# Patient Record
Sex: MALE | Race: WHITE | Employment: OTHER | URBAN - NONMETROPOLITAN AREA
[De-identification: names, ages, dates, MRNs, and addresses within clinical notes are randomized per-mention and may not be internally consistent; named-entity substitution may affect disease eponyms.]

---

## 2017-01-02 ENCOUNTER — OFFICE VISIT - GICH (OUTPATIENT)
Dept: PEDIATRICS | Facility: OTHER | Age: 47
End: 2017-01-02

## 2017-01-02 ENCOUNTER — AMBULATORY - GICH (OUTPATIENT)
Dept: ONCOLOGY | Facility: OTHER | Age: 47
End: 2017-01-02

## 2017-01-02 ENCOUNTER — HISTORY (OUTPATIENT)
Dept: PEDIATRICS | Facility: OTHER | Age: 47
End: 2017-01-02

## 2017-01-02 ENCOUNTER — HOSPITAL ENCOUNTER (OUTPATIENT)
Dept: RADIOLOGY | Facility: OTHER | Age: 47
End: 2017-01-02
Attending: INTERNAL MEDICINE

## 2017-01-02 ENCOUNTER — TRANSFERRED RECORDS (OUTPATIENT)
Dept: HEALTH INFORMATION MANAGEMENT | Facility: CLINIC | Age: 47
End: 2017-01-02

## 2017-01-02 ENCOUNTER — AMBULATORY - GICH (OUTPATIENT)
Dept: LAB | Facility: OTHER | Age: 47
End: 2017-01-02

## 2017-01-02 DIAGNOSIS — D63.8 ANEMIA IN OTHER CHRONIC DISEASES CLASSIFIED ELSEWHERE: ICD-10-CM

## 2017-01-02 DIAGNOSIS — M51.16 INTERVERTEBRAL DISC DISORDER WITH RADICULOPATHY OF LUMBAR REGION: ICD-10-CM

## 2017-01-02 DIAGNOSIS — R61 GENERALIZED HYPERHIDROSIS: ICD-10-CM

## 2017-01-02 DIAGNOSIS — C64.9 MALIGNANT NEOPLASM OF KIDNEY EXCLUDING RENAL PELVIS (H): ICD-10-CM

## 2017-01-02 DIAGNOSIS — Z01.812 ENCOUNTER FOR PREPROCEDURAL LABORATORY EXAMINATION: ICD-10-CM

## 2017-01-02 DIAGNOSIS — M54.16 RADICULOPATHY OF LUMBAR REGION: ICD-10-CM

## 2017-01-02 DIAGNOSIS — M99.83 OTHER BIOMECHANICAL LESIONS OF LUMBAR REGION (CODE): ICD-10-CM

## 2017-01-02 LAB
CREAT SERPL-MCNC: 9.87 MG/DL (ref 0.7–1.3)
GFR IF NOT AFRICAN AMERICAN - HISTORICAL: 6 ML/MIN/1.73M2

## 2017-01-03 ENCOUNTER — MEDICAL CORRESPONDENCE (OUTPATIENT)
Dept: TRANSPLANT | Facility: CLINIC | Age: 47
End: 2017-01-03

## 2017-01-06 ENCOUNTER — HOSPITAL ENCOUNTER (OUTPATIENT)
Dept: PHYSICAL THERAPY | Facility: OTHER | Age: 47
Setting detail: THERAPIES SERIES
End: 2017-01-06
Attending: INTERNAL MEDICINE

## 2017-01-06 DIAGNOSIS — M99.83 OTHER BIOMECHANICAL LESIONS OF LUMBAR REGION (CODE): ICD-10-CM

## 2017-01-06 DIAGNOSIS — M51.16 INTERVERTEBRAL DISC DISORDER WITH RADICULOPATHY OF LUMBAR REGION: ICD-10-CM

## 2017-01-07 ENCOUNTER — AMBULATORY - GICH (OUTPATIENT)
Dept: SCHEDULING | Facility: OTHER | Age: 47
End: 2017-01-07

## 2017-01-10 ENCOUNTER — AMBULATORY - GICH (OUTPATIENT)
Dept: LAB | Facility: OTHER | Age: 47
End: 2017-01-10

## 2017-01-10 DIAGNOSIS — Z99.2 DEPENDENCE ON RENAL DIALYSIS (H): ICD-10-CM

## 2017-01-10 LAB
HEMOGLOBIN: 14.2 G/DL (ref 13.5–17.5)
MCV RBC AUTO: 100 FL (ref 80–100)

## 2017-01-11 ENCOUNTER — HOSPITAL ENCOUNTER (OUTPATIENT)
Dept: RADIOLOGY | Facility: OTHER | Age: 47
End: 2017-01-11
Attending: INTERNAL MEDICINE

## 2017-01-11 ENCOUNTER — AMBULATORY - GICH (OUTPATIENT)
Dept: ONCOLOGY | Facility: OTHER | Age: 47
End: 2017-01-11

## 2017-01-11 DIAGNOSIS — D63.8 ANEMIA IN OTHER CHRONIC DISEASES CLASSIFIED ELSEWHERE: ICD-10-CM

## 2017-01-11 DIAGNOSIS — C64.9 MALIGNANT NEOPLASM OF KIDNEY EXCLUDING RENAL PELVIS (H): ICD-10-CM

## 2017-01-20 ENCOUNTER — AMBULATORY - GICH (OUTPATIENT)
Dept: RADIOLOGY | Facility: OTHER | Age: 47
End: 2017-01-20

## 2017-01-20 DIAGNOSIS — G25.3 MYOCLONUS: ICD-10-CM

## 2017-01-20 DIAGNOSIS — G62.9 POLYNEUROPATHY: ICD-10-CM

## 2017-01-25 ENCOUNTER — OFFICE VISIT - GICH (OUTPATIENT)
Dept: ONCOLOGY | Facility: OTHER | Age: 47
End: 2017-01-25

## 2017-01-25 ENCOUNTER — TRANSFERRED RECORDS (OUTPATIENT)
Dept: HEALTH INFORMATION MANAGEMENT | Facility: HOSPITAL | Age: 47
End: 2017-01-25

## 2017-01-25 ENCOUNTER — HOSPITAL ENCOUNTER (OUTPATIENT)
Dept: RADIOLOGY | Facility: OTHER | Age: 47
End: 2017-01-25

## 2017-01-25 ENCOUNTER — HISTORY (OUTPATIENT)
Dept: ONCOLOGY | Facility: OTHER | Age: 47
End: 2017-01-25

## 2017-01-25 DIAGNOSIS — G25.3 MYOCLONUS: ICD-10-CM

## 2017-01-25 DIAGNOSIS — C64.9 MALIGNANT NEOPLASM OF KIDNEY EXCLUDING RENAL PELVIS (H): ICD-10-CM

## 2017-01-25 DIAGNOSIS — D63.8 ANEMIA IN OTHER CHRONIC DISEASES CLASSIFIED ELSEWHERE: ICD-10-CM

## 2017-01-25 DIAGNOSIS — G62.9 POLYNEUROPATHY: ICD-10-CM

## 2017-01-25 LAB
ERYTHROCYTE [SEDIMENTATION RATE] IN BLOOD: 15 MM/HR
IRON BINDING CAP: 223.72 UG/DL (ref 245–400)
IRON SATURATION: 51 % (ref 20–55)
IRON: 115 UG/DL (ref 50–212)
RHEUMATOID FACTOR - HISTORICAL: 20.8 IU/ML
TSH - HISTORICAL: 3.38 UIU/ML (ref 0.35–4.94)
UIBC (UNSATURATED) - HISTORICAL: 108.72 MG/DL

## 2017-01-26 LAB
ELP INTERP,SERUM - HISTORICAL: NORMAL
ELP,ALBUMIN - HISTORICAL: 4.1 G/DL (ref 3.31–5.31)
ELP,ALPHA 1 - HISTORICAL: 0.28 G/DL (ref 0.19–0.42)
ELP,ALPHA 2 - HISTORICAL: 0.63 G/DL (ref 0.44–1.03)
ELP,BETA - HISTORICAL: 0.63 G/DL (ref 0.52–1.05)
ELP,GAMMA - HISTORICAL: 1.45 G/DL (ref 0.59–1.46)
PROT SERPL-MCNC: 7.1 G/DL (ref 6–8)

## 2017-01-27 LAB
ANTI-NATIVE DNA - HISTORICAL: NEGATIVE
Lab: 20.5 MIU/ML (ref 2.6–18.5)

## 2017-01-31 ENCOUNTER — TRANSFERRED RECORDS (OUTPATIENT)
Dept: HEALTH INFORMATION MANAGEMENT | Facility: CLINIC | Age: 47
End: 2017-01-31

## 2017-01-31 ENCOUNTER — AMBULATORY - GICH (OUTPATIENT)
Dept: SCHEDULING | Facility: OTHER | Age: 47
End: 2017-01-31

## 2017-02-10 ENCOUNTER — TELEPHONE (OUTPATIENT)
Dept: TRANSPLANT | Facility: CLINIC | Age: 47
End: 2017-02-10

## 2017-02-10 ENCOUNTER — AMBULATORY - GICH (OUTPATIENT)
Dept: SCHEDULING | Facility: OTHER | Age: 47
End: 2017-02-10

## 2017-02-10 ENCOUNTER — HISTORY (OUTPATIENT)
Dept: PEDIATRICS | Facility: OTHER | Age: 47
End: 2017-02-10

## 2017-02-10 ENCOUNTER — OFFICE VISIT - GICH (OUTPATIENT)
Dept: PEDIATRICS | Facility: OTHER | Age: 47
End: 2017-02-10

## 2017-02-10 ENCOUNTER — COMMUNICATION - GICH (OUTPATIENT)
Dept: INTERNAL MEDICINE | Facility: OTHER | Age: 47
End: 2017-02-10

## 2017-02-10 ENCOUNTER — COMMUNICATION - GICH (OUTPATIENT)
Dept: ONCOLOGY | Facility: OTHER | Age: 47
End: 2017-02-10

## 2017-02-10 ENCOUNTER — COMMUNICATION - GICH (OUTPATIENT)
Dept: PEDIATRICS | Facility: OTHER | Age: 47
End: 2017-02-10

## 2017-02-10 DIAGNOSIS — Z99.2 DEPENDENCE ON RENAL DIALYSIS (H): ICD-10-CM

## 2017-02-10 DIAGNOSIS — Z79.899 OTHER LONG TERM (CURRENT) DRUG THERAPY: ICD-10-CM

## 2017-02-10 DIAGNOSIS — M54.16 RADICULOPATHY OF LUMBAR REGION: ICD-10-CM

## 2017-02-10 DIAGNOSIS — N18.6 END STAGE RENAL DISEASE (H): ICD-10-CM

## 2017-02-10 DIAGNOSIS — Z94.0 HISTORY OF KIDNEY TRANSPLANT: ICD-10-CM

## 2017-02-10 DIAGNOSIS — R34 ANURIA AND OLIGURIA: ICD-10-CM

## 2017-02-10 ASSESSMENT — PATIENT HEALTH QUESTIONNAIRE - PHQ9: SUM OF ALL RESPONSES TO PHQ QUESTIONS 1-9: 0

## 2017-02-10 NOTE — TELEPHONE ENCOUNTER
Received Oncologist, Dr. Raul Campbell's Note dated 01/25/2017. Note stated concern for rule out for myeloma and need to assess this with SPEP and otherwise pt is cancer free. I called Dr. Campbell's office today and spoke with his nurse who explained SPEP was done and negative but Dr. Campbell also now mentioned in patient's chart concern for mono clonal antibody presence. Nurse will send Dr. Campbell a message to call me about possible myeloma diagnosis and transferred me to HIM where I left a message requesting records for lab work associated with appt 01-.

## 2017-02-13 ENCOUNTER — AMBULATORY - GICH (OUTPATIENT)
Dept: SCHEDULING | Facility: OTHER | Age: 47
End: 2017-02-13

## 2017-02-14 ENCOUNTER — AMBULATORY - GICH (OUTPATIENT)
Dept: LAB | Facility: OTHER | Age: 47
End: 2017-02-14

## 2017-02-14 ENCOUNTER — AMBULATORY - GICH (OUTPATIENT)
Dept: SCHEDULING | Facility: OTHER | Age: 47
End: 2017-02-14

## 2017-02-14 DIAGNOSIS — R34 ANURIA AND OLIGURIA: ICD-10-CM

## 2017-02-14 DIAGNOSIS — Z79.899 OTHER LONG TERM (CURRENT) DRUG THERAPY: ICD-10-CM

## 2017-02-17 ENCOUNTER — COMMUNICATION - GICH (OUTPATIENT)
Dept: INTERNAL MEDICINE | Facility: OTHER | Age: 47
End: 2017-02-17

## 2017-02-17 LAB
Lab: NORMAL
Lab: NORMAL MCG/ML
Lab: NORMAL NG/ML

## 2017-02-21 ENCOUNTER — HOSPITAL ENCOUNTER (OUTPATIENT)
Dept: PHYSICAL THERAPY | Facility: OTHER | Age: 47
Setting detail: THERAPIES SERIES
End: 2017-02-21
Attending: INTERNAL MEDICINE

## 2017-02-21 DIAGNOSIS — Z99.2 DEPENDENCE ON RENAL DIALYSIS (H): ICD-10-CM

## 2017-02-21 DIAGNOSIS — Z79.899 OTHER LONG TERM (CURRENT) DRUG THERAPY: ICD-10-CM

## 2017-02-21 DIAGNOSIS — Z94.0 HISTORY OF KIDNEY TRANSPLANT: ICD-10-CM

## 2017-02-21 DIAGNOSIS — N18.6 END STAGE RENAL DISEASE (H): ICD-10-CM

## 2017-02-21 DIAGNOSIS — M54.16 RADICULOPATHY OF LUMBAR REGION: ICD-10-CM

## 2017-02-23 ENCOUNTER — TELEPHONE (OUTPATIENT)
Dept: TRANSPLANT | Facility: CLINIC | Age: 47
End: 2017-02-23

## 2017-02-23 ENCOUNTER — MEDICAL CORRESPONDENCE (OUTPATIENT)
Dept: TRANSPLANT | Facility: CLINIC | Age: 47
End: 2017-02-23

## 2017-02-23 DIAGNOSIS — N02.B9 IGA NEPHROPATHY: ICD-10-CM

## 2017-02-23 DIAGNOSIS — T86.91 TRANSPLANT FAILURE DUE TO REJECTION: ICD-10-CM

## 2017-02-23 DIAGNOSIS — N18.6 END STAGE RENAL DISEASE (H): ICD-10-CM

## 2017-02-23 DIAGNOSIS — Z76.82 ORGAN TRANSPLANT CANDIDATE: ICD-10-CM

## 2017-02-23 DIAGNOSIS — T86.12 KIDNEY TRANSPLANT FAILURE: ICD-10-CM

## 2017-02-23 NOTE — LETTER
February 24, 2017            Gilles Henning  [address]  [address]  [place] [facility] [address]      Dear Dr. Samantha Tobin,      We are writing to inform you that Gilles Henning has completed the referral process with us to be evaluated for a kidney transplant.    Their assigned Transplant Coordinator is Laura Benitez.  If you should have any questions or concerns, please don t hesitate to contact us.        Regards,           Solid Organ Transplant Intake   [facility], [address]  [address], [facility] [address]  Phone: [phone]  [place] [address]

## 2017-02-23 NOTE — LETTER
March 10, 2017    Gilles Junior  [address]  [address]  [facility] [address]      Dear Mr. Junior,    Attached is your Pre Kidney Evaluation schedule on March 22, 2017 and returning on June 12, 2017. Please feel free to contact me at [phone], if you have any questions.      Sincerely,   Frida LEAL    CC:Laura ESCOBAR                                                  [facility]  [address] [place]  [address]          EVALUATION SCHEDULE: KIDNEY TRANSPLANT SLOT 2 EVAL      Patient:   GILLES JUNIOR   MR#:    [id number]  Coordinator:  Laura ESCOBAR     [phone]  :   Frida LEAL     [phone]  Location:   [facility] 3[facility]  Date:    March 22, 2017 & June 12, 2017      This is your evaluation schedule, please follow dates and times.  You  will receive reminder phone calls for other tests, but please follow this  schedule only!  If you have any questions about dates and times,  please call us on number listed above.  Thank you, Transplant Clinic.     NO FOOD or DRINK AFTER MIDNIGHT  (You may only have small amounts of water)      Day/Date:   Wednesday, March 22, 2017  Time Location Activity   6:30a.m. [facility]  Imaging and Lab Testing  1st floor Blood tests (fasting, PLEASE)   7:15a.m. BREAKFAST     7:30a.m. [facility]  Transplant Services  3rd floor; Clinic 3A Check in & go thru evaluation schedule for the day, get vitals & medication records   7:50 - 9:00a.m. [facility]  Transplant Services  3rd floor; Clinic 3A Pre-transplant class   9:00a.m. [facility]  Transplant Services  3rd floor; Clinic 3B Appointment with Dr. Macias,   Transplant Nephrologist   9:45a.m.-  10:15a.m. [facility]  Transplant Services  3rd floor; Clinic 3A; Consult Room Appointment with Rukhsana Peña,  Registered  Dietitian   10:30a.m. [facility]  Transplant Services  3rd floor; Clinic 3A Meet with Laura ESCOBAR  Transplant Coordinator   11:00a.m. [facility]  Transplant Services  3rd floor; Clinic 3A Appointment with Dr. Lorenzo,  Transplant Surgeon   11:30a.m. [facility]  Transplant Services  3rd floor; Clinic 3A Research    12:00Noon [facility]  Transplant Services  3rd floor; Clinic 3A; Consult Room Appointment with either Anastasiya Low,  Transplant    12:45p.m.-  1:15p.m. LUNCH     PLEASE TAKE SHUTTLE GOING TO MEDICAL CENTER: [facility]     1:30p.m. [facility] Waiting Room  2nd floor [facility] EKG   2:00p.m. [facility] Waiting Room  2nd floor [facility] Chest X-ray   2:30p.m. [facility] Waiting Room  2nd floor [facility] Echocardiogram    PLEASE TAKE THE SHUTTLE BACK TO THE CLINICS AND SURGERY CENTER:    4:00p.m. [facility]  Imaging and Lab Testing  1st floor 2nd ABO blood draw - confirmation of 1st draw       Day/Date:   Monday, June 12, 2017  Time Location Activity   2:00p.m. [facility]  Cardiology/Heart Care Clinic  3rd floor; Clinic 3L Appointment with Dr. Campbell,   Cardiology

## 2017-02-24 ENCOUNTER — AMBULATORY - GICH (OUTPATIENT)
Dept: SCHEDULING | Facility: OTHER | Age: 47
End: 2017-02-24

## 2017-02-24 ENCOUNTER — HISTORY (OUTPATIENT)
Dept: EMERGENCY MEDICINE | Facility: OTHER | Age: 47
End: 2017-02-24

## 2017-02-24 ENCOUNTER — HOSPITAL ENCOUNTER (OUTPATIENT)
Dept: PHYSICAL THERAPY | Facility: OTHER | Age: 47
Setting detail: THERAPIES SERIES
End: 2017-02-24
Attending: INTERNAL MEDICINE

## 2017-02-24 NOTE — TELEPHONE ENCOUNTER
Intake Progress Note  Organ:  Kidney    Referral Came Via (Fax from/phone call from):  Phone call    Referring Physician:  Dr. Samantha Tobin     Assigned Coordinator:  Laura Benitez    Reported Diagnosis that caused the kidney failure ( not CKD)  IgA Nephritis, Cancer 2015, (Both Kidney removed at [facility])    Best time patient can be reached:  Anytime     has dialysis starts 0645-10:00 (T-Th-Sa)     Type of packet sent:  Kidney    Records:  [facility] requested from:  [facility], [facility], [facility]    Insurance information:  Medicare and [employer] Platinum  Policy araiza:  Self  Subscriber/policy/ID number:  [id number],   [id number]  Group Number:                                                   [id number]    History of diabetes: No    Do you have an endocrinologist?: No           History of a kidney biopsy:  Yes         If Yes,when and where:  [facility]  2015       Past Medical and Surgical History (updated in Epic medical / surgical):             History of  cancer personally:  Yes, What type? In the Left Kidney, was removed [facility] 2015              Where and when was it treated?[facility] 2015                            History of cardiac events:  No              History abdominal surgeries: Yes What type?Neprectomy of both Kidney's, Periteneal fluid retension=changed to Hemo>[facility]              Where and when? [facility] Nephrectomy, both Kidney's  2015        [facility] [place] 2015              History of previous transplant: Yes             If Yes where and estimated date:  [facility] 12/2009             Listed or in eval at another transplant center?  No             History of hospitalization in last 12 months:  No               History of blood transfusion:  Yes               Smoking history:  No     Current smoker:  No     On dialysis: Yes  Where: [facility]                  Type of Dialysis: In Center Dialysis   Start  "date: 2015       Dialysis Days:  T-Th-Sa       If NYOD, estimated GFR:  Unknown  Height:  5'10\"  Weight:  131  BMI:  18.8    Health Maintenance:  Colon:  Yes [facility]  2011  Dr. Dubose   PSA:  Yes [facility]  Dr. Kelly 11/2016  Dental: No but needs to go in  Vaccines Yes [facility] Dr. Tobin  Special Needs (ie--wheelchair, assistance, guardian, interpretor):  No      Informed patient on the need to arrange age appropriate cancer screening, vaccines up to date and dental clearance    Reviewed evaluation process and reminded patient to complete questionnaire, complete medical records release, and review packet prior to evaluation visit     Informed patient that coordinator will review their chart and insurance coverage and if no concerns they will receive a call from a  to schedule evaluation      "

## 2017-02-25 ENCOUNTER — AMBULATORY - GICH (OUTPATIENT)
Dept: SCHEDULING | Facility: OTHER | Age: 47
End: 2017-02-25

## 2017-02-27 ENCOUNTER — HOSPITAL ENCOUNTER (OUTPATIENT)
Dept: PHYSICAL THERAPY | Facility: OTHER | Age: 47
Setting detail: THERAPIES SERIES
End: 2017-02-27
Attending: INTERNAL MEDICINE

## 2017-02-27 NOTE — TELEPHONE ENCOUNTER
Called Dr. Raul Campbell ( Oncologist at [facility] ) whose last visit with pt was 01/25/2017 where he wrote that he would like to r/o an underlying myeloma. Dr. Campbell explained to me that he does not think pt has myeloma, that he will have pt RTC with him in 4 months from last visit and that he thinks pt is OK to proceed with kidney transplant from his point of view. I called pt today and spoke to him. I explained that all is OK now to proceed with a pre kidney transplant evaluation - pt is very happy and wants to proceed. I reviewed the components of the pre kidney transplant evaluation and process. I explained that a  from our office will call him soon to set up appts for pre K eval. I instructed pt to call me with questions. Pt expressed good understanding of all and was in good agreement with the plan.     Smart set orders into EPIC today for pre kidney transplant evaluation - routed to .

## 2017-03-01 ENCOUNTER — HOSPITAL ENCOUNTER (OUTPATIENT)
Dept: PHYSICAL THERAPY | Facility: OTHER | Age: 47
Setting detail: THERAPIES SERIES
End: 2017-03-01
Attending: INTERNAL MEDICINE

## 2017-03-02 ENCOUNTER — MEDICAL CORRESPONDENCE (OUTPATIENT)
Dept: TRANSPLANT | Facility: CLINIC | Age: 47
End: 2017-03-02

## 2017-03-08 ENCOUNTER — HOSPITAL ENCOUNTER (OUTPATIENT)
Dept: PHYSICAL THERAPY | Facility: OTHER | Age: 47
Setting detail: THERAPIES SERIES
End: 2017-03-08
Attending: INTERNAL MEDICINE

## 2017-03-10 ENCOUNTER — HOSPITAL ENCOUNTER (OUTPATIENT)
Dept: PHYSICAL THERAPY | Facility: OTHER | Age: 47
Setting detail: THERAPIES SERIES
End: 2017-03-10
Attending: INTERNAL MEDICINE

## 2017-03-11 ENCOUNTER — AMBULATORY - GICH (OUTPATIENT)
Dept: SCHEDULING | Facility: OTHER | Age: 47
End: 2017-03-11

## 2017-03-16 ENCOUNTER — HISTORY (OUTPATIENT)
Dept: PEDIATRICS | Facility: OTHER | Age: 47
End: 2017-03-16

## 2017-03-16 ENCOUNTER — OFFICE VISIT - GICH (OUTPATIENT)
Dept: PEDIATRICS | Facility: OTHER | Age: 47
End: 2017-03-16

## 2017-03-16 DIAGNOSIS — N05.9 NEPHRITIC SYNDROME WITH MORPHOLOGIC CHANGE: ICD-10-CM

## 2017-03-16 DIAGNOSIS — M54.16 RADICULOPATHY OF LUMBAR REGION: ICD-10-CM

## 2017-03-16 DIAGNOSIS — Z94.0 HISTORY OF KIDNEY TRANSPLANT: ICD-10-CM

## 2017-03-16 ASSESSMENT — PATIENT HEALTH QUESTIONNAIRE - PHQ9: SUM OF ALL RESPONSES TO PHQ QUESTIONS 1-9: 0

## 2017-03-20 ENCOUNTER — HISTORY (OUTPATIENT)
Dept: EMERGENCY MEDICINE | Facility: OTHER | Age: 47
End: 2017-03-20

## 2017-03-21 ENCOUNTER — TELEPHONE (OUTPATIENT)
Dept: TRANSPLANT | Facility: CLINIC | Age: 47
End: 2017-03-21

## 2017-03-22 ENCOUNTER — COMMUNICATION - GICH (OUTPATIENT)
Dept: PEDIATRICS | Facility: OTHER | Age: 47
End: 2017-03-22

## 2017-03-22 DIAGNOSIS — K21.9 GASTRO-ESOPHAGEAL REFLUX DISEASE WITHOUT ESOPHAGITIS: ICD-10-CM

## 2017-04-05 ENCOUNTER — ALLIED HEALTH/NURSE VISIT (OUTPATIENT)
Dept: TRANSPLANT | Facility: CLINIC | Age: 47
End: 2017-04-05
Attending: INTERNAL MEDICINE
Payer: MEDICARE

## 2017-04-05 ENCOUNTER — AMBULATORY - GICH (OUTPATIENT)
Dept: SCHEDULING | Facility: OTHER | Age: 47
End: 2017-04-05

## 2017-04-05 ENCOUNTER — RESULTS ONLY (OUTPATIENT)
Dept: OTHER | Facility: CLINIC | Age: 47
End: 2017-04-05

## 2017-04-05 ENCOUNTER — ALLIED HEALTH/NURSE VISIT (OUTPATIENT)
Dept: TRANSPLANT | Facility: CLINIC | Age: 47
End: 2017-04-05
Attending: DIETITIAN, REGISTERED
Payer: MEDICARE

## 2017-04-05 ENCOUNTER — OFFICE VISIT (OUTPATIENT)
Dept: TRANSPLANT | Facility: CLINIC | Age: 47
End: 2017-04-05
Attending: TRANSPLANT SURGERY
Payer: MEDICARE

## 2017-04-05 ENCOUNTER — RADIANT APPOINTMENT (OUTPATIENT)
Dept: CARDIOLOGY | Facility: CLINIC | Age: 47
End: 2017-04-05
Attending: PHYSICIAN ASSISTANT

## 2017-04-05 VITALS
HEART RATE: 58 BPM | DIASTOLIC BLOOD PRESSURE: 88 MMHG | TEMPERATURE: 98.2 F | HEIGHT: 70 IN | SYSTOLIC BLOOD PRESSURE: 158 MMHG | OXYGEN SATURATION: 99 % | BODY MASS INDEX: 19.79 KG/M2 | WEIGHT: 138.2 LBS

## 2017-04-05 VITALS
HEIGHT: 70 IN | WEIGHT: 138.2 LBS | BODY MASS INDEX: 19.79 KG/M2 | SYSTOLIC BLOOD PRESSURE: 158 MMHG | DIASTOLIC BLOOD PRESSURE: 88 MMHG | TEMPERATURE: 98.2 F | OXYGEN SATURATION: 99 % | HEART RATE: 58 BPM

## 2017-04-05 DIAGNOSIS — T86.91 TRANSPLANT FAILURE DUE TO REJECTION: ICD-10-CM

## 2017-04-05 DIAGNOSIS — N18.6 ESRD (END STAGE RENAL DISEASE) (H): Primary | ICD-10-CM

## 2017-04-05 DIAGNOSIS — Z76.82 ORGAN TRANSPLANT CANDIDATE: ICD-10-CM

## 2017-04-05 DIAGNOSIS — T86.12 KIDNEY TRANSPLANT FAILURE: ICD-10-CM

## 2017-04-05 DIAGNOSIS — N02.B9 IGA NEPHROPATHY: ICD-10-CM

## 2017-04-05 DIAGNOSIS — Z76.82 ORGAN TRANSPLANT CANDIDATE: Primary | ICD-10-CM

## 2017-04-05 DIAGNOSIS — Z01.818 PRE-TRANSPLANT EVALUATION FOR KIDNEY TRANSPLANT: Primary | ICD-10-CM

## 2017-04-05 DIAGNOSIS — N18.6 END STAGE RENAL DISEASE (H): ICD-10-CM

## 2017-04-05 DIAGNOSIS — Z76.82 AWAITING ORGAN TRANSPLANT STATUS: Primary | ICD-10-CM

## 2017-04-05 DIAGNOSIS — Z01.818 ENCOUNTER FOR PRE-TRANSPLANT EVALUATION FOR KIDNEY TRANSPLANT: Primary | ICD-10-CM

## 2017-04-05 LAB
ABO + RH BLD: NORMAL
ALBUMIN SERPL-MCNC: 4 G/DL (ref 3.4–5)
ALP SERPL-CCNC: 130 U/L (ref 40–150)
ALT SERPL W P-5'-P-CCNC: 13 U/L (ref 0–70)
ANION GAP SERPL CALCULATED.3IONS-SCNC: 13 MMOL/L (ref 3–14)
APTT PPP: 28 SEC (ref 22–37)
AST SERPL W P-5'-P-CCNC: 11 U/L (ref 0–45)
BASOPHILS # BLD AUTO: 0 10E9/L (ref 0–0.2)
BASOPHILS NFR BLD AUTO: 0.7 %
BILIRUB SERPL-MCNC: 0.8 MG/DL (ref 0.2–1.3)
BLD GP AB SCN SERPL QL: NORMAL
BLD GP AB SCN TITR SERPL: NORMAL {TITER}
BLOOD BANK CMNT PATIENT-IMP: NORMAL
BUN SERPL-MCNC: 61 MG/DL (ref 7–30)
CALCIUM SERPL-MCNC: 8.3 MG/DL (ref 8.5–10.1)
CARDIOLIPIN ANTIBODY IGG: NORMAL GPL-U/ML (ref 0–19.9)
CARDIOLIPIN ANTIBODY IGM: 1.2 MPL-U/ML (ref 0–19.9)
CHLORIDE SERPL-SCNC: 91 MMOL/L (ref 94–109)
CHOLEST SERPL-MCNC: 225 MG/DL
CMV IGG SERPL QL IA: ABNORMAL AI (ref 0–0.8)
CO2 SERPL-SCNC: 32 MMOL/L (ref 20–32)
CREAT SERPL-MCNC: 8.12 MG/DL (ref 0.66–1.25)
DIFFERENTIAL METHOD BLD: ABNORMAL
EBV VCA IGG SER QL IA: 0.4 AI (ref 0–0.8)
EOSINOPHIL # BLD AUTO: 0.1 10E9/L (ref 0–0.7)
EOSINOPHIL NFR BLD AUTO: 2 %
ERYTHROCYTE [DISTWIDTH] IN BLOOD BY AUTOMATED COUNT: 19.9 % (ref 10–15)
GFR SERPL CREATININE-BSD FRML MDRD: 7 ML/MIN/1.7M2
GLUCOSE SERPL-MCNC: 96 MG/DL (ref 70–99)
HBV CORE AB SERPL QL IA: NONREACTIVE
HBV SURFACE AB SERPL IA-ACNC: 26.31 M[IU]/ML
HBV SURFACE AG SERPL QL IA: NONREACTIVE
HCT VFR BLD AUTO: 34.8 % (ref 40–53)
HCV AB SERPL QL IA: NORMAL
HDLC SERPL-MCNC: 50 MG/DL
HGB BLD-MCNC: 11.3 G/DL (ref 13.3–17.7)
HIV 1+2 AB+HIV1 P24 AG SERPL QL IA: NORMAL
IMM GRANULOCYTES # BLD: 0 10E9/L (ref 0–0.4)
IMM GRANULOCYTES NFR BLD: 0.5 %
INR PPP: 1.06 (ref 0.86–1.14)
LDLC SERPL CALC-MCNC: 151 MG/DL
LYMPHOCYTES # BLD AUTO: 0.5 10E9/L (ref 0.8–5.3)
LYMPHOCYTES NFR BLD AUTO: 12.9 %
MCH RBC QN AUTO: 33.6 PG (ref 26.5–33)
MCHC RBC AUTO-ENTMCNC: 32.5 G/DL (ref 31.5–36.5)
MCV RBC AUTO: 104 FL (ref 78–100)
MONOCYTES # BLD AUTO: 0.6 10E9/L (ref 0–1.3)
MONOCYTES NFR BLD AUTO: 15.6 %
NEUTROPHILS # BLD AUTO: 2.8 10E9/L (ref 1.6–8.3)
NEUTROPHILS NFR BLD AUTO: 68.3 %
NONHDLC SERPL-MCNC: 175 MG/DL
NRBC # BLD AUTO: 0 10*3/UL
NRBC BLD AUTO-RTO: 0 /100
PHOSPHATE SERPL-MCNC: 3.9 MG/DL (ref 2.5–4.5)
PLATELET # BLD AUTO: 163 10E9/L (ref 150–450)
POTASSIUM SERPL-SCNC: 4.2 MMOL/L (ref 3.4–5.3)
PROT SERPL-MCNC: 7.7 G/DL (ref 6.8–8.8)
RBC # BLD AUTO: 3.36 10E12/L (ref 4.4–5.9)
SODIUM SERPL-SCNC: 136 MMOL/L (ref 133–144)
SPECIMEN EXP DATE BLD: NORMAL
SPECIMEN EXP DATE BLD: NORMAL
T PALLIDUM IGG+IGM SER QL: NEGATIVE
THROMBIN TIME: 17.5 SEC (ref 13–19)
TRIGL SERPL-MCNC: 119 MG/DL
VZV IGG SER QL IA: 2.2 AI (ref 0–0.8)
WBC # BLD AUTO: 4 10E9/L (ref 4–11)

## 2017-04-05 PROCEDURE — 86665 EPSTEIN-BARR CAPSID VCA: CPT | Performed by: PHYSICIAN ASSISTANT

## 2017-04-05 PROCEDURE — 86832 HLA CLASS I HIGH DEFIN QUAL: CPT | Performed by: PHYSICIAN ASSISTANT

## 2017-04-05 PROCEDURE — 93010 ELECTROCARDIOGRAM REPORT: CPT | Performed by: INTERNAL MEDICINE

## 2017-04-05 PROCEDURE — 85610 PROTHROMBIN TIME: CPT | Performed by: PHYSICIAN ASSISTANT

## 2017-04-05 PROCEDURE — 86706 HEP B SURFACE ANTIBODY: CPT | Performed by: PHYSICIAN ASSISTANT

## 2017-04-05 PROCEDURE — 81376 HLA II TYPING 1 LOCUS LR: CPT | Mod: 91 | Performed by: PHYSICIAN ASSISTANT

## 2017-04-05 PROCEDURE — 86886 COOMBS TEST INDIRECT TITER: CPT | Performed by: PHYSICIAN ASSISTANT

## 2017-04-05 PROCEDURE — 86833 HLA CLASS II HIGH DEFIN QUAL: CPT | Performed by: PHYSICIAN ASSISTANT

## 2017-04-05 PROCEDURE — 86644 CMV ANTIBODY: CPT | Performed by: PHYSICIAN ASSISTANT

## 2017-04-05 PROCEDURE — 80061 LIPID PANEL: CPT | Performed by: PHYSICIAN ASSISTANT

## 2017-04-05 PROCEDURE — 87340 HEPATITIS B SURFACE AG IA: CPT | Performed by: PHYSICIAN ASSISTANT

## 2017-04-05 PROCEDURE — 86147 CARDIOLIPIN ANTIBODY EA IG: CPT | Performed by: PHYSICIAN ASSISTANT

## 2017-04-05 PROCEDURE — 86803 HEPATITIS C AB TEST: CPT | Performed by: PHYSICIAN ASSISTANT

## 2017-04-05 PROCEDURE — 86704 HEP B CORE ANTIBODY TOTAL: CPT | Performed by: PHYSICIAN ASSISTANT

## 2017-04-05 PROCEDURE — 86905 BLOOD TYPING RBC ANTIGENS: CPT | Performed by: PHYSICIAN ASSISTANT

## 2017-04-05 PROCEDURE — 81375 HLA II TYPING AG EQUIV LR: CPT | Performed by: PHYSICIAN ASSISTANT

## 2017-04-05 PROCEDURE — 85025 COMPLETE CBC W/AUTO DIFF WBC: CPT | Performed by: PHYSICIAN ASSISTANT

## 2017-04-05 PROCEDURE — 85670 THROMBIN TIME PLASMA: CPT | Performed by: PHYSICIAN ASSISTANT

## 2017-04-05 PROCEDURE — 81240 F2 GENE: CPT | Performed by: PHYSICIAN ASSISTANT

## 2017-04-05 PROCEDURE — 85730 THROMBOPLASTIN TIME PARTIAL: CPT | Mod: 91 | Performed by: PHYSICIAN ASSISTANT

## 2017-04-05 PROCEDURE — 85730 THROMBOPLASTIN TIME PARTIAL: CPT | Performed by: PHYSICIAN ASSISTANT

## 2017-04-05 PROCEDURE — 86780 TREPONEMA PALLIDUM: CPT | Performed by: PHYSICIAN ASSISTANT

## 2017-04-05 PROCEDURE — 84100 ASSAY OF PHOSPHORUS: CPT | Performed by: PHYSICIAN ASSISTANT

## 2017-04-05 PROCEDURE — 81241 F5 GENE: CPT | Performed by: PHYSICIAN ASSISTANT

## 2017-04-05 PROCEDURE — 85613 RUSSELL VIPER VENOM DILUTED: CPT | Performed by: PHYSICIAN ASSISTANT

## 2017-04-05 PROCEDURE — 86850 RBC ANTIBODY SCREEN: CPT | Performed by: PHYSICIAN ASSISTANT

## 2017-04-05 PROCEDURE — 86787 VARICELLA-ZOSTER ANTIBODY: CPT | Performed by: PHYSICIAN ASSISTANT

## 2017-04-05 PROCEDURE — 80053 COMPREHEN METABOLIC PANEL: CPT | Performed by: PHYSICIAN ASSISTANT

## 2017-04-05 PROCEDURE — 87799 DETECT AGENT NOS DNA QUANT: CPT | Mod: XU | Performed by: PHYSICIAN ASSISTANT

## 2017-04-05 PROCEDURE — 40000866 ZZHCL STATISTIC HIV 1/2 ANTIGEN/ANTIBODY PRETRANSPLANT ONLY: Performed by: PHYSICIAN ASSISTANT

## 2017-04-05 PROCEDURE — 86901 BLOOD TYPING SEROLOGIC RH(D): CPT | Performed by: PHYSICIAN ASSISTANT

## 2017-04-05 PROCEDURE — 86900 BLOOD TYPING SEROLOGIC ABO: CPT | Mod: 91 | Performed by: PHYSICIAN ASSISTANT

## 2017-04-05 PROCEDURE — 36415 COLL VENOUS BLD VENIPUNCTURE: CPT | Performed by: PHYSICIAN ASSISTANT

## 2017-04-05 RX ORDER — AZATHIOPRINE 50 MG/1
50 TABLET ORAL DAILY
Qty: 60 TABLET | Status: ON HOLD | COMMUNITY
Start: 2017-04-05 | End: 2017-12-17

## 2017-04-05 RX ORDER — TACROLIMUS 1 MG/1
1 CAPSULE, GELATIN COATED ORAL 2 TIMES DAILY
Qty: 240 CAPSULE | Status: ON HOLD | COMMUNITY
Start: 2017-04-05 | End: 2017-12-17

## 2017-04-05 RX ORDER — SENNOSIDES 8.6 MG
1 TABLET ORAL DAILY PRN
Status: ON HOLD | COMMUNITY
End: 2017-12-17

## 2017-04-05 RX ORDER — PREDNISONE 5 MG/1
20 TABLET ORAL DAILY
Status: ON HOLD | COMMUNITY
Start: 2017-04-05 | End: 2017-12-17

## 2017-04-05 ASSESSMENT — PAIN SCALES - GENERAL
PAINLEVEL: NO PAIN (0)
PAINLEVEL: NO PAIN (0)

## 2017-04-05 NOTE — LETTER
4/5/2017       RE: Gilles Henning  [address]  [facility] [address]     Dear Colleague,    Thank you for referring your patient, Gilles Henning, to the [facility] SOLID ORGAN TRANSPLANT at [facility]. Please see a copy of my visit note below.    Patient attended the Pre Kidney/Pancreas Transplant Education Class today with his wife. All were very attentive and enganged throughout the class and asked few questions. I introduced the [facility] website and encouraged them to access it for further education. In addition to the standard video content. I reviewed living donation paired exchange, KDPI, typical length of stay and the need to stay locally post transplant discharge.    Again, thank you for allowing me to participate in the care of your patient.      Sincerely,    Transplant Class

## 2017-04-05 NOTE — PROGRESS NOTES
Patient attended the Pre Kidney/Pancreas Transplant Education Class today with his wife. All were very attentive and enganged throughout the class and asked few questions. I introduced the [facility] website and encouraged them to access it for further education. In addition to the standard video content. I reviewed living donation paired exchange, KDPI, typical length of stay and the need to stay locally post transplant discharge.

## 2017-04-05 NOTE — MR AVS SNAPSHOT
"              After Visit Summary   4/5/2017    Gilles Henning    MRN: [id number]           Patient Information     Date Of Birth          1970        Visit Information        Provider Department      4/5/2017 9:45 AM Mary Peña RD [facility] Solid Organ Transplant        Today's Diagnoses     Organ transplant candidate    -  1       Follow-ups after your visit        Your next 10 appointments already scheduled     Jun 12, 2017  2:00 PM CDT   (Arrive by 1:45 PM)   NEW PANCREAS/KIDNEY TRANSPLANT WORK-UP with Marisa Campbell MD   [facility]    [address] [place] [id number] [phone]              Who to contact     If you have questions or need follow up information about today's clinic visit or your schedule please contact [facility] SOLID ORGAN TRANSPLANT directly at [phone].  Normal or non-critical lab and imaging results will be communicated to you by MyChart, letter or phone within 4 business days after the clinic has received the results. If you do not hear from us within 7 days, please contact the clinic through [employer] or phone. If you have a critical or abnormal lab result, we will notify you by phone as soon as possible.  Submit refill requests through "[employer] or call your pharmacy and they will forward the refill request to us. Please allow 3 business days for your refill to be completed.          Additional Information About Your Visit        MyChart Information     "[employer] lets you send messages to your doctor, view your test results, renew your prescriptions, schedule appointments and more. To sign up, go to [email or web address]/"[email or web address] . Click on \"Log in\" on the left side of the screen, which will take you to the Welcome page. Then click on \"Sign up Now\" on the right side of the page.     You will be asked to enter the access code listed below, as well as some personal information. Please follow the directions " to create your username and password.     Your access code is: [email or web address]  Expires: 2017  6:30 AM     Your access code will  in 90 days. If you need help or a new code, please call your [facility] or [phone].        Care EveryWhere ID     This is your Care EveryWhere ID. This could be used by other organizations to access your [facility] medical records  [id number]         Blood Pressure from Last 3 Encounters:   17 158/88   17 158/88    Weight from Last 3 Encounters:   17 62.7 kg (138 lb 3.2 oz)   17 62.7 kg (138 lb 3.2 oz)              Today, you had the following     No orders found for display       Primary Care Provider Office Phone # Fax #    Charlie Darrion Dubose [phone]       [facility] [address] [facility] [address]        Thank you!     Thank you for choosing [facility] SOLID ORGAN TRANSPLANT  for your care. Our goal is always to provide you with excellent care. Hearing back from our patients is one way we can continue to improve our services. Please take a few minutes to complete the written survey that you may receive in the mail after your visit with us. Thank you!             Your Updated Medication List - Protect others around you: Learn how to safely use, store and throw away your medicines at www.disposemymeds.org.          This list is accurate as of: 17 11:59 PM.  Always use your most recent med list.                   Brand Name Dispense Instructions for use    azaTHIOprine 50 MG tablet    IMURAN    60 tablet    Take 1 tablet (50 mg) by mouth daily       calcium acetate 667 MG Caps capsule    PHOSLO     Take 667 mg by mouth 3 times daily (with meals)       CLONAZEPAM PO      Take 1 mg by mouth 2 times daily as needed for anxiety       * LISINOPRIL PO      Take 10 mg by mouth daily       * LISINOPRIL PO      Take 5 mg by mouth daily       METOPROLOL TARTRATE PO      Take 25 mg by mouth daily       OMEPRAZOLE  PO      Take 40 mg by mouth every morning       predniSONE 5 MG tablet    DELTASONE     Take 1 tablet (5 mg) by mouth daily       sennosides 8.6 MG tablet    SENOKOT     Take 1 tablet by mouth daily as needed for constipation       tacrolimus capsule     240 capsule    Take 1 capsule (1 mg) by mouth 2 times daily       TRAZODONE HCL PO      Take 300 mg by mouth At Bedtime       VITAMIN D (CHOLECALCIFEROL) PO      Take 2,000 Units by mouth daily       * Notice:  This list has 2 medication(s) that are the same as other medications prescribed for you. Read the directions carefully, and ask your doctor or other care provider to review them with you.

## 2017-04-05 NOTE — PROGRESS NOTES
Transplant Surgery Consult Note     Medical record number: [id number]  YOB: 1970,   Consult requested by Dr. Tobin for evaluation of kidney transplant candidacy.    Assessment and Recommendations:Mr. Henning appears to be a good candidate for kidney transplantation and has a good understanding of the risks and benefits of this approach to the management of renal failure. The following issues should be addressed prior to finalizing his transplant candidacy:     1. Immunosuppression: ok to wean pred as long as continue on tac and imuran.  2. Review OP notes for extent of surgery (patient says something about partial splenectomy).  3. EBV negative serostatus.  No symptoms of weight loss.   Could consider EBV PCR add on from today's labs.  4. Retransplant: on immunosuppression.  Check BK PCR on eval labs.    The majority of our visit was spent in counselling, discussing the medical and surgical risks of kidney transplantation. We discussed approximate wait time and how that is influenced by issues such as blood type and sensitization (PRA) and access to a living donor. I contrasted potential waiting time for living vs  donor kidneys from  normal (0-85%) or higher (%) kidney donor profile index (KDPI) donors and their associated outcomes. I would not recommend this individual to consider kidneys from high KDPI donors. Potential surgical complications of kidney transplantation include bleeding, superficial or deep wound complications (infection, hernia, lymphocele), ureteral anastomotic failure (leak or stenosis), graft thrombosis, need for reoperation and other issues such as cardiac complications, pneumonia, deep venous thrombosis, pulmonary embolism, post transplant diabetes and death. The potential for recurrent disease or need for retransplantation was also addressed. We discussed the possible need for ureteral stent (and subsequent removal), and the utility of protocol biopsy and  laboratory studies to evaluate for rejection or recurrent disease. We discussed the risk of graft rejection, our center's average graft and patient survival rates, immunosuppression protocols, as well as the potential opportunity to participate in clinical trials.  We also discussed the average length of stay, recovery process, and posttransplant lab and monitoring protocol.  I emphasized the need for strict immunosuppression medication adherence and the potential for complications of immunosuppression such as skin cancer or lymphoma, as well as a very low but not zero risk of donor-derived disease transmission risks (infection, cancer). Mr. Henning asked good questions and his candidacy will be reviewed at our Multidisciplinary Selection Committee. Thank you for the opportunity to participate in Mr. Henning's care.      Total time: 45 minutes  Counselling time: 40 minutes            Mairo Vallejo MD  Department of Surgery  ---------------------------------------------------------------------------------------------------    HPI: Mr. Henning has End stage renal failure due to IgA Nephropathy. The patient is non-diabetic.   He [place] [facility] [place] with [place]  donor kidney.  He had good graft function until requiring bilateral nephrectomy for RCC.  He is now tumor free.  He is active, doing workout (weight/bike) about 5 days a week. Does not make urine at this time.  No known living donor options.  History of heptitis A, uncomplicated.  He tolerated MMF for the first year of his transplant until he developed severe diarrhea w 40 lb weight loss, colonoscopy proven MMF toxicity.  On aza since that time.  Big history of constipation with laxatives.    The patient has the following pertinent history:       No    Yes  Dialysis:    []      [x] via: L AVF    Blood Transfusion                  []      [x]  Number of units:   Most recently:   Pregnancy:    [x]      [] Number:       Previous Transplant:  []       [x] Details:   [facility] [place]   Cancer    []      [x] Comment: L RCC  Kidney stones   [x]      [] Comment:      Recurrent infections  [x]      []  Type:                  Bladder dysfunction  [x]      [] Cause:    Claudication   [x]      [] Distance:    Previous Amputation  [x]      [] Cause:     Chronic anticoagulation  [x]      [] Indication:   Tenriism  [x]      []   Potential living donor?  [x]      []      Past Medical History:   Diagnosis Date     Anemia in chronic kidney disease      Chews tobacco      Chronic rejection of kidney transplant      ESRD needing dialysis (H)     ~2014     History of alcoholism (H)      History of depression      History of peritoneal dialysis     7 years     History of substance abuse      Hyperlipidemia      IgA nephropathy      Osteopenia      Peritonitis (H)     non-MRSA staph     Renal cell carcinoma (H)      Past Surgical History:   Procedure Laterality Date     HC DIALYSIS AVF OR AVG, CENTRAL INTERVENTION ONLY Left      LAPAROSCOPIC INSERTION CATHETER PERITONEAL DIALYSIS Left      NEPHRECTOMY BILATERAL  2015     REMOVE CATHETER PERITONEAL       TRANSPLANT KIDNEY RECIPIENT  DONOR Left 2009     No family history on file.  Social History     Social History     Marital status:      Spouse name: Merline     Number of children: 4     Years of education: 13     Occupational History     disabled      Social History Main Topics     Smoking status: Current Every Day Smoker     Types: Dip, chew, snus or snuff     Start date: 2011     Smokeless tobacco: Current User     Types: Chew      Comment: 2 days per can.     Alcohol use Yes      Comment: none now, did treatment at age 21, relapsed with divorce (a couple months)  then now  sober 3 years.      Drug use: Yes     Special: Marijuana      Comment: age 14 only.     Sexual activity: Yes     Partners: Female     Other Topics Concern     Not on file     Social History Narrative       ROS:   CONSTITUTIONAL:   No fevers or chills  EYES: negative for icterus  ENT:  negative for hearing loss, tinnitus and sore throat  RESPIRATORY:  negative for cough, sputum, dyspnea  CARDIOVASCULAR:  negative for chest pain Fatigue  GASTROINTESTINAL:  negative for nausea, vomiting, diarrhea or constipation  GENITOURINARY:  negative for incontinence, dysuria, bladder emptying problems  HEME:  No easy bruising  INTEGUMENT:  negative for rash and pruritus  NEURO:  Negative for headache, seizure disorder  Allergies:   No Known Allergies  Medications:  Prescription Medications as of 5/2/2017             PROGRAF 1 MG PO CAPSULE Take 1 capsule (1 mg) by mouth 2 times daily    azaTHIOprine (IMURAN) 50 MG tablet Take 1 tablet (50 mg) by mouth daily    predniSONE (DELTASONE) 5 MG tablet Take 1 tablet (5 mg) by mouth daily    METOPROLOL TARTRATE PO Take 25 mg by mouth daily    LISINOPRIL PO Take 10 mg by mouth daily    LISINOPRIL PO Take 5 mg by mouth daily    VITAMIN D, CHOLECALCIFEROL, PO Take 2,000 Units by mouth daily    OMEPRAZOLE PO Take 40 mg by mouth every morning    CLONAZEPAM PO Take 1 mg by mouth 2 times daily as needed for anxiety    sennosides (SENOKOT) 8.6 MG tablet Take 1 tablet by mouth daily as needed for constipation    TRAZODONE HCL PO Take 300 mg by mouth At Bedtime    calcium acetate (PHOSLO) 667 MG CAPS capsule Take 667 mg by mouth 3 times daily (with meals)        Exam:      Appearance: in no apparent distress.   Skin: normal  Head and Neck: Normal, no rashes or jaundice  Respiratory: easy respirations, no audible wheezing.  Cardiovascular: RRR  Abdomen: flat, Surgical scars consistent with history and Lower abdomen negative for tenderness and masses   Extremeties: femoral 2+/2+, Edema, none  Neuro: without deficit     Diagnostics:   Recent Results (from the past 672 hour(s))   HLA-DR/DQ Typing pcr/ssop    Collection Time: 04/05/17 12:00 AM   Result Value Ref Range    Drsso Test Method SSOP     DRB1* locus DRB1*03(17)     DRB1*  DRB1*11     DRB3* locus DRB3*02     DQB1* locus DQB1*02     DQB1* DQB1*03(07)     DQA1*locus DQA1*05     DPB1* DPB1*02:01     DPB1*locus DPB1*04:02     DPA1* DPA1*01:03    HLA Britney Class I Single Antigen    Collection Time: 04/05/17 12:00 AM   Result Value Ref Range    SA1 Test Method SA HI     SA1 Cell Class I     SA1 Hi Risk Britney B:57 58     SA1 Mod Risk Britney B:35 49 50 53 56 62 63 71 72 75     SA1 Comments        Test performed by modified procedure. Serum heat inactivated. High-risk,   mfi >3,000. Mod-risk, mfi 500-3,000.     HLA Britney Class II Single Antigen    Collection Time: 04/05/17 12:00 AM   Result Value Ref Range    SA2 Test Method SA HI     SA2 Cell Class II     SA2 Hi Risk Britney DR:15 DRw:51 53 DQ:4 5 6 8 9 DQA:02 03     SA2 Mod Risk Britney DR:4 18     SA2 Comments        Test performed by modified procedure. Serum heat inactivated. High-risk,   mfi >3,000. Mod-risk, mfi 500-3,000.     ABO/Rh type and screen [[id number]]    Collection Time: 04/05/17  7:11 AM   Result Value Ref Range    ABO O     RH(D)  Pos     Antibody Screen Neg     Test Valid Only At       [facility]    Specimen Expires 04/08/2017    Antibody titer red cell [[id number]]    Collection Time: 04/05/17  7:12 AM   Result Value Ref Range    Antibody Titer Anti A: IgM 32, IgG 128  Anti B: IgM 16, IgG 16      ABO Subtyping [[id number]]    Collection Time: 04/05/17  7:12 AM   Result Value Ref Range    Antigen Type Canceled, Test credited     Blood Bank Comment       Patient is not ABO type A or AB. A subtyping not applicable. 04/05/17 MT   Lipid Profile [LAB18]    Collection Time: 04/05/17  7:13 AM   Result Value Ref Range    Cholesterol 225 (H) <200 mg/dL    Triglycerides 119 <150 mg/dL    HDL Cholesterol 50 >39 mg/dL    LDL Cholesterol Calculated 151 (H) <100 mg/dL    Non HDL Cholesterol 175 (H) <130 mg/dL   Comprehensive metabolic panel [LAB17]    Collection Time: 04/05/17  7:13 AM   Result Value Ref Range     Sodium 136 133 - 144 mmol/L    Potassium 4.2 3.4 - 5.3 mmol/L    Chloride 91 (L) 94 - 109 mmol/L    Carbon Dioxide 32 20 - 32 mmol/L    Anion Gap 13 3 - 14 mmol/L    Glucose 96 70 - 99 mg/dL    Urea Nitrogen 61 (H) 7 - 30 mg/dL    Creatinine 8.12 (H) 0.66 - 1.25 mg/dL    GFR Estimate 7 (L) >60 mL/min/1.7m2    GFR Estimate If Black 9 (L) >60 mL/min/1.7m2    Calcium 8.3 (L) 8.5 - 10.1 mg/dL    Bilirubin Total 0.8 0.2 - 1.3 mg/dL    Albumin 4.0 3.4 - 5.0 g/dL    Protein Total 7.7 6.8 - 8.8 g/dL    Alkaline Phosphatase 130 40 - 150 U/L    ALT 13 0 - 70 U/L    AST 11 0 - 45 U/L   Phosphorus    Collection Time: 04/05/17  7:13 AM   Result Value Ref Range    Phosphorus 3.9 2.5 - 4.5 mg/dL   Cardiolipin Britney IgG and IgM [[id number]]    Collection Time: 04/05/17  7:13 AM   Result Value Ref Range    Cardiolipin Antibody IgG <1.6  Negative   0.0 - 19.9 GPL-U/mL    Cardiolipin Antibody IgM 1.2 0.0 - 19.9 MPL-U/mL   M Tuberculosis by Quantiferon [[id number]]    Collection Time: 04/05/17  7:13 AM   Result Value Ref Range    M Tuberculosis Result (A) NEG     Canceled, Test credited   Unsatisfactory specimen - tube overfilled  Called to Fátima Ribeiro at transplant clinic at 1150,4/6/2017.MR      M Tuberculosis Antigen Value  IU/mL     Canceled, Test credited   Unsatisfactory specimen - tube overfilled  Called to Fátima Ribeiro at transplant clinic at 1150,4/6/207.  MR     INR [[id number]]    Collection Time: 04/05/17  7:13 AM   Result Value Ref Range    INR 1.06 0.86 - 1.14   Partial thromboplastin time [LAB56]    Collection Time: 04/05/17  7:13 AM   Result Value Ref Range    PTT 28 22 - 37 sec   Thrombin time [[id number]]    Collection Time: 04/05/17  7:13 AM   Result Value Ref Range    Thrombin Time 17.5 13.0 - 19.0 sec   Lupus panel [[id number]]    Collection Time: 04/05/17  7:13 AM   Result Value Ref Range    Lupus Result  NEG     Negative  (Note)  COMMENTS:  The INR is normal.  APTT ratio is normal.  DRVVT Screen ratio is normal.  Thrombin time is  normal.  NEGATIVE TEST; A LUPUS ANTICOAGULANT WAS NOT DETECTED IN THIS  SPECIMEN WITHIN THE LIMITS OF THE TESTING REPERTOIRE.  If the clinical picture is strongly suggestive of an antiphospholipid  syndrome, recommend anticardiolipin and beta-2-glycoprotein (IgG and  IgM) antibody tests.  Ema Frankel M.D.  [phone]  4/6/2017    APTT:       Ratio  Patient  =  0.97  1:2 Mix  =  N/A  Reference:  Negative: Less than or equal to 1.16  Positive: Greater than or equal to 1.17     DILUTE OFELIA VIPER VENOM TEST:  Screen Ratio = 0.97   Normal is less than 1.21       CBC with platelets differential [[id number]]    Collection Time: 04/05/17  7:13 AM   Result Value Ref Range    WBC 4.0 4.0 - 11.0 10e9/L    RBC Count 3.36 (L) 4.4 - 5.9 10e12/L    Hemoglobin 11.3 (L) 13.3 - 17.7 g/dL    Hematocrit 34.8 (L) 40.0 - 53.0 %     (H) 78 - 100 fl    MCH 33.6 (H) 26.5 - 33.0 pg    MCHC 32.5 31.5 - 36.5 g/dL    RDW 19.9 (H) 10.0 - 15.0 %    Platelet Count 163 150 - 450 10e9/L    Diff Method Automated Method     % Neutrophils 68.3 %    % Lymphocytes 12.9 %    % Monocytes 15.6 %    % Eosinophils 2.0 %    % Basophils 0.7 %    % Immature Granulocytes 0.5 %    Nucleated RBCs 0 0 /100    Absolute Neutrophil 2.8 1.6 - 8.3 10e9/L    Absolute Lymphocytes 0.5 (L) 0.8 - 5.3 10e9/L    Absolute Monocytes 0.6 0.0 - 1.3 10e9/L    Absolute Eosinophils 0.1 0.0 - 0.7 10e9/L    Absolute Basophils 0.0 0.0 - 0.2 10e9/L    Abs Immature Granulocytes 0.0 0 - 0.4 10e9/L    Absolute Nucleated RBC 0.0    HLA Typing Complete SOT Recipient    Collection Time: 04/05/17  7:13 AM   Result Value Ref Range    HLA Typing Complete SOT Recipient       Specimen received - Immunology report to follow upon completion.   PRA Single Antigen IgG Antibody    Collection Time: 04/05/17  7:13 AM   Result Value Ref Range    PRA Single Antigen IgG Antibody       Specimen received - Immunology report to follow upon completion.   BK virus PCR quantitative [[id number]]     Collection Time: 04/05/17  7:13 AM   Result Value Ref Range    BK Virus Specimen Plasma     BK Virus Result BK Virus DNA Not Detected BKNEG copies/mL    BK Virus Log  <2.7 Log copies/mL     Not Calculated   The Real-Time quantitative BK Virus assay was developed and its performance   characteristics determined by the [facility] Laboratory at   the [facility] in [place], [place]. The   primers and probes for each analyte are Analyte Specific Reagents (ASRs)   manufactured by [employer].   ASRs are used in many laboratory tests necessary for standard medical care and   generally do not require U.S. Food and Drug Administration approval. The FDA   has determined that such clearance or approval is not necessary.   This test is used for clinical purposes. It should not be regarded as   investigational or for research. This laboratory is certified under the   Clinical Laboratory Improvement Amendments of 1988 (CLIA-88) as qualified to   perform high complexity clinical laboratory testing.     CMV Antibody IgG [[id number]]    Collection Time: 04/05/17  7:13 AM   Result Value Ref Range    CMV Antibody IgG (H) 0.0 - 0.8 AI     >8.0  Positive   Antibody index (AI) values reflect qualitative changes in antibody   concentration that cannot be directly associated with clinical condition or   disease state.     EBV Capsid Antibody IgG [[id number]]    Collection Time: 04/05/17  7:13 AM   Result Value Ref Range    EBV Capsid Antibody IgG 0.4 0.0 - 0.8 AI   Hepatitis B core antibody [[id number]]    Collection Time: 04/05/17  7:13 AM   Result Value Ref Range    Hepatitis B Core Britney Nonreactive NR   Hepatitis B Surface Antibody [[id number]]    Collection Time: 04/05/17  7:13 AM   Result Value Ref Range    Hepatitis B Surface Antibody 26.31 (H) <8.00 m[IU]/mL   Hepatitis B surface antigen [[id number]]    Collection Time: 04/05/17  7:13 AM   Result Value Ref Range    Hep B Surface Agn Nonreactive NR    Hepatitis C antibody [[id number]]    Collection Time: 04/05/17  7:13 AM   Result Value Ref Range    Hepatitis C Antibody  NR     Nonreactive   Assay performance characteristics have not been established for newborns,   infants, and children     HIV Antigen Antibody Combo Pretransplant    Collection Time: 04/05/17  7:13 AM   Result Value Ref Range    HIV Antigen Antibody Combo Pretransplant  NR     Nonreactive   HIV-1 p24 Ag & HIV-1/HIV-2 Ab Not Detected     Anti Treponema [[id number]]    Collection Time: 04/05/17  7:13 AM   Result Value Ref Range    Treponema pallidum Antibody Negative NEG   Varicella Zoster Virus Antibody IgG [[id number]]    Collection Time: 04/05/17  7:13 AM   Result Value Ref Range    Varicella Zoster Virus Antibody IgG 2.2 (H) 0.0 - 0.8 AI   Antibody titer red cell [[id number]]    Collection Time: 04/05/17  3:03 PM   Result Value Ref Range    Antibody Titer Duplicate request    ABO type [[id number]]    Collection Time: 04/05/17  3:04 PM   Result Value Ref Range    ABO O     RH(D)  Pos     Specimen Expires 04/08/2017    UNOS Unacceptable Antigens    Collection Time: 04/10/17 12:00 AM   Result Value Ref Range    Protocol Cutoff Plan A, 500 mfi cumulative      Unacceptable Antigen       A:1 3 23 24 32 B:13 35 44 45 49 50 51 53 56 57 58 62 63 71 72 75 76 78 82   DR:1 103 4 9 10 15 18 DRw:51 53 DQ:4 5 6 8 9     UNOS cPRA    Collection Time: 04/10/17 12:00 AM   Result Value Ref Range    UNOS cPRA 100      UNOS cPRA   Date Value Ref Range Status   04/10/2017 100  Final

## 2017-04-05 NOTE — LETTER
2017       RE: Gilles Henning  [address]  [facility] [address]     Dear Colleague,    Thank you for referring your patient, Gilles Henning, to the [facility] at [facility]. Please see a copy of my visit note below.    Transplant Surgery Consult Note     Medical record number: [id number]  YOB: 1970,   Consult requested by Dr. Tobin for evaluation of kidney transplant candidacy.    Assessment and Recommendations:Mr. Henning appears to be a good candidate for kidney transplantation and has a good understanding of the risks and benefits of this approach to the management of renal failure. The following issues should be addressed prior to finalizing his transplant candidacy:     1. Immunosuppression: ok to wean pred as long as continue on tac and imuran.  2. Review OP notes for extent of surgery (patient says something about partial splenectomy).  3. EBV negative serostatus.  No symptoms of weight loss.   Could consider EBV PCR add on from today's labs.  4. Retransplant: on immunosuppression.  Check BK PCR on eval labs.    The majority of our visit was spent in counselling, discussing the medical and surgical risks of kidney transplantation. We discussed approximate wait time and how that is influenced by issues such as blood type and sensitization (PRA) and access to a living donor. I contrasted potential waiting time for living vs  donor kidneys from  normal (0-85%) or higher (%) kidney donor profile index (KDPI) donors and their associated outcomes. I would not recommend this individual to consider kidneys from high KDPI donors. Potential surgical complications of kidney transplantation include bleeding, superficial or deep wound complications (infection, hernia, lymphocele), ureteral anastomotic failure (leak or stenosis), graft thrombosis, need for reoperation and other issues such as cardiac complications,  pneumonia, deep venous thrombosis, pulmonary embolism, post transplant diabetes and death. The potential for recurrent disease or need for retransplantation was also addressed. We discussed the possible need for ureteral stent (and subsequent removal), and the utility of protocol biopsy and laboratory studies to evaluate for rejection or recurrent disease. We discussed the risk of graft rejection, our center's average graft and patient survival rates, immunosuppression protocols, as well as the potential opportunity to participate in clinical trials.  We also discussed the average length of stay, recovery process, and posttransplant lab and monitoring protocol.  I emphasized the need for strict immunosuppression medication adherence and the potential for complications of immunosuppression such as skin cancer or lymphoma, as well as a very low but not zero risk of donor-derived disease transmission risks (infection, cancer). Mr. Henning asked good questions and his candidacy will be reviewed at our Multidisciplinary Selection Committee. Thank you for the opportunity to participate in Mr. Henning's care.      Total time: 45 minutes  Counselling time: 40 minutes            Mraio Vallejo MD  Department of Surgery  ---------------------------------------------------------------------------------------------------    HPI: Mr. Henning has End stage renal failure due to IgA Nephropathy. The patient is non-diabetic.   He was transplanted at [facility] in 2009 with a  donor kidney.  He had good graft function until requiring bilateral nephrectomy for RCC.  He is now tumor free.  He is active, doing workout (weight/bike) about 5 days a week. Does not make urine at this time.  No known living donor options.  History of heptitis A, uncomplicated.  He tolerated MMF for the first year of his transplant until he developed severe diarrhea w 40 lb weight loss, colonoscopy proven MMF toxicity.  On aza since that time.  Big history of  constipation with laxatives.    The patient has the following pertinent history:       No    Yes  Dialysis:    []      [x] via: L AVF    Blood Transfusion                  []      [x]  Number of units:   Most recently:   Pregnancy:    [x]      [] Number:       Previous Trans[place]:  []      [place]x] [place]   [facility]   [place]cer    []      [x] Comment: L RCC  Kidney stones   [x]      [] Comment:      Recurrent infections  [x]      []  Type:                  Bladder dysfunction  [x]      [] Cause:    Claudication   [x]      [] Distance:    Previous Amputation  [x]      [] Cause:     Chronic anticoagulation  [x]      [] Indication:   Shinto  [x]      []   Potential living donor?  [x]      []      Past Medical History:   Diagnosis Date     Anemia in chronic kidney disease      Chews tobacco      Chronic rejection of kidney transplant      ESRD needing dialysis (H)     ~2014     History of alcoholism (H)      History of depression      History of peritoneal dialysis     7 years     History of substance abuse      Hyperlipidemia      IgA nephropathy      Osteopenia      Peritonitis (H)     non-MRSA staph     Renal cell carcinoma (H)      Past Surgical History:   Procedure Laterality Date     HC DIALYSIS AVF OR AVG, CENTRAL INTERVENTION ONLY Left      LAPAROSCOPIC INSERTION CATHETER PERITONEAL DIALYSIS Left      NEPHRECTOMY BILATERAL  2015     REMOVE CATHETER PERITONEAL       TRANSPLANT KIDNEY RECIPIENT  DONOR Left 2009     No family history on file.  Social History     Social History     Marital status:      Spouse name: Merline     Number of children: 4     Years of education: 13     Occupational History     disabled      Social History Main Topics     Smoking status: Current Every Day Smoker     Types: Dip, chew, snus or snuff     Start date: 2011     Smokeless tobacco: Current User     Types: Chew      Comment: 2 days per can.     Alcohol use Yes      Comment: none now, did  treatment at age 21, relapsed with divorce (a couple months)  then now  sober 3 years.      Drug use: Yes     Special: Marijuana      Comment: age 14 only.     Sexual activity: Yes     Partners: Female     Other Topics Concern     Not on file     Social History Narrative       ROS:   CONSTITUTIONAL:  No fevers or chills  EYES: negative for icterus  ENT:  negative for hearing loss, tinnitus and sore throat  RESPIRATORY:  negative for cough, sputum, dyspnea  CARDIOVASCULAR:  negative for chest pain Fatigue  GASTROINTESTINAL:  negative for nausea, vomiting, diarrhea or constipation  GENITOURINARY:  negative for incontinence, dysuria, bladder emptying problems  HEME:  No easy bruising  INTEGUMENT:  negative for rash and pruritus  NEURO:  Negative for headache, seizure disorder  Allergies:   No Known Allergies  Medications:  Prescription Medications as of 5/2/2017             PROGRAF 1 MG PO CAPSULE Take 1 capsule (1 mg) by mouth 2 times daily    azaTHIOprine (IMURAN) 50 MG tablet Take 1 tablet (50 mg) by mouth daily    predniSONE (DELTASONE) 5 MG tablet Take 1 tablet (5 mg) by mouth daily    METOPROLOL TARTRATE PO Take 25 mg by mouth daily    LISINOPRIL PO Take 10 mg by mouth daily    LISINOPRIL PO Take 5 mg by mouth daily    VITAMIN D, CHOLECALCIFEROL, PO Take 2,000 Units by mouth daily    OMEPRAZOLE PO Take 40 mg by mouth every morning    CLONAZEPAM PO Take 1 mg by mouth 2 times daily as needed for anxiety    sennosides (SENOKOT) 8.6 MG tablet Take 1 tablet by mouth daily as needed for constipation    TRAZODONE HCL PO Take 300 mg by mouth At Bedtime    calcium acetate (PHOSLO) 667 MG CAPS capsule Take 667 mg by mouth 3 times daily (with meals)        Exam:      Appearance: in no apparent distress.   Skin: normal  Head and Neck: Normal, no rashes or jaundice  Respiratory: easy respirations, no audible wheezing.  Cardiovascular: RRR  Abdomen: flat, Surgical scars consistent with history and Lower abdomen negative for  tenderness and masses   Extremeties: femoral 2+/2+, Edema, none  Neuro: without deficit     Diagnostics:   Recent Results (from the past 672 hour(s))   HLA-DR/DQ Typing pcr/ssop    Collection Time: 04/05/17 12:00 AM   Result Value Ref Range    Drsso Test Method SSOP     DRB1* locus DRB1*03(17)     DRB1* DRB1*11     DRB3* locus DRB3*02     DQB1* locus DQB1*02     DQB1* DQB1*03(07)     DQA1*locus DQA1*05     DPB1* DPB1*02:01     DPB1*locus DPB1*04:02     DPA1* DPA1*01:03    HLA Britney Class I Single Antigen    Collection Time: 04/05/17 12:00 AM   Result Value Ref Range    SA1 Test Method SA HI     SA1 Cell Class I     SA1 Hi Risk Britney B:57 58     SA1 Mod Risk Britney B:35 49 50 53 56 62 63 71 72 75     SA1 Comments        Test performed by modified procedure. Serum heat inactivated. High-risk,   mfi >3,000. Mod-risk, mfi 500-3,000.     HLA Britney Class II Single Antigen    Collection Time: 04/05/17 12:00 AM   Result Value Ref Range    SA2 Test Method SA HI     SA2 Cell Class II     SA2 Hi Risk Britney DR:15 DRw:51 53 DQ:4 5 6 8 9 DQA:02 03     SA2 Mod Risk Britney DR:4 18     SA2 Comments        Test performed by modified procedure. Serum heat inactivated. High-risk,   mfi >3,000. Mod-risk, mfi 500-3,000.     ABO/Rh type and screen [[id number]]    Collection Time: 04/05/17  7:11 AM   Result Value Ref Range    ABO O     RH(D)  Pos     Antibody Screen Neg     Test Valid Only At       [facility]    Specimen Expires 04/08/2017    Antibody titer red cell [[id number]]    Collection Time: 04/05/17  7:12 AM   Result Value Ref Range    Antibody Titer Anti A: IgM 32, IgG 128  Anti B: IgM 16, IgG 16      ABO Subtyping [[id number]]    Collection Time: 04/05/17  7:12 AM   Result Value Ref Range    Antigen Type Canceled, Test credited     Blood Bank Comment       Patient is not ABO type A or AB. A subtyping not applicable. 04/05/17 MT   Lipid Profile [LAB18]    Collection Time: 04/05/17  7:13 AM   Result Value Ref  Range    Cholesterol 225 (H) <200 mg/dL    Triglycerides 119 <150 mg/dL    HDL Cholesterol 50 >39 mg/dL    LDL Cholesterol Calculated 151 (H) <100 mg/dL    Non HDL Cholesterol 175 (H) <130 mg/dL   Comprehensive metabolic panel [LAB17]    Collection Time: 04/05/17  7:13 AM   Result Value Ref Range    Sodium 136 133 - 144 mmol/L    Potassium 4.2 3.4 - 5.3 mmol/L    Chloride 91 (L) 94 - 109 mmol/L    Carbon Dioxide 32 20 - 32 mmol/L    Anion Gap 13 3 - 14 mmol/L    Glucose 96 70 - 99 mg/dL    Urea Nitrogen 61 (H) 7 - 30 mg/dL    Creatinine 8.12 (H) 0.66 - 1.25 mg/dL    GFR Estimate 7 (L) >60 mL/min/1.7m2    GFR Estimate If Black 9 (L) >60 mL/min/1.7m2    Calcium 8.3 (L) 8.5 - 10.1 mg/dL    Bilirubin Total 0.8 0.2 - 1.3 mg/dL    Albumin 4.0 3.4 - 5.0 g/dL    Protein Total 7.7 6.8 - 8.8 g/dL    Alkaline Phosphatase 130 40 - 150 U/L    ALT 13 0 - 70 U/L    AST 11 0 - 45 U/L   Phosphorus    Collection Time: 04/05/17  7:13 AM   Result Value Ref Range    Phosphorus 3.9 2.5 - 4.5 mg/dL   Cardiolipin Britney IgG and IgM [[id number]]    Collection Time: 04/05/17  7:13 AM   Result Value Ref Range    Cardiolipin Antibody IgG <1.6  Negative   0.0 - 19.9 GPL-U/mL    Cardiolipin Antibody IgM 1.2 0.0 - 19.9 MPL-U/mL   M Tuberculosis by Quantiferon [[id number]]    Collection Time: 04/05/17  7:13 AM   Result Value Ref Range    M Tuberculosis Result (A) NEG     Canceled, Test credited   Unsatisfactory specimen - tube overfilled  Called to Fátima Ribeiro at transplant clinic at 1150,4/6/2017.MR      M Tuberculosis Antigen Value  IU/mL     Canceled, Test credited   Unsatisfactory specimen - tube overfilled  Called to Fátima Ribeiro at transplant clinic at 1150,4/6/207.  MR     INR [[id number]]    Collection Time: 04/05/17  7:13 AM   Result Value Ref Range    INR 1.06 0.86 - 1.14   Partial thromboplastin time [LAB56]    Collection Time: 04/05/17  7:13 AM   Result Value Ref Range    PTT 28 22 - 37 sec   Thrombin time [[id number]]    Collection Time: 04/05/17   7:13 AM   Result Value Ref Range    Thrombin Time 17.5 13.0 - 19.0 sec   Lupus panel [[id number]]    Collection Time: 04/05/17  7:13 AM   Result Value Ref Range    Lupus Result  NEG     Negative  (Note)  COMMENTS:  The INR is normal.  APTT ratio is normal.  DRVVT Screen ratio is normal.  Thrombin time is normal.  NEGATIVE TEST; A LUPUS ANTICOAGULANT WAS NOT DETECTED IN THIS  SPECIMEN WITHIN THE LIMITS OF THE TESTING REPERTOIRE.  If the clinical picture is strongly suggestive of an antiphospholipid  syndrome, recommend anticardiolipin and beta-2-glycoprotein (IgG and  IgM) antibody tests.  Ema Frankel M.D.  [phone]  4/6/2017    APTT:       Ratio  Patient  =  0.97  1:2 Mix  =  N/A  Reference:  Negative: Less than or equal to 1.16  Positive: Greater than or equal to 1.17     DILUTE OFELIA VIPER VENOM TEST:  Screen Ratio = 0.97   Normal is less than 1.21       CBC with platelets differential [[id number]]    Collection Time: 04/05/17  7:13 AM   Result Value Ref Range    WBC 4.0 4.0 - 11.0 10e9/L    RBC Count 3.36 (L) 4.4 - 5.9 10e12/L    Hemoglobin 11.3 (L) 13.3 - 17.7 g/dL    Hematocrit 34.8 (L) 40.0 - 53.0 %     (H) 78 - 100 fl    MCH 33.6 (H) 26.5 - 33.0 pg    MCHC 32.5 31.5 - 36.5 g/dL    RDW 19.9 (H) 10.0 - 15.0 %    Platelet Count 163 150 - 450 10e9/L    Diff Method Automated Method     % Neutrophils 68.3 %    % Lymphocytes 12.9 %    % Monocytes 15.6 %    % Eosinophils 2.0 %    % Basophils 0.7 %    % Immature Granulocytes 0.5 %    Nucleated RBCs 0 0 /100    Absolute Neutrophil 2.8 1.6 - 8.3 10e9/L    Absolute Lymphocytes 0.5 (L) 0.8 - 5.3 10e9/L    Absolute Monocytes 0.6 0.0 - 1.3 10e9/L    Absolute Eosinophils 0.1 0.0 - 0.7 10e9/L    Absolute Basophils 0.0 0.0 - 0.2 10e9/L    Abs Immature Granulocytes 0.0 0 - 0.4 10e9/L    Absolute Nucleated RBC 0.0    HLA Typing Complete SOT Recipient    Collection Time: 04/05/17  7:13 AM   Result Value Ref Range    HLA Typing Complete SOT Recipient       Specimen  received - Immunology report to follow upon completion.   PRA Single Antigen IgG Antibody    Collection Time: 04/05/17  7:13 AM   Result Value Ref Range    PRA Single Antigen IgG Antibody       Specimen received - Immunology report to follow upon completion.   BK virus PCR quantitative [[id number]]    Collection Time: 04/05/17  7:13 AM   Result Value Ref Range    BK Virus Specimen Plasma     BK Virus Result BK Virus DNA Not Detected BKNEG copies/mL    BK Virus Log  <2.7 Log copies/mL     Not Calculated   The Real-Time quantitative BK Virus assay was developed and its performance   characteristics determined by the Infectious Diseases Diagnostic Laboratory at   the [facility] in [place], [place]. The   primers and probes for each analyte are Analyte Specific Reagents (ASRs)   manufactured by [employer].   ASRs are used in many laboratory tests necessary for standard medical care and   generally do not require U.S. Food and Drug Administration approval. The FDA   has determined that such clearance or approval is not necessary.   This test is used for clinical purposes. It should not be regarded as   investigational or for research. This laboratory is certified under the   Clinical Laboratory Improvement Amendments of 1988 (CLIA-88) as qualified to   perform high complexity clinical laboratory testing.     CMV Antibody IgG [[id number]]    Collection Time: 04/05/17  7:13 AM   Result Value Ref Range    CMV Antibody IgG (H) 0.0 - 0.8 AI     >8.0  Positive   Antibody index (AI) values reflect qualitative changes in antibody   concentration that cannot be directly associated with clinical condition or   disease state.     EBV Capsid Antibody IgG [[id number]]    Collection Time: 04/05/17  7:13 AM   Result Value Ref Range    EBV Capsid Antibody IgG 0.4 0.0 - 0.8 AI   Hepatitis B core antibody [[id number]]    Collection Time: 04/05/17  7:13 AM   Result Value Ref Range    Hepatitis B Core Britney Nonreactive NR    Hepatitis B Surface Antibody [[id number]]    Collection Time: 04/05/17  7:13 AM   Result Value Ref Range    Hepatitis B Surface Antibody 26.31 (H) <8.00 m[IU]/mL   Hepatitis B surface antigen [[id number]]    Collection Time: 04/05/17  7:13 AM   Result Value Ref Range    Hep B Surface Agn Nonreactive NR   Hepatitis C antibody [[id number]]    Collection Time: 04/05/17  7:13 AM   Result Value Ref Range    Hepatitis C Antibody  NR     Nonreactive   Assay performance characteristics have not been established for newborns,   infants, and children     HIV Antigen Antibody Combo Pretransplant    Collection Time: 04/05/17  7:13 AM   Result Value Ref Range    HIV Antigen Antibody Combo Pretransplant  NR     Nonreactive   HIV-1 p24 Ag & HIV-1/HIV-2 Ab Not Detected     Anti Treponema [[id number]]    Collection Time: 04/05/17  7:13 AM   Result Value Ref Range    Treponema pallidum Antibody Negative NEG   Varicella Zoster Virus Antibody IgG [[id number]]    Collection Time: 04/05/17  7:13 AM   Result Value Ref Range    Varicella Zoster Virus Antibody IgG 2.2 (H) 0.0 - 0.8 AI   Antibody titer red cell [[id number]]    Collection Time: 04/05/17  3:03 PM   Result Value Ref Range    Antibody Titer Duplicate request    ABO type [[id number]]    Collection Time: 04/05/17  3:04 PM   Result Value Ref Range    ABO O     RH(D)  Pos     Specimen Expires 04/08/2017    UNOS Unacceptable Antigens    Collection Time: 04/10/17 12:00 AM   Result Value Ref Range    Protocol Cutoff Plan A, 500 mfi cumulative      Unacceptable Antigen       A:1 3 23 24 32 B:13 35 44 45 49 50 51 53 56 57 58 62 63 71 72 75 76 78 82   DR:1 103 4 9 10 15 18 DRw:51 53 DQ:4 5 6 8 9     UNOS cPRA    Collection Time: 04/10/17 12:00 AM   Result Value Ref Range    UNOS cPRA 100      UNOS cPRA   Date Value Ref Range Status   04/10/2017 100  Final       Again, thank you for allowing me to participate in the care of your patient.      Sincerely,    JORGE

## 2017-04-05 NOTE — MR AVS SNAPSHOT
After Visit Summary   4/5/2017    Gilles Henning    MRN: [id number]           Patient Information     Date Of Birth          1970        Visit Information        Provider Department      4/5/2017 8:00 AM UC TRANSPLANT CLASS [facility] Solid Organ Transplant        Today's Diagnoses     Encounter for pre-transplant evaluation for kidney transplant    -  1       Follow-ups after your visit        Your next 10 appointments already scheduled     Apr 05, 2017  9:45 AM CDT   NUTRITION VISIT with Mary Peña RD   [facility] Solid Organ Transplant ([facility])    [address] [place] [phone]   [phone]            Apr 05, 2017 10:30 AM CDT   (Arrive by 10:15 AM)   SOT CARE COORDINATOR EVAL with Laura Benitez RN   [facility] Solid Organ Transplant [facility])    [address] [place] [id number]   [phone]            Apr 05, 2017 11:00 AM CDT   (Arrive by 10:45 AM)   Surgery Consult with VIRGINIA PATEL PATIENT 2   [facility] Solid Organ Transplant ([facility])    [address] [place] [phone]   [phone]            Apr 05, 2017 12:00 PM CDT   (Arrive by 11:45 AM)   SOT SOCIAL WORK EVAL with ETTA Patterson   [facility] Solid Organ Transplant ([facility])    [address] [place] [phone]   [phone]            Apr 05, 2017  1:30 PM CDT   Ech Complete with CINDY    [facility] ([facility])    [address] [place] [id number] [phone]           1.  Please bring or wear a comfortable two-piece outfit. 2.  You may eat, drink and take your normal medicines. 3.  For any questions that cannot be answered, please contact the ordering physician            Apr 05, 2017  2:30 PM CDT   (Arrive by 2:15 PM)   ecg with  CV EKG    [facility] ([facility])    [address] [place] [address]  [phone]               Apr 05, 2017  3:15 PM CDT   (Arrive by 3:00 PM)   XR CHEST 2 VIEWS with UCXR1   [facility] ([facility])    [address] [place], [place] [id number] [phone]           Please bring a list of your current medicines to your exam. (Include vitamins, minerals and over-thecounter medicines.) Leave your valuables at home.  Tell your doctor if there is a chance you may be pregnant.  You do not need to do anything special for this exam.            Apr 05, 2017  3:30 PM CDT   Lab with UC LAB   [facility] ([facility])    [address] [place] [id number] [phone]            Jun 12, 2017  2:00 PM CDT   (Arrive by 1:45 PM)   NEW PANCREAS/KIDNEY TRANSPLANT WORK-UP with Marisa Campbell MD   [facility] ([facility])    [address] [place] [id number] [phone]              Who to contact     If you have questions or need follow up information about today's clinic visit or your schedule please contact [facility] SOLID ORGAN TRANSPLANT directly at [phone].  Normal or non-critical lab and imaging results will be communicated to you by eÃ“ticahart, letter or phone within 4 business days after the clinic has received the results. If you do not hear from us within 7 days, please contact the clinic through eÃ“ticahart or phone. If you have a critical or abnormal lab result, we will notify you by phone as soon as possible.  Submit refill requests through [employer] or call your pharmacy and they will forward the refill request to us. Please allow 3 business days for your refill to be completed.          Additional Information About Your Visit        [employer] Information     [employer] lets you send messages to your doctor, view your test results,  "renew your prescriptions, schedule appointments and more. To sign up, go to [email or web address] . Click on \"Log in\" on the left side of the screen, which will take you to the Welcome page. Then click on \"Sign up Now\" on the right side of the page.     You will be asked to enter the access code listed below, as well as some personal information. Please follow the directions to create your username and password.     Your access code is: [email or web address]  Expires: 2017  6:30 AM     Your access code will  in 90 days. If you need help or a new code, please call your [place] clinic or [phone].        Care EveryWhere ID     This is your Care EveryWhere ID. This could be used by other organizations to access your [place] medical records  [id number]         Blood Pressure from Last 3 Encounters:   17 158/88    Weight from Last 3 Encounters:   17 62.7 kg (138 lb 3.2 oz)              Today, you had the following     No orders found for display       Primary Care Provider Office Phone # Fax #    Charlie Darrion Dubose [phone]       [facility] [address] [facility] [address]        Thank you!     Thank you for choosing [facility] SOLID ORGAN TRANSPLANT  for your care. Our goal is always to provide you with excellent care. Hearing back from our patients is one way we can continue to improve our services. Please take a few minutes to complete the written survey that you may receive in the mail after your visit with us. Thank you!             Your Updated Medication List - Protect others around you: Learn how to safely use, store and throw away your medicines at www.disposemymeds.org.      Notice  As of 2017  9:36 AM    You have not been prescribed any medications.      "

## 2017-04-05 NOTE — PROGRESS NOTES
NUTRITION KIDNEY TRANSPLANT EVALUATION  Medical Nutrition Therapy    Weight and BMI:  Current BMI: 19.8  BMI is within criteria for kidney transplant    Malnutrition Status:    Non-Severe malnutrition in the context of chronic illness  Malnutrition determined by:  - Mild fat loss and   - Mild muscle loss associated with weight loss        Visit Type: Initial Assessment    Gilles Henning referred by Dr. Parsons for MNT related to kidney transplant evaluation    Patient accompanied by his wife     H/o previous txp: kidney 2009    Nutrition Assessment:  Anthropometrics  Height:   70 in   BMI:    19.8    Weight Status:Normal BMI   Weight:  138 lbs            IBW (lb): 166  % IBW: 83    Wt Hx: Pt lost 40 lbs d/t medication interaction, side effects, etc and got down to 119 lbs (unsure when). He has regained some lost weight. UBW in the past 155-160.     Adj/dosing BW: 138 lbs/63 kg       Recent Labs   Lab Test  04/05/17   0713   CHOL  225*   HDL  50   LDL  151*   TRIG  119     No results found for: A1C  Potassium   Date Value Ref Range Status   04/05/2017 4.2 3.4 - 5.3 mmol/L Final     Phosphorus: 3.9 today      Vitamins, Supplements, Herbals, Pertinent Meds:   None, phos binders     Dialysis Modality: HD  Days/Times: T/Th/Sat 6 am   Dialysis Start: 2015  Pt receives protein supplement with dialysis: N    Nutrition History  Pt-reported special diets/eating habits: low K+/Phos   Dining out/food not made at home: 1x/month  Appetite: good    Recall:  B: none  L: protein bar, protein drink (Nepro, [employer] powder), apple, berries   D: 8 oz chix/steak/fish, bread, some salad   Sn: pie, fruit, burger, egg s/w (eats a lot at night)  Beverages: water, coffee, energy drinks  ETOH (1 drink = 12 oz beer, 5 oz wine, 1.5 oz liquor): none     Physical Activity  Weights or biking 10-15 minutes 5x/week      MALNUTRITION  % Intake:  No decreased intake noted  % Weight Loss:  Difficult to assess d/t unknown duration of weight loss and when this  occurred   Subcutaneous Fat Loss:  Mild  Muscle Loss:  Mild  Fluid Accumulation/Edema:  None noted  Malnutrition Diagnosis: Non-Severe malnutrition in the context of chronic illness    Nutrition Prescription  Energy:  9745-7647+     (25-30+ kcal/kg for maintenance)     Protein:  76-88    (1.2-1.4 g/kg for HD/PD needs)         Fluid:  1 ml/kcal or per MD        Micronutrient:  Na+: <2000 mg/day  K+: 4583-5360 mg/day  Phos: 800-1000 mg/day          Nutrition Diagnosis:  Food and nutrition related knowledge deficit r/t pre kidney transplant eval AEB pt verbalized not hearing pre/post transplant diet guidelines.    Nutrition Intervention:  Nutrition education provided:  Discussed sodium intake (low sodium foods and drinks, seasoning food without salt and tips for low sodium diet). Also reviewed need for adequate protein intake with dialysis.     Reviewed post txp diet guidelines in brief (will review in further detail post txp):   (1) Review of proper food safety measures d/t immunosuppressant therapy post-op and increased risk for food-borne illness (2) Stressed importance of not taking any herbal/Chinese/alternative medicines or supplements post txp (d/t risk for rejection, unknown effects on the organs, potential interactions with immunosuppresants). (3) Med regimen and possible side effects    Patient Understanding: Pt verbalized understanding of education provided.  Expected Compliance: Good  Follow-Up Plans: PRN     Nutrition Goals:  1. Limit Na+ <2000mg/day  2. Weight maintenance    Provided pt with contact info.   Time spent with patient: 15 minutes.  Mary Peña, RD, LD

## 2017-04-05 NOTE — MR AVS SNAPSHOT
"              After Visit Summary   4/5/2017    Gilles Henning    MRN: [id number]           Patient Information     Date Of Birth          1970        Visit Information        Provider Department      4/5/2017 11:00 AM [place] [facility] PATIENT 2 [facility] Solid Organ Transplant        Today's Diagnoses     Organ transplant candidate    -  1    End stage renal disease (H)           Follow-ups after your visit        Your next 10 appointments already scheduled     Jun 12, 2017  2:00 PM CDT   (Arrive by 1:45 PM)   NEW PANCREAS/KIDNEY TRANSPLANT WORK-UP with Marisa Campbell MD   [facility] ([facility])    [address] [place] [id number] [phone]              Who to contact     If you have questions or need follow up information about today's clinic visit or your schedule please contact [facility] SOLID ORGAN TRANSPLANT directly at [phone].  Normal or non-critical lab and imaging results will be communicated to you by MyChart, letter or phone within 4 business days after the clinic has received the results. If you do not hear from us within 7 days, please contact the clinic through cicaydahart or phone. If you have a critical or abnormal lab result, we will notify you by phone as soon as possible.  Submit refill requests through [employer] or call your pharmacy and they will forward the refill request to us. Please allow 3 business days for your refill to be completed.          Additional Information About Your Visit        MyChart Information     [employer] lets you send messages to your doctor, view your test results, renew your prescriptions, schedule appointments and more. To sign up, go to [email or web address] . Click on \"Log in\" on the left side of the screen, which will take you to the Welcome page. Then click on \"Sign up Now\" on the right side of the page.     You will be asked to enter the access code listed below, as well as some personal information. " "Please follow the directions to create your username and password.     Your access code is: [email or web address]  Expires: 2017  6:30 AM     Your access code will  in 90 days. If you need help or a new code, please call your [facility] clinic or [phone].        Care EveryWhere ID     This is your Care EveryWhere ID. This could be used by other organizations to access your [facility] medical records  [id number]        Your Vitals Were     Pulse Temperature Height Pulse Oximetry BMI (Body Mass Index)       58 98.2  F (36.8  C) (Oral) 1.778 m (5' 10\") 99% 19.83 kg/m2        Blood Pressure from Last 3 Encounters:   17 158/88   17 158/88    Weight from Last 3 Encounters:   17 62.7 kg (138 lb 3.2 oz)   17 62.7 kg (138 lb 3.2 oz)              Today, you had the following     No orders found for display       Primary Care Provider Office Phone # Fax #    Charlie Darrion Dubose [phone]       [facility] [address] [facility] [address]        Thank you!     Thank you for choosing [facility] SOLID ORGAN TRANSPLANT  for your care. Our goal is always to provide you with excellent care. Hearing back from our patients is one way we can continue to improve our services. Please take a few minutes to complete the written survey that you may receive in the mail after your visit with us. Thank you!             Your Updated Medication List - Protect others around you: Learn how to safely use, store and throw away your medicines at www.disposemymeds.org.          This list is accurate as of: 17 11:59 PM.  Always use your most recent med list.                   Brand Name Dispense Instructions for use    azaTHIOprine 50 MG tablet    IMURAN    60 tablet    Take 1 tablet (50 mg) by mouth daily       calcium acetate 667 MG Caps capsule    PHOSLO     Take 667 mg by mouth 3 times daily (with meals)       CLONAZEPAM PO      Take 1 mg by mouth 2 times daily as needed for " anxiety       * LISINOPRIL PO      Take 10 mg by mouth daily       * LISINOPRIL PO      Take 5 mg by mouth daily       METOPROLOL TARTRATE PO      Take 25 mg by mouth daily       OMEPRAZOLE PO      Take 40 mg by mouth every morning       predniSONE 5 MG tablet    DELTASONE     Take 1 tablet (5 mg) by mouth daily       sennosides 8.6 MG tablet    SENOKOT     Take 1 tablet by mouth daily as needed for constipation       tacrolimus capsule     240 capsule    Take 1 capsule (1 mg) by mouth 2 times daily       TRAZODONE HCL PO      Take 300 mg by mouth At Bedtime       VITAMIN D (CHOLECALCIFEROL) PO      Take 2,000 Units by mouth daily       * Notice:  This list has 2 medication(s) that are the same as other medications prescribed for you. Read the directions carefully, and ask your doctor or other care provider to review them with you.

## 2017-04-05 NOTE — LETTER
4/5/2017       RE: Gilles Henning  [address]  [place] [address]     Dear Colleague,    Thank you for referring your patient, Gilels Henning, to the [facility] SOLID ORGAN TRANSPLANT at [facility]. Please see a copy of my visit note below.    Assessment and Plan:  1. Kidney transplant evaluation - patient is a excellent and good candidate overall. Benefits of a living donor transplant were discussed.  2. 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 Taking  Imuran and Cyclosporine   3. 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 PET CT on 1/2/2017 normal. CXR to be done.   4. tabacco chewing : we recommend to quit   5. CV: good exercise tolerance. Repeat Echo  6.comliance: I spoke with the coordinator. Patient did not follow with oncology for one year after he had the surgery. Will f/u on his compliance.   7. Blood type O1, multiple  Transfusions, no LD.     Discussed the risks and benefits of a transplant, including the risk of surgery and immunosuppression medications.  Patients overall evaluation will be discussed in the Transplant Program's regular meeting with a final recommendation on the patients suitability for transplant to be made at that time.    Consult:  Gilles Henning was seen in consultation at the request of Dr. Mario Vallejo for evaluation as a potential Kidney transplant recipient.    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    HPI:  Patient 48 yo M, with PMH of IgA nephropathy at the age of 30s, Dx by kidney Bx. At that time apparently he had extensive chronic changes, was not treated, and started dialysis 3 mo latter. In 2009 he had a DDKT. He had in the first year rejection treated with increased in immunosuppression by the patient. No Bx  report in care everywhere. In 2015 he was found with RCC , and he had BL nephrectomy in march 2015. During that time he had had 11 BP, for bleeding. His Cr back then was in 2nd, witch was his baseline. He had kidney Bx in 5/26/2015 and 6/18/15 found with grade 1 A rejection. Unclear what was  The treatment at that time. He was started on dialysis in July.   He has been tolerating HD well. He goes for HD on TuThSat.  He has good exercise tolerance, bikes 15 miles. He had a  NM cardiac stress test in 8/2016.His EF was 56 %. Dose not have any ischemic changes .   RCC: following with oncology, he had a PET scan in Jan 2017, that did not show any recurrence of his disease. His oncologist cleared him for another transplant from the stend point of RCC.   He had a coloscopy in 2012, for diarrhea. At that time his immunosuppression was switched to Imuran from MMF.   He has been chewing tobacco for 6 years.. He plant to quit, as he understand that puts him at risk for mouth cancer.   Skin cancer: on INS for 8 years, never saw an dermatologist, wearing sun screen only when he goes to the beach.   He had a rectal exam, and as per him PSA sent, normal as per patient.    He has anemia not responding to Epo. His oncologist recommended him to have work up for MM. No results were found in his chart. Will try to talk with his nephrologist about this .  Lately he was restart on BP medication. His BP at home is 120-130s/85 on Metoprolol 25 mg, Lisinopril 10 mg and Cardizem 25 mg.              Kidney Disease Hx:        Kidney Disease Dx: Ig A        Biopsy Proven: Yes;          On Dialysis: Yes, Dialysis Type: [facility] HD;       Primary Nephrologist: Dr.         Medical Hx:       h/o HTN: Yes  Usual BP: 120-130s SBP        h/o DM:  No       h/o Protein in Urine: No       h/o Blood in Urine:  Yes        h/o Kidney Stones:  No       h/o UTI: No       h/o Chronic NSAID Use: No         Previous Transplant Hx:       Yes; failed due to  chronic rejection          Transplant Sensitization Hx:       Previous Tx: Yes       Blood Transfusion: Yes       Pregnancy: Not applicable         Uremic Symptoms:       Fatigue: No; Cold: No; Nausea: No; Poor Appetite: No; Metallic Taste: No; Edema: No; Pruritis: No; Tremor: No;         Cardiovascular Hx:       h/o Cardiac Issues: No       Exercise Tolerance: no chest pain or shortness of breath with exertion.         Health Maintenance:       Colonoscopy: Not indicated and Dermatology: Not up to date         Potential Donor(s): No    ROS:  A comprehensive review of systems was obtained and negative, except as noted in the HPI or PMH.    PMH:   Medical records were reviewed  and PMH was discussed with patient and noted below.  Past Medical History:   Diagnosis Date     Anemia in chronic kidney disease      Chews tobacco      Chronic rejection of kidney transplant      CKD (chronic kidney disease)      History of alcoholism (H)      History of depression      History of substance abuse      Hyperlipidemia      IgA nephropathy      Renal cell carcinoma (H)        PSH:   Past Surgical History:   Procedure Laterality Date     NEPHRECTOMY BILATERAL  2015     TRANSPLANT KIDNEY RECIPIENT  DONOR       Personal or family history of bleeding or anesthesia problems: No    Family Hx:  No family history on file.    Personal Hx:   Social History     Social History     Marital status:      Spouse name: N/A     Number of children: N/A     Years of education: N/A     Occupational History     Not on file.     Social History Main Topics     Smoking status: Never Smoker     Smokeless tobacco: Current User     Types: Chew     Alcohol use No     Drug use: Not on file     Sexual activity: No     Other Topics Concern     Not on file     Social History Narrative       Allergies:  No Known Allergies    Medications:  Prior to Admission medications    Not on File       Vitals:  /88 (BP Location: Right arm, Patient  "Position: Chair, Cuff Size: Adult Regular)  Pulse 58  Temp 98.2  F (36.8  C)  Ht 1.778 m (5' 10\")  Wt 62.7 kg (138 lb 3.2 oz)  SpO2 99%  BMI 19.83 kg/m2    Exam:  HENT: mouth without ulcers or lesions  LYMPHATICS: no cervical or supraclavicular nodes  RESP: lungs clear to auscultation - no rales, rhonchi or wheezes  CV: regular rhythm, normal rate, no rub, no murmur  EDEMA: no LE edema bilaterally  ABDOMEN: soft, nondistended, nontender, bowel sounds normal, healed surgical wound   MS: extremities normal - no gross deformities noted, no evidence of inflammation in joints, no muscle tenderness  SKIN: no rash    Results:   Labs and imaging were ordered for this visit and reviewed by me.  Recent Results (from the past 336 hour(s))   ABO/Rh type and screen [MLD036]    Collection Time: 04/05/17  7:11 AM   Result Value Ref Range    ABO O     RH(D)  Pos     Antibody Screen Neg     Test Valid Only At       [facility]    Specimen Expires 04/08/2017    Lipid Profile [LAB18]    Collection Time: 04/05/17  7:13 AM   Result Value Ref Range    Cholesterol 225 (H) <200 mg/dL    Triglycerides 119 <150 mg/dL    HDL Cholesterol 50 >39 mg/dL    LDL Cholesterol Calculated 151 (H) <100 mg/dL    Non HDL Cholesterol 175 (H) <130 mg/dL   Comprehensive metabolic panel [LAB17]    Collection Time: 04/05/17  7:13 AM   Result Value Ref Range    Sodium 136 133 - 144 mmol/L    Potassium 4.2 3.4 - 5.3 mmol/L    Chloride 91 (L) 94 - 109 mmol/L    Carbon Dioxide 32 20 - 32 mmol/L    Anion Gap 13 3 - 14 mmol/L    Glucose 96 70 - 99 mg/dL    Urea Nitrogen 61 (H) 7 - 30 mg/dL    Creatinine 8.12 (H) 0.66 - 1.25 mg/dL    GFR Estimate 7 (L) >60 mL/min/1.7m2    GFR Estimate If Black 9 (L) >60 mL/min/1.7m2    Calcium 8.3 (L) 8.5 - 10.1 mg/dL    Bilirubin Total 0.8 0.2 - 1.3 mg/dL    Albumin 4.0 3.4 - 5.0 g/dL    Protein Total 7.7 6.8 - 8.8 g/dL    Alkaline Phosphatase 130 40 - 150 U/L    ALT 13 0 - 70 U/L "    AST 11 0 - 45 U/L   Phosphorus    Collection Time: 04/05/17  7:13 AM   Result Value Ref Range    Phosphorus 3.9 2.5 - 4.5 mg/dL   INR [FGJ1514]    Collection Time: 04/05/17  7:13 AM   Result Value Ref Range    INR 1.06 0.86 - 1.14   Partial thromboplastin time [LAB56]    Collection Time: 04/05/17  7:13 AM   Result Value Ref Range    PTT 28 22 - 37 sec   CBC with platelets differential [IMG460]    Collection Time: 04/05/17  7:13 AM   Result Value Ref Range    WBC 4.0 4.0 - 11.0 10e9/L    RBC Count 3.36 (L) 4.4 - 5.9 10e12/L    Hemoglobin 11.3 (L) 13.3 - 17.7 g/dL    Hematocrit 34.8 (L) 40.0 - 53.0 %     (H) 78 - 100 fl    MCH 33.6 (H) 26.5 - 33.0 pg    MCHC 32.5 31.5 - 36.5 g/dL    RDW 19.9 (H) 10.0 - 15.0 %    Platelet Count 163 150 - 450 10e9/L    Diff Method Automated Method     % Neutrophils 68.3 %    % Lymphocytes 12.9 %    % Monocytes 15.6 %    % Eosinophils 2.0 %    % Basophils 0.7 %    % Immature Granulocytes 0.5 %    Nucleated RBCs 0 0 /100    Absolute Neutrophil 2.8 1.6 - 8.3 10e9/L    Absolute Lymphocytes 0.5 (L) 0.8 - 5.3 10e9/L    Absolute Monocytes 0.6 0.0 - 1.3 10e9/L    Absolute Eosinophils 0.1 0.0 - 0.7 10e9/L    Absolute Basophils 0.0 0.0 - 0.2 10e9/L    Abs Immature Granulocytes 0.0 0 - 0.4 10e9/L    Absolute Nucleated RBC 0.0          Again, thank you for allowing me to participate in the care of your patient.      Sincerely,    JORGE

## 2017-04-05 NOTE — PROGRESS NOTES
Assessment and Plan:  1. Kidney transplant evaluation - patient is a reasonable candidate overall. Benefits of a living donor transplant were discussed.  2. 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 Taking  Imuran and Cyclosporine   3. 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 PET CT on 1/2/2017 normal. CXR to be done.   4. Tabacco chewing : we recommend to quit   5. Cardiac Risk assessment: good exercise tolerance. Will need formal cardiac testing   6. Psychosocial difficulties: concern for non compliance per coordinator. Patient did not follow with oncology for one year after he had the surgery. Will f/u on his compliance.       Discussed the risks and benefits of a transplant, including the risk of surgery and immunosuppression medications.  Patients overall evaluation will be discussed in the Transplant Program's regular meeting with a final recommendation on the patients suitability for transplant to be made at that time.    Consult:  Gilles Henning was seen in consultation at the request of Dr. Mario Vallejo for evaluation as a potential Kidney transplant recipient.    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    HPI:  Patient 48 yo M, with PMH of IgA nephropathy at the age of 30s, Dx by kidney Bx. At that time apparently he had extensive chronic changes, was not treated, and started dialysis 3 mo latter. In 2009 he had a DDKT. He had in the first year rejection treated with increased in immunosuppression by the patient. No Bx report in care everywhere. In 2015 he was found with RCC , and he had BL nephrectomy in march 2015. During that time he had had 11 BP, for bleeding. His Cr back then was in 2nd, witch was his baseline. He had kidney Bx in 5/26/2015 and 6/18/15 found with grade 1 A rejection.  Unclear what was  The treatment at that time. He was started on dialysis in July.   He has been tolerating HD well. [facility]  He has good exercise tolerance, bikes 15 miles. He had a  NM cardiac stress test in 8/2016.His EF was 56 %. Dose not have any ischemic changes .   RCC: following with oncology, he had a PET scan in Jan 2017, that did not show any recurrence of his disease. His oncologist cleared him for another transplant from the stend point of RCC.   He had a coloscopy in 2012, for diarrhea. At that time his immunosuppression was switched to Imuran from MMF.   He has been chewing tobacco for 6 years.. He plans to quit, as he understand that puts him at risk for mouth cancer.   Skin cancer: on INS for 8 years, never saw an dermatologist, wearing sun screen only when he goes to the beach.   He had a rectal exam, and as per him PSA sent, normal as per patient.    He has anemia not responding to Epo. His oncologist recommended him to have work up for MM. No results were found in his chart. Will try to talk with his nephrologist about this .  Lately he was restart on BP medication. His BP at home is 120-130s/85 on Metoprolol 25 mg, Lisinopril 10 mg and Cardizem 25 mg.              Kidney Disease Hx:        Kidney Disease Dx: Ig A        Biopsy Proven: Yes;          On Dialysis: Yes, Dialysis Type: [facility] HD;       Primary Nephrologist: Dr.         Medical Hx:       h/o HTN: Yes  Usual BP: 120-130s SBP        h/o DM:  No       h/o Protein in Urine: No       h/o Blood in Urine:  Yes        h/o Kidney Stones:  No       h/o UTI: No       h/o Chronic NSAID Use: No         Previous Transplant Hx:       Yes; failed due to chronic rejection          Transplant Sensitization Hx:       Previous Tx: Yes       Blood Transfusion: Yes       Pregnancy: Not applicable         Uremic Symptoms:       Fatigue: No; Cold: No; Nausea: No; Poor Appetite: No; Metallic Taste: No; Edema: No; Pruritis: No; Tremor:  "No;         Cardiovascular Hx:       h/o Cardiac Issues: No       Exercise Tolerance: no chest pain or shortness of breath with exertion.         Health Maintenance:       Colonoscopy: Not indicated and Dermatology: Not up to date         Potential Donor(s): No    ROS:  A comprehensive review of systems was obtained and negative, except as noted in the HPI or PMH.    PMH:   Medical records were reviewed  and PMH was discussed with patient and noted below.  Past Medical History:   Diagnosis Date     Anemia in chronic kidney disease      Chews tobacco      Chronic rejection of kidney transplant      CKD (chronic kidney disease)      History of alcoholism (H)      History of depression      History of substance abuse      Hyperlipidemia      IgA nephropathy      Renal cell carcinoma (H)        PSH:   Past Surgical History:   Procedure Laterality Date     NEPHRECTOMY BILATERAL  2015     TRANSPLANT KIDNEY RECIPIENT  DONOR       Personal or family history of bleeding or anesthesia problems: No    Family Hx:  No family history on file.    Personal Hx:   Social History     Social History     Marital status:      Spouse name: N/A     Number of children: N/A     Years of education: N/A     Occupational History     Not on file.     Social History Main Topics     Smoking status: Never Smoker     Smokeless tobacco: Current User     Types: Chew     Alcohol use No     Drug use: Not on file     Sexual activity: No     Other Topics Concern     Not on file     Social History Narrative       Allergies:  No Known Allergies    Medications:  Prior to Admission medications    Not on File       Vitals:  /88 (BP Location: Right arm, Patient Position: Chair, Cuff Size: Adult Regular)  Pulse 58  Temp 98.2  F (36.8  C)  Ht 1.778 m (5' 10\")  Wt 62.7 kg (138 lb 3.2 oz)  SpO2 99%  BMI 19.83 kg/m2    Exam:  GEN: NAD pleasant   HENT: mouth without ulcers or lesions  LYMPHATICS: no cervical or supraclavicular " nodes  RESP: lungs clear to auscultation - no rales, rhonchi or wheezes  CV: regular rhythm, normal rate, no rub, no murmur  EDEMA: no LE edema bilaterally  ABDOMEN: soft, nondistended, nontender, bowel sounds normal, healed surgical wound   MS: extremities normal - no gross deformities noted, no evidence of inflammation in joints, no muscle tenderness  SKIN: no rash  Neuro: non focal   Psych: appropriate mood     Results:   Labs and imaging were ordered for this visit and reviewed by me.  Recent Results (from the past 336 hour(s))   ABO/Rh type and screen [EJV743]    Collection Time: 04/05/17  7:11 AM   Result Value Ref Range    ABO O     RH(D)  Pos     Antibody Screen Neg     Test Valid Only At       [facility]    Specimen Expires 04/08/2017    Lipid Profile [LAB18]    Collection Time: 04/05/17  7:13 AM   Result Value Ref Range    Cholesterol 225 (H) <200 mg/dL    Triglycerides 119 <150 mg/dL    HDL Cholesterol 50 >39 mg/dL    LDL Cholesterol Calculated 151 (H) <100 mg/dL    Non HDL Cholesterol 175 (H) <130 mg/dL   Comprehensive metabolic panel [LAB17]    Collection Time: 04/05/17  7:13 AM   Result Value Ref Range    Sodium 136 133 - 144 mmol/L    Potassium 4.2 3.4 - 5.3 mmol/L    Chloride 91 (L) 94 - 109 mmol/L    Carbon Dioxide 32 20 - 32 mmol/L    Anion Gap 13 3 - 14 mmol/L    Glucose 96 70 - 99 mg/dL    Urea Nitrogen 61 (H) 7 - 30 mg/dL    Creatinine 8.12 (H) 0.66 - 1.25 mg/dL    GFR Estimate 7 (L) >60 mL/min/1.7m2    GFR Estimate If Black 9 (L) >60 mL/min/1.7m2    Calcium 8.3 (L) 8.5 - 10.1 mg/dL    Bilirubin Total 0.8 0.2 - 1.3 mg/dL    Albumin 4.0 3.4 - 5.0 g/dL    Protein Total 7.7 6.8 - 8.8 g/dL    Alkaline Phosphatase 130 40 - 150 U/L    ALT 13 0 - 70 U/L    AST 11 0 - 45 U/L   Phosphorus    Collection Time: 04/05/17  7:13 AM   Result Value Ref Range    Phosphorus 3.9 2.5 - 4.5 mg/dL   INR [TYC5546]    Collection Time: 04/05/17  7:13 AM   Result Value Ref  Range    INR 1.06 0.86 - 1.14   Partial thromboplastin time [LAB56]    Collection Time: 04/05/17  7:13 AM   Result Value Ref Range    PTT 28 22 - 37 sec   CBC with platelets differential [ULP096]    Collection Time: 04/05/17  7:13 AM   Result Value Ref Range    WBC 4.0 4.0 - 11.0 10e9/L    RBC Count 3.36 (L) 4.4 - 5.9 10e12/L    Hemoglobin 11.3 (L) 13.3 - 17.7 g/dL    Hematocrit 34.8 (L) 40.0 - 53.0 %     (H) 78 - 100 fl    MCH 33.6 (H) 26.5 - 33.0 pg    MCHC 32.5 31.5 - 36.5 g/dL    RDW 19.9 (H) 10.0 - 15.0 %    Platelet Count 163 150 - 450 10e9/L    Diff Method Automated Method     % Neutrophils 68.3 %    % Lymphocytes 12.9 %    % Monocytes 15.6 %    % Eosinophils 2.0 %    % Basophils 0.7 %    % Immature Granulocytes 0.5 %    Nucleated RBCs 0 0 /100    Absolute Neutrophil 2.8 1.6 - 8.3 10e9/L    Absolute Lymphocytes 0.5 (L) 0.8 - 5.3 10e9/L    Absolute Monocytes 0.6 0.0 - 1.3 10e9/L    Absolute Eosinophils 0.1 0.0 - 0.7 10e9/L    Absolute Basophils 0.0 0.0 - 0.2 10e9/L    Abs Immature Granulocytes 0.0 0 - 0.4 10e9/L    Absolute Nucleated RBC 0.0            Patient was seen and evaluated by me, Edmond Donato MD. I have reviewed the note and agree with the the plan of care as documented by the fellow.    - ESRD s/p failed kidney transplant   - RCC s/p nephrectomy will need to review oncology clearance but per records should be OK RCC< 5 cm that was discovered incidentally and removed without evidence of mets  - Anemia being worked u by hematology need records as they suspected MM at some point and we want to make sure this has been addressed   Patient will be discussed in our multidisciplinary meeting for final candidacy assessment

## 2017-04-05 NOTE — MR AVS SNAPSHOT
"              After Visit Summary   4/5/2017    Gilles Henning    MRN: [id number]           Patient Information     Date Of Birth          1970        Visit Information        Provider Department      4/5/2017 10:30 AM Laura Benitez RN [facility] Solid Organ Transplant        Today's Diagnoses     ESRD (end stage renal disease) (H)    -  1       Follow-ups after your visit        Your next 10 appointments already scheduled     Jun 12, 2017  2:00 PM CDT   (Arrive by 1:45 PM)   NEW PANCREAS/KIDNEY TRANSPLANT WORK-UP with Marisa Campbell MD   [facility] ([facility])    [address] [place] [id number] [phone]              Who to contact     If you have questions or need follow up information about today's clinic visit or your schedule please contact [facility] SOLID ORGAN TRANSPLANT directly at [phone].  Normal or non-critical lab and imaging results will be communicated to you by MyChart, letter or phone within 4 business days after the clinic has received the results. If you do not hear from us within 7 days, please contact the clinic through [employer] or phone. If you have a critical or abnormal lab result, we will notify you by phone as soon as possible.  Submit refill requests through [employer] or call your pharmacy and they will forward the refill request to us. Please allow 3 business days for your refill to be completed.          Additional Information About Your Visit        MyChart Information     [employer] lets you send messages to your doctor, view your test results, renew your prescriptions, schedule appointments and more. To sign up, go to [email or web address] . Click on \"Log in\" on the left side of the screen, which will take you to the Welcome page. Then click on \"Sign up Now\" on the right side of the page.     You will be asked to enter the access code listed below, as well as some personal information. Please follow the " directions to create your username and password.     Your access code is: [email or web address]  Expires: 2017  6:30 AM     Your access code will  in 90 days. If you need help or a new code, please call your [facility] or [phone].        Care EveryWhere ID     This is your Care EveryWhere ID. This could be used by other organizations to access your [facility] medical records  [id number]         Blood Pressure from Last 3 Encounters:   17 158/88   17 158/88    Weight from Last 3 Encounters:   17 62.7 kg (138 lb 3.2 oz)   17 62.7 kg (138 lb 3.2 oz)              Today, you had the following     No orders found for display       Primary Care Provider Office Phone # Fax #    Charlie Darrion Dubose [phone]       [facility] [address] [facility] [address]        Thank you!     Thank you for choosing [facility] SOLID ORGAN TRANSPLANT  for your care. Our goal is always to provide you with excellent care. Hearing back from our patients is one way we can continue to improve our services. Please take a few minutes to complete the written survey that you may receive in the mail after your visit with us. Thank you!             Your Updated Medication List - Protect others around you: Learn how to safely use, store and throw away your medicines at www.disposemymeds.org.          This list is accurate as of: 17 11:59 PM.  Always use your most recent med list.                   Brand Name Dispense Instructions for use    azaTHIOprine 50 MG tablet    IMURAN    60 tablet    Take 1 tablet (50 mg) by mouth daily       calcium acetate 667 MG Caps capsule    PHOSLO     Take 667 mg by mouth 3 times daily (with meals)       CLONAZEPAM PO      Take 1 mg by mouth 2 times daily as needed for anxiety       * LISINOPRIL PO      Take 10 mg by mouth daily       * LISINOPRIL PO      Take 5 mg by mouth daily       METOPROLOL TARTRATE PO      Take 25 mg by mouth daily        OMEPRAZOLE PO      Take 40 mg by mouth every morning       predniSONE 5 MG tablet    DELTASONE     Take 1 tablet (5 mg) by mouth daily       sennosides 8.6 MG tablet    SENOKOT     Take 1 tablet by mouth daily as needed for constipation       tacrolimus capsule     240 capsule    Take 1 capsule (1 mg) by mouth 2 times daily       TRAZODONE HCL PO      Take 300 mg by mouth At Bedtime       VITAMIN D (CHOLECALCIFEROL) PO      Take 2,000 Units by mouth daily       * Notice:  This list has 2 medication(s) that are the same as other medications prescribed for you. Read the directions carefully, and ask your doctor or other care provider to review them with you.

## 2017-04-05 NOTE — MR AVS SNAPSHOT
"              After Visit Summary   4/5/2017    Gilles Henning    MRN: [id number]           Patient Information     Date Of Birth          1970        Visit Information        Provider Department      4/5/2017 9:00 AM [facility] PATIENT 2 [facility] Solid Organ Transplant        Today's Diagnoses     Pre-transplant evaluation for kidney transplant    -  1       Follow-ups after your visit        Your next 10 appointments already scheduled     Jun 12, 2017  2:00 PM CDT   (Arrive by 1:45 PM)   NEW PANCREAS/KIDNEY TRANSPLANT WORK-UP with Marisa Campbell MD   [facility] ([facility] [place])    [address] [place] [address] [phone]              Who to contact     If you have questions or need follow up information about today's clinic visit or your schedule please contact [facility] SOLID ORGAN TRANSPLANT directly at [phone].  Normal or non-critical lab and imaging results will be communicated to you by [employer], letter or phone within 4 business days after the clinic has received the results. If you do not hear from us within 7 days, please contact the clinic through [employer] or phone. If you have a critical or abnormal lab result, we will notify you by phone as soon as possible.  Submit refill requests through [employer] or call your pharmacy and they will forward the refill request to us. Please allow 3 business days for your refill to be completed.          Additional Information About Your Visit        MyChart Information     [employer] lets you send messages to your doctor, view your test results, renew your prescriptions, schedule appointments and more. To sign up, go to [email or web address] . Click on \"Log in\" on the left side of the screen, which will take you to the Welcome page. Then click on \"Sign up Now\" on the right side of the page.     You will be asked to enter the access code listed below, as well as some personal information. Please follow the " "directions to create your username and password.     Your access code is: [email or web address]  Expires: 2017  6:30 AM     Your access code will  in 90 days. If you need help or a new code, please call your [facility] clinic or [phone].        Care EveryWhere ID     This is your Care EveryWhere ID. This could be used by other organizations to access your [facility] medical records  [id number]        Your Vitals Were     Pulse Temperature Height Pulse Oximetry BMI (Body Mass Index)       58 98.2  F (36.8  C) 1.778 m (5' 10\") 99% 19.83 kg/m2        Blood Pressure from Last 3 Encounters:   No data found for BP    Weight from Last 3 Encounters:   No data found for Wt              Today, you had the following     No orders found for display       Primary Care Provider Office Phone # Fax #    Charlie Darrion Dubose [phone]       [facility] [address] [facility] [address]        Thank you!     Thank you for choosing [facility] SOLID ORGAN TRANSPLANT  for your care. Our goal is always to provide you with excellent care. Hearing back from our patients is one way we can continue to improve our services. Please take a few minutes to complete the written survey that you may receive in the mail after your visit with us. Thank you!             Your Updated Medication List - Protect others around you: Learn how to safely use, store and throw away your medicines at www.disposemymeds.org.          This list is accurate as of: 17 11:59 PM.  Always use your most recent med list.                   Brand Name Dispense Instructions for use    azaTHIOprine 50 MG tablet    IMURAN    60 tablet    Take 1 tablet (50 mg) by mouth daily       calcium acetate 667 MG Caps capsule    PHOSLO     Take 667 mg by mouth 3 times daily (with meals)       CLONAZEPAM PO      Take 1 mg by mouth 2 times daily as needed for anxiety       * LISINOPRIL PO      Take 10 mg by mouth daily       * LISINOPRIL PO      " Take 5 mg by mouth daily       METOPROLOL TARTRATE PO      Take 25 mg by mouth daily       OMEPRAZOLE PO      Take 40 mg by mouth every morning       predniSONE 5 MG tablet    DELTASONE     Take 1 tablet (5 mg) by mouth daily       sennosides 8.6 MG tablet    SENOKOT     Take 1 tablet by mouth daily as needed for constipation       tacrolimus capsule     240 capsule    Take 1 capsule (1 mg) by mouth 2 times daily       TRAZODONE HCL PO      Take 300 mg by mouth At Bedtime       VITAMIN D (CHOLECALCIFEROL) PO      Take 2,000 Units by mouth daily       * Notice:  This list has 2 medication(s) that are the same as other medications prescribed for you. Read the directions carefully, and ask your doctor or other care provider to review them with you.

## 2017-04-05 NOTE — PROGRESS NOTES
"Psychosocial Assessment  Patient Name/ Age: Gilles Henning/47 year old   Medical Record #: [id number]  Duration of Interview:     30   min  Process:   Face-to-Face Interview                (counseling < 50%)   Present at Appointment: Gilles \"Javon\" and his wife Merline        : ETTA Colón, LICSW Date:  April 5, 2017        Type of transplant: Kidney    Donor type:   Javon does not know of any potential donors at this time.   [facility]   Prior Transplants:    Yes    2009 DD Kidney transplant Status of Transplant:  2013 transplanted kidney started to fail.       Current Living Situation    Location:   [address]  [facility] [address]  With Whom: Merline       Family/ Social Support:    Javon has four children from a previous relationship.  Melvin (29) lives in [place], [facility] [place] (23) [place], [facility] ([place]) [place] and [facility] (18) [place], [place].  He has two grandchildren.  Javon has one brother ([place], [place]).   Available, helpful   Committed relationship:  Javon and Merline have been  for five years.   Stable/supportive   Other supports:   Friends Available, helpful       Activities/ Functional Ability    Current level: Ambulatory and independent with ADL's     Transportation Drives self       Vocational/Employment/Financial     Employment   Disabled   Job Description      Income  Javon indicated he has been eligible for 17 years for SSDI.  Merline is employed part time.   [employer]DI   Insurance  Medicare Parts A and B, BC/BS Supplement policy and BC/BS Part D with [employer].  Javon indicated he pays his own premiums.      At this time, patient can afford medication costs:  Yes  Private insurance and Medicare       Medical Status    Current mode of treatment for ESRD Dialysis   Complications - Non diabetic        Behavioral    Tobacco Use Yes  Chemical Dependency No   Javon indicated he uses a tin of tobacco every other day and does not have any plans to quit.   Javon " indicated he went through CD treatment 30 years ago but has not always been able to maintain sobriety.  Javon indicated he has not drank for the past three years.     Psychiatric Impairment No    Reading ability Good  Education level: One year of college Recent Legal History No    Coping Style/Strategies: Javon indicated when under stress he will exercise, listen to music, do photography or clean the house.   Ability to Adhere to Complex Medical Regime: Yes     Adherence History: Javon indicated he will usually follow his physician's recommendations.        Education  _X_ Medicare  _X_ Rehabilitation  _X_ Donor issues  _X_ Community resources  _X_ Post discharge housing  _X_ Financial resources  _X_ Medical insurance options  _X_ Psych adjustment  _X_ Family adjustment  _X_ Health Care Directive - Javon indicated he is in agreement with Merline making his medical decisions for him if he is unable and does not want to complete a Health Care Directive at this time. Psychosocial Risks of Transplant Reviewed and Discussed:  _X_ Increased stress related to emotional,            family, social, employment or financial           situation  _X_ Affect on work and/or disability benefits  _X_ Affect on future life insurance  _X_ Transplant outcome expectations may           not be met  _X_ Mental Health Risks: anxiety,           depression, PTSD, guilt, grief and           chronic fatigue     Notable Items:   Javon indicated he should be able to stay post transplant with Merline's father who lives in [place].      Final Evaluation/Assessment   Patient seemed to process information well. Appeared well informed, motivated and able to follow post transplant requirements. Behavior was appropriate during interview. Has adequate income and insurance coverage. Adequate social support. No major contraindications noted for transplant.  At this time patient appears to understand the risks and benefits of transplant.       Recommendation  Acceptable    Selection Criteria Met:  Plan for support Yes   Chemical Dependence Yes   Smoking Yes   Mental Health Yes   Adequate Finances Yes    Signature: ETTA Colón, LICSW   Title: Clinical

## 2017-04-05 NOTE — MR AVS SNAPSHOT
"              After Visit Summary   4/5/2017    Gilles Henning    MRN: [id number]           Patient Information     Date Of Birth          1970        Visit Information        Provider Department      4/5/2017 12:00 PM Krysta Keller MSW [facility] Solid Organ Transplant        Today's Diagnoses     Awaiting organ transplant status    -  1       Follow-ups after your visit        Your next 10 appointments already scheduled     Jun 12, 2017  2:00 PM CDT   (Arrive by 1:45 PM)   NEW PANCREAS/KIDNEY TRANSPLANT WORK-UP with Marisa Campbell MD   [facility] ([facility])    [address] [place] [id number] [phone]              Who to contact     If you have questions or need follow up information about today's clinic visit or your schedule please contact [facility] SOLID ORGAN TRANSPLANT directly at [phone].  Normal or non-critical lab and imaging results will be communicated to you by MyChart, letter or phone within 4 business days after the clinic has received the results. If you do not hear from us within 7 days, please contact the clinic through [facility] or phone. If you have a critical or abnormal lab result, we will notify you by phone as soon as possible.  Submit refill requests through [employer] or call your pharmacy and they will forward the refill request to us. Please allow 3 business days for your refill to be completed.          Additional Information About Your Visit        MyChart Information     [employer] lets you send messages to your doctor, view your test results, renew your prescriptions, schedule appointments and more. To sign up, go to [email or web address] . Click on \"Log in\" on the left side of the screen, which will take you to the Welcome page. Then click on \"Sign up Now\" on the right side of the page.     You will be asked to enter the access code listed below, as well as some personal information. Please follow the " directions to create your username and password.     Your access code is: [email or web address]  Expires: 2017  6:30 AM     Your access code will  in 90 days. If you need help or a new code, please call your [facility] or [phone].        Care EveryWhere ID     This is your Care EveryWhere ID. This could be used by other organizations to access your [place] medical records  [id number]         Blood Pressure from Last 3 Encounters:   17 158/88   17 158/88    Weight from Last 3 Encounters:   17 62.7 kg (138 lb 3.2 oz)   17 62.7 kg (138 lb 3.2 oz)              We Performed the Following     UNOS cPRA     UNOS Unacceptable Antigens        Primary Care Provider Office Phone # Fax #    Charlie Darrion Dubose [phone]       [facility] [address] [facility] [address]        Thank you!     Thank you for choosing [facility] SOLID ORGAN TRANSPLANT  for your care. Our goal is always to provide you with excellent care. Hearing back from our patients is one way we can continue to improve our services. Please take a few minutes to complete the written survey that you may receive in the mail after your visit with us. Thank you!             Your Updated Medication List - Protect others around you: Learn how to safely use, store and throw away your medicines at www.disposemymeds.org.          This list is accurate as of: 17 11:59 PM.  Always use your most recent med list.                   Brand Name Dispense Instructions for use    azaTHIOprine 50 MG tablet    IMURAN    60 tablet    Take 1 tablet (50 mg) by mouth daily       calcium acetate 667 MG Caps capsule    PHOSLO     Take 667 mg by mouth 3 times daily (with meals)       CLONAZEPAM PO      Take 1 mg by mouth 2 times daily as needed for anxiety       * LISINOPRIL PO      Take 10 mg by mouth daily       * LISINOPRIL PO      Take 5 mg by mouth daily       METOPROLOL TARTRATE PO      Take 25 mg by mouth  daily       OMEPRAZOLE PO      Take 40 mg by mouth every morning       predniSONE 5 MG tablet    DELTASONE     Take 1 tablet (5 mg) by mouth daily       sennosides 8.6 MG tablet    SENOKOT     Take 1 tablet by mouth daily as needed for constipation       tacrolimus capsule     240 capsule    Take 1 capsule (1 mg) by mouth 2 times daily       TRAZODONE HCL PO      Take 300 mg by mouth At Bedtime       VITAMIN D (CHOLECALCIFEROL) PO      Take 2,000 Units by mouth daily       * Notice:  This list has 2 medication(s) that are the same as other medications prescribed for you. Read the directions carefully, and ask your doctor or other care provider to review them with you.

## 2017-04-05 NOTE — NURSING NOTE
Pre Abdominal Organ Transplant Coordinator Evaluation Note:    MD present: Dr. Mario Vallejo, Dr. Phuong Pandya/Dr. Edmond Donato    Attendees: self and pt's wife     Type of transplant: kidney    Required Topic(s) Discussed: Evaluation notification document, SRTR data, Multiple wait list brochure, KDPI, Evaluation/approval process, Selection committee process, Wait list process and Living donor process    Teaching: Instruct patient on living donor process/provided contact info, Provided my business card and To call me with any questions    Assessment/Plan: I was not present at either provider consult today. I did spend approximately 15 minutes with pt and his wife to review the pre-transplant process and to answer questions. Pt signed the signature page of the Receipt of Information for Kidney Transplant Recipients. Pt signed the KDPI  > 85 % with a    Response. Pt stating he does not know of any live donors - instructed him to have donors register with our program now if he does know of someone.  Explained I will call him with the outcome of the Selection Committee. Pt and wife expressed good understanding of all and was in good agreement with the plan.     Time spent with patient: 15 minutes    Laura Benitez  --  Nurse Transplant Coordinator   Pager [phone]

## 2017-04-06 ENCOUNTER — DOCUMENTATION ONLY (OUTPATIENT)
Dept: TRANSPLANT | Facility: CLINIC | Age: 47
End: 2017-04-06

## 2017-04-06 LAB
BLD GP AB SCN TITR SERPL: NORMAL {TITER}
HLA TYPING COMPLETE SOT RECIPIENT: NORMAL
LA PPP-IMP: NORMAL
M TB TUBERC IFN-G BLD QL: ABNORMAL
M TB TUBERC IFN-G/MITOGEN IGNF BLD: ABNORMAL IU/ML
PRA SINGLE ANTIGEN IGG ANTIBODY: NORMAL

## 2017-04-07 LAB
BKV DNA # SPEC NAA+PROBE: NORMAL COPIES/ML
BKV DNA SPEC NAA+PROBE-LOG#: NORMAL LOG COPIES/ML
DPA1*: NORMAL
DPB1*: NORMAL
DPB1*LOCUS: NORMAL
DQA1*LOCUS: NORMAL
DQB1* LOCUS: NORMAL
DQB1*: NORMAL
DRB1* LOCUS: NORMAL
DRB1*: NORMAL
DRB3* LOCUS: NORMAL
DRSSO TEST METHOD: NORMAL
SPECIMEN SOURCE: NORMAL

## 2017-04-08 ENCOUNTER — AMBULATORY - GICH (OUTPATIENT)
Dept: SCHEDULING | Facility: OTHER | Age: 47
End: 2017-04-08

## 2017-04-10 LAB
SA1 CELL: NORMAL
SA1 COMMENTS: NORMAL
SA1 HI RISK ABY: NORMAL
SA1 MOD RISK ABY: NORMAL
SA1 TEST METHOD: NORMAL
SA2 CELL: NORMAL
SA2 COMMENTS: NORMAL
SA2 HI RISK ABY UA: NORMAL
SA2 MOD RISK ABY: NORMAL
SA2 TEST METHOD: NORMAL
UNOS CPRA: 100

## 2017-04-21 ENCOUNTER — AMBULATORY - GICH (OUTPATIENT)
Dept: SCHEDULING | Facility: OTHER | Age: 47
End: 2017-04-21

## 2017-04-21 ENCOUNTER — TELEPHONE (OUTPATIENT)
Dept: TRANSPLANT | Facility: CLINIC | Age: 47
End: 2017-04-21

## 2017-04-21 NOTE — LETTER
2017    Gilles Henning  [address] [facility]  [place] [address]  : 1970     Dear Mr. Henning,    It was a pleasure to see you here recently for consideration of kidney transplantation. Your pre-transplant evaluation appointments were here on 2017. Your evaluation results were reviewed at our Multidisciplinary Selection Committee on 2017. The Committee has approved you as a candidate for kidney transplant pending the successful completion of the following things.      1. 6 month compliance contract. Please see the enclosed compliance contract, read and sign and return to our office in the provided envelope if you are in agreement with this.  At the end of the 6 month period, please have all of your regular physicians write a note summarizing your ability to be medically compliant for this 6 months time frame and their opinion of your ability to be compliant with a post transplant regimen.     2. Attend your already scheduled appointment here on  at 2:00 PM with Dr. Campbell ( cardiologist ).     3. Attend your already scheduled appointment locally to see a Dermatologist on Jesenia 10, 2017. Please make arrangements for this clinic note to be faxed to our office at [phone].     4. Please make an appointment for a dental check up. We recommend you quit chewing tobacco.    5. It is recommended that you try to taper off of prednisone. Please work with Dr. Tobin on this item.    6. Please arrange to have all oncology clinic notes faxed to our office at [phone] for every appointment you attend in the future.        Upon successful completion of all the above items, you will be eligible to be added onto the kidney waiting list on ACTIVE status. When added onto the list, your wait time will begin with your dialysis start date.      Please have all potential live donors register with our Program now online to initiate their [facility] at [email or web address].  You  will be notified by our office in the event of an approved live donor.                     Please feel free to call me with questions at [phone].        Sincerely,         Laura Benitze RN BSN Transplant Coordinator     Enclosure: UNOS Letter, Compliance Contract      CC: Charlie Mercado MD (PCP), Samantha Tobin MD, Raul Campbell MD

## 2017-04-21 NOTE — TELEPHONE ENCOUNTER
I called pt today and spoke to him at length. I reviewed the outcome of the Selection Committee this week. Explained that he is approved for kidney transplant pending the completion of: 6 month compliance contract, cardiology clearance, dermatology clearance, dental clearance, try to taper off of Prednisone and need to get the neurology records from appts earlier this year.  Explained once all is successfully completed he can then be added onto the wait list on ACTIVE status with his wait time starting with his dialysis start date. Also explained to pt his cPRA level is 100 meaning he will be hard to find a match for for his next kidney transplant. Pt stating he does not think he has any donors - instructed him to try and find some potential ones due to his very high PRA level.  Explained to pt I will write him a letter regarding our discussion today - instructed him to read the letter and to call me with questions. Pt expressed good understanding of all and was in good agreement with the plan.     Generated Transplant Evaluation Summary Letter today in EPIC - routed to  for mailing.

## 2017-04-25 ENCOUNTER — MEDICAL CORRESPONDENCE (OUTPATIENT)
Dept: TRANSPLANT | Facility: CLINIC | Age: 47
End: 2017-04-25

## 2017-05-08 LAB — COPATH REPORT: NORMAL

## 2017-05-20 ENCOUNTER — AMBULATORY - GICH (OUTPATIENT)
Dept: SCHEDULING | Facility: OTHER | Age: 47
End: 2017-05-20

## 2017-05-24 ENCOUNTER — AMBULATORY - GICH (OUTPATIENT)
Dept: LAB | Facility: OTHER | Age: 47
End: 2017-05-24

## 2017-05-24 DIAGNOSIS — C64.9 MALIGNANT NEOPLASM OF KIDNEY EXCLUDING RENAL PELVIS (H): ICD-10-CM

## 2017-05-24 DIAGNOSIS — D63.8 ANEMIA IN OTHER CHRONIC DISEASES CLASSIFIED ELSEWHERE: ICD-10-CM

## 2017-05-24 LAB
A/G RATIO - HISTORICAL: 1.5 (ref 1–2)
ABSOLUTE BASOPHILS - HISTORICAL: 0 THOU/CU MM
ABSOLUTE EOSINOPHILS - HISTORICAL: 0.1 THOU/CU MM
ABSOLUTE LYMPHOCYTES - HISTORICAL: 0.5 THOU/CU MM (ref 0.9–2.9)
ABSOLUTE MONOCYTES - HISTORICAL: 0.6 THOU/CU MM
ABSOLUTE NEUTROPHILS - HISTORICAL: 3.2 THOU/CU MM (ref 1.7–7)
ALBUMIN SERPL-MCNC: 4.4 G/DL (ref 3.5–5.7)
ALP SERPL-CCNC: 91 IU/L (ref 34–104)
ALT (SGPT) - HISTORICAL: 9 IU/L (ref 7–52)
ANION GAP - HISTORICAL: 14 (ref 5–18)
AST SERPL-CCNC: 12 IU/L (ref 13–39)
BASOPHILS # BLD AUTO: 0.9 %
BILIRUB SERPL-MCNC: 0.5 MG/DL (ref 0.3–1)
BUN SERPL-MCNC: 54 MG/DL (ref 7–25)
BUN/CREAT RATIO - HISTORICAL: 6
CALCIUM SERPL-MCNC: 9.6 MG/DL (ref 8.6–10.3)
CHLORIDE SERPLBLD-SCNC: 96 MMOL/L (ref 98–107)
CO2 SERPL-SCNC: 30 MMOL/L (ref 21–31)
CREAT SERPL-MCNC: 8.91 MG/DL (ref 0.7–1.3)
EOSINOPHIL NFR BLD AUTO: 2.3 %
ERYTHROCYTE [DISTWIDTH] IN BLOOD BY AUTOMATED COUNT: 15.5 % (ref 11.5–15.5)
GFR IF NOT AFRICAN AMERICAN - HISTORICAL: 6 ML/MIN/1.73M2
GLOBULIN - HISTORICAL: 2.9 G/DL (ref 2–3.7)
GLUCOSE SERPL-MCNC: 108 MG/DL (ref 70–105)
HCT VFR BLD AUTO: 36.6 % (ref 37–53)
HEMOGLOBIN: 11.4 G/DL (ref 13.5–17.5)
LDH SERPL-CCNC: 128 IU/L (ref 140–271)
LYMPHOCYTES NFR BLD AUTO: 11.7 % (ref 20–44)
MCH RBC QN AUTO: 33.1 PG (ref 26–34)
MCHC RBC AUTO-ENTMCNC: 31.1 G/DL (ref 32–36)
MCV RBC AUTO: 106 FL (ref 80–100)
MONOCYTES NFR BLD AUTO: 12.6 %
NEUTROPHILS NFR BLD AUTO: 72.3 % (ref 42–72)
PLATELET # BLD AUTO: 122 THOU/CU MM (ref 140–440)
PMV BLD: 9.3 FL (ref 6.5–11)
POTASSIUM SERPL-SCNC: 4.7 MMOL/L (ref 3.5–5.1)
PROT SERPL-MCNC: 7.3 G/DL (ref 6.4–8.9)
RED BLOOD COUNT - HISTORICAL: 3.44 MIL/CU MM (ref 4.3–5.9)
SODIUM SERPL-SCNC: 140 MMOL/L (ref 133–143)
WHITE BLOOD COUNT - HISTORICAL: 4.4 THOU/CU MM (ref 4.5–11)

## 2017-05-25 ENCOUNTER — TRANSFERRED RECORDS (OUTPATIENT)
Dept: HEALTH INFORMATION MANAGEMENT | Facility: HOSPITAL | Age: 47
End: 2017-05-25

## 2017-05-25 ENCOUNTER — OFFICE VISIT - GICH (OUTPATIENT)
Dept: ONCOLOGY | Facility: OTHER | Age: 47
End: 2017-05-25

## 2017-05-25 ENCOUNTER — HISTORY (OUTPATIENT)
Dept: ONCOLOGY | Facility: OTHER | Age: 47
End: 2017-05-25

## 2017-05-25 DIAGNOSIS — C64.9 MALIGNANT NEOPLASM OF KIDNEY EXCLUDING RENAL PELVIS (H): ICD-10-CM

## 2017-05-25 LAB
ELP INTERP,SERUM - HISTORICAL: NORMAL
ELP,ALBUMIN - HISTORICAL: 4.84 G/DL (ref 3.31–5.31)
ELP,ALPHA 1 - HISTORICAL: 0.25 G/DL (ref 0.19–0.42)
ELP,ALPHA 2 - HISTORICAL: 0.58 G/DL (ref 0.44–1.03)
ELP,BETA - HISTORICAL: 0.59 G/DL (ref 0.52–1.05)
ELP,GAMMA - HISTORICAL: 1.25 G/DL (ref 0.59–1.46)
PROT SERPL-MCNC: 7.5 G/DL (ref 6–8)

## 2017-05-25 ASSESSMENT — PATIENT HEALTH QUESTIONNAIRE - PHQ9: SUM OF ALL RESPONSES TO PHQ QUESTIONS 1-9: 0

## 2017-05-26 LAB — BETA 2 MICROGLOBULIN,SERUM - HISTORICAL: 36.3 UG/ML (ref 1.09–2.53)

## 2017-06-08 ENCOUNTER — HISTORY (OUTPATIENT)
Dept: EMERGENCY MEDICINE | Facility: OTHER | Age: 47
End: 2017-06-08

## 2017-06-09 ENCOUNTER — COMMUNICATION - GICH (OUTPATIENT)
Dept: INTERNAL MEDICINE | Facility: OTHER | Age: 47
End: 2017-06-09

## 2017-06-12 ENCOUNTER — AMBULATORY - GICH (OUTPATIENT)
Dept: SCHEDULING | Facility: OTHER | Age: 47
End: 2017-06-12

## 2017-06-12 ENCOUNTER — OFFICE VISIT (OUTPATIENT)
Dept: CARDIOLOGY | Facility: CLINIC | Age: 47
End: 2017-06-12
Attending: INTERNAL MEDICINE
Payer: COMMERCIAL

## 2017-06-12 ENCOUNTER — PRE VISIT (OUTPATIENT)
Dept: CARDIOLOGY | Facility: CLINIC | Age: 47
End: 2017-06-12

## 2017-06-12 DIAGNOSIS — I15.0 RENOVASCULAR HYPERTENSION: Primary | ICD-10-CM

## 2017-06-12 DIAGNOSIS — Z76.82 ORGAN TRANSPLANT CANDIDATE: ICD-10-CM

## 2017-06-12 DIAGNOSIS — N02.B9 IGA NEPHROPATHY: ICD-10-CM

## 2017-06-12 DIAGNOSIS — N18.6 END STAGE RENAL DISEASE (H): ICD-10-CM

## 2017-06-12 PROBLEM — N18.5 CHRONIC KIDNEY DISEASE, STAGE V (H): Status: ACTIVE | Noted: 2017-06-12

## 2017-06-12 PROCEDURE — 99204 OFFICE O/P NEW MOD 45 MIN: CPT | Mod: ZP | Performed by: INTERNAL MEDICINE

## 2017-06-12 PROCEDURE — 99212 OFFICE O/P EST SF 10 MIN: CPT | Mod: 25,ZF

## 2017-06-12 RX ORDER — CIPROFLOXACIN 500 MG/1
500 TABLET, FILM COATED ORAL
COMMUNITY
Start: 2017-06-08 | End: 2017-06-18

## 2017-06-12 NOTE — PROGRESS NOTES
HPI: 47 year old male with PMH end stage renal failure due to IgA Nephropathy as well as uncomplicated Hepatitis A. He comes to us from [place]. He is a non-diabetic. He was transplanted at [facility] in 2009 with a  donor kidney with good graft function until requiring bilateral nephrectomy 2/2 RCC, now tumor free. He is referred for evaluation by cardiology as part of his evaluation to be listed for a kidney (no known living or donor options at present).     Gilles Henning describes an active lifestyle doing workout (weight/bike) about 5 days a week, NYHA I. He gets dialysis three times weekly. Mr. Henning notes that he has persistent, uncontrolled hypertension despite metoprolol 25 mg BID and lisinopril 10 mg qd. His BP today is 158/88 mmHg and he states that his BP is regularly checked with dialysis runs and is consistently >140 mmHg systolic.     ECHO 17 revealed LVEF 60-65%, normal RV size/function, PASP normal. Stress MPI  was without inducible ischemia with stress, LV EF 56%. Patient denies any chest pain, palpitation, light headedness, syncope. He denies lower extremity edema, orthopnea, PND. He denies lower extremity cramping c/w claudication. RCRI score is 1 (ESRD). He is currently being treated for a prostatitis with Cipro. He denies fever, chills, nausea, vomiting. He denies weight gain or weight loss.        PAST MEDICAL HISTORY:  Past Medical History:   Diagnosis Date     Anemia in chronic kidney disease      Chews tobacco      Chronic rejection of kidney transplant      ESRD needing dialysis (H)     ~2014     History of alcoholism (H)      History of depression      History of peritoneal dialysis     7 years     History of substance abuse      Hyperlipidemia      IgA nephropathy      Osteopenia      Peritonitis (H)     non-MRSA staph     Renal cell carcinoma (H)        CURRENT MEDICATIONS:  Current Outpatient Prescriptions   Medication Sig Dispense Refill     PROGRAF 1 MG PO  CAPSULE Take 1 capsule (1 mg) by mouth 2 times daily 240 capsule      azaTHIOprine (IMURAN) 50 MG tablet Take 1 tablet (50 mg) by mouth daily 60 tablet      predniSONE (DELTASONE) 5 MG tablet Take 1 tablet (5 mg) by mouth daily       METOPROLOL TARTRATE PO Take 25 mg by mouth daily       LISINOPRIL PO Take 10 mg by mouth daily       LISINOPRIL PO Take 5 mg by mouth daily       VITAMIN D, CHOLECALCIFEROL, PO Take 2,000 Units by mouth daily       OMEPRAZOLE PO Take 40 mg by mouth every morning       CLONAZEPAM PO Take 1 mg by mouth 2 times daily as needed for anxiety       sennosides (SENOKOT) 8.6 MG tablet Take 1 tablet by mouth daily as needed for constipation       TRAZODONE HCL PO Take 300 mg by mouth At Bedtime       calcium acetate (PHOSLO) 667 MG CAPS capsule Take 667 mg by mouth 3 times daily (with meals)         PAST SURGICAL HISTORY:  Past Surgical History:   Procedure Laterality Date     HC DIALYSIS AVF OR AVG, CENTRAL INTERVENTION ONLY Left      LAPAROSCOPIC INSERTION CATHETER PERITONEAL DIALYSIS Left      NEPHRECTOMY BILATERAL  2015     REMOVE CATHETER PERITONEAL       TRANSPLANT KIDNEY RECIPIENT  DONOR Left 2009       ALLERGIES   No Known Allergies    FAMILY HISTORY:  Paternal family history of DM I and MI before age 65 as complication of longstanding diabetes.     SOCIAL HISTORY:  The patient lives in [place]. Patient chews tobacco. He is a non-smoker. He denies alcohol.     Social History     Marital status:      Spouse name: Merline     Number of children: 4     Years of education: 13     Occupational History     disabled      Social History Main Topics     Smoking status: Current Every Day Smoker     Types: Dip, chew, snus or snuff     Start date: 2011     Smokeless tobacco: Current User     Types: Chew      Comment: 2 days per can.     Alcohol use Yes      Comment: none now, did treatment at age 21, relapsed with divorce (a couple months)  then now  sober 3 years.       "Drug use: Yes     Special: Marijuana      Comment: age 14 only.     Sexual activity: Yes     Partners: Female     Other Topics Concern     Not on file     Social History Narrative       ROS:   Constitutional: No fever, chills, or sweats. No weight gain/loss   ENT: Gradual vision loss with nearsightedness, ear ache, epistaxis, sore throat  Allergies/Immunologic: Negative.   Respiratory: No cough, hemoptysia  Cardiovascular: As per HPI  GI: No nausea, vomiting, hematemesis, melena, or hematochezia  : No longer making urine, bilateral nephrectomy 2/2 RCC  Integument: Negative  Psychiatric: Negative  Neuro: Negative  Endocrinology: Negative   Musculoskeletal: Negative     EXAM:  Vitals: /88, Pulse 58, H 1.778 m (5'10\"), Weight 62.7 kg (138 lb), BMI 19.87    In general, the patient is a thin male, pleasant, in no apparent distress.    HEENT: NC/AT.  PERRLA.  EOMI.  Sclerae white, not injected.  Nares clear.  Pharynx without erythema or exudate.  Dentition intact.    Neck: No adenopathy.  No thyromegaly. Carotids +4/4 bilaterally without bruits.  No jugular venous distension.   Heart: RRR. Normal S1, S2 splits physiologically. No murmur, rub, click, or gallop.There is no heave.    Lungs: CTA.  No ronchi, wheezes, rales.  No dullness to percussion.   Abdomen: Thin, soft, nontender, nondistended. No organomegaly.  No bruits.   Extremities: No clubbing, cyanosis, or edema.  The pulses are +4/4 at the radial, brachial, femoral, popliteal, DP, and PT sites bilaterally.  No bruits are noted.  Neurologic: Alert and oriented to person/place/time, normal speech, gait and affect  Skin: No petechiae, purpura or rash.    Labs:  LIPID RESULTS:  Lab Results   Component Value Date    CHOL 225 (H) 04/05/2017    HDL 50 04/05/2017     (H) 04/05/2017    TRIG 119 04/05/2017    NHDL 175 (H) 04/05/2017       LIVER ENZYME RESULTS:  Lab Results   Component Value Date    AST 11 04/05/2017    ALT 13 04/05/2017       CBC " RESULTS:  Lab Results   Component Value Date    WBC 4.0 2017    RBC 3.36 (L) 2017    HGB 11.3 (L) 2017    HCT 34.8 (L) 2017     (H) 2017    MCH 33.6 (H) 2017    MCHC 32.5 2017    RDW 19.9 (H) 2017     2017       BMP RESULTS:  Lab Results   Component Value Date     2017    POTASSIUM 4.2 2017    CHLORIDE 91 (L) 2017    CO2 32 2017    ANIONGAP 13 2017    GLC 96 2017    BUN 61 (H) 2017    CR 8.12 (H) 2017    GFRESTIMATED 7 (L) 2017    GFRESTBLACK 9 (L) 2017    SHIRAZ 8.3 (L) 2017        A1C RESULTS:  No results found for: A1C    INR RESULTS:  Lab Results   Component Value Date    INR 1.06 2017       Cardiac data:  1. EKG 17 was personally reviewed. Sinus rhythm. No acute ST-T changes.        2. ECHO 17 was personally reviewed and shows preserved biventricular function, no significant valvular dysfunction, estimated pulmonary artery systolic pressure is normal, no pericardial effusion.    3. NM Cardiac MPI Stress 2016: No evidence of stress-induced ischemia, LV EF 56%    Assessment and Plan:   47 year old male with PMH end stage renal failure due to IgA Nephropathy as well as uncomplicated Hepatitis A. The patient is non-diabetic.   He was transplanted at [facility] in 2009 with a  donor kidney with good graft function until requiring bilateral nephrectomy 2/2 RCC, now tumor free.     Cardiac Risk assessment: good exercise tolerance. Blood pressure is elevated. He is not a diabetic. His recent lipids -  total cholesterol 225, HDL 50,  done on 17. Stress test in  showed no ischemia.     Overall, patient has no active anginal or heart faliure symptoms. Exam shows no heart failure. The patient's Revised Ordonez Cardiac Risk Index score is 1 with a low estimated post operative CV event rate of ~1% . Given all the recent investigations he has had, he does  not need any further cardiac work up att his point. Close attention to heart rate, blood pressure and fluid management would be recommended perioperatively.    Plan:   1. Would recommend Amlodipine 2.5 mg at bedtime   2. Given elevated total cholesterol and elevated LDL, consider prevastatin 20 mg daily (10-year risk of atherosclerotic CV disease 5.7%, at risk for CAD given the ESRD)  3. Follow with physicians locally in [place] and maintain communication with transplant coordinator. See us as needed. No additional cardiac workup needed now.      Cirilo Ladd PA-C  General Cardiology   Pager [phone]    ATTENDING ATTESTATION  Patient was seen by me on June 12, 2017 in conjunction with Cirilo Ladd PA-C as part of a shared visit.    I have personally reviewed medical history, medication list, relevant cardiac and laboratory data. I have examined the patient and edited the note as necessary to reflect our joint assessment and plan.    Marisa Campbell MD, MS  Staff Cardiologist, [facility]   Pager: [phone]    CC  Patient Care Team:  Charlie Dubose MD as PCP - General (Internal Medicine)  Samantha Tobin MD as Referring Physician (Nephrology)  Raul Campbell MD as MD (Hematology & Oncology)  EDWIN DELUNA

## 2017-06-12 NOTE — TELEPHONE ENCOUNTER
Previsit nursing summary    HPI: Mr. Henning has End stage renal failure due to IgA Nephropathy. The patient is non-diabetic.   He was transplanted at [facility] in 2009 with a  donor kidney.  He had good graft function until requiring bilateral nephrectomy for RCC.  He is now tumor free.  He is active, doing workout (weight/bike) about 5 days a week. Does not make urine at this time.  No known living donor options.  History of heptitis A, uncomplicated.  He tolerated MMF for the first year of his transplant until he developed severe diarrhea w 40 lb weight loss, colonoscopy proven MMF toxicity.  On aza since that time.  Big history of constipation with laxatives.    Procedures:    ECHO (2017)  Interpretation Summary  Global and regional left ventricular function is normal with an EF of 60-65%.  Global right ventricular function is normal.  Pulmonary artery systolic pressure is normal.  No pericardial effusion is present.  Previous study not available for comparison.    NM Cardiac MPI stress(2016)[facility]  IMPRESSION:   1. No evidence of stress-induced ischemia.  2. Ejection fraction is 56%.    ECHO (2016) [facility]  Final Impressions:   1. Normal LV size, borderline wall thickness, normal global systolic function with an estimated EF of 55 - 60%.   2. Mid septum segment and basal septum segment are abnormal.   3. Right ventricular cavity size is normal, global systolic RV function is normal.   4. The aortic valve is trileaflet, no stenosis and trivial regurgitation.   5. The mitral valve is normal, mild mitral regurgitation.  Stress treadmill (3/9/2009) [facility]   IMPRESSION:    1. No Cardiolite evidence for myocardial ischemia.    2. Atypical chest pain with dipyridamole infusion.    3. Calculated left ventricular ejection fraction of 41%.    4. Nondiagnostic EKG due to abnormal baseline.     ECHO (3/17/2009) [facility]   CONCLUSION:    1. Normal left ventricular size with overall normal LV systolic  function,    estimated ejection fraction around 50 to perhaps 55%.    2. Mild mitral regurgitation.    3. Trivial tricuspid regurgitation.    4. Small pericardial effusion.

## 2017-06-12 NOTE — LETTER
2017      RE: Gilles Henning  [address]  [address]  [facility] [address]       Dear Colleague,    Thank you for the opportunity to participate in the care of your patient, Gilles Henning, at the [facility] HEART [facility]. Please see a copy of my visit note below.    HPI: 47 year old male with PMH end stage renal failure due to IgA Nephropathy as well as uncomplicated Hepatitis A. He comes to us from [place]. He is a non-diabetic. He was transplanted at [facility] in 2009 with a  donor kidney with good graft function until requiring bilateral nephrectomy 2/2 RCC, now tumor free. He is referred for evaluation by cardiology as part of his evaluation to be listed for a kidney (no known living or donor options at present).     Gilles Henning describes an active lifestyle doing workout (weight/bike) about 5 days a week, NYHA I. He gets dialysis three times weekly. Mr. Henning notes that he has persistent, uncontrolled hypertension despite metoprolol 25 mg BID and lisinopril 10 mg qd. His BP today is 158/88 mmHg and he states that his BP is regularly checked with dialysis runs and is consistently >140 mmHg systolic.     ECHO 17 revealed LVEF 60-65%, normal RV size/function, PASP normal. Stress MPI  was without inducible ischemia with stress, LV EF 56%. Patient denies any chest pain, palpitation, light headedness, syncope. He denies lower extremity edema, orthopnea, PND. He denies lower extremity cramping c/w claudication. RCRI score is 1 (ESRD). He is currently being treated for a prostatitis with Cipro. He denies fever, chills, nausea, vomiting. He denies weight gain or weight loss.        PAST MEDICAL HISTORY:  Past Medical History:   Diagnosis Date     Anemia in chronic kidney disease      Chews tobacco      Chronic rejection of kidney transplant      ESRD needing dialysis (H)     ~2014     History of alcoholism (H)      History of  depression      History of peritoneal dialysis     7 years     History of substance abuse      Hyperlipidemia      IgA nephropathy      Osteopenia      Peritonitis (H)     non-MRSA staph     Renal cell carcinoma (H)        CURRENT MEDICATIONS:  Current Outpatient Prescriptions   Medication Sig Dispense Refill     PROGRAF 1 MG PO CAPSULE Take 1 capsule (1 mg) by mouth 2 times daily 240 capsule      azaTHIOprine (IMURAN) 50 MG tablet Take 1 tablet (50 mg) by mouth daily 60 tablet      predniSONE (DELTASONE) 5 MG tablet Take 1 tablet (5 mg) by mouth daily       METOPROLOL TARTRATE PO Take 25 mg by mouth daily       LISINOPRIL PO Take 10 mg by mouth daily       LISINOPRIL PO Take 5 mg by mouth daily       VITAMIN D, CHOLECALCIFEROL, PO Take 2,000 Units by mouth daily       OMEPRAZOLE PO Take 40 mg by mouth every morning       CLONAZEPAM PO Take 1 mg by mouth 2 times daily as needed for anxiety       sennosides (SENOKOT) 8.6 MG tablet Take 1 tablet by mouth daily as needed for constipation       TRAZODONE HCL PO Take 300 mg by mouth At Bedtime       calcium acetate (PHOSLO) 667 MG CAPS capsule Take 667 mg by mouth 3 times daily (with meals)         PAST SURGICAL HISTORY:  Past Surgical History:   Procedure Laterality Date     HC DIALYSIS AVF OR AVG, CENTRAL INTERVENTION ONLY Left      LAPAROSCOPIC INSERTION CATHETER PERITONEAL DIALYSIS Left      NEPHRECTOMY BILATERAL  2015     REMOVE CATHETER PERITONEAL       TRANSPLANT KIDNEY RECIPIENT  DONOR Left 2009       ALLERGIES   No Known Allergies    FAMILY HISTORY:  Paternal family history of DM I and MI before age 65 as complication of longstanding diabetes.     SOCIAL HISTORY:  The patient lives in [place]. Patient chews tobacco. He is a non-smoker. He denies alcohol.     Social History     Marital status:      Spouse name: Merline     Number of children: 4     Years of education: 13     Occupational History     disabled      Social History Main Topics  "    Smoking status: Current Every Day Smoker     Types: Dip, chew, snus or snuff     Start date: 4/1/2011     Smokeless tobacco: Current User     Types: Chew      Comment: 2 days per can.     Alcohol use Yes      Comment: none now, did treatment at age 21, relapsed with divorce (a couple months)  then now  sober 3 years.      Drug use: Yes     Special: Marijuana      Comment: age 14 only.     Sexual activity: Yes     Partners: Female     Other Topics Concern     Not on file     Social History Narrative       ROS:   Constitutional: No fever, chills, or sweats. No weight gain/loss   ENT: Gradual vision loss with nearsightedness, ear ache, epistaxis, sore throat  Allergies/Immunologic: Negative.   Respiratory: No cough, hemoptysia  Cardiovascular: As per HPI  GI: No nausea, vomiting, hematemesis, melena, or hematochezia  : No longer making urine, bilateral nephrectomy 2/2 RCC  Integument: Negative  Psychiatric: Negative  Neuro: Negative  Endocrinology: Negative   Musculoskeletal: Negative     EXAM:  Vitals: /88, Pulse 58, H 1.778 m (5'10\"), Weight 62.7 kg (138 lb), BMI 19.87    In general, the patient is a thin male, pleasant, in no apparent distress.    HEENT: NC/AT.  PERRLA.  EOMI.  Sclerae white, not injected.  Nares clear.  Pharynx without erythema or exudate.  Dentition intact.    Neck: No adenopathy.  No thyromegaly. Carotids +4/4 bilaterally without bruits.  No jugular venous distension.   Heart: RRR. Normal S1, S2 splits physiologically. No murmur, rub, click, or gallop.There is no heave.    Lungs: CTA.  No ronchi, wheezes, rales.  No dullness to percussion.   Abdomen: Thin, soft, nontender, nondistended. No organomegaly.  No bruits.   Extremities: No clubbing, cyanosis, or edema.  The pulses are +4/4 at the radial, brachial, femoral, popliteal, DP, and PT sites bilaterally.  No bruits are noted.  Neurologic: Alert and oriented to person/place/time, normal speech, gait and affect  Skin: No petechiae, " purpura or rash.    Labs:  LIPID RESULTS:  Lab Results   Component Value Date    CHOL 225 (H) 2017    HDL 50 2017     (H) 2017    TRIG 119 2017    NHDL 175 (H) 2017       LIVER ENZYME RESULTS:  Lab Results   Component Value Date    AST 11 2017    ALT 13 2017       CBC RESULTS:  Lab Results   Component Value Date    WBC 4.0 2017    RBC 3.36 (L) 2017    HGB 11.3 (L) 2017    HCT 34.8 (L) 2017     (H) 2017    MCH 33.6 (H) 2017    MCHC 32.5 2017    RDW 19.9 (H) 2017     2017       BMP RESULTS:  Lab Results   Component Value Date     2017    POTASSIUM 4.2 2017    CHLORIDE 91 (L) 2017    CO2 32 2017    ANIONGAP 13 2017    GLC 96 2017    BUN 61 (H) 2017    CR 8.12 (H) 2017    GFRESTIMATED 7 (L) 2017    GFRESTBLACK 9 (L) 2017    SHIRAZ 8.3 (L) 2017        A1C RESULTS:  No results found for: A1C    INR RESULTS:  Lab Results   Component Value Date    INR 1.06 2017       Cardiac data:  1. EKG 17 was personally reviewed. Sinus rhythm. No acute ST-T changes.        2. ECHO 17 was personally reviewed and shows preserved biventricular function, no significant valvular dysfunction, estimated pulmonary artery systolic pressure is normal, no pericardial effusion.    3. NM Cardiac MPI Stress 2016: No evidence of stress-induced ischemia, LV EF 56%    Assessment and Plan:   47 year old male with PMH end stage renal failure due to IgA Nephropathy as well as uncomplicated Hepatitis A. The patient is non-diabetic.   He was transplanted at [facility] in 2009 with a  donor kidney with good graft function until requiring bilateral nephrectomy 2/2 RCC, now tumor free.     Cardiac Risk assessment: good exercise tolerance. Blood pressure is elevated. He is not a diabetic. His recent lipids -  total cholesterol 225, HDL 50,  done on  4/5/17. Stress test in 2016 showed no ischemia.     Overall, patient has no active anginal or heart faliure symptoms. Exam shows no heart failure. The patient's Revised Ordonez Cardiac Risk Index score is 1 with a low estimated post operative CV event rate of ~1% . Given all the recent investigations he has had, he does not need any further cardiac work up att his point. Close attention to heart rate, blood pressure and fluid management would be recommended perioperatively.    Plan:   1. Would recommend Amlodipine 2.5 mg at bedtime   2. Given elevated total cholesterol and elevated LDL, consider prevastatin 20 mg daily (10-year risk of atherosclerotic CV disease 5.7%, at risk for CAD given the ESRD)  3. Follow with physicians locally in [place] and maintain communication with transplant coordinator. See us as needed. No additional cardiac workup needed now.      Cirilo Ladd PA-C  General Cardiology   Pager [phone]    ATTENDING ATTESTATION  Patient was seen by me on June 12, 2017 in conjunction with Cirilo Ladd PA-C as part of a shared visit.    I have personally reviewed medical history, medication list, relevant cardiac and laboratory data. I have examined the patient and edited the note as necessary to reflect our joint assessment and plan.    Marisa Campbell MD, MS  Staff Cardiologist, [facility]   Pager: [phone]    CC  Patient Care Team:  Charlie Dubose MD as PCP - General (Internal Medicine)  Samantha Tobin MD as Referring Physician (Nephrology)  Raul Campbell MD as MD (Hematology & Oncology)  EDWIN DELUNA

## 2017-06-12 NOTE — PATIENT INSTRUCTIONS
Patient Instructions:  It was a pleasure to see you in the cardiology clinic today.      If you have any questions, you can reach my nurse, Louise Campa, at [phone].  Press Option #1 for the [facility], and then press Option #3 for nursing.  We are encouraging the use of Xactly Corpt to communicate with your HealthCare Provider    Note the new medications: none  Stop the following medications: none    Follow the American Heart Association Diet and Lifestyle recommendations:  Limit saturated fat, trans fat, sodium, red meat, sweets and sugar-sweetened beverages. If you choose to eat red meat, compare labels and select the leanest cuts available.  Aim for at least 150 minutes of moderate physical activity or 75 minutes of vigorous physical activity - or an equal combination of both - each week.    The results from today include: none  Please follow up with Dr. Marisa Campbell as needed for cardiac risk prior to transplant    Sincerely,    Marisa VALDES MD     If you have an urgent need after hours (8:00 am to 4:30 pm) please call [phone] and ask for the cardiology fellow on call

## 2017-06-12 NOTE — NURSING NOTE
Diet: Patient instructed regarding a heart healthy diet, including discussion of reduced fat and sodium intake. Patient demonstrated understanding of this information and agreed to call with further questions or concerns.  Med Reconcile: Reviewed and verified all current medications with the patient. The updated medication list was printed and given to the patient.  Return Appointment: Patient given instructions regarding scheduling next clinic visit. Patient demonstrated understanding of this information and agreed to call with further questions or concerns.  Patient stated he understood all health information given and agreed to call with further questions or concerns.

## 2017-06-12 NOTE — MR AVS SNAPSHOT
After Visit Summary   6/12/2017    Gilles Henning    MRN: [id number]           Patient Information     Date Of Birth          1970        Visit Information        Provider Department      6/12/2017 2:00 PM Marisa Campbell MD [facility]        Today's Diagnoses     End stage renal disease (H)        IgA nephropathy        Kidney transplant failure        Organ transplant candidate        Transplant failure due to rejection          Care Instructions    Patient Instructions:  It was a pleasure to see you in the cardiology clinic today.      If you have any questions, you can reach my nurse, Louise Campa, at [phone].  Press Option #1 for the [facility], and then press Option #3 for nursing.  We are encouraging the use of Rent[employer]ot to communicate with your HealthCare Provider    Note the new medications: none  Stop the following medications: none    Follow the American Heart Association Diet and Lifestyle recommendations:  Limit saturated fat, trans fat, sodium, red meat, sweets and sugar-sweetened beverages. If you choose to eat red meat, compare labels and select the leanest cuts available.  Aim for at least 150 minutes of moderate physical activity or 75 minutes of vigorous physical activity - or an equal combination of both - each week.    The results from today include: none  Please follow up with Dr. Marisa Campbell as needed for cardiac risk prior to transplant    Sincerely,    Marisa VALDES MD     If you have an urgent need after hours (8:00 am to 4:30 pm) please call [phone] and ask for the cardiology fellow on call                Follow-ups after your visit        Who to contact     If you have questions or need follow up information about today's clinic visit or your schedule please contact [facility] directly at [phone].  Normal or non-critical lab and imaging results will be communicated to you by MyChart, letter or phone within 4  "business days after the clinic has received the results. If you do not hear from us within 7 days, please contact the clinic through [employer] or phone. If you have a critical or abnormal lab result, we will notify you by phone as soon as possible.  Submit refill requests through [employer] or call your pharmacy and they will forward the refill request to us. Please allow 3 business days for your refill to be completed.          Additional Information About Your Visit        [employer] Information     [employer] lets you send messages to your doctor, view your test results, renew your prescriptions, schedule appointments and more. To sign up, go to [email or web address] . Click on \"Log in\" on the left side of the screen, which will take you to the Welcome page. Then click on \"Sign up Now\" on the right side of the page.     You will be asked to enter the access code listed below, as well as some personal information. Please follow the directions to create your username and password.     Your access code is: [email or web address]  Expires: 9/10/2017  2:51 PM     Your access code will  in 90 days. If you need help or a new code, please call your [facility] clinic or [phone].        Care EveryWhere ID     This is your Care EveryWhere ID. This could be used by other organizations to access your [facility] medical records  [id number]         Blood Pressure from Last 3 Encounters:   17 158/88   17 158/88    Weight from Last 3 Encounters:   17 62.7 kg (138 lb 3.2 oz)   17 62.7 kg (138 lb 3.2 oz)              Today, you had the following     No orders found for display       Primary Care Provider Office Phone # Fax #    Charlie Dubose [phone]       [facility] [address] [facility] [address]        Thank you!     Thank you for choosing [facility]  for your care. Our goal is always to provide you with excellent care. Hearing back from our patients is one " way we can continue to improve our services. Please take a few minutes to complete the written survey that you may receive in the mail after your visit with us. Thank you!             Your Updated Medication List - Protect others around you: Learn how to safely use, store and throw away your medicines at www.disposemymeds.org.          This list is accurate as of: 6/12/17  2:51 PM.  Always use your most recent med list.                   Brand Name Dispense Instructions for use    azaTHIOprine 50 MG tablet    IMURAN    60 tablet    Take 1 tablet (50 mg) by mouth daily       calcium acetate 667 MG Caps capsule    PHOSLO     Take 667 mg by mouth 3 times daily (with meals)       CLONAZEPAM PO      Take 1 mg by mouth 2 times daily as needed for anxiety       * LISINOPRIL PO      Take 10 mg by mouth daily       * LISINOPRIL PO      Take 5 mg by mouth daily       METOPROLOL TARTRATE PO      Take 25 mg by mouth daily       OMEPRAZOLE PO      Take 40 mg by mouth every morning       predniSONE 5 MG tablet    DELTASONE     Take 1 tablet (5 mg) by mouth daily       sennosides 8.6 MG tablet    SENOKOT     Take 1 tablet by mouth daily as needed for constipation       tacrolimus capsule     240 capsule    Take 1 capsule (1 mg) by mouth 2 times daily       TRAZODONE HCL PO      Take 300 mg by mouth At Bedtime       VITAMIN D (CHOLECALCIFEROL) PO      Take 2,000 Units by mouth daily       * Notice:  This list has 2 medication(s) that are the same as other medications prescribed for you. Read the directions carefully, and ask your doctor or other care provider to review them with you.

## 2017-06-12 NOTE — NURSING NOTE
Chief Complaint   Patient presents with     Follow Up For     risk stratify for kidney transplant/EKG done on 4/5

## 2017-06-13 ENCOUNTER — AMBULATORY - GICH (OUTPATIENT)
Dept: SCHEDULING | Facility: OTHER | Age: 47
End: 2017-06-13

## 2017-06-13 ENCOUNTER — HISTORY (OUTPATIENT)
Dept: EMERGENCY MEDICINE | Facility: OTHER | Age: 47
End: 2017-06-13

## 2017-06-14 ENCOUNTER — AMBULATORY - GICH (OUTPATIENT)
Dept: SCHEDULING | Facility: OTHER | Age: 47
End: 2017-06-14

## 2017-06-16 ENCOUNTER — AMBULATORY - GICH (OUTPATIENT)
Dept: SCHEDULING | Facility: OTHER | Age: 47
End: 2017-06-16

## 2017-06-16 ENCOUNTER — COMMUNICATION - GICH (OUTPATIENT)
Dept: INTERNAL MEDICINE | Facility: OTHER | Age: 47
End: 2017-06-16

## 2017-06-19 ENCOUNTER — HISTORY (OUTPATIENT)
Dept: EMERGENCY MEDICINE | Facility: OTHER | Age: 47
End: 2017-06-19

## 2017-06-20 ENCOUNTER — AMBULATORY - GICH (OUTPATIENT)
Dept: SCHEDULING | Facility: OTHER | Age: 47
End: 2017-06-20

## 2017-06-21 ENCOUNTER — TRANSFERRED RECORDS (OUTPATIENT)
Dept: HEALTH INFORMATION MANAGEMENT | Facility: CLINIC | Age: 47
End: 2017-06-21

## 2017-06-21 ENCOUNTER — AMBULATORY - GICH (OUTPATIENT)
Dept: SCHEDULING | Facility: OTHER | Age: 47
End: 2017-06-21

## 2017-06-22 ENCOUNTER — TELEPHONE (OUTPATIENT)
Dept: PHARMACY | Facility: CLINIC | Age: 47
End: 2017-06-22

## 2017-06-22 ENCOUNTER — COMMUNICATION - GICH (OUTPATIENT)
Dept: INTERNAL MEDICINE | Facility: OTHER | Age: 47
End: 2017-06-22

## 2017-06-22 DIAGNOSIS — F51.04 PSYCHOPHYSIOLOGIC INSOMNIA: ICD-10-CM

## 2017-06-22 NOTE — TELEPHONE ENCOUNTER
Transplant coordinator requested pharmacist review imiquimod for use in one of her patients and contact dermatologist requesting to use the product. I called and left message on phone of Yudith Daniels (sp?) [phone], to call me to discuss.     Melvin Esteban, R.Ph.  [facility]  [phone] pager

## 2017-06-22 NOTE — TELEPHONE ENCOUNTER
This pharmacist received phone call from Dermatologist Yudith Gaffney (sp?). She is treating Gilles Henning for viral warts. Consulted me regarding use of imiquimod in presence of immunosuppression. This is due to interferon stimulation.   No DDI listed with AZA or tacrolimus. Patient on lower dose of prednisone and may taper off.   Imiquimod should be ok for this patient to use.   No further questions for this pharmacist.     Melvin Esteban, R.Ph.  [facility]  [phone] pager

## 2017-06-23 ENCOUNTER — AMBULATORY - GICH (OUTPATIENT)
Dept: SCHEDULING | Facility: OTHER | Age: 47
End: 2017-06-23

## 2017-06-26 ENCOUNTER — OFFICE VISIT - GICH (OUTPATIENT)
Dept: PEDIATRICS | Facility: OTHER | Age: 47
End: 2017-06-26

## 2017-06-26 ENCOUNTER — HISTORY (OUTPATIENT)
Dept: PEDIATRICS | Facility: OTHER | Age: 47
End: 2017-06-26

## 2017-06-26 DIAGNOSIS — Z02.89 ENCOUNTER FOR OTHER ADMINISTRATIVE EXAMINATIONS: ICD-10-CM

## 2017-06-26 DIAGNOSIS — Z09 ENCOUNTER FOR FOLLOW-UP EXAMINATION AFTER COMPLETED TREATMENT FOR CONDITIONS OTHER THAN MALIGNANT NEOPLASM: ICD-10-CM

## 2017-06-26 DIAGNOSIS — M54.16 RADICULOPATHY OF LUMBAR REGION: ICD-10-CM

## 2017-06-26 DIAGNOSIS — N41.0 ACUTE PROSTATITIS: ICD-10-CM

## 2017-06-26 DIAGNOSIS — N18.6 END STAGE RENAL DISEASE (H): ICD-10-CM

## 2017-06-26 DIAGNOSIS — M51.16 INTERVERTEBRAL DISC DISORDER WITH RADICULOPATHY OF LUMBAR REGION: ICD-10-CM

## 2017-06-26 DIAGNOSIS — Z99.2 DEPENDENCE ON RENAL DIALYSIS (H): ICD-10-CM

## 2017-06-26 DIAGNOSIS — M99.83 OTHER BIOMECHANICAL LESIONS OF LUMBAR REGION (CODE): ICD-10-CM

## 2017-06-26 DIAGNOSIS — M62.89 OTHER SPECIFIED DISORDERS OF MUSCLE (CODE): ICD-10-CM

## 2017-06-26 DIAGNOSIS — N19 KIDNEY FAILURE: ICD-10-CM

## 2017-06-26 ASSESSMENT — PATIENT HEALTH QUESTIONNAIRE - PHQ9: SUM OF ALL RESPONSES TO PHQ QUESTIONS 1-9: 0

## 2017-06-27 ENCOUNTER — AMBULATORY - GICH (OUTPATIENT)
Dept: SCHEDULING | Facility: OTHER | Age: 47
End: 2017-06-27

## 2017-07-15 ENCOUNTER — AMBULATORY - GICH (OUTPATIENT)
Dept: SCHEDULING | Facility: OTHER | Age: 47
End: 2017-07-15

## 2017-07-15 ENCOUNTER — TRANSFERRED RECORDS (OUTPATIENT)
Dept: HEALTH INFORMATION MANAGEMENT | Facility: CLINIC | Age: 47
End: 2017-07-15

## 2017-07-18 ENCOUNTER — COMMUNICATION - GICH (OUTPATIENT)
Dept: INTERNAL MEDICINE | Facility: OTHER | Age: 47
End: 2017-07-18

## 2017-07-18 DIAGNOSIS — R36.9 URETHRAL DISCHARGE: ICD-10-CM

## 2017-07-19 ENCOUNTER — HOSPITAL ENCOUNTER (OUTPATIENT)
Dept: PHYSICAL THERAPY | Facility: OTHER | Age: 47
Setting detail: THERAPIES SERIES
End: 2017-07-19
Attending: INTERNAL MEDICINE

## 2017-07-19 DIAGNOSIS — M62.89 OTHER SPECIFIED DISORDERS OF MUSCLE (CODE): ICD-10-CM

## 2017-07-28 ENCOUNTER — OFFICE VISIT - GICH (OUTPATIENT)
Dept: UROLOGY | Facility: OTHER | Age: 47
End: 2017-07-28

## 2017-07-28 ENCOUNTER — HISTORY (OUTPATIENT)
Dept: UROLOGY | Facility: OTHER | Age: 47
End: 2017-07-28

## 2017-07-28 DIAGNOSIS — R36.9 URETHRAL DISCHARGE: ICD-10-CM

## 2017-08-01 ENCOUNTER — COMMUNICATION - GICH (OUTPATIENT)
Dept: INTERNAL MEDICINE | Facility: OTHER | Age: 47
End: 2017-08-01

## 2017-08-10 ENCOUNTER — AMBULATORY - GICH (OUTPATIENT)
Dept: SCHEDULING | Facility: OTHER | Age: 47
End: 2017-08-10

## 2017-08-11 ENCOUNTER — TRANSFERRED RECORDS (OUTPATIENT)
Dept: HEALTH INFORMATION MANAGEMENT | Facility: CLINIC | Age: 47
End: 2017-08-11

## 2017-08-19 ENCOUNTER — TRANSFERRED RECORDS (OUTPATIENT)
Dept: HEALTH INFORMATION MANAGEMENT | Facility: CLINIC | Age: 47
End: 2017-08-19

## 2017-08-19 ENCOUNTER — AMBULATORY - GICH (OUTPATIENT)
Dept: SCHEDULING | Facility: OTHER | Age: 47
End: 2017-08-19

## 2017-08-23 ENCOUNTER — TRANSFERRED RECORDS (OUTPATIENT)
Dept: HEALTH INFORMATION MANAGEMENT | Facility: CLINIC | Age: 47
End: 2017-08-23

## 2017-08-30 ENCOUNTER — HISTORY (OUTPATIENT)
Dept: EMERGENCY MEDICINE | Facility: OTHER | Age: 47
End: 2017-08-30

## 2017-09-03 ENCOUNTER — HISTORY (OUTPATIENT)
Dept: EMERGENCY MEDICINE | Facility: OTHER | Age: 47
End: 2017-09-03

## 2017-09-06 ENCOUNTER — AMBULATORY - GICH (OUTPATIENT)
Dept: LAB | Facility: OTHER | Age: 47
End: 2017-09-06

## 2017-09-06 ENCOUNTER — HISTORY (OUTPATIENT)
Dept: UROLOGY | Facility: OTHER | Age: 47
End: 2017-09-06

## 2017-09-06 ENCOUNTER — COMMUNICATION - GICH (OUTPATIENT)
Dept: INTERNAL MEDICINE | Facility: OTHER | Age: 47
End: 2017-09-06

## 2017-09-06 ENCOUNTER — AMBULATORY - GICH (OUTPATIENT)
Dept: UROLOGY | Facility: OTHER | Age: 47
End: 2017-09-06

## 2017-09-06 DIAGNOSIS — R31.9 HEMATURIA: ICD-10-CM

## 2017-09-06 DIAGNOSIS — C64.9 MALIGNANT NEOPLASM OF KIDNEY EXCLUDING RENAL PELVIS (H): ICD-10-CM

## 2017-09-06 LAB
A/G RATIO - HISTORICAL: 1.2 (ref 1–2)
ABSOLUTE BASOPHILS - HISTORICAL: 0 THOU/CU MM
ABSOLUTE EOSINOPHILS - HISTORICAL: 0.2 THOU/CU MM
ABSOLUTE IMMATURE GRANULOCYTES(METAS,MYELOS,PROS) - HISTORICAL: 0 THOU/CU MM
ABSOLUTE LYMPHOCYTES - HISTORICAL: 0.4 THOU/CU MM (ref 0.9–2.9)
ABSOLUTE MONOCYTES - HISTORICAL: 0.5 THOU/CU MM
ABSOLUTE NEUTROPHILS - HISTORICAL: 2.8 THOU/CU MM (ref 1.7–7)
ALBUMIN SERPL-MCNC: 4.5 G/DL (ref 3.5–5.7)
ALP SERPL-CCNC: 62 IU/L (ref 34–104)
ALT (SGPT) - HISTORICAL: 5 IU/L (ref 7–52)
ANION GAP - HISTORICAL: 14 (ref 5–18)
AST SERPL-CCNC: 18 IU/L (ref 13–39)
BASOPHILS # BLD AUTO: 1 %
BILIRUB SERPL-MCNC: 0.9 MG/DL (ref 0.3–1)
BUN SERPL-MCNC: 34 MG/DL (ref 7–25)
BUN/CREAT RATIO - HISTORICAL: 4
CALCIUM SERPL-MCNC: 10.5 MG/DL (ref 8.6–10.3)
CHLORIDE SERPLBLD-SCNC: 95 MMOL/L (ref 98–107)
CO2 SERPL-SCNC: 28 MMOL/L (ref 21–31)
CREAT SERPL-MCNC: 8.05 MG/DL (ref 0.7–1.3)
EOSINOPHIL NFR BLD AUTO: 4.4 %
ERYTHROCYTE [DISTWIDTH] IN BLOOD BY AUTOMATED COUNT: 16.6 % (ref 11.5–15.5)
GFR IF NOT AFRICAN AMERICAN - HISTORICAL: 7 ML/MIN/1.73M2
GLOBULIN - HISTORICAL: 3.8 G/DL (ref 2–3.7)
GLUCOSE SERPL-MCNC: 107 MG/DL (ref 70–105)
HCT VFR BLD AUTO: 35.1 % (ref 37–53)
HEMOGLOBIN: 11.4 G/DL (ref 13.5–17.5)
IMMATURE GRANULOCYTES(METAS,MYELOS,PROS) - HISTORICAL: 0.3 %
LDH SERPL-CCNC: 199 IU/L (ref 140–271)
LYMPHOCYTES NFR BLD AUTO: 9.8 % (ref 20–44)
MCH RBC QN AUTO: 30.5 PG (ref 26–34)
MCHC RBC AUTO-ENTMCNC: 32.5 G/DL (ref 32–36)
MCV RBC AUTO: 94 FL (ref 80–100)
MONOCYTES NFR BLD AUTO: 12.1 %
NEUTROPHILS NFR BLD AUTO: 72.4 % (ref 42–72)
PLATELET # BLD AUTO: 133 THOU/CU MM (ref 140–440)
PMV BLD: 9.2 FL (ref 6.5–11)
POTASSIUM SERPL-SCNC: 3.7 MMOL/L (ref 3.5–5.1)
PROT SERPL-MCNC: 8.3 G/DL (ref 6.4–8.9)
RED BLOOD COUNT - HISTORICAL: 3.74 MIL/CU MM (ref 4.3–5.9)
SODIUM SERPL-SCNC: 137 MMOL/L (ref 133–143)
WHITE BLOOD COUNT - HISTORICAL: 3.9 THOU/CU MM (ref 4.5–11)

## 2017-09-07 LAB
ELP INTERP,SERUM - HISTORICAL: ABNORMAL
ELP,ALBUMIN - HISTORICAL: 4.76 G/DL (ref 3.31–5.31)
ELP,ALPHA 1 - HISTORICAL: 0.34 G/DL (ref 0.19–0.42)
ELP,ALPHA 2 - HISTORICAL: 0.59 G/DL (ref 0.44–1.03)
ELP,BETA - HISTORICAL: 0.73 G/DL (ref 0.52–1.05)
ELP,GAMMA - HISTORICAL: 1.49 G/DL (ref 0.59–1.46)
PROT SERPL-MCNC: 7.9 G/DL (ref 6–8)

## 2017-09-08 ENCOUNTER — OFFICE VISIT - GICH (OUTPATIENT)
Dept: ONCOLOGY | Facility: OTHER | Age: 47
End: 2017-09-08

## 2017-09-08 ENCOUNTER — HISTORY (OUTPATIENT)
Dept: ONCOLOGY | Facility: OTHER | Age: 47
End: 2017-09-08

## 2017-09-08 DIAGNOSIS — D63.8 ANEMIA IN OTHER CHRONIC DISEASES CLASSIFIED ELSEWHERE: ICD-10-CM

## 2017-09-08 DIAGNOSIS — C64.9 MALIGNANT NEOPLASM OF KIDNEY EXCLUDING RENAL PELVIS (H): ICD-10-CM

## 2017-09-08 LAB — BETA 2 MICROGLOBULIN,SERUM - HISTORICAL: 41.8 UG/ML (ref 1.09–2.53)

## 2017-09-14 ENCOUNTER — TRANSFERRED RECORDS (OUTPATIENT)
Dept: HEALTH INFORMATION MANAGEMENT | Facility: CLINIC | Age: 47
End: 2017-09-14

## 2017-09-14 ENCOUNTER — AMBULATORY - GICH (OUTPATIENT)
Dept: SCHEDULING | Facility: OTHER | Age: 47
End: 2017-09-14

## 2017-09-18 ENCOUNTER — TELEPHONE (OUTPATIENT)
Dept: TRANSPLANT | Facility: CLINIC | Age: 47
End: 2017-09-18

## 2017-09-18 ENCOUNTER — TRANSFERRED RECORDS (OUTPATIENT)
Dept: HEALTH INFORMATION MANAGEMENT | Facility: CLINIC | Age: 47
End: 2017-09-18

## 2017-09-18 NOTE — TELEPHONE ENCOUNTER
Called pt today as he is approximately 1 month before the end of his compliance contract period and spoke to him at length. I explained that Rukhsana the NP with Dr. Tobin has contacted me about scheduling a surgery at our Center to remove his transplanted kidney - I told him I am happy to arrange for this as soon as I have records and have discussed it with Dr. Mario Vallejo. I reviewed I have received his note from Dr. Tobin July 2017 and his Dermatology appt noted dated 06/21/2017. Pt added that he was also just seen by his Oncologist and the note with PET scan results should be on the way to our Office. I reviewed that his compliance period is up in October 2017 and that I have not recieved his compliance contract back - pt stating he has the paper work but does not know where it is and he did not sign the contract. Pt complaining that I called him a liar - I responded by stating I have done no such thing. I instructed pt to then have his regular providers at this time write a short to the Transplant Team on his behalf which reviews pt's compliance since April and their recommendation for him to be compliant post transplant. Pt then began telling me how much he does not like me and that he does not agree with my decision about his compliance. I explained that the decision is actually a multidisciplinarily team who decided upon the decision which I am a part of. I further explained that our decision is based on what his medical records dcoumented from his regular providers. I also explained the importance of being happy and comfortable with a Transplant Center in order to proceed with a future transplant - pt explained he is happy with everybody at our Center except for me and that he does not like me. I explained again that the decision to complete a compliance period was made by the Transplant Team and not just myself.  I suggested that pt calls my supervisor and discusses his concerns with her and provided her phone  number. I explained in the meantime I am very happy to continue to provide his care here from a pre kidney transplant coordinator point of view, explained I will review his situation with Dr. Vallejo when I receive local records and make a plan for him to be scheduled for a transplant nephrectomy, I will receive his compliance statement notes from his local doctors and list him on ACTIVE status as soon as all is successfully completed. Instructed pt to continue to call me with questions and concerns. Pt expressed good understanding of all and was in good agreement with the plan.

## 2017-09-20 ENCOUNTER — AMBULATORY - GICH (OUTPATIENT)
Dept: ONCOLOGY | Facility: OTHER | Age: 47
End: 2017-09-20

## 2017-09-20 DIAGNOSIS — N18.6 END STAGE RENAL DISEASE (H): ICD-10-CM

## 2017-09-20 DIAGNOSIS — C64.2 MALIGNANT NEOPLASM OF LEFT KIDNEY, EXCEPT RENAL PELVIS (H): ICD-10-CM

## 2017-09-20 DIAGNOSIS — T86.11 KIDNEY TRANSPLANT REJECTION: ICD-10-CM

## 2017-09-20 DIAGNOSIS — C64.9 MALIGNANT NEOPLASM OF KIDNEY EXCLUDING RENAL PELVIS (H): ICD-10-CM

## 2017-09-28 ENCOUNTER — AMBULATORY - GICH (OUTPATIENT)
Dept: SCHEDULING | Facility: OTHER | Age: 47
End: 2017-09-28

## 2017-10-06 ENCOUNTER — TELEPHONE (OUTPATIENT)
Dept: TRANSPLANT | Facility: CLINIC | Age: 47
End: 2017-10-06

## 2017-10-06 NOTE — TELEPHONE ENCOUNTER
Spoke with pt who called wondering when he could get his transplant nephrectomy done. Pt stating he has already had his cystoscopy at [facility] - gave me permission to request this.  Told pt I will call him as soon as we have received this in our Office. Pt expressed good understanding of all and was in good agreement with the plan.

## 2017-10-09 ENCOUNTER — MEDICAL CORRESPONDENCE (OUTPATIENT)
Dept: TRANSPLANT | Facility: CLINIC | Age: 47
End: 2017-10-09

## 2017-10-10 ENCOUNTER — AMBULATORY - GICH (OUTPATIENT)
Dept: SCHEDULING | Facility: OTHER | Age: 47
End: 2017-10-10

## 2017-10-12 ENCOUNTER — MEDICAL CORRESPONDENCE (OUTPATIENT)
Dept: TRANSPLANT | Facility: CLINIC | Age: 47
End: 2017-10-12

## 2017-10-13 ENCOUNTER — HISTORY (OUTPATIENT)
Dept: PEDIATRICS | Facility: OTHER | Age: 47
End: 2017-10-13

## 2017-10-13 ENCOUNTER — TRANSFERRED RECORDS (OUTPATIENT)
Dept: HEALTH INFORMATION MANAGEMENT | Facility: CLINIC | Age: 47
End: 2017-10-13

## 2017-10-13 ENCOUNTER — OFFICE VISIT - GICH (OUTPATIENT)
Dept: PEDIATRICS | Facility: OTHER | Age: 47
End: 2017-10-13

## 2017-10-13 DIAGNOSIS — N18.6 END STAGE RENAL DISEASE (H): ICD-10-CM

## 2017-10-13 DIAGNOSIS — F31.9 BIPOLAR DISORDER (H): ICD-10-CM

## 2017-10-13 DIAGNOSIS — M99.83 OTHER BIOMECHANICAL LESIONS OF LUMBAR REGION (CODE): ICD-10-CM

## 2017-10-13 DIAGNOSIS — M51.16 INTERVERTEBRAL DISC DISORDER WITH RADICULOPATHY OF LUMBAR REGION: ICD-10-CM

## 2017-10-13 DIAGNOSIS — Z99.2 DEPENDENCE ON RENAL DIALYSIS (H): ICD-10-CM

## 2017-10-13 DIAGNOSIS — F51.04 PSYCHOPHYSIOLOGIC INSOMNIA: ICD-10-CM

## 2017-10-13 DIAGNOSIS — F43.10 POST-TRAUMATIC STRESS DISORDER: ICD-10-CM

## 2017-10-13 DIAGNOSIS — T86.11 KIDNEY TRANSPLANT REJECTION: ICD-10-CM

## 2017-10-13 DIAGNOSIS — F51.4 SLEEP TERRORS: ICD-10-CM

## 2017-10-13 ASSESSMENT — PATIENT HEALTH QUESTIONNAIRE - PHQ9: SUM OF ALL RESPONSES TO PHQ QUESTIONS 1-9: 9

## 2017-10-30 ENCOUNTER — HISTORY (OUTPATIENT)
Dept: EMERGENCY MEDICINE | Facility: OTHER | Age: 47
End: 2017-10-30

## 2017-10-30 ENCOUNTER — TRANSFERRED RECORDS (OUTPATIENT)
Dept: HEALTH INFORMATION MANAGEMENT | Facility: CLINIC | Age: 47
End: 2017-10-30

## 2017-11-09 ENCOUNTER — HOSPITAL ENCOUNTER (OUTPATIENT)
Dept: RADIOLOGY | Facility: OTHER | Age: 47
End: 2017-11-09
Attending: INTERNAL MEDICINE

## 2017-11-09 ENCOUNTER — TRANSFERRED RECORDS (OUTPATIENT)
Dept: HEALTH INFORMATION MANAGEMENT | Facility: CLINIC | Age: 47
End: 2017-11-09

## 2017-11-09 ENCOUNTER — AMBULATORY - GICH (OUTPATIENT)
Dept: LAB | Facility: OTHER | Age: 47
End: 2017-11-09

## 2017-11-09 DIAGNOSIS — T86.11 KIDNEY TRANSPLANT REJECTION: ICD-10-CM

## 2017-11-09 DIAGNOSIS — C64.2 MALIGNANT NEOPLASM OF LEFT KIDNEY, EXCEPT RENAL PELVIS (H): ICD-10-CM

## 2017-11-09 DIAGNOSIS — D63.8 ANEMIA IN OTHER CHRONIC DISEASES CLASSIFIED ELSEWHERE: ICD-10-CM

## 2017-11-09 DIAGNOSIS — C64.9 MALIGNANT NEOPLASM OF KIDNEY EXCLUDING RENAL PELVIS (H): ICD-10-CM

## 2017-11-09 DIAGNOSIS — N18.6 END STAGE RENAL DISEASE (H): ICD-10-CM

## 2017-11-09 LAB
A/G RATIO - HISTORICAL: 1.3 (ref 1–2)
ABSOLUTE BASOPHILS - HISTORICAL: 0 THOU/CU MM
ABSOLUTE EOSINOPHILS - HISTORICAL: 0.1 THOU/CU MM
ABSOLUTE IMMATURE GRANULOCYTES(METAS,MYELOS,PROS) - HISTORICAL: 0 THOU/CU MM
ABSOLUTE LYMPHOCYTES - HISTORICAL: 0.4 THOU/CU MM (ref 0.9–2.9)
ABSOLUTE MONOCYTES - HISTORICAL: 0.5 THOU/CU MM
ABSOLUTE NEUTROPHILS - HISTORICAL: 4.1 THOU/CU MM (ref 1.7–7)
ALBUMIN SERPL-MCNC: 4.2 G/DL (ref 3.5–5.7)
ALP SERPL-CCNC: 53 IU/L (ref 34–104)
ALT (SGPT) - HISTORICAL: 8 IU/L (ref 7–52)
ANION GAP - HISTORICAL: 14 (ref 5–18)
AST SERPL-CCNC: 15 IU/L (ref 13–39)
BASOPHILS # BLD AUTO: 0.6 %
BILIRUB SERPL-MCNC: 0.9 MG/DL (ref 0.3–1)
BUN SERPL-MCNC: 18 MG/DL (ref 7–25)
BUN/CREAT RATIO - HISTORICAL: 3
CALCIUM SERPL-MCNC: 9.3 MG/DL (ref 8.6–10.3)
CHLORIDE SERPLBLD-SCNC: 90 MMOL/L (ref 98–107)
CO2 SERPL-SCNC: 32 MMOL/L (ref 21–31)
CREAT SERPL-MCNC: 5.32 MG/DL (ref 0.7–1.3)
EOSINOPHIL NFR BLD AUTO: 1 %
ERYTHROCYTE [DISTWIDTH] IN BLOOD BY AUTOMATED COUNT: 16.4 % (ref 11.5–15.5)
GFR IF NOT AFRICAN AMERICAN - HISTORICAL: 12 ML/MIN/1.73M2
GLOBULIN - HISTORICAL: 3.2 G/DL (ref 2–3.7)
GLUCOSE SERPL-MCNC: 84 MG/DL (ref 70–105)
HCT VFR BLD AUTO: 36.4 % (ref 37–53)
HEMOGLOBIN: 12.4 G/DL (ref 13.5–17.5)
IMMATURE GRANULOCYTES(METAS,MYELOS,PROS) - HISTORICAL: 0.6 %
LDH SERPL-CCNC: 196 IU/L (ref 140–271)
LYMPHOCYTES NFR BLD AUTO: 8 % (ref 20–44)
MCH RBC QN AUTO: 32.8 PG (ref 26–34)
MCHC RBC AUTO-ENTMCNC: 34.1 G/DL (ref 32–36)
MCV RBC AUTO: 96 FL (ref 80–100)
MONOCYTES NFR BLD AUTO: 9 %
NEUTROPHILS NFR BLD AUTO: 80.8 % (ref 42–72)
PLATELET # BLD AUTO: 136 THOU/CU MM (ref 140–440)
PMV BLD: 8.8 FL (ref 6.5–11)
POTASSIUM SERPL-SCNC: 3.7 MMOL/L (ref 3.5–5.1)
PROT SERPL-MCNC: 7.4 G/DL (ref 6.4–8.9)
RED BLOOD COUNT - HISTORICAL: 3.78 MIL/CU MM (ref 4.3–5.9)
SODIUM SERPL-SCNC: 136 MMOL/L (ref 133–143)
WHITE BLOOD COUNT - HISTORICAL: 5 THOU/CU MM (ref 4.5–11)

## 2017-11-10 ENCOUNTER — DOCUMENTATION ONLY (OUTPATIENT)
Dept: TRANSPLANT | Facility: CLINIC | Age: 47
End: 2017-11-10

## 2017-11-10 ENCOUNTER — TELEPHONE (OUTPATIENT)
Dept: TRANSPLANT | Facility: CLINIC | Age: 47
End: 2017-11-10

## 2017-11-10 NOTE — PROGRESS NOTES
Spoke w/Kylah @ [facility] to make arrangements for dialysis on Thursday 12/14/17, as per Coordinator request.  Packet faxed to pt's regular dialysis unit, [facility] for completion.  Packet to be faxed to Rut @ [facility] upon completion.

## 2017-11-10 NOTE — TELEPHONE ENCOUNTER
Called pt today and spoke with him to review plans for transplant nephrectomy. I reviewed appts here for: Dr. Vallejo 12/14/2017 at 9:00 AM for pre-op surgical consult, Dialysis at [facility] 12/14/2017 afternoon and OR date 12/15/17 with need to arrive at 3C 5:30 AM. Need for NPO after midnight and expected hospital stay around 3 days. I explained a pre-op OR nurse will call him the week of his surgery to review schedule and confirm info with him. I explained he will receive a letter in the mail regarding his schedule here. I instructed pt to schedule a pre-op H+P with his local MD preferably the week of 11/27/17 or at the latest week of 12/04/2017 and to make arrangement for this note and any labs to be faxed to our office asap - provided our fax #. I explained to pt I have notified Rukhsana Johnson NP at his local dialysis unit with this schedule and she said it is fine.  Pt stating he is still working on getting his mental health clinic notes sent to our office which has been delayed due to absence currently of his provider. Pt stating he is NOT taking any asprin, coumadin or plavix.  Instructed pt to call me with questions. Pt expressed good understanding of all and was in good agreement with the plan.      Nirali Roche  to mail out letter.     Notified Rukhsana DIXON that I have reviewed all with pt today and he is in agreement with plan.

## 2017-11-13 LAB
ELP INTERP,SERUM - HISTORICAL: NORMAL
ELP,ALBUMIN - HISTORICAL: 4.53 G/DL (ref 3.31–5.31)
ELP,ALPHA 1 - HISTORICAL: 0.3 G/DL (ref 0.19–0.42)
ELP,ALPHA 2 - HISTORICAL: 0.57 G/DL (ref 0.44–1.03)
ELP,BETA - HISTORICAL: 0.63 G/DL (ref 0.52–1.05)
ELP,GAMMA - HISTORICAL: 1.07 G/DL (ref 0.59–1.46)
IFIX INTERP,SERUM - HISTORICAL: NORMAL
IGA - HISTORICAL: 257 MG/DL (ref 61–356)
IGG - HISTORICAL: 1110 MG/DL (ref 767–1590)
IGM - HISTORICAL: 83.3 MG/DL (ref 37–286)
PROT SERPL-MCNC: 7.1 G/DL (ref 6–8)

## 2017-11-14 LAB — BETA 2 MICROGLOBULIN,SERUM - HISTORICAL: 25.3 UG/ML (ref 1.09–2.53)

## 2017-11-16 ENCOUNTER — MEDICAL CORRESPONDENCE (OUTPATIENT)
Dept: TRANSPLANT | Facility: CLINIC | Age: 47
End: 2017-11-16

## 2017-11-16 ENCOUNTER — TELEPHONE (OUTPATIENT)
Dept: TRANSPLANT | Facility: CLINIC | Age: 47
End: 2017-11-16

## 2017-11-16 NOTE — TELEPHONE ENCOUNTER
I called Mr. Henning to discuss the indications, risks and benefits of transplant nephrectomy.  He has acute on chronic rejection of his failed L iliac fossa transplant and reports he has passed blood, has not made urine for some time, and has some occasional twinges of pain over his 'bladder' area.  He denies fevers/sweats.  He is on prednisone 10 mg (down from a prior taper) and he hates it because it makes his ears ring.  He has not required a transfusion due to this hematuria. We discussed the general conduct of the procedure, the potential for a drain, bleeding, transfusion, wound complication, vascular injury, and other medical complications such as pneumonia and heart attack. We discussed his expected length of stay. He indicated he will have family with him on the day of surgery.  He verbalized an understanding of our conversation and wishes to proceed as scheduled. I advised him to contact his coordinator if he starts to experience increased bleeding, pain or fevers in order to arrange a timely admission for emergent explant rather than temporize with more steroids.       Mario Vallejo MD, MS  Associate Professor of Surgery   Division of Transplantation  Surgical Director, Kidney Transplant Program  Surgical Director, Living Kidney Donor Program  Associate Surgical Director, Chronic Pancreatitis Program  [facility]  [phone]

## 2017-11-28 ENCOUNTER — HISTORY (OUTPATIENT)
Dept: PEDIATRICS | Facility: OTHER | Age: 47
End: 2017-11-28

## 2017-11-28 ENCOUNTER — OFFICE VISIT - GICH (OUTPATIENT)
Dept: PEDIATRICS | Facility: OTHER | Age: 47
End: 2017-11-28

## 2017-11-28 DIAGNOSIS — Z94.0 HISTORY OF KIDNEY TRANSPLANT: ICD-10-CM

## 2017-11-28 DIAGNOSIS — Z99.2 DEPENDENCE ON RENAL DIALYSIS (H): ICD-10-CM

## 2017-11-28 DIAGNOSIS — N18.6 END STAGE RENAL DISEASE (H): ICD-10-CM

## 2017-11-28 DIAGNOSIS — Z90.5 ACQUIRED ABSENCE OF KIDNEY: ICD-10-CM

## 2017-11-28 DIAGNOSIS — T86.11 KIDNEY TRANSPLANT REJECTION: ICD-10-CM

## 2017-11-28 DIAGNOSIS — Z01.818 ENCOUNTER FOR OTHER PREPROCEDURAL EXAMINATION: ICD-10-CM

## 2017-11-28 ASSESSMENT — ANXIETY QUESTIONNAIRES
GAD7 TOTAL SCORE: 0
3. WORRYING TOO MUCH ABOUT DIFFERENT THINGS: NOT AT ALL
7. FEELING AFRAID AS IF SOMETHING AWFUL MIGHT HAPPEN: NOT AT ALL
2. NOT BEING ABLE TO STOP OR CONTROL WORRYING: NOT AT ALL
6. BECOMING EASILY ANNOYED OR IRRITABLE: NOT AT ALL
1. FEELING NERVOUS, ANXIOUS, OR ON EDGE: NOT AT ALL
5. BEING SO RESTLESS THAT IT IS HARD TO SIT STILL: NOT AT ALL
4. TROUBLE RELAXING: NOT AT ALL

## 2017-11-28 ASSESSMENT — PATIENT HEALTH QUESTIONNAIRE - PHQ9: SUM OF ALL RESPONSES TO PHQ QUESTIONS 1-9: 0

## 2017-12-02 ENCOUNTER — TRANSFERRED RECORDS (OUTPATIENT)
Dept: HEALTH INFORMATION MANAGEMENT | Facility: CLINIC | Age: 47
End: 2017-12-02

## 2017-12-02 ENCOUNTER — AMBULATORY - GICH (OUTPATIENT)
Dept: SCHEDULING | Facility: OTHER | Age: 47
End: 2017-12-02

## 2017-12-11 ENCOUNTER — TELEPHONE (OUTPATIENT)
Dept: TRANSPLANT | Facility: CLINIC | Age: 47
End: 2017-12-11

## 2017-12-13 ENCOUNTER — MEDICAL CORRESPONDENCE (OUTPATIENT)
Dept: TRANSPLANT | Facility: CLINIC | Age: 47
End: 2017-12-13

## 2017-12-14 ENCOUNTER — ANESTHESIA EVENT (OUTPATIENT)
Dept: SURGERY | Facility: CLINIC | Age: 47
DRG: 659 | End: 2017-12-14
Payer: MEDICARE

## 2017-12-14 ENCOUNTER — MEDICAL CORRESPONDENCE (OUTPATIENT)
Dept: TRANSPLANT | Facility: CLINIC | Age: 47
End: 2017-12-14

## 2017-12-14 ASSESSMENT — LIFESTYLE VARIABLES: TOBACCO_USE: 1

## 2017-12-14 NOTE — ANESTHESIA PREPROCEDURE EVALUATION
Anesthesia Evaluation     . Pt has had prior anesthetic. Type: General    No history of anesthetic complications          ROS/MED HX    ENT/Pulmonary:     (+)tobacco use, Current use , . .    Neurologic:  - neg neurologic ROS     Cardiovascular:     (+) Dyslipidemia, hypertension----. : . . . :. . Previous cardiac testing Echodate:results:4/5/17:    Interpretation Summary  Global and regional left ventricular function is normal with an EF of 60-65%.  Global right ventricular function is normal.  Pulmonary artery systolic pressure is normal.  No pericardial effusion is present.  Previous study not available for comparison.  _____________________________________________________________________________date: results:ECG reviewed date: results:Sinus rhythm, anterolateral ST and T wave changes date: results:          METS/Exercise Tolerance:  4 - Raking leaves, gardening   Hematologic:     (+) Anemia, -      Musculoskeletal:  - neg musculoskeletal ROS       GI/Hepatic:     (+) Other GI/Hepatic h/o alcoholism       Renal/Genitourinary:     (+) chronic renal disease, type: ESRD, Pt requires dialysis, type: Peritoneal dialysis, Pt has history of transplant, date: 12/2009, Other Renal/ Genitourinary, IgA nephropathy, Renal Cell Carcinoma, Transplant Rejection      Endo:  - neg endo ROS       Psychiatric:     (+) psychiatric history depression      Infectious Disease:  - neg infectious disease ROS       Malignancy:   (+) Malignancy History of Other  Other CA Renal Cell Carcinoma  Active status post Surgery         Other:    - neg other ROS                 Physical Exam  Normal systems: cardiovascular, pulmonary and dental    Airway   Mallampati: I  TM distance: >3 FB  Neck ROM: full    Dental     Cardiovascular   Rhythm and rate: regular and normal      Pulmonary     Other findings: AV fistula on left forearm                 Anesthesia Plan      History & Physical Review  History and physical reviewed and following  examination; no interval change.    ASA Status:  3 .    NPO Status:  > 8 hours    Plan for General and ETT with Intravenous and Propofol induction. Maintenance will be Inhalation and Balanced.    PONV prophylaxis:  Ondansetron (or other 5HT-3), Other (See comment) and Dexamethasone or Solumedrol (hydrocortisone )  Additional equipment: 2nd IV      Postoperative Care  Postoperative pain management:  IV analgesics, Oral pain medications, Multi-modal analgesia and Peripheral nerve block (Single Shot).      Consents  Anesthetic plan, risks, benefits and alternatives discussed with:  Patient.  Use of blood products discussed: Yes.   Use of blood products discussed with Patient.  Consented to blood products.  .          Procedure: Procedure(s):  Transplanted Nephrectomy, Anesthesia Block  - Wound Class:     HPI: 47 year old male with ESRD s/p transplant with transplant failure who requires anesthesia for Procedure(s):  Transplanted Nephrectomy, Anesthesia Block  - Wound Class: .     PMHx significant for current daily smoker, marijuana use, ESRD s/p transplant in 2009 - now with failure of transplant following renal cell carcinoma. Pt s/p bilateral native nephrectomy 2/2 RCC, IgA nephropathy, peritoneal dialysis, HTN, HLD, depression, and anemia    PMH/PSH:  Past Medical History:   Diagnosis Date     Anemia in chronic kidney disease      Chews tobacco      Chronic rejection of kidney transplant      ESRD needing dialysis (H)     ~07/01/2014     History of alcoholism (H)      History of depression      History of peritoneal dialysis     7 years     History of substance abuse      Hyperlipidemia      IgA nephropathy      Osteopenia      Peritonitis (H)     non-MRSA staph     Renal cell carcinoma (H)        Past Surgical History:   Procedure Laterality Date     HC DIALYSIS AVF OR AVG, CENTRAL INTERVENTION ONLY Left      LAPAROSCOPIC INSERTION CATHETER PERITONEAL DIALYSIS Left      NEPHRECTOMY BILATERAL  2015     REMOVE  CATHETER PERITONEAL       TRANSPLANT KIDNEY RECIPIENT  DONOR Left 2009         No current facility-administered medications on file prior to encounter.   Current Outpatient Prescriptions on File Prior to Encounter:  PROGRAF 1 MG PO CAPSULE Take 1 capsule (1 mg) by mouth 2 times daily   azaTHIOprine (IMURAN) 50 MG tablet Take 1 tablet (50 mg) by mouth daily   predniSONE (DELTASONE) 5 MG tablet Take 4 mg by mouth daily    METOPROLOL TARTRATE PO Take 25 mg by mouth daily   LISINOPRIL PO Take 10 mg by mouth daily   LISINOPRIL PO Take 5 mg by mouth daily   VITAMIN D, CHOLECALCIFEROL, PO Take 2,000 Units by mouth daily   OMEPRAZOLE PO Take 40 mg by mouth every morning   CLONAZEPAM PO Take 1 mg by mouth 2 times daily as needed for anxiety   sennosides (SENOKOT) 8.6 MG tablet Take 1 tablet by mouth daily as needed for constipation   TRAZODONE HCL PO Take 300 mg by mouth At Bedtime   calcium acetate (PHOSLO) 667 MG CAPS capsule Take 667 mg by mouth 3 times daily (with meals)       SH:   Social History   Substance Use Topics     Smoking status: Current Every Day Smoker     Types: Dip, chew, snus or snuff     Start date: 2011     Smokeless tobacco: Current User     Types: Chew      Comment: 2 days per can.     Alcohol use Yes      Comment: none now, did treatment at age 21, relapsed with divorce (a couple months)  then now  sober 3 years.        Allergies: No Known Allergies    NPO Status: Per ASA Guidelines    Labs:    Blood Bank:  Lab Results   Component Value Date    ABO O 2017    RH  Pos 2017    AS Neg 2017     BMP:  Recent Labs   Lab Test  17   NA  136   POTASSIUM  4.2   CHLORIDE  91*   CO2  32   BUN  61*   CR  8.12*   GLC  96   SHIRAZ  8.3*     CBC:   Recent Labs   Lab Test  17   WBC  4.0   RBC  3.36*   HGB  11.3*   HCT  34.8*   MCV  104*   MCH  33.6*   MCHC  32.5   RDW  19.9*   PLT  163     Coags:  Recent Labs   Lab Test  17   INR  1.06   PTT  28      To be discussed with staff.   - ASA 3  - GETA with standard ASA monitors, IV induction, balanced anesthetic  - PIVx2  - tylenol preoperatively for postoperative pain control  - Regional anesthesia for postoperative pain control  - Antibiotics per surgery  - PONV prophylaxis    DO BILLY Finley-1   Department of Anesthesiology  P: [phone]                    .

## 2017-12-15 ENCOUNTER — HOSPITAL ENCOUNTER (INPATIENT)
Facility: CLINIC | Age: 47
LOS: 2 days | Discharge: HOME | DRG: 659 | End: 2017-12-17
Attending: SURGERY | Admitting: SURGERY
Payer: MEDICARE

## 2017-12-15 ENCOUNTER — ANESTHESIA (OUTPATIENT)
Dept: SURGERY | Facility: CLINIC | Age: 47
DRG: 659 | End: 2017-12-15
Payer: MEDICARE

## 2017-12-15 DIAGNOSIS — Z90.5 S/P NEPHRECTOMY: Primary | ICD-10-CM

## 2017-12-15 LAB
ABO + RH BLD: NORMAL
ABO + RH BLD: NORMAL
ANION GAP SERPL CALCULATED.3IONS-SCNC: 11 MMOL/L (ref 3–14)
APTT PPP: 25 SEC (ref 22–37)
BASOPHILS # BLD AUTO: 0 10E9/L (ref 0–0.2)
BASOPHILS NFR BLD AUTO: 0 %
BLD GP AB SCN SERPL QL: NORMAL
BLOOD BANK CMNT PATIENT-IMP: NORMAL
BUN SERPL-MCNC: 52 MG/DL (ref 7–30)
CALCIUM SERPL-MCNC: 8.2 MG/DL (ref 8.5–10.1)
CHLORIDE SERPL-SCNC: 95 MMOL/L (ref 94–109)
CO2 SERPL-SCNC: 31 MMOL/L (ref 20–32)
CREAT SERPL-MCNC: 6.85 MG/DL (ref 0.66–1.25)
CREAT SERPL-MCNC: 6.95 MG/DL (ref 0.66–1.25)
DIFFERENTIAL METHOD BLD: ABNORMAL
EOSINOPHIL # BLD AUTO: 0 10E9/L (ref 0–0.7)
EOSINOPHIL NFR BLD AUTO: 0.3 %
ERYTHROCYTE [DISTWIDTH] IN BLOOD BY AUTOMATED COUNT: 17.8 % (ref 10–15)
GFR SERPL CREATININE-BSD FRML MDRD: 8 ML/MIN/1.7M2
GFR SERPL CREATININE-BSD FRML MDRD: 9 ML/MIN/1.7M2
GLUCOSE BLDC GLUCOMTR-MCNC: 132 MG/DL (ref 70–99)
GLUCOSE SERPL-MCNC: 104 MG/DL (ref 70–99)
HCT VFR BLD AUTO: 37.1 % (ref 40–53)
HGB BLD-MCNC: 11.8 G/DL (ref 13.3–17.7)
HGB BLD-MCNC: 12.2 G/DL (ref 13.3–17.7)
IMM GRANULOCYTES # BLD: 0.1 10E9/L (ref 0–0.4)
IMM GRANULOCYTES NFR BLD: 0.6 %
INR PPP: 0.96 (ref 0.86–1.14)
LYMPHOCYTES # BLD AUTO: 0.5 10E9/L (ref 0.8–5.3)
LYMPHOCYTES NFR BLD AUTO: 5 %
MAGNESIUM SERPL-MCNC: 2.7 MG/DL (ref 1.6–2.3)
MCH RBC QN AUTO: 32.7 PG (ref 26.5–33)
MCHC RBC AUTO-ENTMCNC: 31.8 G/DL (ref 31.5–36.5)
MCV RBC AUTO: 103 FL (ref 78–100)
MONOCYTES # BLD AUTO: 0.2 10E9/L (ref 0–1.3)
MONOCYTES NFR BLD AUTO: 2.3 %
NEUTROPHILS # BLD AUTO: 8.6 10E9/L (ref 1.6–8.3)
NEUTROPHILS NFR BLD AUTO: 91.8 %
NRBC # BLD AUTO: 0 10*3/UL
NRBC BLD AUTO-RTO: 0 /100
PHOSPHATE SERPL-MCNC: 2.6 MG/DL (ref 2.5–4.5)
PLATELET # BLD AUTO: 86 10E9/L (ref 150–450)
POTASSIUM SERPL-SCNC: 4.5 MMOL/L (ref 3.4–5.3)
POTASSIUM SERPL-SCNC: 4.8 MMOL/L (ref 3.4–5.3)
RBC # BLD AUTO: 3.61 10E12/L (ref 4.4–5.9)
SODIUM SERPL-SCNC: 137 MMOL/L (ref 133–144)
SPECIMEN EXP DATE BLD: NORMAL
WBC # BLD AUTO: 9.4 10E9/L (ref 4–11)

## 2017-12-15 PROCEDURE — 0TT10ZZ RESECTION OF LEFT KIDNEY, OPEN APPROACH: ICD-10-PCS | Performed by: SURGERY

## 2017-12-15 PROCEDURE — 82565 ASSAY OF CREATININE: CPT | Performed by: STUDENT IN AN ORGANIZED HEALTH CARE EDUCATION/TRAINING PROGRAM

## 2017-12-15 PROCEDURE — 25000128 H RX IP 250 OP 636: Performed by: STUDENT IN AN ORGANIZED HEALTH CARE EDUCATION/TRAINING PROGRAM

## 2017-12-15 PROCEDURE — 25000125 ZZHC RX 250: Performed by: STUDENT IN AN ORGANIZED HEALTH CARE EDUCATION/TRAINING PROGRAM

## 2017-12-15 PROCEDURE — 71000016 ZZH RECOVERY PHASE 1 LEVEL 3 FIRST HR: Performed by: SURGERY

## 2017-12-15 PROCEDURE — 27210794 ZZH OR GENERAL SUPPLY STERILE: Performed by: SURGERY

## 2017-12-15 PROCEDURE — 25000132 ZZH RX MED GY IP 250 OP 250 PS 637: Mod: GY | Performed by: STUDENT IN AN ORGANIZED HEALTH CARE EDUCATION/TRAINING PROGRAM

## 2017-12-15 PROCEDURE — 36415 COLL VENOUS BLD VENIPUNCTURE: CPT | Performed by: STUDENT IN AN ORGANIZED HEALTH CARE EDUCATION/TRAINING PROGRAM

## 2017-12-15 PROCEDURE — C9399 UNCLASSIFIED DRUGS OR BIOLOG: HCPCS | Performed by: STUDENT IN AN ORGANIZED HEALTH CARE EDUCATION/TRAINING PROGRAM

## 2017-12-15 PROCEDURE — 85025 COMPLETE CBC W/AUTO DIFF WBC: CPT | Performed by: STUDENT IN AN ORGANIZED HEALTH CARE EDUCATION/TRAINING PROGRAM

## 2017-12-15 PROCEDURE — 12000025 ZZH R&B TRANSPLANT INTERMEDIATE

## 2017-12-15 PROCEDURE — 36000057 ZZH SURGERY LEVEL 3 1ST 30 MIN - UMMC: Performed by: SURGERY

## 2017-12-15 PROCEDURE — 71000017 ZZH RECOVERY PHASE 1 LEVEL 3 EA ADDTL HR: Performed by: SURGERY

## 2017-12-15 PROCEDURE — 84100 ASSAY OF PHOSPHORUS: CPT | Performed by: STUDENT IN AN ORGANIZED HEALTH CARE EDUCATION/TRAINING PROGRAM

## 2017-12-15 PROCEDURE — 86901 BLOOD TYPING SEROLOGIC RH(D): CPT | Performed by: ANESTHESIOLOGY

## 2017-12-15 PROCEDURE — 37000008 ZZH ANESTHESIA TECHNICAL FEE, 1ST 30 MIN: Performed by: SURGERY

## 2017-12-15 PROCEDURE — 85610 PROTHROMBIN TIME: CPT | Performed by: STUDENT IN AN ORGANIZED HEALTH CARE EDUCATION/TRAINING PROGRAM

## 2017-12-15 PROCEDURE — 86850 RBC ANTIBODY SCREEN: CPT | Performed by: ANESTHESIOLOGY

## 2017-12-15 PROCEDURE — 88307 TISSUE EXAM BY PATHOLOGIST: CPT | Performed by: SURGERY

## 2017-12-15 PROCEDURE — 00000146 ZZHCL STATISTIC GLUCOSE BY METER IP

## 2017-12-15 PROCEDURE — 83735 ASSAY OF MAGNESIUM: CPT | Performed by: STUDENT IN AN ORGANIZED HEALTH CARE EDUCATION/TRAINING PROGRAM

## 2017-12-15 PROCEDURE — 85730 THROMBOPLASTIN TIME PARTIAL: CPT | Performed by: STUDENT IN AN ORGANIZED HEALTH CARE EDUCATION/TRAINING PROGRAM

## 2017-12-15 PROCEDURE — 25000128 H RX IP 250 OP 636: Performed by: ANESTHESIOLOGY

## 2017-12-15 PROCEDURE — 12000026 ZZH R&B TRANSPLANT

## 2017-12-15 PROCEDURE — A9270 NON-COVERED ITEM OR SERVICE: HCPCS | Mod: GY | Performed by: STUDENT IN AN ORGANIZED HEALTH CARE EDUCATION/TRAINING PROGRAM

## 2017-12-15 PROCEDURE — 84132 ASSAY OF SERUM POTASSIUM: CPT | Performed by: STUDENT IN AN ORGANIZED HEALTH CARE EDUCATION/TRAINING PROGRAM

## 2017-12-15 PROCEDURE — 37000009 ZZH ANESTHESIA TECHNICAL FEE, EACH ADDTL 15 MIN: Performed by: SURGERY

## 2017-12-15 PROCEDURE — 25000566 ZZH SEVOFLURANE, EA 15 MIN: Performed by: SURGERY

## 2017-12-15 PROCEDURE — 40000170 ZZH STATISTIC PRE-PROCEDURE ASSESSMENT II: Performed by: SURGERY

## 2017-12-15 PROCEDURE — 85018 HEMOGLOBIN: CPT | Performed by: STUDENT IN AN ORGANIZED HEALTH CARE EDUCATION/TRAINING PROGRAM

## 2017-12-15 PROCEDURE — 25000131 ZZH RX MED GY IP 250 OP 636 PS 637: Mod: GY | Performed by: STUDENT IN AN ORGANIZED HEALTH CARE EDUCATION/TRAINING PROGRAM

## 2017-12-15 PROCEDURE — 86900 BLOOD TYPING SEROLOGIC ABO: CPT | Performed by: ANESTHESIOLOGY

## 2017-12-15 PROCEDURE — 36000059 ZZH SURGERY LEVEL 3 EA 15 ADDTL MIN UMMC: Performed by: SURGERY

## 2017-12-15 PROCEDURE — 80048 BASIC METABOLIC PNL TOTAL CA: CPT | Performed by: STUDENT IN AN ORGANIZED HEALTH CARE EDUCATION/TRAINING PROGRAM

## 2017-12-15 RX ORDER — LISINOPRIL 5 MG/1
5 TABLET ORAL DAILY
Status: DISCONTINUED | OUTPATIENT
Start: 2017-12-15 | End: 2017-12-15

## 2017-12-15 RX ORDER — ONDANSETRON 2 MG/ML
INJECTION INTRAMUSCULAR; INTRAVENOUS PRN
Status: DISCONTINUED | OUTPATIENT
Start: 2017-12-15 | End: 2017-12-15

## 2017-12-15 RX ORDER — PREDNISONE 20 MG/1
20 TABLET ORAL DAILY
Status: DISCONTINUED | OUTPATIENT
Start: 2017-12-15 | End: 2017-12-17 | Stop reason: HOSPADM

## 2017-12-15 RX ORDER — PROCHLORPERAZINE MALEATE 5 MG
10 TABLET ORAL EVERY 6 HOURS PRN
Status: DISCONTINUED | OUTPATIENT
Start: 2017-12-15 | End: 2017-12-17 | Stop reason: HOSPADM

## 2017-12-15 RX ORDER — SODIUM CHLORIDE 9 MG/ML
INJECTION, SOLUTION INTRAVENOUS CONTINUOUS PRN
Status: DISCONTINUED | OUTPATIENT
Start: 2017-12-15 | End: 2017-12-15

## 2017-12-15 RX ORDER — AZATHIOPRINE 50 MG/1
50 TABLET ORAL DAILY
Status: DISCONTINUED | OUTPATIENT
Start: 2017-12-15 | End: 2017-12-17 | Stop reason: HOSPADM

## 2017-12-15 RX ORDER — SODIUM CHLORIDE, SODIUM LACTATE, POTASSIUM CHLORIDE, CALCIUM CHLORIDE 600; 310; 30; 20 MG/100ML; MG/100ML; MG/100ML; MG/100ML
INJECTION, SOLUTION INTRAVENOUS CONTINUOUS
Status: DISCONTINUED | OUTPATIENT
Start: 2017-12-15 | End: 2017-12-15 | Stop reason: HOSPADM

## 2017-12-15 RX ORDER — LABETALOL HYDROCHLORIDE 5 MG/ML
10 INJECTION, SOLUTION INTRAVENOUS
Status: DISCONTINUED | OUTPATIENT
Start: 2017-12-15 | End: 2017-12-15 | Stop reason: HOSPADM

## 2017-12-15 RX ORDER — HYDRALAZINE HYDROCHLORIDE 20 MG/ML
5 INJECTION INTRAMUSCULAR; INTRAVENOUS EVERY 4 HOURS PRN
Status: DISCONTINUED | OUTPATIENT
Start: 2017-12-15 | End: 2017-12-17 | Stop reason: HOSPADM

## 2017-12-15 RX ORDER — GLYCOPYRROLATE 0.2 MG/ML
INJECTION, SOLUTION INTRAMUSCULAR; INTRAVENOUS PRN
Status: DISCONTINUED | OUTPATIENT
Start: 2017-12-15 | End: 2017-12-15

## 2017-12-15 RX ORDER — AMOXICILLIN 250 MG
1-2 CAPSULE ORAL 2 TIMES DAILY
Status: DISCONTINUED | OUTPATIENT
Start: 2017-12-15 | End: 2017-12-17 | Stop reason: HOSPADM

## 2017-12-15 RX ORDER — LISINOPRIL 10 MG/1
40 TABLET ORAL EVERY EVENING
Status: DISCONTINUED | OUTPATIENT
Start: 2017-12-16 | End: 2017-12-17 | Stop reason: HOSPADM

## 2017-12-15 RX ORDER — NALOXONE HYDROCHLORIDE 0.4 MG/ML
.1-.4 INJECTION, SOLUTION INTRAMUSCULAR; INTRAVENOUS; SUBCUTANEOUS
Status: ACTIVE | OUTPATIENT
Start: 2017-12-15 | End: 2017-12-16

## 2017-12-15 RX ORDER — SODIUM CHLORIDE, SODIUM LACTATE, POTASSIUM CHLORIDE, CALCIUM CHLORIDE 600; 310; 30; 20 MG/100ML; MG/100ML; MG/100ML; MG/100ML
INJECTION, SOLUTION INTRAVENOUS CONTINUOUS PRN
Status: DISCONTINUED | OUTPATIENT
Start: 2017-12-15 | End: 2017-12-15

## 2017-12-15 RX ORDER — CEFAZOLIN SODIUM 2 G/100ML
2 INJECTION, SOLUTION INTRAVENOUS
Status: COMPLETED | OUTPATIENT
Start: 2017-12-15 | End: 2017-12-15

## 2017-12-15 RX ORDER — DOCUSATE SODIUM 100 MG/1
100 CAPSULE, LIQUID FILLED ORAL 2 TIMES DAILY
Status: DISCONTINUED | OUTPATIENT
Start: 2017-12-15 | End: 2017-12-17 | Stop reason: HOSPADM

## 2017-12-15 RX ORDER — LIDOCAINE HYDROCHLORIDE 20 MG/ML
INJECTION, SOLUTION INFILTRATION; PERINEURAL PRN
Status: DISCONTINUED | OUTPATIENT
Start: 2017-12-15 | End: 2017-12-15

## 2017-12-15 RX ORDER — FENTANYL CITRATE 50 UG/ML
25-50 INJECTION, SOLUTION INTRAMUSCULAR; INTRAVENOUS
Status: DISCONTINUED | OUTPATIENT
Start: 2017-12-15 | End: 2017-12-15 | Stop reason: HOSPADM

## 2017-12-15 RX ORDER — ONDANSETRON 4 MG/1
4 TABLET, ORALLY DISINTEGRATING ORAL EVERY 30 MIN PRN
Status: DISCONTINUED | OUTPATIENT
Start: 2017-12-15 | End: 2017-12-15 | Stop reason: HOSPADM

## 2017-12-15 RX ORDER — CEFAZOLIN SODIUM 1 G/3ML
1 INJECTION, POWDER, FOR SOLUTION INTRAMUSCULAR; INTRAVENOUS SEE ADMIN INSTRUCTIONS
Status: DISCONTINUED | OUTPATIENT
Start: 2017-12-15 | End: 2017-12-15 | Stop reason: HOSPADM

## 2017-12-15 RX ORDER — TACROLIMUS 1 MG/1
1 CAPSULE, GELATIN COATED ORAL 2 TIMES DAILY
Status: DISCONTINUED | OUTPATIENT
Start: 2017-12-15 | End: 2017-12-17 | Stop reason: HOSPADM

## 2017-12-15 RX ORDER — HYDROMORPHONE HYDROCHLORIDE 1 MG/ML
.3-.5 INJECTION, SOLUTION INTRAMUSCULAR; INTRAVENOUS; SUBCUTANEOUS EVERY 5 MIN PRN
Status: DISCONTINUED | OUTPATIENT
Start: 2017-12-15 | End: 2017-12-15 | Stop reason: HOSPADM

## 2017-12-15 RX ORDER — NALOXONE HYDROCHLORIDE 0.4 MG/ML
.1-.4 INJECTION, SOLUTION INTRAMUSCULAR; INTRAVENOUS; SUBCUTANEOUS
Status: DISCONTINUED | OUTPATIENT
Start: 2017-12-15 | End: 2017-12-15 | Stop reason: HOSPADM

## 2017-12-15 RX ORDER — ONDANSETRON 2 MG/ML
4 INJECTION INTRAMUSCULAR; INTRAVENOUS EVERY 30 MIN PRN
Status: DISCONTINUED | OUTPATIENT
Start: 2017-12-15 | End: 2017-12-15 | Stop reason: HOSPADM

## 2017-12-15 RX ORDER — HYDRALAZINE HYDROCHLORIDE 20 MG/ML
5 INJECTION INTRAMUSCULAR; INTRAVENOUS EVERY 10 MIN PRN
Status: DISCONTINUED | OUTPATIENT
Start: 2017-12-15 | End: 2017-12-15 | Stop reason: HOSPADM

## 2017-12-15 RX ORDER — TRAZODONE HYDROCHLORIDE 150 MG/1
300 TABLET ORAL AT BEDTIME
Status: DISCONTINUED | OUTPATIENT
Start: 2017-12-15 | End: 2017-12-17 | Stop reason: HOSPADM

## 2017-12-15 RX ORDER — ONDANSETRON 2 MG/ML
4 INJECTION INTRAMUSCULAR; INTRAVENOUS EVERY 6 HOURS PRN
Status: DISCONTINUED | OUTPATIENT
Start: 2017-12-15 | End: 2017-12-17 | Stop reason: HOSPADM

## 2017-12-15 RX ORDER — NALOXONE HYDROCHLORIDE 0.4 MG/ML
.1-.4 INJECTION, SOLUTION INTRAMUSCULAR; INTRAVENOUS; SUBCUTANEOUS
Status: DISCONTINUED | OUTPATIENT
Start: 2017-12-16 | End: 2017-12-17 | Stop reason: HOSPADM

## 2017-12-15 RX ORDER — SENNOSIDES 8.6 MG
1 TABLET ORAL DAILY PRN
Status: DISCONTINUED | OUTPATIENT
Start: 2017-12-15 | End: 2017-12-17 | Stop reason: HOSPADM

## 2017-12-15 RX ORDER — FLUMAZENIL 0.1 MG/ML
0.2 INJECTION, SOLUTION INTRAVENOUS
Status: DISCONTINUED | OUTPATIENT
Start: 2017-12-15 | End: 2017-12-15 | Stop reason: HOSPADM

## 2017-12-15 RX ORDER — SODIUM CHLORIDE 9 MG/ML
INJECTION, SOLUTION INTRAVENOUS CONTINUOUS
Status: DISCONTINUED | OUTPATIENT
Start: 2017-12-15 | End: 2017-12-16

## 2017-12-15 RX ORDER — SODIUM CHLORIDE 9 MG/ML
INJECTION, SOLUTION INTRAVENOUS CONTINUOUS
Status: DISCONTINUED | OUTPATIENT
Start: 2017-12-15 | End: 2017-12-15 | Stop reason: HOSPADM

## 2017-12-15 RX ORDER — LISINOPRIL 10 MG/1
40 TABLET ORAL DAILY
Status: DISCONTINUED | OUTPATIENT
Start: 2017-12-15 | End: 2017-12-15

## 2017-12-15 RX ORDER — METOPROLOL TARTRATE 25 MG/1
25 TABLET, FILM COATED ORAL DAILY
Status: DISCONTINUED | OUTPATIENT
Start: 2017-12-15 | End: 2017-12-17 | Stop reason: HOSPADM

## 2017-12-15 RX ORDER — HYDRALAZINE HYDROCHLORIDE 20 MG/ML
5 INJECTION INTRAMUSCULAR; INTRAVENOUS
Status: ACTIVE | OUTPATIENT
Start: 2017-12-15 | End: 2017-12-15

## 2017-12-15 RX ORDER — LIDOCAINE 40 MG/G
CREAM TOPICAL
Status: DISCONTINUED | OUTPATIENT
Start: 2017-12-15 | End: 2017-12-15 | Stop reason: HOSPADM

## 2017-12-15 RX ORDER — IMIQUIMOD 12.5 MG/.25G
1 CREAM TOPICAL
Status: DISCONTINUED | OUTPATIENT
Start: 2017-12-15 | End: 2017-12-17 | Stop reason: HOSPADM

## 2017-12-15 RX ORDER — CLONAZEPAM 1 MG/1
1 TABLET ORAL 2 TIMES DAILY PRN
Status: DISCONTINUED | OUTPATIENT
Start: 2017-12-15 | End: 2017-12-17 | Stop reason: HOSPADM

## 2017-12-15 RX ORDER — AMLODIPINE BESYLATE 5 MG/1
5 TABLET ORAL DAILY
Status: DISCONTINUED | OUTPATIENT
Start: 2017-12-16 | End: 2017-12-17 | Stop reason: HOSPADM

## 2017-12-15 RX ORDER — PROPOFOL 10 MG/ML
INJECTION, EMULSION INTRAVENOUS PRN
Status: DISCONTINUED | OUTPATIENT
Start: 2017-12-15 | End: 2017-12-15

## 2017-12-15 RX ORDER — ONDANSETRON 4 MG/1
4 TABLET, ORALLY DISINTEGRATING ORAL EVERY 6 HOURS PRN
Status: DISCONTINUED | OUTPATIENT
Start: 2017-12-15 | End: 2017-12-17 | Stop reason: HOSPADM

## 2017-12-15 RX ORDER — ACETAMINOPHEN 325 MG/1
975 TABLET ORAL ONCE
Status: DISCONTINUED | OUTPATIENT
Start: 2017-12-15 | End: 2017-12-15 | Stop reason: HOSPADM

## 2017-12-15 RX ORDER — FENTANYL CITRATE 50 UG/ML
INJECTION, SOLUTION INTRAMUSCULAR; INTRAVENOUS PRN
Status: DISCONTINUED | OUTPATIENT
Start: 2017-12-15 | End: 2017-12-15

## 2017-12-15 RX ADMIN — FENTANYL CITRATE 25 MCG: 50 INJECTION, SOLUTION INTRAMUSCULAR; INTRAVENOUS at 10:13

## 2017-12-15 RX ADMIN — CALCIUM ACETATE 1334 MG: 667 CAPSULE ORAL at 18:39

## 2017-12-15 RX ADMIN — FENTANYL CITRATE 25 MCG: 50 INJECTION, SOLUTION INTRAMUSCULAR; INTRAVENOUS at 10:36

## 2017-12-15 RX ADMIN — ROCURONIUM BROMIDE 20 MG: 10 INJECTION INTRAVENOUS at 08:15

## 2017-12-15 RX ADMIN — HYDRALAZINE HYDROCHLORIDE 5 MG: 20 INJECTION INTRAMUSCULAR; INTRAVENOUS at 13:30

## 2017-12-15 RX ADMIN — MIDAZOLAM 2 MG: 1 INJECTION INTRAMUSCULAR; INTRAVENOUS at 10:40

## 2017-12-15 RX ADMIN — SUGAMMADEX 120 MG: 100 INJECTION, SOLUTION INTRAVENOUS at 09:57

## 2017-12-15 RX ADMIN — FENTANYL CITRATE 50 MCG: 50 INJECTION INTRAMUSCULAR; INTRAVENOUS at 10:48

## 2017-12-15 RX ADMIN — FENTANYL CITRATE 25 MCG: 50 INJECTION, SOLUTION INTRAMUSCULAR; INTRAVENOUS at 10:25

## 2017-12-15 RX ADMIN — Medication: at 11:21

## 2017-12-15 RX ADMIN — HYDROCORTISONE SODIUM SUCCINATE 60 MG: 100 INJECTION, POWDER, FOR SOLUTION INTRAMUSCULAR; INTRAVENOUS at 08:27

## 2017-12-15 RX ADMIN — Medication 0.2 MG: at 08:00

## 2017-12-15 RX ADMIN — SODIUM CHLORIDE, POTASSIUM CHLORIDE, SODIUM LACTATE AND CALCIUM CHLORIDE: 600; 310; 30; 20 INJECTION, SOLUTION INTRAVENOUS at 06:50

## 2017-12-15 RX ADMIN — METOPROLOL TARTRATE 25 MG: 25 TABLET ORAL at 14:27

## 2017-12-15 RX ADMIN — Medication 0.5 MG: at 11:24

## 2017-12-15 RX ADMIN — DOCUSATE SODIUM 100 MG: 100 CAPSULE, LIQUID FILLED ORAL at 21:40

## 2017-12-15 RX ADMIN — TACROLIMUS 1 MG: 1 CAPSULE, GELATIN COATED ORAL at 20:17

## 2017-12-15 RX ADMIN — LIDOCAINE HYDROCHLORIDE 60 MG: 20 INJECTION, SOLUTION INFILTRATION; PERINEURAL at 07:51

## 2017-12-15 RX ADMIN — TRAZODONE HYDROCHLORIDE 300 MG: 150 TABLET ORAL at 21:41

## 2017-12-15 RX ADMIN — FENTANYL CITRATE 50 MCG: 50 INJECTION, SOLUTION INTRAMUSCULAR; INTRAVENOUS at 08:34

## 2017-12-15 RX ADMIN — PROPOFOL 30 MG: 10 INJECTION, EMULSION INTRAVENOUS at 08:18

## 2017-12-15 RX ADMIN — FENTANYL CITRATE 50 MCG: 50 INJECTION INTRAMUSCULAR; INTRAVENOUS at 10:59

## 2017-12-15 RX ADMIN — ROCURONIUM BROMIDE 30 MG: 10 INJECTION INTRAVENOUS at 08:48

## 2017-12-15 RX ADMIN — HYDRALAZINE HYDROCHLORIDE 5 MG: 20 INJECTION INTRAMUSCULAR; INTRAVENOUS at 12:16

## 2017-12-15 RX ADMIN — PROPOFOL 50 MG: 10 INJECTION, EMULSION INTRAVENOUS at 07:55

## 2017-12-15 RX ADMIN — CLONAZEPAM 1 MG: 1 TABLET ORAL at 21:40

## 2017-12-15 RX ADMIN — ONDANSETRON 4 MG: 2 INJECTION INTRAMUSCULAR; INTRAVENOUS at 09:43

## 2017-12-15 RX ADMIN — PROPOFOL 20 MG: 10 INJECTION, EMULSION INTRAVENOUS at 10:16

## 2017-12-15 RX ADMIN — ROCURONIUM BROMIDE 20 MG: 10 INJECTION INTRAVENOUS at 09:18

## 2017-12-15 RX ADMIN — ROCURONIUM BROMIDE 20 MG: 10 INJECTION INTRAVENOUS at 08:10

## 2017-12-15 RX ADMIN — SENNOSIDES 1 TABLET: 8.6 TABLET, FILM COATED ORAL at 21:40

## 2017-12-15 RX ADMIN — PROPOFOL 20 MG: 10 INJECTION, EMULSION INTRAVENOUS at 09:51

## 2017-12-15 RX ADMIN — ROCURONIUM BROMIDE 30 MG: 10 INJECTION INTRAVENOUS at 07:52

## 2017-12-15 RX ADMIN — IMIQUIMOD 0.25 G: 12.5 CREAM TOPICAL at 21:38

## 2017-12-15 RX ADMIN — SODIUM CHLORIDE: 9 INJECTION, SOLUTION INTRAVENOUS at 08:00

## 2017-12-15 RX ADMIN — FENTANYL CITRATE 50 MCG: 50 INJECTION INTRAMUSCULAR; INTRAVENOUS at 10:52

## 2017-12-15 RX ADMIN — HYDROMORPHONE HYDROCHLORIDE 0.5 MG: 1 INJECTION, SOLUTION INTRAMUSCULAR; INTRAVENOUS; SUBCUTANEOUS at 09:02

## 2017-12-15 RX ADMIN — FENTANYL CITRATE 50 MCG: 50 INJECTION, SOLUTION INTRAMUSCULAR; INTRAVENOUS at 08:15

## 2017-12-15 RX ADMIN — FENTANYL CITRATE 25 MCG: 50 INJECTION, SOLUTION INTRAMUSCULAR; INTRAVENOUS at 10:30

## 2017-12-15 RX ADMIN — Medication 0.5 MG: at 11:07

## 2017-12-15 RX ADMIN — LISINOPRIL 40 MG: 10 TABLET ORAL at 18:39

## 2017-12-15 RX ADMIN — FENTANYL CITRATE 100 MCG: 50 INJECTION, SOLUTION INTRAMUSCULAR; INTRAVENOUS at 07:51

## 2017-12-15 RX ADMIN — PREDNISONE 20 MG: 20 TABLET ORAL at 18:40

## 2017-12-15 RX ADMIN — MIDAZOLAM 2 MG: 1 INJECTION INTRAMUSCULAR; INTRAVENOUS at 07:42

## 2017-12-15 RX ADMIN — FENTANYL CITRATE 50 MCG: 50 INJECTION INTRAMUSCULAR; INTRAVENOUS at 10:55

## 2017-12-15 RX ADMIN — AZATHIOPRINE 50 MG: 50 TABLET ORAL at 18:39

## 2017-12-15 RX ADMIN — Medication 0.5 MG: at 11:02

## 2017-12-15 RX ADMIN — CEFAZOLIN SODIUM 2 G: 2 INJECTION, SOLUTION INTRAVENOUS at 08:08

## 2017-12-15 RX ADMIN — PROPOFOL 100 MG: 10 INJECTION, EMULSION INTRAVENOUS at 07:51

## 2017-12-15 RX ADMIN — Medication 0.5 MG: at 11:17

## 2017-12-15 RX ADMIN — HYDRALAZINE HYDROCHLORIDE 5 MG: 20 INJECTION INTRAMUSCULAR; INTRAVENOUS at 11:30

## 2017-12-15 RX ADMIN — CALCIUM ACETATE 667 MG: 667 CAPSULE ORAL at 21:44

## 2017-12-15 NOTE — IP AVS SNAPSHOT
MRN:[id number]                      After Visit Summary   12/15/2017    Gilles Henning    MRN: [id number]           Thank you!     Thank you for choosing [facility] for your care. Our goal is always to provide you with excellent care. Hearing back from our patients is one way we can continue to improve our services. Please take a few minutes to complete the written survey that you may receive in the mail after you visit with us. Thank you!        Patient Information     Date Of Birth          1970        Designated Caregiver       Most Recent Value    Caregiver    Will someone help with your care after discharge? yes    Name of designated caregiver Merline Henning (wife)    Phone number of caregiver [phone]    Caregiver address [address], [place] [address]      About your hospital stay     You were admitted on:  December 15, 2017 You last received care in the:  [address] [facility] [place]    You were discharged on:  December 17, 2017        Reason for your hospital stay       S/p nephrectomy  (primary encounter diagnosis)                  Who to Call     For medical emergencies, please call 911.  For non-urgent questions about your medical care, please call your primary care provider or clinic, [phone]  For questions related to your surgery, please call your surgery clinic        Attending Provider     Provider Specialty    Mario Vallejo MD Transplant    Deedee Finley MD Transplant       Primary Care Provider Office Phone # Fax #    Charlie Darrion Dubose [phone]      Follow-up Appointments     Adult [facility] Follow-up and recommended labs and tests       Medication Instructions  -Some of your medications may have changed. Please take only prescribed and resumed medications.   Prednisone 5mg daily for 3 months  Tacrolimus 1mg daily for 1 year or until transplant  Stop taking imuran.     DIALYSIS  -Resume hemodialysis Tu,Th,Sa at [facility]  aldair LOYD    DIET  -Heart healthy dietary habits long term (low saturated/trans fat, low sodium). High protein diet x 8 weeks. Practice food safety precautions.    ACTIVITY  -No lifting, pushing, pulling greater than 10 lbs and no strenuous exercise for 6 weeks   -No driving while on narcotic analgesics (i.e. Percocet, oxycodone, Vicodin)  -No driving until you are able to fully twist to both sides or slam on brakes quickly and without any pain    WOUND   -Inspect your wounds daily for signs of infection (increased redness, drainage, pain)  -Keep your wound clean and dry  -You may shower, but do not soak in tub or pool    WHEN TO CONTACT YOUR  COORDINATOR:  Transplant  [phone]  Notify your coordinator if you have pain over your kidney, increased redness or drainage from your incision, fever greater than 100.5F, or decreased urine output.  Notify your coordinator immediately if you are ever unable to take your immunosuppressive medications for any reason.  If it is outside of office hours, please call the hospital switchboard at [phone] and ask to have the kidney transplant surgery fellow paged for urgent medical questions, or present to the emergency department.    FOLLOW-UP  -Remember to always bring an updated medication list to all appointments.       1.  You will need to follow-up with Dr. Vallejo in the Kidney Transplant Surgery clinic in 2-3 week(s). Please contact our clinic scheduler [phone] if you have not heard from our clinic in 3 business days after discharge to schedule a follow-up appointment.     2.  Follow up with your primary care provider in 1-2 weeks after discharge from the hospital to review this hospitalization. (Patient to schedule)    Appointments on [place] and/or [facility] (with [facility] or [facility] provider or service). Call [phone] if you haven't heard regarding these appointments within 7 days of discharge.                  Further instructions from  your care team       Medication Instructions  -Some of your medications may have changed. Please take only prescribed and resumed medications.   Prednisone 5mg daily for 3 months  Tacrolimus 1mg daily for 1 year or until transplant  Stop taking imuran.     DIALYSIS  -Resume hemodialysis Tu,Th,Sa at [facility]    DIET  -Heart healthy dietary habits long term (low saturated/trans fat, low sodium). High protein diet x 8 weeks. Practice food safety precautions.    ACTIVITY  -No lifting, pushing, pulling greater than 10 lbs and no strenuous exercise for 6 weeks   -No driving while on narcotic analgesics (i.e. Percocet, oxycodone, Vicodin)  -No driving until you are able to fully twist to both sides or slam on brakes quickly and without any pain    WOUND   -Inspect your wounds daily for signs of infection (increased redness, drainage, pain)  -Keep your wound clean and dry  -You may shower, but do not soak in tub or pool    WHEN TO CONTACT YOUR  COORDINATOR:  Transplant  [phone]  Notify your coordinator if you have pain over your kidney, increased redness or drainage from your incision, fever greater than 100.5F, or decreased urine output.  Notify your coordinator immediately if you are ever unable to take your immunosuppressive medications for any reason.  If it is outside of office hours, please call the hospital switchboard at [phone] and ask to have the kidney transplant surgery fellow paged for urgent medical questions, or present to the emergency department.    FOLLOW-UP  -Remember to always bring an updated medication list to all appointments.       1.  You will need to follow-up with Dr. Vallejo in the Kidney Transplant Surgery clinic in 2-3 week(s). Please contact our clinic scheduler [phone] if you have not heard from our clinic in 3 business days after discharge to schedule a follow-up appointment.     2.  Follow up with your primary care provider in 1-2 weeks after  "discharge from the hospital to review this hospitalization. (Patient to schedule)    Appointments on [place] and/or [facility] (with [facility] or [facility] provider or service). Call [phone] if you haven't heard regarding these appointments within 7 days of discharge.      Pending Results     Date and Time Order Name Status Description    2017 1451 Hepatitis B surface antigen In process     2017 1451 Hepatitis B Surface Antibody In process     12/15/2017 0920 Surgical pathology exam In process             Statement of Approval     Ordered          17 1011  I have reviewed and agree with all the recommendations and orders detailed in this document.  EFFECTIVE NOW     Approved and electronically signed by:  Enrique Duval MD             Admission Information     Date & Time Provider Department Dept. Phone    12/15/2017 Deedee Finley MD [address] [facility] [place] [phone]      Your Vitals Were     Blood Pressure Pulse Temperature Respirations Height Weight    156/94 (BP Location: Right arm) 72 98.6  F (37  C) (Oral) 16 1.778 m (5' 10\") 61.1 kg (134 lb 12.8 oz)    Pulse Oximetry BMI (Body Mass Index)                98% 19.34 kg/m2          MyChart Information     [facility] lets you send messages to your doctor, view your test results, renew your prescriptions, schedule appointments and more. To sign up, go to [email or web address] . Click on \"Log in\" on the left side of the screen, which will take you to the Welcome page. Then click on \"Sign up Now\" on the right side of the page.     You will be asked to enter the access code listed below, as well as some personal information. Please follow the directions to create your username and password.     Your access code is: [email or web address]  Expires: 2018  6:30 AM     Your access code will  in 90 days. If you need help or a new code, please call your [facility] clinic or [phone].        Care EveryWhere ID     This is your Care EveryWhere ID. " This could be used by other organizations to access your [facility] medical records  [id number]        Equal Access to Services     ALEXANDRA DOVE : Hadii kev Rene, arabella landaverde, arielmello lingarethjacqueline stephenssanamjacqueline, waxay idiin hayajitallen castelanindia bernie king. So [facility] [phone].    ATENCIÓN: Si habla español, tiene a millan disposición servicios gratuitos de asistencia lingüística. Llame al [phone].    We comply with applicable federal civil rights laws and [place] laws. We do not discriminate on the basis of race, color, national origin, age, disability, sex, sexual orientation, or gender identity.               Review of your medicines      START taking        Dose / Directions    acetaminophen 325 MG tablet   Commonly known as:  TYLENOL   Used for:  S/p nephrectomy        Dose:  650 mg   Take 2 tablets (650 mg) by mouth every 4 hours as needed for mild pain   Quantity:  100 tablet   Refills:  0       oxyCODONE IR 5 MG tablet   Commonly known as:  ROXICODONE   Used for:  S/p nephrectomy        Dose:  5 mg   Take 1 tablet (5 mg) by mouth every 4 hours as needed for moderate to severe pain   Quantity:  14 tablet   Refills:  0         CONTINUE these medicines which may have CHANGED, or have new prescriptions. If we are uncertain of the size of tablets/capsules you have at home, strength may be listed as something that might have changed.        Dose / Directions    amLODIPine 5 MG tablet   Commonly known as:  NORVASC   Indication:  High Blood Pressure Disorder   This may have changed:  medication strength   Used for:  S/p nephrectomy        Dose:  5 mg   Take 1 tablet (5 mg) by mouth daily   Quantity:  30 tablet   Refills:  1       * calcium acetate 667 MG Caps capsule   Commonly known as:  PHOSLO   Indication:  Patient takes two tablets with B, L, D., and one tablet with snacks.   This may have changed:  Another medication with the same name was added. Make sure you understand how and when to take each.         Dose:  667 mg   Take 667 mg by mouth 3 times daily (with meals)   Refills:  0       * calcium acetate 667 MG Caps capsule   Commonly known as:  PHOSLO   Indication:  Patient takes two tablets with B, L, D., and one tablet with snacks.   This may have changed:  You were already taking a medication with the same name, and this prescription was added. Make sure you understand how and when to take each.   Used for:  S/p nephrectomy        Dose:  1334 mg   Take 2 capsules (1,334 mg) by mouth 3 times daily (with meals)   Quantity:  180 capsule   Refills:  1       LISINOPRIL PO   This may have changed:  Another medication with the same name was removed. Continue taking this medication, and follow the directions you see here.        Dose:  40 mg   Take 40 mg by mouth daily   Refills:  0       predniSONE 5 MG tablet   Commonly known as:  DELTASONE   This may have changed:  how much to take   Used for:  S/p nephrectomy        Dose:  5 mg   Take 1 tablet (5 mg) by mouth daily   Quantity:  30 tablet   Refills:  2       tacrolimus 1 MG capsule   This may have changed:  when to take this   Used for:  S/p nephrectomy        Dose:  1 mg   Take 1 capsule (1 mg) by mouth daily   Quantity:  30 capsule   Refills:  11       * Notice:  This list has 2 medication(s) that are the same as other medications prescribed for you. Read the directions carefully, and ask your doctor or other care provider to review them with you.      CONTINUE these medicines which have NOT CHANGED        Dose / Directions    CLONAZEPAM PO        Dose:  1 mg   Take 1 mg by mouth 2 times daily as needed for anxiety   Refills:  0       COLACE PO   Indication:  Constipation        Dose:  100 mg   Take 100 mg by mouth 2 times daily   Refills:  0       metoprolol 25 MG tablet   Commonly known as:  LOPRESSOR   Used for:  S/p nephrectomy        Dose:  25 mg   Take 1 tablet (25 mg) by mouth daily   Quantity:  30 tablet   Refills:  1       OMEPRAZOLE PO        Dose:   40 mg   Take 40 mg by mouth every morning   Refills:  0       sennosides 8.6 MG tablet   Commonly known as:  SENOKOT   Used for:  S/p nephrectomy        Dose:  1 tablet   Take 1 tablet by mouth daily as needed for constipation   Quantity:  120 each   Refills:  0       TRAZODONE HCL PO        Dose:  300 mg   Take 300 mg by mouth At Bedtime   Refills:  0       VITAMIN D (CHOLECALCIFEROL) PO        Dose:  2000 Units   Take 2,000 Units by mouth daily   Refills:  0         STOP taking     azaTHIOprine 50 MG tablet   Commonly known as:  IMURAN                Where to get your medicines      Some of these will need a paper prescription and others can be bought over the counter. Ask your nurse if you have questions.     Bring a paper prescription for each of these medications     acetaminophen 325 MG tablet    amLODIPine 5 MG tablet    calcium acetate 667 MG Caps capsule    metoprolol 25 MG tablet    oxyCODONE IR 5 MG tablet    predniSONE 5 MG tablet    sennosides 8.6 MG tablet    tacrolimus 1 MG capsule                Protect others around you: Learn how to safely use, store and throw away your medicines at www.disposemymeds.org.             Medication List: This is a list of all your medications and when to take them. Check marks below indicate your daily home schedule. Keep this list as a reference.      Medications           Morning Afternoon Evening Bedtime As Needed    acetaminophen 325 MG tablet   Commonly known as:  TYLENOL   Take 2 tablets (650 mg) by mouth every 4 hours as needed for mild pain                                amLODIPine 5 MG tablet   Commonly known as:  NORVASC   Take 1 tablet (5 mg) by mouth daily   Last time this was given:  5 mg on 12/17/2017  7:41 AM                                * calcium acetate 667 MG Caps capsule   Commonly known as:  PHOSLO   Take 667 mg by mouth 3 times daily (with meals)   Last time this was given:  1,334 mg on 12/17/2017  7:39 AM                                * calcium  acetate 667 MG Caps capsule   Commonly known as:  PHOSLO   Take 2 capsules (1,334 mg) by mouth 3 times daily (with meals)   Last time this was given:  1,334 mg on 12/17/2017  7:39 AM                                CLONAZEPAM PO   Take 1 mg by mouth 2 times daily as needed for anxiety   Last time this was given:  1 mg on 12/16/2017  9:53 PM                                COLACE PO   Take 100 mg by mouth 2 times daily   Last time this was given:  100 mg on 12/17/2017  7:41 AM                                LISINOPRIL PO   Take 40 mg by mouth daily   Last time this was given:  40 mg on 12/16/2017  8:17 PM                                metoprolol 25 MG tablet   Commonly known as:  LOPRESSOR   Take 1 tablet (25 mg) by mouth daily   Last time this was given:  25 mg on 12/17/2017  7:41 AM                                OMEPRAZOLE PO   Take 40 mg by mouth every morning   Last time this was given:  40 mg on 12/17/2017  7:39 AM                                oxyCODONE IR 5 MG tablet   Commonly known as:  ROXICODONE   Take 1 tablet (5 mg) by mouth every 4 hours as needed for moderate to severe pain   Last time this was given:  5 mg on 12/17/2017  7:08 AM                                predniSONE 5 MG tablet   Commonly known as:  DELTASONE   Take 1 tablet (5 mg) by mouth daily   Last time this was given:  20 mg on 12/17/2017  7:39 AM                                sennosides 8.6 MG tablet   Commonly known as:  SENOKOT   Take 1 tablet by mouth daily as needed for constipation   Last time this was given:  1 tablet on 12/15/2017  9:40 PM                                tacrolimus 1 MG capsule   Take 1 capsule (1 mg) by mouth daily   Last time this was given:  1 mg on 12/17/2017  7:41 AM                                TRAZODONE HCL PO   Take 300 mg by mouth At Bedtime   Last time this was given:  300 mg on 12/16/2017  9:52 PM                                VITAMIN D (CHOLECALCIFEROL) PO   Take 2,000 Units by mouth daily   Last time  this was given:  2,000 Units on 12/17/2017  7:41 AM                                * Notice:  This list has 2 medication(s) that are the same as other medications prescribed for you. Read the directions carefully, and ask your doctor or other care provider to review them with you.

## 2017-12-15 NOTE — ANESTHESIA POSTPROCEDURE EVALUATION
Patient: Gilles Henning    Procedure(s):  Transplanted Nephrectomy - Wound Class: II-Clean Contaminated    Diagnosis:Post Kidney Transplant   Diagnosis Additional Information: No value filed.    Anesthesia Type:  General, ETT, Periph. Nerve Block for postop pain    Note:  Anesthesia Post Evaluation    Patient location during evaluation: PACU and Bedside  Patient participation: Able to fully participate in evaluation  Level of consciousness: awake  Pain management: adequate  Airway patency: patent  Cardiovascular status: acceptable  Respiratory status: acceptable  Hydration status: acceptable  PONV: none     Anesthetic complications: None          Last vitals:  Vitals:    12/15/17 1200 12/15/17 1215 12/15/17 1230   BP: (!) 166/92 (!) 161/93 (!) 151/91   Pulse:      Resp: 13 12 11   Temp:      SpO2: 100% 99% 100%         Electronically Signed By: Eloy Hamm MD  December 15, 2017  12:47 PM

## 2017-12-15 NOTE — OR NURSING
Per teresa Pruitt for pt to have carrier infusion of 0.9% normal saline IV at 20ml/hr for PCA pump.

## 2017-12-15 NOTE — OR NURSING
Pt transferred to  with RN and NST. Pt declined to don cloth gown, and preferred to wear purple surgical gown. Pt transferred to  at this time, family at bedside.

## 2017-12-15 NOTE — ANESTHESIA CARE TRANSFER NOTE
Patient: Gilles Henning    Procedure(s):  Transplanted Nephrectomy - Wound Class: II-Clean Contaminated    Diagnosis: Post Kidney Transplant   Diagnosis Additional Information: No value filed.    Anesthesia Type:   General, ETT, Periph. Nerve Block for postop pain     Note:  Airway :Face Mask  Patient transferred to:PACU  Comments: VSS. Breathing spontaneously at a regular rate with adequate tidal volumes and maintaining O2 sats on 6L facemask. Denies nausea. Pt reports moderate pain at incisional site, 3x 25 mcg fentanyl given over 15 min period. Pt also given 1 mg versed by PACU resident as he had mild emergence delirium. Pt remains alert and breathing spontaneously.     Trevor Francis DO  CA-1  Handoff Report: Identifed the Patient, Identified the Reponsible Provider, Reviewed the pertinent medical history, Discussed the surgical course, Reviewed Intra-OP anesthesia mangement and issues during anesthesia, Set expectations for post-procedure period and Allowed opportunity for questions and acknowledgement of understanding      Vitals: (Last set prior to Anesthesia Care Transfer)    CRNA VITALS  12/15/2017 0953 - 12/15/2017 1040      12/15/2017             Pulse: 63    SpO2: 100 %    Resp Rate (observed): (!)  6                Electronically Signed By: Trevor Francis DO  December 15, 2017  10:40 AM

## 2017-12-15 NOTE — BRIEF OP NOTE
[facility], [place]    Brief Operative Note    Pre-operative diagnosis: Post Kidney Transplant   Post-operative diagnosis * No post-op diagnosis entered *  Procedure: Procedure(s):  Transplanted Nephrectomy - Wound Class: II-Clean Contaminated  Surgeon: Surgeon(s) and Role:     * Mario Vallejo MD - Primary     * Maliha Taylor MD - Assisting  Anesthesia: Combined General with Block   Estimated blood loss: Less than 50 ml  Drains: None  Specimens:   ID Type Source Tests Collected by Time Destination   A : Transplant Nephrectomy Tissue Abdomen SURGICAL PATHOLOGY EXAM Mario Vallejo MD 12/15/2017  9:20 AM      Findings:   kidney allograft.  Complications: None.  Implants: None.

## 2017-12-15 NOTE — IP AVS SNAPSHOT
[address] [address]    Phone:  [phone]                                       After Visit Summary   12/15/2017    Gilles Henning    MRN: [id number]           After Visit Summary Signature Page     I have received my discharge instructions, and my questions have been answered. I have discussed any challenges I see with this plan with the nurse or doctor.    ..........................................................................................................................................  Patient/Patient Representative Signature      ..........................................................................................................................................  Patient Representative Print Name and Relationship to Patient    ..................................................               ................................................  Date                                            Time    ..........................................................................................................................................  Reviewed by Signature/Title    ...................................................              ..............................................  Date                                                            Time

## 2017-12-15 NOTE — OR NURSING
Report to Dorothea Mccarthy. Dr. Hamm called and notified that BP did not resolve with pain control. Pt HR 45-55 so hydralazine ordered and given with good results.

## 2017-12-16 LAB
ALBUMIN SERPL-MCNC: 3.1 G/DL (ref 3.4–5)
ANION GAP SERPL CALCULATED.3IONS-SCNC: 10 MMOL/L (ref 3–14)
ANION GAP SERPL CALCULATED.3IONS-SCNC: 14 MMOL/L (ref 3–14)
BUN SERPL-MCNC: 60 MG/DL (ref 7–30)
BUN SERPL-MCNC: 70 MG/DL (ref 7–30)
CALCIUM SERPL-MCNC: 8.3 MG/DL (ref 8.5–10.1)
CALCIUM SERPL-MCNC: 8.7 MG/DL (ref 8.5–10.1)
CHLORIDE SERPL-SCNC: 100 MMOL/L (ref 94–109)
CHLORIDE SERPL-SCNC: 96 MMOL/L (ref 94–109)
CO2 SERPL-SCNC: 25 MMOL/L (ref 20–32)
CO2 SERPL-SCNC: 26 MMOL/L (ref 20–32)
CREAT SERPL-MCNC: 7.53 MG/DL (ref 0.66–1.25)
CREAT SERPL-MCNC: 9.78 MG/DL (ref 0.66–1.25)
GFR SERPL CREATININE-BSD FRML MDRD: 6 ML/MIN/1.7M2
GFR SERPL CREATININE-BSD FRML MDRD: 8 ML/MIN/1.7M2
GLUCOSE SERPL-MCNC: 148 MG/DL (ref 70–99)
GLUCOSE SERPL-MCNC: 155 MG/DL (ref 70–99)
PHOSPHATE SERPL-MCNC: 3.8 MG/DL (ref 2.5–4.5)
POTASSIUM SERPL-SCNC: 5 MMOL/L (ref 3.4–5.3)
POTASSIUM SERPL-SCNC: 5.8 MMOL/L (ref 3.4–5.3)
SODIUM SERPL-SCNC: 135 MMOL/L (ref 133–144)
SODIUM SERPL-SCNC: 136 MMOL/L (ref 133–144)

## 2017-12-16 PROCEDURE — 5A1D70Z PERFORMANCE OF URINARY FILTRATION, INTERMITTENT, LESS THAN 6 HOURS PER DAY: ICD-10-PCS | Performed by: INTERNAL MEDICINE

## 2017-12-16 PROCEDURE — A9270 NON-COVERED ITEM OR SERVICE: HCPCS | Mod: GY | Performed by: PHYSICIAN ASSISTANT

## 2017-12-16 PROCEDURE — 86706 HEP B SURFACE ANTIBODY: CPT | Performed by: INTERNAL MEDICINE

## 2017-12-16 PROCEDURE — 25000128 H RX IP 250 OP 636: Performed by: INTERNAL MEDICINE

## 2017-12-16 PROCEDURE — 36415 COLL VENOUS BLD VENIPUNCTURE: CPT | Performed by: INTERNAL MEDICINE

## 2017-12-16 PROCEDURE — 25000132 ZZH RX MED GY IP 250 OP 250 PS 637: Mod: GY | Performed by: STUDENT IN AN ORGANIZED HEALTH CARE EDUCATION/TRAINING PROGRAM

## 2017-12-16 PROCEDURE — 25000131 ZZH RX MED GY IP 250 OP 636 PS 637: Mod: GY | Performed by: STUDENT IN AN ORGANIZED HEALTH CARE EDUCATION/TRAINING PROGRAM

## 2017-12-16 PROCEDURE — 25000125 ZZHC RX 250: Performed by: STUDENT IN AN ORGANIZED HEALTH CARE EDUCATION/TRAINING PROGRAM

## 2017-12-16 PROCEDURE — A9270 NON-COVERED ITEM OR SERVICE: HCPCS | Mod: GY | Performed by: STUDENT IN AN ORGANIZED HEALTH CARE EDUCATION/TRAINING PROGRAM

## 2017-12-16 PROCEDURE — 80048 BASIC METABOLIC PNL TOTAL CA: CPT | Performed by: INTERNAL MEDICINE

## 2017-12-16 PROCEDURE — 80069 RENAL FUNCTION PANEL: CPT | Performed by: INTERNAL MEDICINE

## 2017-12-16 PROCEDURE — 12000026 ZZH R&B TRANSPLANT

## 2017-12-16 PROCEDURE — 87340 HEPATITIS B SURFACE AG IA: CPT | Performed by: INTERNAL MEDICINE

## 2017-12-16 PROCEDURE — 90937 HEMODIALYSIS REPEATED EVAL: CPT

## 2017-12-16 PROCEDURE — 25000132 ZZH RX MED GY IP 250 OP 250 PS 637: Mod: GY | Performed by: PHYSICIAN ASSISTANT

## 2017-12-16 RX ORDER — ACETAMINOPHEN 325 MG/1
650 TABLET ORAL EVERY 4 HOURS PRN
Status: DISCONTINUED | OUTPATIENT
Start: 2017-12-16 | End: 2017-12-17 | Stop reason: HOSPADM

## 2017-12-16 RX ORDER — OXYCODONE HYDROCHLORIDE 5 MG/1
5 TABLET ORAL EVERY 4 HOURS PRN
Status: DISCONTINUED | OUTPATIENT
Start: 2017-12-16 | End: 2017-12-17 | Stop reason: HOSPADM

## 2017-12-16 RX ORDER — BISACODYL 10 MG
10 SUPPOSITORY, RECTAL RECTAL DAILY PRN
Status: DISCONTINUED | OUTPATIENT
Start: 2017-12-16 | End: 2017-12-17 | Stop reason: HOSPADM

## 2017-12-16 RX ORDER — CLONAZEPAM 1 MG/1
1 TABLET ORAL ONCE
Status: COMPLETED | OUTPATIENT
Start: 2017-12-16 | End: 2017-12-16

## 2017-12-16 RX ADMIN — SODIUM CHLORIDE 250 ML: 9 INJECTION, SOLUTION INTRAVENOUS at 14:46

## 2017-12-16 RX ADMIN — OXYCODONE HYDROCHLORIDE 5 MG: 5 TABLET ORAL at 18:23

## 2017-12-16 RX ADMIN — MAGNESIUM HYDROXIDE 30 ML: 400 SUSPENSION ORAL at 18:23

## 2017-12-16 RX ADMIN — SENNOSIDES AND DOCUSATE SODIUM 2 TABLET: 8.6; 5 TABLET ORAL at 20:18

## 2017-12-16 RX ADMIN — AMLODIPINE BESYLATE 5 MG: 5 TABLET ORAL at 08:06

## 2017-12-16 RX ADMIN — SENNOSIDES AND DOCUSATE SODIUM 1 TABLET: 8.6; 5 TABLET ORAL at 08:06

## 2017-12-16 RX ADMIN — LISINOPRIL 40 MG: 10 TABLET ORAL at 20:17

## 2017-12-16 RX ADMIN — PREDNISONE 20 MG: 20 TABLET ORAL at 08:06

## 2017-12-16 RX ADMIN — CLONAZEPAM 1 MG: 1 TABLET ORAL at 21:53

## 2017-12-16 RX ADMIN — CALCIUM ACETATE 667 MG: 667 CAPSULE ORAL at 13:26

## 2017-12-16 RX ADMIN — TACROLIMUS 1 MG: 1 CAPSULE, GELATIN COATED ORAL at 08:07

## 2017-12-16 RX ADMIN — CALCIUM ACETATE 1334 MG: 667 CAPSULE ORAL at 08:07

## 2017-12-16 RX ADMIN — CALCIUM ACETATE 1334 MG: 667 CAPSULE ORAL at 18:23

## 2017-12-16 RX ADMIN — DOCUSATE SODIUM 100 MG: 100 CAPSULE, LIQUID FILLED ORAL at 20:18

## 2017-12-16 RX ADMIN — CALCIUM ACETATE 667 MG: 667 CAPSULE ORAL at 20:28

## 2017-12-16 RX ADMIN — AZATHIOPRINE 50 MG: 50 TABLET ORAL at 08:06

## 2017-12-16 RX ADMIN — TACROLIMUS 1 MG: 1 CAPSULE, GELATIN COATED ORAL at 20:18

## 2017-12-16 RX ADMIN — METOPROLOL TARTRATE 25 MG: 25 TABLET ORAL at 08:07

## 2017-12-16 RX ADMIN — SODIUM CHLORIDE 1000 ML: 9 INJECTION, SOLUTION INTRAVENOUS at 14:46

## 2017-12-16 RX ADMIN — TRAZODONE HYDROCHLORIDE 300 MG: 150 TABLET ORAL at 21:52

## 2017-12-16 RX ADMIN — OMEPRAZOLE 40 MG: 20 CAPSULE, DELAYED RELEASE ORAL at 08:06

## 2017-12-16 RX ADMIN — DOCUSATE SODIUM 100 MG: 100 CAPSULE, LIQUID FILLED ORAL at 08:06

## 2017-12-16 RX ADMIN — Medication: at 14:46

## 2017-12-16 RX ADMIN — CLONAZEPAM 1 MG: 1 TABLET ORAL at 14:06

## 2017-12-16 RX ADMIN — VITAMIN D, TAB 1000IU (100/BT) 2000 UNITS: 25 TAB at 08:07

## 2017-12-16 ASSESSMENT — PAIN DESCRIPTION - DESCRIPTORS
DESCRIPTORS: TENDER
DESCRIPTORS: TENDER

## 2017-12-16 NOTE — OR NURSING
"Blood pressure 150s/90s since writer assumed care in PACU. Writer spoke with Dr. Taylor, she stated, \"It's okay where it is for now. Just give his normal home antihypertensive medications.\" Oral metoprolol administered in PACU. Pt states he usually takes lisinopril at bedtime. This was reported to receiving nurse Abbey Muñoz RN on 7A.   "

## 2017-12-16 NOTE — PLAN OF CARE
Problem: Patient Care Overview  Goal: Plan of Care/Patient Progress Review  Outcome: No Change  All VSS on RA throughout the shift. Pt had pain despite PCA, but realized he had used his bumps for awhile. PCA settings 0.2mg with 10 min lockout. Denied nausea. NS running at 20ml/hr. Tolerates regular diet well. Anuric and no BM on night shift. Telemetry placed on start of shift. Pt did not sleep well last night and denied nonpharmacological interventions Will receive dialysis today. Will continue to monitor and follow POC.

## 2017-12-16 NOTE — PROGRESS NOTES
"POST OP CHECK  12/15/2017    Gilles Henning is a 47 year old male with h/o post kidney transplant now POD #0 s/p transplanted nephrectomy.    Pt reports doing well and pain controlled. Tolerating apple juice without nausea or emesis. Does not make urine as patient does not have native kidneys and is HD dependent. Ambulating without difficulty, dizziness, or lightheadedness.    /89 (BP Location: Right arm)  Pulse 72  Temp 98.6  F (37  C) (Oral)  Resp 16  Ht 1.778 m (5' 10\")  Wt 61.5 kg (135 lb 9.3 oz)  SpO2 100%  BMI 19.45 kg/m2  General: Alert, interactive, & in NA  Resp: Non labored breathing  Cardiac: RRR  Abdomen: Soft, appropriately tender, non-distended  Incision: c/d/i withouth erythema, warmth, or discharge.   Extremities: WWP    A/P:Gilles Henning is a 47 year old male with h/o post kidney transplant now POD #0 s/p above procedure.  -recovering well  -added home lisinopril. Primary team to add back norvasc as appropriate  -PRN antihypertensives  -continue cares per primary team orders    Caroline Mccormack MD  PGY-1, General Surgery        "

## 2017-12-16 NOTE — PROGRESS NOTES
Transplant Surgery  Inpatient Daily Progress Note  12/16/2017    Assessment & Plan: Gilles Henning is a 47 year old male with past medical history ESRD 2/2 IgA nephropathy, s/p (12/2009) DDKT and RCC s/p bilateral nephrectomy (2015). He has been on HD since 9/2015, has had PD in the past. He is now s/p (12/15) transplanted nephrectomy 2/2 rejection. Will have dialysis today. Pending continued clinical improvement and no acute events, could discharge 1-2 days.    Graft function: s/p transplanted nephrectomy, will have HD run today   Immunosuppression management: Tacrolimus 1 mg BID, prednisone 20 mg.  Complexity of management:Medium. Contributing factors: transplanted nephrectomy  Hematology: Hgb stable, 11.8 this AM  Cardiorespiratory: no acute issues; lisinopril 40 mg, metoprolol increased to 50 mg, amlodipine 5 mg; d/c telemetry  GI/Nutrition: advance diet as tolerated, passing flatus; bowel regiment to include Milk of Magnesia PRN, Senna and suppository BID, omeprazole 40 mg QD  Endocrine: no acute issues  Fluid/Electrolytes: d/c mIVF, good oral fluid intake  : Anuresis   Infectious disease:   Prophylaxis: DVT, fall, GI, fungal  Disposition: 7A, pending clinical improvement and no acute issues, could discharge in 1-2 days     Medical Decision Making: Medium  Admit 20992 (moderate level decision making)    ISMAEL/Fellow/Resident Provider: Maliha Taylor MD    Faculty: Deedee Finley M.D.  _________________________________________________________________  Transplant History: Admitted 12/15/2017 for s/p (12/15) transplanted nephrectomy.  N/A, Postoperative day:      Interval History: History is obtained from the patient  Overnight events: no acute events overnight, pain well-controlled, tolerating clear liquid diet, slept well overnight, no nausea/vomiting    ROS:   A 10-point review of systems was negative except as noted above.    Meds:    sodium chloride 0.9%  250 mL Intravenous Once in dialysis     sodium  "chloride 0.9%  1,000-2,000 mL Hemodialysis Machine Once     gelatin absorbable  1 each Topical During Hemodialysis (from stock)     - MEDICATION INSTRUCTIONS -   Does not apply Once     clonazePAM  1 mg Oral Once     azaTHIOprine  50 mg Oral Daily     metoprolol (LOPRESSOR) tablet 25 mg  25 mg Oral Daily     omeprazole (priLOSEC) CR capsule 40 mg  40 mg Oral QAM     predniSONE  20 mg Oral Daily     tacrolimus  1 mg Oral BID     traZODone (DESYREL) tablet 300 mg  300 mg Oral At Bedtime     cholecalciferol  2,000 Units Oral Daily     sodium chloride (PF)  3 mL Intravenous Q8H     senna-docusate  1-2 tablet Oral BID     calcium acetate  1,334 mg Oral TID w/meals     imiquimod  1 packet Topical Once per day on Mon Wed Fri     lisinopril (PRINIVIL/ZESTRIL) tablet 40 mg  40 mg Oral QPM     amLODIPine (NORVASC) tablet 5 mg  5 mg Oral Daily     docusate sodium (COLACE) capsule 100 mg  100 mg Oral BID       Physical Exam:     Admit Weight: 61.5 kg (135 lb 9.3 oz)    Current vitals:   /87 (BP Location: Right arm)  Pulse 72  Temp 98.6  F (37  C) (Oral)  Resp 16  Ht 1.778 m (5' 10\")  Wt 63.3 kg (139 lb 9.6 oz)  SpO2 100%  BMI 20.03 kg/m2         Vital sign ranges:    Temp:  [98  F (36.7  C)-98.9  F (37.2  C)] 98.6  F (37  C)  Pulse:  [72] 72  Heart Rate:  [62-75] 69  Resp:  [12-20] 16  BP: (138-172)/() 156/87  SpO2:  [94 %-100 %] 100 %  Patient Vitals for the past 24 hrs:   BP Temp Temp src Pulse Heart Rate Resp SpO2 Weight   12/16/17 1152 156/87 98.6  F (37  C) Oral - 69 16 100 % -   12/16/17 1131 - - - - - - - 63.3 kg (139 lb 9.6 oz)   12/16/17 0751 138/85 98.9  F (37.2  C) Oral - 75 18 94 % -   12/16/17 0316 146/83 98.6  F (37  C) Oral - 72 16 99 % -   12/15/17 2353 155/90 98.2  F (36.8  C) Oral - 70 16 99 % -   12/15/17 2151 (!) 158/94 - - - 72 16 100 % -   12/15/17 1929 149/89 98.6  F (37  C) Oral - 74 16 100 % -   12/15/17 1752 (!) 172/91 98.4  F (36.9  C) Oral - 62 20 100 % -   12/15/17 1600 (!) 151/92 " 98  F (36.7  C) Oral - 71 12 100 % -   12/15/17 1500 - - - - - - 100 % -   12/15/17 1427 (!) 161/100 - - 72 72 12 100 % -     General Appearance: in no apparent distress.   Skin: normal, warm  Lungs: nonlabored breathing on room air  Abdomen: The abdomen is soft, and  non-tender.The wound is Healing well, well approximated, without signs of infection and no drainage.  : plascencia is not present.   Extremities: edema: absent.   Neurologic: awake, alert and oriented. Tremor absent..     Data:   CMP  Recent Labs  Lab 12/15/17  1150 12/15/17  0615     --    POTASSIUM 4.8 4.5   CHLORIDE 95  --    CO2 31  --    *  --    BUN 52*  --    CR 6.95* 6.85*   GFRESTIMATED 8* 9*   GFRESTBLACK 10* 10*   SHIRAZ 8.2*  --    MAG 2.7*  --    PHOS 2.6  --      CBC  Recent Labs  Lab 12/15/17  1150 12/15/17  0615   HGB 11.8* 12.2*   WBC 9.4  --    PLT 86*  --      COAGS  Recent Labs  Lab 12/15/17  1150   INR 0.96   PTT 25      Urinalysis  No lab results found.  Virology:  Hepatitis C Antibody   Date Value Ref Range Status   04/05/2017  NR Final    Nonreactive   Assay performance characteristics have not been established for newborns,   infants, and children       BK Virus Result   Date Value Ref Range Status   04/05/2017 BK Virus DNA Not Detected BKNEG copies/mL Final

## 2017-12-16 NOTE — PLAN OF CARE
"Problem: Patient Care Overview  Goal: Plan of Care/Patient Progress Review  Outcome: Improving  /89 (BP Location: Right arm)  Pulse 72  Temp 98.6  F (37  C) (Oral)  Resp 16  Ht 1.778 m (5' 10\")  Wt 61.5 kg (135 lb 9.3 oz)  SpO2 100%  BMI 19.45 kg/m2  0762-1950: Pt arrived from PACU at 1752 with spouse and mother-in-law at bedside. Transplanted Nephrectomy. L lower abdominal incision approximated with liquid bandage and open to air. Afebrile. Room air. Refused capnography. VSS with exception of hypertension at arrival with /91. Plan to give home doses of antihypertensives. Lisinopril available and given at 1839. Other scheduled meds resumed including Imuran, prednisone, prograf and Phoslo. Pt took Phoslo with dinner. Regular diet with good appetite. 100% Kenyan toast with butter and syrup, Omelet with green pepper, mushroom, onion, and gasca, cup of grapes& blueberry muffin saved for snack at bedtime. Ambulated with SBA in [facility] at 2000. Bowel maintenance program continued per home dose. Pain controlled with Dilaudid PCA 0.2 mg Q10 minutes; lockout 1.2 mg. R Forearm PIV with NS infusing 20 ml/hr. R Hand PIV SL'd. L hemodialysis fistula. Anuric. Continue to monitor and follow POC.       "

## 2017-12-16 NOTE — CONSULTS
Nephrology Initial Consult  December 16, 2017      Gilles Henning MRN:[id number] YOB: 1970  Date of Admission:12/15/2017  Primary care provider: Charlie Dubose  Requesting physician: Deedee Finley MD    ASSESSMENT AND RECOMMENDATIONS:   ESKD: due to IGA nephropathy on HD Tu,Th,Sa at [facility], via left AV fistula, 3hr run,  lb. Plan will order HD today  Volume and BP: euvolemic he is 3kg above dry weight, will order HD today with 3 l UF.   Electrolyte: K is 5.8 will order K 2 bath  Acid base: no issue   Anemia Hgb 11.8   BMD ca 8.7, phos 3.8   S/p transplant kidney nepherectomy     Recommendations were communicated to primary team via phone     Seen and discussed with Dr. Kwabena Barrientos MD   [phone]      REASON FOR CONSULT: dialysis     HISTORY OF PRESENT ILLNESS:  Gilles Henning is a 47 year old with ESKD related to iga nephropathy s/p transplant 2009 rejection of his kidney 2014 started on dialysis since then , over the last few months he started to have bleeding from his urethra workup shows that it is coming from his transplanted kidney degeneration, he was admitted for elective nephrectomy. Nephrology consulted for dialysis.   Pt asymptomatic after the procedure denies any sob no pain no uremic symptoms.      PAST MEDICAL HISTORY:  Reviewed with patient on 12/16/2017     Past Medical History:   Diagnosis Date     Anemia in chronic kidney disease      Chews tobacco      Chronic rejection of kidney transplant      ESRD needing dialysis (H)     ~07/01/2014     History of alcoholism (H)      History of depression      History of peritoneal dialysis     7 years     History of substance abuse      Hyperlipidemia      IgA nephropathy      Osteopenia      Peritonitis (H)     non-MRSA staph     Renal cell carcinoma (H)        Past Surgical History:   Procedure Laterality Date     HC DIALYSIS AVF OR AVG, CENTRAL INTERVENTION ONLY Left      LAPAROSCOPIC  INSERTION CATHETER PERITONEAL DIALYSIS Left      NEPHRECTOMY BILATERAL  2015     REMOVE CATHETER PERITONEAL       TRANSPLANT KIDNEY RECIPIENT  DONOR Left 2009        MEDICATIONS:  PTA Meds  Prior to Admission medications    Medication Sig Last Dose Taking? Auth Provider   Docusate Sodium (COLACE PO) Take 100 mg by mouth 2 times daily 2017 at 200 Yes Reported, Patient   AmLODIPine Besylate (NORVASC PO) Take 5 mg by mouth daily 2017 at 0800 Yes Reported, Patient   PROGRAF 1 MG PO CAPSULE Take 1 capsule (1 mg) by mouth 2 times daily 2017 at 2130 Yes Mario Vallejo MD   azaTHIOprine (IMURAN) 50 MG tablet Take 1 tablet (50 mg) by mouth daily 2017 at 0800 Yes Mario Vallejo MD   predniSONE (DELTASONE) 5 MG tablet Take 20 mg by mouth daily  2017 at 0800 Yes Mario Vallejo MD   LISINOPRIL PO Take 40 mg by mouth daily  2017 at 2130 Yes Reported, Patient   LISINOPRIL PO Take 5 mg by mouth daily 2017 at 2000 Yes Reported, Patient   VITAMIN D, CHOLECALCIFEROL, PO Take 2,000 Units by mouth daily 12/15/2017 at 0800 Yes Reported, Patient   OMEPRAZOLE PO Take 40 mg by mouth every morning 12/15/2017 at 0545 Yes Reported, Patient   CLONAZEPAM PO Take 1 mg by mouth 2 times daily as needed for anxiety 2017 at 2130 Yes Reported, Patient   TRAZODONE HCL PO Take 300 mg by mouth At Bedtime 2017 at 2130 Yes Reported, Patient   calcium acetate (PHOSLO) 667 MG CAPS capsule Take 667 mg by mouth 3 times daily (with meals) 2017 at 2000 Yes Reported, Patient   METOPROLOL TARTRATE PO Take 25 mg by mouth daily Unknown  Reported, Patient   sennosides (SENOKOT) 8.6 MG tablet Take 1 tablet by mouth daily as needed for constipation   Reported, Patient      Current Meds    azaTHIOprine  50 mg Oral Daily     metoprolol (LOPRESSOR) tablet 25 mg  25 mg Oral Daily     omeprazole (priLOSEC) CR capsule 40 mg  40 mg Oral QAM     predniSONE  20 mg Oral Daily     tacrolimus  1 mg Oral  "BID     traZODone (DESYREL) tablet 300 mg  300 mg Oral At Bedtime     cholecalciferol  2,000 Units Oral Daily     sodium chloride (PF)  3 mL Intravenous Q8H     senna-docusate  1-2 tablet Oral BID     calcium acetate  1,334 mg Oral TID w/meals     imiquimod  1 packet Topical Once per day on      lisinopril (PRINIVIL/ZESTRIL) tablet 40 mg  40 mg Oral QPM     amLODIPine (NORVASC) tablet 5 mg  5 mg Oral Daily     docusate sodium (COLACE) capsule 100 mg  100 mg Oral BID     Infusion Meds       ALLERGIES:    No Known Allergies    REVIEW OF SYSTEMS:  A comprehensive of systems was negative except as noted above.    SOCIAL HISTORY:   Social History     Social History     Marital status:      Spouse name: Merline     Number of children: 4     Years of education: 13     Occupational History     disabled      Social History Main Topics     Smoking status: Current Every Day Smoker     Types: Dip, chew, snus or snuff     Start date: 2011     Smokeless tobacco: Current User     Types: Chew      Comment: 2 days per can.     Alcohol use Yes      Comment: none now, did treatment at age 21, relapsed with divorce (a couple months)  then now  sober 3 years.      Drug use: Yes     Special: Marijuana      Comment: age 14 only.     Sexual activity: Yes     Partners: Female     Other Topics Concern     Not on file     Social History Narrative     Reviewed with patient    accompanies Gilles Henning in hospital room    FAMILY MEDICAL HISTORY:   History reviewed. No pertinent family history.  Reviewed with patient family hx of diabetes and MI     PHYSICAL EXAM:   Temp  Av.2  F (36.8  C)  Min: 97.4  F (36.3  C)  Max: 98.9  F (37.2  C)      Pulse  Av  Min: 64  Max: 72 Resp  Av.7  Min: 11  Max: 20  SpO2  Av.8 %  Min: 85 %  Max: 100 %       /88 (BP Location: Right arm)  Pulse 72  Temp 98.2  F (36.8  C) (Oral)  Resp 18  Ht 1.778 m (5' 10\")  Wt 63.3 kg (139 lb 9.6 oz)  SpO2 100%  BMI 20.03 kg/m2 "   Date 12/16/17 0700 - 12/17/17 0659   Shift [id number] 4998-2590 4315-8530 24 Hour Total   I  N  T  A  K  E   P.O. 720   720    Shift Total  (mL/kg) 720  (6.3)   720  (6.3)   O  U  T  P  U  T   Shift Total  (mL/kg)       Weight (kg) 114.26 114.26 114.26 114.26        Admit Weight: 61.5 kg (135 lb 9.3 oz)     GENERAL APPEARANCE: no distress,  awake  EYES: no scleral icterus, pupils equal  Endo: no goiter, no moon facies  Lymphatics: no cervical or supraclavicular LAD  Pulmonary: lungs clear to auscultation with equal breath sounds bilaterally, no clubbing  CV: regular rhythm, normal rate, no rub   - JVD not elevated    - Edema no  GI: soft, nontender, normal bowel sounds  MS: no evidence of inflammation in joints, no muscle tenderness  : no plascencia  SKIN: no rash, warm, dry, no cyanosis  NEURO: face symmetric, no asterixis     LABS:   CMP    Recent Labs  Lab 12/16/17  1454 12/16/17  1356 12/15/17  1150 12/15/17  0615    135 137  --    POTASSIUM 5.0 5.8* 4.8 4.5   CHLORIDE 100 96 95  --    CO2 26 25 31  --    ANIONGAP 10 14 11  --    * 155* 104*  --    BUN 60* 70* 52*  --    CR 7.53* 9.78* 6.95* 6.85*   GFRESTIMATED 8* 6* 8* 9*   GFRESTBLACK 9* 7* 10* 10*   SHIRAZ 8.7 8.3* 8.2*  --    MAG  --   --  2.7*  --    PHOS  --  3.8 2.6  --    ALBUMIN  --  3.1*  --   --      CBC    Recent Labs  Lab 12/15/17  1150 12/15/17  0615   HGB 11.8* 12.2*   WBC 9.4  --    RBC 3.61*  --    HCT 37.1*  --    *  --    MCH 32.7  --    MCHC 31.8  --    RDW 17.8*  --    PLT 86*  --      INR    Recent Labs  Lab 12/15/17  1150   INR 0.96   PTT 25     ABGNo lab results found in last 7 days.   URINE STUDIES  No lab results found.  No lab results found.  PTH  No lab results found.  IRON STUDIES  No lab results found.    IMAGING:  All imaging studies reviewed by me.     Mery Barrientos MD    I have seen and examined this patient with the resident.  This note reflects our joint assessment and plan.     Anastasiya Martinez MD

## 2017-12-16 NOTE — PLAN OF CARE
Problem: Patient Care Overview  Goal: Plan of Care/Patient Progress Review  Outcome: No Change  Afebrile, OVSS. Pain well managed, PCA discontinued and prn oxycodone available but pt declined. PIV SL'd. Anuric, went to HD at 1400. + BS and passing flatus, no BM as of yet. Eating well, good appetite, denies nausea. Ambulating in halls with SBA. Using IS with reminders. Likely will discharge tomorrow.

## 2017-12-17 VITALS
BODY MASS INDEX: 19.3 KG/M2 | OXYGEN SATURATION: 98 % | HEIGHT: 70 IN | DIASTOLIC BLOOD PRESSURE: 94 MMHG | RESPIRATION RATE: 16 BRPM | TEMPERATURE: 98.6 F | SYSTOLIC BLOOD PRESSURE: 156 MMHG | HEART RATE: 72 BPM | WEIGHT: 134.8 LBS

## 2017-12-17 LAB
ANION GAP SERPL CALCULATED.3IONS-SCNC: 10 MMOL/L (ref 3–14)
BASOPHILS # BLD AUTO: 0 10E9/L (ref 0–0.2)
BASOPHILS NFR BLD AUTO: 0 %
BUN SERPL-MCNC: 44 MG/DL (ref 7–30)
CALCIUM SERPL-MCNC: 9.1 MG/DL (ref 8.5–10.1)
CHLORIDE SERPL-SCNC: 102 MMOL/L (ref 94–109)
CO2 SERPL-SCNC: 25 MMOL/L (ref 20–32)
CREAT SERPL-MCNC: 6.7 MG/DL (ref 0.66–1.25)
DIFFERENTIAL METHOD BLD: ABNORMAL
EOSINOPHIL # BLD AUTO: 0.1 10E9/L (ref 0–0.7)
EOSINOPHIL NFR BLD AUTO: 0.6 %
ERYTHROCYTE [DISTWIDTH] IN BLOOD BY AUTOMATED COUNT: 18 % (ref 10–15)
GFR SERPL CREATININE-BSD FRML MDRD: 9 ML/MIN/1.7M2
GLUCOSE SERPL-MCNC: 112 MG/DL (ref 70–99)
HCT VFR BLD AUTO: 34.2 % (ref 40–53)
HGB BLD-MCNC: 11.1 G/DL (ref 13.3–17.7)
IMM GRANULOCYTES # BLD: 0.1 10E9/L (ref 0–0.4)
IMM GRANULOCYTES NFR BLD: 0.4 %
LYMPHOCYTES # BLD AUTO: 1.1 10E9/L (ref 0.8–5.3)
LYMPHOCYTES NFR BLD AUTO: 9.5 %
MAGNESIUM SERPL-MCNC: 2.7 MG/DL (ref 1.6–2.3)
MCH RBC QN AUTO: 32.6 PG (ref 26.5–33)
MCHC RBC AUTO-ENTMCNC: 32.5 G/DL (ref 31.5–36.5)
MCV RBC AUTO: 100 FL (ref 78–100)
MONOCYTES # BLD AUTO: 0.4 10E9/L (ref 0–1.3)
MONOCYTES NFR BLD AUTO: 3.3 %
NEUTROPHILS # BLD AUTO: 10.3 10E9/L (ref 1.6–8.3)
NEUTROPHILS NFR BLD AUTO: 86.2 %
NRBC # BLD AUTO: 0 10*3/UL
NRBC BLD AUTO-RTO: 0 /100
PHOSPHATE SERPL-MCNC: 3.1 MG/DL (ref 2.5–4.5)
PLATELET # BLD AUTO: 94 10E9/L (ref 150–450)
POTASSIUM SERPL-SCNC: 5.3 MMOL/L (ref 3.4–5.3)
RBC # BLD AUTO: 3.41 10E12/L (ref 4.4–5.9)
SODIUM SERPL-SCNC: 137 MMOL/L (ref 133–144)
WBC # BLD AUTO: 11.9 10E9/L (ref 4–11)

## 2017-12-17 PROCEDURE — 80048 BASIC METABOLIC PNL TOTAL CA: CPT | Performed by: SURGERY

## 2017-12-17 PROCEDURE — 36415 COLL VENOUS BLD VENIPUNCTURE: CPT | Performed by: SURGERY

## 2017-12-17 PROCEDURE — 85025 COMPLETE CBC W/AUTO DIFF WBC: CPT | Performed by: SURGERY

## 2017-12-17 PROCEDURE — A9270 NON-COVERED ITEM OR SERVICE: HCPCS | Mod: GY | Performed by: STUDENT IN AN ORGANIZED HEALTH CARE EDUCATION/TRAINING PROGRAM

## 2017-12-17 PROCEDURE — 84100 ASSAY OF PHOSPHORUS: CPT | Performed by: SURGERY

## 2017-12-17 PROCEDURE — 25000132 ZZH RX MED GY IP 250 OP 250 PS 637: Mod: GY | Performed by: STUDENT IN AN ORGANIZED HEALTH CARE EDUCATION/TRAINING PROGRAM

## 2017-12-17 PROCEDURE — 25000131 ZZH RX MED GY IP 250 OP 636 PS 637: Mod: GY | Performed by: STUDENT IN AN ORGANIZED HEALTH CARE EDUCATION/TRAINING PROGRAM

## 2017-12-17 PROCEDURE — 25000125 ZZHC RX 250: Performed by: STUDENT IN AN ORGANIZED HEALTH CARE EDUCATION/TRAINING PROGRAM

## 2017-12-17 PROCEDURE — 83735 ASSAY OF MAGNESIUM: CPT | Performed by: SURGERY

## 2017-12-17 RX ORDER — ACETAMINOPHEN 325 MG/1
650 TABLET ORAL EVERY 4 HOURS PRN
Qty: 100 TABLET | Refills: 0 | Status: SHIPPED | OUTPATIENT
Start: 2017-12-17 | End: 2018-04-04

## 2017-12-17 RX ORDER — OXYCODONE HYDROCHLORIDE 5 MG/1
5 TABLET ORAL EVERY 4 HOURS PRN
Qty: 14 TABLET | Refills: 0 | Status: SHIPPED | OUTPATIENT
Start: 2017-12-17 | End: 2018-03-09

## 2017-12-17 RX ORDER — TACROLIMUS 1 MG/1
1 CAPSULE, GELATIN COATED ORAL DAILY
Qty: 30 CAPSULE | Refills: 11 | Status: SHIPPED | OUTPATIENT
Start: 2017-12-17 | End: 2018-04-04

## 2017-12-17 RX ORDER — AMLODIPINE BESYLATE 5 MG/1
5 TABLET ORAL DAILY
Qty: 30 TABLET | Refills: 1 | Status: ON HOLD | OUTPATIENT
Start: 2017-12-17 | End: 2018-05-31

## 2017-12-17 RX ORDER — SENNOSIDES 8.6 MG
1 TABLET ORAL DAILY PRN
Qty: 120 EACH | Refills: 0 | Status: ON HOLD | OUTPATIENT
Start: 2017-12-17 | End: 2018-06-02

## 2017-12-17 RX ORDER — PREDNISONE 5 MG/1
5 TABLET ORAL DAILY
Qty: 30 TABLET | Refills: 2 | Status: SHIPPED | OUTPATIENT
Start: 2017-12-17 | End: 2018-05-14

## 2017-12-17 RX ORDER — METOPROLOL TARTRATE 25 MG/1
25 TABLET, FILM COATED ORAL DAILY
Qty: 30 TABLET | Refills: 1 | Status: ON HOLD | OUTPATIENT
Start: 2017-12-17 | End: 2018-05-31

## 2017-12-17 RX ADMIN — PREDNISONE 20 MG: 20 TABLET ORAL at 07:39

## 2017-12-17 RX ADMIN — TACROLIMUS 1 MG: 1 CAPSULE, GELATIN COATED ORAL at 07:41

## 2017-12-17 RX ADMIN — AMLODIPINE BESYLATE 5 MG: 5 TABLET ORAL at 07:41

## 2017-12-17 RX ADMIN — CALCIUM ACETATE 1334 MG: 667 CAPSULE ORAL at 07:39

## 2017-12-17 RX ADMIN — METOPROLOL TARTRATE 25 MG: 25 TABLET ORAL at 07:41

## 2017-12-17 RX ADMIN — AZATHIOPRINE 50 MG: 50 TABLET ORAL at 07:43

## 2017-12-17 RX ADMIN — VITAMIN D, TAB 1000IU (100/BT) 2000 UNITS: 25 TAB at 07:41

## 2017-12-17 RX ADMIN — DOCUSATE SODIUM 100 MG: 100 CAPSULE, LIQUID FILLED ORAL at 07:41

## 2017-12-17 RX ADMIN — SENNOSIDES AND DOCUSATE SODIUM 2 TABLET: 8.6; 5 TABLET ORAL at 07:41

## 2017-12-17 RX ADMIN — OXYCODONE HYDROCHLORIDE 5 MG: 5 TABLET ORAL at 07:08

## 2017-12-17 RX ADMIN — OMEPRAZOLE 40 MG: 20 CAPSULE, DELAYED RELEASE ORAL at 07:39

## 2017-12-17 ASSESSMENT — PAIN DESCRIPTION - DESCRIPTORS: DESCRIPTORS: TENDER

## 2017-12-17 NOTE — PLAN OF CARE
"Problem: Patient Care Overview  Goal: Plan of Care/Patient Progress Review  Outcome: Improving  /88 (BP Location: Right arm)  Pulse 72  Temp 98.2  F (36.8  C) (Oral)  Resp 18  Ht 1.778 m (5' 10\")  Wt 63.3 kg (139 lb 9.6 oz)  SpO2 100%  BMI 20.03 kg/m2  6744-4810:  A&Ox4. Afebrile. VSS with exception of uncontrolled hypertension. Administered lisinopril as scheduled. Pt independently using incentive spirometer. Dialysis run completed today. R arm fistula dressing intact with no bleeding noted. Upon return from dialysis pt ordered dinner of Arabic toast and omelet. Pt ambulated off floor to go to cafeteria and get apple crisp/pie. Good appetite on regular diet. Anuric. Reports constipation. Milk of Mag given PRN and scheduled bowel regime. Ambulates and repositions independently. Abdominal binder on when up. Left lower abdominal incision approximated and open to air. Incisional discomfort controlled with oxycodone. R lower forearm PIV SL'd. R hand PIV removed. Possible discharge tomorrow. Continue to monitor and follow POC.     Comments:     "

## 2017-12-17 NOTE — PLAN OF CARE
Problem: Patient Care Overview  Goal: Plan of Care/Patient Progress Review  Outcome: Adequate for Discharge Date Met: 12/17/17  Focus: Discharge  D/I: Afebrile, VSS. Pain well managed on prn oxycodone. Pt eating well, having BM's. Anuric, will resume home HD T/Th/Sat. Deemed appropriate for discharge. Paperwork reviewed and questions answered. Home dose imuran stopped, prednisone and tacrolimus doses reduced. Pt verbalized understanding of changes to these meds. PIV removed. All personal belongings collected from room. Home cream returned from med room. Pt opted to ambulate to pharmacy and front doors for private transportation.   A/P: F/U with nephrologist from [facility]. Will continue home HD routine.

## 2017-12-17 NOTE — DISCHARGE INSTRUCTIONS
Medication Instructions  -Some of your medications may have changed. Please take only prescribed and resumed medications.   Prednisone 5mg daily for 3 months  Tacrolimus 1mg daily for 1 year or until transplant  Stop taking imuran.     DIALYSIS  -Resume hemodialysis Tu,Th,Sa at [facility]    DIET  -Heart healthy dietary habits long term (low saturated/trans fat, low sodium). High protein diet x 8 weeks. Practice food safety precautions.    ACTIVITY  -No lifting, pushing, pulling greater than 10 lbs and no strenuous exercise for 6 weeks   -No driving while on narcotic analgesics (i.e. Percocet, oxycodone, Vicodin)  -No driving until you are able to fully twist to both sides or slam on brakes quickly and without any pain    WOUND   -Inspect your wounds daily for signs of infection (increased redness, drainage, pain)  -Keep your wound clean and dry  -You may shower, but do not soak in tub or pool    WHEN TO CONTACT YOUR  COORDINATOR:  Transplant  [phone]  Notify your coordinator if you have pain over your kidney, increased redness or drainage from your incision, fever greater than 100.5F, or decreased urine output.  Notify your coordinator immediately if you are ever unable to take your immunosuppressive medications for any reason.  If it is outside of office hours, please call the hospital switchboard at [phone] and ask to have the kidney transplant surgery fellow paged for urgent medical questions, or present to the emergency department.    FOLLOW-UP  -Remember to always bring an updated medication list to all appointments.       1.  You will need to follow-up with Dr. Vallejo in the Kidney Transplant Surgery clinic in 2-3 week(s). Please contact our clinic scheduler [phone] if you have not heard from our clinic in 3 business days after discharge to schedule a follow-up appointment.     2.  Follow up with your primary care provider in 1-2 weeks after discharge from the  hospital to review this hospitalization. (Patient to schedule)    Appointments on [place] and/or [facility] (with [facility] or [facility] provider or service). Call [phone] if you haven't heard regarding these appointments within 7 days of discharge.

## 2017-12-17 NOTE — PLAN OF CARE
"Problem: Patient Care Overview  Goal: Plan of Care/Patient Progress Review  Outcome: Improving  /89 (BP Location: Right arm)  Pulse 72  Temp 98.8  F (37.1  C) (Oral)  Resp 18  Ht 1.778 m (5' 10\")  Wt 63.3 kg (139 lb 9.6 oz)  SpO2 98%  BMI 20.03 kg/m2  2433-2495:  AVSS on room air. Pt slept comfortably throughout the night. Declined need for pain medication when offered. No acute events. Nephrectomy incision on L abdomen approximated with liquid bandage. R PIV SL'd. L dialysis fistula with dressing intact from dialysis run on 12/16. Morning lab draw to include CBC, BMP, Mg, & Phos. Pt likely to discharge today 12/17.         "

## 2017-12-17 NOTE — DISCHARGE SUMMARY
[facility] [place]    Discharge Summary  Transplant Surgery    Date of Admission:  12/15/2017  Date of Discharge:  12/17/2017  Discharging Provider: Maliha Taylor MD  Date of Service (when I saw the patient): 12/17/17    Discharge Diagnoses  12/15/2017  Degeneration of transplanted kidney     Procedure/Surgery Information   Procedure: Procedure(s):  Transplanted Nephrectomy - Wound Class: II-Clean Contaminated   Surgeon(s): Surgeon(s) and Role:     * Mario Vallejo MD - Primary     * Maliha Taylor MD - Assisting       Non-operative procedures None performed     History of Present Illness  From chart:  Gilles Henning is a 47 year old with 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 Nephrology consulted for dialysis.     Hospital Course   Gilles Henning was admitted on 12/15/2017. Tolerated above procedure without complication. Cardiopulmonary function remained stable throughout hospital stay. On the day of discharge, patient was tolerating regular diet, had return of bowel function, pain well controlled on oral pain medication. He is to continue hemodialysis Tu, Th, Sa at [place], [place] via his left [facility] fistula.     Transplant coordinator: Laura Benitez [phone]  Prednisone 5mg daily for 3 months  Tacrolimus 1mg daily for 1 year    Post-operative pain control: included Hydromorphone (dilaudid) IV and will be Oxycodone and Tylenol on discharge.     Antibiotics prescribed at discharge: None prescribed     Imaging study follow up needs:   -None performed    Discharge Disposition   Discharged to home   Condition at discharge: Stable    Pending Results   These results will be followed up by [facility]  Unresulted Labs Ordered in the Past 30 Days of this Admission     Date and Time Order Name Status Description    12/16/2017 1451 Hepatitis B surface antigen In process     12/16/2017 1451 Hepatitis B Surface Antibody In process     12/15/2017 0920 Surgical pathology exam In process         Final pathology results: Pending at time of discharge  Primary Care Physician   Charlie Dubose    General Appearance: NAD  Skin: wwp  Lungs: Non labored breathing on room air  Abdomen: Soft, non-distended, non-tender.    Incision: c/d/i   : plascencia is not present.    Extremities: edema: absent.    Neurologic: awake, alert and oriented. Tremor absent..      Consultations This Hospital Stay   NEPHROLOGY KIDNEY/PANCREAS TRANSPLANT ADULT IP CONSULT    Time Spent on this Encounter   I have spent greater than 30 minutes on this discharge.    Discharge Orders   Discharge Medications   Current Discharge Medication List      START taking these medications    Details   oxyCODONE IR (ROXICODONE) 5 MG tablet Take 1 tablet (5 mg) by mouth every 4 hours as needed for moderate to severe pain  Qty: 14 tablet, Refills: 0    Associated Diagnoses: S/p nephrectomy      acetaminophen (TYLENOL) 325 MG tablet Take 2 tablets (650 mg) by mouth every 4 hours as needed for mild pain  Qty: 100 tablet, Refills: 0    Associated Diagnoses: S/p nephrectomy         CONTINUE these medications which have CHANGED    Details   PROGRAF (BRAND) 1 MG CAPSULE Take 1 capsule (1 mg) by mouth daily  Qty: 30 capsule, Refills: 11    Associated Diagnoses: S/p nephrectomy      metoprolol (LOPRESSOR) 25 MG tablet Take 1 tablet (25 mg) by mouth daily  Qty: 30 tablet, Refills: 1    Associated Diagnoses: S/p nephrectomy      amLODIPine (NORVASC) 5 MG tablet Take 1 tablet (5 mg) by mouth daily  Qty: 30 tablet, Refills: 1    Associated Diagnoses: S/p nephrectomy      predniSONE (DELTASONE) 5 MG tablet Take 1 tablet (5 mg) by mouth daily  Qty: 30 tablet, Refills: 2     Associated Diagnoses: S/p nephrectomy      sennosides (SENOKOT) 8.6 MG tablet Take 1 tablet by mouth daily as needed for constipation  Qty: 120 each, Refills: 0    Associated Diagnoses: S/p nephrectomy      !! calcium acetate (PHOSLO) 667 MG CAPS capsule Take 2 capsules (1,334 mg) by mouth 3 times daily (with meals)  Qty: 180 capsule, Refills: 1    Associated Diagnoses: S/p nephrectomy       !! - Potential duplicate medications found. Please discuss with provider.      CONTINUE these medications which have NOT CHANGED    Details   Docusate Sodium (COLACE PO) Take 100 mg by mouth 2 times daily      LISINOPRIL PO Take 40 mg by mouth daily       VITAMIN D, CHOLECALCIFEROL, PO Take 2,000 Units by mouth daily      OMEPRAZOLE PO Take 40 mg by mouth every morning      CLONAZEPAM PO Take 1 mg by mouth 2 times daily as needed for anxiety      TRAZODONE HCL PO Take 300 mg by mouth At Bedtime      !! calcium acetate (PHOSLO) 667 MG CAPS capsule Take 667 mg by mouth 3 times daily (with meals)       !! - Potential duplicate medications found. Please discuss with provider.      STOP taking these medications       azaTHIOprine (IMURAN) 50 MG tablet Comments:   Reason for Stopping:                 Reason for your hospital stay   S/p nephrectomy  (primary encounter diagnosis)     [facility] Follow-up and recommended labs and tests   Medication Instructions  -Some of your medications may have changed. Please take only prescribed and resumed medications.   Prednisone 5mg daily for 3 months  Tacrolimus 1mg daily for 1 year or until transplant  Stop taking imuran.     DIALYSIS  -Resume hemodialysis Tu,Th,Sa at [facility]    DIET  -Heart healthy dietary habits long term (low saturated/trans fat, low sodium). High protein diet x 8 weeks. Practice food safety precautions.    ACTIVITY  -No lifting, pushing, pulling greater than 10 lbs and no strenuous exercise for 6 weeks   -No driving while on narcotic analgesics (i.e.  Percocet, oxycodone, Vicodin)  -No driving until you are able to fully twist to both sides or slam on brakes quickly and without any pain    WOUND   -Inspect your wounds daily for signs of infection (increased redness, drainage, pain)  -Keep your wound clean and dry  -You may shower, but do not soak in tub or pool    WHEN TO CONTACT YOUR  COORDINATOR:  Transplant  [phone]  Notify your coordinator if you have pain over your kidney, increased redness or drainage from your incision, fever greater than 100.5F, or decreased urine output.  Notify your coordinator immediately if you are ever unable to take your immunosuppressive medications for any reason.  If it is outside of office hours, please call the hospital switchboard at [phone] and ask to have the kidney transplant surgery fellow paged for urgent medical questions, or present to the emergency department.    FOLLOW-UP  -Remember to always bring an updated medication list to all appointments.       1.  You will need to follow-up with Dr. Vallejo in the Kidney Transplant Surgery clinic in 2-3 week(s). Please contact our clinic scheduler [phone] if you have not heard from our clinic in 3 business days after discharge to schedule a follow-up appointment.     2.  Follow up with your primary care provider in 1-2 weeks after discharge from the hospital to review this hospitalization. (Patient to schedule)    Appointments on [place] and/or [facility] (with [facility] or [facility] provider or service). Call [phone] if you haven't heard regarding these appointments within 7 days of discharge.     Full Code           Data   Most Recent 3 CBC's:  Recent Labs   Lab Test  12/17/17   0643  12/15/17   1150  12/15/17   0615  04/05/17   0713   WBC  11.9*  9.4   --   4.0   HGB  11.1*  11.8*  12.2*  11.3*   MCV  100  103*   --   104*   PLT  94*  86*   --   163      Most Recent 3 BMP's:  Recent Labs   Lab Test  12/17/17   0643  12/16/17   1454   12/16/17   1356   NA  137  136  135   POTASSIUM  5.3  5.0  5.8*   CHLORIDE  102  100  96   CO2  25  26  25   BUN  44*  60*  70*   CR  6.70*  7.53*  9.78*   ANIONGAP  10  10  14   SHIRAZ  9.1  8.7  8.3*   GLC  112*  148*  155*     Most Recent 2 LFT's:  Recent Labs   Lab Test  04/05/17   0713   AST  11   ALT  13   ALKPHOS  130   BILITOTAL  0.8     Most Recent INR's and Anticoagulation Dosing History:  Anticoagulation Dose History     Recent Dosing and Labs Latest Ref Rng & Units 4/5/2017 12/15/2017    INR 0.86 - 1.14 1.06 0.96        Most Recent 3 Troponin's:No lab results found.  Most Recent Cholesterol Panel:  Recent Labs   Lab Test  04/05/17   0713   CHOL  225*   LDL  151*   HDL  50   TRIG  119     Most Recent 6 Bacteria Isolates From Any Culture (See EPIC Reports for Culture Details):No lab results found.  Most Recent TSH, T4 and A1c Labs:No lab results found.  Results for orders placed or performed in visit on 04/05/17   XR Chest 2 Views [IMG36]    Narrative    XR CHEST 2 VW  4/5/2017 2:38 PM      HISTORY: End stage renal disease, Recurrent and persistent hematuria  with other morphologic changes, Kidney transplant failure, Awaiting  organ transplant status, Unspecified transplanted organ and tissue  rejection    COMPARISON: None available    FINDINGS:   PA and lateral views of the chest were obtained.   Support devices:None.   Cardiomediastinal silhouette is unremarkable. Pulmonary vasculature is  distinct. No focal airspace opacity. No pleural effusion. No  pneumothorax. The trachea is midline. No acute osseous abnormality.  Visualized upper abdomen unremarkable.       Impression    IMPRESSION:  No acute cardiopulmonary abnormality.       I have personally reviewed the examination and initial interpretation  and I agree with the findings.    MANUEL TABOR       I have reviewed history, examined patient and discussed plan with the fellow/resident/ISMAEL.    I concur with the findings in this note.    Time spent on  discharge activities: 45 minutes.

## 2017-12-18 ENCOUNTER — TELEPHONE (OUTPATIENT)
Dept: TRANSPLANT | Facility: CLINIC | Age: 47
End: 2017-12-18

## 2017-12-18 ENCOUNTER — CARE COORDINATION (OUTPATIENT)
Dept: CARE COORDINATION | Facility: CLINIC | Age: 47
End: 2017-12-18

## 2017-12-18 LAB
HBV SURFACE AB SERPL IA-ACNC: 6.5 M[IU]/ML
HBV SURFACE AG SERPL QL IA: NONREACTIVE

## 2017-12-18 NOTE — PROGRESS NOTES
Patient is a transplant patient and will be followed by the transplant team for follow up          Procedure: Procedure(s):  Transplanted Nephrectomy

## 2017-12-23 NOTE — OP NOTE
Transplant Surgery  Operative Note    Preop Dx:  ESRD. Acute on Chronic rejection of kidney transplant  Postop Dx: same  Procedure: Transplant Nephrectomy  Surgeon: Mario Vallejo M.D., M.S.  ASSISTANT:  Claudia fellow. [employer] student. There was no qualified general surgery resident available to assist during this procedure.   Anesthesia: General  EBL: 25 ml  Fluids: see anesthesia record  UO: none  Drains: no drain  Specimen: failed kidney transplant.  Complications: None  Findings:  Smallish ischemic kidney  Indication: The patient has ESRD after failed kidney transplant, recently with hematuria and acute on chronic rejection, temporized with pulse steroids.  After discussing the risks and benefits of surgery and potential complications, the patient provided informed consent.     DETAILS OF PROCEDURE:  The patient was brought to the operating room, placed in a supine position.  Perioperative prophylactic IV antibiotics were given.  Anesthesia was adminisitered. The abdomen was prepped and draped in the usual sterile fashion.  Time out was performed.    An incision over the prior left iliac fossa scar was made and carried down thru the subcutaneous tissues and fascia.  The kidney was identified.  It was densely adherent to the peritoneum and surrounding tissues.  The transplant ureter was divided and ligated.  After it was clear we could not do a selective vascular dissection, we proceeded to perform an intracapsular transplant nephrectomy.  The hilum was controlled with 2 clamps and the hilar tissue divided sharply, the kidney being passed off as specimen.  4-0 Prolene was used to oversew the hilar vessels with good hemostasis.  Valsalva was done and there was no bleeding. The wound was irrigated.  There was no violation of the peritoneum.  The fascia was closed and the skin reapproximated and dressing applied.    All needle, sponge, and instrument counts were accurate.  The patient tolerated the procedure well  without apparent complications and was trasfered to the PACU in good condition.  Faculty was present for critical portions of the procedure.

## 2017-12-26 LAB — COPATH REPORT: NORMAL

## 2017-12-27 ENCOUNTER — COMMUNICATION - GICH (OUTPATIENT)
Dept: INTERNAL MEDICINE | Facility: OTHER | Age: 47
End: 2017-12-27

## 2017-12-27 NOTE — PROGRESS NOTES
Patient Information     Patient Name MRN Sex Gilles Munoz III [id number] Male 1970      Progress Notes by Charlie Dubose MD at 10/13/2017  3:00 PM     Author:  Charlie Dubose MD Service:  (none) Author Type:  Physician     Filed:  10/13/2017  4:07 PM Encounter Date:  10/13/2017 Status:  Signed     :  Charlie Dubose MD (Physician)            Subjective  Nursing Notes:   Moon Cardona  10/13/2017  3:09 PM  Signed  Patient presents to clinic for ongoing back pain and for increased issues with night terrors and night sweats.  Moon Cardona LPN ....................  10/13/2017   2:55 PM      Gilles Henning III is a 47 y.o. male who presents for multiple concerns. He continues to have discomfort in the back but it is much better. He is doing daily physical exercise is that he was taught by the physical therapist. He's been able to improve his core muscle strength. He's been riding his bicycle for longer distances. Last year he can only ride his bicycle for 5 blocks, recently he went on a 20 mile bike ride. He's been doing some hiking as well. He's attended some yoga classes. His favorite exercise included some stretches from the therapist and planking. His pain tends to be worse in the evening. He's been able to wean down on the hydrocodone-acetaminophen. He says he only needs fewer tablets now. He's also been having some night sweats and night terrors. He saw mental health medication manager locally for PTSD, bipolar disorder. He says some medication options were tried but didn't work for him. He also needs a letter for the transplant center. There was some concern that he may not be compliant with his office visits. We reviewed the note together today via care everywhere. He tells me he has a PET scan upcoming in the near future. He's been able to gain some weight, up 5 pounds. Eating healthier.    Problem List/PMH: reviewed in EMR, and made relevant updates today.  Medications:  "reviewed in EMR, and made relevant updates today.  Allergies: reviewed in EMR, and made relevant updates today.    Social Hx:  Social History       Substance Use Topics         Smoking status:   Never Smoker     Smokeless tobacco:   Current User     Types:  Chew      Comment: 1 tin lasts 2 days      Alcohol use   No     Social History Narrative    Has moved into a new home. Lives with his girlfriend. Has worked at [employer] in the past. Lots of financial concerns/pressures. Going back to [employer] to study psychology.      I reviewed social history and made relevant updates today.    Family Hx:   Family History       Problem   Relation Age of Onset     Other  Mother      parkinson disease       Diabetes  Father      Heart Disease  Father        Objective  Vitals: reviewed in EMR.  /84  Pulse 66  Ht 1.778 m (5' 10\")  Wt 63.8 kg (140 lb 9.6 oz)  BMI 20.17 kg/m2    Gen: Pleasant male, NAD.  HEENT: MMM  Neck: Supple  Pulm: Breathing easily  Neuro: Grossly intact  Skin: No concerning lesions.  Psychiatric: Normal affect and insight. Does not appear anxious or depressed.      PET/CT report dated January 11, 2017 was personally reviewed with the patient today.      Component      Latest Ref Rng & Units 9/6/2017   WHITE BLOOD COUNT              4.5 - 11.0 thou/cu mm 3.9 (L)   RED BLOOD COUNT                4.30 - 5.90 mil/cu mm 3.74 (L)   HEMOGLOBIN                     13.5 - 17.5 g/dL 11.4 (L)   HEMATOCRIT                     37.0 - 53.0 % 35.1 (L)   MCV                            80 - 100 fL 94   MCH                            26.0 - 34.0 pg 30.5   MCHC                           32.0 - 36.0 g/dL 32.5   RDW                            11.5 - 15.5 % 16.6 (H)   PLATELET COUNT                 140 - 440 thou/cu mm 133 (L)   MPV                            6.5 - 11.0 fL 9.2   NEUTROPHILS                    42.0 - 72.0 % 72.4 (H)   LYMPHOCYTES                    20.0 - 44.0 % 9.8 (L)   MONOCYTES                      <12.0 % 12.1 (H) "   EOSINOPHILS                    <8.0 % 4.4   BASOPHILS                      <3.0 % 1.0   IMMATURE GRANULOCYTES(METAS,MYELOS,PROS)      % 0.3   ABSOLUTE NEUTROPHILS           1.7 - 7.0 thou/cu mm 2.8   ABSOLUTE LYMPHOCYTES           0.9 - 2.9 thou/cu mm 0.4 (L)   ABSOLUTE MONOCYTES             <0.9 thou/cu mm 0.5   ABSOLUTE EOSINOPHILS           <0.5 thou/cu mm 0.2   ABSOLUTE BASOPHILS             <0.3 thou/cu mm 0.0   ABSOLUTE IMMATURE GRANULOCYTES(METAS,MYELOS,PROS)      <=0.3 thou/cu mm 0.0   SODIUM      133 - 143 mmol/L 137   POTASSIUM      3.5 - 5.1 mmol/L 3.7   CHLORIDE      98 - 107 mmol/L 95 (L)   CO2,TOTAL      21 - 31 mmol/L 28   ANION GAP      5 - 18                 14   GLUCOSE      70 - 105 mg/dL 107 (H)   CALCIUM      8.6 - 10.3 mg/dL 10.5 (H)   BUN      7 - 25 mg/dL 34 (H)   CREATININE      0.70 - 1.30 mg/dL 8.05 (HH)   BUN/CREAT RATIO                 4   GFR if African American      >60 ml/min/1.73m2 9 (L)   GFR if not African American      >60 ml/min/1.73m2 7 (L)   ALBUMIN      3.5 - 5.7 g/dL 4.5   PROTEIN,TOTAL      6.4 - 8.9 g/dL 8.3   GLOBULIN                       2.0 - 3.7 g/dL 3.8 (H)   A/G RATIO      1.0 - 2.0                 1.2   BILIRUBIN,TOTAL      0.3 - 1.0 mg/dL 0.9   ALK PHOSPHATASE      34 - 104 IU/L 62   ALT (SGPT)      7 - 52 IU/L 5 (L)   AST (SGOT)      13 - 39 IU/L 18   LD,TOTAL      140 - 271 IU/L 199       Assessment    ICD-10-CM    1. Lumbar disc disease with radiculopathy M51.16 traMADol (ULTRAM) 50 mg tablet   2. Lumbar foraminal stenosis M99.83 traMADol (ULTRAM) 50 mg tablet   3. CKD (chronic kidney disease) stage V requiring chronic dialysis (HC) N18.6      Z99.2    4. Chronic rejection of kidney transplant, stage 1 T86.11    5. Chronic insomnia F51.04    6. PTSD (post-traumatic stress disorder) F43.10    7. Bipolar affective disorder, remission status unspecified (HC) F31.9    8. Night terrors F51.4        A letter was written today with regards to his compliance, see  letter section. With regards to his low back pain I applauded his ability to work on increasing physical fitness, physical therapy exercises. We discussed options and decided to reduce from hydrocodone-acetaminophen down to tramadol. With this reduction a schedule to narcotic contract is no longer required so this was amicably discontinued today. We could consider reinstating in the future if required. 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 Fortunately he has an upcoming PET/CT. Blood count is reduced but stable. With regards to his night terror and other mental health conditions I will defer to his mental health medication management are. I would like to see some office notes so I requested he sign a release of information.    Plan   -- Expected clinical course discussed   -- Medications and their side effects discussed  Patient Instructions    -- Follow-up with your mental health med manager for night terrors   -- Request records to be sent to Charlie Dubose MD    -- Stop hydrocodone/apap   -- This completes the pain contract, could reinstate in the future if needed   -- Start tramadol as needed for low back pain   -- Continue PT exercises, daily exercise, yoga   -- If pain persists/worsens, would consider return to PT      Signed, Charlie Dubose MD  Internal Medicine & Pediatrics    Total time 30 minutes, over half spent counseling and coordinating care regarding desire for kidney transplant, back pain.

## 2017-12-27 NOTE — PROGRESS NOTES
Patient Information     Patient Name MRN Sex Gilles Munoz III [id number] Male 1970      Progress Notes by Nicole Brink NP at 2017 10:00 AM     Author:  Nicole Brink NP Service:  (none) Author Type:  PHYS- Nurse Practitioner     Filed:  9/15/2017 11:50 AM Encounter Date:  2017 Status:  Signed     :  Nicole Brink NP (PHYS- Nurse Practitioner)            SUBJECTIVE:    Gilles Henning III is a 47 y.o. male who presents for follow up of renal cell carcinoma    HPI  Patient presents to the clinic today for followup of renal cell carcinoma, anemia and history of IgA nephropathy. Patient was seen at the request of Dr. Charlie Dubose on 2016  with multiple medical problems, primarily end-stage renal disease with a history of renal cell carcinoma. We were asked to evaluate concerning anemia and ongoing management of his renal cell carcinoma. Apparently, he originally had developed end-stage renal disease felt to be secondary to IgA nephropathy. He was treated with dialysis under the direction of Dr. Tobin, and then subsequently underwent transplant and then underwent chronic transplant rejection. Apparently, his left kidney was noted to have developed renal cell carcinoma. The patient subsequently underwent bilateral nephrectomy performed on 2015. 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 Right kidney was benign with end-stage renal disease. He underwent procedure at [facility] on 2015. Since then, he has been treated for end-stage renal disease by Dr. Tobin with chronic hemodialysis. Most recently, he was noted to be anemic with a hemoglobin of 8.4. Dr. Tobin reinstituted IV iron and Epogen replacement and his hemoglobin improved. He was interested in proceeding with renal transplant at the [facility]. According to patient, the transplant team wanted to rule  out any evidence of malignancy before proceeding with transplant. When we saw the patient we felt that his hemoglobin improved with iron and Epogen and likely he had anemia of renal failure. In terms of renal cell carcinoma, we ordered a PET scan. This was done 01/12/2017. The findings were no evidence of recurrent or metastatic disease, no evidence malignancy. We ordered an SPEP; this was not done. He did have a positive WIN and had seen a rheumatologist in the past. Otherwise, the plan was to continue IV iron and Epogen as per Dr. Tobin. We felt that he was cancer-free and he would be a candidate for renal cell transplant. He did see the transplant unit at the [facility], and most recently was accepted into their transplant program; it was reviewed by the multidisciplinary committee on 04/19/2017 and the committee approved Mr. Henning for kidney transplant. They recommended that he have a six-month compliance contract where he would be monitored on an every six-month basis and have regular physician appointments and be medically compliant. They also recommended that he see a cardiologist, Dr. Cordero , on June 12 and also attend scheduled appointments to see a dermatologist as well as a dental checkup and to taper off his prednisone. He continues on hemodialysis with Dr. Tobin. He states he sees her every month. He states he is no longer getting IV iron but he does receive aranesp injections at dialysis. Patient has been having large amounts of blood from his urethra. He was seen by Dr Kelly and a cystoscopy was performed which was negative. Patient states Dr Kelly felt the bleeding was coming from his kidneys and he will follow up with Dr Tobin for this. He continues to follow up at the [facility] and is on the transplant list. Patient was recently hospitalized for pneumonia. He continues on his antibiotics for this but states he is feeling much better.     REVIEW OF SYSTEMS:  ROS   Constitutional: denies fever, chills,  "night sweats  Heent: denies vision or hearing changes  Respiratory: denies cough or shortness of breath  Cardiovascular: denies chest pain, palpitations. Does report occasional swelling of the legs  Gastrointestinal: denies abdominal pain, diarrhea, constipation or blood in stool  Genitourinary: reports bladder spasms and increase in blood from urethra. Patient will be following up with Dr Tobin  Musculoskeletal: reports left knee pain. No new joint or bone pain  Neurologic: denies headaches, dizziness, numbness or tingling      OBJECTIVE:  /84  Pulse 60  Temp 97.4  F (36.3  C) (Tympanic)  Ht 1.778 m (5' 10\")  Wt 61.2 kg (135 lb)  SpO2 99%  BMI 19.37 kg/m2    EXAM:   Physical Exam   GENERAL: 47 year old white male in no acute distress.    HEENT: Atraumatic, normocephalic. Oropharynx is nonerythematous.   NECK: Supple. No adenopathy  LUNGS: Clear to auscultation bilaterally. Normal respiratory effort  HEART: Regular rate and rhythm. S1 and S2 normal.  ABDOMEN: Soft. Normoactive bowel sounds. No masses or tenderness.   LYMPHATICS: No cervical, supraclavicular, axillary or inguinal nodes.   EXTREMITIES: Trace ankle edema. Peripheral pulses palapble  NEURO: Nonfocal.      Laboratories include a CBC with a white count of 3.9, hemoglobin and hematocrit 11.4 and 35.1, platelet count 133,000. . BUN is 34, creatinine 8.05.        ASSESSMENT/PLAN:    ICD-10-CM    1. Renal cell carcinoma, unspecified laterality C64.9 PET CT SKULL BASE TO MID THIGH SUBSEQUENT TREAT      CBC AND DIFFERENTIAL      COMP METABOLIC PANEL      LD,TOTAL      BETA 2 MICROGLOBULIN,SER      PROTEIN ELP,SERUM   2. Anemia of chronic disease D63.8 PET CT SKULL BASE TO MID THIGH SUBSEQUENT TREAT      CBC AND DIFFERENTIAL      COMP METABOLIC PANEL      LD,TOTAL      BETA 2 MICROGLOBULIN,SER      PROTEIN ELP,SERUM        Plan:    1. 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 Right kidney revealed end-stage renal disease. Patient is considered a potential candidate for renal transplant. PET scan was negative. MRI of the thoracic and lumbar spine were negative for metastatic disease. The patient had a positive WIN. We did obtain a double-stranded DNA, which came back negative. The patient is otherwise cancer-free and has been accepted by the renal transplant unit at the [facility] for transplant. Patient will follow up with Dr Tobin in regards to continued bleeding from urethra . Dr Campbell reviewed labs from this visit, his plan is to continue to monitor patient with  CBC, CMP, LDH, SPEP and beta-2 microglobulin and myeloma lab work. Patient will be due for another PET scan in November.  2. Anemia of renal failure:  Patient states he is no longer getting IV iron per Dr Tobin orders. The patient is to continue his  Aranesp injections under the direction of Dr. Tobin. He will continue with hemodialysis      Dr Campbell will see the patient in three months with lab and PET scan     30 minutes were spent with the patient with greater than half the time spent in counseling patient regarding disease process, interpretation of lab work, discussing plan of care and coordination of care.

## 2017-12-28 ENCOUNTER — OFFICE VISIT - GICH (OUTPATIENT)
Dept: PEDIATRICS | Facility: OTHER | Age: 47
End: 2017-12-28

## 2017-12-28 ENCOUNTER — HISTORY (OUTPATIENT)
Dept: PEDIATRICS | Facility: OTHER | Age: 47
End: 2017-12-28

## 2017-12-28 DIAGNOSIS — Z09 ENCOUNTER FOR FOLLOW-UP EXAMINATION AFTER COMPLETED TREATMENT FOR CONDITIONS OTHER THAN MALIGNANT NEOPLASM: ICD-10-CM

## 2017-12-28 DIAGNOSIS — Z94.0 HISTORY OF KIDNEY TRANSPLANT: ICD-10-CM

## 2017-12-28 DIAGNOSIS — Z90.5 ACQUIRED ABSENCE OF KIDNEY: ICD-10-CM

## 2017-12-28 DIAGNOSIS — N18.6 END STAGE RENAL DISEASE (H): ICD-10-CM

## 2017-12-28 DIAGNOSIS — N05.9 NEPHRITIC SYNDROME WITH MORPHOLOGIC CHANGE: ICD-10-CM

## 2017-12-28 DIAGNOSIS — T86.11 KIDNEY TRANSPLANT REJECTION: ICD-10-CM

## 2017-12-28 DIAGNOSIS — Z99.2 DEPENDENCE ON RENAL DIALYSIS (H): ICD-10-CM

## 2017-12-28 ASSESSMENT — PATIENT HEALTH QUESTIONNAIRE - PHQ9: SUM OF ALL RESPONSES TO PHQ QUESTIONS 1-9: 7

## 2017-12-28 NOTE — PROGRESS NOTES
Patient Information     Patient Name MRN Sex Gilles Munoz III [id number] Male 1970      Progress Notes by Trevor Kelly MD at 2017 11:45 AM     Author:  Trevor Kelly MD Service:  (none) Author Type:  Physician     Filed:  2017 12:27 PM Encounter Date:  2017 Status:  Signed     :  Trevor Kelly MD (Physician)            Preprocedure diagnosis  Hematuria    Postprocedure diagnosis  Hematuria    Procedure  Flexible Cystourethroscopy    Surgeon  Trevor Kelly MD    Anesthesia  2% lidocaine jelly intraurethrally    Complications  None    Indications  47 y.o. male undergoing a flexible cystoscopy for the above mentioned indications.    Findings  Cystoscopic findings included a normal anterior urethra.    There was not a prominent median lobe.    The lateral lobes were not obstructive in appearance.  The transplant ureteral anastomosis was widely patent and obvious at the left lateral portion of the bladder.  The bladder appeared to be of lower capacity.    There were no tumors, stones or foreign bodies.      Procedure  The patient was placed in supine position and prepped and draped in sterile fashion with lidocaine jelly per urethra for anesthesia.    I passed a lubricated 14F flexible cystoscope through the penile urethra and into the bladder and the bladder was completely visualized.  The cystoscope was retroflexed and the bladder neck and prostate visualized.    The cystoscope was slowly withdrawn while visualizing the urethra and the procedure terminated.    The patient tolerated the procedure well.      Plan  reassurance

## 2017-12-28 NOTE — TELEPHONE ENCOUNTER
Patient Information     Patient Name MRN Sex Gilles Munoz III [id number] Male 1970      Telephone Encounter by Jason Reis at 2017  9:22 AM     Author:  Jason Reis Service:  (none) Author Type:  (none)     Filed:  2017  9:26 AM Encounter Date:  2017 Status:  Signed     :  Jason Reis            He states that he has a pain contract & was given Dilaudid yesterday in the ED. Wanted Charlie Dubose MD to be aware.  Jason Reis LPN ....................  2017   9:25 AM

## 2017-12-28 NOTE — TELEPHONE ENCOUNTER
Patient Information     Patient Name MRN Sex Gilles Munoz III [id number] Male 1970      Telephone Encounter by Demi Mathis RN at 2017  2:29 PM     Author:  Demi Mathis RN Service:  (none) Author Type:  NURS- Registered Nurse     Filed:  2017  2:53 PM Encounter Date:  2017 Status:  Signed     :  Demi Mathis RN (NURS- Registered Nurse)            Received refill request from [facility] for Fluoxetine.  In reviewing chart Fluoxetine not noted on medication list or in history.  Called patient and patient states he was taken off of Fluoxetine per psychiatrist does not need refilled.  Pharmacy notified.  DEMI MATHIS RN ....................  2017   2:53 PM

## 2017-12-28 NOTE — TELEPHONE ENCOUNTER
Patient Information     Patient Name MRN Sex Gilles Munoz III [id number] Male 1970      Telephone Encounter by Oly sAtudillo at 2017  1:38 PM     Author:  Oly Astudillo Service:  (none) Author Type:  (none)     Filed:  2017  1:39 PM Encounter Date:  2017 Status:  Signed     :  Oly Astudillo            Noted.  Routed to Charlie Dubose MD as an FYI upon his return tomorrow.  Oly Astudillo CMA (AAMA)................ 2017 1:38 PM

## 2017-12-28 NOTE — PATIENT INSTRUCTIONS
Patient Information     Patient Name MRGilles Madrigal III [id number] Male 1970      Patient Instructions by Charlie Dubose MD at 2017  3:15 PM     Author:  Charlie Dubose MD  Service:  (none) Author Type:  Physician     Filed:  2017  3:57 PM  Encounter Date:  2017 Status:  Signed     :  Charlie Dubose MD (Physician)        Related Notes: Original Note by Charlie Dubose MD (Physician) filed at 2017  3:43 PM             -- Referral to [facility] for pelvic floor therapy   -- If we do additional antibiotics, would refer to Dr. Kelly for pelvic floor pain/prostatitis   -- Complete antibiotic course   -- Eat yogurt 1-2 times per day while on antibiotics (and for a few weeks after) to reduce the chances of diarrhea      Narcotic and Controlled Substance information    Today:   -- Goals: stable doses   -- Controlled substance agreement Up to date   -- [place] Prescription Monitoring Program was reviewed today.   -- Last urine drug screen: unable to leave urine due to CKDV   -- Refills for the following medications were provided today:  Drug Quantity/Month Months Rx today   Hydrocodone 5/325 90 3      -- Total Daily MME (morphine equivalents): 15    -- Follow-up in 3 months (approximately 17) for office visit dedicated to Schedule II medications.      Facts:    Every day, 44 people die in the [place] from overdose of prescription painkillers. (1)    Prescription drug overdose kills more people than car crashes each year. (2, 3)    Deaths from prescription painkillers among women is up 400%, and up 265% for men. (4)    Long-term use of prescription painkillers increases your risk for heart attack, broken bones, impotence (5)    By signing a medication contract, you accept these risks and side effects.    Be aware that the use of such medicine has certain risks associated with it, including, but not limited to:  Sleepiness or drowsiness, constipation, nausea, itching,  vomiting, lightheadedness, dizziness, confusion, allergic reaction, slowing of breathing rate, slowing of reflexes or reaction time, kidney or liver disease,  sexual dysfunction, physical dependence, tolerance to analgesia, addiction, withdrawal and the possibility that the medicine will not provide complete relief.  Any narcotic usage is associate with a significantly increased risk of falls and other unintended injuries, respiratory depression and increased risk for death.    I recommend against operating motor vehicles or heavy machinery when using this medication.    This medication will be strictly monitored and all of my medications should be filled at the same pharmacy.  (Should the need arise to change pharmacies our office must be informed).    It is your responsibility to share that you are on a narcotic contract with your other health care providers, including your dentist.    If you come into clinic for a dose adjustment, always bring your remaining pills and remaining hard copies of prescriptions.    References  1. CDC. http://www.cdc.gov/drugoverdose/epidemic/public.html  2. CDC. http://www.cdc.gov/drugoverdose/data/overdose.html  3. [employer]. http://strib.mn/0m5jhaf  4. CDC. http://www.cdc.gov/vitalsigns/PrescriptionPainkillerOverdoses/index.html  5. Annals of Internal Medicine   Vol. 162 No. 4   17 February 2015

## 2017-12-28 NOTE — TELEPHONE ENCOUNTER
Patient Information     Patient Name MRN Sex Gilles Munoz III [id number] Male 1970      Telephone Encounter by Oly Astudillo at 2017 11:50 AM     Author:  Oly Astudillo Service:  (none) Author Type:  (none)     Filed:  2017 11:53 AM Encounter Date:  2017 Status:  Signed     :  Oly Astudillo            Phone call to supervisorRubia with this critical lab result:  Creatinine 8.05  PCP is out of the office today, so routing to doctor of the day, Camron León MD for review.  Oly Astudillo CMA (AAMA)................ 2017 11:52 AM

## 2017-12-28 NOTE — TELEPHONE ENCOUNTER
Patient Information     Patient Name MRN Sex Gilles Munoz III [id number] Male 1970      Telephone Encounter by Moon Cardona at 2017  1:14 PM     Author:  Moon Cardona Service:  (none) Author Type:  (none)     Filed:  2017  1:15 PM Encounter Date:  2017 Status:  Signed     :  Moon Cardona            Referral placed per Charlie Dubose MD.  Moon Cardona LPN ....................  2017   1:15 PM

## 2017-12-28 NOTE — PROGRESS NOTES
Patient Information     Patient Name MRN Sex Gilles Munoz III [id number] Male 1970      Progress Notes by Charlie Dubose MD at 2017  3:15 PM     Author:  Charlie Dubose MD Service:  (none) Author Type:  Physician     Filed:  2017  6:16 PM Encounter Date:  2017 Status:  Signed     :  Charlie Dubose MD (Physician)            Subjective  Gilles Henning III is a 47 y.o. male who presents for Hospital Discharge Follow-up. He also needs refills of his pain medications. He was recently seen at [facility] in the emergency department 2017, diagnosed with hypertensive emergency and transferred to [facility] in [place]. There they adjusted his antihypertensive regimen to now include amlodipine, lisinopril, metoprolol. His blood pressure is a little higher today but this is because it's long stretch between hemodialysis runs. He'll be going to dialysis tomorrow. While he was in the hospital he vomited on a few occasions but has not vomited since leaving. He continues to have some nausea. No diarrhea. No cough. No fever. No sinus pressure or congestion. He was seen by the dermatologist who diagnosed his lesions as an HPV rash. The dermatologist froze a few lesions and is trying to get a specialty cream covered. He's been cleared for his kidney transplant however since he has multiple antibodies he has been told it may be longer until he receives 1. He follows with Dr. Samantha Tobin at [facility] nephrology as well as down at the [facility] ER transplant clinic. He continues to have low back pain. He feels like his pain was well controlled when he was more physically active. However due to his recent ER visits and hospitalization his back has started to worsen and his physical state has deconditioned. He's been battling symptoms of acute prostatitis and was started on antibiotics then subsequently switched to doxycycline while in the  hospital to complete a course of that. Started on June 8, 2017 when he saw Dr. Bib Pagan. He was told that he had a boggy prostate. Looking back he's had symptoms like this last October as well when he saw Dr. Kelly. He was referred to pelvic floor physical therapy which was never completed. He has done lots of physical therapy for his low back which has been very helpful to him. His hospital discharge summary is not yet available for my review.    Allergies: reviewed in EMR  Medications: reviewed in EMR  Problem List/PMH: reviewed in EMR    Social Hx:  Social History       Substance Use Topics         Smoking status:   Never Smoker     Smokeless tobacco:   Current User     Types:  Chew      Comment: 1 tin lasts 2 days      Alcohol use   No     Social History Narrative    Has moved into a new home. Lives with his girlfriend. Has worked at [employer] in the past. Lots of financial concerns/pressures. Going back to [employer] to study psychology.      I reviewed social history and made relevant updates today.    Family Hx:   Family History       Problem   Relation Age of Onset     Other  Mother      parkinson disease       Diabetes  Father      Heart Disease  Father        Objective  Vitals: reviewed in EMR.  /88  Pulse 68  Temp 97.3  F (36.3  C) (Tympanic)   Wt 65.3 kg (144 lb)  BMI 20.66 kg/m2    Gen: Pleasant male, NAD.  HEENT: MMM, no OP erythema.   Neck: Supple  CV: RRR no m/r/g.   Pulm: CTAB no w/r/r  GI: Soft, NT, ND. No HSM. No masses. Bowel sounds present.  Neuro: Grossly intact  Msk: No lower extremity edema.  Skin: No concerning lesions.  Psychiatric: Normal affect and insight. Does not appear anxious or depressed.    Results for orders placed or performed during the hospital encounter of 06/19/17      COMP METABOLIC PANEL      Result  Value Ref Range    SODIUM 139 133 - 143 mmol/L    POTASSIUM 4.6 3.5 - 5.1 mmol/L    CHLORIDE 96 (L) 98 - 107 mmol/L    CO2,TOTAL 26 21 - 31 mmol/L    ANION GAP 17 5 - 18                     GLUCOSE 131 (H) 70 - 105 mg/dL    CALCIUM 10.0 8.6 - 10.3 mg/dL    BUN 57 (H) 7 - 25 mg/dL    CREATININE 9.90 (HH) 0.70 - 1.30 mg/dL    BUN/CREAT RATIO           6                    GFR if African American 7 (L) >60 ml/min/1.73m2    GFR if not African American 6 (L) >60 ml/min/1.73m2    ALBUMIN 4.3 3.5 - 5.7 g/dL    PROTEIN,TOTAL 7.5 6.4 - 8.9 g/dL    GLOBULIN                  3.2 2.0 - 3.7 g/dL    A/G RATIO 1.3 1.0 - 2.0                    BILIRUBIN,TOTAL 0.6 0.3 - 1.0 mg/dL    ALK PHOSPHATASE 75 34 - 104 IU/L    ALT (SGPT) 9 7 - 52 IU/L    AST (SGOT) 15 13 - 39 IU/L   CBC WITH AUTO DIFFERENTIAL      Result  Value Ref Range    WHITE BLOOD COUNT         5.0 4.5 - 11.0 thou/cu mm    RED BLOOD COUNT           3.82 (L) 4.30 - 5.90 mil/cu mm    HEMOGLOBIN                12.7 (L) 13.5 - 17.5 g/dL    HEMATOCRIT                37.3 37.0 - 53.0 %    MCV                       98 80 - 100 fL    MCH                       33.2 26.0 - 34.0 pg    MCHC                      34.0 32.0 - 36.0 g/dL    RDW                       14.6 11.5 - 15.5 %    PLATELET COUNT            141 140 - 440 thou/cu mm    MPV                       8.6 6.5 - 11.0 fL    NEUTROPHILS               74.4 (H) 42.0 - 72.0 %    LYMPHOCYTES               9.8 (L) 20.0 - 44.0 %    MONOCYTES                 11.4 <12.0 %    EOSINOPHILS               3.2 <8.0 %    BASOPHILS                 0.8 <3.0 %    IMMATURE GRANULOCYTES(METAS,MYELOS,PROS) 0.4 %    ABSOLUTE NEUTROPHILS      3.7 1.7 - 7.0 thou/cu mm    ABSOLUTE LYMPHOCYTES      0.5 (L) 0.9 - 2.9 thou/cu mm    ABSOLUTE MONOCYTES        0.6 <0.9 thou/cu mm    ABSOLUTE EOSINOPHILS      0.2 <0.5 thou/cu mm    ABSOLUTE BASOPHILS        0.0 <0.3 thou/cu mm    ABSOLUTE IMMATURE GRANULOCYTES(METAS,MYELOS,PROS) 0.0 <=0.3 thou/cu mm         Assessment    ICD-10-CM    1. Hospital discharge follow-up Z09    2. Pelvic floor dysfunction M62.89 AMB CONSULT TO PHYSICAL THERAPY   3. Acute prostatitis N41.0    4. Uremia  N19 ondansetron (ZOFRAN, AS HYDROCHLORIDE,) 4 mg tablet   5. CKD (chronic kidney disease) stage V requiring chronic dialysis (HC) N18.6      Z99.2    6. Pain medication agreement Z02.89    7. Lumbar disc disease with radiculopathy M51.16    8. Lumbar foraminal stenosis M99.83    9. Lumbar radiculopathy M54.16 HYDROcodone-acetaminophen, 5-325 mg, (NORCO) per tablet      DISCONTINUED: HYDROcodone-acetaminophen, 5-325 mg, (NORCO) per tablet      DISCONTINUED: HYDROcodone-acetaminophen, 5-325 mg, (NORCO) per tablet       Mr. Henning was recently hospitalized for hypertensive emergency, appears to be doing well since discharge.  Discharge summary and recommendations for outpatient provider are reviewed. Based on what occurred in the visit today:  Previous medication(s) were discontinued or altered? No  Previous medication(s) were suspended pending consultation? No  New medication(s) started? No    Looking back on suspicious that some of his low pelvic/prostate symptoms may be simply related to pelvic floor dysfunction. He did undergo extensive evaluation with Dr. Kelly last fall. However it is possible that he has developed acute prostatitis on top of his previous symptoms. We discussed options and I recommended a trial of physical therapy for pelvic floor therapy as previously recommended. If his prostatitis symptoms were to persist a more prolonged course of antibiotics would be recommended however if this was ordered I would recommend a return to Dr. Kelly as well.    Plan   -- Expected clinical course discussed   -- Medications and their side effects discussed    Patient Instructions      -- Referral to [facility] for pelvic floor therapy   -- If we do additional antibiotics, would refer to Dr. Kelly for pelvic floor pain/prostatitis   -- Complete antibiotic course   -- Eat yogurt 1-2 times per day while on antibiotics (and for a few weeks after) to reduce the chances of diarrhea      Narcotic and Controlled  Substance information    Today:   -- Goals: stable doses   -- Controlled substance agreement Up to date   -- [place] Prescription Monitoring Program was reviewed today.   -- Last urine drug screen: unable to leave urine due to CKDV   -- Refills for the following medications were provided today:  Drug Quantity/Month Months Rx today   Hydrocodone 5/325 90 3      -- Total Daily MME (morphine equivalents): 15    -- Follow-up in 3 months (approximately 09/24/17) for office visit dedicated to Schedule II medications.      Facts:    Every day, 44 people die in the [place] from overdose of prescription painkillers. (1)    Prescription drug overdose kills more people than car crashes each year. (2, 3)    Deaths from prescription painkillers among women is up 400%, and up 265% for men. (4)    Long-term use of prescription painkillers increases your risk for heart attack, broken bones, impotence (5)    By signing a medication contract, you accept these risks and side effects.    Be aware that the use of such medicine has certain risks associated with it, including, but not limited to:  Sleepiness or drowsiness, constipation, nausea, itching, vomiting, lightheadedness, dizziness, confusion, allergic reaction, slowing of breathing rate, slowing of reflexes or reaction time, kidney or liver disease,  sexual dysfunction, physical dependence, tolerance to analgesia, addiction, withdrawal and the possibility that the medicine will not provide complete relief.  Any narcotic usage is associate with a significantly increased risk of falls and other unintended injuries, respiratory depression and increased risk for death.    I recommend against operating motor vehicles or heavy machinery when using this medication.    This medication will be strictly monitored and all of my medications should be filled at the same pharmacy.  (Should the need arise to change pharmacies our office must be informed).    It is your responsibility to share that you  are on a narcotic contract with your other health care providers, including your dentist.    If you come into clinic for a dose adjustment, always bring your remaining pills and remaining hard copies of prescriptions.    References  1. CDC. http://www.cdc.gov/drugoverdose/epidemic/public.html  2. CDC. http://www.cdc.gov/drugoverdose/data/overdose.html  3. [facility] [place]. http://strib.mn/6u2cbnl  4. [facility]. http://www.cdc.gov/vitalsigns/PrescriptionPainkillerOverdoses/index.html  5. Annals of Internal Medicine   Vol. 162 No. 4   17 February 2015      Return if symptoms worsen or fail to improve.        Signed, Charlie Dubose MD  Internal Medicine & Pediatrics

## 2017-12-28 NOTE — PROGRESS NOTES
Patient Information     Patient Name MRN Sex Gilles Munoz III [id number] Male 1970      Progress Notes by Trevor Kelly MD at 2017  9:15 AM     Author:  Trevor Kelly MD Service:  (none) Author Type:  Physician     Filed:  2017 10:26 AM Encounter Date:  2017 Status:  Signed     :  Trevor Kelly MD (Physician)            Type of Visit  Established    Chief Complaint  Penile discharge  Hematuria    HPI  Mr. Henning is a 47 y.o. Male with history of ESRD on HD for 18 years follows up with penile discharge.  He makes no urine on a daily basis.  Started 3 months ago.  Has been evaluated multiple times in the last 3 months.  Testing has been negative.  He has had hematuria intermittently however he has also had a negative cystoscopy 8 months ago.      Review of Systems  I reviewed the ROS the patient today.    Nursing Notes:   Mili Oswald  2017  9:34 AM  Signed  Patient presents to the clinic for bloody penile discharge.  Mili Oswald LPN........................2017  9:26 AM    Review of Systems:    Weight loss:    No     Recent fever/chills:  yes   Night sweats:   yes  Current skin rash:  yes   Recent hair loss:  No  Heat intolerance:  No   Cold intolerance:  yes  Chest pain:   No   Palpitations:   No  Shortness of breath:  No   Wheezing:   No  Constipation:    No   Diarrhea:   No   Nausea:   No   Vomiting:   No   Kidney/side pain:  No   Back pain:   yes  Frequent headaches:  No   Dizziness:     No  Leg swelling:   yes   Calf pain:    No    Parents, brothers or sisters with history of kidney cancer?   No  Parents, brothers or sisters with history of bladder cancer: No    Mili Oswald LPN........................2017  9:26 AM     Family History  Family History       Problem   Relation Age of Onset     Other  Mother      parkinson disease       Diabetes  Father      Heart Disease  Father        Physical Exam  Vitals:     17 0927   BP: 142/76   Pulse: 60    Temp: 97.5  F (36.4  C)   TempSrc: Temporal     Constitutional: NAD, WDWN.  Cardiovascular: Regular rate.  Pulmonary/Chest: Respirations are even and non-labored bilaterally.  Abdominal: Soft. No distension, tenderness, masses or guarding. No CVA tenderness.  Extremities: MARY x 4, Warm. No clubbing.  No cyanosis.    Skin: Pink, warm and dry.  No rashes noted.  Genitourinary: nonpalpable bladder    Labs  Results for AYAAN HENNING III (MRN [id number]) as of 7/28/2017 09:34   11/6/2016 15:56 6/8/2017 15:10   COLOR                     Yellow Yellow   CLARITY                   Clear Clear   SPECIFIC GRAVITY,URINE    1.015 1.015   PH,URINE                  8.5 >=9.0 (A)   UROBILINOGEN,QUALITATIVE  Normal Normal   PROTEIN, URINE 100 (A) >=300 (A)   GLUCOSE, URINE Negative Negative   KETONES,URINE             Negative Negative   BILIRUBIN,URINE           Negative Negative   OCCULT BLOOD,URINE        Trace (A) Trace (A)   NITRITE                   Negative Negative   LEUKOCYTE ESTERASE        Negative Negative   RBC 0-2 0-2   WBC 0-2 0-2   BACTERIA Few Moderate (A)   EPITHELIAL CELLS None Seen Few   AMORPHOUS                  Present (A)     Imaging  CT Stone  6/13/2017  Impression: No acute abdominal masses or inflammatory changes are seen    Assessment & Plan  Mr. Henning is a 47 y.o. male with history of ESRD on HD for 18 years follows up with penile discharge and hematuria.  Recent negative imaging.  I reassured the patient, given his renal failure and lack of urine production is not uncommon to see some discharge.  He had a cystoscopy that was normal just 10 months ago.  If the problem persists and he is concerned I am happy to perform cystoscopy for reassurance.  He will call if he decides to proceed with cystoscopy.

## 2017-12-28 NOTE — TELEPHONE ENCOUNTER
Patient Information     Patient Name MRN Sex Gilles Munoz III [id number] Male 1970      Telephone Encounter by Charlie Dubose MD at 2017 12:46 PM     Author:  Charlie Dubose MD Service:  (none) Author Type:  Physician     Filed:  2017 12:46 PM Encounter Date:  2017 Status:  Signed     :  Charlie Dubose MD (Physician)            Noted.    SignedCharlie MD  Internal Medicine & Pediatrics

## 2017-12-28 NOTE — TELEPHONE ENCOUNTER
Patient Information     Patient Name MRN Sex Gilles Munoz III [id number] Male 1970      Telephone Encounter by Romelia Espitia LPN at 2017 12:58 PM     Author:  Romelia Espitia LPN Service:  (none) Author Type:  NURS- Licensed Practical Nurse     Filed:  2017 12:59 PM Encounter Date:  2017 Status:  Signed     :  Romelia Espitia LPN (NURS- Licensed Practical Nurse)            Just an FYI.  Romelia Espitia LPN............................ 2017 12:58 PM

## 2017-12-28 NOTE — TELEPHONE ENCOUNTER
Patient Information     Patient Name MRN Sex Gilles Munoz III [id number] Male 1970      Telephone Encounter by Camron León MD at 2017 11:58 AM     Author:  Camron León MD Service:  (none) Author Type:  Physician     Filed:  2017 11:58 AM Encounter Date:  2017 Status:  Signed     :  Camron León MD (Physician)            Dialysis patient. This is his baseline value.

## 2017-12-28 NOTE — TELEPHONE ENCOUNTER
Patient Information     Patient Name MRN Sex Gilles Munoz III [id number] Male 1970      Telephone Encounter by Demi Archer RN at 2017 12:03 PM     Author:  Demi Archer RN Service:  (none) Author Type:  NURS- Registered Nurse     Filed:  2017 12:04 PM Encounter Date:  2017 Status:  Signed     :  Demi Archer RN (NURS- Registered Nurse)            traZODone (DESYREL) 150 mg tablet  TAKE 2 TABLETS BY MOUTH AT BEDTIME.       Disp: 180 tablet Refills:     Class: eRx Start: 2017    For: Chronic insomnia  Documented:4 years ago  Last refill:2017  To be filled at: [id number] - [place], [place] - [address]: [phone]    Last visit with SUZETTE DUBOSE was on: 2017 in [facility]  PCP:  Suzette Dubose MD  Controlled Substance Agreement:  2/10/17      Unable to complete prescription refill per RN Medication Refill Policy.................... DEMI ARCHER RN ....................  2017   12:03 PM

## 2017-12-28 NOTE — TELEPHONE ENCOUNTER
Patient Information     Patient Name MRN Gilles Dorman III [id number] Male 1970      Telephone Encounter by Moon Cardona at 2017 12:56 PM     Author:  Moon Cardona Service:  (none) Author Type:  (none)     Filed:  2017 12:59 PM Encounter Date:  2017 Status:  Signed     :  Moon Cardona            Pt would like a referral to see Dr. Kelly for bloody discharge from penis.  Moon Cardona LPN ....................  2017   12:59 PM

## 2017-12-28 NOTE — TELEPHONE ENCOUNTER
Patient Information     Patient Name MRN Sex Gilles Munoz III [id number] Male 1970      Telephone Encounter by Moon Cardona at 2017 12:37 PM     Author:  Moon Cardona Service:  (none) Author Type:  (none)     Filed:  2017 12:40 PM Encounter Date:  2017 Status:  Signed     :  Moon Cardona            Patient was in the hospital for 3 days in [place] for prostates infection and high blood pressure.  Pt just wanted Charlie Dubose MD to know he received Morphine on the way down there in the ambulance.  Moon Cardona LPN ....................  2017   12:39 PM

## 2017-12-29 NOTE — H&P
"Patient Information     Patient Name MRN Sex Gilles Munoz III [id number] Male 1970      H&P by Charlie Dubose MD at 2017  2:30 PM     Author:  Charlie Dubose MD Service:  (none) Author Type:  Physician     Filed:  2017  4:35 PM Encounter Date:  2017 Status:  Signed     :  Charlie Dubose MD (Physician)            PREOPERATIVE HISTORY & PHYSICAL  Date of Exam: 2017  Chief Complaint     Patient presents with       Pre-Op Exam      12/15/17 Kidney removal       Nursing Notes:   Moon Cardona  2017  2:37 PM  Signed  Date of Surgery: 12/15/17  Type of Surgery: Transplanted Kidney Removal   Surgeon: does not know until the  when he meets with the surgeon.   Hospital:  [facility]  Fax: does not know    Fever/Chills or other infectious symptoms in past month: YES, chills   >10lb weight loss in past two months: NO  O2 SAT: 100%    Health Care Directive/Code status:  None on file/ Full code  Hx of blood transfusions:   (NO)   Td up to date:  YES,   History of VRE/MRSA:  (NO) Date: N/A    Preoperative Evaluation: Obstructive Sleep Apnea screening    S: Snore -  Do you snore loudly? (louder than talking or loud enough to be heard through closed doors)(NO)  T: Tired - Do you often feel tired, fatigued, or sleepy during the daytime?(YES)  O: Observed - Has anyone ever observed you stop breathing during your sleep?(NO)  P: Pressure - Do you have or are you being treated for high blood pressure?(YES)  B: BMI - BMI greater than 35kg/m2?(NO)  A: Age - Age over 50 years old?(NO)  N: Neck - Neck circumference greater than 40 cm?(NO)  G: Gender - Gender: Male?(YES)    Total number of \"YES\" responses:  4    Scoring: Low risk of LULA 0-2  At Risk of LULA: >3 High Risk of LULA: 5-8    Moon Cardona LPN...................  2017   2:26 PM        HPI:  I was asked to see Mr. Gilles Henning III by [facility] for preoperative management of CKD V on " HD.    Gilles Henning III is a 47 y.o. male with a history of chronic kidney disease stage V on hemodialysis, history of renal cell carcinoma, hypertension, chronic low back pain, chronic rejection of transplanted kidney here for preoperative examination.  The most physically active he has been over the past 2 weeks was: Not too much however he's been very active all summer including riding his bicycle many miles without chest pains, palpitations or diaphoresis..    Patient Active Problem List       Diagnosis  Date Noted     Chronic rejection of kidney transplant, stage 1  06/11/2014     Priority: High      s/p kidney transplant  08/01/2012     Priority: High      Followed at [facility]        IGA NEPHROPATHY       Priority: High      PTSD (post-traumatic stress disorder)  10/13/2017     Bipolar affective disorder (HC)  10/13/2017     Night terrors  10/13/2017     CKD (chronic kidney disease) stage V requiring chronic dialysis (HC)  06/26/2017     Lumbar disc disease with radiculopathy  07/11/2016     Lumbar foraminal stenosis  07/11/2016     Immunosuppressed status (HC)  04/21/2016     Gastroesophageal reflux disease  03/15/2016     Secondary hyperparathyroidism (HC)  08/11/2015     Vitamin D deficiency  08/11/2015     Acute and chronic rejection of kidney  06/12/2015     S/p nephrectomy, bilateral, native kidneys  05/22/2015     Renal cell carcinoma (HC)  05/19/2015     s/p resection at [facility] 2015        Anemia of chronic disease  03/06/2015     Erectile dysfunction  06/11/2014     Chronic insomnia  06/11/2014     Nausea  10/07/2013     COLITIS, ACUTE  06/17/2012     DIARRHEA, CHRONIC  05/10/2012     HEADACHE  10/18/2011     HEARTBURN  08/17/2011     MUSCLE FASCICULATIONS  08/17/2011     ONYCHOCRYPTOSIS       PERIPHERAL NEUROPATHY, FEET  03/29/2011     ERECTILE DYSFUNCTION  03/29/2011     CHEST PAIN  10/26/2010     likely not cardiac          DEPRESSION, MAJOR, RECURRENT  04/26/2010     with suicide attempt.           END STAGE RENAL DISEASE       A:  Status post peritoneal dialysis for seven years.  B:  On hemodialysis.          POSTTRAUMATIC STRESS DISORDER       HYPERTENSION       HYPERLIPIDEMIA       Past Medical History:     Diagnosis  Date     Chemical dependency (HC)     With Vodka      Colitis     CellCept-induced colitis with diarrhea      Depression      HTN (hypertension)      IgA nephropathy     With renal failure      Transplant rejection     episode, resolved with increased meds.       Past Surgical History:      Procedure  Laterality Date     COLONOSCOPY      For pain and diarrhea - normal, no pathologic diagnosis with multiple biopsies, CellCept colitis       ESOPHAGOGASTRODUODENOSCOPY      For dyspepsia - reactive gastropathy; acute esophagitis/ulceration; fungal stain positive       LAPAROTOMY       Left arm fistula       NEPHRECTOMY      native kidney removal bilateral for cancer       Peritoneal dialysis catheter placement       [place]      [facility]       Current Outpatient Prescriptions       Medication  Sig Dispense Refill     amLODIPine (NORVASC) 10 mg tablet Take 10 mg by mouth once daily.       atorvastatin (LIPITOR) 40 mg tablet   2     azaTHIOprine (IMURAN) 50 mg tablet Take 125 mg by mouth every morning.       Calcium Acetate (PHOS-LO) 667 mg capsule 667 mg. Take two tablets with meals and 1 tablet with a snack  0     Cholecalciferol, Vitamin D3, 2,000 unit tablet Take 2,000 Units by mouth once daily.       clonazePAM (KLONOPIN) 1 mg tablet Take up to 1.5 mg daily PRN  0     DME 4 wheeled walker with seat. 1 Each 0     docusate (DOCU SOFT) 100 mg capsule Take 100 mg by mouth 2 times daily.       imiquimod 5% cream (ALDARA) 5 % cream   2     lisinopril (PRINIVIL; ZESTRIL) 40 mg tablet   6     metoprolol tartrate (LOPRESSOR) 25 mg tablet Take 1 tablet by mouth once daily. 30 tablet prn     omeprazole (PRILOSEC) 40 mg Delayed-Release capsule TAKE 1 CAPSULE BY  MOUTH DAILY 90 capsule 3     predniSONE (DELTASONE) 20 mg tablet Take 1 tablet by mouth once daily.  2     sennosides (SENNA) 8.6 mg tablet Take 1 tablet by mouth 2 times daily if needed for Constipation. 60 tablet 99     tacrolimus (PROGRAF) 1 mg capsule Take  by mouth 2 times daily.  0     traMADol (ULTRAM) 50 mg tablet Take 1 tablet by mouth 2 times daily if needed for Pain. 60 tablet 3     traZODone (DESYREL) 150 mg tablet TAKE 2 TABLETS BY MOUTH AT BEDTIME. 180 tablet 4     Current Facility-Administered Medications         Medication  Dose Route Frequency Provider Last Rate     darbepoetin maria esther in polysorbat 40 mcg injection (ARANESP)  40 mcg Subcutaneous Selection Not Available Charlie Dubose MD       Medications have been reviewed by me and are current to the best of my knowledge and ability.    Allergies      Allergen   Reactions     Gabapentin  Other - Describe In Comment Field     myoclonus      Family History       Problem   Relation Age of Onset     Other  Mother      parkinson disease       Diabetes  Father      Heart Disease  Father      Anesthesia Malignant Hyperthermia  No Family History      Clotting disorder  No Family History      Social History       Substance Use Topics         Smoking status:   Never Smoker     Smokeless tobacco:   Current User     Types:  Chew      Comment: 1 tin lasts 2 days      Alcohol use   No       REVIEW OF SYSTEMS:  Review of Systems:  Constitutional: Normal  Eyes: Normal  Ears/nose/mouth/throat: Normal  Cardiology/vascular: Normal  Respiratory: Normal  Psychiatric: Normal  GI: Normal  : He's had some discomfort over his transplanted kidney. He makes no urine. Dr Tobin has said it's from his kidney rejection.  Musculoskeletal: Normal  Neurological: Normal  Endocrine: Normal  Hematological/lymphatic: Normal  Integumentary: Normal  Allergy/immunizations: Normal    EXAM:   Vitals: reviewed in EMR.  /80  Pulse 66  Temp 99.2  F (37.3  C)  Resp 16  Ht 1.778 m  "(5' 10\")  Wt 59.9 kg (132 lb 1.6 oz)  SpO2 100%  BMI 18.95 kg/m2    Gen: Pleasant male, NAD.  HEENT: MMM, no OP erythema.   Neck: Supple, no JVD, no bruits.  CV: RRR no m/r/g.   Pulm: CTAB no w/r/r  GI: Soft, NT, ND. No HSM. No masses. Bowel sounds present. Mild tenderness over transplanted kidney without erythema or induration.  Neuro: Grossly intact  Msk: No lower extremity edema.  Skin: No concerning lesions.  Psychiatric: Normal affect and insight. Does not appear anxious or depressed.    DIAGNOSTICS:   EKG:  Result Notes   Notes Recorded by Camron León MD on 9/5/2017 at 3:10 PM  Sinus bradycardia with a rate of 57. Axis is leftward. MN and QRS intervals normal. QT interval prolonged. Widespread ST and T wave abnormalities with anterior and lateral leads. Compared to a previous EKG dated August 30, 2017, the bradycardia and prolonged QT interval are new otherwise no significant change.  Camron León MD         Labs:  Component      Latest Ref Rng & Units 11/9/2017   WHITE BLOOD COUNT              4.5 - 11.0 thou/cu mm 5.0   RED BLOOD COUNT                4.30 - 5.90 mil/cu mm 3.78 (L)   HEMOGLOBIN                     13.5 - 17.5 g/dL 12.4 (L)   HEMATOCRIT                     37.0 - 53.0 % 36.4 (L)   MCV                            80 - 100 fL 96   MCH                            26.0 - 34.0 pg 32.8   MCHC                           32.0 - 36.0 g/dL 34.1   RDW                            11.5 - 15.5 % 16.4 (H)   PLATELET COUNT                 140 - 440 thou/cu mm 136 (L)   MPV                            6.5 - 11.0 fL 8.8   NEUTROPHILS                    42.0 - 72.0 % 80.8 (H)   LYMPHOCYTES                    20.0 - 44.0 % 8.0 (L)   MONOCYTES                      <12.0 % 9.0   EOSINOPHILS                    <8.0 % 1.0   BASOPHILS                      <3.0 % 0.6   IMMATURE GRANULOCYTES(METAS,MYELOS,PROS)      % 0.6   ABSOLUTE NEUTROPHILS           1.7 - 7.0 thou/cu mm 4.1   ABSOLUTE LYMPHOCYTES           0.9 - " 2.9 thou/cu mm 0.4 (L)   ABSOLUTE MONOCYTES             <0.9 thou/cu mm 0.5   ABSOLUTE EOSINOPHILS           <0.5 thou/cu mm 0.1   ABSOLUTE BASOPHILS             <0.3 thou/cu mm 0.0   ABSOLUTE IMMATURE GRANULOCYTES(METAS,MYELOS,PROS)      <=0.3 thou/cu mm 0.0   SODIUM      133 - 143 mmol/L 136   POTASSIUM      3.5 - 5.1 mmol/L 3.7   CHLORIDE      98 - 107 mmol/L 90 (L)   CO2,TOTAL      21 - 31 mmol/L 32 (H)   ANION GAP      5 - 18                 14   GLUCOSE      70 - 105 mg/dL 84   CALCIUM      8.6 - 10.3 mg/dL 9.3   BUN      7 - 25 mg/dL 18   CREATININE      0.70 - 1.30 mg/dL 5.32 (H)   BUN/CREAT RATIO                 3   GFR if African American      >60 ml/min/1.73m2 14 (L)   GFR if not African American      >60 ml/min/1.73m2 12 (L)   ALBUMIN      3.5 - 5.7 g/dL 4.2   PROTEIN,TOTAL      6.4 - 8.9 g/dL 7.4   GLOBULIN                       2.0 - 3.7 g/dL 3.2   A/G RATIO      1.0 - 2.0                 1.3   BILIRUBIN,TOTAL      0.3 - 1.0 mg/dL 0.9   ALK PHOSPHATASE      34 - 104 IU/L 53   ALT (SGPT)      7 - 52 IU/L 8   AST (SGOT)      13 - 39 IU/L 15       IMPRESSION:     ICD-10-CM    1. Preop examination Z01.818    2. CKD (chronic kidney disease) stage V requiring chronic dialysis (HC) N18.6      Z99.2    3. s/p kidney transplant Z94.0    4. S/p nephrectomy, bilateral, native kidneys Z90.5    5. Acute and chronic rejection of kidney T86.11      For above listed surgery and anesthesia:   Patient is at increased risk for perioperative complications based on  chronic kidney disease stage V on hemodialysis, history of renal cell carcinoma, hypertension, chronic low back pain, chronic rejection of transplanted kidney here.    RECOMMENDATIONS:   proceed without further diagnostic evaluation  Patient is on chronic pain medications: No.  Patient is on antiplatelet/anticoagulation: No.  Other medications that need adjustment perioperatively: No.  Patient Instructions    -- no change to prescriptions   -- Hold aspirin,  Advil/ibuprofen, Aleve/naproxen for 7 days before surgery   -- Acetaminophen (Tylenol) is okay   -- Hold vitamins and herbal remedies for 7 days before surgery        Signed, Charlie Dubose MD  Internal Medicine & Pediatrics    CC: [facility]

## 2017-12-29 NOTE — PATIENT INSTRUCTIONS
Patient Information     Patient Name MRN Sex Gilles Munoz III [id number] Male 1970      Patient Instructions by Charlie Dubose MD at 10/13/2017  3:00 PM     Author:  Charlie Dubose MD  Service:  (none) Author Type:  Physician     Filed:  10/13/2017  3:30 PM  Encounter Date:  10/13/2017 Status:  Addendum     :  Charlie Dubose MD (Physician)        Related Notes: Original Note by Charlie Dubose MD (Physician) filed at 10/13/2017  3:27 PM             -- Follow-up with your mental health med manager for night terrors   -- Request records to be sent to Charlie Dubose MD    -- Stop hydrocodone/apap   -- This completes the pain contract, could reinstate in the future if needed   -- Start tramadol as needed for low back pain   -- Continue PT exercises, daily exercise, yoga   -- If pain persists/worsens, would consider return to PT

## 2017-12-29 NOTE — PATIENT INSTRUCTIONS
Patient Information     Patient Name MRN Gilles Dorman III [id number] Male 1970      Patient Instructions by Charlie Dubose MD at 2017  2:30 PM     Author:  Charlie Dubose MD Service:  (none) Author Type:  Physician     Filed:  2017  2:55 PM Encounter Date:  2017 Status:  Signed     :  Charlie Dubose MD (Physician)             -- no change to prescriptions   -- Hold aspirin, Advil/ibuprofen, Aleve/naproxen for 7 days before surgery   -- Acetaminophen (Tylenol) is okay   -- Hold vitamins and herbal remedies for 7 days before surgery

## 2017-12-29 NOTE — PATIENT INSTRUCTIONS
Patient Information     Patient Name MRN Gilles Dorman III [id number] Male 1970      Patient Instructions by Harleen Hernandez RN at 2017 11:45 AM     Author:  Harleen Hernandez RN Service:  (none) Author Type:  NURS- Registered Nurse     Filed:  2017 11:57 AM Encounter Date:  2017 Status:  Signed     :  Harleen Hernandez RN (NURS- Registered Nurse)            Home Care after Cystoscopy  Follow these guidelines for your care after your procedure.    Activity  No limitations    Bathing or showering  No limitations    Symptoms  You may notice some burning with urination but this usually resolves after 1-2 days.  You may also notice small amounts of blood in your urine.  Please increase water intake for the next few days to help with these symptoms.    Contacts  General Questions: [phone]  Appointments:  [phone]  Emergencies:  [phone]    When to call the clinic  If you develop any of the following symptoms please call the clinic immediately.  If the clinic is closed please be seen at an urgent care clinic or the Emergency Department.  - Burning with urination that worsens after 2 days  - Unable to urinate causing severe pelvic pain  - Fevers of greater than 101 degrees F  - Flank pain that is not responding to pain medication    Follow up  Please follow up as discussed at the appointment.

## 2017-12-30 NOTE — NURSING NOTE
Patient Information     Patient Name MRN Sex Gilles Munoz III [id number] Male 1970      Nursing Note by Mili Oswald at 2017  9:15 AM     Author:  Mili Oswald Service:  (none) Author Type:  (none)     Filed:  2017  9:34 AM Encounter Date:  2017 Status:  Signed     :  Mili Oswald            Patient presents to the clinic for bloody penile discharge.  Mili Oswald LPN........................2017  9:26 AM    Review of Systems:    Weight loss:    No     Recent fever/chills:  yes   Night sweats:   yes  Current skin rash:  yes   Recent hair loss:  No  Heat intolerance:  No   Cold intolerance:  yes  Chest pain:   No   Palpitations:   No  Shortness of breath:  No   Wheezing:   No  Constipation:    No   Diarrhea:   No   Nausea:   No   Vomiting:   No   Kidney/side pain:  No   Back pain:   yes  Frequent headaches:  No   Dizziness:     No  Leg swelling:   yes   Calf pain:    No    Parents, brothers or sisters with history of kidney cancer?   No  Parents, brothers or sisters with history of bladder cancer: No    Mili Oswald LPN........................2017  9:26 AM

## 2017-12-30 NOTE — NURSING NOTE
Patient Information     Patient Name MRN Sex Gilles Munoz III [id number] Male 1970      Nursing Note by Bella Galdamez at 2017 10:00 AM     Author:  Bella Galdamez Service:  (none) Author Type:  NURS- Registered Nurse     Filed:  2017 11:47 AM Encounter Date:  2017 Status:  Signed     :  Bella Galdamez (NURS- Registered Nurse)            Labs and PET scan ordered per written order sheet and sent to provider for co-sign. Bella Galdamez RN 2017  11:47 AM

## 2017-12-30 NOTE — NURSING NOTE
Patient Information     Patient Name MRN Gilles Dorman III [id number] Male 1970      Nursing Note by Moon Cardona at 2017  3:15 PM     Author:  Moon Cardona Service:  (none) Author Type:  (none)     Filed:  2017  3:28 PM Encounter Date:  2017 Status:  Signed     :  Moon Cardona            Patient presents to clinic for hosp F/U on 17 from [facility] emergency.  Patient states still feeling nauseated and has increased back pain.  Moon Cardona LPN ....................  2017   3:18 PM

## 2017-12-30 NOTE — DISCHARGE SUMMARY
Patient Information     Patient Name MRN Sex Gilles Munoz III [id number] Male 1970      Discharge Summaries by Atiya Fields PT at 2017  8:41 AM     Author:  Atiya Fields PT Service:  (none) Author Type:  PT- Physical Therapist     Filed:  2017  8:43 AM Date of Service:  2017  8:41 AM Status:  Signed     :  Atiya Fields PT (PT- Physical Therapist)            [facility]  Outpatient PT - Discharge Summary    Date of discharge: 2017    Patient Name: Gilles Henning III  YOB: 1970   Referring MD/Provider:  Charlie Dubose MD  Diagnosis: pelvic floor dysfunction  Treatment Diagnosis: lower abdominal pain, pelvic pain  Insurance:  Medicare  Start of Care Date:: 17    Dates of Service: 17    Thru:  17  Number of visits: 1           Reason for Discharge:  Further therapy not indicated    Progress from Initial to Discharge (if applicable):  NA      G Codes and Modifier taken from patient completing the FOTO assessment:   The Patient's G Code Goal would be Other PT/OT Primary     The Patient's Impairment, Limitation or Restriction Modifier would be best described as CI - 1% - 20% Impairment.     The Patient's status upon Discharge is Other PT/OT Primary     The Patient's Impairment, Limitation or Restriction Modifier would be best described as CJ - 20% - 40% Impairment.       Further Recommendations:    None      Patient is discharged from Physical Therapy    Thank you for your referral to [facility].  Please call with any questions, concerns or comments.  [phone]

## 2017-12-30 NOTE — NURSING NOTE
"Patient Information     Patient Name MRN Sex Gilles Munoz III [id number] Male 1970      Nursing Note by Moon Cardona at 2017  2:30 PM     Author:  Moon Cardona Service:  (none) Author Type:  (none)     Filed:  2017  2:37 PM Encounter Date:  2017 Status:  Signed     :  Moon Cardona            Date of Surgery: 12/15/17  Type of Surgery: Transplanted Kidney Removal   Surgeon: does not know until the  when he meets with the surgeon.   Hospital:  [facility]  Fax: does not know    Fever/Chills or other infectious symptoms in past month: YES, chills   >10lb weight loss in past two months: NO  O2 SAT: 100%    Health Care Directive/Code status:  None on file/ Full code  Hx of blood transfusions:   (NO)   Td up to date:  YES,   History of VRE/MRSA:  (NO) Date: N/A    Preoperative Evaluation: Obstructive Sleep Apnea screening    S: Snore -  Do you snore loudly? (louder than talking or loud enough to be heard through closed doors)(NO)  T: Tired - Do you often feel tired, fatigued, or sleepy during the daytime?(YES)  O: Observed - Has anyone ever observed you stop breathing during your sleep?(NO)  P: Pressure - Do you have or are you being treated for high blood pressure?(YES)  B: BMI - BMI greater than 35kg/m2?(NO)  A: Age - Age over 50 years old?(NO)  N: Neck - Neck circumference greater than 40 cm?(NO)  G: Gender - Gender: Male?(YES)    Total number of \"YES\" responses:  4    Scoring: Low risk of LULA 0-2  At Risk of LULA: >3 High Risk of LULA: 5-8    Moon Cardona LPN...................  2017   2:26 PM            "

## 2017-12-30 NOTE — INITIAL ASSESSMENTS
Patient Information     Patient Name MRN Gilles Dorman III [id number] Male 1970      Initial Assessments by Atiya Fields PT at 2017  3:32 PM     Author:  Atiya Fields PT  Service:  (none) Author Type:  PT- Physical Therapist     Filed:  2017  8:40 AM  Date of Service:  2017  3:32 PM Status:  Addendum     :  Atiya Fields PT (PT- Physical Therapist)        Related Notes: Original Note by Atiya Fields PT (PT- Physical Therapist) filed at 2017  4:36 PM            [facility]  Outpatient PT - Initial Evaluation  [facility]    Date of Service: 2017     Patient Name: Gilles Henning III   YOB: 1970   Referring MD/Provider: Charlie Dubose MD  Diagnosis: pelvic floor dysfunction  Treatment Diagnosis: lower abdominal pain, pelvic pain  Insurance: Medicare  Start of Service: 17  Certification Dates: Start of Service: 17   Medicare/MA Re-Cert Due: 17      Living Situations:  Independent in Living Situation     Preadmission Functional Mobility: Independent  Precautions:  Currently dealing with bloody discharge from penis  Cognition:  Oriented to Person, Place, and Time.     Were cultural / age or other special adaptations needed? No      Patient is a vulnerable adult: No      Patient is aware of diagnosis: Yes      Risks and benefits explained: Yes      Subjective      Current Complaints/Reason for Referral: Gilles Henning III is a 47 y.o. male who comes to physical therapy today with a chief complaint of lower abdominal pain, bladder spasms and perineal pain. Patient has a history of renal transplant, currently on list for another transplant, currently receives dialysis. Because of this he does not urinate and must limit his water intake. Also reports off/on low grade fevers. This has been going on for a while. Hospitalized one month ago due to acute hypertensive episode.      denies constipation,  daily bm, no pain    Pain:    Patient reports current level of pain of  3 = Mild Pain, (Bothersome, Annoying, Irritating, Nagging) and  4 = Moderate Pain, (Aggravating, Grueling, Upsetting, Frustrating) /10 (0= no pain, 10= worst pain possible).  Patient reports pain is in the following location: lower abdomen, perineum.      Movement, diet, does not seem to impact his pain. Pain does not limit him but it's concerning to him because of his renal history, adds to his worries    Functional Limitations/Difficulty:   Subjective rating of current functional limitations:  Functional Activity No Difficulty Mild Difficulty Mod. Difficulty Severe Difficulty   Sleeping   x      Walking 30 Min   x      Standing 30 Min x      Stairs- 10 steps   x      Sitting/Driving    1  Hour x      Work           Patient reports having had the following symptoms:  Trouble initiating a urine stream:  NA  Childhood bladder problems:   no  Blood in urine:     YES- bloody discharge from penis  Urinary hesitancy/Slow stream:  NA  Difficulty stopping urine stream:  NA  Dribbling after urination:    NA  Trouble emptying bladder completely: NA  Recurrent bladder infections:   NA  Constipation/Straining:    No  Trouble holding back gas/feces:   No  Trouble feeling bowel fullness:   No  Trouble feeling bladder fullness:   No  Straining/Pushing to empty bladder:  NA    Patient rates severity of symptoms 6/10 on a 0-10 scale (10=most severe).    PMH: IgA nephropathy, end stage renal disease, HTN, PTSD, depression, ED, colitis, s/p renal transplant, insomnia, renal cell carcinoma, GERD    Diagnostic Tests:  NA  Meds: reviewed see EHR    Previous Tx: none  Patient s goals for therapy: reduce pain  Latex Allergy: No      G codes and Modifier based on patient's presentation and clinical judgement:     Current Primary G Code and Modifier:    Per the Patient's intake and/or assessment the Primary G Code is: Other PT/OT Primary .   The Patient's  Impairment, Limitation or Restriction Modifier would be best described as: CJ - 20% - 40% Impairment.     Goal Primary G Code and Modifier:    The Patient's G Code Goal would be: Other PT/OT Primary    The Patient's Impairment, Limitation or Restriction Modifier goal would be best described as: CI - 1% - 20% Impairment.     Discharge Primary G Code and Modifier:    Per the Patient's intake and/or assessment the Primary G Code is: Other PT/OT Primary .   The Patient's Impairment, Limitation or Restriction Modifier would be best described as: CJ - 20% - 40% Impairment.       Objective    Items left blank indicate that the test was inappropriate or not meaningful at the time of evaluation and therefore not performed.    Fall Risk Screening:  No risk factors identified      ROM / Strength:          WNL        WFL     Of lower extremities  Limited forward trunk flexion to 70 degrees    .Postural loading:  General Listening: right lower rib cage/upper abdomen  Head:  symmetrical  Shoulders: R/R  Pelvis: symmetrical  Local listening: liver    Scars: surgical scars on abdomen from renal resection, transplant, peritoneal dialysis    Breathing Pattern: upper chest    Today's Intervention:    Discussed with the patient that he has a referral to see Dr. Kelly, urology, and due to bloody discharge, PT will defer any assessment of the pelvic floor today until further medical work up was completed. Patient was in agreement.    Organ specific fascial mobilization (OSFM) to falciform ligament, hepatorenal ligament, right triangular ligament  Proprioceptive organ facilitation (POF) liver, bladder    Home Exercise Program:NA  Assessment    Clinical Impression:  Patient presents with signs and symptoms consistent with pelvic pain with etiology unknown until further medical work up can be completed due to bloody discharge from penis. Future PT appointments will be cancelled until cleared by urology.        Functional  Impairment(s): lower abdominal pain, bladder spasms, perineal pain/pressure:  no change with position, activity, diet      Prior Function:   Pain Free    Physical Impairment(s):       MUSCULOSKELETAL:  Pain    Goals: will be determined at a later date after patient has had medical work up with urology.    Patient participated in goal selection and understand(s) the plan of care: Yes     Response to Intervention: understanding of plan    Plan  Treatment Plan / Targeted Outcomes:     Frequency:   8 visits     Duration of Treatment: 8 weeks    Planned Interventions:  Possible physical therapy interventions include but are not limited to:   Home Exercise Program development  Therapeutic Exercise (ROM & Strengthening)  Manual Therapy  Neuromuscular Re-education  Therapeutic Activities  Pelvic Floor Biofeedback  Hot packs  Cold packs    Plan for next visit:  Will await medical work up with urology    Student or PTA has been instructed in and demonstrates skills necessary to carry out above stated treatment plan: No

## 2017-12-30 NOTE — NURSING NOTE
Patient Information     Patient Name MRN Sex Gilles Munoz III [id number] Male 1970      Nursing Note by Harleen Hernandez RN at 2017 11:45 AM     Author:  Harleen Hernandez RN Service:  (none) Author Type:  NURS- Registered Nurse     Filed:  2017 12:21 PM Encounter Date:  2017 Status:  Signed     :  Harleen Hernandez RN (NURS- Registered Nurse)            Patient positioned in supine position, perineum area prepped with chlorhexidene Gluconate and patient draped per sterile technique. Per verbal order read back by Trevor Kelly MD, Urojet 10mL 2% lidocaine jelly to be instilled into urethra.  Urojet- 10ml 2% Lidocaine jelly instilled into the urethra.    Urojet 2%  Lot#: [id number]  Expiration date:   : [employer]  NDC: [address]-5    [place] Protocol    A. Pre-procedure verification complete yes  1-relevant information / documentation available, reviewed and properly matched to the patient; 2-consent accurate and complete, 3-equipment and supplies available    B. Site marking complete N/A  Site marked if not in continuous attendance with patient    C. TIME OUT completed yes  Time Out was conducted just prior to starting procedure to verify the eight required elements: 1-patient identity, 2-consent accurate and complete, 3-position, 4-correct side/site marked (if applicable), 5-procedure, 6-relevant images / results properly labeled and displayed (if applicable), 7-antibiotics / irrigation fluids (if applicable), 8-safety precautions.    After procedure perineum area rinsed. Discharge instructions reviewed with patient. Patient verbalized understanding of discharge instructions and discharged ambulatory.

## 2017-12-30 NOTE — NURSING NOTE
Patient Information     Patient Name MRN Sex Gilles Munoz III [id number] Male 1970      Nursing Note by Oly Astudillo at 2017 10:00 AM     Author:  Oly Astudillo Service:  (none) Author Type:  (none)     Filed:  2017 10:21 AM Encounter Date:  2017 Status:  Signed     :  Oly Astudillo            Patient presents for a follow up of renal cell carcinoma.  Losing some blood, lab done 17.  Oly Astudillo CMA (AAMA).....................2017  10:09 AM

## 2017-12-30 NOTE — NURSING NOTE
Patient Information     Patient Name MRN Gilles Dorman III [id number] Male 1970      Nursing Note by Moon Cardona at 10/13/2017  3:00 PM     Author:  Moon Cardona Service:  (none) Author Type:  (none)     Filed:  10/13/2017  3:09 PM Encounter Date:  10/13/2017 Status:  Signed     :  Moon Cardona            Patient presents to clinic for ongoing back pain and for increased issues with night terrors and night sweats.  Moon Cardona LPN ....................  10/13/2017   2:55 PM

## 2018-01-02 NOTE — NURSING NOTE
Patient Information     Patient Name MRN Gilles Dorman III [id number] Male 1970      Nursing Note by Moon Cardona at 2017 11:00 AM     Author:  Moon Cardona Service:  (none) Author Type:  (none)     Filed:  2017 10:41 AM Encounter Date:  2017 Status:  Signed     :  Moon Cardona            Patient presents to clinic for night sweats that have been going on for a few weeks and for ongoing back pain.  Moon Cardona LPN ....................  2017   10:38 AM
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
## 2018-01-02 NOTE — PROGRESS NOTES
Patient Information     Patient Name MRN Sex Gilles Munoz III [id number] Male 1970      Progress Notes by Catherine Kenyon at 2017  9:30 AM     Author:  Catherine Kenyon Service:  (none) Author Type:  Other Clinical Staff     Filed:  2017  9:30 AM Date of Service:  2017  9:30 AM Status:  Signed     :  Catherine Kenyon (Other Clinical Staff)            Falls Risk Criteria:    Age 65 and older or under age 4        Sensory deficits    Poor vision    Use of ambulatory aides    Impaired judgment    Unable to walk independently    Meets High Risk criteria for falls:  no

## 2018-01-02 NOTE — PATIENT INSTRUCTIONS
Patient Information     Patient Name MRN Gilles Dorman III [id number] Male 1970      Patient Instructions by Charlie Dubose MD at 2017 11:00 AM     Author:  Charlie Dubose MD Service:  (none) Author Type:  Physician     Filed:  2017 10:59 AM Encounter Date:  2017 Status:  Signed     :  Charlie Dubose MD (Physician)             -- Obtain note from Rheumatology visit   -- Obtain last DXA note  at [facility] [place]   -- Follow-up with Dr. Campbell after PET scan, discuss night sweats too     -- Ibuprofen 600 mg (3 tablets) 3 times per day for 7 days, then as needed   -- Take ibuprofen with food, as can be hard on the stomach   -- Rest   -- Ice/heat whichever feels better   -- Topicals: capsaicin cream, Aspercreme, IcyHot, Bio Freeze, etc   -- Hydrocodone/apap rarely for severe pain   -- MiraLax as needed for constipation, take a dose if you use the hydrocodone/apap   -- Schedule visit for physical therapy   -- Call or come back if concerns, else as needed

## 2018-01-03 NOTE — TELEPHONE ENCOUNTER
Patient Information     Patient Name MRN Sex Gilles Munoz III [id number] Male 1970      Telephone Encounter by Bella Chapa at 2/15/2017 12:12 PM     Author:  Bella Chapa Service:  (none) Author Type:  NURS- Registered Nurse     Filed:  2/15/2017 12:13 PM Encounter Date:  2/10/2017 Status:  Signed     :  Bella Chapa (NURS- Registered Nurse)            Raul Campbell MD spoke with Laura from [facility] renal [facility] and Gilles is cleared for transplant. No evidence malignancy. BELLA CHAPA ....................  2/15/2017   12:13 PM'

## 2018-01-03 NOTE — PATIENT INSTRUCTIONS
Patient Information     Patient Name MRN Gilles Dorman III [id number] Male 1970      Patient Instructions by Charlie Dubose MD at 2/10/2017 11:00 AM     Author:  Charlie Dubose MD  Service:  (none) Author Type:  Physician     Filed:  2/10/2017 11:41 AM  Encounter Date:  2/10/2017 Status:  Addendum     :  Charlie Dubose MD (Physician)        Related Notes: Original Note by Charlie Dubose MD (Physician) filed at 2/10/2017 11:38 AM            Narcotic and Controlled Substance information    Today:   -- Goals: minimize narcotics if able   -- Controlled substance agreement was initiated today   -- [place] Prescription Monitoring Program was reviewed today.    -- Last urine drug screen today   -- Refills for the following medications were provided today:  Drug Quantity/Month Months Rx today   Hydrocodone/apap  90 1      -- Total Daily MME (morphine equivalents): 15    -- Talk to Dr. Tobin, see if you can wean off of clonazepam due to increased risk of death with benzos and narcotics   -- Follow-up in 1 months (approximately 17) for office visit dedicated to Schedule II medications.      Facts:    Every day, 44 people die in the [place] from overdose of prescription painkillers. (1)    Prescription drug overdose kills more people than car crashes each year. (2, 3)    Deaths from prescription painkillers among women is up 400%, and up 265% for men. (4)    Long-term use of prescription painkillers increases your risk for heart attack, broken bones, impotence (5)    By signing a medication contract, you accept these risks and side effects.    Be aware that the use of such medicine has certain risks associated with it, including, but not limited to:  Sleepiness or drowsiness, constipation, nausea, itching, vomiting, lightheadedness, dizziness, confusion, allergic reaction, slowing of breathing rate, slowing of reflexes or reaction time, kidney or liver disease,  sexual dysfunction, physical  dependence, tolerance to analgesia, addiction, withdrawal and the possibility that the medicine will not provide complete relief.  Any narcotic usage is associate with a significantly increased risk of falls and other unintended injuries, respiratory depression and increased risk for death.    I recommend against operating motor vehicles or heavy machinery when using this medication.    This medication will be strictly monitored and all of my medications should be filled at the same pharmacy.  (Should the need arise to change pharmacies our office must be informed).    It is your responsibility to share that you are on a narcotic contract with your other health care providers, including your dentist.    If you come into clinic for a dose adjustment, always bring your remaining pills and remaining hard copies of prescriptions.    References  1. CDC. http://www.cdc.gov/drugoverdose/epidemic/public.html  2. CDC. http://www.cdc.gov/drugoverdose/data/overdose.html  3. [facility]. http://strib.mn/8s4mhei  4. [facility]. http://www.cdc.gov/vitalsigns/PrescriptionPainkillerOverdoses/index.html  5. Annals of Internal Medicine   Vol. 162 No. 4   17 February 2015

## 2018-01-03 NOTE — PROGRESS NOTES
Patient Information     Patient Name MRN Sex Gilles Munoz III [id number] Male 1970      Progress Notes by Raul Campbell MD at 2017  9:30 AM     Author:  Raul Campbell MD Service:  (none) Author Type:  Physician     Filed:  2017  5:51 PM Encounter Date:  2017 Status:  Signed     :  Raul Campbell MD (Physician)            This note has been dictated. The encounter number is [id number].

## 2018-01-03 NOTE — TELEPHONE ENCOUNTER
Patient Information     Patient Name MRN Gilles Dorman III [id number] Male 1970      Telephone Encounter by Moon Cardona at 2017  4:21 PM     Author:  Moon Cardona Service:  (none) Author Type:  (none)     Filed:  2017  4:22 PM Encounter Date:  2017 Status:  Signed     :  Moon Cardona            Per Charlie Dubose MD if not smoking it should be fine.  Patient notified.  Moon Cardona LPN ....................  2017   4:22 PM

## 2018-01-03 NOTE — NURSING NOTE
Patient Information     Patient Name MRN Sex Gilles Munoz III [id number] Male 1970      Nursing Note by Crystal Chapa at 2017  9:30 AM     Author:  Cyrstal Chapa Service:  (none) Author Type:  NURS- Registered Nurse     Filed:  2017 10:20 AM Encounter Date:  2017 Status:  Signed     :  Crystal Chapa (NURS- Registered Nurse)            Patient will follow up with Charlie Dubose MD for blood pressure. CRYSTAL CHAPA ....................  2017   10:20 AM

## 2018-01-03 NOTE — PROGRESS NOTES
Patient Information     Patient Name MRN Sex Gilles Munoz III [id number] Male 1970      Progress Notes signed by Raul Campbell MD at 2017  6:24 PM      Author:  Raul Campbell MD Service:  (none) Author Type:  Physician     Filed:  2017  6:24 PM Encounter Date:  2017 Status:  Signed     :  Raul Campbell MD (Physician)            -  DATE OF SERVICE:  2017    Mr. Henning returns for followup of renal cell carcinoma, anemia, history of IgA nephropathy. We had seen the patient at the request of Dr. Charlie Dubose on 2016. At that time, he was a 46-year-old white male with multiple medical problems, primarily end-stage renal disease with a history of renal cell carcinoma. We were asked to evaluate concerning anemia and ongoing management of renal cell carcinoma.     Apparently, he originally had developed end-stage renal disease felt to be secondary to IgA nephropathy. He was treated with dialysis under the direction of Dr. Tobin, then subsequently underwent transplant and then underwent chronic transplant rejection. Apparently his left kidney was noted to have developed renal cell carcinoma. The patient subsequently underwent bilateral nephrectomy performed on 2015. 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 The right kidney was benign with end-stage renal disease. He underwent the procedure at  on 2015. Since then, he has been treated for end-stage renal disease with Dr. Tobin with chronic hemodialysis. Most recently he was noted to be anemic with a hemoglobin of 8.4 and Dr. Tobin reinstituted IV iron with Epogen replacement and his hemoglobin improved. He was interested in proceeding with a renal transplant at the [facility]. According to the patient, the transplant team wanted to rule out any evidence of  malignancy before proceeding with transplant.     When we saw the patient we felt that his hemoglobin had improved with iron and Epogen, and likely had anemia of renal failure. In terms of renal cell carcinoma we ordered a PET scan. This was done on January 12, 2017. The findings were no evidence of recurrent or metastatic disease, no evidence of malignancy. We ordered SPEP but this was not done. He did have a positive WIN. According to the patient he has seen a rheumatologist in the past.     He is here now for followup. He says he is feeling well. He offers no complaints of shortness of breath, chest pain, abdominal pain, fevers, night sweats, weight loss He is on IV iron and Epogen.     PHYSICAL EXAMINATION  GENERAL: He is a middle-aged white male in no acute distress.   VITAL SIGNS: Blood pressure 156/90, pulse 60, temperature 96.2.   HEENT: Atraumatic, normocephalic. Oropharynx is nonerythematous.   NECK: Supple.   LUNGS: Clear to auscultation and percussion.   HEART: Regular rhythm. S1, S2 normal.   ABDOMEN: Soft. Normoactive bowel sounds. No masses. Nontender.   LYMPHATICS: No cervical, supraclavicular, axillary, or inguinal nodes.   EXTREMITIES: Trace ankle edema.   NEUROLOGIC: Grossly nonfocal.     LABORATORY DATA   CBC: White count 4.2, H&H 12.6 and 37.3, platelet count 151, BUN 23, creatinine 6.01.     He also had an MRI of the thoracic spine and lumbar spine which revealed T6-T9 wedging consistent with degenerative joint disease. MRI of the lumbar spine revealed disk bulge at S1 with compression of the S1 nerve root, otherwise no evidence for malignancy.     IMPRESSION  1. 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 Right kidney revealed end-stage renal disease. Patient was considered a potential candidate for renal transplant. PET scan was  negative. MRI  thoracic and lumbar spine were negative for metastatic disease. The patient has positive WIN. We will obtain a double-stranded DNA to rule out lupus. Also SPEP was not done. Given his history of IgA nephropathy, would like to rule out underlying myeloma by obtaining serum protein electrophoresis. The patient is otherwise cancer free. He is a candidate for repeat renal cell transplant.   2. Anemia of renal failure, currently under control under the direction of Dr. Salmeron with IV iron and Aranesp injections.     Plan is otherwise to see the patient in 4 months, obtain CBC, CMP, LDH. Repeat SPEP, beta 2 microglobulin.     Forty minutes was spent with the patient. Greater than half the time was spent in counseling and coordination of care.         MD HERMAN TOTH/bindu   D:  2017 13:18:22  T:  2017 19:17:12  Voice Job ID:  [id number]  Text Job ID:  [id number]  cc:SUZETTE STEELE MD, PRIMARY PHYSICIAN  RENAL TRANSPLANT PROGRAM, [facility]  ANITA SALMERON MD         [facility] [place], [place]:  AYAAN JUNIOR  MR#:  [id number]  :  1970  DATE:  2017  LOCATION:  [facility]  ROOM:    TYPE:  [facility] NOTEPage 1 of 1

## 2018-01-03 NOTE — DISCHARGE SUMMARY
Patient Information     Patient Name MRN Sex Gilles Munoz III [id number] Male 1970      Discharge Summaries by Leilani Yang PT at 3/10/2017 10:04 AM     Author:  Leilani Yang PT Service:  (none) Author Type:  PT- Physical Therapist     Filed:  3/10/2017  3:41 PM Date of Service:  3/10/2017 10:04 AM Status:  Signed     :  Leilani Yang PT (PT- Physical Therapist)            [facility]  Outpatient PT -   Discharge/ Daily Note        Date of Service & Discharge: 3/10/2017   Visit #7  Patient Name: Gilles Henning III ([facility])  YOB: 1970   Referring MD/Provider: Sedrick  Medical and Treatment Diagnosis: Lumbar DDD, stenosis  Treatment Diagnosis:  Low back pain  Insurance: Medicare: G Codes/C Modifiers at Initial Assessment:     Start of Service: 17  Certification Dates: Start of Service: 17   Medicare/MA Re-Cert Due: 17  Re- certification: 2017 - 17    Subjective      Current status: Patient feels that he can manage symptoms well at this point, he is happy to be able to exercise and work muscles again without pain.  Back gets tired with exercise but not bad.  He did get pain up to 5/10 with long drive to bring brother home to [place] (3 hr trip) but that was the worst it's been in awhile.  No further questions and plan to d/c from PT at this time.      History of Injury/reason for PT: Patient comes in with chronic LBP from compression fxs, buldging discs, osteoporosis, etc.  He's lost another inch of his height.  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  He is still an optomistic individual and wants to stay active.  He wants to get some strength back and has started a little exercise.  His doctor wants him to make sure it is appropriate.  Patient is aware of his body, can tell he has more  "energy now that red blood cells are improving, blood level was down to 7.  In general pain in back is 5/10, but he feels it's gotten worse so he wants to stretch and strengthen more to manage it.  He wants continue on his own after we've reviewed and updated his exercise plan.  Gets dialysis on Tues, Thurs, Sat.         OBJECTIVE        TODAY'S INTERVENTION:     NuStep arms 9-10, seat 11-12, resistance 7, 5:00, around 70 steps per minute- BioDex recumbent elliptical today at resistance 8.    Stationary Bike (seat 6) resistance 4, 3:00 - HR up 92  Gastroc stretch B 2x30 sec  Quad stretch B 2x30 sec  SLS eyes closed 3-5 sec max- put 1 finger on rail to get up to 30 sec each leg  Airex Foam: feet together eyes closed x10-15 sec max, moderate sway - SBA with a few touches by therapist   Airex Foam: feet apart eyes closed with head rotations x10-15 sec max, moderate sway - SBA with a few touches by therapist     Med X machines:   Leg Press (seat 18 back middle) 60 pounds for 2 sets of 20 repetitions  Hip Adductor (back middle) 60 pounds for 2 sets 10 repetitions - tried 50# better  Hamstring Curl (back 8) 60 pounds for 20 repetitions   Hip Abductor (back middle) 40 pounds for 20 repetitions  Leg Extension (back 8) 60 pounds for 20 repetitions   Lumbar: 40#x20  Abdominals: 20#x15, 30#x15      Deferred today:  Marching with abdominal tight x30- on/ off foam  Seated Hamstring Stretch - 2 reps - 30 second hold - 2x daily  Piriformis stretch short sitting 2x30 sec B  TRX:   -squats x20  -row x20  -press ups x20  Supine Pelvic Tilt  x20 - cuing to keep pelvis on bed  Tilt with \"biking\" in air B 2 x10-20 reps (less second set)  Trunk rotation in hook lying B x5-10 reps  Prone on elbows x1 minute  Standard Plank 2 reps - 1 minute, 1 minute  Child's pose 2x30 sec   Rowing green or blue band with abdominals tight x30  Sitting slide foot forward to get stretch in shins B 2x30 sec  Upper trap stretch B 2x30 sec   Swimming " "alternating B x 20 reps - pillow under stomach and towel roll under chin      Home Exercise Program:   Original from summer 2016:   - trunk rotation in hook lying, 30\" x3 bilat (HEP) - continue  - piriformis stretch 30\" x3 bilat (HEP)- continue   - bird dog x15 (HEP)- d/c  - standing hip abd with red band and eccentric control x12 bilat (HEP) - continue with blue tied around ankles  - standing hip ext with red band and eccentric control x12 bilat (HEP) - d/c (prone)  - SL balance 30\" x3 bilat (HEP) -continue  - Written education provided on HEP/self management techniques.      Access Code: [email or web address] URL: [email or web address] Date: 01/06/2017  Prepared by: Leilani Yang   Exercises  Supine Pelvic Tilt with Straight Leg Raise - 20 reps - 1x daily  Supine 90/90 Alternating Heel Touches with Posterior Pelvic Tilt - 20 reps  Standard Plank - 2 reps - 30 hold - 1x daily  Swimming - 30 reps - 2 hold - 1x daily  Seated Table Hamstring Stretch - 2 reps - 30 second hold - 2x daily     ASSESSMENT/GOALS     Current Functional Status: LBP and overall fatigue that is causing activity modification throughout the day  Signs and symptoms consistent with decreased endurance from dialysis that is causing back pain  Pt will benefit from skilled physical therapy to address functional limitations.     Goals to be achieved within 8 weeks:   1. Pt to cont on his own and be compliant with HEP  2. Pt to maintain consistency with HEP  3. Pt to cont with stretching without increase of pain.     Patient Specific Functional and Pain Scales (PSFS) on 3/10/2017   Clinician Instructions: Complete after the history and before the exam.   Initial Assessment: We want to know what 3 activities in your life you are unable to perform, or are having the most difficulty performing, as a result of your chief problem. Please list and score at least 3 activities that you are unable to perform, or having the most difficulty performing, because " of your chief problem.   Patient Specific Activity Scoring Scheme (score one number for each activity):   Activity Score (0-10)  0= Unable to perform activity  10= Able to perform activity at same level as before injury or problem   1. ADLs: dressing, meal prep, showering   10/10   2. Stairs  9/10   3. Prolonged sitting, ie 2 hour car ride 8/10               Totals:  27/30 = 90% ability which relates to 10% impairment   Initial PSFS: 15/30 = 50% ability which relates to 50% impairment    Patient verbally states that they understand that the information they have provided above is current and complete to the best of their knowledge.   Patient Specific Functional Scale Modifier Scale Conversion: (patient's modifier that correlates with pt's score on PSFS): 5-CK (50% Impaired).     G codes and Modifier taken from patient completing the PSFS:     Goal Primary G Code and Modifier:   The Patient's G Code Goal would be: Mobility   The Patient's Impairment, Limitation or Restriction Modifier goal would be best described as: CJ - 20% - 40% Impairment.       Discharge Primary G Code and Modifier:   The Patient's status upon Discharge is Mobility    The Patient's Impairment, Limitation or Restriction Modifier would be best described as CI - 1% - 20% Impairment.       PLAN     Pt plans to cont on his own with HEP and self mgmt techniques  If pt returns, he will be seen 2x/wk for up to 8 weeks to improve core and LE strength, endurance, mobility, proprioception and to decrease pain and discomfort.     Planned Interventions:   Home Exercise Program development  Therapeutic Exercise (ROM & Strengthening)  Manual Therapy  Neuromuscular Re-education  Ultrasound including Phonophoresis with Ketoprofen  Electrical Stimulation including Iontophoresis with Dexamethasone  Therapeutic Activities  Gait Training  Mechanical Traction  Vasopneumatic Compression     Plan for next visit: Cont with PT per POC to improve/maintain ROM,  strength, endurance, proprioception and to decrease pain/discomfort.   Patient instructed to call with any questions, problems or concerns.     Student or PTA has been instructed in and demonstrates skills necessary to carry out above stated treatment plan: Yes     Thank you for your referral to [facility]. Please call with any questions, concerns or comments. [phone]

## 2018-01-03 NOTE — PROGRESS NOTES
"Patient Information     Patient Name MRN Sex Gilles Munoz III [id number] Male 1970      Progress Notes by Leilani Yang PT at 2017  2:43 PM     Author:  Leilani Yang PT  Service:  (none) Author Type:  PT- Physical Therapist     Filed:  2017  4:07 PM  Date of Service:  2017  2:43 PM Status:  Addendum     :  Leilani Yang PT (PT- Physical Therapist)        Related Notes: Original Note by Leilani Yang PT (PT- Physical Therapist) filed at 2017  3:59 PM            [facility]  Outpatient PT - Daily Note/ Re-certification        Date of Service: 2017   Visit #2  Patient Name: Gilles Henning III ([facility])  YOB: 1970   Referring MD/Provider: Sedrick  Medical and Treatment Diagnosis: Lumbar DDD, stenosis  Treatment Diagnosis:  Low back pain  Insurance: Medicare: G Codes/C Modifiers at Initial Assessment:     Start of Service: 17  Certification Dates: Start of Service: 17   Medicare/MA Re-Cert Due: 17  Re- certification: 2017 - 17    Subjective      Re-certification: due to no further PT after eval, will update plan to work with him 2x/wk up to 8 weeks from this day forward.     Current status: Patient had a f/u with Dr. Dubose to get more pain meds and was instructed to return to PT to help manage sx as well and he's motivated to follow through with PT to decrease pain.  Pt reports back pain is around 3/10 but also says he has a \"high pain tolerance\".  Overall he feels he's been doing good, had dialysis today which usually gives him energy.  His blood pressure has been getting high again so he doesn't want to over do it today.  He did get out biking 3.5 miles this weekend with great weather and pleased with it being the first time of the season, last year when started with only 1 mile but really enjoyed biking last summer.  He's been doing a lot around house, lifting and moving things.  Due to " being on pain meds, Dr. Dubose put him on a pain contract, but patient wants to get strength back so that he can go off pain meds.  Has prescription for Hydrocodone (Norco) 325-5.  Can take 3 tabs per day, doesn't need every day, did go 4 days without.  He did take one this morning before dialysis.  He reports doing some of HEP, but more keeping busy around the house.  Discussed posture and keeping transverse abdominus contracted during activity.      History of Injury/reason for PT: Patient comes in with chronic LBP from compression fxs, buldging discs, osteoporosis, etc.  He's lost another inch of his height.  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  He is still an optomistic individual and wants to stay active.  He wants to get some strength back and has started a little exercise.  His doctor wants him to make sure it is appropriate.  Patient is aware of his body, can tell he has more energy now that red blood cells are improving, blood level was down to 7.  In general pain in back is 5/10, but he feels it's gotten worse so he wants to stretch and strengthen more to manage it.  He wants continue on his own after we've reviewed and updated his exercise plan.      MRI from 2016 IMPRESSION:    1.  Nonacute appearing anterior wedge type fracture with approximately 25 percent height loss anteriorly, and very slight marrow edema extending slightly caudal to the fracture line.  2.  Mild to moderate multilevel degenerative change with bilateral frontal stenosis at all levels secondary to congenitally shortened pedicles. Bilateral ganglionic abutment at L4-5 and bilateral ganglia abutment at L5-S1.  3.  2.5 mm left parasagittal disc protrusion at L2-3 contributes to mass effect upon the left lateral aspect of the dural sac and the exiting left L3 nerve root sleeve.  4.  Slight bulge and posterior  "central disc protrusion at L4-5 contributes to mass effect upon the ventral aspect of the sac and upon the exiting L5 root sleeves.  5.  Large inferior endplate Schmorl's node at L3.        OBJECTIVE        TODAY'S INTERVENTION:     Supine Pelvic Tilt  x20  Tilt with Straight Leg Raise Bx 20 reps   Tilt with \"biking\" in air B 2 x10 reps  Trunk rotation in hook lying B 2x30 sec  Standard Plank - 3 reps 15 sec, 13 sec, then 11 sec  Swimming alternating B x 20 reps cue to not lift as high  Prone on elbows x2 minutes - sore in LB afterwards  Sitting flex x10 sec  SLS x 30 sec each with 2-3 touches    May add next time: NuStep, balance on foam, modalities to manage pain    Deferred today:   Seated Table Hamstring Stretch - 2 reps - 30 second hold - 2x daily  Piriformis stretch  Hip abd with blue band around ankles    Home Exercise Program:   Original from summer 2016:   - trunk rotation in hook lying, 30\" x3 bilat (HEP) - continue  - piriformis stretch 30\" x3 bilat (HEP)- continue   - bird dog x15 (HEP)- d/c  - standing hip abd with red band and eccentric control x12 bilat (HEP) - continue with blue tied around ankles  - standing hip ext with red band and eccentric control x12 bilat (HEP) - d/c (prone)  - SL balance 30\" x3 bilat (HEP) -continue  - Written education provided on HEP/self management techniques.      Access Code: [email or web address] URL: [email or web address] Date: 01/06/2017  Prepared by: Leilani Yang   Exercises  Supine Pelvic Tilt with Straight Leg Raise - 20 reps - 1x daily  Supine 90/90 Alternating Heel Touches with Posterior Pelvic Tilt - 20 reps  Standard Plank - 2 reps - 30 hold - 1x daily  Swimming - 30 reps - 2 hold - 1x daily  Seated Table Hamstring Stretch - 2 reps - 30 second hold - 2x daily     ASSESSMENT/GOALS     Current Functional Status: LBP and overall fatigue that is causing activity modification throughout the day  Signs and symptoms consistent with decreased endurance from dialysis " that is causing back pain  Pt will benefit from skilled physical therapy to address functional limitations.     Goals to be achieved within 8 weeks:   1. Pt to cont on his own and be compliant with HEP  2. Pt to maintain consistency with HEP  3. Pt to cont with stretching without increase of pain.     Patient Specific Functional and Pain Scales (PSFS) on 1/6/17   Clinician Instructions: Complete after the history and before the exam.   Initial Assessment: We want to know what 3 activities in your life you are unable to perform, or are having the most difficulty performing, as a result of your chief problem. Please list and score at least 3 activities that you are unable to perform, or having the most difficulty performing, because of your chief problem.   Patient Specific Activity Scoring Scheme (score one number for each activity):   Activity Score (0-10)  0= Unable to perform activity  10= Able to perform activity at same level as before injury or problem   1. ADLs: dressing, meal prep, showering 8/10   2. Stairs  4/10   3. Prolonged sitting, ie 2 hour car ride 3/10               Totals:  15/30 = 50% ability which relates to 50% impairment   Patient verbally states that they understand that the information they have provided above is current and complete to the best of their knowledge.   Patient Specific Functional Scale Modifier Scale Conversion: (patient's modifier that correlates with pt's score on PSFS): 5-CK (50% Impaired).     G codes and Modifier taken from patient completing the PSFS:   Initial Primary G Code and Modifier:   Per the Patient's intake and/or assessment the Primary G Code is: Mobility .  The Patient's Impairment, Limitation or Restriction Modifier would be best described as: CK - 40% - 60% Impairment.   Goal Primary G Code and Modifier:   The Patient's G Code Goal would be: Mobility   The Patient's Impairment, Limitation or Restriction Modifier goal would be best described as: CJ -  20% - 40% Impairment.          Patient participated in goal selection and understand(s) the plan of care: Yes, pt stated a verbal understanding  Patient Potential for Achieving Desired Outcome:  Good     FUNCTIONAL LIMITATIONS:  Chronic LBP that is causing activity modification throughout the day.  PLAN     Pt plans to cont on his own with HEP and self mgmt techniques  If pt returns, he will be seen 2x/wk for up to 8 weeks to improve core and LE strength, endurance, mobility, proprioception and to decrease pain and discomfort.     Planned Interventions:   Home Exercise Program development  Therapeutic Exercise (ROM & Strengthening)  Manual Therapy  Neuromuscular Re-education  Ultrasound including Phonophoresis with Ketoprofen  Electrical Stimulation including Iontophoresis with Dexamethasone  Therapeutic Activities  Gait Training  Mechanical Traction  Vasopneumatic Compression     Plan for next visit: Cont with PT per POC to improve/maintain ROM, strength, endurance, proprioception and to decrease pain/discomfort.   Patient instructed to call with any questions, problems or concerns.     Student or PTA has been instructed in and demonstrates skills necessary to carry out above stated treatment plan: Yes     Thank you for your referral to [facility] [place] [facility]. Please call with any questions, concerns or comments. [phone]

## 2018-01-03 NOTE — INITIAL ASSESSMENTS
Patient Information     Patient Name MRN Sex Gilles Munoz III [id number] Male 1970      Initial Assessments by Leilani Yang PT at 2017  2:06 PM     Author:  Leilani Yang PT Service:  (none) Author Type:  PT- Physical Therapist     Filed:  2017 12:55 PM Date of Service:  2017  2:06 PM Status:  Signed     :  Leilani Yang PT (PT- Physical Therapist)            [facility]  Outpatient PT - Initial Evaluation  Spine Evaluation         Date of Service: 2017   Visit #1   Patient Name: Gilles Henning III ([facility])  YOB: 1970   Referring MD/Provider: Sedrick  Medical and Treatment Diagnosis: Lumbar DDD, stenosis  Treatment Diagnosis:  Low back pain  Insurance: Medicare: G Codes/C Modifiers at Initial Assessment:     Start of Service: 17  Certification Dates: Start of Service: 17   Medicare/MA Re-Cert Due: 17    Living Situations:  Independent in Living Situation  Preadmission Functional Mobility: Independent  Precautions:  Immune system compromised   Cognition:  Oriented to Person, Place, and Time.    Were cultural / age or other special adaptations needed? No      Patient is a vulnerable adult: No      Patient is aware of diagnosis: Yes      Risks and benefits explained: Yes    Subjective      History of Injury/reason for PT: Patient comes in with chronic LBP from compression fxs, buldging discs, osteoporosis, etc.  He's lost another inch of his height.  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  He is still an optomistic individual and wants to stay active.  He wants to get some strength back and has started a little exercise.  His doctor wants him to make sure it is appropriate.  Patient is aware of his body, can tell he has more energy now that red blood cells are improving, blood level was down  "to 7.  In general pain in back is 5/10, but he feels it's gotten worse so he wants to stretch and strengthen more to manage it.  He wants continue on his own after we've reviewed and updated his exercise plan.      MRI from 2016 IMPRESSION:    1.  Nonacute appearing anterior wedge type fracture with approximately 25 percent height loss anteriorly, and very slight marrow edema extending slightly caudal to the fracture line.  2.  Mild to moderate multilevel degenerative change with bilateral frontal stenosis at all levels secondary to congenitally shortened pedicles. Bilateral ganglionic abutment at L4-5 and bilateral ganglia abutment at L5-S1.  3.  2.5 mm left parasagittal disc protrusion at L2-3 contributes to mass effect upon the left lateral aspect of the dural sac and the exiting left L3 nerve root sleeve.  4.  Slight bulge and posterior central disc protrusion at L4-5 contributes to mass effect upon the ventral aspect of the sac and upon the exiting L5 root sleeves.  5.  Large inferior endplate Schmorl's node at L3.     OCCUPATION: not employed      LIVING STATUS: lives at home with spouse and 'empty nest' to 4 kids     PATIENT GOALS: Return to activity at prior level of function.     MEDICAL HISTORY: Pt states he is on dialysis due to a kidney transfer related to hx of cancer      Pt is familiar with physical therapy and has no questions at this time.        OBJECTIVE     Fall Risk Screening:  No risk factors identified     VISUAL INSPECTION/OBSERVATION: pt is very pleasant and does not appear to be in any acute distress     ROM & MMT:   Functional trunk mobility, limited with trunk extension and flex for hamstring tightness  LE generally 4+/5     TODAY'S INTERVENTION:   Therapeutic Exercise to improve mobility, strength and endurance, reviewed previous HEP:   - trunk rotation in hook lying, 30\" x3 bilat (HEP) - continue  - piriformis stretch 30\" x3 bilat (HEP)- continue   - bird dog x15 (HEP)- d/c  - standing " "hip abd with red band and eccentric control x12 bilat (HEP) - continue with blue tied around ankles  - standing hip ext with red band and eccentric control x12 bilat (HEP) - d/c (prone)  - SL balance 30\" x3 bilat (HEP) -continue  - Written education provided on HEP/self management techniques.      OTHER: added with modifications:   Access Code: [email or web address] URL: [email or web address] Date: 01/06/2017  Prepared by: Leilani Yang     Exercises  Supine Pelvic Tilt with Straight Leg Raise - 20 reps - 1x daily  Supine 90/90 Alternating Heel Touches with Posterior Pelvic Tilt - 20 reps  Standard Plank - 2 reps - 30 hold - 1x daily  Swimming - 30 reps - 2 hold - 1x daily  Seated Table Hamstring Stretch - 2 reps - 30 second hold - 2x daily     ASSESSMENT/GOALS     Current Functional Status: LBP and overall fatigue that is causing activity modification throughout the day  Signs and symptoms consistent with decreased endurance from dialysis that is causing back pain  Pt will benefit from skilled physical therapy to address functional limitations.     Goals to be achieved within 8 weeks:   1. Pt to cont on his own and be compliant with HEP  2. Pt to maintain consistency with HEP  3. Pt to cont with stretching without increase of pain.     Patient Specific Functional and Pain Scales (PSFS) on 1/6/17   Clinician Instructions: Complete after the history and before the exam.   Initial Assessment: We want to know what 3 activities in your life you are unable to perform, or are having the most difficulty performing, as a result of your chief problem. Please list and score at least 3 activities that you are unable to perform, or having the most difficulty performing, because of your chief problem.   Patient Specific Activity Scoring Scheme (score one number for each activity):   Activity Score (0-10)  0= Unable to perform activity  10= Able to perform activity at same level as before injury or problem   1. ADLs: dressing, " meal prep, showering 8/10   2. Stairs  4/10   3. Prolonged sitting, ie 2 hour car ride 3/10               Totals:  15/30 = 50% ability which relates to 50% impairment   Patient verbally states that they understand that the information they have provided above is current and complete to the best of their knowledge.   Patient Specific Functional Scale Modifier Scale Conversion: (patient's modifier that correlates with pt's score on PSFS): 5-CK (50% Impaired).     G codes and Modifier taken from patient completing the PSFS:   Initial Primary G Code and Modifier:   Per the Patient's intake and/or assessment the Primary G Code is: Mobility .  The Patient's Impairment, Limitation or Restriction Modifier would be best described as: CK - 40% - 60% Impairment.   Goal Primary G Code and Modifier:   The Patient's G Code Goal would be: Mobility   The Patient's Impairment, Limitation or Restriction Modifier goal would be best described as: CJ - 20% - 40% Impairment.          Patient participated in goal selection and understand(s) the plan of care: Yes, pt stated a verbal understanding  Patient Potential for Achieving Desired Outcome:  Good     FUNCTIONAL LIMITATIONS:  Chronic LBP that is causing activity modification throughout the day  PLAN     Pt plans to cont on his own with HEP and self mgmt techniques  If pt returns, he will be seen 2x/wk for up to 8 weeks to improve core and LE strength, endurance, mobility, proprioception and to decrease pain and discomfort.     Planned Interventions:   Home Exercise Program development  Therapeutic Exercise (ROM & Strengthening)  Manual Therapy  Neuromuscular Re-education  Ultrasound including Phonophoresis with Ketoprofen  Electrical Stimulation including Iontophoresis with Dexamethasone  Therapeutic Activities  Gait Training  Mechanical Traction  Vasopneumatic Compression     Plan for next visit: Cont with PT per POC to improve/maintain ROM, strength, endurance,  proprioception and to decrease pain/discomfort.   Patient instructed to call with any questions, problems or concerns.     Student or PTA has been instructed in and demonstrates skills necessary to carry out above stated treatment plan: Yes     Thank you for your referral to [facility]. Please call with any questions, concerns or comments. [phone]     The signature, of the referring medical provider, on this document indicates certification of the above prescribed plan of care and is medically necessary.

## 2018-01-03 NOTE — TELEPHONE ENCOUNTER
Patient Information     Patient Name MRN Sex Gilles Munoz III [id number] Male 1970      Telephone Encounter by Bessy Clinton at 2/10/2017  1:48 PM     Author:  Bessy Clinton Service:  (none) Author Type:  (none)     Filed:  2/10/2017  1:48 PM Encounter Date:  2/10/2017 Status:  Signed     :  Bessy Clinton            Prior authorization initiated.   Bessy Clinton CMA (AAMA)........2/10/2017 1:48 PM

## 2018-01-03 NOTE — PROGRESS NOTES
Patient Information     Patient Name MRN Sex Gilles Munoz III [id number] Male 1970      Progress Notes by Leilani Yang PT at 2017 10:24 AM     Author:  Leilani Yang PT Service:  (none) Author Type:  PT- Physical Therapist     Filed:  2017 11:03 AM Date of Service:  2017 10:24 AM Status:  Signed     :  Leilani Yang PT (PT- Physical Therapist)            [facility]  Outpatient PT - Daily Note        Date of Service: 2017   Visit #4  Patient Name: Gilles Henning III ([place])  YOB: 1970   Referring MD/Provider: Sedrick  Medical and Treatment Diagnosis: Lumbar DDD, stenosis  Treatment Diagnosis:  Low back pain  Insurance: Medicare: G Codes/C Modifiers at Initial Assessment:     Start of Service: 17  Certification Dates: Start of Service: 17   Medicare/MA Re-Cert Due: 17  Re- certification: 2017 - 17    Subjective        Current status: Pain in low back 0/10, took 1 tab yesterday with bending, cleaning, etc.  Hurt a little this morning but good now.  Upper trap pain gone.  Didn't do much of HEP over weekend due to continued high BP, his nephrologist did have him increase his blood pressure meds again.      Gets dialysis on Tues, Thurs, Sat.     History of Injury/reason for PT: Patient comes in with chronic LBP from compression fxs, buldging discs, osteoporosis, etc.  He's lost another inch of his height.  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  He is still an optomistic individual and wants to stay active.  He wants to get some strength back and has started a little exercise.  His doctor wants him to make sure it is appropriate.  Patient is aware of his body, can tell he has more energy now that red blood cells are improving, blood level was down to 7.  In general pain in back is  "5/10, but he feels it's gotten worse so he wants to stretch and strengthen more to manage it.  He wants continue on his own after we've reviewed and updated his exercise plan.      MRI from 2016 IMPRESSION:    1.  Nonacute appearing anterior wedge type fracture with approximately 25 percent height loss anteriorly, and very slight marrow edema extending slightly caudal to the fracture line.  2.  Mild to moderate multilevel degenerative change with bilateral frontal stenosis at all levels secondary to congenitally shortened pedicles. Bilateral ganglionic abutment at L4-5 and bilateral ganglia abutment at L5-S1.  3.  2.5 mm left parasagittal disc protrusion at L2-3 contributes to mass effect upon the left lateral aspect of the dural sac and the exiting left L3 nerve root sleeve.  4.  Slight bulge and posterior central disc protrusion at L4-5 contributes to mass effect upon the ventral aspect of the sac and upon the exiting L5 root sleeves.  5.  Large inferior endplate Schmorl's node at L3.        OBJECTIVE        TODAY'S INTERVENTION:   BP: 177/89 HR: 86  SLS x 15 sec max B  SLS eyes closed 3-5 sec max  Marching with abdominal tight x30  Rowing blue band with abdominals tight x30  Airex Foam: feet together eyes closed x10-15 sec max, moderate sway   Piriformis stretch short sitting 2x30 sec B  Upper trap stretch B 2x30 sec   Seated Hamstring Stretch - 2 reps - 30 second hold - 2x daily  Supine Pelvic Tilt  x20 - cuing to do less intensity   Tilt with \"biking\" in air B 2 x10-20 reps (less second set)  Trunk rotation in hook lying B x5-10 reps  Prone on elbows x1 minute  Standard Plank 2 reps - 1 minute, 1 minute  Swimming alternating B x 20 reps  Child's pose 2x30 sec     171/88, HE 85 at end    Deferred with HBP- NuStep arms 12, seat -, resistance 7, 3:00      Home Exercise Program:   Original from summer 2016:   - trunk rotation in hook lying, 30\" x3 bilat (HEP) - continue  - piriformis stretch 30\" x3 bilat (HEP)- " "continue   - bird dog x15 (HEP)- d/c  - standing hip abd with red band and eccentric control x12 bilat (HEP) - continue with blue tied around ankles  - standing hip ext with red band and eccentric control x12 bilat (HEP) - d/c (prone)  - SL balance 30\" x3 bilat (HEP) -continue  - Written education provided on HEP/self management techniques.      Access Code: [email or web address] URL: [email or web address] Date: 01/06/2017  Prepared by: Leilani Yang   Exercises  Supine Pelvic Tilt with Straight Leg Raise - 20 reps - 1x daily  Supine 90/90 Alternating Heel Touches with Posterior Pelvic Tilt - 20 reps  Standard Plank - 2 reps - 30 hold - 1x daily  Swimming - 30 reps - 2 hold - 1x daily  Seated Table Hamstring Stretch - 2 reps - 30 second hold - 2x daily     ASSESSMENT/GOALS     Current Functional Status: LBP and overall fatigue that is causing activity modification throughout the day  Signs and symptoms consistent with decreased endurance from dialysis that is causing back pain  Pt will benefit from skilled physical therapy to address functional limitations.     Goals to be achieved within 8 weeks:   1. Pt to cont on his own and be compliant with HEP  2. Pt to maintain consistency with HEP  3. Pt to cont with stretching without increase of pain.     Patient Specific Functional and Pain Scales (PSFS) on 1/6/17   Clinician Instructions: Complete after the history and before the exam.   Initial Assessment: We want to know what 3 activities in your life you are unable to perform, or are having the most difficulty performing, as a result of your chief problem. Please list and score at least 3 activities that you are unable to perform, or having the most difficulty performing, because of your chief problem.   Patient Specific Activity Scoring Scheme (score one number for each activity):   Activity Score (0-10)  0= Unable to perform activity  10= Able to perform activity at same level as before injury or problem   1. " ADLs: dressing, meal prep, showering 8/10   2. Stairs  4/10   3. Prolonged sitting, ie 2 hour car ride 3/10               Totals:  15/30 = 50% ability which relates to 50% impairment   Patient verbally states that they understand that the information they have provided above is current and complete to the best of their knowledge.   Patient Specific Functional Scale Modifier Scale Conversion: (patient's modifier that correlates with pt's score on PSFS): 5-CK (50% Impaired).     G codes and Modifier taken from patient completing the PSFS:   Initial Primary G Code and Modifier:   Per the Patient's intake and/or assessment the Primary G Code is: Mobility .  The Patient's Impairment, Limitation or Restriction Modifier would be best described as: CK - 40% - 60% Impairment.   Goal Primary G Code and Modifier:   The Patient's G Code Goal would be: Mobility   The Patient's Impairment, Limitation or Restriction Modifier goal would be best described as: CJ - 20% - 40% Impairment.          Patient participated in goal selection and understand(s) the plan of care: Yes, pt stated a verbal understanding  Patient Potential for Achieving Desired Outcome:  Good     FUNCTIONAL LIMITATIONS:  Chronic LBP that is causing activity modification throughout the day.  PLAN     Pt plans to cont on his own with HEP and self mgmt techniques  If pt returns, he will be seen 2x/wk for up to 8 weeks to improve core and LE strength, endurance, mobility, proprioception and to decrease pain and discomfort.     Planned Interventions:   Home Exercise Program development  Therapeutic Exercise (ROM & Strengthening)  Manual Therapy  Neuromuscular Re-education  Ultrasound including Phonophoresis with Ketoprofen  Electrical Stimulation including Iontophoresis with Dexamethasone  Therapeutic Activities  Gait Training  Mechanical Traction  Vasopneumatic Compression     Plan for next visit: Cont with PT per POC to improve/maintain ROM, strength,  endurance, proprioception and to decrease pain/discomfort.   Patient instructed to call with any questions, problems or concerns.     Student or PTA has been instructed in and demonstrates skills necessary to carry out above stated treatment plan: Yes     Thank you for your referral to [facility]. Please call with any questions, concerns or comments. [phone]

## 2018-01-03 NOTE — TELEPHONE ENCOUNTER
Patient Information     Patient Name MRGilles Madrigal III [id number] Male 1970      Telephone Encounter by Moon Cardona at 2017  1:44 PM     Author:  Moon Cardona Service:  (none) Author Type:  (none)     Filed:  2017  1:50 PM Encounter Date:  2017 Status:  Signed     :  Moon Cardona            Patient states that Lyrica is still 80 dollars for 12 pills.  Is going to call Medicare and see what is covered that is cheaper and let us know.  Moon Cardona LPN ....................  2017   1:49 PM

## 2018-01-03 NOTE — PATIENT INSTRUCTIONS
Patient Information     Patient Name MRGilles Madrigal III [id number] Male 1970      Patient Instructions by Charlie Dubose MD at 3/16/2017  1:15 PM     Author:  Charlie Dubose MD Service:  (none) Author Type:  Physician     Filed:  3/16/2017  1:59 PM Encounter Date:  3/16/2017 Status:  Signed     :  Charlie Dubose MD (Physician)            Narcotic and Controlled Substance information    Today:   -- Goals: Stable for now, wean down in 3-6 months   -- Controlled substance agreement   -- [place] Prescription Monitoring Program was reviewed today.   -- Last urine drug screen on 17 was acceptable.   -- Refills for the following medications were provided today:  Drug Quantity/Month Months Rx today   Hydrocodone/apap 90 3      -- Total Daily MME (morphine equivalents): 15    -- Goal to wean down and possibly off in 3-6 months   -- Continue home exercise program   -- Agree with exercise at [facility] therapy dept, form signed   -- Follow-up in 3 months (approximately 17) for office visit dedicated to Schedule II medications.      Facts:    Every day, 44 people die in the [place] from overdose of prescription painkillers. (1)    Prescription drug overdose kills more people than car crashes each year. (2, 3)    Deaths from prescription painkillers among women is up 400%, and up 265% for men. (4)    Long-term use of prescription painkillers increases your risk for heart attack, broken bones, impotence (5)    By signing a medication contract, you accept these risks and side effects.    Be aware that the use of such medicine has certain risks associated with it, including, but not limited to:  Sleepiness or drowsiness, constipation, nausea, itching, vomiting, lightheadedness, dizziness, confusion, allergic reaction, slowing of breathing rate, slowing of reflexes or reaction time, kidney or liver disease,  sexual dysfunction, physical dependence, tolerance to analgesia, addiction, withdrawal and the  possibility that the medicine will not provide complete relief.  Any narcotic usage is associate with a significantly increased risk of falls and other unintended injuries, respiratory depression and increased risk for death.    I recommend against operating motor vehicles or heavy machinery when using this medication.    This medication will be strictly monitored and all of my medications should be filled at the same pharmacy.  (Should the need arise to change pharmacies our office must be informed).    It is your responsibility to share that you are on a narcotic contract with your other health care providers, including your dentist.    If you come into clinic for a dose adjustment, always bring your remaining pills and remaining hard copies of prescriptions.    References  1. CDC. http://www.cdc.gov/drugoverdose/epidemic/public.html  2. CDC. http://www.cdc.gov/drugoverdose/data/overdose.html  3. [place]. http://haris.mn/9j7tmsm  4. CDC. http://www.cdc.gov/vitalsigns/PrescriptionPainkillerOverdoses/index.html  5. Annals of Internal Medicine   Vol. 162 No. 4   17 February 2015

## 2018-01-03 NOTE — TELEPHONE ENCOUNTER
Patient Information     Patient Name MRN Sex Gilles Munoz III [id number] Male 1970      Telephone Encounter by Charlie Dubose MD at 2/10/2017  1:38 PM     Author:  Charlie Dubose MD Service:  (none) Author Type:  Physician     Filed:  2/10/2017  1:38 PM Encounter Date:  2/10/2017 Status:  Signed     :  Charlie Dubose MD (Physician)            Did not tolerate gabapentin.  Amairani VALDERRAMA    Signed, Charlie Dubose MD  Internal Medicine & Pediatrics

## 2018-01-03 NOTE — PROGRESS NOTES
Patient Information     Patient Name MRN Sex Gilles Munoz III [id number] Male 1970      Progress Notes by Leilani Yang PT at 2017  1:16 PM     Author:  Leilani Yang PT Service:  (none) Author Type:  PT- Physical Therapist     Filed:  2017  3:48 PM Date of Service:  2017  1:16 PM Status:  Signed     :  Leilani Yang PT (PT- Physical Therapist)            [facility]  Outpatient PT - Daily Note        Date of Service: 2017   Visit #3  Patient Name: Gilles Henning III ([facility])  YOB: 1970   Referring MD/Provider: Sedrick  Medical and Treatment Diagnosis: Lumbar DDD, stenosis  Treatment Diagnosis:  Low back pain  Insurance: Medicare: G Codes/C Modifiers at Initial Assessment:     Start of Service: 17  Certification Dates: Start of Service: 17   Medicare/MA Re-Cert Due: 17  Re- certification: 2017 - 17    Subjective        Current status: Patient got sore in upper traps, still a little sore, BP had been over 200/105 a couple days ago.  Had call from transplant that he's eligible to be on list for 2 years again.  Back pain now 2/10 didn't need pain meds today.  Gets dialysis on Tues, Thurs, Sat.     History of Injury/reason for PT: Patient comes in with chronic LBP from compression fxs, buldging discs, osteoporosis, etc.  He's lost another inch of his height.  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  He is still an optomistic individual and wants to stay active.  He wants to get some strength back and has started a little exercise.  His doctor wants him to make sure it is appropriate.  Patient is aware of his body, can tell he has more energy now that red blood cells are improving, blood level was down to 7.  In general pain in back is 5/10, but he feels it's gotten worse so he wants  "to stretch and strengthen more to manage it.  He wants continue on his own after we've reviewed and updated his exercise plan.      MRI from 2016 IMPRESSION:    1.  Nonacute appearing anterior wedge type fracture with approximately 25 percent height loss anteriorly, and very slight marrow edema extending slightly caudal to the fracture line.  2.  Mild to moderate multilevel degenerative change with bilateral frontal stenosis at all levels secondary to congenitally shortened pedicles. Bilateral ganglionic abutment at L4-5 and bilateral ganglia abutment at L5-S1.  3.  2.5 mm left parasagittal disc protrusion at L2-3 contributes to mass effect upon the left lateral aspect of the dural sac and the exiting left L3 nerve root sleeve.  4.  Slight bulge and posterior central disc protrusion at L4-5 contributes to mass effect upon the ventral aspect of the sac and upon the exiting L5 root sleeves.  5.  Large inferior endplate Schmorl's node at L3.        OBJECTIVE        TODAY'S INTERVENTION:     SLS x 15 sec max B  NuStep arms 12, seat -, resistance 7, 3:00 BP after 178/82  Prone on elbows x1 minute  Swimming alternating B x 20 reps  Standard Plank 2 reps - 1 minute, 1 minute  Supine Pelvic Tilt  x20 - cuing to do less intensity   Tilt with Straight Leg Raise Bx 20 reps   Tilt with \"biking\" in air B 2 x10 reps  Trunk rotation in hook lying B 2x30 sec  Seated Hamstring Stretch - 2 reps - 30 second hold - 2x daily  Piriformis stretch short sitting 2x30 sec B  Sitting flex x10 sec  155/92,   Upper trap stretch B 2x30 sec     May add next time: balance on foam, modalities to manage pain    Home Exercise Program:   Original from summer 2016:   - trunk rotation in hook lying, 30\" x3 bilat (HEP) - continue  - piriformis stretch 30\" x3 bilat (HEP)- continue   - bird dog x15 (HEP)- d/c  - standing hip abd with red band and eccentric control x12 bilat (HEP) - continue with blue tied around ankles  - standing hip ext with red " "band and eccentric control x12 bilat (HEP) - d/c (prone)  - SL balance 30\" x3 bilat (HEP) -continue  - Written education provided on HEP/self management techniques.      Access Code: [email or web address] URL: [email or web address] Date: 01/06/2017  Prepared by: Leilani Yang   Exercises  Supine Pelvic Tilt with Straight Leg Raise - 20 reps - 1x daily  Supine 90/90 Alternating Heel Touches with Posterior Pelvic Tilt - 20 reps  Standard Plank - 2 reps - 30 hold - 1x daily  Swimming - 30 reps - 2 hold - 1x daily  Seated Table Hamstring Stretch - 2 reps - 30 second hold - 2x daily     ASSESSMENT/GOALS     Current Functional Status: LBP and overall fatigue that is causing activity modification throughout the day  Signs and symptoms consistent with decreased endurance from dialysis that is causing back pain  Pt will benefit from skilled physical therapy to address functional limitations.     Goals to be achieved within 8 weeks:   1. Pt to cont on his own and be compliant with HEP  2. Pt to maintain consistency with HEP  3. Pt to cont with stretching without increase of pain.     Patient Specific Functional and Pain Scales (PSFS) on 1/6/17   Clinician Instructions: Complete after the history and before the exam.   Initial Assessment: We want to know what 3 activities in your life you are unable to perform, or are having the most difficulty performing, as a result of your chief problem. Please list and score at least 3 activities that you are unable to perform, or having the most difficulty performing, because of your chief problem.   Patient Specific Activity Scoring Scheme (score one number for each activity):   Activity Score (0-10)  0= Unable to perform activity  10= Able to perform activity at same level as before injury or problem   1. ADLs: dressing, meal prep, showering 8/10   2. Stairs  4/10   3. Prolonged sitting, ie 2 hour car ride 3/10               Totals:  15/30 = 50% ability which relates to 50% impairment "   Patient verbally states that they understand that the information they have provided above is current and complete to the best of their knowledge.   Patient Specific Functional Scale Modifier Scale Conversion: (patient's modifier that correlates with pt's score on PSFS): 5-CK (50% Impaired).     G codes and Modifier taken from patient completing the PSFS:   Initial Primary G Code and Modifier:   Per the Patient's intake and/or assessment the Primary G Code is: Mobility .  The Patient's Impairment, Limitation or Restriction Modifier would be best described as: CK - 40% - 60% Impairment.   Goal Primary G Code and Modifier:   The Patient's G Code Goal would be: Mobility   The Patient's Impairment, Limitation or Restriction Modifier goal would be best described as: CJ - 20% - 40% Impairment.          Patient participated in goal selection and understand(s) the plan of care: Yes, pt stated a verbal understanding  Patient Potential for Achieving Desired Outcome:  Good     FUNCTIONAL LIMITATIONS:  Chronic LBP that is causing activity modification throughout the day.  PLAN     Pt plans to cont on his own with HEP and self mgmt techniques  If pt returns, he will be seen 2x/wk for up to 8 weeks to improve core and LE strength, endurance, mobility, proprioception and to decrease pain and discomfort.     Planned Interventions:   Home Exercise Program development  Therapeutic Exercise (ROM & Strengthening)  Manual Therapy  Neuromuscular Re-education  Ultrasound including Phonophoresis with Ketoprofen  Electrical Stimulation including Iontophoresis with Dexamethasone  Therapeutic Activities  Gait Training  Mechanical Traction  Vasopneumatic Compression     Plan for next visit: Cont with PT per POC to improve/maintain ROM, strength, endurance, proprioception and to decrease pain/discomfort.   Patient instructed to call with any questions, problems or concerns.     Student or PTA has been instructed in and demonstrates  skills necessary to carry out above stated treatment plan: Yes     Thank you for your referral to [facility]. Please call with any questions, concerns or comments. [phone]

## 2018-01-03 NOTE — TELEPHONE ENCOUNTER
Patient Information     Patient Name MRN Sex Gilles Munoz III [id number] Male 1970      Telephone Encounter by Charlie Dubose MD at 2/10/2017  4:58 PM     Author:  Charlie Dubose MD Service:  (none) Author Type:  Physician     Filed:  2/10/2017  4:58 PM Encounter Date:  2/10/2017 Status:  Signed     :  Charlie Dubose MD (Physician)            We forgot to collect his toxassure.  Have him come in 17 to collect one.    Signed, Charlie Dubose MD  Internal Medicine & Pediatrics

## 2018-01-03 NOTE — NURSING NOTE
Patient Information     Patient Name MRN Sex Gilles Munoz III [id number] Male 1970      Nursing Note by Bella Galdamez at 2017  9:30 AM     Author:  Bella Galdamez Service:  (none) Author Type:  NURS- Registered Nurse     Filed:  2017 10:16 AM Encounter Date:  2017 Status:  Signed     :  Bella Galdamez (NURS- Registered Nurse)            Labs ordered per written order sheet and sent to provider for co-sign. Bella Galdamez RN 2017  10:16 AM

## 2018-01-03 NOTE — NURSING NOTE
Patient Information     Patient Name MRN Sex Gilles Munoz III [id number] Male 1970      Nursing Note by Isabel Burger at 2017  9:30 AM     Author:  Isabel Burger Service:  (none) Author Type:  (none)     Filed:  2017 10:05 AM Encounter Date:  2017 Status:  Signed     :  Isabel Burger            Pt here to f/u on his recent PET scan and other scans.  Isabel Burger CMA (AAMA)......................2017  9:27 AM

## 2018-01-03 NOTE — PROGRESS NOTES
Patient Information     Patient Name MRN Sex Gilles Munoz III [id number] Male 1970      Progress Notes by Charlie Dubose MD at 3/16/2017  1:15 PM     Author:  Charlie Dubose MD Service:  (none) Author Type:  Physician     Filed:  3/16/2017  6:13 PM Encounter Date:  3/16/2017 Status:  Signed     :  Charlie Dubose MD (Physician)            Subjective  Gilles Henning III is a 47 y.o. male who presents for controlled substance medication management.  He's been having ongoing back pain. It feels a little worse today. He thinks he's making headway with his physical therapy. He's completed his program and plans to continue home exercise program. He plans to keep exercising at the [facility] physical therapy exercise room and hopes I signed the form he needed. He continues on hydrocodone-acetaminophen 5-3 25. This has been controlling his pain. He would like to get off of this medication if possible. He's happy that he feels like he's getting closer to the kidney transplant list.    Allergies: reviewed in EMR  Medications: reviewed in EMR  Problem List/PMH: reviewed in EMR    Social Hx:  Social History       Substance Use Topics         Smoking status:   Never Smoker     Smokeless tobacco:   Current User     Types:  Chew      Comment: 1 tin lasts 2 days      Alcohol use   No     Social History Narrative    Has moved into a new home. Lives with his girlfriend. Has worked at [employer] in the past. Lots of financial concerns/pressures. 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        Family Hx:   Family History       Problem   Relation Age of Onset     Other  Mother      parkinson disease       Diabetes  Father      Heart Disease  Father        Objective  Vitals: reviewed in EMR.    Visit Vitals       /80     Pulse 76     Wt 62 kg (136 lb 9.6 oz)     BMI 19.6 kg/m2       Gen: Pleasant male, NAD.  HEENT: MMM  Neck: Supple  Pulm: Breathing easily  Neuro: Grossly intact  Skin: No  concerning lesions.  Psychiatric: Normal affect and insight. Does not appear anxious or depressed.    Assessment:    ICD-10-CM    1. Lumbar radiculopathy M54.16 HYDROcodone-acetaminophen, 5-325 mg, (NORCO) per tablet      DISCONTINUED: HYDROcodone-acetaminophen, 5-325 mg, (NORCO) per tablet      DISCONTINUED: HYDROcodone-acetaminophen, 5-325 mg, (NORCO) per tablet   2. IGA NEPHROPATHY N05.9    3. s/p kidney transplant Z94.0      Orders Placed This Encounter       DISCONTD: HYDROcodone-acetaminophen, 5-325 mg, (NORCO) per tablet      Sig: Take 1 tablet by mouth 3 times daily  Refill on/after 3/16/2017     Dispense:  90 tablet     Refill:  0     DISCONTD: HYDROcodone-acetaminophen, 5-325 mg, (NORCO) per tablet      Sig: Take 1 tablet by mouth 3 times daily  Refill on/after 4/15/2017     Dispense:  90 tablet     Refill:  0     HYDROcodone-acetaminophen, 5-325 mg, (NORCO) per tablet      Sig: Take 1 tablet by mouth 3 times daily  Refill on/after 5/14/2017     Dispense:  90 tablet     Refill:  0     I again discussed the risks associated with the use of long-term benzodiazepines and narcotics including but not limited to death. I encouraged him to discuss this with his nephrologist, who has been prescribing the benzodiazepines.    Plan   -- Expected clinical course discussed   -- Medications and their side effects discussed   Patient Instructions     Narcotic and Controlled Substance information    Today:   -- Goals: Stable for now, wean down in 3-6 months   -- Controlled substance agreement   -- [place] Prescription Monitoring Program was reviewed today.   -- Last urine drug screen on 2/14/17 was acceptable.   -- Refills for the following medications were provided today:  Drug Quantity/Month Months Rx today   Hydrocodone/apap 90 3      -- Total Daily MME (morphine equivalents): 15    -- Goal to wean down and possibly off in 3-6 months   -- Continue home exercise program   -- Agree with exercise at [facility] therapy dept, form  signed   -- Follow-up in 3 months (approximately 06/14/17) for office visit dedicated to Schedule II medications.      Facts:    Every day, 44 people die in the [place] from overdose of prescription painkillers. (1)    Prescription drug overdose kills more people than car crashes each year. (2, 3)    Deaths from prescription painkillers among women is up 400%, and up 265% for men. (4)    Long-term use of prescription painkillers increases your risk for heart attack, broken bones, impotence (5)    By signing a medication contract, you accept these risks and side effects.    Be aware that the use of such medicine has certain risks associated with it, including, but not limited to:  Sleepiness or drowsiness, constipation, nausea, itching, vomiting, lightheadedness, dizziness, confusion, allergic reaction, slowing of breathing rate, slowing of reflexes or reaction time, kidney or liver disease,  sexual dysfunction, physical dependence, tolerance to analgesia, addiction, withdrawal and the possibility that the medicine will not provide complete relief.  Any narcotic usage is associate with a significantly increased risk of falls and other unintended injuries, respiratory depression and increased risk for death.    I recommend against operating motor vehicles or heavy machinery when using this medication.    This medication will be strictly monitored and all of my medications should be filled at the same pharmacy.  (Should the need arise to change pharmacies our office must be informed).    It is your responsibility to share that you are on a narcotic contract with your other health care providers, including your dentist.    If you come into clinic for a dose adjustment, always bring your remaining pills and remaining hard copies of prescriptions.    References  1. CDC. http://www.cdc.gov/drugoverdose/epidemic/public.html  2. CDC. http://www.cdc.gov/drugoverdose/data/overdose.html  3. [place]. http://strib.mn/3l4oiji  4.  CDC. http://www.cdc.gov/vitalsigns/PrescriptionPainkillerOverdoses/index.html  5. Annals of Internal Medicine   Vol. 162 No. 4   17 February 2015        Signed, Charlie Dubose MD  Internal Medicine & Pediatrics

## 2018-01-03 NOTE — TELEPHONE ENCOUNTER
Patient Information     Patient Name MRN Gilles Dorman III [id number] Male 1970      Telephone Encounter by Moon Cardona at 2017  8:42 AM     Author:  Moon Cardona Service:  (none) Author Type:  (none)     Filed:  2017  8:43 AM Encounter Date:  2/10/2017 Status:  Signed     :  Moon Cardona            Patient is wondering if he can come after dialysis tomorrow.  He is very tired today, 3rd day of not having dialysis.  Moon Cardona LPN ....................  2017   8:43 AM

## 2018-01-03 NOTE — PROGRESS NOTES
Patient Information     Patient Name MRN Sex Gilles Munoz III [id number] Male 1970      Progress Notes by Leilani Yang PT at 3/1/2017  2:28 PM     Author:  Leilani Yang PT Service:  (none) Author Type:  PT- Physical Therapist     Filed:  3/1/2017  3:18 PM Date of Service:  3/1/2017  2:28 PM Status:  Signed     :  Leilani Yang PT (PT- Physical Therapist)            [facility]  Outpatient PT - Daily Note        Date of Service: 3/1/2017   Visit #5  Patient Name: Gilles Henning III (Poncho)  YOB: 1970   Referring MD/Provider: Sedrick  Medical and Treatment Diagnosis: Lumbar DDD, stenosis  Treatment Diagnosis:  Low back pain  Insurance: Medicare: G Codes/C Modifiers at Initial Assessment:     Start of Service: 17  Certification Dates: Start of Service: 17   Medicare/MA Re-Cert Due: 17  Re- certification: 2017 - 17    Subjective        Current status: Pain in low back 0/10, some burning in along side L scap.  Did a lot of moving boxes and dragging furniture yesterday doing some spring cleaning.  R shin more sore after driving.  Putting on a little weight with supplement.  He did cramp up in calves during dialysis yesterday.  No pain pills yesterday or today, did take one Monday.  Didn't do HEP yesterday but did a lot of work around house.  Monday night had to go to ER with HBP over 208/103.  Put on new BP med and still on the other one for evenings.      History of Injury/reason for PT: Patient comes in with chronic LBP from compression fxs, buldging discs, osteoporosis, etc.  He's lost another inch of his height.  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  He is still an optomistic individual and wants to stay active.  He wants to get some strength back and has started a little exercise.  His doctor  wants him to make sure it is appropriate.  Patient is aware of his body, can tell he has more energy now that red blood cells are improving, blood level was down to 7.  In general pain in back is 5/10, but he feels it's gotten worse so he wants to stretch and strengthen more to manage it.  He wants continue on his own after we've reviewed and updated his exercise plan.  Gets dialysis on Tues, Thurs, Sat.     MRI from 2016 IMPRESSION:    1.  Nonacute appearing anterior wedge type fracture with approximately 25 percent height loss anteriorly, and very slight marrow edema extending slightly caudal to the fracture line.  2.  Mild to moderate multilevel degenerative change with bilateral frontal stenosis at all levels secondary to congenitally shortened pedicles. Bilateral ganglionic abutment at L4-5 and bilateral ganglia abutment at L5-S1.  3.  2.5 mm left parasagittal disc protrusion at L2-3 contributes to mass effect upon the left lateral aspect of the dural sac and the exiting left L3 nerve root sleeve.  4.  Slight bulge and posterior central disc protrusion at L4-5 contributes to mass effect upon the ventral aspect of the sac and upon the exiting L5 root sleeves.  5.  Large inferior endplate Schmorl's node at L3.        OBJECTIVE        TODAY'S INTERVENTION:   BP: 158/89 HR: 75  SLS x 15 sec max B  SLS eyes closed 3-5 sec max  Seated Hamstring Stretch - 2 reps - 30 second hold - 2x daily  Marching with abdominal tight x30  Gastroc stretch B 2x30 sec  NuStep arms 10-12, seat 10, resistance 5, 5:00, around 70 steps per minute  Quad stretch B 2x30 sec  Rowing green or blue band with abdominals tight x30  Sitting slide foot forward to get stretch in shins B 2x30 sec  Airex Foam: feet together eyes closed x10-15 sec max, moderate sway   Airex Foam: feet together eyes closed with head rotations x10-15 sec max, moderate sway   Piriformis stretch short sitting 2x30 sec B  Upper trap stretch B 2x30 sec   Supine Pelvic Tilt   "x20 - cuing to do less intensity   Tilt with \"biking\" in air B 2 x10-20 reps (less second set)  Trunk rotation in hook lying B x5-10 reps  Prone on elbows x1 minute  Standard Plank 2 reps - 1 minute, 1 minute  Swimming alternating B x 20 reps - pillow under stomach and towel roll under chin  Child's pose 2x30 sec   143/84, HE 81 at end        Home Exercise Program:   Original from summer 2016:   - trunk rotation in hook lying, 30\" x3 bilat (HEP) - continue  - piriformis stretch 30\" x3 bilat (HEP)- continue   - bird dog x15 (HEP)- d/c  - standing hip abd with red band and eccentric control x12 bilat (HEP) - continue with blue tied around ankles  - standing hip ext with red band and eccentric control x12 bilat (HEP) - d/c (prone)  - SL balance 30\" x3 bilat (HEP) -continue  - Written education provided on HEP/self management techniques.      Access Code: [email or web address] URL: [email or web address] Date: 01/06/2017  Prepared by: Leilani Yang   Exercises  Supine Pelvic Tilt with Straight Leg Raise - 20 reps - 1x daily  Supine 90/90 Alternating Heel Touches with Posterior Pelvic Tilt - 20 reps  Standard Plank - 2 reps - 30 hold - 1x daily  Swimming - 30 reps - 2 hold - 1x daily  Seated Table Hamstring Stretch - 2 reps - 30 second hold - 2x daily     ASSESSMENT/GOALS     Current Functional Status: LBP and overall fatigue that is causing activity modification throughout the day  Signs and symptoms consistent with decreased endurance from dialysis that is causing back pain  Pt will benefit from skilled physical therapy to address functional limitations.     Goals to be achieved within 8 weeks:   1. Pt to cont on his own and be compliant with HEP  2. Pt to maintain consistency with HEP  3. Pt to cont with stretching without increase of pain.     Patient Specific Functional and Pain Scales (PSFS) on 1/6/17   Clinician Instructions: Complete after the history and before the exam.   Initial Assessment: We want to know " what 3 activities in your life you are unable to perform, or are having the most difficulty performing, as a result of your chief problem. Please list and score at least 3 activities that you are unable to perform, or having the most difficulty performing, because of your chief problem.   Patient Specific Activity Scoring Scheme (score one number for each activity):   Activity Score (0-10)  0= Unable to perform activity  10= Able to perform activity at same level as before injury or problem   1. ADLs: dressing, meal prep, showering 8/10   2. Stairs  4/10   3. Prolonged sitting, ie 2 hour car ride 3/10               Totals:  15/30 = 50% ability which relates to 50% impairment   Patient verbally states that they understand that the information they have provided above is current and complete to the best of their knowledge.   Patient Specific Functional Scale Modifier Scale Conversion: (patient's modifier that correlates with pt's score on PSFS): 5-CK (50% Impaired).     G codes and Modifier taken from patient completing the PSFS:   Initial Primary G Code and Modifier:   Per the Patient's intake and/or assessment the Primary G Code is: Mobility .  The Patient's Impairment, Limitation or Restriction Modifier would be best described as: CK - 40% - 60% Impairment.   Goal Primary G Code and Modifier:   The Patient's G Code Goal would be: Mobility   The Patient's Impairment, Limitation or Restriction Modifier goal would be best described as: CJ - 20% - 40% Impairment.          Patient participated in goal selection and understand(s) the plan of care: Yes, pt stated a verbal understanding  Patient Potential for Achieving Desired Outcome:  Good     FUNCTIONAL LIMITATIONS:  Chronic LBP that is causing activity modification throughout the day.  PLAN     Pt plans to cont on his own with HEP and self mgmt techniques  If pt returns, he will be seen 2x/wk for up to 8 weeks to improve core and LE strength, endurance,  mobility, proprioception and to decrease pain and discomfort.     Planned Interventions:   Home Exercise Program development  Therapeutic Exercise (ROM & Strengthening)  Manual Therapy  Neuromuscular Re-education  Ultrasound including Phonophoresis with Ketoprofen  Electrical Stimulation including Iontophoresis with Dexamethasone  Therapeutic Activities  Gait Training  Mechanical Traction  Vasopneumatic Compression     Plan for next visit: Cont with PT per POC to improve/maintain ROM, strength, endurance, proprioception and to decrease pain/discomfort.   Patient instructed to call with any questions, problems or concerns.     Student or PTA has been instructed in and demonstrates skills necessary to carry out above stated treatment plan: Yes     Thank you for your referral to [facility]. Please call with any questions, concerns or comments. [phone]

## 2018-01-03 NOTE — PROGRESS NOTES
"Patient Information     Patient Name MRN Sex Gilles Munoz III [id number] Male 1970      Progress Notes by Charlie Dubose MD at 2/10/2017 11:00 AM     Author:  Charlie Dubose MD Service:  (none) Author Type:  Physician     Filed:  2/10/2017  5:00 PM Encounter Date:  2/10/2017 Status:  Signed     :  Charlie Dubose MD (Physician)            Subjective  Gilles Henning III is a 47 y.o. male who presents for ongoing back pain. He's wondering if he can get some additional hydrocodone. It helps but doesn't last very long. His pain is \"up and down the back.\" Worse with any movement including housework and better with lying down or hydrocodone. It also radiates around the left lateral thigh. Describes it as sharp or burning in sensation. Better in the morning and worse throughout the day. He went to physical therapy once. He was told he was on track with his exercises. He had previously been on gabapentin in the past which was discontinued by his nephrologist due to muscle twitching. Her notes were personally reviewed with the patient today. He is glad to recently learned that he is discharged from his oncologist and feels like he is getting closer to another kidney transplant. He's looking forward to getting back to biking in the summer.    Problem List/PMH: reviewed in EMR, and made relevant updates today.  Medications: reviewed in EMR, and made relevant updates today.  Allergies: reviewed in EMR, and made relevant updates today.    Social Hx:  Social History       Substance Use Topics         Smoking status:   Never Smoker     Smokeless tobacco:   Current User     Types:  Chew      Comment: 1 tin lasts 2 days      Alcohol use   No     Social History Narrative    Has moved into a new home. Lives with his girlfriend. Has worked at [employer] in the past. Lots of financial concerns/pressures. 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      I reviewed social history and made relevant updates today.    Family " Hx:   Family History       Problem   Relation Age of Onset     Other  Mother      parkinson disease       Diabetes  Father      Heart Disease  Father        Objective  Vitals: reviewed in EMR.    Visit Vitals       /90     Pulse 80     Wt 62.1 kg (136 lb 12.8 oz)     BMI 19.63 kg/m2       Gen: Pleasant male, NAD.  HEENT: MMM  Neck: Supple  Pulm: Breathing easily  Neuro: Grossly intact  Skin: No concerning lesions.  Psychiatric: Normal affect and insight. Does not appear anxious or depressed.    MRI lumbar and thoracic spine reports dated January 2, 2017 were personally reviewed with the patient today.    Assessment    ICD-10-CM    1. Lumbar radiculopathy M54.16 AMB CONSULT TO PHYSICAL THERAPY      HYDROcodone-acetaminophen, 5-325 mg, (NORCO) per tablet      pregabalin (LYRICA) 25 mg capsule      COMPLIANCE DRUG ANALYSIS   2. CKD (chronic kidney disease) stage V requiring chronic dialysis ([facility]) N18.6 AMB CONSULT TO PHYSICAL THERAPY     Z99.2 COMPLIANCE DRUG ANALYSIS   3. s/p kidney transplant Z94.0 AMB CONSULT TO PHYSICAL THERAPY      COMPLIANCE DRUG ANALYSIS   4. Pain medication agreement Z79.899 AMB CONSULT TO PHYSICAL THERAPY      COMPLIANCE DRUG ANALYSIS       Since he's been using pain medications now for 3 months it's time to initiate a pain medication agreement. He's been getting Klonopin from Dr. Tobin related to mild clonus related to dialysis. We discussed the increased risk of death related to concomitant use of benzodiazepine's and narcotics. Strongly encouraged to return to physical therapy. I think initiating Lyrica may help with underlying neuropathic component of pain and help minimize the use of narcotics.    Plan   -- Expected clinical course discussed   -- Medications and their side effects discussed  Patient Instructions     Narcotic and Controlled Substance information    Today:   -- Goals: minimize narcotics if able   -- Controlled substance agreement was initiated today   -- [place]  Pres[place] today.    -- Last urine drug screen today   -- Refills for the following medications were provided today:  Drug Quantity/Month Months Rx today   Hydrocodone/apap 5/325 90 1      -- Total Daily MME (morphine equivalents): 15    -- Talk to Dr. Tobin, see if you can wean off of clonazepam due to increased risk of death with benzos and narcotics   -- Follow-up in 1 months (approximately 03/11/17) for office visit dedicated to Schedule II medications.      Facts:    Every day, 44 people die in the [place] from overdose of prescription painkillers. (1)    Prescription drug overdose kills more people than car crashes each year. (2, 3)    Deaths from prescription painkillers among women is up 400%, and up 265% for men. (4)    Long-term use of prescription painkillers increases your risk for heart attack, broken bones, impotence (5)    By signing a medication contract, you accept these risks and side effects.    Be aware that the use of such medicine has certain risks associated with it, including, but not limited to:  Sleepiness or drowsiness, constipation, nausea, itching, vomiting, lightheadedness, dizziness, confusion, allergic reaction, slowing of breathing rate, slowing of reflexes or reaction time, kidney or liver disease,  sexual dysfunction, physical dependence, tolerance to analgesia, addiction, withdrawal and the possibility that the medicine will not provide complete relief.  Any narcotic usage is associate with a significantly increased risk of falls and other unintended injuries, respiratory depression and increased risk for death.    I recommend against operating motor vehicles or heavy machinery when using this medication.    This medication will be strictly monitored and all of my medications should be filled at the same pharmacy.  (Should the need arise to change pharmacies our office must be informed).    It is your responsibility to share that you are on a narcotic  contract with your other health care providers, including your dentist.    If you come into clinic for a dose adjustment, always bring your remaining pills and remaining hard copies of prescriptions.    References  1. CDC. http://www.cdc.gov/drugoverdose/epidemic/public.html  2. CDC. http://www.cdc.gov/drugoverdose/data/overdose.html  3. [facility] [place]. http://strib.mn/6s6lokk  4. [facility]. http://www.cdc.gov/vitalsigns/PrescriptionPainkillerOverdoses/index.html  5. Annals of Internal Medicine   Vol. 162 No. 4   17 February 2015      Return in about 4 weeks (around 3/10/2017) for Schedule II visit.    Signed, Charlie Dubose MD  Internal Medicine & Pediatrics    Cc Dr. Samantha Tobin [facility]

## 2018-01-03 NOTE — TELEPHONE ENCOUNTER
Patient Information     Patient Name MRN Gilles Dorman III [id number] Male 1970      Telephone Encounter by Moon Cardona at 2017 10:23 AM     Author:  Moon Cardona Service:  (none) Author Type:  (none)     Filed:  2017 10:23 AM Encounter Date:  2/10/2017 Status:  Signed     :  Moon Cardona            Patient notified and transferred to [facility] to make lab appt.  Moon Cardona LPN ....................  2017   10:23 AM

## 2018-01-03 NOTE — PROGRESS NOTES
Patient Information     Patient Name MRN Sex Gilles Munoz III [id number] Male 1970      Progress Notes by Maliha Corona at 2017  8:16 AM     Author:  Maliha Corona Service:  (none) Author Type:  Other Clinical Staff     Filed:  2017  8:16 AM Date of Service:  2017  8:16 AM Status:  Signed     :  Maliha Corona (Other Clinical Staff)            Falls Risk Criteria:    Age 65 and older or under age 4        Sensory deficits    Poor vision    Use of ambulatory aides    Impaired judgment    Unable to walk independently    Meets High Risk criteria for falls:  Yes  .risaks             1.  Do you have dizziness or vertigo?    yes                    2.  Do you need help standing or walking?   no                 3.  Have you fallen within the last 6 months?    no           4.  Has the patient been fasting?      no       If any risks are marked Yes, the following interventions are utilized:    Do not leave patient unattended     Assist patient in the dressing room and bathroom    Have ambulatory aides available throughout procedure    Involve patient s family if available

## 2018-01-03 NOTE — NURSING NOTE
Patient Information     Patient Name MRN Sex Gilles Munoz III [id number] Male 1970      Nursing Note by Moon Cardona at 3/16/2017  1:15 PM     Author:  Moon Cardona Service:  (none) Author Type:  (none)     Filed:  3/16/2017  1:48 PM Encounter Date:  3/16/2017 Status:  Signed     :  Moon Cardona            Patient presents to clinic for medication management today for back pain.  States back is very painful and rates pain at a 7/10.  Moon Cardona LPN ....................  3/16/2017   1:27 PM

## 2018-01-03 NOTE — PROGRESS NOTES
Patient Information     Patient Name MRN Sex Gilles Munoz III [id number] Male 1970      Progress Notes by Leilani Yang PT at 3/8/2017 10:24 AM     Author:  Leilani Yang PT Service:  (none) Author Type:  PT- Physical Therapist     Filed:  3/8/2017 12:55 PM Date of Service:  3/8/2017 10:24 AM Status:  Signed     :  Leilani Yang PT (PT- Physical Therapist)            [facility]  Outpatient PT - Daily Note        Date of Service: 3/8/2017   Visit #6  Patient Name: Gilles Henning III ([facility])  YOB: 1970   Referring MD/Provider: Sedrick  Medical and Treatment Diagnosis: Lumbar DDD, stenosis  Treatment Diagnosis:  Low back pain  Insurance: Medicare: G Codes/C Modifiers at Initial Assessment:     Start of Service: 17  Certification Dates: Start of Service: 17   Medicare/MA Re-Cert Due: 17  Re- certification: 2017 - 17    Subjective        Current status: Pain on Monday had to take pain pill, pain level up to 6/10 R low back, wasn't feeling good anyway on 3rd day from dalysis.  Could feel sharp pain when he leaned over.  Felt much better and no more pain by Tuesday after dialysis (6:45-10:00), had canceled PT on Monday and re-scheduled to Friday.  Did good workouts Sat and Sun and had a drive to [place] to  his brother on Sat.      History of Injury/reason for PT: Patient comes in with chronic LBP from compression fxs, buldging discs, osteoporosis, etc.  He's lost another inch of his height.  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  He is still an optomistic individual and wants to stay active.  He wants to get some strength back and has started a little exercise.  His doctor wants him to make sure it is appropriate.  Patient is aware of his body, can tell he has more energy now that  "red blood cells are improving, blood level was down to 7.  In general pain in back is 5/10, but he feels it's gotten worse so he wants to stretch and strengthen more to manage it.  He wants continue on his own after we've reviewed and updated his exercise plan.  Gets dialysis on Tues, Thurs, Sat.     MRI from 2016 IMPRESSION:    1.  Nonacute appearing anterior wedge type fracture with approximately 25 percent height loss anteriorly, and very slight marrow edema extending slightly caudal to the fracture line.  2.  Mild to moderate multilevel degenerative change with bilateral frontal stenosis at all levels secondary to congenitally shortened pedicles. Bilateral ganglionic abutment at L4-5 and bilateral ganglia abutment at L5-S1.  3.  2.5 mm left parasagittal disc protrusion at L2-3 contributes to mass effect upon the left lateral aspect of the dural sac and the exiting left L3 nerve root sleeve.  4.  Slight bulge and posterior central disc protrusion at L4-5 contributes to mass effect upon the ventral aspect of the sac and upon the exiting L5 root sleeves.  5.  Large inferior endplate Schmorl's node at L3.        OBJECTIVE        TODAY'S INTERVENTION:   BP: 145/83 HR: 66  NuStep arms 9-10, seat 11-12, resistance 7, 5:00, around 70 steps per minute  Gastroc stretch B 2x30 sec  Quad stretch B 2x30 sec  SLS eyes closed 3-5 sec max- put 1 finger on rail to get up to 30 sec each leg  Airex Foam: feet together eyes closed x10-15 sec max, moderate sway - SBA with a few touches by therapist   Airex Foam: feet apart eyes closed with head rotations x10-15 sec max, moderate sway - SBA with a few touches by therapist   Marching with abdominal tight x30- on/ off foam  Seated Hamstring Stretch - 2 reps - 30 second hold - 2x daily  Piriformis stretch short sitting 2x30 sec B  TRX:   -squats x20  -row x20  -press ups x20  Supine Pelvic Tilt  x20 - cuing to keep pelvis on bed  Tilt with \"biking\" in air B 2 x10-20 reps (less second " "set)  Trunk rotation in hook lying B x5-10 reps  Prone on elbows x1 minute  Standard Plank 2 reps - 1 minute, 1 minute  Child's pose 2x30 sec   18/81, HE 81 at end    Deferred today:  Rowing green or blue band with abdominals tight x30  Sitting slide foot forward to get stretch in shins B 2x30 sec  Upper trap stretch B 2x30 sec   Swimming alternating B x 20 reps - pillow under stomach and towel roll under chin      Home Exercise Program:   Original from summer 2016:   - trunk rotation in hook lying, 30\" x3 bilat (HEP) - continue  - piriformis stretch 30\" x3 bilat (HEP)- continue   - bird dog x15 (HEP)- d/c  - standing hip abd with red band and eccentric control x12 bilat (HEP) - continue with blue tied around ankles  - standing hip ext with red band and eccentric control x12 bilat (HEP) - d/c (prone)  - SL balance 30\" x3 bilat (HEP) -continue  - Written education provided on HEP/self management techniques.      Access Code: [email or web address] URL: [email or web address] Date: 01/06/2017  Prepared by: Leilani Yang   Exercises  Supine Pelvic Tilt with Straight Leg Raise - 20 reps - 1x daily  Supine 90/90 Alternating Heel Touches with Posterior Pelvic Tilt - 20 reps  Standard Plank - 2 reps - 30 hold - 1x daily  Swimming - 30 reps - 2 hold - 1x daily  Seated Table Hamstring Stretch - 2 reps - 30 second hold - 2x daily     ASSESSMENT/GOALS     Current Functional Status: LBP and overall fatigue that is causing activity modification throughout the day  Signs and symptoms consistent with decreased endurance from dialysis that is causing back pain  Pt will benefit from skilled physical therapy to address functional limitations.     Goals to be achieved within 8 weeks:   1. Pt to cont on his own and be compliant with HEP  2. Pt to maintain consistency with HEP  3. Pt to cont with stretching without increase of pain.     Patient Specific Functional and Pain Scales (PSFS) on 1/6/17   Clinician Instructions: Complete after " the history and before the exam.   Initial Assessment: We want to know what 3 activities in your life you are unable to perform, or are having the most difficulty performing, as a result of your chief problem. Please list and score at least 3 activities that you are unable to perform, or having the most difficulty performing, because of your chief problem.   Patient Specific Activity Scoring Scheme (score one number for each activity):   Activity Score (0-10)  0= Unable to perform activity  10= Able to perform activity at same level as before injury or problem   1. ADLs: dressing, meal prep, showering 8/10   2. Stairs  4/10   3. Prolonged sitting, ie 2 hour car ride 3/10               Totals:  15/30 = 50% ability which relates to 50% impairment   Patient verbally states that they understand that the information they have provided above is current and complete to the best of their knowledge.   Patient Specific Functional Scale Modifier Scale Conversion: (patient's modifier that correlates with pt's score on PSFS): 5-CK (50% Impaired).     G codes and Modifier taken from patient completing the PSFS:   Initial Primary G Code and Modifier:   Per the Patient's intake and/or assessment the Primary G Code is: Mobility .  The Patient's Impairment, Limitation or Restriction Modifier would be best described as: CK - 40% - 60% Impairment.   Goal Primary G Code and Modifier:   The Patient's G Code Goal would be: Mobility   The Patient's Impairment, Limitation or Restriction Modifier goal would be best described as: CJ - 20% - 40% Impairment.          Patient participated in goal selection and understand(s) the plan of care: Yes, pt stated a verbal understanding  Patient Potential for Achieving Desired Outcome:  Good     FUNCTIONAL LIMITATIONS:  Chronic LBP that is causing activity modification throughout the day.  PLAN     Pt plans to cont on his own with HEP and self mgmt techniques  If pt returns, he will be seen  2x/wk for up to 8 weeks to improve core and LE strength, endurance, mobility, proprioception and to decrease pain and discomfort.     Planned Interventions:   Home Exercise Program development  Therapeutic Exercise (ROM & Strengthening)  Manual Therapy  Neuromuscular Re-education  Ultrasound including Phonophoresis with Ketoprofen  Electrical Stimulation including Iontophoresis with Dexamethasone  Therapeutic Activities  Gait Training  Mechanical Traction  Vasopneumatic Compression     Plan for next visit: Cont with PT per POC to improve/maintain ROM, strength, endurance, proprioception and to decrease pain/discomfort.   Patient instructed to call with any questions, problems or concerns.     Student or PTA has been instructed in and demonstrates skills necessary to carry out above stated treatment plan: Yes     Thank you for your referral to [facility]. Please call with any questions, concerns or comments. [phone]

## 2018-01-03 NOTE — TELEPHONE ENCOUNTER
Patient Information     Patient Name MRN Gilles Dorman III [id number] Male 1970      Telephone Encounter by Moon Cardona at 2017  4:01 PM     Author:  Moon Cardona Service:  (none) Author Type:  (none)     Filed:  2017  4:22 PM Encounter Date:  2017 Status:  Signed     :  Moon Cardona            Patient called back and states that he is going to visit brother this weekend and states that his brothers kids smoke marijuana.  He is wondering if he should even go visit them since they smoke. He does not smoke himself but wants to check with Charlie Dubose MD before he goes over there. He does not want to jeopardize his pain contract. Moon Cardona LPN ....................  2017   4:04 PM

## 2018-01-03 NOTE — TELEPHONE ENCOUNTER
Patient Information     Patient Name MRN Gilles Dorman III [id number] Male 1970      Telephone Encounter by Moon Cardona at 2017  8:16 AM     Author:  Moon Cardona Service:  (none) Author Type:  (none)     Filed:  2017  8:18 AM Encounter Date:  2/10/2017 Status:  Signed     :  Moon Cardona            Patient states does not urinate ever.  Has it taken out from dialysis.  Is wondering if he can leave a blood sample instead for his drug screen?  Moon Cardona LPN ....................  2017   8:17 AM

## 2018-01-03 NOTE — NURSING NOTE
Patient Information     Patient Name MRN Gilles Dorman III [id number] Male 1970      Nursing Note by Moon Cardona at 2/10/2017 11:00 AM     Author:  Moon Cardona Service:  (none) Author Type:  (none)     Filed:  2/10/2017 11:09 AM Encounter Date:  2/10/2017 Status:  Signed     :  Moon Cardona            Patient presents to clinic for ongoing back pain.  Would also like to go over MRI results.  Moon Cardona LPN ....................  2/10/2017   10:52 AM

## 2018-01-03 NOTE — TELEPHONE ENCOUNTER
Patient Information     Patient Name MRN Sex Gilles Munoz III [id number] Male 1970      Telephone Encounter by Crystal Chapa at 2/10/2017 11:10 AM     Author:  Crystal Chapa Service:  (none) Author Type:  NURS- Registered Nurse     Filed:  2/10/2017 11:15 AM Encounter Date:  2/10/2017 Status:  Signed     :  Crystal Chapa (NURS- Registered Nurse)            Would like call to discuss Gilles's case and potential transplant. Message to Raul Campbell MD. Caller states ok for Wednesday when Raul Campbell MD returns. CRYSTAL CHAPA ....................  2/10/2017   11:14 AM

## 2018-01-04 ENCOUNTER — TRANSFERRED RECORDS (OUTPATIENT)
Dept: HEALTH INFORMATION MANAGEMENT | Facility: CLINIC | Age: 48
End: 2018-01-04

## 2018-01-04 NOTE — TELEPHONE ENCOUNTER
Patient Information     Patient Name MRN Sex Gilles Munoz III [id number] Male 1970      Telephone Encounter by Johnathan Pollard RN at 3/22/2017  9:40 AM     Author:  Johnathan Pollard RN Service:  (none) Author Type:  NURS- Registered Nurse     Filed:  3/22/2017  9:48 AM Encounter Date:  3/22/2017 Status:  Signed     :  Johnathan Pollard RN (NURS- Registered Nurse)            Proton Pump Inhibitors    Office visit in the past 12 months or per provider note.    Last visit with SUZETTE STEELE was on: 2017 in [facility]  Next visit with SUZETTE STEELE is on: No future appointment listed with this provider  Next visit with [facility] is on: No future appointment listed in this department    Max refill for 12 months from last office visit or per provider note.  Prescription refilled per RN Medication Refill Policy.................... JOHNATHAN POLLARD RN ....................  3/22/2017   9:41 AM

## 2018-01-05 NOTE — NURSING NOTE
Patient Information     Patient Name MRN Sex Gilles Munoz III [id number] Male 1970      Nursing Note by Bella Galdamez at 2017  3:00 PM     Author:  Bella Galdamez Service:  (none) Author Type:  NURS- Registered Nurse     Filed:  2017  3:29 PM Encounter Date:  2017 Status:  Signed     :  Bella Galdamez (NURS- Registered Nurse)            Labs ordered per written order sheet and sent to provider for co-sign. Bella Galdamez RN 2017  3:29 PM

## 2018-01-05 NOTE — PROGRESS NOTES
Patient Information     Patient Name MRN Sex Gilles Munoz III [id number] Male 1970      Progress Notes signed by Raul Campbell MD at 2017  5:33 PM      Author:  Raul Campbell MD Service:  (none) Author Type:  Physician     Filed:  2017  5:33 PM Encounter Date:  2017 Status:  Signed     :  Raul Campbell MD (Physician)            -  DATE OF SERVICE:  2017    HEMATOLOGY-ONCOLOGY CLINIC NOTE    Mr. Henning returns for followup of renal cell carcinoma, anemia and history of IgA nephropathy. We had seen the patient at the request of Dr. Charlie Dubose on 2016. At that time he was a 46-year-old white male with multiple medical problems, primarily end-stage renal disease with a history of renal cell carcinoma. We were asked to evaluate concerning anemia and ongoing management of his renal cell carcinoma. Apparently, he originally had developed end-stage renal disease felt to be secondary to IgA nephropathy. He was treated with dialysis under the direction of Dr. Tobin, and then subsequently underwent transplant and then underwent chronic transplant rejection. Apparently, his left kidney was noted to have developed renal cell carcinoma. The patient subsequently underwent bilateral nephrectomy performed on 2015. 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 Right kidney was benign with end-stage renal disease. He underwent procedure at [facility] on 2015. Since then, he has been treated for end-stage renal disease by Dr. Tobin with chronic hemodialysis. Most recently, he was noted to be anemic with a hemoglobin of 8.4. Dr. Tobin reinstituted IV iron and Epogen replacement and his hemoglobin improved. He was interested in proceeding with renal transplant at the [facility]. According to patient, the transplant team wanted to rule out any evidence of malignancy before  proceeding with transplant. When we saw the patient we felt that his hemoglobin improved with iron and Epogen and likely he had anemia of renal failure. In terms of renal cell carcinoma, we ordered a PET scan. This was done 01/12/2017. The findings were no evidence of recurrent or metastatic disease, no evidence malignancy. We ordered an SPEP; this was not done. He did have a positive WIN and had seen a rheumatologist in the past. Otherwise, the plan was to continue IV iron and Epogen as per Dr. Tobin. We felt that he was cancer-free and he would be a candidate for renal cell transplant. He did see the transplant unit at the [facility], and most recently was accepted into their transplant program; it was reviewed by the multidisciplinary committee on 04/19/2017 and the committee approved Mr. Henning for kidney transplant. They recommended that he have a six-month compliance contract where he would be monitored on an every six-month basis and have regular physician appointments and be medically compliant. They also recommended that he see a cardiologist, Dr. Cordero , on June 12 and also attend scheduled appointments to see a dermatologist as well as a dental checkup and to taper off his prednisone. Dr. Tobin did see him recently and is tapering him off prednisone.    Otherwise, he is doing relatively well. He is running 5Ks. He is doing all of kinds of outdoor activities, participating in walks, and otherwise has no complaints of shortness of breath, chest pain, abdominal pain, fevers, night sweats or weight loss. He continues on hemodialysis with Dr. Tobin.     PHYSICAL EXAMINATION  GENERAL: He is a middle-aged white male in no acute distress.   VITAL SIGNS: Blood pressure 140/80, pulse 60, temperature 98.3.   HEENT: Atraumatic, normocephalic. Oropharynx is nonerythematous.   NECK: Supple.   LUNGS: Clear to auscultation and percussion.  HEART: Regular rhythm. S1 and S2 normal.  ABDOMEN: Soft. Normoactive bowel sounds.  No masses or tenderness.   LYMPHATICS: No cervical, supraclavicular, axillary or inguinal nodes.   EXTREMITIES: Trace ankle edema.   NEURO: Nonfocal.     Laboratories include a CBC with a white count of 4.4, hemoglobin and hematocrit 9.4 and 36.6, platelet count 122,000. . BUN is 54, creatinine 8.91, AST 12.     IMPRESSION  1. 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 Right kidney revealed end-stage renal disease. Patient is considered a potential candidate for renal transplant. PET scan was negative. MRI of the thoracic and lumbar spine were negative for metastatic disease. The patient had a positive WIN. We did obtain a double-stranded DNA, which came back negative. The patient is otherwise cancer-free and has been accepted by the renal transplant unit at the [facility] for transplant. Otherwise, plan is to continue to monitor CBC, CMP, LDH, SPEP and beta-2 microglobulin.  2. Anemia of renal failure: The patient is to continue his IV iron and Aranesp injections under the direction of Dr. Salmeron. He will continue with hemodialysis.     Otherwise, we will see the patient in three months, obtain CBC, CMP, LDH, serum protein electrophoresis and beta-2 microglobulin. He will need a PET scan in six months.    40 minutes were spent with the patient, greater than half the time spent in consultation and coordination of care.        MD HERMAN TOTH/stuart   D:  2017 15:38:04  T:  2017 16:54:02  Voice Job ID:  [id number]  Text Job ID:  [id number]  cc:SUZETTE STEELE MD, PRIMARY PHYSICIAN  ANITA SALMERON MD  RENAL TRANSPLANT PROGRAM, [facility] [place], [place]:  AYAAN JUNIOR  MR#:  [id number]  :  1970  DATE:  2017  LOCATION:  [facility]  ROOM:    TYPE:  [facility] NOTEPage 1 of 1         Statement Selected

## 2018-01-05 NOTE — PROGRESS NOTES
Patient Information     Patient Name MRN Sex Gilles Munoz III [id number] Male 1970      Progress Notes by Raul Campbell MD at 2017  3:00 PM     Author:  Raul Campbell MD Service:  (none) Author Type:  Physician     Filed:  2017  5:14 PM Encounter Date:  2017 Status:  Signed     :  Raul Campbell MD (Physician)            This note has been dictated. The encounter number is [id number].

## 2018-01-05 NOTE — NURSING NOTE
Patient Information     Patient Name MRN Sex Gilles Munoz III [id number] Male 1970      Nursing Note by Jason Reis at 2017  3:00 PM     Author:  Jason Reis Service:  (none) Author Type:  (none)     Filed:  2017  3:16 PM Encounter Date:  2017 Status:  Signed     :  Jason Reis            Patient presents today for a follow up with lab results.He states that he's doing good.  Jason Reis LPN ....................  2017   3:08 PM

## 2018-01-12 ENCOUNTER — MEDICAL CORRESPONDENCE (OUTPATIENT)
Dept: TRANSPLANT | Facility: CLINIC | Age: 48
End: 2018-01-12

## 2018-01-19 ENCOUNTER — MEDICAL CORRESPONDENCE (OUTPATIENT)
Dept: TRANSPLANT | Facility: CLINIC | Age: 48
End: 2018-01-19

## 2018-01-27 VITALS
HEART RATE: 66 BPM | DIASTOLIC BLOOD PRESSURE: 102 MMHG | BODY MASS INDEX: 18.84 KG/M2 | WEIGHT: 135 LBS | SYSTOLIC BLOOD PRESSURE: 142 MMHG | TEMPERATURE: 96.6 F

## 2018-01-27 VITALS
HEART RATE: 80 BPM | WEIGHT: 136.8 LBS | SYSTOLIC BLOOD PRESSURE: 142 MMHG | DIASTOLIC BLOOD PRESSURE: 90 MMHG | BODY MASS INDEX: 19.63 KG/M2

## 2018-01-27 VITALS
SYSTOLIC BLOOD PRESSURE: 136 MMHG | HEART RATE: 60 BPM | WEIGHT: 132.1 LBS | OXYGEN SATURATION: 100 % | RESPIRATION RATE: 16 BRPM | DIASTOLIC BLOOD PRESSURE: 90 MMHG | BODY MASS INDEX: 20.06 KG/M2 | HEART RATE: 66 BPM | DIASTOLIC BLOOD PRESSURE: 80 MMHG | SYSTOLIC BLOOD PRESSURE: 156 MMHG | BODY MASS INDEX: 18.91 KG/M2 | TEMPERATURE: 99.2 F | TEMPERATURE: 96.2 F | HEIGHT: 70 IN | WEIGHT: 140.1 LBS | HEIGHT: 70 IN

## 2018-01-27 VITALS
BODY MASS INDEX: 20.13 KG/M2 | HEIGHT: 70 IN | WEIGHT: 140.6 LBS | HEART RATE: 66 BPM | DIASTOLIC BLOOD PRESSURE: 84 MMHG | SYSTOLIC BLOOD PRESSURE: 130 MMHG

## 2018-01-27 VITALS
SYSTOLIC BLOOD PRESSURE: 140 MMHG | HEIGHT: 70 IN | DIASTOLIC BLOOD PRESSURE: 84 MMHG | WEIGHT: 139.9 LBS | DIASTOLIC BLOOD PRESSURE: 80 MMHG | HEART RATE: 60 BPM | WEIGHT: 135 LBS | HEART RATE: 60 BPM | TEMPERATURE: 97.4 F | OXYGEN SATURATION: 99 % | BODY MASS INDEX: 19.33 KG/M2 | SYSTOLIC BLOOD PRESSURE: 142 MMHG | HEIGHT: 70 IN | TEMPERATURE: 98.3 F | BODY MASS INDEX: 20.03 KG/M2

## 2018-01-27 VITALS — HEIGHT: 70 IN | WEIGHT: 138.2 LBS | HEART RATE: 52 BPM | BODY MASS INDEX: 19.79 KG/M2 | RESPIRATION RATE: 12 BRPM

## 2018-01-27 VITALS
WEIGHT: 144 LBS | SYSTOLIC BLOOD PRESSURE: 138 MMHG | HEART RATE: 64 BPM | BODY MASS INDEX: 20.66 KG/M2 | TEMPERATURE: 97.3 F | DIASTOLIC BLOOD PRESSURE: 76 MMHG

## 2018-01-27 VITALS
DIASTOLIC BLOOD PRESSURE: 76 MMHG | BODY MASS INDEX: 19.6 KG/M2 | HEART RATE: 60 BPM | DIASTOLIC BLOOD PRESSURE: 80 MMHG | SYSTOLIC BLOOD PRESSURE: 106 MMHG | WEIGHT: 136.6 LBS | TEMPERATURE: 97.5 F | HEART RATE: 76 BPM | SYSTOLIC BLOOD PRESSURE: 142 MMHG

## 2018-01-30 ENCOUNTER — MEDICAL CORRESPONDENCE (OUTPATIENT)
Dept: TRANSPLANT | Facility: CLINIC | Age: 48
End: 2018-01-30

## 2018-01-30 ENCOUNTER — DOCUMENTATION ONLY (OUTPATIENT)
Dept: TRANSPLANT | Facility: CLINIC | Age: 48
End: 2018-01-30

## 2018-02-02 ENCOUNTER — COMMUNICATION - GICH (OUTPATIENT)
Dept: INTERNAL MEDICINE | Facility: OTHER | Age: 48
End: 2018-02-02

## 2018-02-02 DIAGNOSIS — M99.83 OTHER BIOMECHANICAL LESIONS OF LUMBAR REGION (CODE): ICD-10-CM

## 2018-02-02 DIAGNOSIS — M51.16 INTERVERTEBRAL DISC DISORDER WITH RADICULOPATHY OF LUMBAR REGION: ICD-10-CM

## 2018-02-02 ASSESSMENT — PATIENT HEALTH QUESTIONNAIRE - PHQ9
SUM OF ALL RESPONSES TO PHQ QUESTIONS 1-9: 9
SUM OF ALL RESPONSES TO PHQ QUESTIONS 1-9: 0

## 2018-02-02 ASSESSMENT — ANXIETY QUESTIONNAIRES: GAD7 TOTAL SCORE: 0

## 2018-02-03 ENCOUNTER — AMBULATORY - GICH (OUTPATIENT)
Dept: SCHEDULING | Facility: OTHER | Age: 48
End: 2018-02-03

## 2018-02-07 ENCOUNTER — COMMUNICATION - GICH (OUTPATIENT)
Dept: INTERNAL MEDICINE | Facility: OTHER | Age: 48
End: 2018-02-07

## 2018-02-09 VITALS
TEMPERATURE: 98.5 F | WEIGHT: 129.4 LBS | HEART RATE: 70 BPM | DIASTOLIC BLOOD PRESSURE: 80 MMHG | BODY MASS INDEX: 18.52 KG/M2 | HEIGHT: 70 IN | SYSTOLIC BLOOD PRESSURE: 132 MMHG

## 2018-02-10 ASSESSMENT — PATIENT HEALTH QUESTIONNAIRE - PHQ9: SUM OF ALL RESPONSES TO PHQ QUESTIONS 1-9: 7

## 2018-02-13 NOTE — PROGRESS NOTES
Patient Information     Patient Name MRN Sex Gilles Munoz III [id number] Male 1970      Progress Notes by Charlie Dubose MD at 2017 10:45 AM     Author:  Charlie Dubose MD Service:  (none) Author Type:  Physician     Filed:  2017 12:54 PM Encounter Date:  2017 Status:  Signed     :  Charlie Dubose MD (Physician)            Subjective  Gilles Henning III is a 47 y.o. male who presents for Hospital Discharge Follow-up. He recently underwent nephrectomy of his transplanted kidney at the [facility] December 15, 2017 with Dr. Vallejo. Since discharging he's had some low-grade temps. 99.0-99.9 Fahrenheit. Started about 4 days postop. His medications have been adjusted. Imuran was discontinued. Tacrolimus was reduced to 1 mg. His prednisone was reduced from 20 mg down to 5 mg. He had previously been on 20 mg daily for about 3 months. He's planning on seeing Dr. Tobin in the near future at dialysis. He makes no urine. He's had no dyspnea. No rash. No diarrhea. He was nauseated one day without vomiting. Overall he feels well and looks forward to getting back to exercising. His had some low back aching which she always gets whenever he is less active and thinks it will go away when he is able to get back to exercising.    Allergies: reviewed in EMR  Medications: reviewed in EMR  Problem List/PMH: reviewed in EMR    Social Hx:  Social History       Substance Use Topics         Smoking status:   Never Smoker     Smokeless tobacco:   Current User     Types:  Chew      Comment: 1 tin lasts 2 days      Alcohol use   No     Social History Narrative    Has moved into a new home. Lives with his girlfriend. Has worked at [employer] in the past. Lots of financial concerns/pressures. 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      I reviewed social history and made relevant updates today.    Family Hx:   Family History       Problem   Relation Age of Onset     Other  Mother      parkinson  "disease       Diabetes  Father      Heart Disease  Father      Anesthesia Malignant Hyperthermia  No Family History      Clotting disorder  No Family History        Objective  Vitals: reviewed in EMR.  /80 (Cuff Site: Right Arm, Position: Sitting, Cuff Size: Adult Regular)  Pulse 70  Temp 98.5  F (36.9  C) (Tympanic)   Ht 1.778 m (5' 10\")  Wt 58.7 kg (129 lb 6.4 oz)  BMI 18.57 kg/m2    Gen: Pleasant male, NAD.  HEENT: MMM, no OP erythema.   Neck: Supple, no JVD, no bruits.  CV: RRR no m/r/g.   Pulm: CTAB no w/r/r  GI: Soft, NT, ND. Surgical incision site left lower quadrant appears clean, dry and intact.  Neuro: Grossly intact  Msk: No lower extremity edema.  Skin: No concerning lesions.  Psychiatric: Normal affect and insight. Does not appear anxious or depressed.    Results for orders placed or performed during the hospital encounter of 12/22/17      Basic Metabolic Panel      Result  Value Ref Range    SODIUM 131 (L) 133 - 143 mmol/L    POTASSIUM 5.2 (H) 3.5 - 5.1 mmol/L    CHLORIDE 88 (L) 98 - 107 mmol/L    CO2,TOTAL 28 21 - 31 mmol/L    ANION GAP 15 5 - 18                    GLUCOSE 142 (H) 70 - 105 mg/dL    CALCIUM 9.5 8.6 - 10.3 mg/dL    BUN 53 (H) 7 - 25 mg/dL    CREATININE 8.25 (HH) 0.70 - 1.30 mg/dL    BUN/CREAT RATIO           6                    GFR if African American 8 (L) >60 ml/min/1.73m2    GFR if not African American 7 (L) >60 ml/min/1.73m2   Magnesium      Result  Value Ref Range    MAGNESIUM 2.6 1.9 - 2.7 mg/dL   CBC WITH AUTO DIFFERENTIAL      Result  Value Ref Range    WHITE BLOOD COUNT         7.8 4.5 - 11.0 thou/cu mm    RED BLOOD COUNT           3.11 (L) 4.30 - 5.90 mil/cu mm    HEMOGLOBIN                10.5 (L) 13.5 - 17.5 g/dL    HEMATOCRIT                31.0 (L) 37.0 - 53.0 %    MCV                       100 80 - 100 fL    MCH                       33.8 26.0 - 34.0 pg    MCHC                      33.9 32.0 - 36.0 g/dL    RDW                       16.4 (H) 11.5 - 15.5 %    " PLATELET COUNT            133 (L) 140 - 440 thou/cu mm    MPV                       9.1 6.5 - 11.0 fL    NEUTROPHILS               84.8 (H) 42.0 - 72.0 %    LYMPHOCYTES               5.5 (L) 20.0 - 44.0 %    MONOCYTES                 7.8 <12.0 %    EOSINOPHILS               0.8 <8.0 %    BASOPHILS                 0.3 <3.0 %    IMMATURE GRANULOCYTES(METAS,MYELOS,PROS) 0.8 %    ABSOLUTE NEUTROPHILS      6.7 1.7 - 7.0 thou/cu mm    ABSOLUTE LYMPHOCYTES      0.4 (L) 0.9 - 2.9 thou/cu mm    ABSOLUTE MONOCYTES        0.6 <0.9 thou/cu mm    ABSOLUTE EOSINOPHILS      0.1 <0.5 thou/cu mm    ABSOLUTE BASOPHILS        0.0 <0.3 thou/cu mm    ABSOLUTE IMMATURE GRANULOCYTES(METAS,MYELOS,PROS) 0.1 <=0.3 thou/cu mm         Assessment    ICD-10-CM    1. Hospital discharge follow-up Z09    2. Status post nephrectomy Z90.5    3. Chronic rejection of kidney transplant, stage 1 T86.11    4. s/p kidney transplant Z94.0    5. IGA NEPHROPATHY N05.9    6. CKD (chronic kidney disease) stage V requiring chronic dialysis (HC) N18.6      Z99.2        Mr. Henning was recently hospitalized for nephrectomy, appears to be doing well since discharge.  Discharge summary and recommendations for outpatient provider are reviewed. Based on what occurred in the visit today:  Previous medication(s) were discontinued or altered? No  Previous medication(s) were suspended pending consultation? No  New medication(s) started? No    Differential diagnosis for his low-grade fevers includes postop fever, reabsorption of residual transplanted kidney tissue, viral illness, early fungal or bacterial illness, others. I've asked him to monitor his fevers and hopefully they'll start to slowly fade over the next couple of weeks. However if other concerning symptoms were to develop such as abdominal pain, cough or shortness of breath, or other concerning findings he should return for a repeat evaluation.    Plan   -- Expected clinical course discussed   -- Medications and  their side effects discussed   -- no medication changes today   -- follow-up with Dr. Tobin at dialysis as planned   -- encouraged physical exercise when cleared by surgeon    Signed, Charlie Dubose MD  Internal Medicine & Pediatrics

## 2018-02-13 NOTE — TELEPHONE ENCOUNTER
Patient Information     Patient Name MRN Sex Gilles Munoz III [id number] Male 1970      Telephone Encounter by Rut Gooden at 2017 10:05 AM     Author:  Rut Gooden Service:  (none) Author Type:  (none)     Filed:  2017 10:07 AM Encounter Date:  2017 Status:  Signed     :  Rut Gooden            Spoke with patient he said he just had his transplanted kidney removed on the 12/15. He said he has been having low grade fevers between 99.4-99.7 since off and on and also is achy, has been having chills and night sweats. Will route to RN Triage nurse. Rut Gooden LPN......................2017 10:06 AM

## 2018-02-13 NOTE — NURSING NOTE
Patient Information     Patient Name MRN Sex Gilles Munoz III [id number] Male 1970      Nursing Note by Adia Pat at 2017 10:45 AM     Author:  Adia Pat Service:  (none) Author Type:  (none)     Filed:  2017 10:52 AM Encounter Date:  2017 Status:  Signed     :  Adia Pat            Patient presents in the clinic for a follow up after kidney surgery, and has concerns of running low grade fevers the past few days.  Adia Pat LPN............................ 2017 10:44 AM

## 2018-02-13 NOTE — TELEPHONE ENCOUNTER
Patient Information     Patient Name MRN Sex Gilles Munoz III [id number] Male 1970      Telephone Encounter by Indy Lynch DO at 2017 12:18 PM     Author:  Indy Lynch DO Service:  (none) Author Type:  PHYS- Osteopathic     Filed:  2017 12:21 PM Encounter Date:  2017 Status:  Signed     :  Indy Lynch DO (PHYS- Osteopathic)            The change in the meds could contribute to his symptoms however I would recommend that he be seen.  He can see Dr. Dubose tomorrow at 1045 or Gogo eugene this afternoon at 240 otherwise any available provider

## 2018-02-13 NOTE — TELEPHONE ENCOUNTER
Patient Information     Patient Name MRN Sex Gilles Munoz III [id number] Male 1970      Telephone Encounter by Mili Barrera at 2017  9:49 AM     Author:  Mili Barrera Service:  (none) Author Type:  (none)     Filed:  2017  9:50 AM Encounter Date:  2017 Status:  Signed     :  Mili Barrera            [facility]- Patient is having a high temp on and off since his surgery on 12/15 and is wondering if it is something he should be worried about and if he can see [facility] this week.  Thank you.

## 2018-02-13 NOTE — TELEPHONE ENCOUNTER
Patient Information     Patient Name MRN Sex Gilles Munoz III [id number] Male 1970      Telephone Encounter by Michelle San RN at 2017 12:33 PM     Author:  Michelle San RN Service:  (none) Author Type:  NURS- Registered Nurse     Filed:  2017 12:33 PM Encounter Date:  2017 Status:  Signed     :  Michelle San RN (NURS- Registered Nurse)            Returned call to patient and he is agreeable to seeing Charlie Dubose MD at 1030 tomorrow after his dialysis appointment. Added to schedule per Dr. Lynch's recommendation.   Michelle San RN ....................  2017   12:33 PM

## 2018-02-13 NOTE — TELEPHONE ENCOUNTER
Patient Information     Patient Name MRN Sex Gilles Munoz III [id number] Male 1970      Telephone Encounter by Michelle San RN at 2018  2:56 PM     Author:  Michelle San RN Service:  (none) Author Type:  NURS- Registered Nurse     Filed:  2018  3:00 PM Encounter Date:  2018 Status:  Signed     :  Michelle San RN (NURS- Registered Nurse)            This is a Refill request from: [id number]    Medication: TraMADol (ULTRAM) 50 mg tablet    Qty:60 tablet   Ref:3  Start:10/13/2017  End:              Route:Oral                Class:Print    Sig:Take 1 tablet by mouth 2 times daily if needed for Pain.    Quantity requested: 60  Due for refill: yes  Last visit with SUZETTE DUBOSE was on: 2017 in [facility]  PCP:  Suzette Dubose MD  Controlled Substance Agreement:  2-10-17  Diagnosis r/t this medication request: Lumbar disc disease     Unable to complete prescription refill per RN Medication Refill Policy.................... Michelle San RN ....................  2018   2:57 PM

## 2018-02-13 NOTE — TELEPHONE ENCOUNTER
Patient Information     Patient Name MRN Sex Gilles Munoz III [id number] Male 1970      Telephone Encounter by Denisse Dorman RN at 2018 10:06 AM     Author:  Denisse Dorman RN Service:  (none) Author Type:  NURS- Registered Nurse     Filed:  2018 10:19 AM Encounter Date:  2018 Status:  Signed     :  Denisse Dorman RN (NURS- Registered Nurse)            Triptans-Serotonin Receptor Agonist    Office visit in the past 12 months or per provider note.    Last visit with SUZETTE DUBOSE was on: 2017 in [facility]  Next visit with SUZETTE DUBOSE is on: No future appointment listed with this provider  Next visit with Internal Medicine is on: No future appointment listed in this department    Max dosage per 24 hours is 200mg.  Max refill for 12 months from last office visit or per provider note.    Requested Medications  SUMATRIPTAN 25MG TAB  Will file in chart as: SUMAtriptan (IMITREX) 25 mg tablet  TAKE 1 TABLET BY MOUTH EVERY 2 HOURS IF NEEDED FOR MIGRAINE. MAX DOSE: 200MG PER 24 HRS. (MAX 7 PER WEEK; MAX 30 PER MONTH)       Disp: 15 tablet Refills:     Class: eRx Start: 2018    To be filled at: [id number] [place] [id number] [facility] Con: [phone]    MEDICATION IS NOT ON CURRENT MEDICATION LIST, IT IS LISTED IN SCAN FROM [facility] 18.    Unable to complete prescription refill per RN Medication Refill Policy.................... DENISSE DORMAN RN ....................  2018   10:18 AM      Will forward to Dr. Dubose.

## 2018-02-13 NOTE — TELEPHONE ENCOUNTER
Patient Information     Patient Name MRN Sex Gilles Munoz III [id number] Male 1970      Telephone Encounter by Michelle San RN at 2017 10:22 AM     Author:  Michelle San RN  Service:  (none) Author Type:  NURS- Registered Nurse     Filed:  2017 12:17 PM  Encounter Date:  2017 Status:  Addendum     :  Michelle San RN (NURS- Registered Nurse)        Related Notes: Original Note by Michelle San RN (NURS- Registered Nurse) filed at 2017 10:58 AM            Returned call to patient who states that he wants Dr. Dubose to know that he has been having a low grade fever for 1 1/2 weeks that has been hovering right below 100 degrees - currently 99.4 with oral thermometer. Has some body aches and occasional chills/sweats. Patient had a transplanted kidney removed on 12-15-17 at the [facility]. Per patient incision site is approx 6 inches and looks intact. No discharge, unusual redness. Slight swelling around site which he believes may be expected. Denies nausea, vomiting, sore throat, congestion or other s/s of illness. Discussed hydration, no s/s such as lightheadedness, headache or dry mouth. Patient on dialysis, no kidneys present or urine output. Is taking tylenol if fever gets near 100 degrees as he was told to go into ER if he reaches 100.     Patient reports he did speak with his nephrologist who told him to contact Charlie Dubose MD with symptoms. States that there was a quick decrease of prednisone 20mg which he had used for 8 months up to surgery. After surgery on 12-15-17 was reduced to 5mg Prednisone daily. Also had reduction in Tacrolimus and taken off of anti rejection drug at the same time. Wonders if this may cause symptoms. Charlie Dubose MD is out of office, routed to covering internal med doctor.   Michelle San RN ....................  2017   10:56 AM      Reason for Disposition    Transplant patient (e.g., kidney, liver, lung,  "heart)    Answer Assessment - Initial Assessment Questions  1. TEMPERATURE: \"What is the most recent temperature?\"  \"How was it measured?\"       99.4 using an oral thermometer. 97.5 is normal baseline   2. ONSET: \"When did the fever start?\"       1 1/2 weeks  3. SYMPTOMS: \"Do you have any other symptoms besides the fever?\"  (e.g., colds, headache, sore throat, earache, cough, rash, diarrhea, vomiting, abdominal pain)      Only some achiness and chills/sweats somewhat.   4. CAUSE: If there are no symptoms, ask: \"What do you think is causing the fever?\"       Not sure, wants PCP to be aware just in case  5. CONTACTS: \"Does anyone else in the family have an infection?\"      No  6. TREATMENT: \"What have you done so far to treat this fever?\" (e.g., medications)      Tylenol and will break the fever most all the time.   7. IMMUNOCOMPROMISE: \"Do you have of the following: diabetes, HIV positive, splenectomy, cancer chemotherapy, chronic steroid treatment, transplant patient, etc.\"      Treated with Prednisone 20mg daily for 8 months until 12-15-17 and then dropped to Prednisone 5mg on 12-16-17 which he's taken daily since.   8. PREGNANCY: \"Is there any chance you are pregnant?\" \"When was your last menstrual period?\"      No  9. TRAVEL: \"Have you traveled out of the country in the last month?\" (e.g., travel history, exposures)      No    Protocols used: ADULT FEVER-A-AH            "

## 2018-02-14 ENCOUNTER — TELEPHONE (OUTPATIENT)
Dept: TRANSPLANT | Facility: CLINIC | Age: 48
End: 2018-02-14

## 2018-02-14 ENCOUNTER — TRANSFERRED RECORDS (OUTPATIENT)
Dept: HEALTH INFORMATION MANAGEMENT | Facility: CLINIC | Age: 48
End: 2018-02-14

## 2018-02-14 NOTE — TELEPHONE ENCOUNTER
Taran recently received records that our office has requested. Noted clinic appt note with Dr. Samantha Tobin 01/06/2018 which states on exam transplant nephrectomy incision is well healed, medication list at this visit includes prednisone 5 mg per day and tacrolimus 1 mg BID. Received PET skull base to mid thigh dated 11/09/2017 with results of no findings to suggest metastatic cancer.  Last clinic note I have from pt's hematologist Dr. Raul Campbell is dated 05/2017 with recommendation to RTC in 3 months.  I called pt today and spoke to him.  Pt states he feels well since his nephrectomy and his abd pain from his failed transplant is now gone, he is going to the gym and working out now. Pt confirmed he remains on prednisone 5 mg per day and is actually taking tacrolimus 0.5 mg per day - pt does not know of any plan to taper off of these. Pt also stating he is due to RTC with his hematologist - I instructed him to make this appt now and informed pt that I do have his PET results from 11/09/2017.  I explained to pt that if he can come off of his prednisone and tacrolimus pre kidney transplant now it would be best so I can call Dr. Tobin and ask her for the plan. I explained to pt that once his current pred/tac plan is done and he has been cleared from Dr. Campbell at this time, he will then be eligible to list ACTIVE status on kidney wait list. Pt expressed good understanding of all and was in good agreement with the plan.     Called Dr. Tobin today and LM with her  regarding need to know plan for pred/tac.

## 2018-02-15 ENCOUNTER — MEDICAL CORRESPONDENCE (OUTPATIENT)
Dept: TRANSPLANT | Facility: CLINIC | Age: 48
End: 2018-02-15

## 2018-02-15 DIAGNOSIS — I10 ESSENTIAL HYPERTENSION: ICD-10-CM

## 2018-02-15 DIAGNOSIS — K21.9 GASTROESOPHAGEAL REFLUX DISEASE, ESOPHAGITIS PRESENCE NOT SPECIFIED: Primary | ICD-10-CM

## 2018-02-20 RX ORDER — LISINOPRIL 40 MG/1
TABLET ORAL
Qty: 90 TABLET | Refills: 3 | Status: SHIPPED | OUTPATIENT
Start: 2018-02-20 | End: 2018-04-04 | Stop reason: DRUGHIGH

## 2018-02-20 RX ORDER — OMEPRAZOLE 40 MG/1
CAPSULE, DELAYED RELEASE ORAL
Qty: 90 CAPSULE | Refills: 3 | Status: ON HOLD | OUTPATIENT
Start: 2018-02-20 | End: 2018-08-13

## 2018-02-20 NOTE — TELEPHONE ENCOUNTER
This is a Refill request from: [facility]  Name of Medication and Dose:  Lisinopril 40mg, Omeprazole 40 mg  Quantity requested:  90  Last fill date: N/A  Last Office Visit: 12/28/17  PCP:  Charlie Dubose MD  Controlled Substance Agreement: N/A  Associated Diagnosis: HTN, GERD.    CORA Reveles ....................  2/20/2018   4:25 PM

## 2018-02-26 ENCOUNTER — DOCUMENTATION ONLY (OUTPATIENT)
Dept: FAMILY MEDICINE | Facility: OTHER | Age: 48
End: 2018-02-26

## 2018-02-26 PROBLEM — F43.10 PTSD (POST-TRAUMATIC STRESS DISORDER): Status: ACTIVE | Noted: 2017-10-13

## 2018-02-26 PROBLEM — F43.10 POSTTRAUMATIC STRESS DISORDER: Status: ACTIVE | Noted: 2018-02-26

## 2018-02-26 PROBLEM — L60.0 ONYCHOCRYPTOSIS: Status: ACTIVE | Noted: 2018-02-26

## 2018-02-26 PROBLEM — F51.4 NIGHT TERRORS: Status: ACTIVE | Noted: 2017-10-13

## 2018-02-26 PROBLEM — N18.6 CKD (CHRONIC KIDNEY DISEASE) STAGE V REQUIRING CHRONIC DIALYSIS (H): Status: ACTIVE | Noted: 2017-06-26

## 2018-02-26 PROBLEM — N05.9 NEPHRITIS AND NEPHROPATHY, WITH PATHOLOGICAL LESION IN KIDNEY: Status: ACTIVE | Noted: 2018-02-26

## 2018-02-26 PROBLEM — E78.5 HYPERLIPIDEMIA: Status: ACTIVE | Noted: 2018-02-26

## 2018-02-26 PROBLEM — N18.6 END STAGE RENAL DISEASE (H): Status: ACTIVE | Noted: 2018-02-26

## 2018-02-26 PROBLEM — Z99.2 CKD (CHRONIC KIDNEY DISEASE) STAGE V REQUIRING CHRONIC DIALYSIS (H): Status: ACTIVE | Noted: 2017-06-26

## 2018-02-26 RX ORDER — CALCIUM ACETATE 667 MG/1
CAPSULE ORAL
COMMUNITY
Start: 2017-06-16 | End: 2018-03-09

## 2018-02-26 RX ORDER — IMIQUIMOD 12.5 MG/.25G
CREAM TOPICAL
COMMUNITY
Start: 2017-06-28 | End: 2018-04-04

## 2018-02-26 RX ORDER — TRAZODONE HYDROCHLORIDE 150 MG/1
300 TABLET ORAL AT BEDTIME
COMMUNITY
Start: 2017-06-22 | End: 2018-06-26

## 2018-02-26 RX ORDER — PREDNISONE 5 MG/1
5 TABLET ORAL
COMMUNITY
End: 2018-03-09

## 2018-02-26 RX ORDER — CLONAZEPAM 1 MG/1
.5-1 TABLET ORAL 2 TIMES DAILY PRN
Status: ON HOLD | COMMUNITY
Start: 2017-02-10 | End: 2018-08-13

## 2018-02-26 RX ORDER — SENNOSIDES A AND B 8.6 MG/1
8.6 TABLET, FILM COATED ORAL 2 TIMES DAILY PRN
COMMUNITY
Start: 2015-03-27 | End: 2018-03-09

## 2018-02-26 RX ORDER — METOPROLOL TARTRATE 25 MG/1
25 TABLET, FILM COATED ORAL DAILY
COMMUNITY
Start: 2017-02-28 | End: 2018-03-09

## 2018-02-26 RX ORDER — TACROLIMUS 1 MG/1
CAPSULE ORAL DAILY
COMMUNITY
Start: 2015-06-26 | End: 2018-04-04

## 2018-02-26 RX ORDER — LISINOPRIL 40 MG/1
TABLET ORAL
COMMUNITY
Start: 2017-06-16 | End: 2018-03-09

## 2018-02-26 RX ORDER — AMLODIPINE BESYLATE 10 MG/1
10 TABLET ORAL DAILY
COMMUNITY
End: 2018-03-09

## 2018-02-26 RX ORDER — TRAMADOL HYDROCHLORIDE 50 MG/1
50 TABLET ORAL 2 TIMES DAILY PRN
COMMUNITY
Start: 2017-10-13 | End: 2018-04-04

## 2018-02-26 RX ORDER — ATORVASTATIN CALCIUM 40 MG/1
40 TABLET, FILM COATED ORAL DAILY
Status: ON HOLD | COMMUNITY
Start: 2017-05-30 | End: 2018-08-13

## 2018-02-26 RX ORDER — OMEPRAZOLE 40 MG/1
40 CAPSULE, DELAYED RELEASE ORAL DAILY
COMMUNITY
Start: 2017-03-22 | End: 2018-03-09

## 2018-02-26 RX ORDER — DOCUSATE SODIUM 100 MG/1
200 CAPSULE, LIQUID FILLED ORAL 2 TIMES DAILY
Status: ON HOLD | COMMUNITY
End: 2018-08-13

## 2018-02-26 RX ORDER — CHOLECALCIFEROL (VITAMIN D3) 50 MCG
2000 TABLET ORAL DAILY
Status: ON HOLD | COMMUNITY
End: 2018-08-13

## 2018-03-09 ENCOUNTER — HOSPITAL ENCOUNTER (EMERGENCY)
Facility: OTHER | Age: 48
Discharge: HOME | End: 2018-03-09
Attending: FAMILY MEDICINE | Admitting: FAMILY MEDICINE
Payer: MEDICARE

## 2018-03-09 VITALS
TEMPERATURE: 98.3 F | OXYGEN SATURATION: 100 % | DIASTOLIC BLOOD PRESSURE: 85 MMHG | RESPIRATION RATE: 33 BRPM | HEART RATE: 50 BPM | BODY MASS INDEX: 17.64 KG/M2 | HEIGHT: 71 IN | SYSTOLIC BLOOD PRESSURE: 167 MMHG | WEIGHT: 126 LBS

## 2018-03-09 DIAGNOSIS — N18.5 CKD (CHRONIC KIDNEY DISEASE) STAGE 5, GFR LESS THAN 15 ML/MIN (H): ICD-10-CM

## 2018-03-09 DIAGNOSIS — Z99.2 DIALYSIS PATIENT (H): ICD-10-CM

## 2018-03-09 DIAGNOSIS — G43.901 MIGRAINE WITH STATUS MIGRAINOSUS, NOT INTRACTABLE, UNSPECIFIED MIGRAINE TYPE: ICD-10-CM

## 2018-03-09 DIAGNOSIS — I10 BENIGN ESSENTIAL HYPERTENSION: ICD-10-CM

## 2018-03-09 LAB
ALBUMIN SERPL-MCNC: 3.9 G/DL (ref 3.5–5.7)
ALP SERPL-CCNC: 60 U/L (ref 34–104)
ALT SERPL W P-5'-P-CCNC: 5 U/L (ref 7–52)
ANION GAP SERPL CALCULATED.3IONS-SCNC: 14 MMOL/L (ref 3–14)
AST SERPL W P-5'-P-CCNC: 13 U/L (ref 13–39)
BASOPHILS # BLD AUTO: 0.1 10E9/L (ref 0–0.2)
BASOPHILS NFR BLD AUTO: 1.4 %
BILIRUB SERPL-MCNC: 0.5 MG/DL (ref 0.3–1)
BUN SERPL-MCNC: 54 MG/DL (ref 7–25)
CALCIUM SERPL-MCNC: 9.9 MG/DL (ref 8.6–10.3)
CHLORIDE SERPL-SCNC: 89 MMOL/L (ref 98–107)
CO2 SERPL-SCNC: 32 MMOL/L (ref 21–31)
CREAT SERPL-MCNC: 12.47 MG/DL (ref 0.7–1.3)
DIFFERENTIAL METHOD BLD: ABNORMAL
EOSINOPHIL # BLD AUTO: 0.2 10E9/L (ref 0–0.7)
EOSINOPHIL NFR BLD AUTO: 4.7 %
ERYTHROCYTE [DISTWIDTH] IN BLOOD BY AUTOMATED COUNT: 14.3 % (ref 10–15)
GFR SERPL CREATININE-BSD FRML MDRD: 4 ML/MIN/1.7M2
GLUCOSE SERPL-MCNC: 93 MG/DL (ref 70–105)
HCT VFR BLD AUTO: 32.2 % (ref 40–53)
HGB BLD-MCNC: 10.7 G/DL (ref 13.3–17.7)
IMM GRANULOCYTES # BLD: 0 10E9/L (ref 0–0.4)
IMM GRANULOCYTES NFR BLD: 0.5 %
LYMPHOCYTES # BLD AUTO: 0.7 10E9/L (ref 0.8–5.3)
LYMPHOCYTES NFR BLD AUTO: 15.6 %
MAGNESIUM SERPL-MCNC: 2.4 MG/DL (ref 1.9–2.7)
MCH RBC QN AUTO: 30.6 PG (ref 26.5–33)
MCHC RBC AUTO-ENTMCNC: 33.2 G/DL (ref 31.5–36.5)
MCV RBC AUTO: 92 FL (ref 78–100)
MONOCYTES # BLD AUTO: 0.6 10E9/L (ref 0–1.3)
MONOCYTES NFR BLD AUTO: 14.2 %
NEUTROPHILS # BLD AUTO: 2.8 10E9/L (ref 1.6–8.3)
NEUTROPHILS NFR BLD AUTO: 63.6 %
PLATELET # BLD AUTO: 138 10E9/L (ref 150–450)
POTASSIUM SERPL-SCNC: 5 MMOL/L (ref 3.5–5.1)
PROT SERPL-MCNC: 6.5 G/DL (ref 6.4–8.9)
RBC # BLD AUTO: 3.5 10E12/L (ref 4.4–5.9)
SODIUM SERPL-SCNC: 135 MMOL/L (ref 134–144)
TROPONIN I SERPL-MCNC: <0.03 UG/L (ref 0–0.03)
WBC # BLD AUTO: 4.4 10E9/L (ref 4–11)

## 2018-03-09 PROCEDURE — 25000128 H RX IP 250 OP 636: Performed by: FAMILY MEDICINE

## 2018-03-09 PROCEDURE — 25000132 ZZH RX MED GY IP 250 OP 250 PS 637: Mod: GY | Performed by: FAMILY MEDICINE

## 2018-03-09 PROCEDURE — 93005 ELECTROCARDIOGRAM TRACING: CPT | Performed by: FAMILY MEDICINE

## 2018-03-09 PROCEDURE — A9270 NON-COVERED ITEM OR SERVICE: HCPCS | Mod: GY | Performed by: FAMILY MEDICINE

## 2018-03-09 PROCEDURE — 83735 ASSAY OF MAGNESIUM: CPT | Performed by: FAMILY MEDICINE

## 2018-03-09 PROCEDURE — 85025 COMPLETE CBC W/AUTO DIFF WBC: CPT | Performed by: FAMILY MEDICINE

## 2018-03-09 PROCEDURE — 99283 EMERGENCY DEPT VISIT LOW MDM: CPT | Mod: Z6 | Performed by: FAMILY MEDICINE

## 2018-03-09 PROCEDURE — 80053 COMPREHEN METABOLIC PANEL: CPT | Performed by: FAMILY MEDICINE

## 2018-03-09 PROCEDURE — 84484 ASSAY OF TROPONIN QUANT: CPT | Performed by: FAMILY MEDICINE

## 2018-03-09 PROCEDURE — 96372 THER/PROPH/DIAG INJ SC/IM: CPT | Performed by: FAMILY MEDICINE

## 2018-03-09 PROCEDURE — 36415 COLL VENOUS BLD VENIPUNCTURE: CPT | Performed by: FAMILY MEDICINE

## 2018-03-09 PROCEDURE — 93010 ELECTROCARDIOGRAM REPORT: CPT | Performed by: INTERNAL MEDICINE

## 2018-03-09 PROCEDURE — 99284 EMERGENCY DEPT VISIT MOD MDM: CPT | Mod: 25 | Performed by: FAMILY MEDICINE

## 2018-03-09 RX ORDER — AMLODIPINE BESYLATE 5 MG/1
5 TABLET ORAL ONCE
Status: COMPLETED | OUTPATIENT
Start: 2018-03-09 | End: 2018-03-09

## 2018-03-09 RX ORDER — PROCHLORPERAZINE MALEATE 5 MG
10 TABLET ORAL ONCE
Status: COMPLETED | OUTPATIENT
Start: 2018-03-09 | End: 2018-03-09

## 2018-03-09 RX ORDER — PANTOPRAZOLE SODIUM 40 MG/1
40 TABLET, DELAYED RELEASE ORAL
Status: DISCONTINUED | OUTPATIENT
Start: 2018-03-09 | End: 2018-03-09 | Stop reason: HOSPADM

## 2018-03-09 RX ORDER — SUMATRIPTAN 6 MG/.5ML
6 INJECTION, SOLUTION SUBCUTANEOUS ONCE
Status: COMPLETED | OUTPATIENT
Start: 2018-03-09 | End: 2018-03-09

## 2018-03-09 RX ORDER — SUMATRIPTAN 25 MG/1
25 TABLET, FILM COATED ORAL
COMMUNITY
Start: 2018-02-09 | End: 2018-06-11

## 2018-03-09 RX ORDER — LORAZEPAM 1 MG/1
1 TABLET ORAL ONCE
Status: COMPLETED | OUTPATIENT
Start: 2018-03-09 | End: 2018-03-09

## 2018-03-09 RX ADMIN — LORAZEPAM 1 MG: 1 TABLET ORAL at 08:25

## 2018-03-09 RX ADMIN — AMLODIPINE BESYLATE 5 MG: 5 TABLET ORAL at 08:26

## 2018-03-09 RX ADMIN — PROCHLORPERAZINE MALEATE 10 MG: 5 TABLET, FILM COATED ORAL at 08:25

## 2018-03-09 RX ADMIN — SUMATRIPTAN 6 MG: 6 INJECTION SUBCUTANEOUS at 08:26

## 2018-03-09 RX ADMIN — PANTOPRAZOLE SODIUM 40 MG: 40 TABLET, DELAYED RELEASE ORAL at 08:26

## 2018-03-09 RX ADMIN — AMLODIPINE BESYLATE 5 MG: 5 TABLET ORAL at 11:03

## 2018-03-09 ASSESSMENT — ENCOUNTER SYMPTOMS
SHORTNESS OF BREATH: 0
TROUBLE SWALLOWING: 0
HEMATURIA: 0
CONFUSION: 0
ABDOMINAL PAIN: 0
SORE THROAT: 0
DIAPHORESIS: 0
CHOKING: 0
COUGH: 0
FLANK PAIN: 0
VOMITING: 1
DYSURIA: 0
JOINT SWELLING: 0
CHILLS: 1
FEVER: 0
AGITATION: 0
POLYDIPSIA: 0
NECK PAIN: 0
NAUSEA: 1
HEADACHES: 1
BACK PAIN: 0

## 2018-03-09 NOTE — ED AVS SNAPSHOT
[facility]    [address]    [place] [address]    Phone:  [phone]    Fax:  [phone]                                       Gilles Henning III   MRN: [id number]    Department:  [facility]   Date of Visit:  3/9/2018           After Visit Summary Signature Page     I have received my discharge instructions, and my questions have been answered. I have discussed any challenges I see with this plan with the nurse or doctor.    ..........................................................................................................................................  Patient/Patient Representative Signature      ..........................................................................................................................................  Patient Representative Print Name and Relationship to Patient    ..................................................               ................................................  Date                                            Time    ..........................................................................................................................................  Reviewed by Signature/Title    ...................................................              ..............................................  Date                                                            Time

## 2018-03-09 NOTE — ED NOTES
Pt ate 100% of his breakfast, reports he's feeling better, H/A has resolved, tinnitus has resolved.  Pt reports as he's lying on cot and staring at the garbage can it looks like little people are jumping out of it.  MD notified.

## 2018-03-09 NOTE — ED AVS SNAPSHOT
[facility]    [address]    [place] [address]    Phone:  [phone]    Fax:  [phone]                                       Gilles Henning III   MRN: [id number]    Department:  [facility]   Date of Visit:  3/9/2018           Patient Information     Date Of Birth          1970        Your diagnoses for this visit were:     Migraine with status migrainosus, not intractable, unspecified migraine type     Benign essential hypertension     CKD (chronic kidney disease) stage 5, GFR less than 15 ml/min (H)     Dialysis patient (H)        You were seen by Eb Pagan MD.        Discharge Instructions       Dialysis tomorrow,  Recheck sooner if increasing confusion,  Vomiting, or other concerns.    24 Hour Appointment Hotline       To make an appointment at any [facility], call [phone] ([phone]). If you don't have a family doctor or clinic, we will help you find one. [facility] clinics are conveniently located to serve the needs of you and your family.             Review of your medicines      Our records show that you are taking the medicines listed below. If these are incorrect, please call your family doctor or clinic.        Dose / Directions Last dose taken    acetaminophen 325 MG tablet   Commonly known as:  TYLENOL   Dose:  650 mg   Quantity:  100 tablet        Take 2 tablets (650 mg) by mouth every 4 hours as needed for mild pain   Refills:  0        amLODIPine 5 MG tablet   Commonly known as:  NORVASC   Dose:  5 mg   Indication:  High Blood Pressure Disorder   Quantity:  30 tablet        Take 1 tablet (5 mg) by mouth daily   Refills:  1        atorvastatin 40 MG tablet   Commonly known as:  LIPITOR        Refills:  0        calcium acetate 667 MG Caps capsule   Commonly known as:  PHOSLO   Dose:  1334 mg   Indication:  Patient takes two tablets with B, L, D., and one tablet with snacks.   Quantity:  180 capsule         Take 2 capsules (1,334 mg) by mouth 3 times daily (with meals)   Refills:  1        clonazePAM 1 MG tablet   Commonly known as:  klonoPIN   Dose:  1.5 mg        Take 1.5 mg by mouth daily as needed   Refills:  0        darbepoetin maria esther 40 MCG/ML injection   Commonly known as:  ARANESP   Dose:  40 mcg        Inject 40 mcg Subcutaneous   Refills:  0        docusate sodium 100 MG capsule   Commonly known as:  COLACE   Dose:  100 mg        Take 100 mg by mouth 2 times daily   Refills:  0        imiquimod 5 % cream   Commonly known as:  ALDARA        Refills:  0        lisinopril 40 MG tablet   Commonly known as:  PRINIVIL/ZESTRIL   Quantity:  90 tablet        TAKE 1 TABLET BY MOUTH DAILY   Refills:  3        metoprolol tartrate 25 MG tablet   Commonly known as:  LOPRESSOR   Dose:  25 mg   Quantity:  30 tablet        Take 1 tablet (25 mg) by mouth daily   Refills:  1        omeprazole 40 MG capsule   Commonly known as:  priLOSEC   Quantity:  90 capsule        TAKE 1 CAPSULE BY MOUTH DAILY   Refills:  3        order for [facility]        4 wheeled walker with seat.   Refills:  0        predniSONE 5 MG tablet   Commonly known as:  DELTASONE   Dose:  5 mg   Quantity:  30 tablet        Take 1 tablet (5 mg) by mouth daily   Refills:  2        sennosides 8.6 MG tablet   Commonly known as:  SENOKOT   Dose:  1 tablet   Quantity:  120 each        Take 1 tablet by mouth daily as needed for constipation   Refills:  0        SUMAtriptan 25 MG tablet   Commonly known as:  IMITREX        Refills:  0        * tacrolimus 1 MG capsule   Commonly known as:  GENERIC EQUIVALENT        Take by mouth daily   Refills:  0        * tacrolimus 1 MG capsule   Dose:  1 mg   Quantity:  30 capsule        Take 1 capsule (1 mg) by mouth daily   Refills:  11        traMADol 50 MG tablet   Commonly known as:  ULTRAM   Dose:  50 mg        Take 50 mg by mouth 2 times daily as needed for pain   Refills:  0        traZODone 150 MG tablet   Commonly known as:   "DESYREL   Dose:  300 mg        Take 300 mg by mouth At Bedtime   Refills:  0        * VITAMIN D (CHOLECALCIFEROL) PO   Dose:  2000 Units        Take 2,000 Units by mouth daily   Refills:  0        * vitamin D 2000 UNITS tablet   Dose:  2000 Units        Take 2,000 Units by mouth daily   Refills:  0        * Notice:  This list has 4 medication(s) that are the same as other medications prescribed for you. Read the directions carefully, and ask your doctor or other care provider to review them with you.            Procedures and tests performed during your visit     CBC with platelets differential    Comprehensive metabolic panel    EKG 12-lead, tracing only    Magnesium    Troponin I (now)      Orders Needing Specimen Collection     None      Pending Results     Date and Time Order Name Status Description    3/9/2018 0910 EKG 12-lead, tracing only In process             Pending Culture Results     No orders found from 3/7/2018 to 3/10/2018.            Thank you for choosing [facility]       Thank you for choosing [facility] for your care. Our goal is always to provide you with excellent care. Hearing back from our patients is one way we can continue to improve our services. Please take a few minutes to complete the written survey that you may receive in the mail after you visit with us. Thank you!        [employer] Information     [employer] lets you send messages to your doctor, view your test results, renew your prescriptions, schedule appointments and more. To sign up, go to [email or web address] . Click on \"Log in\" on the left side of the screen, which will take you to the Welcome page. Then click on \"Sign up Now\" on the right side of the page.     You will be asked to enter the access code listed below, as well as some personal information. Please follow the directions to create your username and password.     Your access code is: [email or web address]  Expires: 2018 11:36 AM     Your access code will  in 90 days. If " you need help or a new code, please call your [facility] clinic or [phone].        Care EveryWhere ID     This is your Care EveryWhere ID. This could be used by other organizations to access your [facility] medical records  [id number]        Equal Access to Services     ALEXANDRA DOVE : Xiao Rene, wanelsonda luqadaha, qaybta kaalmada chirag, pascale king. So [facility] [place] [phone].    ATENCIÓN: Si habla español, tiene a millan disposición servicios gratuitos de asistencia lingüística. Llame al [phone].    We comply with applicable federal civil rights laws and [place] laws. We do not discriminate on the basis of race, color, national origin, age, disability, sex, sexual orientation, or gender identity.            After Visit Summary       This is your record. Keep this with you and show to your community pharmacist(s) and doctor(s) at your next visit.

## 2018-03-09 NOTE — ED NOTES
Pt reports yesterday and this AM, he was able to void a little, it was clear.  Pt reports he doesn't have kidneys and he hasn't voided in years.

## 2018-03-09 NOTE — ED NOTES
Contacted dialysis clinic to get appt for pt today, they are unable to see him today but he already has an appt for tomorrow.  MD notified.

## 2018-03-09 NOTE — ED PROVIDER NOTES
History     Chief Complaint   Patient presents with     Headache     Hypertension     HPI  Gilles Henning III is a 48 year old male who presents with headache and hypertension,  No better with his imitrex yesterday.  Nausea and vomiting yesterday but none today.  Last dialysis 3 days ago - missed yesterday and due again tomorrow. Has felt chilled.      Problem List:    Patient Active Problem List    Diagnosis Date Noted     End stage renal disease (H) 02/26/2018     Priority: Medium     Overview:   A:  Status post peritoneal dialysis for seven years.  B:  On hemodialysis.       Hyperlipidemia 02/26/2018     Priority: Medium     Hypertension 02/26/2018     Priority: Medium     Nephritis and nephropathy, 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 02/26/2018     Priority: Medium     Onychocryptosis 02/26/2018     Priority: Medium     Posttraumatic stress disorder 02/26/2018     Priority: Medium     Kidney transplant recipient 12/15/2017     Priority: Medium     Bipolar affective disorder (H) 10/13/2017     Priority: Medium     Night terrors 10/13/2017     Priority: Medium     PTSD (post-traumatic stress disorder) 10/13/2017     Priority: Medium     CKD (chronic kidney disease) stage V requiring chronic dialysis (H) 06/26/2017     Priority: Medium     Chronic kidney disease, stage V (H) 06/12/2017     Priority: Medium     Lumbar disc disease with radiculopathy 07/11/2016     Priority: Medium     Lumbar foraminal stenosis 07/11/2016     Priority: Medium     Immunosuppressed status (H) 04/21/2016     Priority: Medium     Gastroesophageal reflux disease 03/15/2016     Priority: Medium     Secondary hyperparathyroidism (H) 08/11/2015     Priority: Medium     Vitamin D deficiency 08/11/2015     Priority: Medium     Acute on chronic rejection of kidney 06/12/2015     Priority: Medium     S/p nephrectomy 05/22/2015     Priority: Medium     Renal cell carcinoma (H) 05/19/2015     Priority: Medium     Overview:   s/p resection at  [facility] [place] [place]        Anemia of chronic disease 2015     Priority: Medium     Chronic insomnia 2014     Priority: Medium     Chronic rejection of kidney transplant 2014     Priority: Medium     Erectile dysfunction 2014     Priority: Medium     Nausea 10/07/2013     Priority: Medium     S/P kidney transplant 2012     Priority: Medium     Overview:   Followed at [facility] [place] [place]       Colitis, acute 2012     Priority: Medium     Diarrhea 05/10/2012     Priority: Medium     Headache 10/18/2011     Priority: Medium     Heartburn 2011     Priority: Medium     Abnormal involuntary movement 2011     Priority: Medium     Psychosexual dysfunction with inhibited sexual excitement 2011     Priority: Medium     Hereditary and idiopathic peripheral neuropathy 2011     Priority: Medium     Chest pain 10/26/2010     Priority: Medium     Overview:   likely not cardiac       Depression, major, recurrent (H) 2010     Priority: Medium     Overview:   with suicide attempt.          Past Medical History:    Past Medical History:   Diagnosis Date     Anemia in chronic kidney disease      Chews tobacco      Chronic rejection of kidney transplant      ESRD needing dialysis (H)      History of alcoholism (H)      History of depression      History of peritoneal dialysis      History of substance abuse      Hyperlipidemia      IgA nephropathy      Osteopenia      Peritonitis (H)      Renal cell carcinoma (H)        Past Surgical History:    Past Surgical History:   Procedure Laterality Date     EXPLANT TRANSPLANTED KIDNEY N/A 12/15/2017    Procedure: EXPLANT TRANSPLANTED KIDNEY;  Transplanted Nephrectomy;  Surgeon: Mario Vallejo MD;  Location: [facility] OR     HC DIALYSIS AVF OR AVG, CENTRAL INTERVENTION ONLY Left      LAPAROSCOPIC INSERTION CATHETER PERITONEAL DIALYSIS Left      NEPHRECTOMY BILATERAL  2015     REMOVE CATHETER PERITONEAL       TRANSPLANT KIDNEY RECIPIENT  DONOR Left  12/18/2009       Family History:    No family history on file.    Social History:  Marital Status:   [2]  Social History   Substance Use Topics     Smoking status: Current Every Day Smoker     Types: Dip, chew, snus or snuff     Start date: 4/1/2011     Smokeless tobacco: Current User     Types: Chew      Comment: 2 days per can.     Alcohol use Yes      Comment: none now, did treatment at age 21, relapsed with divorce (a couple months)  then now  sober 3 years.         Medications:      SUMAtriptan (IMITREX) 25 MG tablet   atorvastatin (LIPITOR) 40 MG tablet   darbepoetin maria esther (ARANESP) 40 MCG/ML injection   order for DME   imiquimod (ALDARA) 5 % cream   traMADol (ULTRAM) 50 MG tablet   Cholecalciferol (VITAMIN D) 2000 UNITS tablet   clonazePAM (KLONOPIN) 1 MG tablet   docusate sodium (COLACE) 100 MG capsule   tacrolimus (GENERIC EQUIVALENT) 1 MG capsule   traZODone (DESYREL) 150 MG tablet   omeprazole (PRILOSEC) 40 MG capsule   lisinopril (PRINIVIL/ZESTRIL) 40 MG tablet   PROGRAF (BRAND) 1 MG CAPSULE   metoprolol (LOPRESSOR) 25 MG tablet   amLODIPine (NORVASC) 5 MG tablet   predniSONE (DELTASONE) 5 MG tablet   sennosides (SENOKOT) 8.6 MG tablet   calcium acetate (PHOSLO) 667 MG CAPS capsule   VITAMIN D, CHOLECALCIFEROL, PO   [DISCONTINUED] amLODIPine (NORVASC) 10 MG tablet   [DISCONTINUED] lisinopril (PRINIVIL/ZESTRIL) 40 MG tablet   [DISCONTINUED] metoprolol tartrate (LOPRESSOR) 25 MG tablet   acetaminophen (TYLENOL) 325 MG tablet   [DISCONTINUED] CLONAZEPAM PO   [DISCONTINUED] TRAZODONE HCL PO         Review of Systems   Constitutional: Positive for chills. Negative for diaphoresis and fever.   HENT: Negative for congestion, sore throat and trouble swallowing.    Eyes: Negative for visual disturbance.   Respiratory: Negative for cough, choking and shortness of breath.    Cardiovascular: Negative for chest pain.   Gastrointestinal: Positive for nausea and vomiting. Negative for abdominal pain.   Endocrine:  "Negative for polydipsia and polyuria.   Genitourinary: Negative for dysuria, flank pain and hematuria.   Musculoskeletal: Negative for back pain, joint swelling and neck pain.   Skin: Negative for rash.   Allergic/Immunologic:        Chronic renal disease on dialysis   Neurological: Positive for headaches.   Hematological:         Dialysis pt.   Psychiatric/Behavioral: Negative for agitation, behavioral problems and confusion.       Physical Exam   BP: (!) 180/95  Pulse: 50  Heart Rate: 50  Temp: 98.3  F (36.8  C)  Resp: 16  Height: 179.1 cm (5' 10.5\")  Weight: 57.2 kg (126 lb)  SpO2: 100 %      Physical Exam   Constitutional: He is oriented to person, place, and time. He appears well-developed and well-nourished. No distress.   HENT:   Head: Normocephalic and atraumatic.   Mouth/Throat: Oropharynx is clear and moist.   Eyes: Conjunctivae and EOM are normal. Pupils are equal, round, and reactive to light.   Neck: Normal range of motion. Neck supple.   Cardiovascular: Regular rhythm.    Rate 50 and /95   Pulmonary/Chest:   Rate 16, lungs clear   Abdominal: Soft. He exhibits no distension. There is no tenderness.   Musculoskeletal:   Shunt left arm normal   Neurological: He is alert and oriented to person, place, and time.   Skin: Skin is warm and dry. No rash noted. He is not diaphoretic.   Psychiatric: He has a normal mood and affect. His behavior is normal. Judgment and thought content normal.   Nursing note and vitals reviewed.      ED Course     ED Course     Procedures          EKG:  Sinus olegario 50 per min.  left axis,  ST and T wave abnormalities  Consider alcides-lat. Ischemia,  Prolonged  qt.    Results for orders placed or performed during the hospital encounter of 03/09/18   CBC with platelets differential   Result Value Ref Range    WBC 4.4 4.0 - 11.0 10e9/L    RBC Count 3.50 (L) 4.4 - 5.9 10e12/L    Hemoglobin 10.7 (L) 13.3 - 17.7 g/dL    Hematocrit 32.2 (L) 40.0 - 53.0 %    MCV 92 78 - 100 fl    MCH " 30.6 26.5 - 33.0 pg    MCHC 33.2 31.5 - 36.5 g/dL    RDW 14.3 10.0 - 15.0 %    Platelet Count 138 (L) 150 - 450 10e9/L    Diff Method Automated Method     % Neutrophils 63.6 %    % Lymphocytes 15.6 %    % Monocytes 14.2 %    % Eosinophils 4.7 %    % Basophils 1.4 %    % Immature Granulocytes 0.5 %    Absolute Neutrophil 2.8 1.6 - 8.3 10e9/L    Absolute Lymphocytes 0.7 (L) 0.8 - 5.3 10e9/L    Absolute Monocytes 0.6 0.0 - 1.3 10e9/L    Absolute Eosinophils 0.2 0.0 - 0.7 10e9/L    Absolute Basophils 0.1 0.0 - 0.2 10e9/L    Abs Immature Granulocytes 0.0 0 - 0.4 10e9/L   Comprehensive metabolic panel   Result Value Ref Range    Sodium 135 134 - 144 mmol/L    Potassium 5.0 3.5 - 5.1 mmol/L    Chloride 89 (L) 98 - 107 mmol/L    Carbon Dioxide 32 (H) 21 - 31 mmol/L    Anion Gap 14 3 - 14 mmol/L    Glucose 93 70 - 105 mg/dL    Urea Nitrogen 54 (H) 7 - 25 mg/dL    Creatinine 12.47 (HH) 0.70 - 1.30 mg/dL    GFR Estimate 4 (L) >60 mL/min/1.7m2    GFR Estimate If Black 5 (L) >60 mL/min/1.7m2    Calcium 9.9 8.6 - 10.3 mg/dL    Bilirubin Total 0.5 0.3 - 1.0 mg/dL    Albumin 3.9 3.5 - 5.7 g/dL    Protein Total 6.5 6.4 - 8.9 g/dL    Alkaline Phosphatase 60 34 - 104 U/L    ALT 5 (L) 7 - 52 U/L    AST 13 13 - 39 U/L   Magnesium   Result Value Ref Range    Magnesium 2.4 1.9 - 2.7 mg/dL   Troponin I (now)   Result Value Ref Range    Troponin I ES <0.030 0.000 - 0.034 ug/L                Results for orders placed or performed during the hospital encounter of 03/09/18 (from the past 24 hour(s))   Troponin I (now)   Result Value Ref Range    Troponin I ES <0.030 0.000 - 0.034 ug/L   CBC with platelets differential   Result Value Ref Range    WBC 4.4 4.0 - 11.0 10e9/L    RBC Count 3.50 (L) 4.4 - 5.9 10e12/L    Hemoglobin 10.7 (L) 13.3 - 17.7 g/dL    Hematocrit 32.2 (L) 40.0 - 53.0 %    MCV 92 78 - 100 fl    MCH 30.6 26.5 - 33.0 pg    MCHC 33.2 31.5 - 36.5 g/dL    RDW 14.3 10.0 - 15.0 %    Platelet Count 138 (L) 150 - 450 10e9/L    Diff  Method Automated Method     % Neutrophils 63.6 %    % Lymphocytes 15.6 %    % Monocytes 14.2 %    % Eosinophils 4.7 %    % Basophils 1.4 %    % Immature Granulocytes 0.5 %    Absolute Neutrophil 2.8 1.6 - 8.3 10e9/L    Absolute Lymphocytes 0.7 (L) 0.8 - 5.3 10e9/L    Absolute Monocytes 0.6 0.0 - 1.3 10e9/L    Absolute Eosinophils 0.2 0.0 - 0.7 10e9/L    Absolute Basophils 0.1 0.0 - 0.2 10e9/L    Abs Immature Granulocytes 0.0 0 - 0.4 10e9/L   Comprehensive metabolic panel   Result Value Ref Range    Sodium 135 134 - 144 mmol/L    Potassium 5.0 3.5 - 5.1 mmol/L    Chloride 89 (L) 98 - 107 mmol/L    Carbon Dioxide 32 (H) 21 - 31 mmol/L    Anion Gap 14 3 - 14 mmol/L    Glucose 93 70 - 105 mg/dL    Urea Nitrogen 54 (H) 7 - 25 mg/dL    Creatinine 12.47 (HH) 0.70 - 1.30 mg/dL    GFR Estimate 4 (L) >60 mL/min/1.7m2    GFR Estimate If Black 5 (L) >60 mL/min/1.7m2    Calcium 9.9 8.6 - 10.3 mg/dL    Bilirubin Total 0.5 0.3 - 1.0 mg/dL    Albumin 3.9 3.5 - 5.7 g/dL    Protein Total 6.5 6.4 - 8.9 g/dL    Alkaline Phosphatase 60 34 - 104 U/L    ALT 5 (L) 7 - 52 U/L    AST 13 13 - 39 U/L   Magnesium   Result Value Ref Range    Magnesium 2.4 1.9 - 2.7 mg/dL       Assessments & Plan (with Medical Decision Making)     I have reviewed the nursing notes.    I have reviewed the findings, diagnosis, plan and need for follow up with the patient.  Orders Placed This Encounter   Procedures     CBC with platelets differential     Comprehensive metabolic panel     Magnesium     Troponin I (now)     EKG 12-lead, tracing only           Discharge Medication List as of 3/9/2018 11:36 AM          Final diagnoses:   Migraine with status migrainosus, not intractable, unspecified migraine type   Benign essential hypertension   CKD (chronic kidney disease) stage 5, GFR less than 15 ml/min (H)   Dialysis patient (H)   feels much better - usual self, discussed  Transfer to  [facility] for  Dialysis today,  Unable to get in here in [facility] today.  He plans on   Waiting and doing his dialysis here tomorrow as scheduled however if faintness,  SOB,  Confusion  Pain or other concerns will return here promptly.    3/9/2018   [facility]     Eb Pagan MD  03/09/18 9831       Eb Pagan MD  03/09/18 4922

## 2018-03-09 NOTE — ED NOTES
Pt admit to [facility] via [facility]C.  Pt reports he had a migraine for 2 days, today his BP has been elevated (216/84 at home).  Pt says his head feels messed up, food doesn't taste the same (fruits taste more tart than usual), and he's been getting the hiccups a lot.  Emesis last NOC.  Pt still has a H/A, has vision changes/aura's with his migraines.  Pt reports he missed his dialysis appt yesterday (they had a water problem)

## 2018-03-10 ENCOUNTER — TRANSFERRED RECORDS (OUTPATIENT)
Dept: HEALTH INFORMATION MANAGEMENT | Facility: OTHER | Age: 48
End: 2018-03-10

## 2018-03-20 ENCOUNTER — TRANSFERRED RECORDS (OUTPATIENT)
Dept: HEALTH INFORMATION MANAGEMENT | Facility: OTHER | Age: 48
End: 2018-03-20

## 2018-03-23 DIAGNOSIS — C64.9 RENAL CELL CARCINOMA, UNSPECIFIED LATERALITY (H): Primary | ICD-10-CM

## 2018-03-23 NOTE — PROGRESS NOTES
Lab ordered per verbal read back order from provider and sent to provider to co-sign.  Bella Galdamez RN...........3/23/2018 1:59 PM

## 2018-03-27 DIAGNOSIS — C64.9 RENAL CELL CARCINOMA, UNSPECIFIED LATERALITY (H): ICD-10-CM

## 2018-03-27 LAB
ALBUMIN SERPL-MCNC: 4.7 G/DL (ref 3.5–5.7)
ALP SERPL-CCNC: 68 U/L (ref 34–104)
ALT SERPL W P-5'-P-CCNC: 7 U/L (ref 7–52)
ANION GAP SERPL CALCULATED.3IONS-SCNC: 9 MMOL/L (ref 3–14)
AST SERPL W P-5'-P-CCNC: 19 U/L (ref 13–39)
BASOPHILS # BLD AUTO: 0.1 10E9/L (ref 0–0.2)
BASOPHILS NFR BLD AUTO: 1.3 %
BILIRUB SERPL-MCNC: 0.6 MG/DL (ref 0.3–1)
BUN SERPL-MCNC: 16 MG/DL (ref 7–25)
CALCIUM SERPL-MCNC: 9.5 MG/DL (ref 8.6–10.3)
CHLORIDE SERPL-SCNC: 94 MMOL/L (ref 98–107)
CO2 SERPL-SCNC: 37 MMOL/L (ref 21–31)
CREAT SERPL-MCNC: 4.01 MG/DL (ref 0.7–1.3)
DIFFERENTIAL METHOD BLD: ABNORMAL
EOSINOPHIL # BLD AUTO: 0.2 10E9/L (ref 0–0.7)
EOSINOPHIL NFR BLD AUTO: 6.2 %
ERYTHROCYTE [DISTWIDTH] IN BLOOD BY AUTOMATED COUNT: 15.7 % (ref 10–15)
GFR SERPL CREATININE-BSD FRML MDRD: 16 ML/MIN/1.7M2
GLUCOSE SERPL-MCNC: 86 MG/DL (ref 70–105)
HCT VFR BLD AUTO: 33.9 % (ref 40–53)
HGB BLD-MCNC: 11.2 G/DL (ref 13.3–17.7)
IMM GRANULOCYTES # BLD: 0 10E9/L (ref 0–0.4)
IMM GRANULOCYTES NFR BLD: 0.3 %
LDH SERPL L TO P-CCNC: 185 U/L (ref 140–271)
LYMPHOCYTES # BLD AUTO: 0.6 10E9/L (ref 0.8–5.3)
LYMPHOCYTES NFR BLD AUTO: 14.8 %
MCH RBC QN AUTO: 30.1 PG (ref 26.5–33)
MCHC RBC AUTO-ENTMCNC: 33 G/DL (ref 31.5–36.5)
MCV RBC AUTO: 91 FL (ref 78–100)
MONOCYTES # BLD AUTO: 0.4 10E9/L (ref 0–1.3)
MONOCYTES NFR BLD AUTO: 9.9 %
NEUTROPHILS # BLD AUTO: 2.5 10E9/L (ref 1.6–8.3)
NEUTROPHILS NFR BLD AUTO: 67.5 %
PLATELET # BLD AUTO: 143 10E9/L (ref 150–450)
POTASSIUM SERPL-SCNC: 3.6 MMOL/L (ref 3.5–5.1)
PROT SERPL-MCNC: 7.8 G/DL (ref 6.4–8.9)
RBC # BLD AUTO: 3.72 10E12/L (ref 4.4–5.9)
SODIUM SERPL-SCNC: 140 MMOL/L (ref 134–144)
WBC # BLD AUTO: 3.7 10E9/L (ref 4–11)

## 2018-03-27 PROCEDURE — 84165 PROTEIN E-PHORESIS SERUM: CPT | Performed by: NURSE PRACTITIONER

## 2018-03-27 PROCEDURE — 85025 COMPLETE CBC W/AUTO DIFF WBC: CPT | Performed by: NURSE PRACTITIONER

## 2018-03-27 PROCEDURE — 36415 COLL VENOUS BLD VENIPUNCTURE: CPT | Performed by: NURSE PRACTITIONER

## 2018-03-27 PROCEDURE — 80053 COMPREHEN METABOLIC PANEL: CPT | Mod: AY | Performed by: NURSE PRACTITIONER

## 2018-03-27 PROCEDURE — 82784 ASSAY IGA/IGD/IGG/IGM EACH: CPT | Performed by: NURSE PRACTITIONER

## 2018-03-27 PROCEDURE — 82232 ASSAY OF BETA-2 PROTEIN: CPT | Performed by: NURSE PRACTITIONER

## 2018-03-27 PROCEDURE — 00000402 ZZHCL STATISTIC TOTAL PROTEIN: Performed by: NURSE PRACTITIONER

## 2018-03-27 PROCEDURE — 83615 LACTATE (LD) (LDH) ENZYME: CPT | Performed by: NURSE PRACTITIONER

## 2018-03-28 LAB
ALBUMIN SERPL ELPH-MCNC: 4.7 G/DL (ref 3.7–5.1)
ALPHA1 GLOB SERPL ELPH-MCNC: 0.3 G/DL (ref 0.2–0.4)
ALPHA2 GLOB SERPL ELPH-MCNC: 0.7 G/DL (ref 0.5–0.9)
B-GLOBULIN SERPL ELPH-MCNC: 0.8 G/DL (ref 0.6–1)
B2 MICROGLOB SERPL-MCNC: 26.7 MG/L
GAMMA GLOB SERPL ELPH-MCNC: 1.1 G/DL (ref 0.7–1.6)
IGA SERPL-MCNC: 364 MG/DL (ref 70–380)
IGG SERPL-MCNC: 1130 MG/DL (ref 695–1620)
IGM SERPL-MCNC: 57 MG/DL (ref 60–265)
M PROTEIN SERPL ELPH-MCNC: 0 G/DL
PROT PATTERN SERPL ELPH-IMP: NORMAL

## 2018-04-04 ENCOUNTER — ONCOLOGY VISIT (OUTPATIENT)
Dept: ONCOLOGY | Facility: OTHER | Age: 48
End: 2018-04-04
Attending: INTERNAL MEDICINE
Payer: COMMERCIAL

## 2018-04-04 VITALS
OXYGEN SATURATION: 99 % | SYSTOLIC BLOOD PRESSURE: 132 MMHG | WEIGHT: 129.4 LBS | DIASTOLIC BLOOD PRESSURE: 82 MMHG | TEMPERATURE: 99.3 F | HEIGHT: 71 IN | HEART RATE: 68 BPM | BODY MASS INDEX: 18.11 KG/M2

## 2018-04-04 DIAGNOSIS — C64.9 RENAL CELL CARCINOMA, UNSPECIFIED LATERALITY (H): Primary | ICD-10-CM

## 2018-04-04 PROCEDURE — 99215 OFFICE O/P EST HI 40 MIN: CPT | Performed by: INTERNAL MEDICINE

## 2018-04-04 PROCEDURE — G0463 HOSPITAL OUTPT CLINIC VISIT: HCPCS

## 2018-04-04 RX ORDER — LISINOPRIL 20 MG/1
20 TABLET ORAL DAILY
Status: ON HOLD | COMMUNITY
End: 2018-06-02

## 2018-04-04 ASSESSMENT — PAIN SCALES - GENERAL: PAINLEVEL: NO PAIN (0)

## 2018-04-04 NOTE — NURSING NOTE
Patient presents for a follow up regarding renal cell cancer.  No current concerns or complaints.  On last steps to get on the transplant list at the [facility].  Oly Astudillo CMA ([facility])................ 4/4/2018 3:14 PM

## 2018-04-04 NOTE — MR AVS SNAPSHOT
After Visit Summary   4/4/2018    Gilles Henning III    MRN: [id number]           Patient Information     Date Of Birth          1970        Visit Information        Provider Department      4/4/2018 3:00 PM Raul Campbell MD [facility]        Today's Diagnoses     Renal cell carcinoma, unspecified laterality (H)    -  1       Follow-ups after your visit        Your next 10 appointments already scheduled     Jul 03, 2018 10:45 AM CDT   [facility]   [facility]    [address] [facility] [address] [phone]           Please do not eat 10-12 hours before your appointment if you are coming in fasting for labs on lipids, cholesterol, or glucose (sugar). This does not apply to pregnant women. Water, hot tea and black coffee (with nothing added) are okay. Do not drink other fluids, diet soda or chew gum.            Jul 10, 2018 11:15 AM CDT   [facility] with MICAH Fatima C[facility]   [facility]    [address]  [place] [id number] [phone]              Future tests that were ordered for you today     Open Future Orders        Priority Expected Expires Ordered    CBC with platelets differential Routine 7/2/2018 9/4/2018 4/4/2018    Comprehensive metabolic panel Routine 7/2/2018 9/4/2018 4/4/2018    Lactate Dehydrogenase Routine 7/2/2018 9/4/2018 4/4/2018    Beta 2 microglobulin Routine 7/2/2018 9/4/2018 4/4/2018    Immunoglobulins A G and M Routine 7/2/2018 9/4/2018 4/4/2018    Protein electrophoresis Routine 7/2/2018 9/4/2018 4/4/2018            Who to contact     If you have questions or need follow up information about today's clinic visit or your schedule please contact [facility] directly at [phone].  Normal or non-critical lab and imaging results will be communicated to you by  "MyChart, letter or phone within 4 business days after the clinic has received the results. If you do not hear from us within 7 days, please contact the clinic through [employer] or phone. If you have a critical or abnormal lab result, we will notify you by phone as soon as possible.  Submit refill requests through [employer] or call your pharmacy and they will forward the refill request to us. Please allow 3 business days for your refill to be completed.          Additional Information About Your Visit        [employer] Information     [employer] lets you send messages to your doctor, view your test results, renew your prescriptions, schedule appointments and more. To sign up, go to [email or web address] . Click on \"Log in\" on the left side of the screen, which will take you to the Welcome page. Then click on \"Sign up Now\" on the right side of the page.     You will be asked to enter the access code listed below, as well as some personal information. Please follow the directions to create your username and password.     Your access code is: [email or web address]  Expires: 2018 12:36 PM     Your access code will  in 90 days. If you need help or a new code, please call your [place] clinic or [phone].        Care EveryWhere ID     This is your Care EveryWhere ID. This could be used by other organizations to access your [place] medical records  [id number]        Your Vitals Were     Pulse Temperature Height Pulse Oximetry BMI (Body Mass Index)       68 99.3  F (37.4  C) (Oral) 1.791 m (5' 10.51\") 99% 18.3 kg/m2        Blood Pressure from Last 3 Encounters:   18 132/82   18 167/85   17 132/80    Weight from Last 3 Encounters:   18 58.7 kg (129 lb 6.4 oz)   18 57.2 kg (126 lb)   17 58.7 kg (129 lb 6.4 oz)                 Today's Medication Changes          These changes are accurate as of 18  5:13 PM.  If you have any questions, ask your nurse or doctor.               These " medicines have changed or have updated prescriptions.        Dose/Directions    lisinopril 20 MG tablet   Commonly known as:  PRINIVIL/ZESTRIL   This may have changed:  Another medication with the same name was removed. Continue taking this medication, and follow the directions you see here.   Changed by:  Raul Campbell MD        Dose:  20 mg   Take 20 mg by mouth daily   Refills:  0                Primary Care Provider Office Phone # Fax #    Charlie Darrion Dubose [phone]       [address] [facility] [address]        Equal Access to Services     [facility]: Hadii aad ku hadasho Soomaali, waaxda luqadaha, qaybta kaalmada adeegyada, waxish gibbs . So [facility] [phone].    ATENCIÓN: Si habla español, tiene a millan disposición servicios gratuitos de asistencia lingüística. [facility] [phone].    We comply with applicable federal civil rights laws and [place] laws. We do not discriminate on the basis of race, color, national origin, age, disability, sex, sexual orientation, or gender identity.            Thank you!     Thank you for choosing [facility] [place]  for your care. Our goal is always to provide you with excellent care. Hearing back from our patients is one way we can continue to improve our services. Please take a few minutes to complete the written survey that you may receive in the mail after your visit with us. Thank you!             Your Updated Medication List - Protect others around you: Learn how to safely use, store and throw away your medicines at www.disposemymeds.org.          This list is accurate as of 4/4/18  5:13 PM.  Always use your most recent med list.                   Brand Name Dispense Instructions for use Diagnosis    amLODIPine 5 MG tablet    NORVASC    30 tablet    Take 1 tablet (5 mg) by mouth daily    S/p nephrectomy       atorvastatin 40 MG tablet    LIPITOR          calcium acetate 667 MG Caps  capsule    PHOSLO    180 capsule    Take 2 capsules (1,334 mg) by mouth 3 times daily (with meals)    S/p nephrectomy       clonazePAM 1 MG tablet    klonoPIN     Take 1.5 mg by mouth daily as needed        docusate sodium 100 MG capsule    COLACE     Take 100 mg by mouth 2 times daily        lisinopril 20 MG tablet    PRINIVIL/ZESTRIL     Take 20 mg by mouth daily        metoprolol tartrate 25 MG tablet    LOPRESSOR    30 tablet    Take 1 tablet (25 mg) by mouth daily    S/p nephrectomy       omeprazole 40 MG capsule    priLOSEC    90 capsule    TAKE 1 CAPSULE BY MOUTH DAILY    Gastroesophageal reflux disease, esophagitis presence not specified       predniSONE 5 MG tablet    DELTASONE    30 tablet    Take 1 tablet (5 mg) by mouth daily    S/p nephrectomy       sennosides 8.6 MG tablet    SENOKOT    120 each    Take 1 tablet by mouth daily as needed for constipation    S/p nephrectomy       SUMAtriptan 25 MG tablet    IMITREX          traZODone 150 MG tablet    DESYREL     Take 300 mg by mouth At Bedtime        vitamin D 2000 UNITS tablet      Take 2,000 Units by mouth daily

## 2018-04-04 NOTE — PROGRESS NOTES
Visit Date:   04/04/2018      HISTORY OF PRESENT ILLNESS:  Mr. Henning returns for followup of renal cell carcinoma, anemia and history of IgA nephropathy.  I had seen the patient at the request of Dr. Charlie Dubose on 12/22/2016.  At that time, he was a 46-year-old white male with multiple medical problems primarily end-stage renal disease with history of renal cell carcinoma.  We were asked to evaluate concerning anemia and ongoing management of his renal cell carcinoma.  Apparently originally he had developed end-stage renal disease felt to be secondary to IgA nephropathy.  He was treated with dialysis under the direction of Dr. Tobin and then subsequently underwent transplant and then underwent chronic transplant rejection.  Apparently his left kidney was noted to have developed renal cell carcinoma.  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  Right kidney was benign with end-stage renal disease.  He underwent the procedure at [facility] on 03/25/2015.  Since then he has been treated for end-stage renal disease by Dr. Tobin on chronic hemodialysis.  Most recently, he was noted to be anemic with hemoglobin 8.4.  Dr. Tobin reinstated IV iron Neupogen replacement, his hemoglobin improved.  He is interested in proceeding with renal transplant at the [facility].  According to the patient the transplant team wanted to rule out any evidence of malignancy before proceeding with transplant.  When we saw the patient, we felt his hemoglobin improved with iron and Epogen, likely anemia of renal failure.  In terms of renal cell carcinoma, we ordered a PET scan.  This was done on 01/12/2017, the findings were no evidence of recurrent metastatic disease, no evidence of malignancy.  We felt he was cancer free and he would be a candidate for renal cell  transplant, he did see the transplant team at the [facility] who accepting him into the transplant program.  He was reviewed by a multidisciplinary committee on 04/19/2017 and the committee approved Mr. Henning for kidney transplant.  Most recently he had been seen by Rika Fitzpatrick on 09/08/2017.  He was apparently having large amounts of blood from his urethra.  He has seen Dr. Beach and cystoscopy was performed which was negative.  There was a feeling was that the patient was having blood coming from his kidneys and he was to follow up with the [facility] who recommended a PET scan.  A PET scan was done subsequently in November 2017 and it was essentially negative for metastatic disease.  The patient subsequently was seen at the  and it was deemed that it was degeneration of the transplanted kidney.  It was decided to proceed with transplant nephrectomy, this was performed December 15.  The patient was discharged on December 17 and the plan was to continue dialysis on Tuesdays, Thursdays, and Saturdays at [facility] in [place], [place] via his left fistula.  Patient was recently seen in the ER with complaints of headaches and he was noted to have a blood pressure of 173/97.  He did have a fall.  X-ray of the humerus is negative.  He did have hyperkalemia and this was corrected.  The plan was to continue dialysis.  The patient had seen Dr. Tobin and her nurse practitioner on 03/28/2018, she had noted that his blood pressure was elevated off amlodipine and he was resumed on amlodipine.  He was tapered off tacrolimus and prednisone.  Otherwise the patient is doing relatively well.  He denies any significant fevers, no shortness of breath, chest pain, abdominal pain, fevers, night sweats, weight loss.  Plans to continue dialysis.  He says he is waiting and he is still on the transplant list for a kidney, otherwise no other complaints.      PHYSICAL EXAMINATION:   GENERAL:  He is a thin, middle-aged white  male in no acute distress.   VITAL SIGNS:  Blood pressure 130/82, pulse 68, temperature 99.3.   HEENT:  Head atraumatic, normocephalic.  Oropharynx is nonerythematous.   NECK:  Supple.   LUNGS:  Clear to auscultation and percussion.   HEART:  Regular rhythm, S1, S2 normal.   ABDOMEN:  Soft, nontender.   NEUROLOGIC:  Nonfocal.      LABORATORY DATA:  Reveal CBC:  White count 3.7, H and H 11.2 and 33.9, platelet count 143,000.  BUN 16, creatinine 4.01.  Serum protein electrophoresis was normal with no monoclonal protein seen.  LDH is normal at 185, quantitative IgG, IgA and IgM are all normal.      IMPRESSION:     1.  History of renal cell carcinoma status post bilateral nephrectomy in 2015 with left kidney demonstrating papillary renal cell carcinoma grade 2 with 2.5 cm tumor kidney margins free of tumor and right kidney revealed end-stage renal disease.  The patient was considered a potential candidate for renal transplant and PET scan was negative.  MRI of thoracic and lumbar spine were negative for metastatic disease.  The patient was deemed cancer free and was accepted by the renal transplant at the [facility].  He developed bleeding from the urethra and was found to have a degenerative transplanted kidney, which was removed in 2017.  Patient continues on dialysis noting no evidence of malignancy at this point.  The plan is to continue surveillance.   2.  Anemia and renal failure.  The patient continues with Aranesp injections under the direction of Dr. Tobin.  We will see the patient in 3 months, obtain CBC, CMP, LDH, serum protein electrophoresis, quantitative immunoglobulins.        Forty minutes was spent with the patient, half the time was spent in counseling and coordination of care.         OLIVE COLE MD             D: 2018   T: 2018   MT: NTS      Name:     AYAAN JUNIOR   MRN:      [id number]        Account:      [id number]   :      1970           Visit  Date:   04/04/2018      Document: [id number]       cc: Charlie Tobin MD

## 2018-04-07 ENCOUNTER — TRANSFERRED RECORDS (OUTPATIENT)
Dept: HEALTH INFORMATION MANAGEMENT | Facility: OTHER | Age: 48
End: 2018-04-07

## 2018-04-10 ENCOUNTER — TELEPHONE (OUTPATIENT)
Dept: TRANSPLANT | Facility: CLINIC | Age: 48
End: 2018-04-10

## 2018-04-10 DIAGNOSIS — Z76.82 AWAITING ORGAN TRANSPLANT: Primary | ICD-10-CM

## 2018-04-10 NOTE — LETTER
PHYSICIAN ORDER   ALA/PRA BLOOD    DATE & TIME ISSUED: April 10, 2018 1:50 PM  PATIENT NAME: Gilles Henning III   : 1970     [facility] MR#  [id number]     DIAGNOSIS/ICD-10 CODE: Awaiting Organ transplant [Z76.82}   EXPIRES: (1 YEAR AFTER DATE ISSUED)  EVERY 12 weeks / 3 months   1. Please draw 20ml of blood in red top (plain) tube for Antileukocyte Antibody (ALA or PRA).   2. Label tubes with the patient s name, complete lab slip.         3. Mailers, lab slips with instructions are sent to patient separately.      4. Call the Outreach Lab at [phone] to reorder mailers.       5. Mail blood to (this address is also on the mailers):    IMMUNOLOGY LABORATORY  [facility]  Room 7-139 B  [address]  [address]        Baron Parsosn MD  Surgical Director, Kidney Transplantation

## 2018-04-10 NOTE — PROGRESS NOTES
Called pt today and spoke with him. Explained I have received notes from last weeks appts with Dr. Campbell and Dr. Tobin - all look OK for transplant.  I explained to patient he will need to attend a cardiologist appt June 2018 for updated cardiology clearance to proceed with transplant, pt wishes to do this locally so I explained I can try to arrange via dialysis. Also explained to pt that he needs an updated PRA sample and TB test - will arrange at dialysis.  Pt states he has been feeling well in recent weeks, up and around, good energy, eating and drinking normally and no GI issues.  Pt states he does not know why his BP was high a number of weeks ago, but since his BP medications were readjusted his BP is in good control.  Pt stating he is no longer taking any immunosuppression and is completely off of any Prednisone - I explained to pt that his provider notes from last week say that he is taking Prednisone 5 mg per day, pt denies that he is taking any Pred.  I explained to pt that I anticipate listing him on ACTIVE status next week. I also explained when listed ACTIVE status he will be transferred to [facility].  Pt expressed good understanding of all and was in good agreement with the plan.     Called dialysis and spoke with nurse. Nurse will set up pt for PRA and TB test on Thursday April 12th. Nurse will arrange for cardiology appt locally in June. Nurse will check with Dr. Tobin about Prednisone and dbl check with pt on Thursday about which medications he is and is not taking. I explained I will anticipate listing pt on ACTIVE status next week.

## 2018-04-12 ENCOUNTER — RESULTS ONLY (OUTPATIENT)
Dept: OTHER | Facility: CLINIC | Age: 48
End: 2018-04-12

## 2018-04-12 ENCOUNTER — TRANSFERRED RECORDS (OUTPATIENT)
Dept: HEALTH INFORMATION MANAGEMENT | Facility: CLINIC | Age: 48
End: 2018-04-12

## 2018-04-12 DIAGNOSIS — Z76.82 AWAITING ORGAN TRANSPLANT: ICD-10-CM

## 2018-04-12 PROCEDURE — 86832 HLA CLASS I HIGH DEFIN QUAL: CPT | Performed by: PHYSICIAN ASSISTANT

## 2018-04-12 PROCEDURE — 86833 HLA CLASS II HIGH DEFIN QUAL: CPT | Performed by: PHYSICIAN ASSISTANT

## 2018-04-16 LAB — PRA SINGLE ANTIGEN IGG ANTIBODY: NORMAL

## 2018-04-17 LAB
SA1 CELL: NORMAL
SA1 COMMENTS: NORMAL
SA1 HI RISK ABY: NORMAL
SA1 MOD RISK ABY: NORMAL
SA1 TEST METHOD: NORMAL
SA2 CELL: NORMAL
SA2 COMMENTS: NORMAL
SA2 HI RISK ABY UA: NORMAL
SA2 MOD RISK ABY: NORMAL
SA2 TEST METHOD: NORMAL

## 2018-04-18 ENCOUNTER — COMMITTEE REVIEW (OUTPATIENT)
Dept: TRANSPLANT | Facility: CLINIC | Age: 48
End: 2018-04-18

## 2018-04-18 NOTE — COMMITTEE REVIEW
Abdominal Patient Discussion Note Transplant Coordinator: Laura Benitez  Transplant Surgeon:  Dr. Mario Vallejo     Referring Physician: Dr. Samantha Allenham    Committee Review Members:  Nephrology Andrea Valdez, APRN CNP   Nurse Jane Fermin, DONALD   Nutrition Thao Peña, RD   Pharmacy Katelyn Covarrubias, [facility]    - Clinical Maranda DELFINA Watts, MSW, Krystabuster Keller, [facility]   Transplant Ginna Cardoso PA-C, Johanna Couch, DONALD, Troy Lorenzo MD, Clarisse Rosen, DONALD, Rachel Resendez, NP, Laura Benitez, DONALD, Lucina Sanford, RN, Martha Buckley, DONALD, Satr Macias MD, Mony Gill RN       Additional Discussion Notes and Findings: Reviewed medical status and evaluation results. Reviewed last note on 04/04/2018 per Dr. Raul Campbell - would like to know what the risk of RCC recurrence is, I will call. Noted pt is no longer on any immunosuppression at this time. Pt is scheduled locally to see cardiology in June 2018 and should RTC with Dermatology in August. Reviewed pt's TB quant gold done here with evaluation was drawn incorrectly so no result - viewed recent TB screening records from this month at dialysis and noted all results are negative - determined this is OK for transplant.  Determination made that it is OK to add pt onto the DD kidney wait list as soon as Dr. Campbell' input is known.      Called Dr. Campbell today at his clinic and LM on VM with his nurse regarding above question.     Mario Vallejo MD Siers, Marci A, RN; Star Macias MD                   Yes active please            Previous Messages       ----- Message -----      From: Laura Benitez RN      Sent: 4/19/2018   4:03 PM        To: Star Macias MD, Mario Vallejo MD   Subject: RCC clearance                                     F/U to yesterday's Selection Committee Meeting. Your question was what is the risk of pt's RCC recurrence and is it OK from Dr. Campbell's point of view  for pt to proceed with kidney transplant now ?     I spoke with Dr. Campbell's nurse Bella who added this note into EPIC today.  Is this OK then for pt to be ACTIVE status on K list. Nurse also stated pt is on an every 3 months RTC with Dr. Campbell. Thx, Laura         Per Raul Campbell MD patient has 5 percent risk of reoccurrence in next 5 years. Ok to proceed with kidney transplant. Message also left with Laura at [facility] at [phone].   Bella Galdamez RN...........4/19/2018 1:04 PM

## 2018-04-19 NOTE — NURSING NOTE
Per Raul Campbell MD patient has 5 percent risk of reoccurrence in next 5 years. Ok to proceed with kidney transplant. Message also left with Laura malloy [facility] at [phone].  Bella Galdamez RN...........4/19/2018 1:04 PM

## 2018-04-23 ENCOUNTER — TELEPHONE (OUTPATIENT)
Dept: TRANSPLANT | Facility: CLINIC | Age: 48
End: 2018-04-23

## 2018-04-23 ENCOUNTER — MEDICAL CORRESPONDENCE (OUTPATIENT)
Dept: HEALTH INFORMATION MANAGEMENT | Facility: CLINIC | Age: 48
End: 2018-04-23

## 2018-04-23 NOTE — LETTER
PHYSICIAN ORDER   ALA/PRA BLOOD    DATE & TIME ISSUED: 2018 11:47 AM  PATIENT NAME: Gilles Henning III   : 1970     [facility] MR#  [id number]     DIAGNOSIS/ICD-10 CODE: Awaiting Organ transplant [Z76.82}   EXPIRES: (1 YEAR AFTER DATE ISSUED)  EVERY 12 weeks / 3 months   1. Please draw 20ml of blood in red top (plain) tube for Antileukocyte Antibody (ALA or PRA).   2. Label tubes with the patient s name, complete lab slip.         3. Mailers, lab slips with instructions are sent to patient separately.      4. Call the Outreach Lab at [phone] to reorder mailers.       5. Mail blood to (this address is also on the mailers):    IMMUNOLOGY LABORATORY  [facility]  Room 7-139 B  [address]  [address]        Baron Parsons MD  Surgical Director, Kidney Transplantation

## 2018-04-23 NOTE — LETTER
2018    Gilles Henning III  [address]  [facility] [facility] [address]          Dear Mr. Henning,    This letter is sent to confirm that you have completed your transplant work-up and you are a candidate in the kidney transplant program at the [facility].  You were placed on the kidney active waitlist on 2018.      Your new transplant coordinator now is lCarisse Rosen RN who you can reach per our main office number or her direct number of [phone]. Clarisse is assisted by Gia Gutierrez LPN who can be reached as well per our main office number or her direct number of [phone].     When you are active on the waitlist and an organ becomes available, a coordinator will need to speak to you immediately.  You could be contacted at any time during the day or night as an organ could become available at any time.  Please make certain our office always has your current telephone numbers and address.      Items we will need from you:      We have received approval from your insurance company for the transplant procedure.  It is critical that you notify us if there is any change in your insurance.  It is also important that you familiarize yourself with the details of your specific insurance policy.  Our patient  is available to assist you if you should have any questions regarding your coverage.      An ALA or PRA blood sample may need to be sent here every 3 months to match you with  donors or any potential living donors. Your next sample is due here by 2018.  Special mailing boxes (called mailers) will be sent to you directly from the Outreach Department.  You should take the physician order form and the  to your dialysis unit when it is time to for this testing to be done.  Additional mailers can be obtained by calling the Transplant Office and asking to speak to a kidney .      During this waiting period, we  may request additional periodic laboratory tests with your primary physician.  It will be your responsibility to remind your physician to forward your results to the Transplant Office.      We need to be kept informed of any changes in your medical condition such as:    o changes in your medications,   o significant changes in your health  o significant infections (such as pneumonia or abscesses)  o blood transfusions  o any condition which requires hospitalization  o any surgery      Remember to complete any routine cancer screening tests required before your transplant.  This includes colonoscopy; prostate screening for men, and mammogram and gynecologic testing for women, as well as dental work.  Your primary care clinic can assist you with arranging for these exams.  Remind your caregivers to forward copies of the records and final reports.    We want you to know that our program has physician and surgeon coverage 24 hours a day, 365 days a year. If this coverage changes or there are substantial program changes, you will be notified in writing by letter.     Attached is a letter from the [employer] ([employer]). It describes the services and information offered to patients by [employer] and the Organ Procurement and Transplantation Network.    We appreciate having had the opportunity to participate in your care.  If you have questions, please feel free to call the Transplant Office at [phone] or [phone].      Sincerely,      Solid Organ Transplant  [facility]    Enclosures: ALA/PRA Physician Order, Telephone Contact List, Travel Resources, [employer] Letter, Waitlist Information Update and While You Are Waiting  cc: Care Team

## 2018-04-23 NOTE — TELEPHONE ENCOUNTER
Contacted patient to review outcome of selection committee meeting (See selection committee encounter).   Explained to patient that he/she needs to complete all components of the evaluation to be eligible for active status on the waiting list or to proceed with a live donor kidney transplant.   Reviewed next steps based on outcomes: explained he now meets criteria to be listed on DD kidney transplant wait list on ACTIVE status - pt very happy and agreeable to this.  Reviewed telephone numbers and updated EPIC.  Reveiwed sending PRA samples every 3 months. Reviewed that he is blood type 0 and c  meaning he is very hard to match with a donor.  I explained he does have the option to turn down an offer, but if he does he may not get another offer for an extremely long time.  Instructed pt to have all live donors register now with our program to increase possibilities of getting a kidney transplant, pt stating he is thinking he doesn't have any donors but will think some more.  Reviewed what to do when he gets a call with an offer, general components of the inpatient stay and post transplant routines including frequent appts at [facility] for 1 -2 weeks.  Pt states he can stay locally here post hospital d/c with his father in law. Pt confirmed he is not on any immunosuppression now.  I explained to patient that because he is outdoors a lot, it is important for him to make an attempt to not get bug bites. I explained if he has had recent bug bites, it is very important that he mentions this at the time of an offer or approved live donor due to the potential infection risk.  I also very clearly explained that he needs to keep up with all regular provider appts/dialysis as recommended. In addition to this he must keep up with annual cardiology appts for clearance for kidney transplant surgery with next appt locally in June 2018, needs to attend RTC with Dr. Campbell every 3 months with next appt 07/10/2018 and needs to RTC  with dermatology September 2018.  I explained that if he does not do this, we may decline to proceed with the kidney transplant if not up to date with all items.  I explained because he is listed ACTIVE status he will now be moved to a new coordinator - Clarisse Rosen RN with [facility]ier[facility] LPN, provided phone numbers.    Patient will be listed as active (evaluation is complete), will receive:    -A listing letter indicating active status   -A PRA Order   -PRA Mailers  Notified that patients Transplant Coordinator will change when waitlisted, new coordinator is:  Clarisse Rosen RN with [facility] Gutierrez LPN.   Pt expressed very good understanding of all and was in good agreement with the plan.   I added pt today onto the DD kidney transplant wait list in [facility]. Pt qualifies for wait time with a dialysis start date of 09- per 2728 form. Generated listing letter, PRA order today in EPIC - routed to  for mailing.   Called pt's dialysis unit today and spoke . Informed of ACTIVE listing, need for PRA samples and need for pt to remain up to date with all medical recommendations she will inform pt's nurse of all.

## 2018-04-23 NOTE — Clinical Note
Please note I have added pt today on ACTIVE status on the DD kidney transplant wait list.  Thanks Laura

## 2018-04-23 NOTE — TELEPHONE ENCOUNTER
Cassie Lynn in Immunology Lab noted a different MRN for pt in [facility] along with the [facility] MRN which is [id number].  I called Mar Nathan in our HIM at phone number [phone] and spoke to her explaining the situation of pt actually having 7 different MRN's listed on his demographic page.  Mar explained to me that many of these have been generated at the [facility] and have been since merged to current [facility] MRN. Mar said it appears that all MRN's seem correct for this patient. Mar will carefully double check on these now and call me with any problems.       Called Cassie Lynn in Immunology and updated her with Mar Nathan's input.

## 2018-05-14 ENCOUNTER — APPOINTMENT (OUTPATIENT)
Dept: GENERAL RADIOLOGY | Facility: OTHER | Age: 48
End: 2018-05-14
Attending: FAMILY MEDICINE
Payer: MEDICARE

## 2018-05-14 ENCOUNTER — HOSPITAL ENCOUNTER (EMERGENCY)
Facility: OTHER | Age: 48
Discharge: HOME | End: 2018-05-14
Attending: FAMILY MEDICINE | Admitting: FAMILY MEDICINE
Payer: MEDICARE

## 2018-05-14 VITALS
TEMPERATURE: 98.3 F | OXYGEN SATURATION: 100 % | HEIGHT: 71 IN | SYSTOLIC BLOOD PRESSURE: 152 MMHG | RESPIRATION RATE: 18 BRPM | HEART RATE: 64 BPM | BODY MASS INDEX: 17.08 KG/M2 | WEIGHT: 122 LBS | DIASTOLIC BLOOD PRESSURE: 84 MMHG

## 2018-05-14 DIAGNOSIS — B34.9 VIRAL SYNDROME: Primary | ICD-10-CM

## 2018-05-14 LAB
ALBUMIN SERPL-MCNC: 4.2 G/DL (ref 3.5–5.7)
ALP SERPL-CCNC: 82 U/L (ref 34–104)
ALT SERPL W P-5'-P-CCNC: 13 U/L (ref 7–52)
ANION GAP SERPL CALCULATED.3IONS-SCNC: 16 MMOL/L (ref 3–14)
AST SERPL W P-5'-P-CCNC: 20 U/L (ref 13–39)
BASOPHILS # BLD AUTO: 0 10E9/L (ref 0–0.2)
BASOPHILS NFR BLD AUTO: 0.7 %
BILIRUB SERPL-MCNC: 0.6 MG/DL (ref 0.3–1)
BUN SERPL-MCNC: 47 MG/DL (ref 7–25)
CALCIUM SERPL-MCNC: 9.8 MG/DL (ref 8.6–10.3)
CHLORIDE SERPL-SCNC: 91 MMOL/L (ref 98–107)
CO2 SERPL-SCNC: 31 MMOL/L (ref 21–31)
CREAT SERPL-MCNC: 10.14 MG/DL (ref 0.7–1.3)
DIFFERENTIAL METHOD BLD: ABNORMAL
EOSINOPHIL # BLD AUTO: 0.2 10E9/L (ref 0–0.7)
EOSINOPHIL NFR BLD AUTO: 5.2 %
ERYTHROCYTE [DISTWIDTH] IN BLOOD BY AUTOMATED COUNT: 15.1 % (ref 10–15)
FLUAV+FLUBV RNA SPEC QL NAA+PROBE: NEGATIVE
FLUAV+FLUBV RNA SPEC QL NAA+PROBE: NEGATIVE
GFR SERPL CREATININE-BSD FRML MDRD: 5 ML/MIN/1.7M2
GLUCOSE SERPL-MCNC: 80 MG/DL (ref 70–105)
HCT VFR BLD AUTO: 35 % (ref 40–53)
HGB BLD-MCNC: 11.7 G/DL (ref 13.3–17.7)
IMM GRANULOCYTES # BLD: 0 10E9/L (ref 0–0.4)
IMM GRANULOCYTES NFR BLD: 0.2 %
LYMPHOCYTES # BLD AUTO: 0.8 10E9/L (ref 0.8–5.3)
LYMPHOCYTES NFR BLD AUTO: 18.6 %
MCH RBC QN AUTO: 30.9 PG (ref 26.5–33)
MCHC RBC AUTO-ENTMCNC: 33.4 G/DL (ref 31.5–36.5)
MCV RBC AUTO: 92 FL (ref 78–100)
MONOCYTES # BLD AUTO: 0.6 10E9/L (ref 0–1.3)
MONOCYTES NFR BLD AUTO: 13.9 %
NEUTROPHILS # BLD AUTO: 2.7 10E9/L (ref 1.6–8.3)
NEUTROPHILS NFR BLD AUTO: 61.4 %
PLATELET # BLD AUTO: 114 10E9/L (ref 150–450)
POTASSIUM SERPL-SCNC: 5.2 MMOL/L (ref 3.5–5.1)
PROT SERPL-MCNC: 7.3 G/DL (ref 6.4–8.9)
RBC # BLD AUTO: 3.79 10E12/L (ref 4.4–5.9)
RSV RNA SPEC NAA+PROBE: NEGATIVE
SODIUM SERPL-SCNC: 138 MMOL/L (ref 134–144)
SPECIMEN SOURCE: NORMAL
WBC # BLD AUTO: 4.5 10E9/L (ref 4–11)

## 2018-05-14 PROCEDURE — 94640 AIRWAY INHALATION TREATMENT: CPT

## 2018-05-14 PROCEDURE — 99283 EMERGENCY DEPT VISIT LOW MDM: CPT | Mod: Z6 | Performed by: FAMILY MEDICINE

## 2018-05-14 PROCEDURE — 99284 EMERGENCY DEPT VISIT MOD MDM: CPT | Mod: 25 | Performed by: FAMILY MEDICINE

## 2018-05-14 PROCEDURE — 25000125 ZZHC RX 250: Performed by: FAMILY MEDICINE

## 2018-05-14 PROCEDURE — 80053 COMPREHEN METABOLIC PANEL: CPT | Performed by: FAMILY MEDICINE

## 2018-05-14 PROCEDURE — 71046 X-RAY EXAM CHEST 2 VIEWS: CPT

## 2018-05-14 PROCEDURE — 40000275 ZZH STATISTIC RCP TIME EA 10 MIN

## 2018-05-14 PROCEDURE — 36415 COLL VENOUS BLD VENIPUNCTURE: CPT | Performed by: FAMILY MEDICINE

## 2018-05-14 PROCEDURE — 87631 RESP VIRUS 3-5 TARGETS: CPT | Performed by: FAMILY MEDICINE

## 2018-05-14 PROCEDURE — 85025 COMPLETE CBC W/AUTO DIFF WBC: CPT | Performed by: FAMILY MEDICINE

## 2018-05-14 RX ORDER — ALBUTEROL SULFATE 0.83 MG/ML
2.5 SOLUTION RESPIRATORY (INHALATION)
Status: DISCONTINUED | OUTPATIENT
Start: 2018-05-14 | End: 2018-05-14 | Stop reason: HOSPADM

## 2018-05-14 RX ORDER — ALBUTEROL SULFATE 90 UG/1
2 AEROSOL, METERED RESPIRATORY (INHALATION) EVERY 6 HOURS PRN
Qty: 1 INHALER | Refills: 0 | Status: ON HOLD | OUTPATIENT
Start: 2018-05-14 | End: 2018-05-31

## 2018-05-14 RX ADMIN — ALBUTEROL SULFATE 2.5 MG: 2.5 SOLUTION RESPIRATORY (INHALATION) at 10:21

## 2018-05-14 NOTE — ED TRIAGE NOTES
Pt admit to [facility], ambulatory.  Pt reports he's been coughing (productive), has been bed ridden for a few days, nose is runny, throwing up, chills, weakness, body aches.  Pt hasn't hasn't been able to eat without throwing up after.

## 2018-05-14 NOTE — ED AVS SNAPSHOT
" [facility]    [address]    [place] [address]    Phone:  [phone]    Fax:  [phone]                                       Gilles Henning III   MRN: [id number]    Department:  [facility]   Date of Visit:  5/14/2018           Patient Information     Date Of Birth          1970        Your diagnoses for this visit were:     Viral syndrome        You were seen by Oral Cerna MD.      Follow-up Information     Follow up with Charlie Dubose MD In 1 week.    Specialty:  Pediatrics    Contact information:    [address] [place] [address] [phone]          Follow up with [facility].    Specialty:  EMERGENCY MEDICINE    Why:  If symptoms worsen    Contact information:    [address]  [place] [address] [phone]        Follow up with Charlie Dubose MD.    Specialty:  Pediatrics    Contact information:    [address] [address] [phone]          Discharge Instructions         Viral Syndrome (Adult)  A viral illness may cause a number of symptoms. The symptoms depend on the part of the body that the virus affects. If it settles in your nose, throat, and lungs, it may cause cough, sore throat, congestion, and sometimes headache. If it settles in your stomach and intestinal tract, it may cause vomiting and diarrhea. Sometimes it causes vague symptoms like \"aching all over,\" feeling tired, loss of appetite, or fever.  A viral illness usually lasts 1 to 2 weeks, but sometimes it lasts longer. In some cases, a more serious infection can look like a viral syndrome in the first few days of the illness. You may need another exam and additional tests to know the difference. Watch for the warning signs listed below.  Home care  Follow these guidelines for taking care of yourself at home:    If symptoms are severe, rest at home for the first 2 to 3 " days.    Stay away from cigarette smoke - both your smoke and the smoke from others.    You may use over-the-counter acetaminophen or ibuprofen for fever, muscle aching, and headache, unless another medicine was prescribed for this. If you have chronic liver or kidney disease or ever had a stomach ulcer or GI bleeding, talk with your doctor before using these medicines. No one who is younger than 18 and ill with a fever should take aspirin. It may cause severe disease or death.    Your appetite may be poor, so a light diet is fine. Avoid dehydration by drinking 8 to 12 8-ounce glasses of fluids each day. This may include water; orange juice; lemonade; apple, grape, and cranberry juice; clear fruit drinks; electrolyte replacement and sports drinks; and decaffeinated teas and coffee. If you have been diagnosed with a kidney disease, ask your doctor how much and what types of fluids you should drink to prevent dehydration. If you have kidney disease, drinking too much fluid can cause it build up in the your body and be dangerous to your health.    Over-the-counter remedies won't shorten the length of the illness but may be helpful for cough, sore throat; and nasal and sinus congestion. Don't use decongestants if you have high blood pressure.  Follow-up care  Follow up with your healthcare provider if you do not improve over the next week.  Call 911  Call 911 if any of the following occur:    Convulsion    Feeling weak, dizzy, or like you are going to faint    Chest pain, shortness of breath, wheezing, or difficulty breathing  When to seek medical advice  Call your healthcare provider right away if any of these occur:    Cough with lots of colored sputum (mucus) or blood in your sputum    Chest pain, shortness of breath, wheezing, or difficulty breathing    Severe headache; face, neck, or ear pain    Severe, constant pain in the lower right side of your belly (abdominal)    Continued vomiting (can t keep liquids  down)    Frequent diarrhea (more than 5 times a day); blood (red or black color) or mucus in diarrhea    Feeling weak, dizzy, or like you are going to faint    Extreme thirst    Fever of 100.4 F (38 C) or higher, or as directed by your healthcare provider  Date Last Reviewed: 9/25/2015 2000-2017 [employer]. [address] [place] [address]. All rights reserved. This information is not intended as a substitute for professional medical care. Always follow your healthcare professional's instructions.          Your next 10 appointments already scheduled     Jun 08, 2018  8:15 AM CDT   New Visit with Nikhil Cordero DO   [facility]    [address] [address] [address]   [phone]            Jul 03, 2018 10:45 AM CDT   [facility] with [facility]   [facility]    [address] [facility] [address]   [phone]           Please do not eat 10-12 hours before your appointment if you are coming in fasting for labs on lipids, cholesterol, or glucose (sugar). This does not apply to pregnant women. Water, hot tea and black coffee (with nothing added) are okay. Do not drink other fluids, diet soda or chew gum.            Jul 10, 2018 11:15 AM CDT   Return Visit with MICAH Fatima CNP   [facility]    [address] [facility] [address]   [phone]              24 Hour Appointment Hotline       To make an appointment at any [facility], call [phone] ([phone]). If you don't have a family doctor or clinic, we will help you find one. [facility] clinics are conveniently located to serve the needs of you and your family.             Review of your medicines      START taking        Dose / Directions Last dose taken    albuterol 108 (90 Base) MCG/ACT Inhaler   Commonly known as:   PROAIR HFA/PROVENTIL HFA/VENTOLIN HFA   Dose:  2 puff   Quantity:  1 Inhaler        Inhale 2 puffs into the lungs every 6 hours as needed for shortness of breath / dyspnea or wheezing   Refills:  0          Our records show that you are taking the medicines listed below. If these are incorrect, please call your family doctor or clinic.        Dose / Directions Last dose taken    amLODIPine 5 MG tablet   Commonly known as:  NORVASC   Dose:  5 mg   Indication:  High Blood Pressure Disorder   Quantity:  30 tablet        Take 1 tablet (5 mg) by mouth daily   Refills:  1        atorvastatin 40 MG tablet   Commonly known as:  LIPITOR        Refills:  0        calcium acetate 667 MG Caps capsule   Commonly known as:  PHOSLO   Dose:  1334 mg   Indication:  Patient takes two tablets with B, L, D., and one tablet with snacks.   Quantity:  180 capsule        Take 2 capsules (1,334 mg) by mouth 3 times daily (with meals)   Refills:  1        clonazePAM 1 MG tablet   Commonly known as:  klonoPIN   Dose:  1.5 mg        Take 1.5 mg by mouth daily as needed   Refills:  0        docusate sodium 100 MG capsule   Commonly known as:  COLACE   Dose:  100 mg        Take 100 mg by mouth 2 times daily   Refills:  0        lisinopril 20 MG tablet   Commonly known as:  PRINIVIL/ZESTRIL   Dose:  20 mg        Take 20 mg by mouth daily   Refills:  0        metoprolol tartrate 25 MG tablet   Commonly known as:  LOPRESSOR   Dose:  25 mg   Quantity:  30 tablet        Take 1 tablet (25 mg) by mouth daily   Refills:  1        omeprazole 40 MG capsule   Commonly known as:  priLOSEC   Quantity:  90 capsule        TAKE 1 CAPSULE BY MOUTH DAILY   Refills:  3        sennosides 8.6 MG tablet   Commonly known as:  SENOKOT   Dose:  1 tablet   Quantity:  120 each        Take 1 tablet by mouth daily as needed for constipation   Refills:  0        SUMAtriptan 25 MG tablet   Commonly known as:  IMITREX        Refills:  0        traZODone 150 MG tablet    Commonly known as:  DESYREL   Dose:  300 mg        Take 300 mg by mouth At Bedtime   Refills:  0        vitamin D 2000 units tablet   Dose:  2000 Units        Take 2,000 Units by mouth daily   Refills:  0                Prescriptions were sent or printed at these locations (1 Prescription)                   [id number] - [place], [place] - [address]   [address] [place] [address]    Telephone:  [phone]   Fax:  [phone]   Hours:                  E-Prescribed (1 of 1)         albuterol (PROAIR HFA/PROVENTIL HFA/VENTOLIN HFA) 108 (90 Base) MCG/ACT Inhaler                Procedures and tests performed during your visit     CBC with platelets differential    Comprehensive metabolic panel    Influenza A and B and RSV PCR    XR Chest 2 Views      Orders Needing Specimen Collection     None      Pending Results     No orders found from 5/12/2018 to 5/15/2018.            Pending Culture Results     No orders found from 5/12/2018 to 5/15/2018.            Pending Results Instructions     If you had any lab results that were not finalized at the time of your Discharge, you can call the ED Lab Result RN at [phone]. You will be contacted by this team for any positive Lab results or changes in treatment. The nurses are available 7 days a week from 10A to 6:30P.  You can leave a message 24 hours per day and they will return your call.        Thank you for choosing [facility]       Thank you for choosing [facility] for your care. Our goal is always to provide you with excellent care. Hearing back from our patients is one way we can continue to improve our services. Please take a few minutes to complete the written survey that you may receive in the mail after you visit with us. Thank you!        [employer] Information     Certain lets you send messages to your doctor, view your test results, renew your prescriptions, schedule appointments and more. To sign up, go to  [email or web address] . Click on \"Log in\" on the left side of the screen, which will take you to the Welcome page. Then click on \"Sign up Now\" on the right side of the page.     You will be asked to enter the access code listed below, as well as some personal information. Please follow the directions to create your username and password.     Your access code is: [email or web address]  Expires: 2018 12:36 PM     Your access code will  in 90 days. If you need help or a new code, please call your [facility] clinic or [phone].        Care EveryWhere ID     This is your Care EveryWhere ID. This could be used by other organizations to access your [facility] medical records  [id number]        Equal Access to Services     ALEXANDRA DOVE : Xiao Rene, arabella landaverde, gustabo beard, pascale king. So [facility] [phone].    ATENCIÓN: Si habla español, tiene a millan disposición servicios gratuitos de asistencia lingüística. Denys al [phone].    We comply with applicable federal civil rights laws and [place] laws. We do not discriminate on the basis of race, color, national origin, age, disability, sex, sexual orientation, or gender identity.            After Visit Summary       This is your record. Keep this with you and show to your community pharmacist(s) and doctor(s) at your next visit.                  "

## 2018-05-14 NOTE — DISCHARGE INSTRUCTIONS

## 2018-05-14 NOTE — ED AVS SNAPSHOT
[facility]    [address]    [place] [address]    Phone:  [phone]    Fax:  [phone]                                       Gilles Henning III   MRN: [id number]    Department:  [facility]   Date of Visit:  5/14/2018           After Visit Summary Signature Page     I have received my discharge instructions, and my questions have been answered. I have discussed any challenges I see with this plan with the nurse or doctor.    ..........................................................................................................................................  Patient/Patient Representative Signature      ..........................................................................................................................................  Patient Representative Print Name and Relationship to Patient    ..................................................               ................................................  Date                                            Time    ..........................................................................................................................................  Reviewed by Signature/Title    ...................................................              ..............................................  Date                                                            Time

## 2018-05-16 ASSESSMENT — ENCOUNTER SYMPTOMS
SINUS PAIN: 0
ABDOMINAL DISTENTION: 0
ABDOMINAL PAIN: 0
COUGH: 1
SHORTNESS OF BREATH: 0
AGITATION: 0
ADENOPATHY: 0
CONFUSION: 0
BACK PAIN: 0
PALPITATIONS: 0
FEVER: 0
CHILLS: 1
RHINORRHEA: 1
DYSURIA: 0

## 2018-05-16 NOTE — ED PROVIDER NOTES
History     Chief Complaint   Patient presents with     Cough     Nausea & Vomiting     Fatigue     HPI  Gilles Henning III is a 48 year old male who presents the emergency department with cough, increased runny nose throwing up chills weakness and body aches for the last few days.  He is not very ambulatory normally his debilitated dialysis patient.  He has had vomiting after eating as well.  No fevers at home.  He is due for dialysis today or tomorrow.  History of kidney transplant.    Problem List:    Patient Active Problem List    Diagnosis Date Noted     End stage renal disease (H) 02/26/2018     Priority: Medium     Overview:   A:  Status post peritoneal dialysis for seven years.  B:  On hemodialysis.       Hyperlipidemia 02/26/2018     Priority: Medium     Hypertension 02/26/2018     Priority: Medium     Nephritis and nephropathy, 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 02/26/2018     Priority: Medium     Onychocryptosis 02/26/2018     Priority: Medium     Posttraumatic stress disorder 02/26/2018     Priority: Medium     Kidney transplant recipient 12/15/2017     Priority: Medium     Bipolar affective disorder (H) 10/13/2017     Priority: Medium     Night terrors 10/13/2017     Priority: Medium     PTSD (post-traumatic stress disorder) 10/13/2017     Priority: Medium     CKD (chronic kidney disease) stage V requiring chronic dialysis (H) 06/26/2017     Priority: Medium     Chronic kidney disease, stage V (H) 06/12/2017     Priority: Medium     Lumbar disc disease with radiculopathy 07/11/2016     Priority: Medium     Lumbar foraminal stenosis 07/11/2016     Priority: Medium     Immunosuppressed status (H) 04/21/2016     Priority: Medium     Gastroesophageal reflux disease 03/15/2016     Priority: Medium     Secondary hyperparathyroidism (H) 08/11/2015     Priority: Medium     Vitamin D deficiency 08/11/2015     Priority: Medium     Acute on chronic rejection of kidney 06/12/2015     Priority: Medium     S/p  nephrectomy 05/22/2015     Priority: Medium     Renal cell carcinoma (H) 05/19/2015     Priority: Medium     Overview:   s/p resection at [facility] 2015       Anemia of chronic disease 03/06/2015     Priority: Medium     Chronic insomnia 06/11/2014     Priority: Medium     Chronic rejection of kidney transplant 06/11/2014     Priority: Medium     Erectile dysfunction 06/11/2014     Priority: Medium     Nausea 10/07/2013     Priority: Medium     S/P kidney transplant 08/01/2012     Priority: Medium     Overview:   Followed at [facility] [place] [facility]       Colitis, acute 06/17/2012     Priority: Medium     Diarrhea 05/10/2012     Priority: Medium     Headache 10/18/2011     Priority: Medium     Heartburn 08/17/2011     Priority: Medium     Abnormal involuntary movement 08/17/2011     Priority: Medium     Psychosexual dysfunction with inhibited sexual excitement 03/29/2011     Priority: Medium     Hereditary and idiopathic peripheral neuropathy 03/29/2011     Priority: Medium     Chest pain 10/26/2010     Priority: Medium     Overview:   likely not cardiac       Depression, major, recurrent (H) 04/26/2010     Priority: Medium     Overview:   with suicide attempt.          Past Medical History:    Past Medical History:   Diagnosis Date     Anemia in chronic kidney disease      Chews tobacco      Chronic rejection of kidney transplant      ESRD needing dialysis (H)      History of alcoholism (H)      History of depression      History of peritoneal dialysis      History of substance abuse      Hyperlipidemia      IgA nephropathy      Osteopenia      Peritonitis (H)      Renal cell carcinoma (H)        Past Surgical History:    Past Surgical History:   Procedure Laterality Date     EXPLANT TRANSPLANTED KIDNEY N/A 12/15/2017    Procedure: EXPLANT TRANSPLANTED KIDNEY;  Transplanted Nephrectomy;  Surgeon: Mario Vallejo MD;  Location: [facility] OR     HC DIALYSIS AVF OR AVG, CENTRAL INTERVENTION ONLY Left      LAPAROSCOPIC INSERTION CATHETER PERITONEAL  DIALYSIS Left      NEPHRECTOMY BILATERAL  2015     REMOVE CATHETER PERITONEAL       TRANSPLANT KIDNEY RECIPIENT  DONOR Left 2009       Family History:    No family history on file.    Social History:  Marital Status:   [2]  Social History   Substance Use Topics     Smoking status: Passive Smoke Exposure - Never Smoker     Types: Dip, chew, snus or snuff     Smokeless tobacco: Current User     Types: Chew      Comment: Wife smoked years ago.  Weaning off chew now     Alcohol use No      Comment: none now, did treatment at age 21, relapsed with divorce (a couple months)  then now  sober 3 years.         Medications:      albuterol (PROAIR HFA/PROVENTIL HFA/VENTOLIN HFA) 108 (90 Base) MCG/ACT Inhaler   amLODIPine (NORVASC) 5 MG tablet   atorvastatin (LIPITOR) 40 MG tablet   calcium acetate (PHOSLO) 667 MG CAPS capsule   Cholecalciferol (VITAMIN D) 2000 UNITS tablet   clonazePAM (KLONOPIN) 1 MG tablet   docusate sodium (COLACE) 100 MG capsule   lisinopril (PRINIVIL/ZESTRIL) 20 MG tablet   metoprolol (LOPRESSOR) 25 MG tablet   omeprazole (PRILOSEC) 40 MG capsule   sennosides (SENOKOT) 8.6 MG tablet   SUMAtriptan (IMITREX) 25 MG tablet   traZODone (DESYREL) 150 MG tablet         Review of Systems   Constitutional: Positive for chills. Negative for fever.   HENT: Positive for congestion and rhinorrhea. Negative for sinus pain.    Respiratory: Positive for cough. Negative for shortness of breath.    Cardiovascular: Negative for chest pain, palpitations and leg swelling.   Gastrointestinal: Negative for abdominal distention and abdominal pain.   Genitourinary: Negative for dysuria.   Musculoskeletal: Negative for back pain.   Skin: Negative for rash.   Neurological: Negative for syncope.   Hematological: Negative for adenopathy.   Psychiatric/Behavioral: Negative for agitation and confusion.       Physical Exam   BP: (!) 132/91  Pulse: 58  Temp: 98.3  F (36.8  C)  Resp: 18  Height: 179.1 cm (5'  "10.5\")  Weight: 55.3 kg (122 lb)  SpO2: 100 %      Physical Exam   Constitutional: He is oriented to person, place, and time.   Cardiovascular: Normal rate.    No murmur heard.  Pulmonary/Chest: Effort normal and breath sounds normal. No respiratory distress. He has no wheezes. He has no rales.   Abdominal: Soft. He exhibits no distension. There is no tenderness.   Musculoskeletal: He exhibits no edema.   Neurological: He is alert and oriented to person, place, and time. He has normal reflexes.   Nursing note and vitals reviewed.      ED Course     ED Course     Procedures             No results found for this or any previous visit (from the past 24 hour(s)).    Medications - No data to display    Assessments & Plan (with Medical Decision Making)     Discharge Medication List as of 5/14/2018 12:46 PM      START taking these medications    Details   albuterol (PROAIR HFA/PROVENTIL HFA/VENTOLIN HFA) 108 (90 Base) MCG/ACT Inhaler Inhale 2 puffs into the lungs every 6 hours as needed for shortness of breath / dyspnea or wheezing, Disp-1 Inhaler, R-0, E-Prescribe         Patient feeling much better over the course of his ER stay, he did state that albuterol nebulizer in the ED helped.  I will discharge him home with an albuterol inhaler.  Trial of watchful waiting, if worsening return to the emergency department.  Patient was upset about a long ER stay, I apologized and I did tell him that we often observe patients for quite some time to document a trajectory of improvement.  He did seem to understand this.  His ER stay was less than 3 hours.  I was more interested in observing this patient in many with a similar simple presentation due to his chronic underlying illness but it is my clinical impression that he just has a viral syndrome at this time.  A clear chest x-ray his labs are reassuring.  His creatinine is normal for him and he is due for dialysis anyway.  He had mild hyperkalemia, the albuterol may help with this " at home.  Recommend he follow-up in the emergency department if he is worsening again otherwise he needs to get to his dialysis appointment.  Patient verbalized understanding of plan of care and is in agreement  Final diagnoses:   Viral syndrome       5/14/2018   [facility]  05/16/18 [id number]

## 2018-05-18 ENCOUNTER — ORGAN (OUTPATIENT)
Dept: TRANSPLANT | Facility: CLINIC | Age: 48
End: 2018-05-18

## 2018-05-18 ENCOUNTER — DOCUMENTATION ONLY (OUTPATIENT)
Dept: TRANSPLANT | Facility: CLINIC | Age: 48
End: 2018-05-18

## 2018-05-18 DIAGNOSIS — Z76.82 AWAITING ORGAN TRANSPLANT: Primary | ICD-10-CM

## 2018-05-19 NOTE — PROGRESS NOTES
PATHOLOGY HLA CROSSMATCH CONSULTATION: DONOR/RECIPIENT VIRTUAL CROSSMATCH - Kidney  Consultation Date: 2018  Consultation Requested by: Dr. Tracy  Regarding: Compatibility of  donor organ UNOS #[id number]  from OPO/Hospital: [facility], [place] with patient Gilles Henning III       Findings: Regarding a virtual crossmatch between Gilles Henning III and  donor listed above (match ID [id number]):  The most recent and peak patient sera were analyzed.  The patient has 1 donor-specific antibody(ies)  (DSA) as listed in table below. No other antibodies listed with specificity against donor organ.      ANTIBODY MOST RECENT SERUM (mfi)  4.12.18 Peak Serum #1 (mfi)  6.19.15     A1  - 570       Record Review Indicates: I personally reviewed the most recent serum and the  peak serum/sera.  In addition, I analyzed 4 more sera:  The patient has antibodies against the donor organ.   Based on historical data from this hospital's histocompatibility lab, using the sum mfi of the patient's DSA to antibodies with specificity against this donor organ, the probability of a positive B cell crossmatch is  3% for the peak serum.  DSA to C-locus antigens were not considered in deriving the probability of positive crossmatch because DSA to C-locus antigens rarely contribute to a positive lymphocyte crossmatch test.    The results of this virtual XM are:   -most recent serum: Compatible  -peak #1:  Likely compatible    Donor is  and there is no bead representation in the antibody assay. This may pose potential difficulty during post- transplant follow up.    Disclaimer: Clinical judgement must take into account other factors, such as non-HLA antibodies not detected in the assay.   The VXM gives probabilities only.  The probability does not account for the potential for auto-antibodies that may be present in the patient's serum.  These autoantibodies may render the physical crossmatch  falsely positive, and would be detected by an autologous crossmatch.  When possible, confirm findings with a prospective allogeneic and autologous flow crossmatches before going to transplant.    Cherelle Rivera MD  Medical Director, Immunology/Histocompatibility Laboratory

## 2018-05-20 NOTE — TELEPHONE ENCOUNTER
Organ Offer Encounter Information    Organ Offer Information   Organ offer date & time:  5/18/2018  7:29 PM   Coordinator/Fellow/Attending name:  BARONSOPHIAMAXINE L   Organ(s):   Organ UNOS ID Match Run ID Comment Organ Laterality   Kidney [id number] PATF          Recent infections?:  No    New medications?:  Yes (Comment: inhaler) Recent pregnancy?:  (Comment: NA)   Angicoagulation medications?:  No Recent vaccinations?:  No   Recent blood transfusions?:  No Recent hospitalizations?:  No   Has your insurance changed in the last 6-12 months?:  Neg    Patient last dialyzed:  5/17/2018  7:00 AM   Dialysis type:  [facility]   Discussed organ offer with:  Patient   Understood donor criteria, verbalized understanding   Patient/Other asked to speak to a surgeon?:  No   Discussed program-specific outcomes:  Did not have questions regarding SRTR   Right to decline organ offer without penalty, Patient/Other:  Aware of option to decline without penalty   Organ offer decision status Patient/Other:  Accepted Offer   Additional Comments:  7:30 PM  May 18, 2018  MD: Nika/Eduardo  Reviewed offer with Dr. Maher after negative virtual crossmatch.  Contacted Mr. Henning regarding offer.  Respiratory illness resolved with albuterol inhaler (see ER note from 5/14/2018).  He would like to move forward with offer.  Will call him back when ETA for donor blood is determined.  Maxine Tomas RN, CCTC  On Call Organ Coordinator    8:55 PM  May 18, 2018  Called Mr. Henning back to let him know that I still do not have ETA for donor blood and will call him as soon as I receive the information.  Maxine Tomas RN, CCTC  On Call Organ Coordinator    6:15 AM  May 19, 2018  Mr. Henning called and said he was going to dialysis.  I told him that was fine and that the donor blood was not expected until 4:30 PM this afternoon.  Maxine Tomas RN, CCTC  On Call Organ Coordinator    7:15 AM  May 19, 2018  Received a call from Hamzah, the coordinator  "at [facility] stating that there was a solid lesion in the right kidney on CT that was suspicious for RCC. The lesion will be biopsied at the start of the recovery. Notified Dr. Clark.  Will make plan for Mr. Henning once biopsy results are available.  Maxine Tomas RN, CCTC  On Call Organ Coordinator    1:00 PM  May 19, 2018  Donor blood did not make the connection in [place].  Will now arrive between 5:30 and 6:00 PM  Janell in Immunology notified.  Maxine Tomas RN, CCTC  On Call Organ Coordinator    2:30 PM  May 19, 2018  Per Hamzah at [facility], donor OR is scheduled for 4:00 PM central time. He will call as soon as results are available.  Maxine Tomas RN, CCTC  On Call Organ Coordinator    11:29 PM  May 19, 2018  Received call from Jose, coordinator at [facility], with verbal report of solid kidney lesion biopsy- \"spindle cell neoplasm\".  Reported to Dr. Clark who declined the kidney offer.  Mr. Henning notified.  Maxine Tomas RN, CCTC  On Call Organ Coordinator           Attestation I have discussed all of the above with the Patient/Legal Guardian/Caregiver regarding this organ offer.:  Yes   Coordinator/Fellow/Attending name:  MAXINE TOMAS          "

## 2018-05-22 DIAGNOSIS — N18.6 END STAGE RENAL DISEASE (H): ICD-10-CM

## 2018-05-22 DIAGNOSIS — Z12.5 PROSTATE CANCER SCREENING: ICD-10-CM

## 2018-05-22 DIAGNOSIS — Z76.82 ORGAN TRANSPLANT CANDIDATE: Primary | ICD-10-CM

## 2018-05-26 ENCOUNTER — TRANSFERRED RECORDS (OUTPATIENT)
Dept: HEALTH INFORMATION MANAGEMENT | Facility: OTHER | Age: 48
End: 2018-05-26

## 2018-05-28 ENCOUNTER — DOCUMENTATION ONLY (OUTPATIENT)
Dept: TRANSPLANT | Facility: CLINIC | Age: 48
End: 2018-05-28

## 2018-05-28 ENCOUNTER — ORGAN (OUTPATIENT)
Dept: TRANSPLANT | Facility: CLINIC | Age: 48
End: 2018-05-28
Payer: COMMERCIAL

## 2018-05-28 DIAGNOSIS — Z76.82 AWAITING ORGAN TRANSPLANT: Primary | ICD-10-CM

## 2018-05-28 NOTE — PROGRESS NOTES
PATHOLOGY HLA CROSSMATCH CONSULTATION: DONOR/RECIPIENT VIRTUAL CROSSMATCH - Kidney  Consultation Date: 2018  Consultation Requested by: Dr. Clark  Regarding: Compatibility of  donor organ UNOS #[id number]  from OPO/Hospital:  [facility], [place], [place]   with patient Gilles Henning III     Findings: Regarding a virtual crossmatch between Gilles Henning III and  donor listed above (match ID [id number]):  The most recent and peak patient sera were analyzed.  The patient has 2 donor-specific antibody(ies)  (DSA) as listed in table below. No other antibodies listed with specificity against donor organ.      ANTIBODY MOST RECENT SERUM (mfi) 4.12.18 Peak Serum #1 (mfi)6.17.15     A1  - 570   A24  - 700       Record Review Indicates: I personally reviewed the most recent serum and the  peak serum/sera.  In addition, I analyzed 5 more sera:  The patient has antibodies against the donor organ.   Based on historical data from this hospital's histocompatibility lab, using the sum mfi of the patient's DSA with specificity against this donor organ, the probability of a positive B cell crossmatch is  7% for the peak serum.     The results of this virtual XM are:   -most recent serum: Compatible  -peak #1 :  Likely compatible    Disclaimer: Clinical judgement must take into account other factors, such as non-HLA antibodies not detected in the assay.   The VXM gives probabilities only.  The probability does not account for the potential for auto-antibodies that may be present in the patient's serum.  These autoantibodies may render the physical crossmatch falsely positive, and would be detected by an autologous crossmatch.  When possible, confirm findings with a prospective allogeneic and autologous flow crossmatches before going to transplant.    Cherelle Rivera MD  Medical Director, Immunology/Histocompatibility Laboratory

## 2018-05-29 ENCOUNTER — TELEPHONE (OUTPATIENT)
Dept: TRANSPLANT | Facility: CLINIC | Age: 48
End: 2018-05-29

## 2018-05-29 ENCOUNTER — RESULTS ONLY (OUTPATIENT)
Dept: OTHER | Facility: CLINIC | Age: 48
End: 2018-05-29

## 2018-05-29 ENCOUNTER — HOSPITAL ENCOUNTER (INPATIENT)
Facility: CLINIC | Age: 48
LOS: 3 days | Discharge: HOME HEALTH | DRG: 652 | End: 2018-06-02
Attending: SURGERY | Admitting: SURGERY
Payer: MEDICARE

## 2018-05-29 ENCOUNTER — APPOINTMENT (OUTPATIENT)
Dept: GENERAL RADIOLOGY | Facility: CLINIC | Age: 48
DRG: 652 | End: 2018-05-29
Attending: SURGERY
Payer: MEDICARE

## 2018-05-29 DIAGNOSIS — R25.9 ABNORMAL INVOLUNTARY MOVEMENT: Primary | ICD-10-CM

## 2018-05-29 DIAGNOSIS — F31.4 BIPOLAR DISORDER, CURRENT EPISODE DEPRESSED, SEVERE, WITHOUT PSYCHOTIC FEATURES (H): ICD-10-CM

## 2018-05-29 DIAGNOSIS — Z76.82 AWAITING ORGAN TRANSPLANT: ICD-10-CM

## 2018-05-29 DIAGNOSIS — E78.49 OTHER HYPERLIPIDEMIA: ICD-10-CM

## 2018-05-29 DIAGNOSIS — Z94.0 KIDNEY TRANSPLANTED: ICD-10-CM

## 2018-05-29 DIAGNOSIS — Z94.0 KIDNEY TRANSPLANT RECIPIENT: ICD-10-CM

## 2018-05-29 PROCEDURE — 86901 BLOOD TYPING SEROLOGIC RH(D): CPT | Performed by: SURGERY

## 2018-05-29 PROCEDURE — 36415 COLL VENOUS BLD VENIPUNCTURE: CPT | Performed by: SURGERY

## 2018-05-29 PROCEDURE — 87340 HEPATITIS B SURFACE AG IA: CPT | Performed by: SURGERY

## 2018-05-29 PROCEDURE — 86644 CMV ANTIBODY: CPT | Performed by: SURGERY

## 2018-05-29 PROCEDURE — 80061 LIPID PANEL: CPT | Performed by: SURGERY

## 2018-05-29 PROCEDURE — 86645 CMV ANTIBODY IGM: CPT | Performed by: SURGERY

## 2018-05-29 PROCEDURE — 40000275 ZZH STATISTIC RCP TIME EA 10 MIN

## 2018-05-29 PROCEDURE — 86665 EPSTEIN-BARR CAPSID VCA: CPT | Performed by: SURGERY

## 2018-05-29 PROCEDURE — 86850 RBC ANTIBODY SCREEN: CPT | Performed by: SURGERY

## 2018-05-29 PROCEDURE — 83036 HEMOGLOBIN GLYCOSYLATED A1C: CPT | Performed by: SURGERY

## 2018-05-29 PROCEDURE — 87389 HIV-1 AG W/HIV-1&-2 AB AG IA: CPT | Performed by: SURGERY

## 2018-05-29 PROCEDURE — 85025 COMPLETE CBC W/AUTO DIFF WBC: CPT | Performed by: SURGERY

## 2018-05-29 PROCEDURE — 93010 ELECTROCARDIOGRAM REPORT: CPT | Performed by: INTERNAL MEDICINE

## 2018-05-29 PROCEDURE — 80053 COMPREHEN METABOLIC PANEL: CPT | Performed by: SURGERY

## 2018-05-29 PROCEDURE — 86705 HEP B CORE ANTIBODY IGM: CPT | Performed by: SURGERY

## 2018-05-29 PROCEDURE — 86803 HEPATITIS C AB TEST: CPT | Performed by: SURGERY

## 2018-05-29 PROCEDURE — 86900 BLOOD TYPING SEROLOGIC ABO: CPT | Performed by: SURGERY

## 2018-05-29 PROCEDURE — 85610 PROTHROMBIN TIME: CPT | Performed by: SURGERY

## 2018-05-29 PROCEDURE — 86923 COMPATIBILITY TEST ELECTRIC: CPT | Performed by: SURGERY

## 2018-05-29 PROCEDURE — 85730 THROMBOPLASTIN TIME PARTIAL: CPT | Performed by: SURGERY

## 2018-05-29 PROCEDURE — 71046 X-RAY EXAM CHEST 2 VIEWS: CPT

## 2018-05-29 PROCEDURE — 93005 ELECTROCARDIOGRAM TRACING: CPT

## 2018-05-29 RX ORDER — CEFUROXIME SODIUM 1.5 G/16ML
1.5 INJECTION, POWDER, FOR SOLUTION INTRAVENOUS ONCE
Status: DISCONTINUED | OUTPATIENT
Start: 2018-05-29 | End: 2018-05-30 | Stop reason: HOSPADM

## 2018-05-29 ASSESSMENT — ACTIVITIES OF DAILY LIVING (ADL)
RETIRED_COMMUNICATION: 0-->UNDERSTANDS/COMMUNICATES WITHOUT DIFFICULTY
SWALLOWING: 0-->SWALLOWS FOODS/LIQUIDS WITHOUT DIFFICULTY
TOILETING: 0-->INDEPENDENT
TRANSFERRING: 0-->INDEPENDENT
DRESS: 0-->INDEPENDENT
FALL_HISTORY_WITHIN_LAST_SIX_MONTHS: NO
BATHING: 0-->INDEPENDENT
AMBULATION: 0-->INDEPENDENT
COGNITION: 0 - NO COGNITION ISSUES REPORTED
RETIRED_EATING: 0-->INDEPENDENT

## 2018-05-29 NOTE — IP AVS SNAPSHOT
MRN:[id number]                      After Visit Summary   5/29/2018    Gilles Henning III    MRN: [id number]           Thank you!     Thank you for choosing [facility] for your care. Our goal is always to provide you with excellent care. Hearing back from our patients is one way we can continue to improve our services. Please take a few minutes to complete the written survey that you may receive in the mail after you visit with us. Thank you!        Patient Information     Date Of Birth          1970        Designated Caregiver       Most Recent Value    Caregiver    Will someone help with your care after discharge? yes    Name of designated caregiver Merline, spouse    Phone number of caregiver [phone]    Caregiver address same as pt      About your hospital stay     You were admitted on:  May 29, 2018 You last received care in the:  [facility]    You were discharged on:  June 2, 2018        Reason for your hospital stay       You received a kidney transplant                  Who to Call     For medical emergencies, please call 911.  For non-urgent questions about your medical care, please call your primary care provider or clinic, [phone]  For questions related to your surgery, please call your surgery clinic        Attending Provider     Provider Specialty    Mario Vallejo MD Transplant    Finger, Troy Mckeon MD Transplant       Primary Care Provider Office Phone # Fax #    Charlie Darrion Dubose [phone]      Follow-up Appointments     Adult [facility] Follow-up and recommended labs and tests       Over the next 3-5 days you will be seen in the [facility] at 7 am (ph. [phone], option 7) .  Your labs will be drawn at the beginning of your appointment at 7:00 am.  DO NOT take your medications prior to having labs drawn. Please bring all your medications with you from home to take after labs are drawn.  LABS:  CBC, BMP, Mg, Phos and  Tacrolimus levels (12 hours after administration) to be drawn daily while in ATC, then every Monday, Wednesday, Friday by home health care nurse if arranged, or at an outpatient lab.     FOLLOW UP APPOINTMENTS:  Remember to always bring an updated medication list to all appointments.     An appointment with your transplant surgeon will be scheduled for approximately 2 weeks following discharge from the hospital.  Your transplant surgeon is: Dr. Vallejo.  You will follow up with transplant nephrology in clinic at 1 and 3 months and then annually.   Follow up with primary care provider in 4-8 weeks. (Pt to schedule)  You have a ureteral stent in place which needs to be removed in 4-6 weeks.  You will be scheduled for stent removal at the [facility] outpatient surgery.  If a  does not contact you for this, please contact your transplant coordinator.    Call scheduling at [phone] if you have not heard about your appointments within 48 hours after discharge.  If you have staples in place, they will be removed in 3 weeks after operation.  WHEN TO CONTACT YOUR  COORDINATOR:  Transplant Coordinator [phone]  Notify your coordinator if you have pain over your kidney, increased redness or drainage from your incision, fever greater than 100.5F, or decreased urine output.  Notify your coordinator immediately if you are ever unable to take your immunosuppressive medications for any reason.  If it is outside of office hours, please call the hospital switchboard at [phone] and ask to have the kidney transplant surgery fellow paged for urgent medical questions, or present to the emergency department.    OTHER DISCHARGE INSTRUCTIONS:  LUZ plan: Please monitor color and amount of output. Drain will remain in place until follow up in clinic.   Monitor blood pressure and weight daily initially post transplant.  Walk at least four times a day, lift no greater than 10 pounds for 6-8 weeks from the time of surgery.  No  driving while taking narcotics or 3 weeks after surgery.    Diet recommendations post-transplant: Heart healthy dietary habits long term (low saturated/trans fat, low sodium). High protein diet x 8 weeks. Practice food safety precautions.    Discharged to home.                  Your next 10 appointments already scheduled     Jun 03, 2018  7:00 AM CDT   (Arrive by 6:45 AM)   New Transplant Visit with UC SPEC INFUSION, UC 43 ATC   [facility] ([facility])    [address]  [address]  [place] [address]   [phone]            Jun 04, 2018  7:00 AM CDT   (Arrive by 6:45 AM)   New Transplant Visit with UC SPEC INFUSION, UC 43 ATC   [facility] ([facility])    [address]  [address]  [place] [address]   [phone]            Jun 05, 2018  7:00 AM CDT   (Arrive by 6:45 AM)   New Transplant Visit with UC SPEC INFUSION, UC 42 ATC   [facility] ([facility])    [address]  [address]  [place] [address]   [phone]            Jun 08, 2018  8:15 AM CDT   New Visit with Nikhil Cordero,    [facility] ([facility])    [address]  [place] [address] [phone]            Jun 19, 2018  9:00 AM CDT   (Arrive by 8:45 AM)   Kidney Post Op with Mario Vallejo MD   [facility] ([facility])    [address]  [address]  [place] [address]   [phone]            Jul 03, 2018 10:45 AM CDT   LAB with GH LAB   [facility] ([facility])    [address] [facility] [address] [phone]           Please do not eat 10-12 hours before your appointment if you are coming in fasting for labs on  lipids, cholesterol, or glucose (sugar). This does not apply to pregnant women. Water, hot tea and black coffee (with nothing added) are okay. Do not drink other fluids, diet soda or chew gum.            Jul 09, 2018  4:00 PM CDT   (Arrive by 3:30 PM)   Return Kidney Transplant with  Kidney/Pancreas Recipient   [facility] ([facility])    [address]  [address]  [place] [address] [phone]              Additional Services     Home care nursing referral       [facility]   #[phone]    Fax#[phone]      Skilled nursing visits ---to begin after [facility]([phone]) visits are completed--approx start date 6/6/ or 6/8     monitor cardiac and resp status    Monitor hydration, nutrition, urinary and bowel status    Monitor healing of incision    Lab draws M-W-F morning, report results to Post Kidney Transplant Coordinator: Gia Romero and Rosa Maria Gutierrez  Ph: [phone]  Fax: [phone]          Your provider has ordered home care nursing services. If you have not been contacted within 2 days of your discharge please call the inpatient department phone number at [phone] .                  Future tests that were ordered for you     Basic metabolic panel           CBC with platelets       Last Lab Result: Hemoglobin (g/dL)       Date                     Value                 06/02/2018               9.1 (L)          ----------            Magnesium           Phosphorus           Tacrolimus level                 Further instructions from your care team       Diet recommendations post-transplant: High protein diet x 8 weeks.  Heart healthy dietary habits long term (low saturated/trans fat, low sodium). Practice food safety precautions. See nutrition section of transplant handbook for more information.      Pending Results     Date and Time Order Name Status Description    5/30/2018 0830 Fluid Culture Aerobic  "Bacterial Preliminary             Statement of Approval     Ordered          18 1130  I have reviewed and agree with all the recommendations and orders detailed in this document.  EFFECTIVE NOW     Approved and electronically signed by:  Artie Matthews MD             Admission Information     Date & Time Provider Department Dept. Phone    2018 Troy Lorenzo MD [address] [facility] [phone]      Your Vitals Were     Blood Pressure Pulse Temperature Respirations Height Weight    144/91 (BP Location: Right arm) 74 98.1  F (36.7  C) (Oral) 18 1.778 m (5' 10\") 53.6 kg (118 lb 1.6 oz)    Pulse Oximetry BMI (Body Mass Index)                100% 16.95 kg/m2          MyChart Information     [facility] lets you send messages to your doctor, view your test results, renew your prescriptions, schedule appointments and more. To sign up, go to [email or web address] . Click on \"Log in\" on the left side of the screen, which will take you to the Welcome page. Then click on \"Sign up Now\" on the right side of the page.     You will be asked to enter the access code listed below, as well as some personal information. Please follow the directions to create your username and password.     Your access code is: [email or web address]  Expires: 2018 12:36 PM     Your access code will  in 90 days. If you need help or a new code, please call your [facility] clinic or [phone].        Care EveryWhere ID     This is your Care EveryWhere ID. This could be used by other organizations to access your [facility] medical records  [id number]        Equal Access to Services     [facility] : Hadii kev Rene, waaxda luqadaha, qaybta kaalmada pascale beard. So [facility] [phone].    ATENCIÓN: Si habla español, tiene a millan disposición servicios gratuitos de asistencia lingüística. Llame al [phone].    We comply with applicable federal civil rights laws and [place] laws. We " do not discriminate on the basis of race, color, national origin, age, disability, sex, sexual orientation, or gender identity.               Review of your medicines      START taking        Dose / Directions    acetaminophen 325 MG tablet   Commonly known as:  TYLENOL   Used for:  Kidney transplant recipient        Dose:  650 mg   Take 2 tablets (650 mg) by mouth every 6 hours for 10 days   Quantity:  80 tablet   Refills:  0       amLODIPine 5 MG tablet   Commonly known as:  NORVASC   Used for:  Kidney transplant recipient        Dose:  5 mg   Start taking on:  6/3/2018   Take 1 tablet (5 mg) by mouth daily   Quantity:  30 tablet   Refills:  1       magnesium hydroxide 400 MG/5ML suspension   Commonly known as:  MILK OF MAGNESIA   Used for:  Kidney transplant recipient        Dose:  30 mL   Take 30 mLs by mouth daily   Quantity:  355 mL   Refills:  1       magnesium oxide 400 MG tablet   Commonly known as:  MAG-OX   Used for:  Kidney transplant recipient        Dose:  400 mg   Take 1 tablet (400 mg) by mouth daily (with lunch)   Quantity:  30 tablet   Refills:  0       mycophenolate 250 MG capsule   Commonly known as:  GENERIC EQUIVALENT   Used for:  Kidney transplant recipient        Dose:  750 mg   Take 3 capsules (750 mg) by mouth 2 times daily   Quantity:  180 capsule   Refills:  11       oxyCODONE IR 5 MG tablet   Commonly known as:  ROXICODONE   Used for:  Kidney transplant recipient        Dose:  5-10 mg   Take 1-2 tablets (5-10 mg) by mouth every 4 hours as needed for other (pain control or improvement in physical function. Hold dose for analgesic side effects.)   Quantity:  12 tablet   Refills:  0       senna-docusate 8.6-50 MG per tablet   Commonly known as:  SENOKOT-S;PERICOLACE   Used for:  Kidney transplant recipient        Dose:  2 tablet   Take 2 tablets by mouth 2 times daily as needed for constipation   Quantity:  100 tablet   Refills:  3       sulfamethoxazole-trimethoprim 400-80 MG per tablet    Commonly known as:  BACTRIM/SEPTRA   Indication:  PCP prophylaxis   Used for:  Kidney transplant recipient        Dose:  1 tablet   Start taking on:  6/3/2018   Take 1 tablet by mouth daily   Quantity:  30 tablet   Refills:  11       tacrolimus 1 MG 24 hr tablet   Commonly known as:  ENVARSUS XR   Used for:  Kidney transplant recipient        Dose:  5 mg   Take 5 tablets (5 mg) by mouth every morning (before breakfast)   Quantity:  150 tablet   Refills:  11       valGANciclovir 450 MG tablet   Commonly known as:  VALCYTE   Indication:  Medication Treatment to Prevent Cytomegalovirus Disease   Used for:  Kidney transplant recipient        Dose:  900 mg   Start taking on:  6/3/2018   Take 2 tablets (900 mg) by mouth daily   Quantity:  60 tablet   Refills:  2         CONTINUE these medicines which may have CHANGED, or have new prescriptions. If we are uncertain of the size of tablets/capsules you have at home, strength may be listed as something that might have changed.        Dose / Directions    metoprolol tartrate 50 MG tablet   Commonly known as:  LOPRESSOR   This may have changed:    - medication strength  - how much to take  - when to take this   Used for:  Kidney transplant recipient        Dose:  50 mg   Take 1 tablet (50 mg) by mouth 2 times daily   Quantity:  60 tablet   Refills:  1         CONTINUE these medicines which have NOT CHANGED        Dose / Directions    atorvastatin 40 MG tablet   Commonly known as:  LIPITOR        Dose:  40 mg   Take 40 mg by mouth daily   Refills:  0       calcium acetate 667 MG Caps capsule   Commonly known as:  PHOSLO        Dose:  667-1334 mg   Take 667-1,334 mg by mouth 3 times daily (with meals) And 667mg with snacks. Max 6 times/day.   Refills:  0       clonazePAM 1 MG tablet   Commonly known as:  klonoPIN        Dose:  0.5-1 mg   Take 0.5-1 mg by mouth 2 times daily as needed (restless leg)   Refills:  0       docusate sodium 100 MG capsule   Commonly known as:  COLACE         Dose:  200 mg   Take 200 mg by mouth 2 times daily   Refills:  0       NONFORMULARY        Salicylic acid 3% / 5-FU 4% in vanicream : Apply a thin layer to affected area once daily   Refills:  0       omeprazole 40 MG capsule   Commonly known as:  priLOSEC   Used for:  Gastroesophageal reflux disease, esophagitis presence not specified        TAKE 1 CAPSULE BY MOUTH DAILY   Quantity:  90 capsule   Refills:  3       polyethylene glycol Packet   Commonly known as:  MIRALAX/GLYCOLAX   Used for:  Kidney transplant recipient        Dose:  17 g   Take 17 g by mouth daily as needed for constipation   Quantity:  14 packet   Refills:  3       SUMAtriptan 25 MG tablet   Commonly known as:  IMITREX        Dose:  25 mg   Take 25 mg by mouth every 2 hours as needed for migraine Max 200mg/24 hours. Max 7/week. Max 30/month.   Refills:  0       traZODone 150 MG tablet   Commonly known as:  DESYREL        Dose:  300 mg   Take 300 mg by mouth At Bedtime with food   Refills:  0       vitamin D 2000 units tablet        Dose:  2000 Units   Take 2,000 Units by mouth daily   Refills:  0         STOP taking     lisinopril 20 MG tablet   Commonly known as:  PRINIVIL/ZESTRIL           METOPROLOL SUCCINATE ER PO           sennosides 8.6 MG tablet   Commonly known as:  SENOKOT                Where to get your medicines      These medications were sent to [facility] [employer], [place] - [address] [address]     Phone:  [phone]     acetaminophen 325 MG tablet    amLODIPine 5 MG tablet    magnesium hydroxide 400 MG/5ML suspension    magnesium oxide 400 MG tablet    metoprolol tartrate 50 MG tablet    mycophenolate 250 MG capsule    polyethylene glycol Packet    senna-docusate 8.6-50 MG per tablet    sulfamethoxazole-trimethoprim 400-80 MG per tablet    tacrolimus 1 MG 24 hr tablet    valGANciclovir 450 MG tablet         Some of these will need a paper prescription and others  can be bought over the counter. Ask your nurse if you have questions.     Bring a paper prescription for each of these medications     oxyCODONE IR 5 MG tablet                Protect others around you: Learn how to safely use, store and throw away your medicines at www.disposemymeds.org.        ANTIBIOTIC INSTRUCTION     You've Been Prescribed an Antibiotic - Now What?  Your healthcare team thinks that you or your loved one might have an infection. Some infections can be treated with antibiotics, which are powerful, life-saving drugs. Like all medications, antibiotics have side effects and should only be used when necessary. There are some important things you should know about your antibiotic treatment.      Your healthcare team may run tests before you start taking an antibiotic.    Your team may take samples (e.g., from your blood, urine or other areas) to run tests to look for bacteria. These test can be important to determine if you need an antibiotic at all and, if you do, which antibiotic will work best.      Within a few days, your healthcare team might change or even stop your antibiotic.    Your team may start you on an antibiotic while they are working to find out what is making you sick.    Your team might change your antibiotic because test results show that a different antibiotic would be better to treat your infection.    In some cases, once your team has more information, they learn that you do not need an antibiotic at all. They may find out that you don't have an infection, or that the antibiotic you're taking won't work against your infection. For example, an infection caused by a virus can't be treated with antibiotics. Staying on an antibiotic when you don't need it is more likely to be harmful than helpful.      You may experience side effects from your antibiotic.    Like all medications, antibiotics have side effects. Some of these can be serious.    Let you healthcare team know if you have  any known allergies when you are admitted to the hospital.    One significant side effect of nearly all antibiotics is the risk of severe and sometimes deadly diarrhea caused by Clostridium difficile (C. Difficile). This occurs when a person takes antibiotics because some good germs are destroyed. Antibiotic use allows C. diificile to take over, putting patients at high risk for this serious infection.    As a patient or caregiver, it is important to understand your or your loved one's antibiotic treatment. It is especially important for caregivers to speak up when patients can't speak for themselves. Here are some important questions to ask your healthcare team.    What infection is this antibiotic treating and how do you know I have that infection?    What side effects might occur from this antibiotic?    How long will I need to take this antibiotic?    Is it safe to take this antibiotic with other medications or supplements (e.g., vitamins) that I am taking?     Are there any special directions I need to know about taking this antibiotic? For example, should I take it with food?    How will I be monitored to know whether my infection is responding to the antibiotic?    What tests may help to make sure the right antibiotic is prescribed for me?      Information provided by:  www.cdc.gov/getsmart  U.S. Department of Health and Human Services  Centers for disease Control and Prevention  National Center for Emerging and Zoonotic Infectious Diseases  Division of Healthcare Quality Promotion        Information about OPIOIDS     PRESCRIPTION OPIOIDS: WHAT YOU NEED TO KNOW   You have a prescription for an opioid (narcotic) pain medicine. Opioids can cause addiction. If you have a history of chemical dependency of any type, you are at a higher risk of becoming addicted to opioids. Only take this medicine after all other options have been tried. Take it for as short a time and as few doses as possible.     Do  not:    Drive. If you drive while taking these medicines, you could be arrested for driving under the influence (DUI).    Operate heavy machinery    Do any other dangerous activities while taking these medicines.     Drink any alcohol while taking these medicines.      Take with any other medicines that contain acetaminophen. Read all labels carefully. Look for the word  acetaminophen  or  Tylenol.  Ask your pharmacist if you have questions or are unsure.    Store your pills in a secure place, locked if possible. We will not replace any lost or stolen medicine. If you don t finish your medicine, please throw away (dispose) as directed by your pharmacist. The [place] Pollution Control Agency has more information about safe disposal: https://www.pca.[facility].[place].us/living-green/managing-unwanted-medications    All opioids tend to cause constipation. Drink plenty of water and eat foods that have a lot of fiber, such as fruits, vegetables, prune juice, apple juice and high-fiber cereal. Take a laxative (Miralax, milk of magnesia, Colace, Senna) if you don t move your bowels at least every other day.              Medication List: This is a list of all your medications and when to take them. Check marks below indicate your daily home schedule. Keep this list as a reference.      Medications           Morning Afternoon Evening Bedtime As Needed    acetaminophen 325 MG tablet   Commonly known as:  TYLENOL   Take 2 tablets (650 mg) by mouth every 6 hours for 10 days   Last time this was given:  650 mg on 6/2/2018  8:50 AM                                amLODIPine 5 MG tablet   Commonly known as:  NORVASC   Take 1 tablet (5 mg) by mouth daily   Start taking on:  6/3/2018   Last time this was given:  5 mg on 6/2/2018  8:51 AM                                atorvastatin 40 MG tablet   Commonly known as:  LIPITOR   Take 40 mg by mouth daily   Last time this was given:  20 mg on 6/2/2018  8:51 AM                                 calcium acetate 667 MG Caps capsule   Commonly known as:  PHOSLO   Take 667-1,334 mg by mouth 3 times daily (with meals) And 667mg with snacks. Max 6 times/day.                                clonazePAM 1 MG tablet   Commonly known as:  klonoPIN   Take 0.5-1 mg by mouth 2 times daily as needed (restless leg)   Last time this was given:  1.5 mg on 6/1/2018  6:33 PM                                docusate sodium 100 MG capsule   Commonly known as:  COLACE   Take 200 mg by mouth 2 times daily   Last time this was given:  200 mg on 6/2/2018  8:50 AM                                magnesium hydroxide 400 MG/5ML suspension   Commonly known as:  MILK OF MAGNESIA   Take 30 mLs by mouth daily   Last time this was given:  30 mLs on 6/1/2018  5:56 PM                                magnesium oxide 400 MG tablet   Commonly known as:  MAG-OX   Take 1 tablet (400 mg) by mouth daily (with lunch)   Last time this was given:  400 mg on 6/1/2018  1:22 PM                                metoprolol tartrate 50 MG tablet   Commonly known as:  LOPRESSOR   Take 1 tablet (50 mg) by mouth 2 times daily   Last time this was given:  50 mg on 6/2/2018  8:51 AM                                mycophenolate 250 MG capsule   Commonly known as:  GENERIC EQUIVALENT   Take 3 capsules (750 mg) by mouth 2 times daily   Last time this was given:  750 mg on 6/2/2018  8:50 AM                                NONFORMULARY   Salicylic acid 3% / 5-FU 4% in vanicream : Apply a thin layer to affected area once daily   Last time this was given:  1 Application on 6/1/2018 11:05 PM                                omeprazole 40 MG capsule   Commonly known as:  priLOSEC   TAKE 1 CAPSULE BY MOUTH DAILY   Last time this was given:  40 mg on 6/2/2018  8:50 AM                                oxyCODONE IR 5 MG tablet   Commonly known as:  ROXICODONE   Take 1-2 tablets (5-10 mg) by mouth every 4 hours as needed for other (pain control or improvement in physical function. Hold  dose for analgesic side effects.)   Last time this was given:  10 mg on 6/1/2018  2:13 PM                                polyethylene glycol Packet   Commonly known as:  MIRALAX/GLYCOLAX   Take 17 g by mouth daily as needed for constipation   Last time this was given:  17 g on 6/2/2018  8:49 AM                                senna-docusate 8.6-50 MG per tablet   Commonly known as:  SENOKOT-S;PERICOLACE   Take 2 tablets by mouth 2 times daily as needed for constipation   Last time this was given:  2 tablets on 6/2/2018  8:50 AM                                sulfamethoxazole-trimethoprim 400-80 MG per tablet   Commonly known as:  BACTRIM/SEPTRA   Take 1 tablet by mouth daily   Start taking on:  6/3/2018   Last time this was given:  1 tablet on 6/2/2018  8:51 AM                                SUMAtriptan 25 MG tablet   Commonly known as:  IMITREX   Take 25 mg by mouth every 2 hours as needed for migraine Max 200mg/24 hours. Max 7/week. Max 30/month.   Last time this was given:  25 mg on 5/31/2018 10:09 PM                                tacrolimus 1 MG 24 hr tablet   Commonly known as:  ENVARSUS XR   Take 5 tablets (5 mg) by mouth every morning (before breakfast)   Last time this was given:  5.75 mg on 6/2/2018  8:50 AM                                traZODone 150 MG tablet   Commonly known as:  DESYREL   Take 300 mg by mouth At Bedtime with food   Last time this was given:  300 mg on 6/1/2018 11:01 PM                                valGANciclovir 450 MG tablet   Commonly known as:  VALCYTE   Take 2 tablets (900 mg) by mouth daily   Start taking on:  6/3/2018   Last time this was given:  450 mg on 6/2/2018  8:50 AM                                vitamin D 2000 units tablet   Take 2,000 Units by mouth daily

## 2018-05-29 NOTE — IP AVS SNAPSHOT
[address] [address]    Phone:  [phone]                                       After Visit Summary   5/29/2018    Gilles Henning III    MRN: [id number]           After Visit Summary Signature Page     I have received my discharge instructions, and my questions have been answered. I have discussed any challenges I see with this plan with the nurse or doctor.    ..........................................................................................................................................  Patient/Patient Representative Signature      ..........................................................................................................................................  Patient Representative Print Name and Relationship to Patient    ..................................................               ................................................  Date                                            Time    ..........................................................................................................................................  Reviewed by Signature/Title    ...................................................              ..............................................  Date                                                            Time

## 2018-05-29 NOTE — LETTER
Transition Communication Hand-off for Care Transitions to Next Level of Care Provider    Name: Gilles HERANNDEZ Juvencio SCHULTZ  : 1970  MRN #: [id number]  Primary Care Provider: Charlie Dubose     Primary Clinic: [address]  [place] [address]     Reason for Hospitalization:  Kidney transplanted [Z94.0]  Kidney transplant recipient [Z94.0]  Admit Date/Time: 2018  9:31 PM  Discharge Date: 18  Payor Source: Payor: BCBS / Plan: BCBS PLATINUM BLUE / Product Type: PPO /              Reason for Communication Hand-off Referral: Other s/p KT    Discharge Plan:       Concern for non-adherence with plan of care:   Y/N N  Discharge Needs Assessment:        Follow-up specialty is recommended: Yes    Follow-up plan:  Future Appointments  Date Time Provider Department Center   2018 7:00 AM UC SPEC INFUSION [facility]   2018 8:00 AM Rachel Resendez NP [facility]   2018 8:15 AM Nikhil Cordero, DO [facility]   2018 9:00 AM Mario Vallejo MD TXS [facility]   7/3/2018 10:45 AM GH LAB [facility]   2018 4:00 PM Recipient,  Kidney/Pancreas [facility]   7/10/2018 11:15 AM Nicole Brink APRN CNP [facility]   2018 3:10 PM Recip[facility]   10/11/2018 1:30 PM Recipient,  Kidney/Pancreas [facility]   12/10/2018 1:30 PM Recipient,  Kidney/Pancreas [facility]       Any outstanding tests or procedures:        Referrals     Future Labs/Procedures    Home care nursing referral     Comments:    [facility]   #[phone]    Fax#[phone]      Skilled nursing visits ---to begin after [facility]([phone]) visits are completed--approx start date / or      monitor cardiac and resp status    Monitor hydration, nutrition, urinary and bowel status    Monitor healing of incision    Lab draws -W-F morning, report results to Post Kidney Transplant Coordinator: Gia Romero and Rosa Maria  Jj Gutierrez  Ph: [phone]  Fax: [phone]          Your provider has ordered home care nursing services. If you have not been contacted within 2 days of your discharge please call the inpatient department phone number at [phone] .            Spring Recommendations:      Colleen Blanco    AVS/Discharge Summary is the source of truth; this is a helpful guide for improved communication of patient story

## 2018-05-29 NOTE — TELEPHONE ENCOUNTER
PT called to get update about kidney offer. Reviewed on-call coordinator should be contacting him with updates. Pt states he knows how to in touch with on-call nurse and states he does not need further assistance.

## 2018-05-30 ENCOUNTER — ANESTHESIA EVENT (OUTPATIENT)
Dept: SURGERY | Facility: CLINIC | Age: 48
DRG: 652 | End: 2018-05-30
Payer: MEDICARE

## 2018-05-30 ENCOUNTER — APPOINTMENT (OUTPATIENT)
Dept: GENERAL RADIOLOGY | Facility: CLINIC | Age: 48
DRG: 652 | End: 2018-05-30
Attending: SURGERY
Payer: MEDICARE

## 2018-05-30 ENCOUNTER — APPOINTMENT (OUTPATIENT)
Dept: CT IMAGING | Facility: CLINIC | Age: 48
DRG: 652 | End: 2018-05-30
Attending: SURGERY
Payer: MEDICARE

## 2018-05-30 ENCOUNTER — ANESTHESIA (OUTPATIENT)
Dept: SURGERY | Facility: CLINIC | Age: 48
DRG: 652 | End: 2018-05-30
Payer: MEDICARE

## 2018-05-30 ENCOUNTER — DOCUMENTATION ONLY (OUTPATIENT)
Dept: TRANSPLANT | Facility: CLINIC | Age: 48
End: 2018-05-30

## 2018-05-30 PROBLEM — R94.31 LONG QT INTERVAL: Status: ACTIVE | Noted: 2018-05-30

## 2018-05-30 PROBLEM — Z94.0 KIDNEY TRANSPLANTED: Status: ACTIVE | Noted: 2018-05-30

## 2018-05-30 LAB
ABO + RH BLD: NORMAL
ABO + RH BLD: NORMAL
ALBUMIN SERPL-MCNC: 3.7 G/DL (ref 3.4–5)
ALBUMIN UR-MCNC: NEGATIVE MG/DL
ALP SERPL-CCNC: 102 U/L (ref 40–150)
ALT SERPL W P-5'-P-CCNC: 14 U/L (ref 0–70)
ANION GAP SERPL CALCULATED.3IONS-SCNC: 11 MMOL/L (ref 3–14)
ANION GAP SERPL CALCULATED.3IONS-SCNC: 7 MMOL/L (ref 3–14)
APPEARANCE UR: CLEAR
APTT PPP: 30 SEC (ref 22–37)
AST SERPL W P-5'-P-CCNC: 18 U/L (ref 0–45)
BACTERIA SPEC CULT: NORMAL
BASE EXCESS BLDV CALC-SCNC: 6.8 MMOL/L
BASOPHILS # BLD AUTO: 0.1 10E9/L (ref 0–0.2)
BASOPHILS NFR BLD AUTO: 1.4 %
BILIRUB SERPL-MCNC: 0.5 MG/DL (ref 0.2–1.3)
BILIRUB UR QL STRIP: NEGATIVE
BLD GP AB SCN SERPL QL: NORMAL
BLD PROD TYP BPU: NORMAL
BLD UNIT ID BPU: 0
BLD UNIT ID BPU: 0
BLOOD BANK CMNT PATIENT-IMP: NORMAL
BLOOD PRODUCT CODE: NORMAL
BLOOD PRODUCT CODE: NORMAL
BPU ID: NORMAL
BPU ID: NORMAL
BUN SERPL-MCNC: 45 MG/DL (ref 7–30)
BUN SERPL-MCNC: 47 MG/DL (ref 7–30)
CA-I BLD-MCNC: 4.4 MG/DL (ref 4.4–5.2)
CALCIUM SERPL-MCNC: 7.6 MG/DL (ref 8.5–10.1)
CALCIUM SERPL-MCNC: 9 MG/DL (ref 8.5–10.1)
CHLORIDE SERPL-SCNC: 95 MMOL/L (ref 94–109)
CHLORIDE SERPL-SCNC: 96 MMOL/L (ref 94–109)
CHOLEST SERPL-MCNC: 116 MG/DL
CMV IGG SERPL QL IA: >8 AI (ref 0–0.8)
CMV IGM SERPL QL IA: 0.2 AI (ref 0–0.8)
CO2 SERPL-SCNC: 28 MMOL/L (ref 20–32)
CO2 SERPL-SCNC: 35 MMOL/L (ref 20–32)
COLOR UR AUTO: ABNORMAL
CREAT SERPL-MCNC: 6.02 MG/DL (ref 0.66–1.25)
CREAT SERPL-MCNC: 6.03 MG/DL (ref 0.66–1.25)
DIFFERENTIAL METHOD BLD: ABNORMAL
EBV VCA IGG SER QL IA: 0.4 AI (ref 0–0.8)
EBV VCA IGM SER QL IA: <0.2 AI (ref 0–0.8)
EOSINOPHIL # BLD AUTO: 0.1 10E9/L (ref 0–0.7)
EOSINOPHIL NFR BLD AUTO: 3.3 %
ERYTHROCYTE [DISTWIDTH] IN BLOOD BY AUTOMATED COUNT: 16.2 % (ref 10–15)
ERYTHROCYTE [DISTWIDTH] IN BLOOD BY AUTOMATED COUNT: 16.2 % (ref 10–15)
GFR SERPL CREATININE-BSD FRML MDRD: 10 ML/MIN/1.7M2
GFR SERPL CREATININE-BSD FRML MDRD: 10 ML/MIN/1.7M2
GLUCOSE BLD-MCNC: 137 MG/DL (ref 70–99)
GLUCOSE BLDC GLUCOMTR-MCNC: 128 MG/DL (ref 70–99)
GLUCOSE BLDC GLUCOMTR-MCNC: 144 MG/DL (ref 70–99)
GLUCOSE BLDC GLUCOMTR-MCNC: 150 MG/DL (ref 70–99)
GLUCOSE BLDC GLUCOMTR-MCNC: 189 MG/DL (ref 70–99)
GLUCOSE BLDC GLUCOMTR-MCNC: 259 MG/DL (ref 70–99)
GLUCOSE BLDC GLUCOMTR-MCNC: 293 MG/DL (ref 70–99)
GLUCOSE BLDC GLUCOMTR-MCNC: 314 MG/DL (ref 70–99)
GLUCOSE SERPL-MCNC: 111 MG/DL (ref 70–99)
GLUCOSE SERPL-MCNC: 333 MG/DL (ref 70–99)
GLUCOSE UR STRIP-MCNC: NEGATIVE MG/DL
GRAM STN SPEC: NORMAL
GRAM STN SPEC: NORMAL
HBA1C MFR BLD: 4.5 % (ref 0–5.6)
HBV CORE IGM SERPL QL IA: NONREACTIVE
HBV SURFACE AG SERPL QL IA: NONREACTIVE
HCO3 BLDV-SCNC: 31 MMOL/L (ref 21–28)
HCT VFR BLD AUTO: 28.6 % (ref 40–53)
HCT VFR BLD AUTO: 32.9 % (ref 40–53)
HCV AB SERPL QL IA: NONREACTIVE
HDLC SERPL-MCNC: 42 MG/DL
HGB BLD-MCNC: 10 G/DL (ref 13.3–17.7)
HGB BLD-MCNC: 10.6 G/DL (ref 13.3–17.7)
HGB BLD-MCNC: 9.2 G/DL (ref 13.3–17.7)
HGB BLD-MCNC: 9.8 G/DL (ref 13.3–17.7)
HGB UR QL STRIP: ABNORMAL
HIV 1+2 AB+HIV1 P24 AG SERPL QL IA: NONREACTIVE
HLA FINAL CROSSMATCH RECIPIENT: NORMAL
HLA FINAL CROSSMATCH RECIPIENT: NORMAL
IMM GRANULOCYTES # BLD: 0 10E9/L (ref 0–0.4)
IMM GRANULOCYTES NFR BLD: 0 %
INR PPP: 1.14 (ref 0.86–1.14)
INTERPRETATION ECG - MUSE: NORMAL
KETONES UR STRIP-MCNC: NEGATIVE MG/DL
LDLC SERPL CALC-MCNC: 56 MG/DL
LEUKOCYTE ESTERASE UR QL STRIP: NEGATIVE
LYMPHOCYTES # BLD AUTO: 0.7 10E9/L (ref 0.8–5.3)
LYMPHOCYTES NFR BLD AUTO: 16.6 %
MAGNESIUM SERPL-MCNC: 2 MG/DL (ref 1.6–2.3)
MCH RBC QN AUTO: 30.1 PG (ref 26.5–33)
MCH RBC QN AUTO: 30.3 PG (ref 26.5–33)
MCHC RBC AUTO-ENTMCNC: 32.2 G/DL (ref 31.5–36.5)
MCHC RBC AUTO-ENTMCNC: 32.2 G/DL (ref 31.5–36.5)
MCV RBC AUTO: 94 FL (ref 78–100)
MCV RBC AUTO: 94 FL (ref 78–100)
MONOCYTES # BLD AUTO: 0.7 10E9/L (ref 0–1.3)
MONOCYTES NFR BLD AUTO: 15.9 %
MUCOUS THREADS #/AREA URNS LPF: PRESENT /LPF
NEUTROPHILS # BLD AUTO: 2.7 10E9/L (ref 1.6–8.3)
NEUTROPHILS NFR BLD AUTO: 62.8 %
NITRATE UR QL: NEGATIVE
NONHDLC SERPL-MCNC: 74 MG/DL
NRBC # BLD AUTO: 0 10*3/UL
NRBC BLD AUTO-RTO: 0 /100
NUM BPU REQUESTED: 2
O2/TOTAL GAS SETTING VFR VENT: 50 %
PCO2 BLDV: 41 MM HG (ref 40–50)
PH BLDV: 7.49 PH (ref 7.32–7.43)
PH UR STRIP: 5 PH (ref 5–7)
PHOSPHATE SERPL-MCNC: 3.3 MG/DL (ref 2.5–4.5)
PLATELET # BLD AUTO: 117 10E9/L (ref 150–450)
PLATELET # BLD AUTO: 166 10E9/L (ref 150–450)
PO2 BLDV: 54 MM HG (ref 25–47)
POTASSIUM BLD-SCNC: 3.7 MMOL/L (ref 3.4–5.3)
POTASSIUM SERPL-SCNC: 2.9 MMOL/L (ref 3.4–5.3)
POTASSIUM SERPL-SCNC: 3.5 MMOL/L (ref 3.4–5.3)
POTASSIUM SERPL-SCNC: 3.5 MMOL/L (ref 3.4–5.3)
PRA SINGLE ANTIGEN IGG ANTIBODY: NORMAL
PROT SERPL-MCNC: 7 G/DL (ref 6.8–8.8)
RBC # BLD AUTO: 3.04 10E12/L (ref 4.4–5.9)
RBC # BLD AUTO: 3.52 10E12/L (ref 4.4–5.9)
RBC #/AREA URNS AUTO: 4 /HPF (ref 0–2)
SODIUM BLD-SCNC: 138 MMOL/L (ref 133–144)
SODIUM SERPL-SCNC: 135 MMOL/L (ref 133–144)
SODIUM SERPL-SCNC: 138 MMOL/L (ref 133–144)
SOURCE: ABNORMAL
SP GR UR STRIP: 1 (ref 1–1.03)
SPECIMEN EXP DATE BLD: NORMAL
SPECIMEN SOURCE: NORMAL
SPECIMEN SOURCE: NORMAL
TRANSFUSION STATUS PATIENT QL: NORMAL
TRIGL SERPL-MCNC: 88 MG/DL
UROBILINOGEN UR STRIP-MCNC: NORMAL MG/DL (ref 0–2)
WBC # BLD AUTO: 4.2 10E9/L (ref 4–11)
WBC # BLD AUTO: 5.4 10E9/L (ref 4–11)
WBC #/AREA URNS AUTO: <1 /HPF (ref 0–5)

## 2018-05-30 PROCEDURE — 25800025 ZZH RX 258: Performed by: NURSE ANESTHETIST, CERTIFIED REGISTERED

## 2018-05-30 PROCEDURE — 25000128 H RX IP 250 OP 636: Performed by: NURSE ANESTHETIST, CERTIFIED REGISTERED

## 2018-05-30 PROCEDURE — 81001 URINALYSIS AUTO W/SCOPE: CPT | Performed by: SURGERY

## 2018-05-30 PROCEDURE — 25000128 H RX IP 250 OP 636: Performed by: STUDENT IN AN ORGANIZED HEALTH CARE EDUCATION/TRAINING PROGRAM

## 2018-05-30 PROCEDURE — 25000125 ZZHC RX 250: Performed by: NURSE ANESTHETIST, CERTIFIED REGISTERED

## 2018-05-30 PROCEDURE — 84295 ASSAY OF SERUM SODIUM: CPT | Performed by: ANESTHESIOLOGY

## 2018-05-30 PROCEDURE — 12000006 ZZH R&B IMCU INTERMEDIATE UMMC

## 2018-05-30 PROCEDURE — 83735 ASSAY OF MAGNESIUM: CPT | Performed by: STUDENT IN AN ORGANIZED HEALTH CARE EDUCATION/TRAINING PROGRAM

## 2018-05-30 PROCEDURE — 85027 COMPLETE CBC AUTOMATED: CPT | Performed by: STUDENT IN AN ORGANIZED HEALTH CARE EDUCATION/TRAINING PROGRAM

## 2018-05-30 PROCEDURE — 25000565 ZZH ISOFLURANE, EA 15 MIN: Performed by: SURGERY

## 2018-05-30 PROCEDURE — 36000064 ZZH SURGERY LEVEL 4 EA 15 ADDTL MIN - UMMC: Performed by: SURGERY

## 2018-05-30 PROCEDURE — 25000125 ZZHC RX 250: Performed by: ANESTHESIOLOGY

## 2018-05-30 PROCEDURE — 25000131 ZZH RX MED GY IP 250 OP 636 PS 637: Mod: GY | Performed by: STUDENT IN AN ORGANIZED HEALTH CARE EDUCATION/TRAINING PROGRAM

## 2018-05-30 PROCEDURE — 87070 CULTURE OTHR SPECIMN AEROBIC: CPT | Performed by: SURGERY

## 2018-05-30 PROCEDURE — 0TY10Z0 TRANSPLANTATION OF LEFT KIDNEY, ALLOGENEIC, OPEN APPROACH: ICD-10-PCS | Performed by: SURGERY

## 2018-05-30 PROCEDURE — 25000128 H RX IP 250 OP 636: Performed by: ANESTHESIOLOGY

## 2018-05-30 PROCEDURE — 40000556 ZZH STATISTIC PERIPHERAL IV START W US GUIDANCE

## 2018-05-30 PROCEDURE — 40000170 ZZH STATISTIC PRE-PROCEDURE ASSESSMENT II: Performed by: SURGERY

## 2018-05-30 PROCEDURE — 87205 SMEAR GRAM STAIN: CPT | Performed by: SURGERY

## 2018-05-30 PROCEDURE — 82330 ASSAY OF CALCIUM: CPT | Performed by: ANESTHESIOLOGY

## 2018-05-30 PROCEDURE — 25000132 ZZH RX MED GY IP 250 OP 250 PS 637: Mod: GY | Performed by: STUDENT IN AN ORGANIZED HEALTH CARE EDUCATION/TRAINING PROGRAM

## 2018-05-30 PROCEDURE — 40000275 ZZH STATISTIC RCP TIME EA 10 MIN

## 2018-05-30 PROCEDURE — C9399 UNCLASSIFIED DRUGS OR BIOLOG: HCPCS | Performed by: NURSE ANESTHETIST, CERTIFIED REGISTERED

## 2018-05-30 PROCEDURE — 71045 X-RAY EXAM CHEST 1 VIEW: CPT

## 2018-05-30 PROCEDURE — C2617 STENT, NON-COR, TEM W/O DEL: HCPCS | Performed by: SURGERY

## 2018-05-30 PROCEDURE — 27210995 ZZH RX 272: Performed by: STUDENT IN AN ORGANIZED HEALTH CARE EDUCATION/TRAINING PROGRAM

## 2018-05-30 PROCEDURE — 40000196 ZZH STATISTIC RAPCV CVP MONITORING

## 2018-05-30 PROCEDURE — 37000008 ZZH ANESTHESIA TECHNICAL FEE, 1ST 30 MIN: Performed by: SURGERY

## 2018-05-30 PROCEDURE — 71000017 ZZH RECOVERY PHASE 1 LEVEL 3 EA ADDTL HR: Performed by: SURGERY

## 2018-05-30 PROCEDURE — 25000131 ZZH RX MED GY IP 250 OP 636 PS 637: Mod: GY | Performed by: NURSE ANESTHETIST, CERTIFIED REGISTERED

## 2018-05-30 PROCEDURE — 27210794 ZZH OR GENERAL SUPPLY STERILE: Performed by: SURGERY

## 2018-05-30 PROCEDURE — 84132 ASSAY OF SERUM POTASSIUM: CPT | Performed by: ANESTHESIOLOGY

## 2018-05-30 PROCEDURE — 84132 ASSAY OF SERUM POTASSIUM: CPT | Performed by: STUDENT IN AN ORGANIZED HEALTH CARE EDUCATION/TRAINING PROGRAM

## 2018-05-30 PROCEDURE — 81200002 ZZH ACQUISITION KIDNEY CADAVER

## 2018-05-30 PROCEDURE — 72192 CT PELVIS W/O DYE: CPT

## 2018-05-30 PROCEDURE — 00000146 ZZHCL STATISTIC GLUCOSE BY METER IP

## 2018-05-30 PROCEDURE — 80048 BASIC METABOLIC PNL TOTAL CA: CPT | Performed by: STUDENT IN AN ORGANIZED HEALTH CARE EDUCATION/TRAINING PROGRAM

## 2018-05-30 PROCEDURE — 82947 ASSAY GLUCOSE BLOOD QUANT: CPT | Performed by: ANESTHESIOLOGY

## 2018-05-30 PROCEDURE — 71000016 ZZH RECOVERY PHASE 1 LEVEL 3 FIRST HR: Performed by: SURGERY

## 2018-05-30 PROCEDURE — 25000128 H RX IP 250 OP 636: Performed by: SURGERY

## 2018-05-30 PROCEDURE — A9270 NON-COVERED ITEM OR SERVICE: HCPCS | Mod: GY | Performed by: STUDENT IN AN ORGANIZED HEALTH CARE EDUCATION/TRAINING PROGRAM

## 2018-05-30 PROCEDURE — P9016 RBC LEUKOCYTES REDUCED: HCPCS | Performed by: SURGERY

## 2018-05-30 PROCEDURE — 37000009 ZZH ANESTHESIA TECHNICAL FEE, EACH ADDTL 15 MIN: Performed by: SURGERY

## 2018-05-30 PROCEDURE — 84100 ASSAY OF PHOSPHORUS: CPT | Performed by: STUDENT IN AN ORGANIZED HEALTH CARE EDUCATION/TRAINING PROGRAM

## 2018-05-30 PROCEDURE — 36592 COLLECT BLOOD FROM PICC: CPT | Performed by: STUDENT IN AN ORGANIZED HEALTH CARE EDUCATION/TRAINING PROGRAM

## 2018-05-30 PROCEDURE — 85018 HEMOGLOBIN: CPT | Performed by: STUDENT IN AN ORGANIZED HEALTH CARE EDUCATION/TRAINING PROGRAM

## 2018-05-30 PROCEDURE — 36000062 ZZH SURGERY LEVEL 4 1ST 30 MIN - UMMC: Performed by: SURGERY

## 2018-05-30 PROCEDURE — 82803 BLOOD GASES ANY COMBINATION: CPT | Performed by: ANESTHESIOLOGY

## 2018-05-30 DEVICE — STENT URETERAL SOFT UNIVERSA 5.0FRX18CM US-518 G53145
Type: IMPLANTABLE DEVICE | Site: URETER | Status: NON-FUNCTIONAL
Removed: 2018-06-19

## 2018-05-30 RX ORDER — AMOXICILLIN 250 MG
2 CAPSULE ORAL 2 TIMES DAILY
Status: DISCONTINUED | OUTPATIENT
Start: 2018-05-31 | End: 2018-06-02 | Stop reason: HOSPADM

## 2018-05-30 RX ORDER — PROPOFOL 10 MG/ML
INJECTION, EMULSION INTRAVENOUS PRN
Status: DISCONTINUED | OUTPATIENT
Start: 2018-05-30 | End: 2018-05-30

## 2018-05-30 RX ORDER — LIDOCAINE HYDROCHLORIDE 20 MG/ML
INJECTION, SOLUTION INFILTRATION; PERINEURAL PRN
Status: DISCONTINUED | OUTPATIENT
Start: 2018-05-30 | End: 2018-05-30

## 2018-05-30 RX ORDER — FENTANYL CITRATE 50 UG/ML
INJECTION, SOLUTION INTRAMUSCULAR; INTRAVENOUS PRN
Status: DISCONTINUED | OUTPATIENT
Start: 2018-05-30 | End: 2018-05-30

## 2018-05-30 RX ORDER — VALGANCICLOVIR 450 MG/1
450 TABLET, FILM COATED ORAL
Status: DISCONTINUED | OUTPATIENT
Start: 2018-05-31 | End: 2018-06-01

## 2018-05-30 RX ORDER — MANNITOL 20 G/100ML
INJECTION, SOLUTION INTRAVENOUS PRN
Status: DISCONTINUED | OUTPATIENT
Start: 2018-05-30 | End: 2018-05-30

## 2018-05-30 RX ORDER — NALOXONE HYDROCHLORIDE 0.4 MG/ML
.1-.4 INJECTION, SOLUTION INTRAMUSCULAR; INTRAVENOUS; SUBCUTANEOUS
Status: DISCONTINUED | OUTPATIENT
Start: 2018-05-30 | End: 2018-05-30

## 2018-05-30 RX ORDER — HYDRALAZINE HYDROCHLORIDE 20 MG/ML
2.5-5 INJECTION INTRAMUSCULAR; INTRAVENOUS EVERY 10 MIN PRN
Status: DISCONTINUED | OUTPATIENT
Start: 2018-05-30 | End: 2018-05-30 | Stop reason: HOSPADM

## 2018-05-30 RX ORDER — SUMATRIPTAN 25 MG/1
25 TABLET, FILM COATED ORAL EVERY 8 HOURS PRN
Status: DISCONTINUED | OUTPATIENT
Start: 2018-05-30 | End: 2018-06-02 | Stop reason: HOSPADM

## 2018-05-30 RX ORDER — ONDANSETRON 2 MG/ML
4 INJECTION INTRAMUSCULAR; INTRAVENOUS EVERY 30 MIN PRN
Status: DISCONTINUED | OUTPATIENT
Start: 2018-05-30 | End: 2018-05-30 | Stop reason: HOSPADM

## 2018-05-30 RX ORDER — ACETAMINOPHEN 325 MG/1
650 TABLET ORAL EVERY 4 HOURS
Status: DISCONTINUED | OUTPATIENT
Start: 2018-05-30 | End: 2018-06-02 | Stop reason: HOSPADM

## 2018-05-30 RX ORDER — MYCOPHENOLATE MOFETIL 250 MG/1
750 CAPSULE ORAL
Status: DISCONTINUED | OUTPATIENT
Start: 2018-05-30 | End: 2018-06-02 | Stop reason: HOSPADM

## 2018-05-30 RX ORDER — NALOXONE HYDROCHLORIDE 0.4 MG/ML
.1-.4 INJECTION, SOLUTION INTRAMUSCULAR; INTRAVENOUS; SUBCUTANEOUS
Status: DISCONTINUED | OUTPATIENT
Start: 2018-05-30 | End: 2018-06-02 | Stop reason: HOSPADM

## 2018-05-30 RX ORDER — METOPROLOL TARTRATE 25 MG/1
25 TABLET, FILM COATED ORAL 2 TIMES DAILY
Status: DISCONTINUED | OUTPATIENT
Start: 2018-05-30 | End: 2018-06-01

## 2018-05-30 RX ORDER — ONDANSETRON 2 MG/ML
INJECTION INTRAMUSCULAR; INTRAVENOUS PRN
Status: DISCONTINUED | OUTPATIENT
Start: 2018-05-30 | End: 2018-05-30

## 2018-05-30 RX ORDER — OXYCODONE HYDROCHLORIDE 5 MG/1
5-10 TABLET ORAL EVERY 4 HOURS PRN
Status: DISCONTINUED | OUTPATIENT
Start: 2018-05-30 | End: 2018-06-02 | Stop reason: HOSPADM

## 2018-05-30 RX ORDER — LABETALOL HYDROCHLORIDE 5 MG/ML
INJECTION, SOLUTION INTRAVENOUS PRN
Status: DISCONTINUED | OUTPATIENT
Start: 2018-05-30 | End: 2018-05-30

## 2018-05-30 RX ORDER — SODIUM CHLORIDE 9 MG/ML
1000 INJECTION, SOLUTION INTRAVENOUS CONTINUOUS PRN
Status: DISCONTINUED | OUTPATIENT
Start: 2018-05-30 | End: 2018-05-31

## 2018-05-30 RX ORDER — ATORVASTATIN CALCIUM 20 MG/1
20 TABLET, FILM COATED ORAL DAILY
Status: DISCONTINUED | OUTPATIENT
Start: 2018-05-31 | End: 2018-06-02 | Stop reason: HOSPADM

## 2018-05-30 RX ORDER — SULFAMETHOXAZOLE AND TRIMETHOPRIM 400; 80 MG/1; MG/1
1 TABLET ORAL DAILY
Status: DISCONTINUED | OUTPATIENT
Start: 2018-05-31 | End: 2018-05-31

## 2018-05-30 RX ORDER — SODIUM CHLORIDE, SODIUM LACTATE, POTASSIUM CHLORIDE, CALCIUM CHLORIDE 600; 310; 30; 20 MG/100ML; MG/100ML; MG/100ML; MG/100ML
INJECTION, SOLUTION INTRAVENOUS CONTINUOUS
Status: DISCONTINUED | OUTPATIENT
Start: 2018-05-30 | End: 2018-05-30

## 2018-05-30 RX ORDER — ONDANSETRON 4 MG/1
4 TABLET, ORALLY DISINTEGRATING ORAL EVERY 30 MIN PRN
Status: DISCONTINUED | OUTPATIENT
Start: 2018-05-30 | End: 2018-05-30 | Stop reason: HOSPADM

## 2018-05-30 RX ORDER — NITROGLYCERIN 20 MG/100ML
INJECTION INTRAVENOUS CONTINUOUS PRN
Status: DISCONTINUED | OUTPATIENT
Start: 2018-05-30 | End: 2018-05-30

## 2018-05-30 RX ORDER — SODIUM CHLORIDE 450 MG/100ML
INJECTION, SOLUTION INTRAVENOUS CONTINUOUS PRN
Status: DISCONTINUED | OUTPATIENT
Start: 2018-05-30 | End: 2018-05-31

## 2018-05-30 RX ORDER — FENTANYL CITRATE 50 UG/ML
25-50 INJECTION, SOLUTION INTRAMUSCULAR; INTRAVENOUS
Status: DISCONTINUED | OUTPATIENT
Start: 2018-05-30 | End: 2018-05-30 | Stop reason: HOSPADM

## 2018-05-30 RX ORDER — SENNOSIDES 8.6 MG
2 TABLET ORAL DAILY PRN
Status: DISCONTINUED | OUTPATIENT
Start: 2018-05-30 | End: 2018-05-30

## 2018-05-30 RX ORDER — SODIUM CHLORIDE 9 MG/ML
INJECTION, SOLUTION INTRAVENOUS CONTINUOUS PRN
Status: DISCONTINUED | OUTPATIENT
Start: 2018-05-30 | End: 2018-05-30

## 2018-05-30 RX ORDER — DEXTROSE MONOHYDRATE 25 G/50ML
25-50 INJECTION, SOLUTION INTRAVENOUS
Status: DISCONTINUED | OUTPATIENT
Start: 2018-05-30 | End: 2018-05-31

## 2018-05-30 RX ORDER — SENNOSIDES 8.6 MG
1 TABLET ORAL DAILY PRN
Status: DISCONTINUED | OUTPATIENT
Start: 2018-05-30 | End: 2018-05-30

## 2018-05-30 RX ORDER — ONDANSETRON 2 MG/ML
4 INJECTION INTRAMUSCULAR; INTRAVENOUS EVERY 6 HOURS PRN
Status: DISCONTINUED | OUTPATIENT
Start: 2018-05-30 | End: 2018-06-02 | Stop reason: HOSPADM

## 2018-05-30 RX ORDER — POTASSIUM CHLORIDE 29.8 MG/ML
20 INJECTION INTRAVENOUS
Status: DISCONTINUED | OUTPATIENT
Start: 2018-05-30 | End: 2018-05-31

## 2018-05-30 RX ORDER — ALBUTEROL SULFATE 90 UG/1
2 AEROSOL, METERED RESPIRATORY (INHALATION) EVERY 6 HOURS PRN
Status: DISCONTINUED | OUTPATIENT
Start: 2018-05-30 | End: 2018-06-02 | Stop reason: HOSPADM

## 2018-05-30 RX ORDER — DEXTROSE, SODIUM CHLORIDE, SODIUM LACTATE, POTASSIUM CHLORIDE, AND CALCIUM CHLORIDE 5; .6; .31; .03; .02 G/100ML; G/100ML; G/100ML; G/100ML; G/100ML
INJECTION, SOLUTION INTRAVENOUS CONTINUOUS
Status: DISCONTINUED | OUTPATIENT
Start: 2018-05-30 | End: 2018-06-01

## 2018-05-30 RX ORDER — GLYCOPYRROLATE 0.2 MG/ML
INJECTION, SOLUTION INTRAMUSCULAR; INTRAVENOUS PRN
Status: DISCONTINUED | OUTPATIENT
Start: 2018-05-30 | End: 2018-05-30

## 2018-05-30 RX ORDER — AMOXICILLIN 250 MG
1 CAPSULE ORAL 2 TIMES DAILY
Status: DISCONTINUED | OUTPATIENT
Start: 2018-05-30 | End: 2018-05-30

## 2018-05-30 RX ORDER — ATORVASTATIN CALCIUM 40 MG/1
40 TABLET, FILM COATED ORAL DAILY
Status: DISCONTINUED | OUTPATIENT
Start: 2018-05-30 | End: 2018-05-30

## 2018-05-30 RX ORDER — TRAZODONE HYDROCHLORIDE 150 MG/1
300 TABLET ORAL AT BEDTIME
Status: DISCONTINUED | OUTPATIENT
Start: 2018-05-30 | End: 2018-06-02 | Stop reason: HOSPADM

## 2018-05-30 RX ORDER — AMOXICILLIN 250 MG
2 CAPSULE ORAL 2 TIMES DAILY
Status: DISCONTINUED | OUTPATIENT
Start: 2018-05-30 | End: 2018-05-30

## 2018-05-30 RX ORDER — METOPROLOL TARTRATE 1 MG/ML
INJECTION, SOLUTION INTRAVENOUS PRN
Status: DISCONTINUED | OUTPATIENT
Start: 2018-05-30 | End: 2018-05-30

## 2018-05-30 RX ORDER — ONDANSETRON 4 MG/1
4 TABLET, ORALLY DISINTEGRATING ORAL EVERY 6 HOURS PRN
Status: DISCONTINUED | OUTPATIENT
Start: 2018-05-30 | End: 2018-06-02 | Stop reason: HOSPADM

## 2018-05-30 RX ORDER — LABETALOL HYDROCHLORIDE 5 MG/ML
10 INJECTION, SOLUTION INTRAVENOUS
Status: DISCONTINUED | OUTPATIENT
Start: 2018-05-30 | End: 2018-05-30 | Stop reason: HOSPADM

## 2018-05-30 RX ORDER — MAGNESIUM OXIDE 400 MG/1
400 TABLET ORAL
Status: DISCONTINUED | OUTPATIENT
Start: 2018-06-01 | End: 2018-06-02 | Stop reason: HOSPADM

## 2018-05-30 RX ORDER — FUROSEMIDE 10 MG/ML
INJECTION INTRAMUSCULAR; INTRAVENOUS PRN
Status: DISCONTINUED | OUTPATIENT
Start: 2018-05-30 | End: 2018-05-30

## 2018-05-30 RX ORDER — FUROSEMIDE 10 MG/ML
80 INJECTION INTRAMUSCULAR; INTRAVENOUS
Status: DISCONTINUED | OUTPATIENT
Start: 2018-05-30 | End: 2018-05-31

## 2018-05-30 RX ORDER — CLONAZEPAM 0.5 MG/1
1.5 TABLET ORAL DAILY
Status: DISCONTINUED | OUTPATIENT
Start: 2018-05-30 | End: 2018-05-31

## 2018-05-30 RX ORDER — METOPROLOL TARTRATE 1 MG/ML
5 INJECTION, SOLUTION INTRAVENOUS EVERY 6 HOURS PRN
Status: DISCONTINUED | OUTPATIENT
Start: 2018-05-30 | End: 2018-05-31

## 2018-05-30 RX ORDER — NEOSTIGMINE METHYLSULFATE 1 MG/ML
VIAL (ML) INJECTION PRN
Status: DISCONTINUED | OUTPATIENT
Start: 2018-05-30 | End: 2018-05-30

## 2018-05-30 RX ORDER — NICOTINE POLACRILEX 4 MG
15-30 LOZENGE BUCCAL
Status: DISCONTINUED | OUTPATIENT
Start: 2018-05-30 | End: 2018-05-31

## 2018-05-30 RX ORDER — HYDROMORPHONE HYDROCHLORIDE 1 MG/ML
.3-.5 INJECTION, SOLUTION INTRAMUSCULAR; INTRAVENOUS; SUBCUTANEOUS EVERY 5 MIN PRN
Status: DISCONTINUED | OUTPATIENT
Start: 2018-05-30 | End: 2018-05-30 | Stop reason: HOSPADM

## 2018-05-30 RX ADMIN — ACETAMINOPHEN 650 MG: 325 TABLET, FILM COATED ORAL at 23:55

## 2018-05-30 RX ADMIN — SUGAMMADEX 120 MG: 100 INJECTION, SOLUTION INTRAVENOUS at 13:45

## 2018-05-30 RX ADMIN — CEFUROXIME 1.5 G: 1.5 INJECTION, POWDER, FOR SOLUTION INTRAVENOUS at 08:45

## 2018-05-30 RX ADMIN — POTASSIUM CHLORIDE 20 MEQ: 400 INJECTION, SOLUTION INTRAVENOUS at 22:53

## 2018-05-30 RX ADMIN — SODIUM CHLORIDE, SODIUM LACTATE, POTASSIUM CHLORIDE, CALCIUM CHLORIDE AND DEXTROSE MONOHYDRATE: 5; 600; 310; 30; 20 INJECTION, SOLUTION INTRAVENOUS at 22:52

## 2018-05-30 RX ADMIN — NITROGLYCERIN 0.07 MCG/KG/MIN: 20 INJECTION INTRAVENOUS at 10:31

## 2018-05-30 RX ADMIN — HYDRALAZINE HYDROCHLORIDE 5 MG: 20 INJECTION INTRAMUSCULAR; INTRAVENOUS at 16:27

## 2018-05-30 RX ADMIN — SODIUM CHLORIDE 1000 ML: 4.5 INJECTION, SOLUTION INTRAVENOUS at 15:01

## 2018-05-30 RX ADMIN — DEXMEDETOMIDINE HYDROCHLORIDE 0.7 MCG/KG/HR: 100 INJECTION, SOLUTION INTRAVENOUS at 13:57

## 2018-05-30 RX ADMIN — ROCURONIUM BROMIDE 20 MG: 10 INJECTION INTRAVENOUS at 08:53

## 2018-05-30 RX ADMIN — ROCURONIUM BROMIDE 20 MG: 10 INJECTION INTRAVENOUS at 09:24

## 2018-05-30 RX ADMIN — HYDROMORPHONE HYDROCHLORIDE: 10 INJECTION, SOLUTION INTRAMUSCULAR; INTRAVENOUS; SUBCUTANEOUS at 14:50

## 2018-05-30 RX ADMIN — ROCURONIUM BROMIDE 30 MG: 10 INJECTION INTRAVENOUS at 11:00

## 2018-05-30 RX ADMIN — FUROSEMIDE 80 MG: 10 INJECTION, SOLUTION INTRAVENOUS at 11:37

## 2018-05-30 RX ADMIN — ONDANSETRON 4 MG: 2 INJECTION INTRAMUSCULAR; INTRAVENOUS at 13:30

## 2018-05-30 RX ADMIN — ACETAMINOPHEN 650 MG: 325 TABLET, FILM COATED ORAL at 19:50

## 2018-05-30 RX ADMIN — DEXTROSE MONOHYDRATE AND SODIUM CHLORIDE: 5; .45 INJECTION, SOLUTION INTRAVENOUS at 13:08

## 2018-05-30 RX ADMIN — HYDROMORPHONE HYDROCHLORIDE 0.5 MG: 1 INJECTION, SOLUTION INTRAMUSCULAR; INTRAVENOUS; SUBCUTANEOUS at 13:11

## 2018-05-30 RX ADMIN — SENNOSIDES AND DOCUSATE SODIUM 2 TABLET: 8.6; 5 TABLET ORAL at 19:50

## 2018-05-30 RX ADMIN — METOPROLOL TARTRATE 25 MG: 25 TABLET ORAL at 19:50

## 2018-05-30 RX ADMIN — ROCURONIUM BROMIDE 30 MG: 10 INJECTION INTRAVENOUS at 10:54

## 2018-05-30 RX ADMIN — FENTANYL CITRATE 50 MCG: 50 INJECTION, SOLUTION INTRAMUSCULAR; INTRAVENOUS at 08:24

## 2018-05-30 RX ADMIN — SODIUM CHLORIDE: 9 INJECTION, SOLUTION INTRAVENOUS at 08:13

## 2018-05-30 RX ADMIN — Medication 24 MCG: at 13:56

## 2018-05-30 RX ADMIN — TRAZODONE HYDROCHLORIDE 300 MG: 150 TABLET ORAL at 01:36

## 2018-05-30 RX ADMIN — MYCOPHENOLATE MOFETIL 1000 MG: 500 INJECTION, POWDER, LYOPHILIZED, FOR SOLUTION INTRAVENOUS at 09:50

## 2018-05-30 RX ADMIN — MIDAZOLAM 2 MG: 1 INJECTION INTRAMUSCULAR; INTRAVENOUS at 08:13

## 2018-05-30 RX ADMIN — LABETALOL HYDROCHLORIDE 5 MG: 5 INJECTION INTRAVENOUS at 10:06

## 2018-05-30 RX ADMIN — NEOSTIGMINE METHYLSULFATE 5 MG: 1 INJECTION, SOLUTION INTRAVENOUS at 13:33

## 2018-05-30 RX ADMIN — SODIUM CHLORIDE, SODIUM LACTATE, POTASSIUM CHLORIDE, CALCIUM CHLORIDE AND DEXTROSE MONOHYDRATE: 5; 600; 310; 30; 20 INJECTION, SOLUTION INTRAVENOUS at 14:49

## 2018-05-30 RX ADMIN — FUROSEMIDE 80 MG: 10 INJECTION, SOLUTION INTRAVENOUS at 19:49

## 2018-05-30 RX ADMIN — LABETALOL HYDROCHLORIDE 10 MG: 5 INJECTION INTRAVENOUS at 10:14

## 2018-05-30 RX ADMIN — ROCURONIUM BROMIDE 50 MG: 10 INJECTION INTRAVENOUS at 08:24

## 2018-05-30 RX ADMIN — SODIUM CHLORIDE 1000 ML: 9 INJECTION, SOLUTION INTRAVENOUS at 20:57

## 2018-05-30 RX ADMIN — HUMAN INSULIN 8 UNITS/HR: 100 INJECTION, SOLUTION SUBCUTANEOUS at 17:09

## 2018-05-30 RX ADMIN — SODIUM CHLORIDE 1000 ML: 9 INJECTION, SOLUTION INTRAVENOUS at 15:02

## 2018-05-30 RX ADMIN — MIDAZOLAM 2 MG: 1 INJECTION INTRAMUSCULAR; INTRAVENOUS at 13:54

## 2018-05-30 RX ADMIN — FENTANYL CITRATE 100 MCG: 50 INJECTION, SOLUTION INTRAMUSCULAR; INTRAVENOUS at 11:36

## 2018-05-30 RX ADMIN — ANTI-THYMOCYTE GLOBULIN (RABBIT) 125 MG: 5 INJECTION, POWDER, LYOPHILIZED, FOR SOLUTION INTRAVENOUS at 09:34

## 2018-05-30 RX ADMIN — OMEPRAZOLE 40 MG: 20 CAPSULE, DELAYED RELEASE ORAL at 19:51

## 2018-05-30 RX ADMIN — SODIUM CHLORIDE: 9 INJECTION, SOLUTION INTRAVENOUS at 08:51

## 2018-05-30 RX ADMIN — FENTANYL CITRATE 100 MCG: 50 INJECTION, SOLUTION INTRAMUSCULAR; INTRAVENOUS at 13:50

## 2018-05-30 RX ADMIN — HYDRALAZINE HYDROCHLORIDE 5 MG: 20 INJECTION INTRAMUSCULAR; INTRAVENOUS at 16:16

## 2018-05-30 RX ADMIN — HUMAN INSULIN 2 UNITS/HR: 100 INJECTION, SOLUTION SUBCUTANEOUS at 16:06

## 2018-05-30 RX ADMIN — ROCURONIUM BROMIDE 30 MG: 10 INJECTION INTRAVENOUS at 13:02

## 2018-05-30 RX ADMIN — MYCOPHENOLATE MOFETIL 750 MG: 250 CAPSULE ORAL at 19:51

## 2018-05-30 RX ADMIN — SODIUM CHLORIDE 1000 ML: 9 INJECTION, SOLUTION INTRAVENOUS at 16:01

## 2018-05-30 RX ADMIN — Medication 16 MCG: at 14:01

## 2018-05-30 RX ADMIN — HUMAN INSULIN 2 UNITS/HR: 100 INJECTION, SOLUTION SUBCUTANEOUS at 22:52

## 2018-05-30 RX ADMIN — FENTANYL CITRATE 25 MCG: 50 INJECTION, SOLUTION INTRAMUSCULAR; INTRAVENOUS at 15:14

## 2018-05-30 RX ADMIN — SENNOSIDES 2 TABLET: 8.6 TABLET, FILM COATED ORAL at 01:28

## 2018-05-30 RX ADMIN — MANNITOL 25 G: 20 INJECTION, SOLUTION INTRAVENOUS at 11:43

## 2018-05-30 RX ADMIN — FENTANYL CITRATE 25 MCG: 50 INJECTION, SOLUTION INTRAMUSCULAR; INTRAVENOUS at 15:11

## 2018-05-30 RX ADMIN — LIDOCAINE HYDROCHLORIDE 80 MG: 20 INJECTION, SOLUTION INFILTRATION; PERINEURAL at 08:24

## 2018-05-30 RX ADMIN — FENTANYL CITRATE 50 MCG: 50 INJECTION, SOLUTION INTRAMUSCULAR; INTRAVENOUS at 15:19

## 2018-05-30 RX ADMIN — HYDROMORPHONE HYDROCHLORIDE 0.5 MG: 1 INJECTION, SOLUTION INTRAMUSCULAR; INTRAVENOUS; SUBCUTANEOUS at 10:57

## 2018-05-30 RX ADMIN — LABETALOL HYDROCHLORIDE 5 MG: 5 INJECTION INTRAVENOUS at 10:02

## 2018-05-30 RX ADMIN — METOPROLOL TARTRATE 1 MG: 5 INJECTION INTRAVENOUS at 11:15

## 2018-05-30 RX ADMIN — FENTANYL CITRATE 50 MCG: 50 INJECTION, SOLUTION INTRAMUSCULAR; INTRAVENOUS at 09:01

## 2018-05-30 RX ADMIN — METOPROLOL TARTRATE 2 MG: 5 INJECTION INTRAVENOUS at 09:39

## 2018-05-30 RX ADMIN — METOPROLOL TARTRATE 2 MG: 5 INJECTION INTRAVENOUS at 11:21

## 2018-05-30 RX ADMIN — FENTANYL CITRATE 100 MCG: 50 INJECTION, SOLUTION INTRAMUSCULAR; INTRAVENOUS at 09:31

## 2018-05-30 RX ADMIN — PROPOFOL 170 MG: 10 INJECTION, EMULSION INTRAVENOUS at 08:24

## 2018-05-30 RX ADMIN — SODIUM CHLORIDE 500 MG: 9 INJECTION, SOLUTION INTRAVENOUS at 08:47

## 2018-05-30 RX ADMIN — FENTANYL CITRATE 100 MCG: 50 INJECTION, SOLUTION INTRAMUSCULAR; INTRAVENOUS at 13:58

## 2018-05-30 RX ADMIN — GLYCOPYRROLATE 0.8 MG: 0.2 INJECTION, SOLUTION INTRAMUSCULAR; INTRAVENOUS at 13:33

## 2018-05-30 RX ADMIN — CLONAZEPAM 1.5 MG: 0.5 TABLET ORAL at 19:52

## 2018-05-30 ASSESSMENT — PAIN DESCRIPTION - DESCRIPTORS
DESCRIPTORS: ACHING
DESCRIPTORS: ACHING

## 2018-05-30 ASSESSMENT — ACTIVITIES OF DAILY LIVING (ADL): ADLS_ACUITY_SCORE: 9

## 2018-05-30 NOTE — H&P
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  Other past medical history includes HTN, anemia, depression, substance abuse and Osteopenia. Patient was notified as an acceptable  donor organ became available and presented for further pre-operative work-up.  Patient was informed of the risks and benefits regarding  donor organ transplantation, and has elected to proceed.    The patient is on dialysis.  Modality: HD, via AV fistula (left).    Dialysis days: Tuesday, Thursday, Saturday.  Last dialysis run:   The patient is not making any urine prior to transplantation.    H/o blood transfusion: multiple transfusions in the past, last transfusion is 4-5 yrs ago  No Chronic anticoagulation, etc    Plan:  -[place] to outpatient for pre-op work-up  -Pre-op labs, including BMP, CBC, coag panel, viral serologies  -EKG, CXR  -Cardiac clearance:  - CT pelvis w/o contrast to evaluate anatomy    Fabricio Cheatham MD  General Surgery., PGY1    Physician Attestation   I, Mario Vallejo, saw this patient with the resident and agree with the resident and/or medical student's findings and plan of care as documented in the note.      I personally reviewed vital signs, medications, labs and imaging.    Key findings: appropriate for  donor kidney transplant.    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     The patient is on dialysis.  Modality: HD, via AV fistula (left).    Dialysis days: Tuesday, Thursday, Saturday.  Last dialysis run:   is not make any urine prior to transplantation.    H/o blood transfusion: multiple transfusion, last transfuse 4-5 years ago   No Chronic anticoagulation, etc  Other past medical history includes :    At the time of admission, the patient reports he is doing well, in his baseline health, no recent illness, no shortness of breath, no chest pain, tolerating diet, stooling appropriately without issues.    Past Medical History    I have reviewed this patient's medical history and updated it with pertinent information if needed.   Past Medical History:   Diagnosis Date     Anemia in chronic kidney disease      Chews tobacco      Chronic rejection of kidney transplant      ESRD needing dialysis (H)          History of alcoholism (H)      History of depression      History of peritoneal dialysis     7 years     History of substance abuse      Hyperlipidemia      IgA nephropathy      Osteopenia      Peritonitis (H)     non-MRSA staph     Renal cell carcinoma (H)        Past Surgical History   I have reviewed this patient's surgical history and updated it with pertinent information if needed.  Past Surgical History:   Procedure Laterality Date     EXPLANT TRANSPLANTED KIDNEY N/A 12/15/2017    Procedure: EXPLANT TRANSPLANTED KIDNEY;   Transplanted Nephrectomy;  Surgeon: Mario Vallejo MD;  Location: [facility] OR     HC DIALYSIS AVF OR AVG, CENTRAL INTERVENTION ONLY Left      LAPAROSCOPIC INSERTION CATHETER PERITONEAL DIALYSIS Left      NEPHRECTOMY BILATERAL  2015     REMOVE CATHETER PERITONEAL       TRANSPLANT KIDNEY RECIPIENT  DONOR Left 2009       Prior to Admission Medications   Prior to Admission Medications   Prescriptions Last Dose Informant Patient Reported? Taking?   Cholecalciferol (VITAMIN D) 2000 UNITS tablet 2018 at Unknown time  Yes Yes   Sig: Take 2,000 Units by mouth daily   SUMAtriptan (IMITREX) 25 MG tablet More than a month at Unknown time  Yes No   albuterol (PROAIR HFA/PROVENTIL HFA/VENTOLIN HFA) 108 (90 Base) MCG/ACT Inhaler   No No   Sig: Inhale 2 puffs into the lungs every 6 hours as needed for shortness of breath / dyspnea or wheezing   amLODIPine (NORVASC) 5 MG tablet   No No   Sig: Take 1 tablet (5 mg) by mouth daily   atorvastatin (LIPITOR) 40 MG tablet 2018 at Unknown time  Yes Yes   calcium acetate (PHOSLO) 667 MG CAPS capsule 2018 at Unknown time  No Yes   Sig: Take 2 capsules (1,334 mg) by mouth 3 times daily (with meals)   clonazePAM (KLONOPIN) 1 MG tablet 2018 at Unknown time  Yes Yes   Sig: Take 1.5 mg by mouth daily as needed   docusate sodium (COLACE) 100 MG capsule 2018 at Unknown time  Yes Yes   Sig: Take 100 mg by mouth 2 times daily   lisinopril (PRINIVIL/ZESTRIL) 20 MG tablet 2018 at Unknown time  Yes Yes   Sig: Take 20 mg by mouth daily   metoprolol (LOPRESSOR) 25 MG tablet 2018 at Unknown time  No Yes   Sig: Take 1 tablet (25 mg) by mouth daily   omeprazole (PRILOSEC) 40 MG capsule 2018 at Unknown time  No Yes   Sig: TAKE 1 CAPSULE BY MOUTH DAILY   sennosides (SENOKOT) 8.6 MG tablet 2018 at Unknown time  No Yes   Sig: Take 1 tablet by mouth daily as needed for constipation   traZODone (DESYREL) 150 MG tablet 2018 at Unknown time  Yes Yes   Sig:  Take 300 mg by mouth At Bedtime      Facility-Administered Medications: None     Allergies   Allergies   Allergen Reactions     Gabapentin Other (See Comments)     myoclonus       Social History   I have reviewed this patient's social history and updated it with pertinent information if needed. Gilles Henning III  reports that he is a non-smoker but has been exposed to tobacco smoke. His smokeless tobacco use includes Chew. He reports that he does not drink alcohol or use illicit drugs. Remote history of substance use.    Family History   Non- contributory    Review of Systems   The 10 point Review of Systems is negative other than noted in the HPI or here.     Physical Exam   Temp: 98.4  F (36.9  C) Temp src: Oral BP: 121/88 Pulse: 60   Resp: 18 SpO2: 98 %      Vital Signs with Ranges  Temp:  [98.4  F (36.9  C)] 98.4  F (36.9  C)  Pulse:  [60] 60  Resp:  [18] 18  BP: (121)/(88) 121/88  SpO2:  [98 %] 98 %  125 lbs 1.6 oz    Constitutional: alert and oriented, no distress  Respiratory: breathing is non-labor, lungs clear to ascultation  Cardiovascular: regular, rate and rhythm  GI: abdomen is soft, non-tender, multiple surgical scars noted  Musculoskeletal: moving all extremities, no pedal, edema palpable pulses  Neurologic: CNs grossly intact    Data   I personally reviewed the EKG tracing showing sinus bradycardia, 1st degree AV block.  Results for orders placed or performed during the hospital encounter of 05/29/18 (from the past 24 hour(s))   EKG 12-lead, tracing only   Result Value Ref Range    Interpretation ECG Click View Image link to view waveform and result    CBC with platelets differential   Result Value Ref Range    WBC 4.2 4.0 - 11.0 10e9/L    RBC Count 3.52 (L) 4.4 - 5.9 10e12/L    Hemoglobin 10.6 (L) 13.3 - 17.7 g/dL    Hematocrit 32.9 (L) 40.0 - 53.0 %    MCV 94 78 - 100 fl    MCH 30.1 26.5 - 33.0 pg    MCHC 32.2 31.5 - 36.5 g/dL    RDW 16.2 (H) 10.0 - 15.0 %    Platelet Count 166 150 - 450 10e9/L     Diff Method Automated Method     % Neutrophils 62.8 %    % Lymphocytes 16.6 %    % Monocytes 15.9 %    % Eosinophils 3.3 %    % Basophils 1.4 %    % Immature Granulocytes 0.0 %    Nucleated RBCs 0 0 /100    Absolute Neutrophil 2.7 1.6 - 8.3 10e9/L    Absolute Lymphocytes 0.7 (L) 0.8 - 5.3 10e9/L    Absolute Monocytes 0.7 0.0 - 1.3 10e9/L    Absolute Eosinophils 0.1 0.0 - 0.7 10e9/L    Absolute Basophils 0.1 0.0 - 0.2 10e9/L    Abs Immature Granulocytes 0.0 0 - 0.4 10e9/L    Absolute Nucleated RBC 0.0    INR   Result Value Ref Range    INR 1.14 0.86 - 1.14   Partial thromboplastin time   Result Value Ref Range    PTT 30 22 - 37 sec   Comprehensive metabolic panel   Result Value Ref Range    Sodium 138 133 - 144 mmol/L    Potassium 3.5 3.4 - 5.3 mmol/L    Chloride 95 94 - 109 mmol/L    Carbon Dioxide 35 (H) 20 - 32 mmol/L    Anion Gap 7 3 - 14 mmol/L    Glucose 111 (H) 70 - 99 mg/dL    Urea Nitrogen 45 (H) 7 - 30 mg/dL    Creatinine 6.02 (H) 0.66 - 1.25 mg/dL    GFR Estimate 10 (L) >60 mL/min/1.7m2    GFR Estimate If Black 12 (L) >60 mL/min/1.7m2    Calcium 9.0 8.5 - 10.1 mg/dL    Bilirubin Total 0.5 0.2 - 1.3 mg/dL    Albumin 3.7 3.4 - 5.0 g/dL    Protein Total 7.0 6.8 - 8.8 g/dL    Alkaline Phosphatase 102 40 - 150 U/L    ALT 14 0 - 70 U/L    AST 18 0 - 45 U/L   Hemoglobin A1c   Result Value Ref Range    Hemoglobin A1C 4.5 0 - 5.6 %   Lipid Profile   Result Value Ref Range    Cholesterol 116 <200 mg/dL    Triglycerides 88 <150 mg/dL    HDL Cholesterol 42 >39 mg/dL    LDL Cholesterol Calculated 56 <100 mg/dL    Non HDL Cholesterol 74 <130 mg/dL   ABO/Rh type and screen   Result Value Ref Range    ABO O     RH(D) Pos     Antibody Screen Neg     Test Valid Only At          [facility]    Specimen Expires 2018    XR Chest 2 Views    Narrative    Exam: XR CHEST 2 VW, 2018 11:57 PM    Indication:  donor transplant going to OR;     Comparison: Radiograph  the chest 5/14/2018.    Findings:   PA and lateral views of the chest. Cardiomediastinal silhouette is  stable. Pulmonary vasculature is distinct. No focal airspace opacity.  No pneumothorax or pleural effusion. Upper abdomen is unremarkable.      Impression    Impression: Stable chest. No acute cardiopulmonary abnormality.

## 2018-05-30 NOTE — PROGRESS NOTES
"/88  Pulse 59  Temp 97.4  F (36.3  C) (Axillary)  Resp 11  Ht 1.778 m (5' 10\")  Wt 56.7 kg (125 lb 1.6 oz)  SpO2 100%  BMI 17.95 kg/m2    Transfer  Transferred from: PACU  Via: bed  Reason for transfer:Pt appropriate for 6B- improved patient condition  Family: Aware of transfer  Belongings: Received with pt  Chart: Received with pt  Medications: Meds received from old unit with pt  2 RN Skin Assessment Completed By: Marcos (PACU)  Neuro: A&Ox4, making needs known.   Cardiac: SR 70s. VSS, afebrile, CVP 4-5.  Respiratory: Sating 100% on RA, Capno IPI 10.  GI/: Adequate urine output via plascencia, pink/red color. MIV @ 125mL/hr, replacement #1 and 2 per MAR.  Diet/appetite: Tolerating clear liquids. ADAT POD #1  Activity:  Bedrest, turning/repositioning independently.  Pain: At acceptable level on current regimen. PCA 0.2mg q10min.   Skin: No new deficits noted. Right sided incision covered with dressing, c,d,i.  LDA's: Right triple IJ and right arm PIV. Ins gtt at algorithm 2.      "

## 2018-05-30 NOTE — OP NOTE
Transplant Surgery  Operative Note     Procedure date:  18    Preoperative diagnosis:  End Stage renal failure due to IgA Nephropathy    Postoperative diagnosis:  Same,     Procedure:  1. Left kidney  Re- transplant,   - Brain Death, Right  iliac fossa, without vascular reconstruction. A J-J ureteral stent was placed.  2. Kidney allograft preparation on Back Table      Surgeon:  AGUSTIN TORO    Fellow/Assistant:  Anthony Clark, fellow, Tony Tracy fellow, Artie Matthews, resident     Anesthesia:  General    Specimen:  None    Drains:  Almas drain    Urine output:  380 mls    Estimated blood loss:  100    Fluids administered:    Fluid Amount   Crystalloid (mL) 1600        Indication: The patient has End Stage renal failure due to IgA Nephropathy and received an organ offer for a  - Brain Death kidney allograft. After discussing the risks and benefits of proceeding, the patient agreed to proceed with surgery and provided informed consent.  Findings: Integrity of recipient artery: Moderate-severe atherosclerosis. 3 day plascencia.  Intraoperative Events: None, unexpected     Final ABO/Crossmatch verification: After the donor organ arrived to the operating room and prior to anastomosis, I participated in the transplant pre-verification upon organ receipt timeout by visually verifying the donor ID, organ and laterality, donor blood type, recipient unique identifier, recipient blood type, and that the donor and recipient are blood type compatible. The crossmatch was done prospectively; the T cell flow crossmatch result was negative and B cell flow crossmatch result was negative prior to anastomosis.  The patient received Thymoglobulin, Cellcept and Solumedrol on induction.    Donor Organ Information:   Donor UNOS ID:  [id number]    Donor arterial clamp on:  2018  8:55 PM    Total ischemic time:  893 min    Cold ischemic time:  859 min    Warm ischemic time:  34 min    Preservation fluid:  HTK       Back Table Details:   Procedure:  Bench preparation of the kidney allograft for transplantation without vascular reconstruction    Surgeon:  AGUSTIN TORO    Faculty Co-Surgeon:  AGUSTIN TORO    Fellow/Assistant:  Anthony Clark fellow, Tony Tracy fellow    Donor arrival to recipient room:  5/30/2018  9:36 AM    Graft injury:  No    Graft biopsy:  no    Organ received on:  Ice    Pump resistance:      Pump flow:      Arterial anatomy:  Single    Donor arterial quality:  Normal    Venous anatomy:  Single    Ureteral anatomy:  Single    Any reconstruction:  No    Artery:  no    Vein:  no     Complications: None.    Findings: severely calcified iliac arteries.      Back Table Preparation:  The donor kidney was received and inspected. It had been flushed with HTK. The graft was prepared on the back table by removing perinephric fat and ligating venous tributaries and lymphatics. The ureter was also cleaned of excess tissue. If required, reconstruction was performed as detailed above. The kidney was stored in iced cold preservation solution until ready for transplantation. Faculty was present for the critical portions of the procedure.    Operative Procedure:   Arterial anastomosis start:  5/30/2018 11:14 AM    Arterial unclamp:  5/30/2018 11:48 AM    Extra vessels used:   no      The patient was brought to the operating room, placed in a supine position, and a time out was performed. Sequential compression devices were placed on both lower extremities and general endotracheal anesthesia was induced.  The patient was given IV antibiotics and a Lucas catheter. A central line was placed by Anesthesia service. The abdomen was then shaved, prepped, and draped in the usual sterile fashion.  An incision was made in the right lower quadrant and carried down through the subcutaneous tissue and the abdominal wall fascia. If encountered, the epigastric vessels were ligated in continuity, divided and secured with  surgical clips. The right iliac artery and vein were exposed. The retractor system was placed and the lymphatics overlying the vessels were serially ligated and divided.     The patient was not heparinized. We applied atraumatic vascular clamps and the donor kidney was brought to the operative field. We made a venotomy and the retroaortic left renal vein was anastomosed to the recipient right Common Iliac vein in an end-to-side fashion. An arteriotomy was made and the donor renal artery was anastomosed to the recipient right common iliac artery  in an end to side fashion. The patient was simultaneously loaded with IV mannitol, Lasix and volume. The renal artery was protected and the clamps were removed. After several cardiac cycles, we opened the renal artery and the kidney had Good reperfusion and was firm and pink .    The transplant ureter was managed by creating a Liche (anterior multistitch) anastomosis with absorbable suture. A stent was placed across the anastomosis. The kidney made urine prior to implantation.    Hemostasis was obtained, the anastomoses inspected, and the kidney placed in the iliac fossa. After placement, the vessel lay was inspected and found to be acceptable. The kidney position was Retroperitoneal. The field was irrigated with antibiotic solution. A drain was placed. The retractor was removed and the abdominal wall fascia reapproximated. Subcutaneous tissues were irrigated and hemostasis obtained.  The skin was reapproximated with staples and a dry dressing was applied.   All needle, sponge and instrument counts were correct x 2. The patient was awakened, extubated, and transferred to PACU for post-op monitoring. Faculty was present for key portions of the procedure.    Physician Attestation   I was present for the key portions of the procedure and I was immediately available for the entire procedure between opening and closing.    Ty Blink Genevieve  Date of Service (when I saw the patient):  5/30/18

## 2018-05-30 NOTE — ANESTHESIA PROCEDURE NOTES
Central Line Procedure Note  Staff:     Anesthesiologist:  KELLY LONG  Location: In OR after induction  Procedure Start/Stop Times:     patient identified, IV checked, site marked, risks and benefits discussed, informed consent, monitors and equipment checked, pre-op evaluation and at physician/surgeon's request      Correct Patient: Yes      Correct Position: Yes      Correct Site: Yes      Correct Procedure: Yes      Correct Laterality:  Yes    Site Marked:  Yes  Line Placement:     Procedure:  Central Line    Insertion laterality:  Right    Insertion site:  Internal Jugular    Position:  Trendelenburg      Maximal Sterile Barriers: All elements of maximal sterile barrier technique followed      (Maximal sterile barriers include:   Sterile gown, Sterile Gloves, Mask, Cap, Whole body draped, hand hygiene and acceptable skin prep).Skin Prep: Chloraprep         Injection Technique:  Ultrasound guided    Sterile Ultrasound Technique:  Sterile probe cover and Sterile gel    Vein evaluated via U/S for patency/adequacy of catheter insertion and is adequate.  Using realtime U/S imaging the vein was punctured, and needle was observed entering vein on U/S      Permanent Image entered into patient's record      Local skin infiltration:  None    Catheter size:  7 Fr, 3 lumen, 20 cm    Catheter length at skin (cm):  15    Cath secured with: suture and anchor securement device      Dressing:  Tegaderm    Complications:  None obvious    Blood aspirated all lumens: Yes      All Lumens Flushed: Yes      Tip termination: right atrium      Verification method:  Ultrasound and Placement to be verified post-op

## 2018-05-30 NOTE — PLAN OF CARE
Problem: Patient Care Overview  Goal: Plan of Care/Patient Progress Review  Outcome: No Change  Pt here for potential kidney transplant with OR time of 0830. VSS on RA, bradycardic at baseline - EKG showing 1st degree AV block with bradycardia and prolonged QT. On call notified; no intervention ordered. Denies pain and nausea. Up ad leon. Preop shower completed x2 in addition to CXR and lab work. UA/UC and protein urine unable to be collected - pt anuric (native nephrectomies and transplant nephrectomy) and last dialyzed on 5/29. Last BM 5/28. Skin intact. L AVF +bruit/thrill. Pre op checklist completed in addition to admission profile (aside from mental health questions as wife was present). Wife at bedside and supportive.    Pt transferred to  at 0645. All belongings were gathered and sent with pt and his wife. Offered to keep belongings on 7A, but pt declined.

## 2018-05-30 NOTE — TELEPHONE ENCOUNTER
"Organ Offer Encounter Information    Organ Offer Information   Organ offer date & time:  5/28/2018  5:35 PM   Coordinator/Fellow/Attending name:  MERCEDESKACEYLUCIA   Organ(s):   Organ UNOS ID Match Run ID Comment Organ Laterality   Kidney [id number] ILIP          Recent infections?:  No    New medications?:  No Recent pregnancy?:  No   Angicoagulation medications?:  No Recent vaccinations?:  No   Recent blood transfusions?:  No Recent hospitalizations?:  No   Has your insurance changed in the last 6-12 months?:  Neg    Patient last dialyzed:  5/26/2018  6:45 AM   Dialysis type:  [facility]   Discussed organ offer with:  Patient   Patient/Caregiver name:  Gilles Henning-\"Poncho\"   Discussed risk category with Patient/Other:  N/A   Understood donor criteria, verbalized understanding   Patient/Other asked to speak to a surgeon?:  No   Discussed program-specific outcomes:  Did not have questions regarding SRTR   Right to decline organ offer without penalty, Patient/Other:  Aware of option to decline without penalty   Organ offer decision status Patient/Other:  Accepted Offer   Organ disposition:  Transplanted   Additional Comments:    May 28, 2018  5:48 PM  MD:Genevieve/Eduardo  Plan: Discussed ILIP primary KI offer w/ Dr. Clark. Plan to call patient to inquire interest. VXM in progress.   Lucia Covarrubias RN, On call Transplant Coordinator    May 28, 2018  5:59 PM  MD:Alhaji  Plan: Discussed primary KI offer w/patient, he verbalized interest. Informed patient that he is primary for KI but back to multi visceral placement. He verbalized understanding. Contact information provided. VXM compatible-no DSA w/current in house serum 4/18/18 and peak date 6/17/15 7% chance of positive XM, as per Dr Rivera. Dr Clark informed. Plan to obtain donor's blood and proceed with FXM. Aly from [email or web address] notified, to coordinate with onsite coordinator [phone], to send donor's blood for FXM. Blood is arriving via [id number], " departing ORD at 0620-5/29/2018 and arriving [facility] 0750-5/29/2018, and then en route to [facility] at 0950-5/29/2018. Dr Clark informed.   Christine Covarrubias RN, On call Transplant Coordinator       May 28, 2018  6:22 PM  MD:Alhaji  Plan: No donor OR time at this time. Discussed with Dr Clark, patient will need to drop off current serum sample. Patient informed, he would his dialysis unit in the am to send the current serum. He further states that he [place] from the [facility]  Dr Clark informed.  Christine Covarrubias RN, On call Transplant Coordinator    May 29, 2018  12:55 AM  MD:Alhaji  Plan: Spoke w/ ILIP coordinator Karl- no donor OR time scheduled. Will notify when scheduled.   Christine Covarrubias RN, On call Transplant Coordinator    May 29, 2018  5:11 AM  MD:Alhaji  Plan: Donor OR scheduled for 1700 CST as per Karl GUERRERO OPO.  Dr Clark informed. Will attempt to get patient serum from dialysis this am.   Christine Covarrubias RN, On call transplant coordinator    May 29, 2018  6:14 AM  MD:Alhaji  Plan: Patient states he does not have the ACD tubes- yellow tubes but have the ret top tubes. He is in dialysis as of now. Informed him that oncoming coordinator will be contacting him for further instructions.   Christine Covarrubias RN, On call transplant coordinator    8:30 AM  May 29, 2018  Called Mr. Henning and let him know that he will need [facility] draw since dialysis center has no yellow tops.  He will leave as soon as dialysis is complete. Said that he has family to stay with in [place].  Reina Tomas, RN, CCTC  On Call Organ Coordinator    11:45 AM  May 29, 2018  Called Mr. Henning.  He is enroute to [facility].  Reina Tomas, RN, CCTC  On Call Organ Coordinator      2:45 PM  Reina Tomas, RN, CCTC  On Call Organ Coordinator  Called concerned that no one knew what was going on.  Explained that we would need to wait for the crossmatch to be complete.  He expressed concerned that  "\"no one knows what is going on.\"  I explained that we would need to wait for crossmatch and that the donor OR time is at 5:00 PM.  Reina Tomas RN, CCTC  On Call Organ Coordinator    4:25 PM  May 29, 2018  Again called concerned that \"no one knows what is going on.\"  I explained again that we were waiting for final crossmatch and that the donor OR was at 5:00 PM.  Reina Tomas RN, CCTC  On Call Organ Coordinator    7:58 PM  May 29, 2018  Returned Mr. Henning call.  He was very angry wanting to know what is going on.  I again explained the process and commented that he probably should have come yesterday for serum.  He proceeded to tell me how hateful I was and that I hadn't \"done shit for him.\"  I told him that he could not speak to me in that manner and that his crossmatch would be done between 8:30-9:00 PM.  Notified Dr. Clark who said that he would call Mr. Henning.  Reina Tomas RN, CCTC  On Call Organ Coordinator    8:53 PM  May 29, 2018  Notified Mr. Henning that xmatch was negative and that he needed to proceed to [facility] for admission to .  He apologized for his earlier behavior and said he was just stressed.  Will present to [facility] within the hour.  Admissions: Done- Blossom   Unit: Done- Daniela   Immunology: Done- Zohreh   OR:Done Natasha/Cassie OR at 8:30 AM  Blood Bank: Done- Elizabeth   Research: Page sent   On call provider: Page sent  TransNet/ABO Verification: Done  Add Organ: Done  Organ transportation via [employer] job #[id number] [employer] flight [id number] departing [place] at 6:20 AM and [facility] at 7:50 AM with guaranteed delivery to [facility] at 9:50 AM  Reina Tomas RN, CCTC  On Call Organ Coordinator                   Attestation I have discussed all of the above with the Patient/Legal Guardian/Caregiver regarding this organ offer.:  Yes   Coordinator/Fellow/Attending name:  LUCIA LONG        "

## 2018-05-30 NOTE — ANESTHESIA PREPROCEDURE EVALUATION
Anesthesia Evaluation     . Pt has had prior anesthetic. Type: General    No history of anesthetic complications          ROS/MED HX    ENT/Pulmonary:  - neg pulmonary ROS     Neurologic:  - neg neurologic ROS     Cardiovascular:     (+) hypertension----. : . . . :. . Previous cardiac testing Echodate:2017results:Global and regional left ventricular function is normal with an EF of 60-65%.  Global right ventricular function is normal.  Pulmonary artery systolic pressure is normal.  No pericardial effusion is present.  Previous study not available for comparison.date: results:ECG reviewed date: results:Prolonged QT, sinus bradycardia, nonspecific ST/T wave changes date: results:          METS/Exercise Tolerance:     Hematologic:         Musculoskeletal:  - neg musculoskeletal ROS       GI/Hepatic:     (+) GERD       Renal/Genitourinary:     (+) chronic renal disease, type: ESRD, Pt requires dialysis, type: Hemodialysis, Pt has history of transplant, date: , explanted ,       Endo:  - neg endo ROS       Psychiatric:     (+) psychiatric history depression      Infectious Disease:  - neg infectious disease ROS       Malignancy:      - no malignancy   Other:                   ANESTHESIA PREOP EVALUATION    Procedure: Procedure(s):  TRANSPLANT KIDNEY RECIPIENT  DONOR     HPI: Gilles Henning III is a 48 year old male     PMHx/PSHx/ROS:  Past Medical History:   Diagnosis Date     Anemia in chronic kidney disease      Chews tobacco      Chronic rejection of kidney transplant     Chronic rejection of 2009 kidney, failed . Graft nephrectomy 2017.     ESRD needing dialysis (H)      History of alcoholism (H)      History of depression      History of peritoneal dialysis     7 years     History of substance abuse      Hyperlipidemia      IgA nephropathy      Migraine      Osteopenia      Peritonitis (H)     non-MRSA staph     Renal cell carcinoma (H)     Left native kidney, s/p nephrectomy        Past Surgical History:   Procedure Laterality Date     EXPLANT TRANSPLANTED KIDNEY N/A 12/15/2017    Procedure: EXPLANT TRANSPLANTED KIDNEY;  Transplanted Nephrectomy;  Surgeon: Mario Vallejo MD;  Location: [facility] OR     HC DIALYSIS AVF OR AVG, CENTRAL INTERVENTION ONLY Left      LAPAROSCOPIC INSERTION CATHETER PERITONEAL DIALYSIS Left      NEPHRECTOMY BILATERAL  2015     REMOVE CATHETER PERITONEAL       TRANSPLANT KIDNEY RECIPIENT  DONOR Left 2009         Past Anes Hx: No personal or family h/o anesthesia problems    Soc Hx:   Social History   Substance Use Topics     Smoking status: Passive Smoke Exposure - Never Smoker     Types: Dip, chew, snus or snuff     Smokeless tobacco: Current User     Types: Chew      Comment: Wife smoked years ago.  Weaning off chew now     Alcohol use No      Comment: none now, did treatment at age 21, relapsed with divorce (a couple months)  then now  sober 3 years.        Allergies:   Allergies   Allergen Reactions     Gabapentin Other (See Comments)     myoclonus       Meds:   Prescriptions Prior to Admission   Medication Sig Dispense Refill Last Dose     atorvastatin (LIPITOR) 40 MG tablet    2018 at Unknown time     calcium acetate (PHOSLO) 667 MG CAPS capsule Take 2 capsules (1,334 mg) by mouth 3 times daily (with meals) 180 capsule 1 2018 at Unknown time     Cholecalciferol (VITAMIN D) 2000 UNITS tablet Take 2,000 Units by mouth daily   2018 at Unknown time     clonazePAM (KLONOPIN) 1 MG tablet Take 1.5 mg by mouth daily as needed   2018 at Unknown time     docusate sodium (COLACE) 100 MG capsule Take 100 mg by mouth 2 times daily   2018 at Unknown time     lisinopril (PRINIVIL/ZESTRIL) 20 MG tablet Take 20 mg by mouth daily   2018 at Unknown time     metoprolol (LOPRESSOR) 25 MG tablet Take 1 tablet (25 mg) by mouth daily 30 tablet 1 2018 at Unknown time     omeprazole (PRILOSEC) 40 MG capsule TAKE 1 CAPSULE BY MOUTH DAILY 90  capsule 3 5/29/2018 at Unknown time     sennosides (SENOKOT) 8.6 MG tablet Take 1 tablet by mouth daily as needed for constipation 120 each 0 5/28/2018 at Unknown time     traZODone (DESYREL) 150 MG tablet Take 300 mg by mouth At Bedtime   5/28/2018 at Unknown time     albuterol (PROAIR HFA/PROVENTIL HFA/VENTOLIN HFA) 108 (90 Base) MCG/ACT Inhaler Inhale 2 puffs into the lungs every 6 hours as needed for shortness of breath / dyspnea or wheezing 1 Inhaler 0      amLODIPine (NORVASC) 5 MG tablet Take 1 tablet (5 mg) by mouth daily 30 tablet 1 5/13/2018 at Unknown time     SUMAtriptan (IMITREX) 25 MG tablet    More than a month at Unknown time       No current outpatient prescriptions on file.       Physical Exam:  VS: T 98.8, P 59, /81, R 18, SpO2 98%, Weight   Wt Readings from Last 2 Encounters:   05/29/18 56.7 kg (125 lb 1.6 oz)   05/14/18 55.3 kg (122 lb)       Labs:  UPT: No results found for: HCGQUANT      BMP:  Recent Labs   Lab Test  05/29/18   2350   NA  138   POTASSIUM  3.5   CHLORIDE  95   CO2  35*   BUN  45*   CR  6.02*   GLC  111*   SHIRAZ  9.0     CBC:   Recent Labs   Lab Test  05/29/18   2350   WBC  4.2   RBC  3.52*   HGB  10.6*   HCT  32.9*   MCV  94   MCH  30.1   MCHC  32.2   RDW  16.2*   PLT  166     Coags:  Recent Labs   Lab Test  05/29/18   2350   INR  1.14   PTT  30       Assessment/Plan:  - ASA 3  - GETA with standard ASA monitors, IV induction, balanced anesthetic  - PIV  - Central line  - Antibiotics per surgery  - PONV prophylaxis  - Blood products available, possible administration discussed with patient  - Relevant risks, benefits, alternatives and the anesthetic plan were discussed with patient/family or family representative.  All questions were answered and there was agreement to proceed.      Zuleika Covarrubias MD    5/30/2018  6:49 AM                   Anesthesia Plan      History & Physical Review  History and physical reviewed and following examination; no interval change.    ASA  Status:  3 .    NPO Status:  > 8 hours    Plan for General with Intravenous induction. Maintenance will be Balanced.    PONV prophylaxis:  Ondansetron (or other 5HT-3)  Additional equipment: 2nd IV and Central Line      Postoperative Care  Postoperative pain management:  Multi-modal analgesia.      Consents  Anesthetic plan, risks, benefits and alternatives discussed with:  Patient.  Use of blood products discussed: Yes.   Use of blood products discussed with Patient.  Consented to blood products.  .

## 2018-05-30 NOTE — PROGRESS NOTES
Patient removed from UNOS waitlist after  donor kidney transplant. UNOS ID [id number].   Donor PHS increased risk: NO.   If Yes, MD documentation N/A.

## 2018-05-30 NOTE — ANESTHESIA POSTPROCEDURE EVALUATION
Patient: Gilles Henning III    Procedure(s):  TRANSPLANT KIDNEY RECIPIENT  DONOR and ureteral stent placement - Wound Class: I-Clean    Diagnosis:End stage renal disease  Diagnosis Additional Information: No value filed.    Anesthesia Type:  No value filed.    Note:  Anesthesia Post Evaluation    Patient location during evaluation: PACU  Patient participation: Able to fully participate in evaluation  Level of consciousness: awake and alert  Pain management: adequate  Airway patency: patent  Cardiovascular status: acceptable  Respiratory status: acceptable  Hydration status: acceptable  PONV: none     Anesthetic complications: None          Last vitals:  Vitals:    18 1430 18 1445 18 1500   BP: 122/69 121/72 132/77   Pulse:      Resp: 15 21 17   Temp:   36.4  C (97.6  F)   SpO2: 100% 100% 96%         Electronically Signed By: Zuleika Covarrubias MD  May 30, 2018  3:19 PM

## 2018-05-31 ENCOUNTER — DOCUMENTATION ONLY (OUTPATIENT)
Dept: TRANSPLANT | Facility: CLINIC | Age: 48
End: 2018-05-31

## 2018-05-31 LAB
ANION GAP SERPL CALCULATED.3IONS-SCNC: 10 MMOL/L (ref 3–14)
ANION GAP SERPL CALCULATED.3IONS-SCNC: 8 MMOL/L (ref 3–14)
BASOPHILS # BLD AUTO: 0 10E9/L (ref 0–0.2)
BASOPHILS NFR BLD AUTO: 0 %
BUN SERPL-MCNC: 28 MG/DL (ref 7–30)
BUN SERPL-MCNC: 37 MG/DL (ref 7–30)
CALCIUM SERPL-MCNC: 8.1 MG/DL (ref 8.5–10.1)
CALCIUM SERPL-MCNC: 9 MG/DL (ref 8.5–10.1)
CHLORIDE SERPL-SCNC: 100 MMOL/L (ref 94–109)
CHLORIDE SERPL-SCNC: 103 MMOL/L (ref 94–109)
CO2 SERPL-SCNC: 25 MMOL/L (ref 20–32)
CO2 SERPL-SCNC: 30 MMOL/L (ref 20–32)
CREAT SERPL-MCNC: 2.55 MG/DL (ref 0.66–1.25)
CREAT SERPL-MCNC: 3.83 MG/DL (ref 0.66–1.25)
DIFFERENTIAL METHOD BLD: ABNORMAL
DONOR IDENTIFICATION: NORMAL
DSA COMMENTS: NORMAL
DSA PRESENT: NO
DSA TEST METHOD: NORMAL
EOSINOPHIL # BLD AUTO: 0 10E9/L (ref 0–0.7)
EOSINOPHIL NFR BLD AUTO: 0.1 %
ERYTHROCYTE [DISTWIDTH] IN BLOOD BY AUTOMATED COUNT: 16.7 % (ref 10–15)
GFR SERPL CREATININE-BSD FRML MDRD: 17 ML/MIN/1.7M2
GFR SERPL CREATININE-BSD FRML MDRD: 27 ML/MIN/1.7M2
GLUCOSE BLDC GLUCOMTR-MCNC: 112 MG/DL (ref 70–99)
GLUCOSE BLDC GLUCOMTR-MCNC: 112 MG/DL (ref 70–99)
GLUCOSE BLDC GLUCOMTR-MCNC: 134 MG/DL (ref 70–99)
GLUCOSE BLDC GLUCOMTR-MCNC: 142 MG/DL (ref 70–99)
GLUCOSE BLDC GLUCOMTR-MCNC: 146 MG/DL (ref 70–99)
GLUCOSE BLDC GLUCOMTR-MCNC: 150 MG/DL (ref 70–99)
GLUCOSE BLDC GLUCOMTR-MCNC: 155 MG/DL (ref 70–99)
GLUCOSE BLDC GLUCOMTR-MCNC: 159 MG/DL (ref 70–99)
GLUCOSE BLDC GLUCOMTR-MCNC: 159 MG/DL (ref 70–99)
GLUCOSE BLDC GLUCOMTR-MCNC: 167 MG/DL (ref 70–99)
GLUCOSE BLDC GLUCOMTR-MCNC: 231 MG/DL (ref 70–99)
GLUCOSE BLDC GLUCOMTR-MCNC: 64 MG/DL (ref 70–99)
GLUCOSE BLDC GLUCOMTR-MCNC: 73 MG/DL (ref 70–99)
GLUCOSE BLDC GLUCOMTR-MCNC: 86 MG/DL (ref 70–99)
GLUCOSE SERPL-MCNC: 126 MG/DL (ref 70–99)
GLUCOSE SERPL-MCNC: 186 MG/DL (ref 70–99)
HCT VFR BLD AUTO: 27.6 % (ref 40–53)
HGB BLD-MCNC: 8.4 G/DL (ref 13.3–17.7)
HGB BLD-MCNC: 8.8 G/DL (ref 13.3–17.7)
HGB BLD-MCNC: 9.1 G/DL (ref 13.3–17.7)
HGB BLD-MCNC: 9.4 G/DL (ref 13.3–17.7)
HGB BLD-MCNC: 9.7 G/DL (ref 13.3–17.7)
IMM GRANULOCYTES # BLD: 0 10E9/L (ref 0–0.4)
IMM GRANULOCYTES NFR BLD: 0.2 %
LYMPHOCYTES # BLD AUTO: 0.3 10E9/L (ref 0.8–5.3)
LYMPHOCYTES NFR BLD AUTO: 2.5 %
MAGNESIUM SERPL-MCNC: 2 MG/DL (ref 1.6–2.3)
MAGNESIUM SERPL-MCNC: 2 MG/DL (ref 1.6–2.3)
MCH RBC QN AUTO: 30.1 PG (ref 26.5–33)
MCHC RBC AUTO-ENTMCNC: 31.9 G/DL (ref 31.5–36.5)
MCV RBC AUTO: 95 FL (ref 78–100)
MONOCYTES # BLD AUTO: 0.4 10E9/L (ref 0–1.3)
MONOCYTES NFR BLD AUTO: 3.4 %
NEUTROPHILS # BLD AUTO: 11.4 10E9/L (ref 1.6–8.3)
NEUTROPHILS NFR BLD AUTO: 93.8 %
NRBC # BLD AUTO: 0 10*3/UL
NRBC BLD AUTO-RTO: 0 /100
ORGAN: NORMAL
PHOSPHATE SERPL-MCNC: 3.1 MG/DL (ref 2.5–4.5)
PHOSPHATE SERPL-MCNC: 3.6 MG/DL (ref 2.5–4.5)
PLATELET # BLD AUTO: 163 10E9/L (ref 150–450)
POTASSIUM SERPL-SCNC: 3.4 MMOL/L (ref 3.4–5.3)
POTASSIUM SERPL-SCNC: 4.1 MMOL/L (ref 3.4–5.3)
POTASSIUM SERPL-SCNC: 4.2 MMOL/L (ref 3.4–5.3)
POTASSIUM SERPL-SCNC: 4.6 MMOL/L (ref 3.4–5.3)
RBC # BLD AUTO: 2.92 10E12/L (ref 4.4–5.9)
SA1 CELL: NORMAL
SA1 COMMENTS: NORMAL
SA1 HI RISK ABY: NORMAL
SA1 MOD RISK ABY: NORMAL
SA1 TEST METHOD: NORMAL
SA2 CELL: NORMAL
SA2 COMMENTS: NORMAL
SA2 HI RISK ABY UA: NORMAL
SA2 MOD RISK ABY: NORMAL
SA2 TEST METHOD: NORMAL
SODIUM SERPL-SCNC: 136 MMOL/L (ref 133–144)
SODIUM SERPL-SCNC: 141 MMOL/L (ref 133–144)
WBC # BLD AUTO: 12.2 10E9/L (ref 4–11)

## 2018-05-31 PROCEDURE — 85018 HEMOGLOBIN: CPT | Performed by: NURSE PRACTITIONER

## 2018-05-31 PROCEDURE — 25000132 ZZH RX MED GY IP 250 OP 250 PS 637: Mod: GY | Performed by: STUDENT IN AN ORGANIZED HEALTH CARE EDUCATION/TRAINING PROGRAM

## 2018-05-31 PROCEDURE — 25000128 H RX IP 250 OP 636: Performed by: STUDENT IN AN ORGANIZED HEALTH CARE EDUCATION/TRAINING PROGRAM

## 2018-05-31 PROCEDURE — 80048 BASIC METABOLIC PNL TOTAL CA: CPT | Performed by: NURSE PRACTITIONER

## 2018-05-31 PROCEDURE — 83735 ASSAY OF MAGNESIUM: CPT | Performed by: NURSE PRACTITIONER

## 2018-05-31 PROCEDURE — 36415 COLL VENOUS BLD VENIPUNCTURE: CPT | Performed by: STUDENT IN AN ORGANIZED HEALTH CARE EDUCATION/TRAINING PROGRAM

## 2018-05-31 PROCEDURE — 84132 ASSAY OF SERUM POTASSIUM: CPT | Performed by: STUDENT IN AN ORGANIZED HEALTH CARE EDUCATION/TRAINING PROGRAM

## 2018-05-31 PROCEDURE — 25000132 ZZH RX MED GY IP 250 OP 250 PS 637: Mod: GY | Performed by: NURSE PRACTITIONER

## 2018-05-31 PROCEDURE — 25000131 ZZH RX MED GY IP 250 OP 636 PS 637: Mod: GY | Performed by: NURSE PRACTITIONER

## 2018-05-31 PROCEDURE — A9270 NON-COVERED ITEM OR SERVICE: HCPCS | Mod: GY | Performed by: NURSE PRACTITIONER

## 2018-05-31 PROCEDURE — 84132 ASSAY OF SERUM POTASSIUM: CPT | Performed by: SURGERY

## 2018-05-31 PROCEDURE — A9270 NON-COVERED ITEM OR SERVICE: HCPCS | Mod: GY | Performed by: STUDENT IN AN ORGANIZED HEALTH CARE EDUCATION/TRAINING PROGRAM

## 2018-05-31 PROCEDURE — 36592 COLLECT BLOOD FROM PICC: CPT | Performed by: STUDENT IN AN ORGANIZED HEALTH CARE EDUCATION/TRAINING PROGRAM

## 2018-05-31 PROCEDURE — 3E0430M INTRODUCTION OF MONOCLONAL ANTIBODY INTO CENTRAL VEIN, PERCUTANEOUS APPROACH: ICD-10-PCS | Performed by: SURGERY

## 2018-05-31 PROCEDURE — 83735 ASSAY OF MAGNESIUM: CPT | Performed by: STUDENT IN AN ORGANIZED HEALTH CARE EDUCATION/TRAINING PROGRAM

## 2018-05-31 PROCEDURE — 80048 BASIC METABOLIC PNL TOTAL CA: CPT | Performed by: STUDENT IN AN ORGANIZED HEALTH CARE EDUCATION/TRAINING PROGRAM

## 2018-05-31 PROCEDURE — 36592 COLLECT BLOOD FROM PICC: CPT | Performed by: NURSE PRACTITIONER

## 2018-05-31 PROCEDURE — 84100 ASSAY OF PHOSPHORUS: CPT | Performed by: NURSE PRACTITIONER

## 2018-05-31 PROCEDURE — 12000025 ZZH R&B TRANSPLANT INTERMEDIATE

## 2018-05-31 PROCEDURE — 84100 ASSAY OF PHOSPHORUS: CPT | Performed by: STUDENT IN AN ORGANIZED HEALTH CARE EDUCATION/TRAINING PROGRAM

## 2018-05-31 PROCEDURE — 25000128 H RX IP 250 OP 636: Performed by: NURSE PRACTITIONER

## 2018-05-31 PROCEDURE — 85025 COMPLETE CBC W/AUTO DIFF WBC: CPT | Performed by: STUDENT IN AN ORGANIZED HEALTH CARE EDUCATION/TRAINING PROGRAM

## 2018-05-31 PROCEDURE — 85018 HEMOGLOBIN: CPT | Performed by: STUDENT IN AN ORGANIZED HEALTH CARE EDUCATION/TRAINING PROGRAM

## 2018-05-31 PROCEDURE — 27210995 ZZH RX 272: Performed by: STUDENT IN AN ORGANIZED HEALTH CARE EDUCATION/TRAINING PROGRAM

## 2018-05-31 PROCEDURE — 25000131 ZZH RX MED GY IP 250 OP 636 PS 637: Mod: GY | Performed by: STUDENT IN AN ORGANIZED HEALTH CARE EDUCATION/TRAINING PROGRAM

## 2018-05-31 PROCEDURE — 36415 COLL VENOUS BLD VENIPUNCTURE: CPT | Performed by: SURGERY

## 2018-05-31 RX ORDER — HYDROMORPHONE HYDROCHLORIDE 1 MG/ML
0.2 INJECTION, SOLUTION INTRAMUSCULAR; INTRAVENOUS; SUBCUTANEOUS EVERY 4 HOURS PRN
Status: DISCONTINUED | OUTPATIENT
Start: 2018-05-31 | End: 2018-06-02 | Stop reason: HOSPADM

## 2018-05-31 RX ORDER — DEXTROSE MONOHYDRATE 25 G/50ML
25-50 INJECTION, SOLUTION INTRAVENOUS
Status: DISCONTINUED | OUTPATIENT
Start: 2018-05-31 | End: 2018-06-02 | Stop reason: HOSPADM

## 2018-05-31 RX ORDER — DOCUSATE SODIUM 100 MG/1
100 CAPSULE, LIQUID FILLED ORAL 2 TIMES DAILY
Status: DISCONTINUED | OUTPATIENT
Start: 2018-05-31 | End: 2018-05-31

## 2018-05-31 RX ORDER — HYDRALAZINE HYDROCHLORIDE 20 MG/ML
10 INJECTION INTRAMUSCULAR; INTRAVENOUS EVERY 6 HOURS PRN
Status: DISCONTINUED | OUTPATIENT
Start: 2018-05-31 | End: 2018-05-31

## 2018-05-31 RX ORDER — POLYETHYLENE GLYCOL 3350 17 G/17G
17 POWDER, FOR SOLUTION ORAL DAILY PRN
Status: ON HOLD | COMMUNITY
End: 2018-06-02

## 2018-05-31 RX ORDER — DOCUSATE SODIUM 100 MG/1
200 CAPSULE, LIQUID FILLED ORAL 2 TIMES DAILY
Status: DISCONTINUED | OUTPATIENT
Start: 2018-05-31 | End: 2018-06-02 | Stop reason: HOSPADM

## 2018-05-31 RX ORDER — DIPHENHYDRAMINE HCL 25 MG
25-50 CAPSULE ORAL ONCE
Status: COMPLETED | OUTPATIENT
Start: 2018-05-31 | End: 2018-05-31

## 2018-05-31 RX ORDER — FUROSEMIDE 10 MG/ML
40 INJECTION INTRAMUSCULAR; INTRAVENOUS
Status: DISCONTINUED | OUTPATIENT
Start: 2018-05-31 | End: 2018-05-31

## 2018-05-31 RX ORDER — POLYETHYLENE GLYCOL 3350 17 G/17G
17 POWDER, FOR SOLUTION ORAL DAILY
Status: DISCONTINUED | OUTPATIENT
Start: 2018-05-31 | End: 2018-06-01

## 2018-05-31 RX ORDER — PROCHLORPERAZINE MALEATE 5 MG
10 TABLET ORAL EVERY 6 HOURS PRN
Status: DISCONTINUED | OUTPATIENT
Start: 2018-05-31 | End: 2018-06-02

## 2018-05-31 RX ORDER — HYDRALAZINE HYDROCHLORIDE 20 MG/ML
10 INJECTION INTRAMUSCULAR; INTRAVENOUS EVERY 4 HOURS PRN
Status: DISCONTINUED | OUTPATIENT
Start: 2018-05-31 | End: 2018-06-01

## 2018-05-31 RX ORDER — SULFAMETHOXAZOLE AND TRIMETHOPRIM 400; 80 MG/1; MG/1
1 TABLET ORAL
Status: DISCONTINUED | OUTPATIENT
Start: 2018-06-01 | End: 2018-06-01

## 2018-05-31 RX ORDER — ACETAMINOPHEN 325 MG/1
650 TABLET ORAL ONCE
Status: COMPLETED | OUTPATIENT
Start: 2018-05-31 | End: 2018-05-31

## 2018-05-31 RX ORDER — LABETALOL HYDROCHLORIDE 5 MG/ML
20 INJECTION, SOLUTION INTRAVENOUS
Status: DISCONTINUED | OUTPATIENT
Start: 2018-05-31 | End: 2018-06-02 | Stop reason: HOSPADM

## 2018-05-31 RX ORDER — NICOTINE POLACRILEX 4 MG
15-30 LOZENGE BUCCAL
Status: DISCONTINUED | OUTPATIENT
Start: 2018-05-31 | End: 2018-06-02 | Stop reason: HOSPADM

## 2018-05-31 RX ORDER — DIPHENHYDRAMINE HCL 12.5MG/5ML
25-50 LIQUID (ML) ORAL ONCE
Status: COMPLETED | OUTPATIENT
Start: 2018-05-31 | End: 2018-05-31

## 2018-05-31 RX ADMIN — POLYETHYLENE GLYCOL 3350 17 G: 17 POWDER, FOR SOLUTION ORAL at 11:39

## 2018-05-31 RX ADMIN — INSULIN ASPART 1 UNITS: 100 INJECTION, SOLUTION INTRAVENOUS; SUBCUTANEOUS at 15:40

## 2018-05-31 RX ADMIN — INSULIN ASPART 1 UNITS: 100 INJECTION, SOLUTION INTRAVENOUS; SUBCUTANEOUS at 12:01

## 2018-05-31 RX ADMIN — DOCUSATE SODIUM 100 MG: 100 CAPSULE, LIQUID FILLED ORAL at 11:39

## 2018-05-31 RX ADMIN — HYDRALAZINE HYDROCHLORIDE 10 MG: 20 INJECTION INTRAMUSCULAR; INTRAVENOUS at 15:38

## 2018-05-31 RX ADMIN — POTASSIUM CHLORIDE 20 MEQ: 400 INJECTION, SOLUTION INTRAVENOUS at 02:35

## 2018-05-31 RX ADMIN — ACETAMINOPHEN 650 MG: 325 TABLET, FILM COATED ORAL at 15:38

## 2018-05-31 RX ADMIN — ONDANSETRON 4 MG: 2 INJECTION INTRAMUSCULAR; INTRAVENOUS at 01:03

## 2018-05-31 RX ADMIN — CLONAZEPAM 1.5 MG: 0.5 TABLET ORAL at 20:06

## 2018-05-31 RX ADMIN — SODIUM CHLORIDE 1000 ML: 4.5 INJECTION, SOLUTION INTRAVENOUS at 02:08

## 2018-05-31 RX ADMIN — SODIUM CHLORIDE, SODIUM LACTATE, POTASSIUM CHLORIDE, CALCIUM CHLORIDE AND DEXTROSE MONOHYDRATE: 5; 600; 310; 30; 20 INJECTION, SOLUTION INTRAVENOUS at 14:45

## 2018-05-31 RX ADMIN — METHYLPREDNISOLONE SODIUM SUCCINATE 250 MG: 125 INJECTION, POWDER, LYOPHILIZED, FOR SOLUTION INTRAMUSCULAR; INTRAVENOUS at 11:47

## 2018-05-31 RX ADMIN — DOCUSATE SODIUM 200 MG: 100 CAPSULE, LIQUID FILLED ORAL at 21:02

## 2018-05-31 RX ADMIN — OXYCODONE HYDROCHLORIDE 5 MG: 5 TABLET ORAL at 14:33

## 2018-05-31 RX ADMIN — ATORVASTATIN CALCIUM 20 MG: 20 TABLET, FILM COATED ORAL at 08:19

## 2018-05-31 RX ADMIN — ACETAMINOPHEN 650 MG: 325 TABLET, FILM COATED ORAL at 11:39

## 2018-05-31 RX ADMIN — TACROLIMUS 8 MG: 4 TABLET, EXTENDED RELEASE ORAL at 08:18

## 2018-05-31 RX ADMIN — DIPHENHYDRAMINE HYDROCHLORIDE 50 MG: 25 CAPSULE ORAL at 11:39

## 2018-05-31 RX ADMIN — HYDROMORPHONE HYDROCHLORIDE 0.2 MG: 1 INJECTION, SOLUTION INTRAMUSCULAR; INTRAVENOUS; SUBCUTANEOUS at 21:48

## 2018-05-31 RX ADMIN — SODIUM CHLORIDE 1000 ML: 9 INJECTION, SOLUTION INTRAVENOUS at 02:58

## 2018-05-31 RX ADMIN — DOCUSATE SODIUM 100 MG: 100 CAPSULE, LIQUID FILLED ORAL at 17:56

## 2018-05-31 RX ADMIN — OMEPRAZOLE 40 MG: 20 CAPSULE, DELAYED RELEASE ORAL at 15:38

## 2018-05-31 RX ADMIN — METOPROLOL TARTRATE 25 MG: 25 TABLET ORAL at 20:06

## 2018-05-31 RX ADMIN — HYDROMORPHONE HYDROCHLORIDE 0.2 MG: 1 INJECTION, SOLUTION INTRAMUSCULAR; INTRAVENOUS; SUBCUTANEOUS at 17:54

## 2018-05-31 RX ADMIN — HYDRALAZINE HYDROCHLORIDE 10 MG: 20 INJECTION INTRAMUSCULAR; INTRAVENOUS at 20:50

## 2018-05-31 RX ADMIN — OXYCODONE HYDROCHLORIDE 5 MG: 5 TABLET ORAL at 10:43

## 2018-05-31 RX ADMIN — OMEPRAZOLE 40 MG: 20 CAPSULE, DELAYED RELEASE ORAL at 08:19

## 2018-05-31 RX ADMIN — SODIUM CHLORIDE, SODIUM LACTATE, POTASSIUM CHLORIDE, CALCIUM CHLORIDE AND DEXTROSE MONOHYDRATE: 5; 600; 310; 30; 20 INJECTION, SOLUTION INTRAVENOUS at 07:44

## 2018-05-31 RX ADMIN — MYCOPHENOLATE MOFETIL 750 MG: 250 CAPSULE ORAL at 08:18

## 2018-05-31 RX ADMIN — PROCHLORPERAZINE MALEATE 10 MG: 5 TABLET, FILM COATED ORAL at 06:24

## 2018-05-31 RX ADMIN — SENNOSIDES AND DOCUSATE SODIUM 2 TABLET: 8.6; 5 TABLET ORAL at 17:55

## 2018-05-31 RX ADMIN — METOPROLOL TARTRATE 25 MG: 25 TABLET ORAL at 08:22

## 2018-05-31 RX ADMIN — ANTI-THYMOCYTE GLOBULIN (RABBIT) 125 MG: 5 INJECTION, POWDER, LYOPHILIZED, FOR SOLUTION INTRAVENOUS at 13:00

## 2018-05-31 RX ADMIN — VALGANCICLOVIR HYDROCHLORIDE 450 MG: 450 TABLET ORAL at 08:19

## 2018-05-31 RX ADMIN — SENNOSIDES AND DOCUSATE SODIUM 2 TABLET: 8.6; 5 TABLET ORAL at 08:21

## 2018-05-31 RX ADMIN — ACETAMINOPHEN 650 MG: 325 TABLET, FILM COATED ORAL at 20:05

## 2018-05-31 RX ADMIN — ACETAMINOPHEN 650 MG: 325 TABLET, FILM COATED ORAL at 04:08

## 2018-05-31 RX ADMIN — POTASSIUM CHLORIDE 20 MEQ: 400 INJECTION, SOLUTION INTRAVENOUS at 01:11

## 2018-05-31 RX ADMIN — FUROSEMIDE 40 MG: 10 INJECTION, SOLUTION INTRAVENOUS at 08:00

## 2018-05-31 RX ADMIN — CLONAZEPAM 1.5 MG: 0.5 TABLET ORAL at 08:31

## 2018-05-31 RX ADMIN — OXYCODONE HYDROCHLORIDE 10 MG: 5 TABLET ORAL at 18:55

## 2018-05-31 RX ADMIN — MYCOPHENOLATE MOFETIL 750 MG: 250 CAPSULE ORAL at 17:56

## 2018-05-31 RX ADMIN — TRAZODONE HYDROCHLORIDE 300 MG: 150 TABLET ORAL at 21:54

## 2018-05-31 RX ADMIN — SUMATRIPTAN SUCCINATE 25 MG: 25 TABLET, FILM COATED ORAL at 22:09

## 2018-05-31 ASSESSMENT — ACTIVITIES OF DAILY LIVING (ADL)
ADLS_ACUITY_SCORE: 9
ADLS_ACUITY_SCORE: 9
ADLS_ACUITY_SCORE: 10
ADLS_ACUITY_SCORE: 9

## 2018-05-31 ASSESSMENT — PAIN DESCRIPTION - DESCRIPTORS: DESCRIPTORS: ACHING

## 2018-05-31 NOTE — PROGRESS NOTES
Transfer  Transferred to: 7A bed 12  Via: wheel chair  Reason for transfer:Pt no longer appropriate for 6B- improved patient condition  Family: Aware of transfer  Belongings: Packed and sent with pt  Chart:  Delivered with pt to next unit  Medications: Meds sent to new unit with pt  Report given to: Jane BENNETT

## 2018-05-31 NOTE — PROGRESS NOTES
4:39 PM  May 31, 2018  Final positive donor sputum culture results for yeast have been uploaded into UNOS. Donor ID [id number]. Dr Clark notified of results.  Reina Tomas RN, CCTC  On Call Organ Coordinator

## 2018-05-31 NOTE — PROGRESS NOTES
Care Coordinator Progress Note    Admission Date/Time:  5/29/2018  Attending MD:  Mario Vallejo MD    Data  Chart reviewed, discussed with interdisciplinary team.   Patient was admitted for:    Abnormal involuntary movement  Other hyperlipidemia  Kidney transplant recipient  Bipolar disorder, current episode depressed, severe, without psychotic features (H).    Concerns with insurance coverage for discharge needs: None identified  Current Living Situation: Patient lives with Merline Terry  Support System: Merline is Primary Caregiver  Services Involved: [facility] #[phone]                                Primary Care Clinic: [facility]   Dr Darrion Dubose  Transportation at Discharge: Merline  Transportation to Medical Appointments: Merline  Barriers to Discharge: None identified    Coordination of Care and Referrals: Provided patient/family with options for Home Care       Assessment  Pt s/p Kidney Transplant 5/30/18, anticipated discharge in 2-3 days. I have met with Pt and Wife, Merline to assist with discharge planning. I have informed Pt of daily ATC Clinic visits starting the morning after discharge. A list of local Hotels with shuttle service provided to Pt and Wife.  Home Care follow up discussed which Pt is agreeable to. I have offered a list of Home Care options from Medicare.gov, [facility] is preferred.  I have made a referral to [facility] #[phone], Fax #[phone] and faxed requested information.  Pt and Wife currently have no outstanding questions or concerns.     Plan  Anticipated Discharge Date:  2-3 days  Anticipated Discharge Plan:  Following completion of daily ATC Clinic visits, Skilled Nursing visits provided by [facility]    Candice Eden RN   6B care coordinator #[phone]

## 2018-05-31 NOTE — CONSULTS
Nephrology Clinic    Gilles Henning III MRN:[id number] YOB: 1970  Date of Service: 05/31/2018  Primary care provider: Charlie Dubose  Requesting physician: Genevieve    ASSESSMENT AND RECOMMENDATIONS:   1.  Graft function:  Short ischemic time. DBD donor.  Pt was 100% PRA so this is an excellent match.  Immediate UOP   - monitor Cr   - does not need HD  2.  HBP:  On lisinopril and metoprolol as OP.  -170 today   - continue metoprolol  3.  IS: Induction with thymo.  Standard IS with tac and MMF  4.  Prophylaxis:  Pt is EBV neg with EBV pos donor   - monthly EBV   - consider 6 months of valcyte  5.  Low hb.  Pre-op hb 10.7, now down to 8.4.  Drain has small amount bloody fluid. Urine bloody earlier but clear now per pt.   Pt is not dizzy   - continue to monitor hb    Anastasiya Martinez MD   [phone]    REASON FOR CONSULT: medical probelms related to new kidney transplant    HISTORY OF PRESENT ILLNESS:   Gilles Henning III is a 48 year old male who presents for DDKT.  Pt has ESRD from [facility].  Had initial transplant in 2009 (DDKT).  Pt was diagnosed with RCC in 2015 so had native nephrectomy and reduction of IS.  Developed rejection and went back on HD 2015.  Had transplant nephrectomy in 2009 for degenerative change.  HD with Dr Tobin using UE fistula.      HBP well-controlled on two meds.  No CAD events.  Normal echo 2017.       PAST MEDICAL HISTORY:  Past Medical History:   Diagnosis Date     Anemia in chronic kidney disease      Chews tobacco      Chronic rejection of kidney transplant 2015    Chronic rejection of 2009 kidney, failed 2015. Graft nephrectomy 2017.     ESRD needing dialysis (H) 2015     History of alcoholism (H)      History of depression      History of peritoneal dialysis     7 years     History of substance abuse      Hyperlipidemia      IgA nephropathy      Migraine      Osteopenia      Peritonitis (H)     non-MRSA staph     Renal cell carcinoma (H) 2015    Left native  "kidney, s/p nephrectomy     PAST SURGICAL HISTORY:  Past Surgical History:   Procedure Laterality Date     EXPLANT TRANSPLANTED KIDNEY N/A 12/15/2017    Procedure: EXPLANT TRANSPLANTED KIDNEY;  Transplanted Nephrectomy;  Surgeon: Mario Vallejo MD;  Location: [facility] OR     HC DIALYSIS AVF OR AVG, CENTRAL INTERVENTION ONLY Left      LAPAROSCOPIC INSERTION CATHETER PERITONEAL DIALYSIS Left      NEPHRECTOMY BILATERAL  2015     REMOVE CATHETER PERITONEAL       TRANSPLANT KIDNEY RECIPIENT  DONOR Left 2009     MEDICATIONS:  Med list reviewed  Home meds clonazepam, lisinopril, metoprolol      ALLERGIES:    Allergies   Allergen Reactions     Gabapentin Other (See Comments)     myoclonus     REVIEW OF SYSTEMS:  A comprehensive review of systems was performed and found to be negative except as described here or above.    SOCIAL HISTORY:   Social History     Social History     Marital status:      Spouse name: Merline     Number of children: 4     Years of education: 13     Occupational History     disabled      Social History Main Topics     Smoking status: Passive Smoke Exposure - Never Smoker     Types: Dip, chew, snus or snuff     Smokeless tobacco: Current User     Types: Chew      Comment: Wife smoked years ago.  Weaning off chew now     Alcohol use No      Comment: none now, did treatment at age 21, relapsed with divorce (a couple months)  then now  sober 3 years.      Drug use: No      Comment: Marijuana at age 14 only.     Sexual activity: Yes     Partners: Female     Other Topics Concern     Not on file     Social History Narrative    ** Merged History Encounter **          FAMILY MEDICAL HISTORY:   History reviewed. No pertinent family history.  PHYSICAL EXAM:   /88 (BP Location: Right arm)  Pulse 59  Temp 99.4  F (37.4  C) (Oral)  Resp 16  Ht 1.778 m (5' 10\")  Wt 57.4 kg (126 lb 8 oz)  SpO2 98%  BMI 18.15 kg/m2  GENERAL APPEARANCE: alert and no distress  EYES: nonicteric  HENT: mouth " without ulcers or lesions  NECK: supple, no adenopathy  RESP: lungs clear to auscultation   CV: regular rhythm, normal rate, no rub  ABDOMEN: soft, nontender, new surgical incision  Extremities: no  Edema.  Fistula in left UE  SKIN: no rash  NEURO: mentation intact and speech normal  PSYCH: affect normal/bright    LABS:   CMP  Recent Labs   Lab Test  05/31/18   0804  05/31/18   0550  05/31/18   0426  05/31/18   0002   05/30/18   1430  05/30/18   1041  05/29/18   2350  05/14/18   1025  03/27/18   1044  03/09/18   0847  12/22/17   1747  12/17/17   0643   12/16/17   1356   NA   --    --   141   --    --   135  138  138  138  140  135  131*  137   < >  135   POTASSIUM  4.2  4.2  4.6  3.4   < >  3.5  3.7  3.5  5.2*  3.6  5.0  5.2*  5.3   < >  5.8*   CHLORIDE   --    --   103   --    --   96   --   95  91*  94*  89*  88*  102   < >  96   CO2   --    --   30   --    --   28   --   35*  31  37*  32*  28  25   < >  25   ANIONGAP   --    --   8   --    --   11   --   7  16*  9  14   --   10   < >  14   GLC   --    --   126*   --    --   333*  137*  111*  80  86  93  142*  112*   < >  155*   BUN   --    --   37*   --    --   47*   --   45*  47*  16  54*  53*  44*   < >  70*   CR   --    --   3.83*   --    --   6.03*   --   6.02*  10.14*  4.01*  12.47*  8.25*  6.70*   < >  9.78*   GFRESTIMATED   --    --   17*   --    --   10*   --   10*  5*  16*  4*   --   9*   < >  6*   GFRESTBLACK   --    --   20*   --    --   12*   --   12*  7*  19*  5*  8*  11*   < >  7*   SHIARZ   --    --   8.1*   --    --   7.6*   --   9.0  9.8  9.5  9.9  9.5  9.1   < >  8.3*   MAG   --    --   2.0   --    --   2.0   --    --    --    --   2.4  2.6  2.7*   --    --    PHOS   --    --   3.1   --    --   3.3   --    --    --    --    --    --   3.1   --   3.8   PROTTOTAL   --    --    --    --    --    --    --   7.0  7.3  7.8  6.5   --    --    --    --    ALBUMIN   --    --    --    --    --    --    --   3.7  4.2  4.7  3.9   --    --    --   3.1*    BILITOTAL   --    --    --    --    --    --    --   0.5  0.6  0.6  0.5   --    --    --    --    ALKPHOS   --    --    --    --    --    --    --   102  82  68  60   --    --    --    --    AST   --    --    --    --    --    --    --   18  20  19  13   --    --    --    --    ALT   --    --    --    --    --    --    --   14  13  7  5*   --    --    --    --     < > = values in this interval not displayed.     CBC  Recent Labs   Lab Test  05/31/18   0804  05/31/18   0426  05/31/18   0002  05/30/18   2025 05/30/18   1430   05/29/18   2350  05/14/18   1025   HGB  8.4*  8.8*  9.4*  9.8*  9.2*   < >  10.6*  11.7*   WBC   --   12.2*   --    --   5.4   --   4.2  4.5   RBC   --   2.92*   --    --   3.04*   --   3.52*  3.79*   HCT   --   27.6*   --    --   28.6*   --   32.9*  35.0*   MCV   --   95   --    --   94   --   94  92   MCH   --   30.1   --    --   30.3   --   30.1  30.9   MCHC   --   31.9   --    --   32.2   --   32.2  33.4   RDW   --   16.7*   --    --   16.2*   --   16.2*  15.1*   PLT   --   163   --    --   117*   --   166  114*    < > = values in this interval not displayed.     INR  Recent Labs   Lab Test  05/29/18   2350  12/15/17   1150  06/13/17   1915  06/08/17   1315  04/05/17   0713  02/24/17   2101   INR  1.14  0.96  1.3*  1.2  1.06   --    PTT  30  25   --    --   28  28     ABG  Recent Labs   Lab Test  05/30/18   1041  06/11/15   2201   PH   --   7.47*   HCO3   --   13*   O2PER  50.0   --       URINE STUDIES  Recent Labs   Lab Test  05/30/18   0830  06/08/17   1523  11/06/16   1602  09/13/16   1300   COLOR  Straw  Yellow  Yellow  Yellow   APPEARANCE  Clear   --    --    --    URINEGLC  Negative  Negative  Negative  Negative   URINEBILI  Negative   --    --    --    URINEKETONE  Negative  Negative  Negative  Negative   SG  1.004  1.015  1.015  1.010   UBLD  Small*   --    --    --    URINEPH  5.0   --    --    --    PROTEIN  Negative   --    --    --    NITRITE  Negative  Negative  Negative   Negative   LEUKEST  Negative  Negative  Negative  Negative   RBCU  4*   --    --    --    WBCU  <1   --    --    --      No lab results found.  PTH  Recent Labs   Lab Test  09/01/15   1126  07/31/15   1244   PTHI  248.8*  542.2*     IRON STUDIES  Recent Labs   Lab Test  01/25/17   1100  12/22/16   1949  12/22/16   1503  11/14/16   1843  07/31/15   1205  12/29/14   1110  12/29/14   1101   IRON  115   --   40*  38*  92   --   94   FEB  223.72*   --    --   297.50  260.40   --   282.80   RACHEL   --   1197.5*   --    --    --   702.3*   --      IMAGING:  CXR: no infiltrates    Anastasiya Martinez MD

## 2018-05-31 NOTE — PLAN OF CARE
"Problem: Patient Care Overview  Goal: Plan of Care/Patient Progress Review  Outcome: No Change  /83  Pulse 59  Temp 99.9  F (37.7  C) (Oral)  Resp 14  Ht 1.778 m (5' 10\")  Wt 57.4 kg (126 lb 8 oz)  SpO2 100%  BMI 18.15 kg/m2    Neuro: Alert and oriented x4.   Cardiac: Sinus rhythm/First degree AV Block 70s-80s. BP elevated at beginning of shift. MDs aware. CVPs 4-6.  Respiratory: Sating 100% on room air. No capno alarms. IPI 8-10.   GI/: Lucas patent. Urine output 200-750mL/hr. Light pink/red in color.  Diet/appetite: Tolerating clear liquid diet. Zofran and compazine given x1 with relief.  Activity:  Up with assist of 1.  Pain: At acceptable level on current regimen. Dilaudid PCA 0.2mg q 10 minutes.   Skin: No new deficits noted. Abdominal incision dressing CDI.   LDA's: Right LUZ drain with dark red/bloody output. Left fistula. Right PIV saline locked. Right triple lumen IJ with maintenance fluid infusing at 125mL/hr. Replacements 1 and 2 infusing per MAR orders.  Blood sugars 120s-150s on insulin gtt algo 2. Drip stopped at 0600 d/t BG 86. K 2.9 replaced with 60mEq. Recheck 4.2.     Plan: Will continue to monitor and notify MDs accordingly.       Problem: Renal Insufficiency Comorbidity  Goal: Renal Insufficiency  Patient comorbidity will be monitored for signs and symptoms of Renal Insufficiency (Chronic) condition.  Problems will be absent, minimized or managed by discharge/transition of care.  Outcome: No Change  Urine output 200-750mL/hr.     Problem: Cardiac Disease Comorbidity  Goal: Cardiac Disease  Patient comorbidity will be monitored for signs and symptoms of Cardiac Disease.  Problems will be absent, minimized or managed by discharge/transition of care.  Outcome: No Change  Blood pressure elevated at beginning of shift. Improved following scheduled Metoprolol.    Problem: Mood Alteration Comorbidity  Goal: Mood Alteration Comorbidity  Patient comorbidity will be monitored for signs and " symptoms of Mood Alteration condition.  Problems will be absent, minimized or managed by discharge/transition of care.  Outcome: No Change  Mood stable. Scheduled Klonopin administered.

## 2018-05-31 NOTE — PHARMACY-TRANSPLANT NOTE
Adult Kidney Transplant Post Operative Note    48 year old male s/p  donor kidney transplant on 2018 for IgA nephropathy.      Planned immunosuppression regimen per kidney transplant protocol:  INDUCTION: thymoglobulin to total ~ 6mg/kg and methylprednisolone IV daily x 3 doses used as pre-med for thymoglobulin.  MAINTENANCE:  mycophenolate and tacrolimus (Envarsus XR) with goal trough levels of 8-10 mcg/L for first 6 months post-transplant.    Opportunistic pathogen prophylaxis includes: trimethoprim/sulfamethoxazole and valganciclovir.    Patient is not enrolled in medication study.    Pharmacy will monitor for medication interactions and immunosuppression levels in conjunction with the team. Medication therapy needs for discharge planning will continue to be addressed throughout the current admission via multidisciplinary rounds and order review.  Pharmacy will make recommendations as appropriate.    Bambi Cartwright, PharmD IV Student

## 2018-05-31 NOTE — PROGRESS NOTES
Time of notification: 1:36 PM  Provider notified: PA/NP paged to notify of hypertension.  Temp:  [97.4  F (36.3  C)-99.9  F (37.7  C)] 99.5  F (37.5  C)  Heart Rate:  [55-75] 57  Resp:  [8-21] 15  BP: (114-176)/(67-93) 168/91  SpO2:  [96 %-100 %] 98 %

## 2018-05-31 NOTE — PROGRESS NOTES
"CLINICAL NUTRITION SERVICES - ASSESSMENT NOTE     Nutrition Prescription    RECOMMENDATIONS FOR MDs/PROVIDERS TO ORDER:  Diet recommendations post-transplant: High protein diet x 8 weeks.    Malnutrition Status:    Unable to assess    Recommendations already ordered by Registered Dietitian (RD):  Discharge diet order written.     Future/Additional Recommendations:  Would recommend supplements once diet adv > CL. If PO intakes are poor, then start on kcal cnts.        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    Dialysis days: Tuesday, Thursday, Saturday.  Last dialysis run:     Unable to obtain diet hx. Pt was busy with team, then pharmacy, then social work, and then pt was asleep upon RD visit.     CURRENT NUTRITION ORDERS  Diet: Clear Liquid   Intake/Tolerance: tolerating with some nausea, Zofran and compazine given x1 with relief.    LABS  Labs reviewed    MEDICATIONS  mycophenolate, Medications reviewed    ANTHROPOMETRICS  Height: 177.8 cm (5' 10\")  Most Recent Weight: 57.4 kg (126 lb 8 oz)    IBW: 75.5 kg (76% IBW)  BMI: Underweight BMI <18.5  Weight History: ~2% wt loss in the past 1 month, ~5% in the past 6 months  Wt Readings from Last 10 Encounters:   18 57.4 kg (126 lb 8 oz)   18 55.3 kg (122 lb)   18 58.7 kg (129 lb 6.4 oz)   18 57.2 kg (126 lb)   17 58.7 kg (129 lb 6.4 oz)   17 61.1 kg (134 lb 12.8 oz)   17 59.9 kg (132 lb 1.6 oz)   10/13/17 63.8 kg (140 lb 9.6 oz)   17 61.2 kg (135 lb)   17 62.7 kg (138 lb 3.2 oz)     Dosing Weight: 57 kg (actual)    ASSESSED NUTRITION " NEEDS:  Estimated Energy Needs: [id number] kcals (30-35 -40Kcal/Kg)  Justification: increased needs post-op SOT (higher end for repletion, underweight)  Estimated Protein Needs:  grams protein (1.5-2 gm/Kg)  Justification: increased needs post op SOT, repletion, underwt  Estimated Fluid Needs: 1 ml/kcal/day  Justification: maintenance, or per MD pending fluid status and adequate UOP    PHYSICAL FINDINGS  See malnutrition section below.    MALNUTRITION  % Intake: Unable to assess  % Weight Loss: Weight loss does not meet criteria  Subcutaneous Fat Loss: Unable to assess  Muscle Loss: Unable to assess  Fluid Accumulation/Edema: None noted  Malnutrition Diagnosis: Unable to determine due to RD unable to visit with pt at this time (busy with cares, pharm, SW, then asleep)    NUTRITION DIAGNOSIS:  Food and nutrition-related knowledge deficit r/t length of time since previous post-transplant education AEB patient verbal report, review of chart record, and MD consult for nutrition education.     INTERVENTIONS  Implementation  Nutrition Education:  1. Unable to provide verbal instruction. Pt has received transplant education in the past 4/5/17.   2. Provided handout: Post-transplant diet guidelines. Patient receptive to information provided. Expected diet compliance is good.     Goals  1. Patient will verbalize understanding of 3 important aspects of post-transplant diet guidelines.   2. PO intake >50% meals TID.    Monitoring/Evaluation  Progress toward goals will be monitored and evaluated per protocol.      Letha Mustafa RD, MS, LD  6B- Pager: [phone]

## 2018-05-31 NOTE — PROGRESS NOTES
"../89 (BP Location: Right arm)  Pulse 59  Temp 99.2  F (37.3  C) (Oral)  Resp 16  Ht 1.778 m (5' 10\")  Wt 56.9 kg (125 lb 6.4 oz)  SpO2 100%  BMI 17.99 kg/m2  Pt transferred from 6B to unit 7A around 1415. BP elevated but pt stated he was having incisional pain and was medicated with 5 mg Oxy for discomfort which did help him rest. A&O. Very pleasant. Helped pt to order lunch. New IV bag hung D5LR @ 125 ml/hr. IV Thymo running at 50 ml/hr to run over 6 hrs. LUZ to bulb suction 5 ml dark red drainage. Lucas patent 550 ml clear yellow urine. Continue with plan of cares and medicate per orders.  "

## 2018-05-31 NOTE — PHARMACY-ADMISSION MEDICATION HISTORY
Admission medication history interview status for the 5/29/2018 admission is complete. See Epic admission navigator for allergy information, pharmacy, prior to admission medications and immunization status.     Medication history interview sources:  Patient, Insurance Refill history in EPIC chart, [facility], [facility] [place] ([phone]), Care Everywhere    Changes made to PTA medication list (reason)  Added:   -polyethylene glycol - per patient.  -salicylic acid 3%/5FU 4% in vanicream - per patient/prescription vial/[facility].  Deleted:   -albuterol - per patient, only used temporarily during a respiratory infection.  -amlodipine - per patient, discontinued by prescriber due to falls.  Changed:   -atorvastatin 40mg tab (no directions) --> atorvastatin 40mg tab: take 40mg by mouth once daily per patient/insurance history/[facility] pharmacy.  -calcium acetate 667mg caps: take 2 caps by mouth 3 times daily (with meals) --> calcium acetate 667mg caps: take 667-1334mg by mouth 3 time daily with meals. Take 667mg with snacks. Max 6 times/day per [facility].  -clonazepam 1mg tab: take 1.5mg by mouth daily as needed --> clonazepam 1mg tab: take 0.5-1mg by mouth twice daily for restless leg per [facility] pharmacy.  -docusate sodium 100mg caps : take 100mg by mouth 2 times daily --> docusate sodium 100mg caps: take 200mg by mouth 2 times daily per patient.  -metoprolol 25mg tab (LOPRESSOR): take 1 tab by mouth daily --> metoprolol succinate 25mg tab: take 25mg by mouth once howell in the morning per insurance refill history.  -sumatriptan 25mg tab (no directions) --> sumatriptan 25mg tab: take 25mg by mouth every 2 hours as needed for migraine. Max 200mg/24 hours. Max 7/week. Max 30/month.    Additional medication history information (including reliability of information, actions taken by pharmacist):  -Patient is a moderate historian who knew all medication names and could  describe how he was taking his medications. Medication strengths and directions were clarified with [facility] and compared with available insurance refill history.  -Patient brought in his [place] compounded cream (salicyclic acid 3%/5FU 4% in vanicream) which is not on formulary. Cream will be relabeled for inpatient use by pharmacy.  -Patient reports he takes clonazepam 1.5mg at bedtime as needed (but regularly) for anxiety. Per pharmacy, patient is prescribed 0.5-1mg twice daily for restless leg.  -Per patient, he had a problem with falls approximately 7-12 months ago at which time the amlodipine was discontinued and the lisinopril was halved from 40mg to 20mg.   -Per patient, he takes docusate scheduled daily and sennosides/polyethylene glycol as needed for constipation. He reports having difficulty with constipation after his last transplant and requests docusate 200mg twice daily with senna 17.6mg twice daily scheduled and polyethylene glycol.     Prior to Admission medications    Medication Sig Last Dose Taking? Auth Provider   atorvastatin (LIPITOR) 40 MG tablet Take 40 mg by mouth daily  5/29/2018 at AM Yes Reported, Patient   calcium acetate (PHOSLO) 667 MG CAPS capsule Take 667-1,334 mg by mouth 3 times daily (with meals) And 667mg with snacks. Max 6 times/day. 5/29/2018 at AM Yes Unknown, Entered By History   Cholecalciferol (VITAMIN D) 2000 UNITS tablet Take 2,000 Units by mouth daily 5/29/2018 at AM Yes Reported, Patient   clonazePAM (KLONOPIN) 1 MG tablet Take 0.5-1 mg by mouth 2 times daily as needed (restless leg)  5/28/2018 at PM Yes Reported, Patient   docusate sodium (COLACE) 100 MG capsule Take 200 mg by mouth 2 times daily  5/29/2018 at AM Yes Reported, Patient   lisinopril (PRINIVIL/ZESTRIL) 20 MG tablet Take 20 mg by mouth daily 5/28/2018 at PM Yes Reported, Patient   METOPROLOL SUCCINATE ER PO Take 25 mg by mouth every morning 5/29/2018 at AM Yes Unknown, Entered By History    NONFORMULARY Salicylic acid 3% / 5-FU 4% in vanicream : Apply a thin layer to affected area once daily 5/29/2018 at AM Yes Unknown, Entered By History   omeprazole (PRILOSEC) 40 MG capsule TAKE 1 CAPSULE BY MOUTH DAILY 5/29/2018 at AM Yes Charlie Dubose MD   polyethylene glycol (MIRALAX/GLYCOLAX) Packet Take 17 g by mouth daily as needed for constipation 5/28/2018 at Unknown time Yes Unknown, Entered By History   sennosides (SENOKOT) 8.6 MG tablet Take 1 tablet by mouth daily as needed for constipation 5/29/2018 at AM Yes Enrique Duval MD   traZODone (DESYREL) 150 MG tablet Take 300 mg by mouth At Bedtime with food 5/28/2018 at PM Yes Reported, Patient   SUMAtriptan (IMITREX) 25 MG tablet Take 25 mg by mouth every 2 hours as needed for migraine Max 200mg/24 hours. Max 7/week. Max 30/month. More than a month at Unknown time  Reported, Patient           Medication history completed by: Bambi Cartwright, PharmD IV Student

## 2018-05-31 NOTE — PROGRESS NOTES
Transplant Social Work Services Progress Note      Date of Initial Social Work Evaluation: 4/5/2017  Collaborated with: Pts wife, attempted to meet with pt.     Data: Pt underwent DD kidney transplant on 5/30/2018. Per medical team, pt and wife had questions about lodging and wanted to speak with SW. SW attempted to meet with pt but pt was with other providers. SW reached out to pts wife Merline to discuss lodging options. SW offered that our accommodations dept would be able to facilitate finding a hotel for pt which pts wife was agreeable to. SW inquired about pt and wife staying at her fathers house. She stated this is no longer an option because her younger brother just moved in with her father. She will see if they can stay at another family members home but she states she doesn't have much family in this area. Pts wife asked SW to reach out tomorrow regarding lodging since she is on her way back to [place] right now.  SW will meet with pt tomorrow to complete psychosocial assessment and 2728 form.  Intervention: Spoke with pts wife via phone.  Assessment: Did not meet with pt.  Education provided by SW: Lodging options.  Plan:    Discharge Plans in Progress: None.    Barriers to d/c plan: Medical stability.    Follow up Plan: SW will meet with pt tomorrow to completed medicare 2728 form and psychosocial assessment, will also follow up with pts wife regarding lodging options.    ETTA Bee, SW  7A   Ph: [phone], Pager: [phone]

## 2018-05-31 NOTE — PLAN OF CARE
Problem: Patient Care Overview  Goal: Plan of Care/Patient Progress Review  Outcome: Improving  Neuro: A&Ox4.   Cardiac: SR. Temp and BP elevated, see flow sheet.   Respiratory: Sating 95%+ on RA.  GI/: Adequate urine output via plascencia.  Diet/appetite: Tolerating Regular diet. Eating well.  Activity:  Assist of 1, up to chair and in halls to assist with IV.  Pain: At acceptable level on current regimen, see MAR.  Skin: No new deficits noted. UTV incision, dressing is CDI.  LDA's: 1 LUZ, 1 IJ central line, Plascencia. 125ml/hr D5LR currently running. Thymoglobulin running 300ml/6hours.     PCA discontinued on this shift.    Plan: Continue with POC. Notify primary team with changes.      Problem: Renal Insufficiency Comorbidity  Goal: Renal Insufficiency  Patient comorbidity will be monitored for signs and symptoms of Renal Insufficiency (Chronic) condition.  Problems will be absent, minimized or managed by discharge/transition of care.   Urine output was closely monitored on this shift.

## 2018-06-01 ENCOUNTER — TELEPHONE (OUTPATIENT)
Dept: PHARMACY | Facility: CLINIC | Age: 48
End: 2018-06-01

## 2018-06-01 LAB
ANION GAP SERPL CALCULATED.3IONS-SCNC: 8 MMOL/L (ref 3–14)
BASOPHILS # BLD AUTO: 0 10E9/L (ref 0–0.2)
BASOPHILS NFR BLD AUTO: 0 %
BUN SERPL-MCNC: 25 MG/DL (ref 7–30)
CALCIUM SERPL-MCNC: 8.8 MG/DL (ref 8.5–10.1)
CHLORIDE SERPL-SCNC: 107 MMOL/L (ref 94–109)
CO2 SERPL-SCNC: 25 MMOL/L (ref 20–32)
CREAT SERPL-MCNC: 1.74 MG/DL (ref 0.66–1.25)
CROSSMATCH RESULT: NORMAL
CROSSMATCH RESULT: NORMAL
DIFFERENTIAL METHOD BLD: ABNORMAL
EOSINOPHIL # BLD AUTO: 0 10E9/L (ref 0–0.7)
EOSINOPHIL NFR BLD AUTO: 0 %
ERYTHROCYTE [DISTWIDTH] IN BLOOD BY AUTOMATED COUNT: 17.1 % (ref 10–15)
GFR SERPL CREATININE-BSD FRML MDRD: 42 ML/MIN/1.7M2
GLUCOSE BLDC GLUCOMTR-MCNC: 147 MG/DL (ref 70–99)
GLUCOSE BLDC GLUCOMTR-MCNC: 148 MG/DL (ref 70–99)
GLUCOSE BLDC GLUCOMTR-MCNC: 153 MG/DL (ref 70–99)
GLUCOSE BLDC GLUCOMTR-MCNC: 156 MG/DL (ref 70–99)
GLUCOSE BLDC GLUCOMTR-MCNC: 185 MG/DL (ref 70–99)
GLUCOSE BLDC GLUCOMTR-MCNC: 194 MG/DL (ref 70–99)
GLUCOSE SERPL-MCNC: 136 MG/DL (ref 70–99)
HCT VFR BLD AUTO: 26.7 % (ref 40–53)
HGB BLD-MCNC: 8.4 G/DL (ref 13.3–17.7)
IMM GRANULOCYTES # BLD: 0 10E9/L (ref 0–0.4)
IMM GRANULOCYTES NFR BLD: 0.1 %
LYMPHOCYTES # BLD AUTO: 0.3 10E9/L (ref 0.8–5.3)
LYMPHOCYTES NFR BLD AUTO: 3.7 %
MAGNESIUM SERPL-MCNC: 2.1 MG/DL (ref 1.6–2.3)
MCH RBC QN AUTO: 30.2 PG (ref 26.5–33)
MCHC RBC AUTO-ENTMCNC: 31.5 G/DL (ref 31.5–36.5)
MCV RBC AUTO: 96 FL (ref 78–100)
MONOCYTES # BLD AUTO: 0.5 10E9/L (ref 0–1.3)
MONOCYTES NFR BLD AUTO: 4.9 %
NEUTROPHILS # BLD AUTO: 8.4 10E9/L (ref 1.6–8.3)
NEUTROPHILS NFR BLD AUTO: 91.3 %
NRBC # BLD AUTO: 0 10*3/UL
NRBC BLD AUTO-RTO: 0 /100
PHOSPHATE SERPL-MCNC: 2.7 MG/DL (ref 2.5–4.5)
PLATELET # BLD AUTO: 161 10E9/L (ref 150–450)
POTASSIUM SERPL-SCNC: 3.8 MMOL/L (ref 3.4–5.3)
RBC # BLD AUTO: 2.78 10E12/L (ref 4.4–5.9)
SODIUM SERPL-SCNC: 140 MMOL/L (ref 133–144)
TACROLIMUS BLD-MCNC: 6.5 UG/L (ref 5–15)
TME LAST DOSE: NORMAL H
WBC # BLD AUTO: 9.2 10E9/L (ref 4–11)

## 2018-06-01 PROCEDURE — A9270 NON-COVERED ITEM OR SERVICE: HCPCS | Mod: GY | Performed by: STUDENT IN AN ORGANIZED HEALTH CARE EDUCATION/TRAINING PROGRAM

## 2018-06-01 PROCEDURE — A9270 NON-COVERED ITEM OR SERVICE: HCPCS | Mod: GY | Performed by: NURSE PRACTITIONER

## 2018-06-01 PROCEDURE — 84100 ASSAY OF PHOSPHORUS: CPT | Performed by: NURSE PRACTITIONER

## 2018-06-01 PROCEDURE — 25000132 ZZH RX MED GY IP 250 OP 250 PS 637: Mod: GY | Performed by: NURSE PRACTITIONER

## 2018-06-01 PROCEDURE — 25000132 ZZH RX MED GY IP 250 OP 250 PS 637: Mod: GY | Performed by: STUDENT IN AN ORGANIZED HEALTH CARE EDUCATION/TRAINING PROGRAM

## 2018-06-01 PROCEDURE — 25000128 H RX IP 250 OP 636: Performed by: NURSE PRACTITIONER

## 2018-06-01 PROCEDURE — 12000025 ZZH R&B TRANSPLANT INTERMEDIATE

## 2018-06-01 PROCEDURE — 25000128 H RX IP 250 OP 636: Performed by: STUDENT IN AN ORGANIZED HEALTH CARE EDUCATION/TRAINING PROGRAM

## 2018-06-01 PROCEDURE — 80048 BASIC METABOLIC PNL TOTAL CA: CPT | Performed by: NURSE PRACTITIONER

## 2018-06-01 PROCEDURE — 25000132 ZZH RX MED GY IP 250 OP 250 PS 637: Mod: GY | Performed by: SURGERY

## 2018-06-01 PROCEDURE — 25000131 ZZH RX MED GY IP 250 OP 636 PS 637: Mod: GY | Performed by: STUDENT IN AN ORGANIZED HEALTH CARE EDUCATION/TRAINING PROGRAM

## 2018-06-01 PROCEDURE — 85025 COMPLETE CBC W/AUTO DIFF WBC: CPT | Performed by: NURSE PRACTITIONER

## 2018-06-01 PROCEDURE — 83735 ASSAY OF MAGNESIUM: CPT | Performed by: NURSE PRACTITIONER

## 2018-06-01 PROCEDURE — 00000146 ZZHCL STATISTIC GLUCOSE BY METER IP

## 2018-06-01 PROCEDURE — A9270 NON-COVERED ITEM OR SERVICE: HCPCS | Mod: GY | Performed by: SURGERY

## 2018-06-01 PROCEDURE — 80197 ASSAY OF TACROLIMUS: CPT | Performed by: NURSE PRACTITIONER

## 2018-06-01 PROCEDURE — 25000125 ZZHC RX 250: Performed by: STUDENT IN AN ORGANIZED HEALTH CARE EDUCATION/TRAINING PROGRAM

## 2018-06-01 RX ORDER — BISACODYL 10 MG
10 SUPPOSITORY, RECTAL RECTAL DAILY PRN
Status: DISCONTINUED | OUTPATIENT
Start: 2018-06-01 | End: 2018-06-02 | Stop reason: HOSPADM

## 2018-06-01 RX ORDER — POLYETHYLENE GLYCOL 3350 17 G/17G
17 POWDER, FOR SOLUTION ORAL DAILY
Status: DISCONTINUED | OUTPATIENT
Start: 2018-06-01 | End: 2018-06-01 | Stop reason: CLARIF

## 2018-06-01 RX ORDER — OXYBUTYNIN CHLORIDE 5 MG/1
5 TABLET ORAL 3 TIMES DAILY
Status: DISCONTINUED | OUTPATIENT
Start: 2018-06-01 | End: 2018-06-02 | Stop reason: HOSPADM

## 2018-06-01 RX ORDER — METOPROLOL TARTRATE 50 MG
50 TABLET ORAL 2 TIMES DAILY
Status: DISCONTINUED | OUTPATIENT
Start: 2018-06-01 | End: 2018-06-02 | Stop reason: HOSPADM

## 2018-06-01 RX ORDER — HYDRALAZINE HYDROCHLORIDE 20 MG/ML
10 INJECTION INTRAMUSCULAR; INTRAVENOUS
Status: DISCONTINUED | OUTPATIENT
Start: 2018-06-01 | End: 2018-06-02 | Stop reason: HOSPADM

## 2018-06-01 RX ORDER — SULFAMETHOXAZOLE AND TRIMETHOPRIM 400; 80 MG/1; MG/1
1 TABLET ORAL DAILY
Status: DISCONTINUED | OUTPATIENT
Start: 2018-06-01 | End: 2018-06-02 | Stop reason: HOSPADM

## 2018-06-01 RX ORDER — CARVEDILOL 6.25 MG/1
6.25 TABLET ORAL 2 TIMES DAILY WITH MEALS
Status: DISCONTINUED | OUTPATIENT
Start: 2018-06-01 | End: 2018-06-01

## 2018-06-01 RX ORDER — POLYETHYLENE GLYCOL 3350 17 G/17G
17 POWDER, FOR SOLUTION ORAL 2 TIMES DAILY
Status: DISCONTINUED | OUTPATIENT
Start: 2018-06-01 | End: 2018-06-02 | Stop reason: HOSPADM

## 2018-06-01 RX ORDER — DIPHENHYDRAMINE HCL 25 MG
25-50 CAPSULE ORAL ONCE
Status: COMPLETED | OUTPATIENT
Start: 2018-06-01 | End: 2018-06-01

## 2018-06-01 RX ORDER — VALGANCICLOVIR 450 MG/1
450 TABLET, FILM COATED ORAL DAILY
Status: DISCONTINUED | OUTPATIENT
Start: 2018-06-01 | End: 2018-06-02 | Stop reason: HOSPADM

## 2018-06-01 RX ORDER — HYDRALAZINE HYDROCHLORIDE 20 MG/ML
10 INJECTION INTRAMUSCULAR; INTRAVENOUS ONCE
Status: COMPLETED | OUTPATIENT
Start: 2018-06-01 | End: 2018-06-01

## 2018-06-01 RX ORDER — DIPHENHYDRAMINE HCL 12.5MG/5ML
25-50 LIQUID (ML) ORAL ONCE
Status: COMPLETED | OUTPATIENT
Start: 2018-06-01 | End: 2018-06-01

## 2018-06-01 RX ORDER — ACETAMINOPHEN 325 MG/1
650 TABLET ORAL ONCE
Status: COMPLETED | OUTPATIENT
Start: 2018-06-01 | End: 2018-06-01

## 2018-06-01 RX ORDER — METHYLPREDNISOLONE SODIUM SUCCINATE 125 MG/2ML
100 INJECTION, POWDER, LYOPHILIZED, FOR SOLUTION INTRAMUSCULAR; INTRAVENOUS ONCE
Status: COMPLETED | OUTPATIENT
Start: 2018-06-01 | End: 2018-06-01

## 2018-06-01 RX ORDER — VALGANCICLOVIR 450 MG/1
450 TABLET, FILM COATED ORAL EVERY OTHER DAY
Status: DISCONTINUED | OUTPATIENT
Start: 2018-06-02 | End: 2018-06-01

## 2018-06-01 RX ORDER — AMLODIPINE BESYLATE 5 MG/1
5 TABLET ORAL DAILY
Status: DISCONTINUED | OUTPATIENT
Start: 2018-06-01 | End: 2018-06-02 | Stop reason: HOSPADM

## 2018-06-01 RX ADMIN — ACETAMINOPHEN 650 MG: 325 TABLET, FILM COATED ORAL at 16:10

## 2018-06-01 RX ADMIN — SENNOSIDES AND DOCUSATE SODIUM 2 TABLET: 8.6; 5 TABLET ORAL at 09:35

## 2018-06-01 RX ADMIN — OXYCODONE HYDROCHLORIDE 10 MG: 5 TABLET ORAL at 14:13

## 2018-06-01 RX ADMIN — POLYETHYLENE GLYCOL 3350 17 G: 17 POWDER, FOR SOLUTION ORAL at 09:34

## 2018-06-01 RX ADMIN — METOPROLOL TARTRATE 50 MG: 50 TABLET ORAL at 20:46

## 2018-06-01 RX ADMIN — TACROLIMUS 5.75 MG: 0.75 TABLET, EXTENDED RELEASE ORAL at 09:36

## 2018-06-01 RX ADMIN — MYCOPHENOLATE MOFETIL 750 MG: 250 CAPSULE ORAL at 17:49

## 2018-06-01 RX ADMIN — HYDRALAZINE HYDROCHLORIDE 10 MG: 20 INJECTION INTRAMUSCULAR; INTRAVENOUS at 18:38

## 2018-06-01 RX ADMIN — CARVEDILOL 6.25 MG: 6.25 TABLET, FILM COATED ORAL at 06:05

## 2018-06-01 RX ADMIN — OMEPRAZOLE 40 MG: 20 CAPSULE, DELAYED RELEASE ORAL at 08:53

## 2018-06-01 RX ADMIN — Medication 20 MG: at 04:43

## 2018-06-01 RX ADMIN — METHYLPREDNISOLONE 100 MG: 125 INJECTION, POWDER, LYOPHILIZED, FOR SOLUTION INTRAMUSCULAR; INTRAVENOUS at 13:24

## 2018-06-01 RX ADMIN — HYDROMORPHONE HYDROCHLORIDE 0.2 MG: 1 INJECTION, SOLUTION INTRAMUSCULAR; INTRAVENOUS; SUBCUTANEOUS at 17:49

## 2018-06-01 RX ADMIN — DIPHENHYDRAMINE HYDROCHLORIDE 50 MG: 25 CAPSULE ORAL at 13:22

## 2018-06-01 RX ADMIN — DOCUSATE SODIUM 200 MG: 100 CAPSULE, LIQUID FILLED ORAL at 20:46

## 2018-06-01 RX ADMIN — INSULIN ASPART 1 UNITS: 100 INJECTION, SOLUTION INTRAVENOUS; SUBCUTANEOUS at 08:54

## 2018-06-01 RX ADMIN — HYDRALAZINE HYDROCHLORIDE 10 MG: 20 INJECTION INTRAMUSCULAR; INTRAVENOUS at 16:11

## 2018-06-01 RX ADMIN — SODIUM CHLORIDE, SODIUM LACTATE, POTASSIUM CHLORIDE, CALCIUM CHLORIDE AND DEXTROSE MONOHYDRATE: 5; 600; 310; 30; 20 INJECTION, SOLUTION INTRAVENOUS at 02:28

## 2018-06-01 RX ADMIN — ACETAMINOPHEN 650 MG: 325 TABLET, FILM COATED ORAL at 13:22

## 2018-06-01 RX ADMIN — MYCOPHENOLATE MOFETIL 750 MG: 250 CAPSULE ORAL at 08:52

## 2018-06-01 RX ADMIN — POLYETHYLENE GLYCOL 3350 17 G: 17 POWDER, FOR SOLUTION ORAL at 20:46

## 2018-06-01 RX ADMIN — MAGNESIUM HYDROXIDE 30 ML: 400 SUSPENSION ORAL at 13:23

## 2018-06-01 RX ADMIN — ACETAMINOPHEN 650 MG: 325 TABLET, FILM COATED ORAL at 09:42

## 2018-06-01 RX ADMIN — PROCHLORPERAZINE MALEATE 10 MG: 5 TABLET, FILM COATED ORAL at 16:10

## 2018-06-01 RX ADMIN — ACETAMINOPHEN 650 MG: 325 TABLET, FILM COATED ORAL at 04:43

## 2018-06-01 RX ADMIN — AMLODIPINE BESYLATE 5 MG: 5 TABLET ORAL at 06:06

## 2018-06-01 RX ADMIN — METOPROLOL TARTRATE 25 MG: 25 TABLET ORAL at 06:06

## 2018-06-01 RX ADMIN — INSULIN ASPART 2 UNITS: 100 INJECTION, SOLUTION INTRAVENOUS; SUBCUTANEOUS at 18:36

## 2018-06-01 RX ADMIN — CLONAZEPAM 1.5 MG: 0.5 TABLET ORAL at 18:33

## 2018-06-01 RX ADMIN — OXYBUTYNIN CHLORIDE 5 MG: 5 TABLET ORAL at 20:46

## 2018-06-01 RX ADMIN — TRAZODONE HYDROCHLORIDE 300 MG: 150 TABLET ORAL at 23:01

## 2018-06-01 RX ADMIN — ACETAMINOPHEN 650 MG: 325 TABLET, FILM COATED ORAL at 20:46

## 2018-06-01 RX ADMIN — ONDANSETRON 4 MG: 4 TABLET, ORALLY DISINTEGRATING ORAL at 14:20

## 2018-06-01 RX ADMIN — VALGANCICLOVIR 450 MG: 450 TABLET, FILM COATED ORAL at 09:38

## 2018-06-01 RX ADMIN — ATORVASTATIN CALCIUM 20 MG: 20 TABLET, FILM COATED ORAL at 08:53

## 2018-06-01 RX ADMIN — Medication 20 MG: at 16:35

## 2018-06-01 RX ADMIN — OXYCODONE HYDROCHLORIDE 10 MG: 5 TABLET ORAL at 00:34

## 2018-06-01 RX ADMIN — ANTI-THYMOCYTE GLOBULIN (RABBIT) 100 MG: 5 INJECTION, POWDER, LYOPHILIZED, FOR SOLUTION INTRAVENOUS at 14:15

## 2018-06-01 RX ADMIN — MAGNESIUM HYDROXIDE 30 ML: 400 SUSPENSION ORAL at 17:56

## 2018-06-01 RX ADMIN — SENNOSIDES AND DOCUSATE SODIUM 2 TABLET: 8.6; 5 TABLET ORAL at 20:46

## 2018-06-01 RX ADMIN — HYDRALAZINE HYDROCHLORIDE 10 MG: 20 INJECTION INTRAMUSCULAR; INTRAVENOUS at 09:39

## 2018-06-01 RX ADMIN — ACETAMINOPHEN 650 MG: 325 TABLET, FILM COATED ORAL at 00:35

## 2018-06-01 RX ADMIN — MAGNESIUM OXIDE TAB 400 MG (241.3 MG ELEMENTAL MG) 400 MG: 400 (241.3 MG) TAB at 13:22

## 2018-06-01 RX ADMIN — BISACODYL 10 MG: 10 SUPPOSITORY RECTAL at 16:40

## 2018-06-01 RX ADMIN — DOCUSATE SODIUM 200 MG: 100 CAPSULE, LIQUID FILLED ORAL at 08:53

## 2018-06-01 RX ADMIN — OMEPRAZOLE 40 MG: 20 CAPSULE, DELAYED RELEASE ORAL at 16:10

## 2018-06-01 RX ADMIN — SULFAMETHOXAZOLE AND TRIMETHOPRIM 1 TABLET: 400; 80 TABLET ORAL at 09:38

## 2018-06-01 RX ADMIN — INSULIN ASPART 1 UNITS: 100 INJECTION, SOLUTION INTRAVENOUS; SUBCUTANEOUS at 13:24

## 2018-06-01 ASSESSMENT — PAIN DESCRIPTION - DESCRIPTORS: DESCRIPTORS: SPASM

## 2018-06-01 NOTE — PROGRESS NOTES
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 DDKT - patient has good allograft function with large uop at 8.5 L yesterday.  He does not require dialysis.  Recommend holding further diuretics  -- Recommend avoding imitrex for migraine treatment related to hemodynamic effects    2. Immunosuppression - patient is receiving standard induction with ATG and of envarsus / mmf.    3. Hypertension - blood pressure elevated overnight, possibly related to migraine.  He has well controlled blood pressures currently after receiving prn medications in addition to metoprolol and addition of amlodipine.  -- Continue metorpolol and amlodipine and prn hydralazine  -- If bp remains elevated would increase the amlodipine again to 10 mg given his underlying first degree av block    4. Anemia - will send iron studies but he has a stable hemoglobin likely related to perioperative blood losses.    5. Secondary renal hyperparathyroidism - will check a pth and vitamin d level with am labs.  His phosphorus is falling and I suspect a high pth.    6. Viral prophylaxis - he is EBV discordant and will be on valcyte ppx for 3 months and have monthly ebv checks.    7. PCP prophylaxis - on bactrim ss daily , dosed appropriately for improving gfr    Recommendations were communicated to primary team via note    Seen and discussed with Dr. Martinez    Viral Michael Cabrera MD   [phone]    Interval History :   In the last 24 hours Gilles Henning III noted hypertension overnight and a migraine.  He received a dose of imitrex.  bp improved with prn medications .  He notes a good appetite but hasn[t received his food he ordered.  Cr is good with uop of 8.5 L.    Review of Systems:   I reviewed the following systems:  GI:  "good appetite. no nausea or vomiting or diarrhea.   Neuro:  no confusion  Constitutional:  no fever or chills  CV: no dyspnea or edema.  no chest pain.    Physical Exam:   I/O last 3 completed shifts:  In: 3254.58 [P.O.:1040; I.V.:2214.58]  Out: 4975 [Urine:4850; Drains:125]   BP (!) 178/93 (BP Location: Right arm)  Pulse 59  Temp 99.3  F (37.4  C) (Oral)  Resp 20  Ht 1.778 m (5' 10\")  Wt 55.8 kg (123 lb 1.6 oz)  SpO2 100%  BMI 17.66 kg/m2     GENERAL APPEARANCE: nad  EYES:  no scleral icterus, pupils equal  HENT: mouth without ulcers or lesions  PULM: lungs clear to auscultation,  bilaterally, equal air movement, no clubbing  CV: regular rhythm, normal rate, no rub     -JVD none     -edema non   GI: soft, nontender, nondistended, bowel sounds are present  INTEGUMENT: no cyanosis, no rash  Access     Labs:   All labs reviewed by me  Electrolytes/Renal -   Recent Labs   Lab Test  06/01/18 0750 05/31/18 1627 05/31/18 1240 05/31/18   0426   NA  140  136   --    --   141   POTASSIUM  3.8  4.1  4.2   < >  4.6   CHLORIDE  107  100   --    --   103   CO2  25  25   --    --   30   BUN  25  28   --    --   37*   CR  1.74*  2.55*   --    --   3.83*   GLC  136*  186*   --    --   126*   SHIRAZ  8.8  9.0   --    --   8.1*   MAG  2.1  2.0   --    --   2.0   PHOS  2.7  3.6   --    --   3.1    < > = values in this interval not displayed.       CBC -   Recent Labs   Lab Test  06/01/18 0750 05/31/18 1627 05/31/18   1240   05/31/18   0426   05/30/18   1430   WBC  9.2   --    --    --   12.2*   --   5.4   HGB  8.4*  9.7*  9.1*   < >  8.8*   < >  9.2*   PLT  161   --    --    --   163   --   117*    < > = values in this interval not displayed.       LFTs -   Recent Labs   Lab Test  05/29/18   2350  05/14/18   1025  03/27/18   1044   ALKPHOS  102  82  68   BILITOTAL  0.5  0.6  0.6   ALT  14  13  7   AST  18  20  19   PROTTOTAL  7.0  7.3  7.8   ALBUMIN  3.7  4.2  4.7       Iron Panel -   Recent Labs   Lab Test  " 01/25/17   1100  12/22/16   1949  12/22/16   1503  11/14/16   1843   IRON  115   --   40*  38*   RACHEL   --   1197.5*   --    --          Imaging:  All imaging studies reviewed by me.     Current Medications:    3% salicylic acid, 4% 5FU in vanicream (compounded topical agent)  1 Application Apply externally Daily     acetaminophen  650 mg Oral Q4H     amLODIPine  5 mg Oral Daily     anti-thymocyte globulin  100 mg Intravenous Central line Once     atorvastatin  20 mg Oral Daily     docusate sodium  200 mg Oral BID     insulin aspart  1-7 Units Subcutaneous TID AC     insulin aspart  1-5 Units Subcutaneous At Bedtime     magnesium hydroxide  30 mL Oral Daily     magnesium oxide  400 mg Oral Daily with lunch     metoprolol tartrate  50 mg Oral BID     mycophenolate  750 mg Oral BID IS     omeprazole  40 mg Oral BID AC     polyethylene glycol  17 g Oral BID     senna-docusate  2 tablet Oral BID    Or     senna-docusate  2 tablet Oral BID     sodium chloride (PF)  10 mL Intracatheter Q8H     sulfamethoxazole-trimethoprim  1 tablet Oral Daily     tacrolimus  0.1 mg/kg Oral QAM AC     traZODone  300 mg Oral At Bedtime     valGANciclovir  450 mg Oral Daily       Viral Michael Cabrera MD    I have seen and examined this patient with the resident.  This note reflects our joint assessment and plan.     Anastasiya Martinez MD

## 2018-06-01 NOTE — PLAN OF CARE
Problem: Patient Care Overview  Goal: Individualization & Mutuality  Outcome: Improving  Blood pressure elevated in the am, Hydralazine 10 mg given, and BG recheck decreased.  See Vitals flowsheet.   Afebrile. AOVSS.   Blood glucose monitored and corrected as charted.   Pt denied pain in the am. Oxycodone 10 mg given at 1400.  Zofran ODT given per pt c/o nausea. Pt denies that Zofran was effective at 4 pm with change of shift.    He took all of his laxatives and did not report a bowel movement.    Good use of IS.  Pt walked X 2 this shift.   Lucas had adequate pink tinged output.  Pt has a ureteral stent.  D5LR still at 75 ml/ hr.   Thymo #3 is running without incident.   Ipad was given to pt by NST.  Pharmacy visited with pt as well.   Continue to monitor.

## 2018-06-01 NOTE — DISCHARGE SUMMARY
[facility], [place]    Discharge Summary  Solid Organ Transplant    Date of Admission:  2018  Date of Discharge:  2018  3:00 PM  Discharging Provider: Artie Matthews    Discharge Diagnoses   Active Problems:    Hypertension    Kidney transplant candidate    Long QT interval    Kidney transplanted      History of Present Illness   Gilles Henning III is a 48 year old male with a past medical history significant for end stage kidney disease secondary to IgA nephropathy status post kidney transplant in  that failed in , f renal cell carcinoma s/p bilateral nephrectomy 3/25/2015 and transplant nephrectomy 12/15/2017, HTN, anemia, depression, substance abuse and Osteopenia, who presents for a pre-operative work-up for possible kidney transplant after he was notified as an acceptable  donor organ became available.      The patient is on dialysis.  Modality: HD, via AV fistula (left).    Dialysis days: Tuesday, Thursday, Saturday.  Last dialysis run:   is not make any urine prior to transplantation.    H/o blood transfusion: multiple transfusion, last transfuse 4-5 years ago   No Chronic anticoagulation, etc  Other past medical history includes :     At the time of admission, the patient reports he is doing well, in his baseline health, no recent illness, no shortness of breath, no chest pain, tolerating diet, stooling appropriately without issues.    Hospital Course   Gilles Henning III was admitted on 2018.  The following problems were addressed during his hospitalization:    Kidney transplant: s/p DDKT 18: Graft function good. SCr continued to downtrend and was 1.32 at discharge. UOP adequate and was averaging 6400cc/day.       Immunosuppression:  Induction immunosuppression with thymoglobulin 350mg (6.17mg/kg) and steroid taper. Maintenance immunosuppression with envarsus and mycophenolate.  FK goal level 8-10, last level 6.5.  Infectious  prophylaxis with valganciclovir (x12 weeks) and bactrim (indefinitely). Envarsus dose of 5.75mg on POD 2/3, sent with 5mg for POD4 and recheck day.    Transplant coordinator  [phone]  DSA at time of transplant:  no  Ureteral stent: yes  CMV:  Donor -/ Recipient +  EBV:  Donor + / Recipient -  Thymoglobulin:  6.17mg/kg      HTN:  On Metoprolol XL 25mg daily PTA. Blood pressure became uncontrolled on POD #1 requiring adjustments to home regimen. He will be discharged on lopressor 50mg BID and norvasc 5mg qD with blood pressures in the 130-140 range. Pre-op EKG shows a first degree AV block with a KS interval of 220 ms.     # Discharge Pain Plan:  - During his hospitalization, Gilles experienced pain due to acute post op pain.  The pain plan for discharge was discussed with Gilles and the plan was created in a collaborative fashion.    - Opioids prescribed on discharge: oxycodone 5-10mg every 4 hours as needed for severe pain  - Duration of opioids after discharge: 12 tabs  - Bowel regimen: senna, miralax and millk of neisha Ybarra    Significant Results and Procedures    Procedure date:  18    Preoperative diagnosis:  End Stage renal failure due to IgA Nephropathy    Postoperative diagnosis:  Same,     Procedure:  1. Left kidney  Re- transplant,   - Brain Death, Right  iliac fossa, without vascular reconstruction. A J-J ureteral stent was placed.  2. Kidney allograft preparation on Back Table    Surgeon:  AGUSTIN TORO    Fellow/Assistant:  Anthony Clark, fellow, Tony Tracy fellow, Artie Matthews, resident          Pending Results   These results will be followed up by ATC.  Unresulted Labs Ordered in the Past 30 Days of this Admission     Date and Time Order Name Status Description    2018 0830 Fluid Culture Aerobic Bacterial Preliminary           Code Status   Full Code    Primary Care Physician   Charlie Dubose    Physical Exam    Temp: 98.1  F (36.7  C) Temp  src: Oral BP: (!) 144/91 Pulse: 74 Heart Rate: 76 Resp: 18 SpO2: 100 % O2 Device: None (Room air)    Vitals:    05/31/18 1422 06/01/18 0915 06/02/18 0954   Weight: 56.9 kg (125 lb 6.4 oz) 55.8 kg (123 lb 1.6 oz) 53.6 kg (118 lb 1.6 oz)     Vital Signs with Ranges  Temp:  [98.1  F (36.7  C)-99.3  F (37.4  C)] 98.1  F (36.7  C)  Pulse:  [70-83] 74  Heart Rate:  [74-96] 76  Resp:  [18-20] 18  BP: (139-178)/() 144/91  SpO2:  [100 %] 100 %  I/O last 3 completed shifts:  In: -   Out: 4660 [Urine:4575; Drains:85]    See daily progress note on day of discharge for details of PE.    Time Spent on this Encounter   I, Artie Matthews, personally saw the patient today and spent greater than 30 minutes discharging this patient.    Discharge Disposition   Discharged to home  Condition at discharge: Good    Consultations This Hospital Stay   NEPHROLOGY KIDNEY/PANCREAS TRANSPLANT ADULT IP CONSULT  VASCULAR ACCESS CARE ADULT IP CONSULT  SOCIAL WORK IP CONSULT  PHARMACY IP CONSULT  NUTRITION SERVICES ADULT IP CONSULT  NEPHROLOGY KIDNEY/PANCREAS TRANSPLANT ADULT IP CONSULT  MEDICATION HISTORY IP PHARMACY CONSULT    Discharge Orders     Basic metabolic panel     Magnesium     Phosphorus     Tacrolimus level     CBC with platelets   Last Lab Result: Hemoglobin (g/dL)      Date                     Value                06/02/2018               9.1 (L)          ----------     Home care nursing referral     MD face to face encounter   Documentation of Face to Face and Certification for Home Health Services    I certify that patient: Gilles Henning III is under my care and that I, or a nurse practitioner or physician's assistant working with me, had a face-to-face encounter that meets the physician face-to-face encounter requirements with this patient on: 6/2/18    This encounter with the patient was in whole, or in part, for the following medical condition, which is the primary reason for home health care: Kidney Transplant    I certify  that, based on my findings, the following services are medically necessary home health services: Skilled nursing visits      Further, I certify that my clinical findings support that this patient is homebound, absences from home require considerable and taxing effort and are for medical reasons or Alevism services or infrequently or of short duration when for other reasons.    Based on the above findings. I certify that this patient is confined to the home and needs intermittent skilled nursing care, physical therapy and/or speech therapy.  The patient is under my care, and I have initiated the establishment of the plan of care.  This patient will be followed by a physician who will periodically review the plan of care.  Physician/Provider to provide follow up care: Charlie Dubose    Attending hospital physician   Physician Signature: See electronic signature associated with these discharge orders.  Date: 6/2/2018     Reason for your hospital stay   You received a kidney transplant     [facility] Follow-up and recommended labs and tests   Over the next 3-5 days you will be seen in [facility] at 7 am (ph. [phone], option 7) .  Your labs will be drawn at the beginning of your appointment at 7:00 am.  DO NOT take your medications prior to having labs drawn. Please bring all your medications with you from home to take after labs are drawn.  LABS:  CBC, BMP, Mg, Phos and Tacrolimus levels (12 hours after administration) to be drawn daily while in ATC, then every Monday, Wednesday, Friday by home health care nurse if arranged, or at an outpatient lab.     FOLLOW UP APPOINTMENTS:  Remember to always bring an updated medication list to all appointments.     An appointment with your transplant surgeon will be scheduled for approximately 2 weeks following discharge from the hospital.  Your transplant surgeon is: Dr. Vallejo.  You will follow up with transplant nephrology in clinic at 1 and 3  months and then annually.   Follow up with primary care provider in 4-8 weeks. (Pt to schedule)  You have a ureteral stent in place which needs to be removed in 4-6 weeks.  You will be scheduled for stent removal at the [facility] outpatient surgery.  If a  does not contact you for this, please contact your transplant coordinator.    Call scheduling at [phone] if you have not heard about your appointments within 48 hours after discharge.  If you have staples in place, they will be removed in 3 weeks after operation.  WHEN TO CONTACT YOUR  COORDINATOR:  Transplant Coordinator [phone]  Notify your coordinator if you have pain over your kidney, increased redness or drainage from your incision, fever greater than 100.5F, or decreased urine output.  Notify your coordinator immediately if you are ever unable to take your immunosuppressive medications for any reason.  If it is outside of office hours, please call the hospital switchboard at [phone] and ask to have the kidney transplant surgery fellow paged for urgent medical questions, or present to the emergency department.    OTHER DISCHARGE INSTRUCTIONS:  LUZ plan: Please monitor color and amount of output. Drain will remain in place until follow up in clinic.   Monitor blood pressure and weight daily initially post transplant.  Walk at least four times a day, lift no greater than 10 pounds for 6-8 weeks from the time of surgery.  No driving while taking narcotics or 3 weeks after surgery.    Diet recommendations post-transplant: Heart healthy dietary habits long term (low saturated/trans fat, low sodium). High protein diet x 8 weeks. Practice food safety precautions.    Discharged to home.       Discharge Medications   Discharge Medication List as of 6/2/2018 12:14 PM      START taking these medications    Details   acetaminophen (TYLENOL) 325 MG tablet Take 2 tablets (650 mg) by mouth every 6 hours for 10 days, Disp-80 tablet, R-0, E-Prescribe       amLODIPine (NORVASC) 5 MG tablet Take 1 tablet (5 mg) by mouth daily, Disp-30 tablet, R-1, E-Prescribe      magnesium hydroxide (MILK OF MAGNESIA) 400 MG/5ML suspension Take 30 mLs by mouth daily, Disp-355 mL, R-1, E-Prescribe      magnesium oxide (MAG-OX) 400 MG tablet Take 1 tablet (400 mg) by mouth daily (with lunch), Disp-30 tablet, R-0, E-Prescribe      mycophenolate (GENERIC EQUIVALENT) 250 MG capsule Take 3 capsules (750 mg) by mouth 2 times daily, Disp-180 capsule, R-11, E-Prescribe      oxyCODONE IR (ROXICODONE) 5 MG tablet Take 1-2 tablets (5-10 mg) by mouth every 4 hours as needed for other (pain control or improvement in physical function. Hold dose for analgesic side effects.), Disp-12 tablet, R-0, Local Print      senna-docusate (SENOKOT-S;PERICOLACE) 8.6-50 MG per tablet Take 2 tablets by mouth 2 times daily as needed for constipation, Disp-100 tablet, R-3, E-Prescribe      sulfamethoxazole-trimethoprim (BACTRIM/SEPTRA) 400-80 MG per tablet Take 1 tablet by mouth daily, Disp-30 tablet, R-11, E-Prescribe         CONTINUE these medications which have CHANGED    Details   metoprolol tartrate (LOPRESSOR) 50 MG tablet Take 1 tablet (50 mg) by mouth 2 times daily, Disp-60 tablet, R-1, E-Prescribe      polyethylene glycol (MIRALAX/GLYCOLAX) Packet Take 17 g by mouth daily as needed for constipation, Disp-14 packet, R-3, E-Prescribe      tacrolimus (ENVARSUS XR) 1 MG 24 hr tablet Take 5 tablets (5 mg) by mouth every morning (before breakfast), Disp-150 tablet, R-11, E-Prescribe      valGANciclovir (VALCYTE) 450 MG tablet Take 2 tablets (900 mg) by mouth daily, Disp-60 tablet, R-2, E-Prescribe         CONTINUE these medications which have NOT CHANGED    Details   atorvastatin (LIPITOR) 40 MG tablet Take 40 mg by mouth daily , Historical      calcium acetate (PHOSLO) 667 MG CAPS capsule Take 667-1,334 mg by mouth 3 times daily (with meals) And 667mg with snacks. Max 6 times/day., Historical       Cholecalciferol (VITAMIN D) 2000 UNITS tablet Take 2,000 Units by mouth daily, Historical      clonazePAM (KLONOPIN) 1 MG tablet Take 0.5-1 mg by mouth 2 times daily as needed (restless leg) , Historical      docusate sodium (COLACE) 100 MG capsule Take 200 mg by mouth 2 times daily , Historical      NONFORMULARY Salicylic acid 3% / 5-FU 4% in vanicream : Apply a thin layer to affected area once daily, Historical      omeprazole (PRILOSEC) 40 MG capsule TAKE 1 CAPSULE BY MOUTH DAILY, Disp-90 capsule, R-3, E-PrescribePatient enrolled in our Rx Med Sync service to improveadherence. We are requesting a refill authorization inadvance to ensure an active prescription is on file.      traZODone (DESYREL) 150 MG tablet Take 300 mg by mouth At Bedtime with food, Historical      SUMAtriptan (IMITREX) 25 MG tablet Take 25 mg by mouth every 2 hours as needed for migraine Max 200mg/24 hours. Max 7/week. Max 30/month., Historical         STOP taking these medications       albuterol (PROAIR HFA/PROVENTIL HFA/VENTOLIN HFA) 108 (90 Base) MCG/ACT Inhaler Comments:   Reason for Stopping:         lisinopril (PRINIVIL/ZESTRIL) 20 MG tablet Comments:   Reason for Stopping:         METOPROLOL SUCCINATE ER PO Comments:   Reason for Stopping:         sennosides (SENOKOT) 8.6 MG tablet Comments:   Reason for Stopping:             Allergies   Allergies   Allergen Reactions     Gabapentin Other (See Comments)     myoclonus     Data   Most Recent 3 CBC's:  Recent Labs   Lab Test  06/02/18   0701  06/01/18   0750  05/31/18   1627   05/31/18   0426   WBC  6.1  9.2   --    --   12.2*   HGB  9.1*  8.4*  9.7*   < >  8.8*   MCV  96  96   --    --   95   PLT  165  161   --    --   163    < > = values in this interval not displayed.      Most Recent 3 BMP's:  Recent Labs   Lab Test  06/02/18   0701  06/01/18   0750  05/31/18   1627   NA  135  140  136   POTASSIUM  4.1  3.8  4.1   CHLORIDE  104  107  100   CO2  23  25  25   BUN  21  25  28   CR   1.32*  1.74*  2.55*   ANIONGAP  8  8  10   SHIRAZ  9.1  8.8  9.0   GLC  92  136*  186*     Most Recent 2 LFT's:  Recent Labs   Lab Test  05/29/18   2350  05/14/18   1025   AST  18  20   ALT  14  13   ALKPHOS  102  82   BILITOTAL  0.5  0.6     Most Recent INR's and Anticoagulation Dosing History:  Anticoagulation Dose History     Recent Dosing and Labs Latest Ref Rng & Units 4/5/2017 12/15/2017 5/29/2018    INR 0.86 - 1.14 1.06 0.96 1.14        Most Recent 3 Troponin's:  Recent Labs   Lab Test  03/09/18   0833   TROPI  <0.030     Most Recent Cholesterol Panel:  Recent Labs   Lab Test  05/29/18   2350   CHOL  116   LDL  56   HDL  42   TRIG  88     Most Recent 6 Bacteria Isolates From Any Culture (See EPIC Reports for Culture Details):  Recent Labs   Lab Test  05/30/18   0830   CULT  Culture negative monitoring continues  Canceled, Test credited  Incorrectly ordered by PCU/Clinic  Reordered as fluid culture.     Most Recent TSH, T4 and A1c Labs:  Recent Labs   Lab Test  05/29/18   2350   A1C  4.5

## 2018-06-01 NOTE — PROGRESS NOTES
"Transplant Admission Psychosocial Assessment    Patient Name: Gilles Henning III  : 1970  Age: 48 year old  MRN: [id number]  Date of Initial Social Work Evaluation: 2017    Patient underwent DD kidnet transplant on 2018.  Met with pt to update psychosocial assessment and provide education about SW role while inpatient, and to begin discussion of expectations/requirements, caregiver needs and follow up needs post-transplant. Completed medicare 2728 form.    Presenting Information   Living Situation: Pt resides with his wife, Merline, in [facility].  If not local, plans for short term stay:  Pt and wife plan to stay at a hotel.   Previous Functional Status: Independent with ADL's.  Cultural/Language/Spiritual Considerations: Pt is male, english speaking.    Support System  Primary Support Person Wife Merline.  Other support:  Adult children.  Plan for support in immediate post-transplant period: Wife Merline.    Health Care Directive  Decision Maker: Pt is his own decision maker.  Alternate Decision Maker: Wife Merline.  Health Care Directive: None on file, pt states he has one at home.    Mental Health/Coping:   History of Mental Health: Pt declined having a history of mental health.  History of Chemical Health: History of CD treatment 30 years ago.  Current status: Stable.  Coping: Pt feels he is coping well today. He stated he has a lot of anxiety last night because it took the doctor \"5 hours to respond\" regarding his high blood pressure. He was very angry about this and states that physician should be fired. Provided patient relations phone number.  Services Needed/Recommended: None.    Financial   Income: Pt is on SSDI. He plans to go back to school once he recovers from transplant to study psychology.  Impact of transplant on income: No concerns.  Insurance and medication coverage: PRIMARY INSURANCE IS A MEDICARE ADVANTAGE PLAN THROUGH [employer] ID#[id number] [facility]#[id number] (MEDICARE " "PART A & B EFFECTIVE: 03/01/2001). BILL PART B MEDICATIONS TO [facility] PART B (EFFECTIVE: 01/01/2017) ID#[id number] [facility]#[id number] N#HMPBB BIN#[id number] AND PATIENT WILL PAY 20% CO-INSURANCE AT TIME OF SERVICE. PART D PHARMACY BENEFITS ARE THROUGH [employer]. BILL PART D MEDICATIONS TO RX PROCESSOR [facility] PART D (EFFECTIVE: 01/01/2017) ID#[id number] [facility]#[id number] N#HMPBD BIN#[id number]. FOR PART D MEDICATIONS PATIENT HAS A DEDUCTIBLE OF $405.00 AND DURING THE INITIAL PHASE PT WILL PAY $3.00 FOR TIER 1, $9.00 FOR TIER 2, 15% CO-INSURANCE FOR TIER 3, 45% CO-INSURANCE FOR TIER 4, & 25% CO-INSURANCE FOR TIER 5 UNTIL TOTAL YEARLY DRUG COSTS REACH $3,750. ONCE MET, PT WILL ENTER THE COVERAGE GAP (DONUT HOLE) PHASE AND PAY 51% OF COST FOR GENERICS, 40% OF COST FOR BRANDS UNTIL TOTAL YEARLY PATIENT OUT OF POCKET COSTS REACH $5,000. ONCE MET, PT WILL ENTER THE CATASTROPHIC PHASE AND PAY THE GREATER OF EITHER 5% OF COST OR $3.30 FOR GENERICS, $8.25 FOR BRANDS. TESTCLAIMS: MYCOPHENOLATE 250MG=$16.23, TACROLIMUS 1MG=$5.30, CYCLOSPORINE 100MG=$28.73, ENVARSUS XR 1MG=$119.51, VALGANCICLOVIR 450MG=$370.33, VALACYCLOVIR 1GM=$71.03.  Financial concerns: None.  Resources needed: SW will assist pt and wife with finding a hotel.    Education provided by SW: Social Work role inpatient setting, availability of support groups, parking information.    Assessment and recommendations and plan:  Pt is a 49 yo male who underwent DD kidney transplant on 5/30/2018. Pt has a history of a kidney transplant on 2009. Pt has been going to dialysis at [facility]. Pt is unable to work and is on SSDI. Lives with his wife who will be staying locally with him and will be his primary caregiver.    SW met with pt to complete psychosocial assessment. Pt engaged in conversation and appreciative of SW visit and help with lodging. Pt ruminating about the doctor not coming to see him last night \"for 5 hours\" when he had a migrane and high blood " pressure, otherwise, pt feels he is coping well. Pt has adequate social support and insurance coverage. No psychosocial concerns.    ETTA Bee, SW     Ph: [phone], Pager: [phone]

## 2018-06-01 NOTE — PROGRESS NOTES
Transplant Surgery  Inpatient Daily Progress Note  06/01/2018    Assessment & Plan: Gilles Henning (Poncho) is a 48-year old male with a history of IgA nephropathy, RCC, bilateral native nephrectomy 2017, depression, PTSD, and substance abuse who is now s/p DDKT on 5/30/18.    Graft function:s/p DDKT 5/30/18. Graft function good. Creatinine continues to improve, SCr 2.55 today (from 3.83), UOP is excellent with ~240/hr in previous 24 hours.   Immunosuppression management:   - Thymoglobulin: Received 2mg/kg (125mg) on POD #0 & 1, will give an final dose of 100mg today.  - Steroids: Received 500mg Solumedrol on POD #0, 250mg on POD1, will give final 125mg today.  - Tacrolimus: On Envarsus 8mg daily per protocol, goal range 8-10. First level today 6.5-no dose adjustment.  - MMF: On 750mg BID.  Complexity of management:Medium. Contributing factors: induction  Hematology: Anemia of chronic disease: Stable, no issues. Hemoglobin stable 8.4, with no signs of bleeding. WBC 9.2 today.   Cardiorespiratory: HTN uncontrolled: On Metoprolol XL 25mg daily PTA. SBP remains in the 170-180s range on Metoprolol 25mg BID despite receiving PRN hydralazine and labetolol. Heart rate in the 60-70s. Pre-op EKG shows a first degree AV block with a IL interval of 220 ms. Will add amlodipine 5mg today and increase metoprolol to 50mg BID.  Continue with IV PRN hydralazine and labetolol for SBP>160  GI/Nutrition: Regular diet today. Denies nausea. Still no BM. Takes miralax at baseline. Will add senna, milk of mag, and increase miralax to BID.  Endocrine: No history of DM, had hyperglycemia postop last evening. Will d/c insulin gtt today and start Novolog SSI.  Fluid/Electrolytes: MIVF: Will d/c fluid replacements and IV Lasix today. D/c MIVF. Electrolytes WNL.  : Lucas to remain for 3 days due to new surgical anastomosis (remove 6/2).  Infectious disease: Tmax 99.9, WBC 9.2 today. UC 5/30 negative.   Prophylaxis: GI, DVT. On Bactrim and  Valcyte for ppx.  Disposition: 7A, likely d/c this weekend.    Medical Decision Making: Medium  Subsequent visit 33756 (moderate level decision making)    NP: Darcie Ybarra NP  Faculty: Troy Lorenzo MD  _________________________________________________________________  Transplant History: Admitted 5/29/2018 for kidney transplantation.  5/30/2018 (Kidney), Postoperative day: 2     Interval History:   Feeling well today, abdominal pain is well-controlled with available pain meds. Hypertensive to 170s despite hydralazine and labetalol overnight. Denies nausea.    ROS:   A 10-point review of systems was negative except as noted above.    Meds:    3% salicylic acid, 4% 5FU in vanicream (compounded topical agent)  1 Application Apply externally Daily     acetaminophen  650 mg Oral Once     acetaminophen  650 mg Oral Q4H     amLODIPine  5 mg Oral Daily     anti-thymocyte globulin  2 mg/kg Intravenous Central line Once     atorvastatin  20 mg Oral Daily     carvedilol  6.25 mg Oral BID w/meals     diphenhydrAMINE  25-50 mg Oral Once    Or     diphenhydrAMINE  25-50 mg Per NG tube Once     docusate sodium  200 mg Oral BID     insulin aspart  1-7 Units Subcutaneous TID AC     insulin aspart  1-5 Units Subcutaneous At Bedtime     magnesium oxide  400 mg Oral Daily with lunch     methylPREDNISolone  100 mg Intravenous Once     metoprolol tartrate  25 mg Oral BID     mycophenolate  750 mg Oral BID IS     omeprazole  40 mg Oral BID AC     polyethylene glycol  17 g Oral BID     senna-docusate  2 tablet Oral BID    Or     senna-docusate  2 tablet Oral BID     sodium chloride (PF)  10 mL Intracatheter Q8H     sulfamethoxazole-trimethoprim  1 tablet Oral Once per day on Mon Wed Fri     tacrolimus  0.1 mg/kg Oral QAM AC     traZODone  300 mg Oral At Bedtime     valGANciclovir  450 mg Oral Once per day on Mon Thu       Physical Exam:     Admit Weight: 56.7 kg (125 lb 1.6 oz)  Today's weight: 125 lbs 6.4 oz    Vital sign ranges:     Temp:  [98.6  F (37  C)-99.6  F (37.6  C)] 98.6  F (37  C)  Heart Rate:  [57-80] 80  Resp:  [15-18] 16  BP: (151-183)/() 171/106  SpO2:  [98 %-100 %] 99 %    GENERAL: Appears alert and oriented times three. In no apparent distress.  SKIN: No jaundice. No rashes or lesions.   HEENT: Eyes symmetrical and free of discharge bilaterally.  RESPIRATORY: Respirations regular, even, and unlabored.  GI: Soft and non distended. Incision clean, dry, intact. LUZ present in right abdomen with serosanguinous drainage.  : Lucas in place, draining clear jones urine.  EXTREMITIES: No peripheral edema. Mild red-purple color present in right hand, no swelling.   NEUROLOGIC: Alert and orientated x 3. No focal deficits.      Data:   CMP  Recent Labs  Lab 05/31/18  1627 05/31/18  1240  05/31/18  0426  05/30/18  1041 05/29/18  2350     --   --  141  < > 138 138   POTASSIUM 4.1 4.2  < > 4.6  < > 3.7 3.5   CHLORIDE 100  --   --  103  < >  --  95   CO2 25  --   --  30  < >  --  35*   *  --   --  126*  < > 137* 111*   BUN 28  --   --  37*  < >  --  45*   CR 2.55*  --   --  3.83*  < >  --  6.02*   GFRESTIMATED 27*  --   --  17*  < >  --  10*   GFRESTBLACK 33*  --   --  20*  < >  --  12*   SHIRAZ 9.0  --   --  8.1*  < >  --  9.0   ICAW  --   --   --   --   --  4.4  --    MAG 2.0  --   --  2.0  < >  --   --    PHOS 3.6  --   --  3.1  < >  --   --    ALBUMIN  --   --   --   --   --   --  3.7   BILITOTAL  --   --   --   --   --   --  0.5   ALKPHOS  --   --   --   --   --   --  102   AST  --   --   --   --   --   --  18   ALT  --   --   --   --   --   --  14   < > = values in this interval not displayed.  CBC  Recent Labs  Lab 05/31/18  1627 05/31/18  1240  05/31/18  0426  05/30/18  1430  05/29/18  2350   HGB 9.7* 9.1*  < > 8.8*  < > 9.2*  < > 10.6*   WBC  --   --   --  12.2*  --  5.4  --  4.2   PLT  --   --   --  163  --  117*  --  166   A1C  --   --   --   --   --   --   --  4.5   < > = values in this interval not  tra [place]    Recent Labs  Lab 05/29/18  2350   INR 1.14   PTT 30      Urinalysis  Recent Labs   Lab Test  05/30/18   0830  06/08/17   1523   COLOR  Straw  Yellow   APPEARANCE  Clear   --    URINEGLC  Negative  Negative   URINEBILI  Negative   --    URINEKETONE  Negative  Negative   SG  1.004  1.015   UBLD  Small*   --    URINEPH  5.0   --    PROTEIN  Negative   --    NITRITE  Negative  Negative   LEUKEST  Negative  Negative   RBCU  4*   --    WBCU  <1   --      Virology:  Hepatitis C Antibody   Date Value Ref Range Status   05/29/2018 Nonreactive NR^Nonreactive Final     Comment:     Assay performance characteristics have not been established for newborns,   infants, and children       BK Virus Result   Date Value Ref Range Status   04/05/2017 BK Virus DNA Not Detected BKNEG copies/mL Final

## 2018-06-01 NOTE — PLAN OF CARE
Problem: Patient Care Overview  Goal: Plan of Care/Patient Progress Review  Outcome: No Change  Temp max 99.4 Pt hypertensive 165-181/, team notified. Hydralazine PRN given x2 with little effect. Labetolol ordered for BP >160/90 waiting on pharmacy. Pt complained of headache, abdominal pain, and bladder spasms. Given dilaudid 0.2mg x2 and oxy 10 mg x1 with some relief. Also given Imitrex for migraine with some relief. Pt also received klonopin for anxiety x1.  and 231, covered with Novolog. Tolerating regular diet with good appetite. RIJ is patent with D5LR running at 75/hr. RJP with 40 ml or dark red output. Lucas catheter with good output of 1250 ml pink tinged clear urine. No BM yet, given senna & colace. Pt ambulated in the sandra x3 this shift. Continue to monitor and update team with any changes.

## 2018-06-01 NOTE — PROGRESS NOTES
CLINICAL NUTRITION SERVICES - DISCHARGE NOTE    Pt was followed by this service while inpatient. See previous nutrition notes for all recs. Diet discharge instructions in chart. Patient s discharge needs assessed and discharge planning has been conducted with the multidisciplinary transplant care team including physicians, pharmacy, social work and transplant coordinator.    Diet History: Per patient appetite is fair. Has not received post transplant nutrition guidelines   Nutrition Diagnosis:  Food- and nutrition-related knowledge deficit r/t no previous knowledge of post transplant diet guidelines AEB patient verbal report.   Interventions:  Provided instruction on high protein sources with increased protein needs x 8 weeks, consuming heart healthy diet low in saturated/trans fat and sodium, food safety precautions.   Provided handouts: Post Transplant Diet Guidelines, Food Safety for transplant recipients  Pt verbalized understanding of diet following education and denied further nutritional questions.    Goals:   Patient will verbalize understanding of education provided.  Follow up/Monitoring:  Once discharged, place outpatient nutrition consult via the transplant team if nutrition concerns arise.    Francisca Morton MS/RD/CHRISTOPHER/CNSC  [address] RD Pager: [phone]

## 2018-06-01 NOTE — PLAN OF CARE
"Problem: Patient Care Overview  Goal: Plan of Care/Patient Progress Review  Outcome: No Change  2300 - 0700  Pt frustrated and angry at beginning of shift. Spoke with pt to determine what he felt needed to be done.  Pt stated he wanted to see the MD on-call because the previous shift had paged him multiple times about his blood pressures, pain, and bladder spasms.  Paged MD PARAG up to see pt. Pt calmed down after MD spoke with him but was still upset about the occurrences on the previous shift.  BP (!) 171/101 (BP Location: Right arm)  Pulse 59  Temp 98.5  F (36.9  C) (Oral)  Resp 18  Ht 1.778 m (5' 10\")  Wt 56.9 kg (125 lb 6.4 oz)  SpO2 98%  BMI 17.99 kg/m2  Hypertensive gave labatolol x 1 BP still elevated, paged MD - paged turned off, gave metoprolol early.  OVSS on RA  B  PRN oxycodone 10 mg x 1 for headache, some relief noted.  Regular diet.  IJ infusing D5LR at 75 mL/hr. LUZ output 30 mL dark, red output.  Lucas output of 1675 mL pink, clear, urine. No BM overnight but passing flatus.  Incision covered; clean, dry, intact.  Standby assist/up ad leon in room.  Continue to monitor.      "

## 2018-06-02 VITALS
WEIGHT: 118.1 LBS | SYSTOLIC BLOOD PRESSURE: 144 MMHG | BODY MASS INDEX: 16.91 KG/M2 | TEMPERATURE: 98.1 F | RESPIRATION RATE: 18 BRPM | OXYGEN SATURATION: 100 % | DIASTOLIC BLOOD PRESSURE: 91 MMHG | HEART RATE: 74 BPM | HEIGHT: 70 IN

## 2018-06-02 LAB
ANION GAP SERPL CALCULATED.3IONS-SCNC: 8 MMOL/L (ref 3–14)
BASOPHILS # BLD AUTO: 0 10E9/L (ref 0–0.2)
BASOPHILS NFR BLD AUTO: 0 %
BUN SERPL-MCNC: 21 MG/DL (ref 7–30)
CALCIUM SERPL-MCNC: 9.1 MG/DL (ref 8.5–10.1)
CHLORIDE SERPL-SCNC: 104 MMOL/L (ref 94–109)
CO2 SERPL-SCNC: 23 MMOL/L (ref 20–32)
CREAT SERPL-MCNC: 1.32 MG/DL (ref 0.66–1.25)
DIFFERENTIAL METHOD BLD: ABNORMAL
EOSINOPHIL # BLD AUTO: 0 10E9/L (ref 0–0.7)
EOSINOPHIL NFR BLD AUTO: 0.2 %
ERYTHROCYTE [DISTWIDTH] IN BLOOD BY AUTOMATED COUNT: 17.2 % (ref 10–15)
GFR SERPL CREATININE-BSD FRML MDRD: 58 ML/MIN/1.7M2
GLUCOSE BLDC GLUCOMTR-MCNC: 127 MG/DL (ref 70–99)
GLUCOSE SERPL-MCNC: 92 MG/DL (ref 70–99)
HCT VFR BLD AUTO: 29.1 % (ref 40–53)
HGB BLD-MCNC: 9.1 G/DL (ref 13.3–17.7)
IMM GRANULOCYTES # BLD: 0 10E9/L (ref 0–0.4)
IMM GRANULOCYTES NFR BLD: 0.2 %
LYMPHOCYTES # BLD AUTO: 0.4 10E9/L (ref 0.8–5.3)
LYMPHOCYTES NFR BLD AUTO: 6.2 %
MAGNESIUM SERPL-MCNC: 2.5 MG/DL (ref 1.6–2.3)
MCH RBC QN AUTO: 30 PG (ref 26.5–33)
MCHC RBC AUTO-ENTMCNC: 31.3 G/DL (ref 31.5–36.5)
MCV RBC AUTO: 96 FL (ref 78–100)
MONOCYTES # BLD AUTO: 0.5 10E9/L (ref 0–1.3)
MONOCYTES NFR BLD AUTO: 7.5 %
NEUTROPHILS # BLD AUTO: 5.3 10E9/L (ref 1.6–8.3)
NEUTROPHILS NFR BLD AUTO: 85.9 %
NRBC # BLD AUTO: 0 10*3/UL
NRBC BLD AUTO-RTO: 0 /100
PHOSPHATE SERPL-MCNC: 1.6 MG/DL (ref 2.5–4.5)
PLATELET # BLD AUTO: 165 10E9/L (ref 150–450)
POTASSIUM SERPL-SCNC: 4.1 MMOL/L (ref 3.4–5.3)
RBC # BLD AUTO: 3.03 10E12/L (ref 4.4–5.9)
SODIUM SERPL-SCNC: 135 MMOL/L (ref 133–144)
WBC # BLD AUTO: 6.1 10E9/L (ref 4–11)

## 2018-06-02 PROCEDURE — 25000128 H RX IP 250 OP 636: Performed by: STUDENT IN AN ORGANIZED HEALTH CARE EDUCATION/TRAINING PROGRAM

## 2018-06-02 PROCEDURE — 83735 ASSAY OF MAGNESIUM: CPT | Performed by: NURSE PRACTITIONER

## 2018-06-02 PROCEDURE — 80048 BASIC METABOLIC PNL TOTAL CA: CPT | Performed by: NURSE PRACTITIONER

## 2018-06-02 PROCEDURE — 25000131 ZZH RX MED GY IP 250 OP 636 PS 637: Mod: GY | Performed by: STUDENT IN AN ORGANIZED HEALTH CARE EDUCATION/TRAINING PROGRAM

## 2018-06-02 PROCEDURE — A9270 NON-COVERED ITEM OR SERVICE: HCPCS | Mod: GY | Performed by: STUDENT IN AN ORGANIZED HEALTH CARE EDUCATION/TRAINING PROGRAM

## 2018-06-02 PROCEDURE — 25000128 H RX IP 250 OP 636: Performed by: NURSE PRACTITIONER

## 2018-06-02 PROCEDURE — 00000146 ZZHCL STATISTIC GLUCOSE BY METER IP

## 2018-06-02 PROCEDURE — 85025 COMPLETE CBC W/AUTO DIFF WBC: CPT | Performed by: NURSE PRACTITIONER

## 2018-06-02 PROCEDURE — 25000132 ZZH RX MED GY IP 250 OP 250 PS 637: Mod: GY | Performed by: STUDENT IN AN ORGANIZED HEALTH CARE EDUCATION/TRAINING PROGRAM

## 2018-06-02 PROCEDURE — 25000132 ZZH RX MED GY IP 250 OP 250 PS 637: Mod: GY | Performed by: NURSE PRACTITIONER

## 2018-06-02 PROCEDURE — 36592 COLLECT BLOOD FROM PICC: CPT | Performed by: NURSE PRACTITIONER

## 2018-06-02 PROCEDURE — 25000132 ZZH RX MED GY IP 250 OP 250 PS 637: Mod: GY | Performed by: SURGERY

## 2018-06-02 PROCEDURE — 84100 ASSAY OF PHOSPHORUS: CPT | Performed by: NURSE PRACTITIONER

## 2018-06-02 PROCEDURE — A9270 NON-COVERED ITEM OR SERVICE: HCPCS | Mod: GY | Performed by: SURGERY

## 2018-06-02 RX ORDER — MAGNESIUM OXIDE 400 MG/1
400 TABLET ORAL
Qty: 30 TABLET | Refills: 0 | Status: SHIPPED | OUTPATIENT
Start: 2018-06-02 | End: 2018-06-11

## 2018-06-02 RX ORDER — OXYCODONE HYDROCHLORIDE 5 MG/1
5-10 TABLET ORAL EVERY 4 HOURS PRN
Qty: 12 TABLET | Refills: 0 | Status: SHIPPED | OUTPATIENT
Start: 2018-06-02 | End: 2018-06-11

## 2018-06-02 RX ORDER — VALGANCICLOVIR 450 MG/1
900 TABLET, FILM COATED ORAL DAILY
Qty: 60 TABLET | Refills: 2 | Status: SHIPPED | OUTPATIENT
Start: 2018-06-03 | End: 2018-06-06

## 2018-06-02 RX ORDER — METOPROLOL TARTRATE 50 MG
50 TABLET ORAL 2 TIMES DAILY
Qty: 60 TABLET | Refills: 1 | Status: SHIPPED | OUTPATIENT
Start: 2018-06-02 | End: 2018-06-05

## 2018-06-02 RX ORDER — AMOXICILLIN 250 MG
2 CAPSULE ORAL 2 TIMES DAILY PRN
Qty: 100 TABLET | Refills: 3 | Status: ON HOLD | OUTPATIENT
Start: 2018-06-02 | End: 2018-08-13

## 2018-06-02 RX ORDER — POLYETHYLENE GLYCOL 3350 17 G/17G
17 POWDER, FOR SOLUTION ORAL DAILY PRN
Qty: 14 PACKET | Refills: 3 | Status: ON HOLD | OUTPATIENT
Start: 2018-06-02 | End: 2018-08-13

## 2018-06-02 RX ORDER — SULFAMETHOXAZOLE AND TRIMETHOPRIM 400; 80 MG/1; MG/1
1 TABLET ORAL DAILY
Qty: 30 TABLET | Refills: 11 | Status: SHIPPED | OUTPATIENT
Start: 2018-06-03 | End: 2019-01-29

## 2018-06-02 RX ORDER — ACETAMINOPHEN 325 MG/1
650 TABLET ORAL EVERY 6 HOURS
Qty: 80 TABLET | Refills: 0 | Status: SHIPPED | OUTPATIENT
Start: 2018-06-02 | End: 2019-01-29

## 2018-06-02 RX ORDER — VALGANCICLOVIR 450 MG/1
450 TABLET, FILM COATED ORAL DAILY
Qty: 30 TABLET | Refills: 2 | Status: SHIPPED | OUTPATIENT
Start: 2018-06-03 | End: 2018-06-02

## 2018-06-02 RX ORDER — MYCOPHENOLATE MOFETIL 250 MG/1
750 CAPSULE ORAL 2 TIMES DAILY
Qty: 180 CAPSULE | Refills: 11 | Status: ON HOLD | OUTPATIENT
Start: 2018-06-02 | End: 2018-08-13

## 2018-06-02 RX ORDER — AMLODIPINE BESYLATE 5 MG/1
5 TABLET ORAL DAILY
Qty: 30 TABLET | Refills: 1 | Status: SHIPPED | OUTPATIENT
Start: 2018-06-03 | End: 2018-06-05

## 2018-06-02 RX ADMIN — POLYETHYLENE GLYCOL 3350 17 G: 17 POWDER, FOR SOLUTION ORAL at 08:49

## 2018-06-02 RX ADMIN — AMLODIPINE BESYLATE 5 MG: 5 TABLET ORAL at 08:51

## 2018-06-02 RX ADMIN — ACETAMINOPHEN 650 MG: 325 TABLET, FILM COATED ORAL at 12:28

## 2018-06-02 RX ADMIN — OXYBUTYNIN CHLORIDE 5 MG: 5 TABLET ORAL at 08:51

## 2018-06-02 RX ADMIN — MAGNESIUM HYDROXIDE 30 ML: 400 SUSPENSION ORAL at 12:27

## 2018-06-02 RX ADMIN — ATORVASTATIN CALCIUM 20 MG: 20 TABLET, FILM COATED ORAL at 08:51

## 2018-06-02 RX ADMIN — DOCUSATE SODIUM 200 MG: 100 CAPSULE, LIQUID FILLED ORAL at 08:50

## 2018-06-02 RX ADMIN — HYDROMORPHONE HYDROCHLORIDE 0.2 MG: 1 INJECTION, SOLUTION INTRAMUSCULAR; INTRAVENOUS; SUBCUTANEOUS at 04:26

## 2018-06-02 RX ADMIN — OMEPRAZOLE 40 MG: 20 CAPSULE, DELAYED RELEASE ORAL at 08:50

## 2018-06-02 RX ADMIN — MAGNESIUM OXIDE TAB 400 MG (241.3 MG ELEMENTAL MG) 400 MG: 400 (241.3 MG) TAB at 12:28

## 2018-06-02 RX ADMIN — MYCOPHENOLATE MOFETIL 750 MG: 250 CAPSULE ORAL at 08:50

## 2018-06-02 RX ADMIN — HYDRALAZINE HYDROCHLORIDE 10 MG: 20 INJECTION INTRAMUSCULAR; INTRAVENOUS at 00:59

## 2018-06-02 RX ADMIN — ACETAMINOPHEN 650 MG: 325 TABLET, FILM COATED ORAL at 08:50

## 2018-06-02 RX ADMIN — METOPROLOL TARTRATE 50 MG: 50 TABLET ORAL at 08:51

## 2018-06-02 RX ADMIN — SULFAMETHOXAZOLE AND TRIMETHOPRIM 1 TABLET: 400; 80 TABLET ORAL at 08:51

## 2018-06-02 RX ADMIN — Medication 20 MG: at 02:01

## 2018-06-02 RX ADMIN — ACETAMINOPHEN 650 MG: 325 TABLET, FILM COATED ORAL at 00:13

## 2018-06-02 RX ADMIN — VALGANCICLOVIR 450 MG: 450 TABLET, FILM COATED ORAL at 08:50

## 2018-06-02 RX ADMIN — TACROLIMUS 5.75 MG: 0.75 TABLET, EXTENDED RELEASE ORAL at 08:50

## 2018-06-02 RX ADMIN — OXYCODONE HYDROCHLORIDE 10 MG: 5 TABLET ORAL at 12:57

## 2018-06-02 RX ADMIN — SENNOSIDES AND DOCUSATE SODIUM 2 TABLET: 8.6; 5 TABLET ORAL at 08:50

## 2018-06-02 RX ADMIN — ONDANSETRON 4 MG: 2 INJECTION INTRAMUSCULAR; INTRAVENOUS at 00:55

## 2018-06-02 ASSESSMENT — PAIN DESCRIPTION - DESCRIPTORS
DESCRIPTORS: ACHING
DESCRIPTORS: ACHING;SHARP

## 2018-06-02 NOTE — PLAN OF CARE
Problem: Patient Care Overview  Goal: Plan of Care/Patient Progress Review  Outcome: No Change  Pt. hypertensive 160's-170's/, AOVSS.  Pt. receiving prn IV Hydralazine & IV Labetalol with little effect & on-call, Dr. Cheatham, notified & stated to give Hydralazine at 4am, however, BP: 144/92 at 0417, so Hydralazine not given per orders. Nausea treated with IV Zofran once with some relief, no emesis.  Lucas with 1,200cc, 1 small formed stool. LUZ with 30cc bloody drainage.  Right IJ saline locked.  Pt. slept fair between cares.  Continue to follow POC & notify team with changes.

## 2018-06-02 NOTE — PLAN OF CARE
Problem: Patient Care Overview  Goal: Plan of Care/Patient Progress Review  Outcome: Improving  Orders received to DC patient this afternoon. Orders printed and reviewed with patient and spouse at bedside. Education completed on drain and supplies given. AVSS on RA. Pain well controlled with PRN Oxy and scheduled Tylenol. Denies nausea. Regular diet. Voiding with negative PVR's post plasecncia removal. IJ removed. Up independently in room. Will facilitate remainder of DC.

## 2018-06-02 NOTE — PHARMACY-TRANSPLANT NOTE
Solid Organ Transplant Recipient Prior to Discharge Note    48 year old male s/p kidney transplant on 5/30/18 for IgA nephropathy.    Pharmacy has monitored for medication interactions and immunosuppression levels in conjunction with the multidisciplinary team. In anticipation for discharge, medication therapy needs have been addressed daily throughout the current admission via multidisciplinary rounds and/or discussions, order verification, daily clinical pharmacy review, and communication with prescribers.  Pt is being discharged on Envarsus XR, further doses to be monitored in clinic

## 2018-06-02 NOTE — PROVIDER NOTIFICATION
Notified Resident at 0300 AM regarding hypertension.      Spoke with:  PHILIP Cheatham MD    Orders:  Were ordered    Comments: MD notified of pt's BPs: 160's-170's/80's-105, ND: 70's-80's after receiving PRN Hydralazine IV & Labetalol IV.  MD ordered to give PRN Hydralazine at 4am  If pt. remains hypertensive.

## 2018-06-02 NOTE — PROGRESS NOTES
Transplant Surgery  Inpatient Daily Progress Note  06/02/2018    Assessment & Plan: Gilles Henning (Poncho) is a 48-year old male with a history of IgA nephropathy, RCC, bilateral native nephrectomy 2017, depression, PTSD, and substance abuse who is now s/p DDKT on 5/30/18.    Graft function:s/p DDKT 5/30/18. Graft function good. Creatinine continues to improve, Cr 1.32 today, UOP is excellent.  Immunosuppression management:   - Thymoglobulin: Received 2mg/kg (125mg) on POD #0 & 1, Gave final dose of 100mg on 6/1.   - Steroids: Received 500mg Solumedrol on POD #0, 250mg on POD1, gave final dose of 100mg on 6/1.  - Tacrolimus: On Envarsus 8mg daily per protocol, goal range 8-10.  - MMF: On 750mg BID.  Complexity of management:Medium. Contributing factors: induction  Hematology: Anemia of chronic disease: Stable, no issues. Hemoglobin stable 9.1, with no signs of bleeding. WBC 6.1 today.   Cardiorespiratory: HTN improving: SBP remains in the 170-180s range on Metoprolol 25mg BID despite receiving PRN hydralazine and labetolol. Heart rate in the 60-70s. Pre-op EKG shows a first degree AV block with a KS interval of 220 ms. Amlodipine 5mg and metoprolol 50mg BID.   GI/Nutrition: Regular diet today. Denies nausea. Still no BM. Takes miralax at baseline. Will add senna, milk of mag, and increase miralax to BID.  Endocrine: No history of DM, had hyperglycemia postop last evening. Novolog SSI.  Fluid/Electrolytes: MIVF: Will d/c fluid replacements and IV Lasix today. D/c MIVF. Electrolytes WNL.  : Lucas to remain for 3 days due to new surgical anastomosis (remove 6/2). Remove Lucas today.  Infectious disease: Tmax 99.9, WBC 9.2 today. UC 5/30 negative.   Prophylaxis: GI, DVT. On Bactrim and Valcyte for ppx.  Disposition: 7A, likely d/c today.    Medical Decision Making: Medium  Subsequent visit 78683 (moderate level decision making)    Resident: Artie Matthews  Faculty: Troy Lorenzo,  MD  _________________________________________________________________  Transplant History: Admitted 5/29/2018 for kidney transplantation.  5/30/2018 (Kidney), Postoperative day: 3     Interval History:   Feeling well today, abdominal pain is well-controlled with available pain meds. Hypertensive to 170s despite hydralazine and labetalol overnight. Denies nausea.    ROS:   A 10-point review of systems was negative except as noted above.    Meds:    3% salicylic acid, 4% 5FU in vanicream (compounded topical agent)  1 Application Apply externally Daily     acetaminophen  650 mg Oral Q4H     amLODIPine  5 mg Oral Daily     atorvastatin  20 mg Oral Daily     docusate sodium  200 mg Oral BID     insulin aspart  1-7 Units Subcutaneous TID AC     insulin aspart  1-5 Units Subcutaneous At Bedtime     magnesium hydroxide  30 mL Oral Daily     magnesium oxide  400 mg Oral Daily with lunch     metoprolol tartrate  50 mg Oral BID     mycophenolate  750 mg Oral BID IS     omeprazole  40 mg Oral BID AC     oxybutynin  5 mg Oral TID     polyethylene glycol  17 g Oral BID     senna-docusate  2 tablet Oral BID    Or     senna-docusate  2 tablet Oral BID     sodium chloride (PF)  10 mL Intracatheter Q8H     sulfamethoxazole-trimethoprim  1 tablet Oral Daily     tacrolimus  0.1 mg/kg Oral QAM AC     traZODone  300 mg Oral At Bedtime     valGANciclovir  450 mg Oral Daily       Physical Exam:     Admit Weight: 56.7 kg (125 lb 1.6 oz)  Today's weight: 123 lbs 1.6 oz    Vital sign ranges:    Temp:  [98.1  F (36.7  C)-99.3  F (37.4  C)] 98.1  F (36.7  C)  Pulse:  [70-83] 74  Heart Rate:  [66-96] 79  Resp:  [18-20] 18  BP: (139-185)/() 139/86  SpO2:  [100 %] 100 %    GENERAL: Appears alert and oriented times three. In no apparent distress.  SKIN: No jaundice. No rashes or lesions.   HEENT: Eyes symmetrical and free of discharge bilaterally.  RESPIRATORY: Respirations regular, even, and unlabored.  GI: Soft and non distended. Incision  clean, dry, intact. LUZ present in right abdomen with serosanguinous drainage.  : Lucas in place, draining clear jones urine.  EXTREMITIES: No peripheral edema. Mild red-purple color present in right hand, no swelling.   NEUROLOGIC: Alert and orientated x 3. No focal deficits.      Data:   CMP  Recent Labs  Lab 06/02/18  0701 06/01/18  0750  05/30/18  1041 05/29/18  2350    140  < > 138 138   POTASSIUM 4.1 3.8  < > 3.7 3.5   CHLORIDE 104 107  < >  --  95   CO2 23 25  < >  --  35*   GLC 92 136*  < > 137* 111*   BUN 21 25  < >  --  45*   CR 1.32* 1.74*  < >  --  6.02*   GFRESTIMATED 58* 42*  < >  --  10*   GFRESTBLACK 70 51*  < >  --  12*   SHIRAZ 9.1 8.8  < >  --  9.0   ICAW  --   --   --  4.4  --    MAG 2.5* 2.1  < >  --   --    PHOS 1.6* 2.7  < >  --   --    ALBUMIN  --   --   --   --  3.7   BILITOTAL  --   --   --   --  0.5   ALKPHOS  --   --   --   --  102   AST  --   --   --   --  18   ALT  --   --   --   --  14   < > = values in this interval not displayed.  CBC  Recent Labs  Lab 06/02/18  0701 06/01/18  0750  05/29/18  2350   HGB 9.1* 8.4*  < > 10.6*   WBC 6.1 9.2  < > 4.2    161  < > 166   A1C  --   --   --  4.5   < > = values in this interval not displayed.  COAGS    Recent Labs  Lab 05/29/18  2350   INR 1.14   PTT 30      Urinalysis  Recent Labs   Lab Test  05/30/18   0830  06/08/17   1523   COLOR  Straw  Yellow   APPEARANCE  Clear   --    URINEGLC  Negative  Negative   URINEBILI  Negative   --    URINEKETONE  Negative  Negative   SG  1.004  1.015   UBLD  Small*   --    URINEPH  5.0   --    PROTEIN  Negative   --    NITRITE  Negative  Negative   LEUKEST  Negative  Negative   RBCU  4*   --    WBCU  <1   --      Virology:  Hepatitis C Antibody   Date Value Ref Range Status   05/29/2018 Nonreactive NR^Nonreactive Final     Comment:     Assay performance characteristics have not been established for newborns,   infants, and children       BK Virus Result   Date Value Ref Range Status   04/05/2017 BK  Virus DNA Not Detected BKNEG copies/mL Final

## 2018-06-02 NOTE — PLAN OF CARE
Problem: Patient Care Overview  Goal: Plan of Care/Patient Progress Review  Outcome: No Change  [id number]: Hypertensive; Hydralazine given x1 and Labetalol given x1 with no effect. MD notified and wrote for an extra dose of hydralazine. OVSS on RA. Regular diet; poor intake due to nausea. Compazine given x1 with fair relief. A/Ox4; stable mood. Up independently. Pt reports continuous bladder spasms; MD notified and order written for ditropan. Dilaudid given x1 for spasms and incisional pain with good relief. Nausea and pain likely related to no BM since 5/28; milk of mag x1 and suppository x1 with very small hard stool return. R IJ SL. R LUZ with minimal dark red output. Lucas with adequate urine output; light pink in color. Fluids were dc'd today; encouraged patient to continue pushing PO fluids. Plan for Lucas to come out tomorrow. Will continue to monitor and notify the team of any changes.

## 2018-06-03 ENCOUNTER — INFUSION THERAPY VISIT (OUTPATIENT)
Dept: INFUSION THERAPY | Facility: CLINIC | Age: 48
End: 2018-06-03
Attending: SURGERY
Payer: MEDICARE

## 2018-06-03 VITALS — OXYGEN SATURATION: 100 % | RESPIRATION RATE: 18 BRPM | TEMPERATURE: 97.7 F

## 2018-06-03 DIAGNOSIS — E83.39 HYPOPHOSPHATEMIA: ICD-10-CM

## 2018-06-03 DIAGNOSIS — Z94.0 KIDNEY REPLACED BY TRANSPLANT: Primary | ICD-10-CM

## 2018-06-03 LAB
ANION GAP SERPL CALCULATED.3IONS-SCNC: 9 MMOL/L (ref 3–14)
BASOPHILS # BLD AUTO: 0 10E9/L (ref 0–0.2)
BASOPHILS NFR BLD AUTO: 0.2 %
BUN SERPL-MCNC: 23 MG/DL (ref 7–30)
CALCIUM SERPL-MCNC: 10.4 MG/DL (ref 8.5–10.1)
CHLORIDE SERPL-SCNC: 100 MMOL/L (ref 94–109)
CO2 SERPL-SCNC: 24 MMOL/L (ref 20–32)
CREAT SERPL-MCNC: 1.55 MG/DL (ref 0.66–1.25)
DIFFERENTIAL METHOD BLD: ABNORMAL
EOSINOPHIL # BLD AUTO: 0 10E9/L (ref 0–0.7)
EOSINOPHIL NFR BLD AUTO: 1 %
ERYTHROCYTE [DISTWIDTH] IN BLOOD BY AUTOMATED COUNT: 16.5 % (ref 10–15)
GFR SERPL CREATININE-BSD FRML MDRD: 48 ML/MIN/1.7M2
GLUCOSE SERPL-MCNC: 102 MG/DL (ref 70–99)
HCT VFR BLD AUTO: 37.8 % (ref 40–53)
HGB BLD-MCNC: 12.5 G/DL (ref 13.3–17.7)
IMM GRANULOCYTES # BLD: 0 10E9/L (ref 0–0.4)
IMM GRANULOCYTES NFR BLD: 0.2 %
LYMPHOCYTES # BLD AUTO: 0.2 10E9/L (ref 0.8–5.3)
LYMPHOCYTES NFR BLD AUTO: 4.8 %
MAGNESIUM SERPL-MCNC: 2.4 MG/DL (ref 1.6–2.3)
MCH RBC QN AUTO: 30.7 PG (ref 26.5–33)
MCHC RBC AUTO-ENTMCNC: 33.1 G/DL (ref 31.5–36.5)
MCV RBC AUTO: 93 FL (ref 78–100)
MONOCYTES # BLD AUTO: 0.4 10E9/L (ref 0–1.3)
MONOCYTES NFR BLD AUTO: 9.4 %
NEUTROPHILS # BLD AUTO: 3.5 10E9/L (ref 1.6–8.3)
NEUTROPHILS NFR BLD AUTO: 84.4 %
NRBC # BLD AUTO: 0 10*3/UL
NRBC BLD AUTO-RTO: 0 /100
PHOSPHATE SERPL-MCNC: 0.9 MG/DL (ref 2.5–4.5)
PLATELET # BLD AUTO: 247 10E9/L (ref 150–450)
POTASSIUM SERPL-SCNC: 5.2 MMOL/L (ref 3.4–5.3)
RBC # BLD AUTO: 4.07 10E12/L (ref 4.4–5.9)
SODIUM SERPL-SCNC: 134 MMOL/L (ref 133–144)
TACROLIMUS BLD-MCNC: 9.5 UG/L (ref 5–15)
TME LAST DOSE: NORMAL H
WBC # BLD AUTO: 4.1 10E9/L (ref 4–11)

## 2018-06-03 PROCEDURE — 96366 THER/PROPH/DIAG IV INF ADDON: CPT | Mod: 59

## 2018-06-03 PROCEDURE — 85025 COMPLETE CBC W/AUTO DIFF WBC: CPT | Performed by: INTERNAL MEDICINE

## 2018-06-03 PROCEDURE — 80197 ASSAY OF TACROLIMUS: CPT | Performed by: INTERNAL MEDICINE

## 2018-06-03 PROCEDURE — 25000125 ZZHC RX 250: Mod: ZF | Performed by: STUDENT IN AN ORGANIZED HEALTH CARE EDUCATION/TRAINING PROGRAM

## 2018-06-03 PROCEDURE — 25000128 H RX IP 250 OP 636: Mod: ZF | Performed by: STUDENT IN AN ORGANIZED HEALTH CARE EDUCATION/TRAINING PROGRAM

## 2018-06-03 PROCEDURE — 83735 ASSAY OF MAGNESIUM: CPT | Performed by: INTERNAL MEDICINE

## 2018-06-03 PROCEDURE — 80048 BASIC METABOLIC PNL TOTAL CA: CPT | Performed by: INTERNAL MEDICINE

## 2018-06-03 PROCEDURE — 84100 ASSAY OF PHOSPHORUS: CPT | Performed by: INTERNAL MEDICINE

## 2018-06-03 PROCEDURE — 96365 THER/PROPH/DIAG IV INF INIT: CPT

## 2018-06-03 RX ORDER — DIBASIC SODIUM PHOSPHATE, MONOBASIC POTASSIUM PHOSPHATE AND MONOBASIC SODIUM PHOSPHATE 852; 155; 130 MG/1; MG/1; MG/1
250 TABLET ORAL 3 TIMES DAILY
Qty: 120 TABLET | Refills: 0 | Status: SHIPPED | OUTPATIENT
Start: 2018-06-03 | End: 2018-06-05

## 2018-06-03 RX ADMIN — SODIUM PHOSPHATE, MONOBASIC, MONOHYDRATE: 276; 142 INJECTION, SOLUTION INTRAVENOUS at 09:41

## 2018-06-03 NOTE — MR AVS SNAPSHOT
After Visit Summary   6/3/2018    Gilles Henning III    MRN: [id number]           Patient Information     Date Of Birth          1970        Visit Information        Provider Department      6/3/2018 7:00 AM UC 43 ATC; UC SPEC INFUSION [facility]        Today's Diagnoses     Kidney replaced by transplant    -  1    Hypophosphatemia          Care Instructions     phosphorus replacement from discharge pharmacy in hospital and start taking today  Stop phoslo    RN will call you if any changes to tacrolimus (Envarsus) dose    Continue to drink lots of fluids today    Return tomorrow for labs and assessment-bring medication card with to appt          Follow-ups after your visit        Your next 10 appointments already scheduled     Jun 04, 2018  7:00 AM CDT   (Arrive by 6:45 AM)   New Transplant Visit with UC SPEC INFUSION, UC 43 ATC   [facility] ([facility])    [address]  [address]  [place] [address]   [phone]            Jun 05, 2018  7:00 AM CDT   (Arrive by 6:45 AM)   New Transplant Visit with UC SPEC INFUSION, UC 42 ATC   [facility] ([facility])    [address]  [address]  [place] [address]   [phone]            Jun 08, 2018  8:15 AM CDT   New [facility] with Nikhil Cordero DO   [facility]    [address]  [place] [address]   [phone]            Jun 19, 2018  9:00 AM CDT   (Arrive by 8:45 AM)   Kidney Post Op with Mario Vallejo MD   [facility] Solid Organ Transplant ([facility])    [address]  [address]  [place] [address] [phone]            Jul 03, 2018 10:45 AM CDT   LAB with GH LAB   [facility] ([facility]  [facility])    [address] [facility] [address]   [phone]           Please do not eat 10-12 hours before your appointment if you are coming in fasting for labs on lipids, cholesterol, or glucose (sugar). This does not apply to pregnant women. Water, hot tea and black coffee (with nothing added) are okay. Do not drink other fluids, diet soda or chew gum.            Jul 09, 2018  4:00 PM CDT   (Arrive by 3:30 PM)   Return Kidney Transplant with  Kidney/Pancreas Recipient   [facility]ph[facility] ([facility])    [address]  [address]  [facility] [address]   [phone]            Jul 10, 2018 11:15 AM CDT   Return Visit with MICAH Fatima CNP   [facility] ([facility])    [address]  [place] [address]   [phone]            Aug 09, 2018  3:10 PM CDT   (Arrive by 2:40 PM)   Return Kidney Transplant with  Kidney/Pancreas Recipient   [facility] ([facility])    [address]  [address]  [facility] [address]   [phone]              Future tests that were ordered for you today     Open Future Orders        Priority Expected Expires Ordered    Phosphorus Routine 6/3/2018 7/3/2018 6/3/2018            Who to contact     If you have questions or need follow up information about today's clinic visit or your schedule please contact [facility] SPECIALTY AND PROCEDURE directly at [phone].  Normal or non-critical lab and imaging results will be communicated to you by MyChart, letter or phone within 4 business days after the clinic has received the results. If you do not hear from us within 7 days, please contact the clinic through MyChart or phone. If you have a critical or abnormal lab result, we will notify you by phone as soon as possible.  Submit refill requests through [employer] or call your pharmacy and they  "will forward the refill request to us. Please allow 3 business days for your refill to be completed.          Additional Information About Your Visit        [employer] Information     [employer] lets you send messages to your doctor, view your test results, renew your prescriptions, schedule appointments and more. To sign up, go to [email or web address] . Click on \"Log in\" on the left side of the screen, which will take you to the Welcome page. Then click on \"Sign up Now\" on the right side of the page.     You will be asked to enter the access code listed below, as well as some personal information. Please follow the directions to create your username and password.     Your access code is: [email or web address]  Expires: 2018 12:36 PM     Your access code will  in 90 days. If you need help or a new code, please call your [facility] clinic or [phone].        Care EveryWhere ID     This is your Care EveryWhere ID. This could be used by other organizations to access your [facility] medical records  [id number]        Your Vitals Were     Temperature Respirations Pulse Oximetry             97.7  F (36.5  C) (Oral) 18 100%          Blood Pressure from Last 3 Encounters:   18 (!) 144/91   18 152/84   18 132/82    Weight from Last 3 Encounters:   18 53.6 kg (118 lb 1.6 oz)   18 55.3 kg (122 lb)   18 58.7 kg (129 lb 6.4 oz)              We Performed the Following     Basic metabolic panel     CBC with platelets differential     Magnesium     Phosphorus     Tacrolimus level          Today's Medication Changes          These changes are accurate as of 6/3/18  1:52 PM.  If you have any questions, ask your nurse or doctor.               Start taking these medicines.        Dose/Directions    PHOSPHA 250 NEUTRAL 155-852-130 MG Tabs   Used for:  Hypophosphatemia, Kidney replaced by transplant        Dose:  250 mg   Take 250 mg by mouth 3 times daily   Quantity:  120 tablet   Refills:  0          "   Where to get your medicines      These medications were sent to [facility] - [place], [place] - [address]  [address] [address]     Phone:  [phone]     PHOSPHA 250 NEUTRAL [phone] MG Tabs                Primary Care Provider Office Phone # Fax #    Charlie Darrion Dubose [phone]       [address] [facility] [address]        Equal Access to Services     ALEXANDRA DOVE : Hadii aad ku hadasho Soomaali, waaxda luqadaha, qaybta kaalmada adeegyada, waxay idiin hayaan adeeg kharash lajohnathan . So [facility] [phone].    ATENCIÓN: Si yarila esppam, tiene a millan disposición servicios gratuitos de asistencia lingüística. [facility] [phone].    We comply with applicable federal civil rights laws and [place] laws. We do not discriminate on the basis of race, color, national origin, age, disability, sex, sexual orientation, or gender identity.            Thank you!     Thank you for choosing [facility] SPECIAL[facility] PROCED[facility]  for your care. Our goal is always to provide you with excellent care. Hearing back from our patients is one way we can continue to improve our services. Please take a few minutes to complete the written survey that you may receive in the mail after your visit with us. Thank you!             Your Updated Medication List - Protect others around you: Learn how to safely use, store and throw away your medicines at www.disposemymeds.org.          This list is accurate as of 6/3/18  1:52 PM.  Always use your most recent med list.                   Brand Name Dispense Instructions for use Diagnosis    acetaminophen 325 MG tablet    TYLENOL    80 tablet    Take 2 tablets (650 mg) by mouth every 6 hours for 10 days    Kidney transplant recipient       amLODIPine 5 MG tablet    NORVASC    30 tablet    Take 1 tablet (5 mg) by mouth daily    Kidney transplant recipient       atorvastatin 40 MG tablet     LIPITOR     Take 40 mg by mouth daily        clonazePAM 1 MG tablet    klonoPIN     Take 0.5-1 mg by mouth 2 times daily as needed (restless leg)        docusate sodium 100 MG capsule    COLACE     Take 200 mg by mouth 2 times daily        magnesium hydroxide 400 MG/5ML suspension    MILK OF MAGNESIA    355 mL    Take 30 mLs by mouth daily    Kidney transplant recipient       magnesium oxide 400 MG tablet    MAG-OX    30 tablet    Take 1 tablet (400 mg) by mouth daily (with lunch)    Kidney transplant recipient       metoprolol tartrate 50 MG tablet    LOPRESSOR    60 tablet    Take 1 tablet (50 mg) by mouth 2 times daily    Kidney transplant recipient       mycophenolate 250 MG capsule    GENERIC EQUIVALENT    180 capsule    Take 3 capsules (750 mg) by mouth 2 times daily    Kidney transplant recipient       NONFORMULARY      Salicylic acid 3% / 5-FU 4% in vanicream : Apply a thin layer to affected area once daily        omeprazole 40 MG capsule    priLOSEC    90 capsule    TAKE 1 CAPSULE BY MOUTH DAILY    Gastroesophageal reflux disease, esophagitis presence not specified       oxyCODONE IR 5 MG tablet    ROXICODONE    12 tablet    Take 1-2 tablets (5-10 mg) by mouth every 4 hours as needed for other (pain control or improvement in physical function. Hold dose for analgesic side effects.)    Kidney transplant recipient       PHOSPHA 250 NEUTRAL 155-852-130 MG Tabs     120 tablet    Take 250 mg by mouth 3 times daily    Hypophosphatemia, Kidney replaced by transplant       polyethylene glycol Packet    MIRALAX/GLYCOLAX    14 packet    Take 17 g by mouth daily as needed for constipation    Kidney transplant recipient       senna-docusate 8.6-50 MG per tablet    SENOKOT-S;PERICOLACE    100 tablet    Take 2 tablets by mouth 2 times daily as needed for constipation    Kidney transplant recipient       sulfamethoxazole-trimethoprim 400-80 MG per tablet    BACTRIM/SEPTRA    30 tablet    Take 1 tablet by mouth daily     Kidney transplant recipient       SUMAtriptan 25 MG tablet    IMITREX     Take 25 mg by mouth every 2 hours as needed for migraine Max 200mg/24 hours. Max 7/week. Max 30/month.        tacrolimus 1 MG 24 hr tablet    ENVARSUS XR    150 tablet    Take 5 tablets (5 mg) by mouth every morning (before breakfast)    Kidney transplant recipient       traZODone 150 MG tablet    DESYREL     Take 300 mg by mouth At Bedtime with food        valGANciclovir 450 MG tablet    VALCYTE    60 tablet    Take 2 tablets (900 mg) by mouth daily    Kidney transplant recipient       vitamin D 2000 units tablet      Take 2,000 Units by mouth daily

## 2018-06-03 NOTE — PROGRESS NOTES
DATE:  6/3/2018   TIME OF RECEIPT FROM LAB:  0749  LAB TEST:  Phosphorus  LAB VALUE:  0.9  RESULTS GIVEN WITH READ-BACK TO (PROVIDER):  Britney Way RN

## 2018-06-03 NOTE — PATIENT INSTRUCTIONS
phosphorus replacement from discharge pharmacy in hospital and start taking today  Stop darian    RN will call you if any changes to tacrolimus (Envarsus) dose    Continue to drink lots of fluids today    Return tomorrow for labs and assessment-bring medication card with to appt

## 2018-06-03 NOTE — PROGRESS NOTES
"Gilles Henning III came to [facility] today for a lab and assess following a kidney transplant on 5/30/18.      Discharge date: 6/2/18  Transplant coordinator: Maliha  Phone number patient can be reached at: [phone](pt)  [phone] (wife Merline)      Physical Assessment:  See physical assessment located under \"Document Flowsheets\".  Incision site: Staples; CDI  Lines: Travis drain; emptied 25cc during appointment today.  Left arm fistula  Lucas: n/a  Urine clarity: clear per pt  Hydration: pt drinking adequately; reminded of 2L daily non caffienated fluids  Nutrition: good appetite; no nausea   Last BM: 2 days ago; pt has miralax, sennakot, colace, and MOM to take at home  Pain: having incisional pain; oxycodone every 4 hours     Labs drawn by [facility] staff Yes    Plan of care for today: Pt and vitals assessed. Medications and 7A checklist reviewed. Pt did not bring medication card so that will need to be reviewed tomorrow. Pt has had previous transplant. Lab results/assessment reviewed with Dr. Martinez. Critical phos of 0.9 reviewed with MD. Plan today to infuse phosphorus today.   TORJENNIFER Connolly/Keny, RN  6/3/18 @ 0900  Infuse Na+Phos 25mmol in 1,000ml NS today  Recheck Phos level after infusion    SERENITY Martinez/Keny, RN  6/3/18 @ 0930  Do not need to recheck phos level after infusion  Stop phoslo  Start Phospha Neutral 250mg TID  Recheck labs tomorrow    Medication changes: stop phoslo  Start Phospha Neutral 250mg TID    Medications administered:  Administrations This Visit     sodium chloride 0.9 % 1,000 mL with sodium phosphate 25 mmol infusion     Admin Date Action Dose Rate Route Administered By          06/03/2018 New Bag   278.8 mL/hr Intravenous Britney Way, RN                           Patient education:    The following teaching topics were addressed: Importance of drinking 2L of non-caffeinated fluids daily, Incisional care, Medications (purposes, doses and times of administration), 7A discharge " check list and Plan of care   Patient and wife Merline verbalized understanding and all questions answered.    Drug level:  Prograf level today reviewed with Dr Martinez who gave orders to decrease dose to 4mg daily.  Patient was updated with this information and verbalized understanding. Pt will not need a tacrolimus level tomorrow due to this change per Dr. Martinez.    Face to face time: 30 minutes  Discharge Plan    Pt will follow up with [facility] tomorrow for labs and assessment.  Discharge instructions reviewed with patient: YES  Patient/Representative verbalized understanding, all questions answered: YES    Discharged from unit at 1410 with whom: wife to local hotel.    Britney Way RN     Administrations This Visit     sodium chloride 0.9 % 1,000 mL with sodium phosphate 25 mmol infusion     Admin Date Action Dose Rate Route Administered By          06/03/2018 New Bag   278.8 mL/hr Intravenous Britney Way RN

## 2018-06-04 ENCOUNTER — INFUSION THERAPY VISIT (OUTPATIENT)
Dept: INFUSION THERAPY | Facility: CLINIC | Age: 48
End: 2018-06-04
Attending: SURGERY
Payer: MEDICARE

## 2018-06-04 ENCOUNTER — OFFICE VISIT (OUTPATIENT)
Dept: INFUSION THERAPY | Facility: CLINIC | Age: 48
End: 2018-06-04
Attending: NURSE PRACTITIONER
Payer: MEDICARE

## 2018-06-04 ENCOUNTER — TELEPHONE (OUTPATIENT)
Dept: PHARMACY | Facility: CLINIC | Age: 48
End: 2018-06-04

## 2018-06-04 VITALS
OXYGEN SATURATION: 100 % | TEMPERATURE: 98.1 F | WEIGHT: 120.7 LBS | BODY MASS INDEX: 17.32 KG/M2 | RESPIRATION RATE: 18 BRPM

## 2018-06-04 DIAGNOSIS — Z11.59 ENCOUNTER FOR SCREENING FOR OTHER VIRAL DISEASES (CODE): ICD-10-CM

## 2018-06-04 DIAGNOSIS — R11.0 NAUSEA: Primary | ICD-10-CM

## 2018-06-04 DIAGNOSIS — Z94.0 KIDNEY REPLACED BY TRANSPLANT: Primary | ICD-10-CM

## 2018-06-04 DIAGNOSIS — Z48.298 AFTERCARE FOLLOWING ORGAN TRANSPLANT: ICD-10-CM

## 2018-06-04 PROBLEM — K59.09 OTHER CONSTIPATION: Status: ACTIVE | Noted: 2018-06-04

## 2018-06-04 LAB
ANION GAP SERPL CALCULATED.3IONS-SCNC: 10 MMOL/L (ref 3–14)
BACTERIA SPEC CULT: NO GROWTH
BASOPHILS # BLD AUTO: 0 10E9/L (ref 0–0.2)
BASOPHILS NFR BLD AUTO: 0.4 %
BUN SERPL-MCNC: 19 MG/DL (ref 7–30)
CALCIUM SERPL-MCNC: 9.2 MG/DL (ref 8.5–10.1)
CHLORIDE SERPL-SCNC: 99 MMOL/L (ref 94–109)
CO2 SERPL-SCNC: 20 MMOL/L (ref 20–32)
CREAT SERPL-MCNC: 1.2 MG/DL (ref 0.66–1.25)
DIFFERENTIAL METHOD BLD: ABNORMAL
EOSINOPHIL # BLD AUTO: 0.1 10E9/L (ref 0–0.7)
EOSINOPHIL NFR BLD AUTO: 1.5 %
ERYTHROCYTE [DISTWIDTH] IN BLOOD BY AUTOMATED COUNT: 15.9 % (ref 10–15)
GFR SERPL CREATININE-BSD FRML MDRD: 64 ML/MIN/1.7M2
GLUCOSE SERPL-MCNC: 102 MG/DL (ref 70–99)
HCT VFR BLD AUTO: 35.3 % (ref 40–53)
HGB BLD-MCNC: 11.9 G/DL (ref 13.3–17.7)
IMM GRANULOCYTES # BLD: 0.1 10E9/L (ref 0–0.4)
IMM GRANULOCYTES NFR BLD: 0.9 %
LYMPHOCYTES # BLD AUTO: 0.2 10E9/L (ref 0.8–5.3)
LYMPHOCYTES NFR BLD AUTO: 3.5 %
MAGNESIUM SERPL-MCNC: 2.3 MG/DL (ref 1.6–2.3)
MCH RBC QN AUTO: 30.3 PG (ref 26.5–33)
MCHC RBC AUTO-ENTMCNC: 33.7 G/DL (ref 31.5–36.5)
MCV RBC AUTO: 90 FL (ref 78–100)
MONOCYTES # BLD AUTO: 0.7 10E9/L (ref 0–1.3)
MONOCYTES NFR BLD AUTO: 12.9 %
NEUTROPHILS # BLD AUTO: 4.3 10E9/L (ref 1.6–8.3)
NEUTROPHILS NFR BLD AUTO: 80.8 %
NRBC # BLD AUTO: 0 10*3/UL
NRBC BLD AUTO-RTO: 0 /100
PHOSPHATE SERPL-MCNC: 1.8 MG/DL (ref 2.5–4.5)
PLATELET # BLD AUTO: 231 10E9/L (ref 150–450)
POTASSIUM SERPL-SCNC: 4.6 MMOL/L (ref 3.4–5.3)
RBC # BLD AUTO: 3.93 10E12/L (ref 4.4–5.9)
SODIUM SERPL-SCNC: 129 MMOL/L (ref 133–144)
SPECIMEN SOURCE: NORMAL
WBC # BLD AUTO: 5.4 10E9/L (ref 4–11)

## 2018-06-04 PROCEDURE — G0463 HOSPITAL OUTPT CLINIC VISIT: HCPCS

## 2018-06-04 PROCEDURE — 96361 HYDRATE IV INFUSION ADD-ON: CPT

## 2018-06-04 PROCEDURE — 96375 TX/PRO/DX INJ NEW DRUG ADDON: CPT

## 2018-06-04 PROCEDURE — 25000128 H RX IP 250 OP 636: Mod: ZF | Performed by: NURSE PRACTITIONER

## 2018-06-04 PROCEDURE — 84100 ASSAY OF PHOSPHORUS: CPT | Performed by: SURGERY

## 2018-06-04 PROCEDURE — 83735 ASSAY OF MAGNESIUM: CPT | Performed by: SURGERY

## 2018-06-04 PROCEDURE — 85025 COMPLETE CBC W/AUTO DIFF WBC: CPT | Performed by: SURGERY

## 2018-06-04 PROCEDURE — 96360 HYDRATION IV INFUSION INIT: CPT

## 2018-06-04 PROCEDURE — 96374 THER/PROPH/DIAG INJ IV PUSH: CPT

## 2018-06-04 PROCEDURE — 80048 BASIC METABOLIC PNL TOTAL CA: CPT | Performed by: SURGERY

## 2018-06-04 RX ORDER — PROCHLORPERAZINE MALEATE 10 MG
10 TABLET ORAL EVERY 6 HOURS PRN
Qty: 20 TABLET | Refills: 1 | Status: SHIPPED | OUTPATIENT
Start: 2018-06-04 | End: 2018-06-06

## 2018-06-04 RX ORDER — ONDANSETRON 2 MG/ML
4 INJECTION INTRAMUSCULAR; INTRAVENOUS ONCE
Status: COMPLETED | OUTPATIENT
Start: 2018-06-04 | End: 2018-06-04

## 2018-06-04 RX ADMIN — SODIUM CHLORIDE 1000 ML: 9 INJECTION, SOLUTION INTRAVENOUS at 08:43

## 2018-06-04 RX ADMIN — ONDANSETRON 4 MG: 2 INJECTION INTRAMUSCULAR; INTRAVENOUS at 08:43

## 2018-06-04 NOTE — PROGRESS NOTES
Transplant Social Work Service Discharge Note      Patient Name:  Gilles Henning III     Anticipated Discharge Date:  6/2/2018    Discharge Disposition:   Home with assist.    Plan for 24 hour care for immediate post transplant period: Pts wife will be his primary caregiver.    If not local, plans for short term stay:  Hotel. SW provided lodging resources to pt and wife.    Additional Services/Equipment Arranged:  No SW services arranged. See RNCC note.     Persons notified of above discharge plan:  Pt, medical team.    Patient / Family response to discharge plan:  In agreement.    Education and resources provided by SW at discharge: role of transplant  in out patient setting and provided contact info for , availability of support groups.    Discussed anticipated pharmacy out of pocket costs: YES    Provided [employer] Donor Letter Writing packet : YES    Plan: Plan is for pt to stay locally in a hotel with his wife while he attends follow up appointments.    ETTA Bee, [facility]  7A   Ph: [phone], Pager: [phone]

## 2018-06-04 NOTE — LETTER
OUTPATIENT LABORATORY TEST ORDER    Patient Name:Gilles Henning III   Transplant Date: 5/30/2018  YOB: 1970  Issue Date & Time: June 4, 2018 7:59 AM     [facility] MR: [id number]  Expiration Date:  (1 year after date issued)      Diagnoses: Aftercare following organ transplant (ICD-10 Z48.288)   Kidney Transplant (ICD-10 Z94.0)   Long term use of medications (ICD-10  Z79.899)     ?Lab results to be available on the same day drawn.   ?Patient should release information to the [facility], [facility].     ?Please fax to the Transplant Center at [phone].    3x/week, First 4Weeks post-transplant    5/30/2018 - 6/30/2018    2x/week, Months 2-3 post-transplant    7/1/2018 - 9/1/2018       1x/week, Months 4-6 post-transplant    9/2/2018 - 12/2/2018  Every 2 weeks, Months 7-9 post-transplant    12/3/2018 - 3/3/2019  Every 3 weeks, Months 10-12 post-transplant   3/5/2019 - 5/30/2019        ?Hemogram and Platelet   ?Basic Metabolic Panel (Sodium, Potassium,Chloride, CO2, Creatinine, Urea Nitrogen, Glucose, Calcium)   ?Prograf/Tacrolimus drug level     Weekly through first 3 months post-transplant    5/30/2018 - 8/30/2018   ?Phosphorus, Magnesium   ?MPA level (mycophenolic acid) once per week for first 3 months.    ?Please add a diff to hemogram once per week for the first 3 months.    Monthly   ? BK PCR Quantitative (Polyoma virus - blood in purple top tube to Reference Lab)    At 6 & 12 months post-transplant         11/2018 & 5/2019   ? HBsurfaceAb, HBcoreAb, HCV at 6 months only -   ? Liver Function Tests(Bilirubin Direct/Total, AST, ALT, Alkaline Phosphatase)   ?Complete Lipid Panel fasting (Cholesterol, Triglycerides, HDL, LDL)   ? Random Urine for Protein/Creatinine ratio    At 7-12 months post-transplant - Monthly   ?EBV PCR QT    Month 1 post-transplant = Day 4,7,10,14,21,28  Month 2 post-transplant = Every 2 weeks  Month 3 - 12 post-transplant = Monthly   ?  PRA/DSA level (mailers provided by the patient)    If you have any questions, please call [facility] at [phone] or [phone].    Please fax all results to [phone].          Mario Vallejo MD  Department of Surgery

## 2018-06-04 NOTE — PROGRESS NOTES
"Gilles Henning III came to [facility] today for a lab and assess following a kidney transplant on 5/30/18.      Discharge date: 6/2/18  Transplant coordinator: Maliha HAMMONDS  Phone number patient can be reached at: [phone](pt)  [phone] (wife Merline)      Physical Assessment:  See physical assessment located under \"Document Flowsheets\".  Incision site: staples; CDI  Lines: LUZ drain  Lucas: n/a  Urine clarity: yellow; clear  Hydration: pt reports drinking at least 64 oz yesterday. Pt does report dizziness and positive for orthostatic BP drop.  Nutrition: good appetite; some nausea/dry heaving overnight   Last BM: only watery overnight after suppository; last formed was 3 days ago  Pain: controlled with 1/2 oxycodone    Labs drawn by [facility] staff Yes    Plan of care for today: Vitals and medications reviewed. RN updated medication card and pill box. Pt was seen today by Rachel Resendez, specialty pharmacist and transplant nurse coordinator. Plan today to given tap water enema.   VORB Rachel Resendez/SSkildum, RN  6/4/18 @ 0900  Given tap water enema x 1 today  Infuse 1L NS  Give Zofran 4mg IV x 1    Enema given over 3 attempts and pt had small liquid results each time. Pt does report feeling better. NS and zofran given. Nausea somewhat improved but pt does still report nausea after taking pills and had 1 episode of emesis.     Medication changes: Compazine 10mg PO every 6 hours prn nausea    Medications administered:     Administrations This Visit     0.9% sodium chloride BOLUS     Admin Date Action Dose Rate Route Administered By          06/04/2018 New Bag 1000 mL 1,000 mL/hr Intravenous Britney Way RN                   ondansetron (ZOFRAN) injection 4 mg     Admin Date Action Dose Route Administered By             06/04/2018 Given 4 mg Intravenous Britney Way RN                          Patient education:    The following teaching topics were addressed: Importance of drinking 2L of non-caffeinated fluids daily, " Medications (purposes, doses and times of administration), 7A discharge check list and Plan of care   Patient and wife Merline verbalized understanding and all questions answered.    Drug level:  Tacrolimus level was not drawn today due to dose adjustment this am. (Pt on Envarsus)  Face to face time: 20  Discharge Plan    Pt will follow up with [facility] tomorrow for labs and assessment  Discharge instructions reviewed with patient: YES  Patient/Representative verbalized understanding, all questions answered: YES    Discharged from unit at 1215 with whom: wife to local hotel.    Britney Way RN

## 2018-06-04 NOTE — MR AVS SNAPSHOT
After Visit Summary   6/4/2018    Gilles Henning III    MRN: [id number]           Patient Information     Date Of Birth          1970        Visit Information        Provider Department      6/4/2018 7:00 AM UC 43 ATC; [facility] Specialty and Procedure        Today's Diagnoses     Kidney replaced by transplant    -  1      Care Instructions     compazine at pharmacy on 1st floor  Take 1 tab every 6 hours as needed for nausea    Continue to drink lots of fluids    Activity as tolerated to help bowels    Return to Specialty Infusion tomorrow at 7am for labs and assessment          Follow-ups after your visit        Your next 10 appointments already scheduled     Jun 05, 2018  7:00 AM CDT   (Arrive by 6:45 AM)   New Transplant Visit with UC SPEC INFUSION, UC 42 ATC   [facility] ([facility])    [address]  [address]  [place] [address]   [phone]            Jun 05, 2018  8:00 AM CDT   (Arrive by 7:45 AM)   Kidney Transplant Discharge with Rachel Resendez NP   [facility] ([facility])    [address]  [address]  [place] [address] [phone]            Jun 08, 2018  8:15 AM CDT   New Visit with Nikhil Cordero DO   [facility]    [address]  [place] [id number] [phone]            Jun 19, 2018  9:00 AM CDT   (Arrive by 8:45 AM)   Kidney Post Op with Mario Vallejo MD   [facility] Transplant ([facility])    [address]  [address]  [place] [address] [phone]            Jul 03, 2018 10:45 AM CDT   LAB with GH LAB   [facility]    [address] [facility] [address]    [phone]           Please do not eat 10-12 hours before your appointment if you are coming in fasting for labs on lipids, cholesterol, or glucose (sugar). This does not apply to pregnant women. Water, hot tea and black coffee (with nothing added) are okay. Do not drink other fluids, diet soda or chew gum.            Jul 09, 2018  4:00 PM CDT   (Arrive by 3:30 PM)   Return Kidney Transplant with Uc Kidney/Pancreas Recipient   [facility] ([facility])    [address]  [address]  [facility] [address]   [phone]            Jul 10, 2018 11:15 AM CDT   Return Visit with MICAH Fatima CNP   [facility] ([facility]    [address]  [facility] [address]   [phone]            Aug 09, 2018  3:10 PM CDT   (Arrive by 2:40 PM)   Return Kidney Transplant with Uc Kidney/Pancreas Recipient   [facility] ([facility])    [address]  [address]  [place] [address]   [phone]            Oct 11, 2018  1:30 PM CDT   (Arrive by 1:00 PM)   Return Kidney Transplant with Uc Kidney/Pancreas Recipient   [facility] ([facility])    [address]  [address]  [place] [address]   [phone]              Future tests that were ordered for you today     Open Standing Orders        Priority Remaining Interval Expires Ordered    CBC with platelets Routine 6/6 Every Other Week 3/11/2019 6/4/2018    Basic metabolic panel Routine 6/6 Every Other Week 3/11/2019 6/4/2018    CBC with platelets Routine 4/4 Every 3 Weeks 6/4/2019 6/4/2018    Basic metabolic panel Routine 4/4 Every 3 Weeks 6/4/2019 6/4/2018    Phosphorus Routine 12/12 Weekly 10/2/2018 6/4/2018    Magnesium Routine 12/12 Weekly 10/2/2018 6/4/2018    Mycophenolic acid Routine 12/12 Weekly 10/2/2018 6/4/2018    WBC Differential Routine 12/12 Weekly 10/2/2018 6/4/2018     Hepatic panel Routine 2/2 Every 6 Months 6/4/2019 6/4/2018    Lipid Profile Routine 2/2 Every 6 Months 6/4/2019 6/4/2018    Protein  random urine with Creat Ratio Routine 2/2 Every 6 Months 6/4/2019 6/4/2018    PRA Donor Specific Antibody Routine 18/18 6/4/2019 6/4/2018    Tacrolimus level Routine 16/16 2X Week 11/11/2018 6/4/2018    Tacrolimus level Routine 12/12 Weekly 12/11/2018 6/4/2018    Tacrolimus level Routine 6/6 Every Other Week 3/11/2019 6/4/2018    Tacrolimus level Routine 4/4 Every 3 Weeks 6/4/2019 6/4/2018    BK virus PCR quantitative Routine 12/12 Monthly 6/4/2019 6/4/2018    CBC with platelets Routine 12/12 3X Week 7/9/2018 6/4/2018    Basic metabolic panel Routine 12/12 3X Week 7/9/2018 6/4/2018    CBC with platelets Routine 16/16 2X Week 11/11/2018 6/4/2018    Basic metabolic panel Routine 16/16 2X Week 11/11/2018 6/4/2018    CBC with platelets Routine 12/12 Weekly 12/11/2018 6/4/2018    Basic metabolic panel Routine 12/12 Weekly 12/11/2018 6/4/2018          Open Future Orders        Priority Expected Expires Ordered    EBV DNA PCR Quantitative Whole Blood Routine 12/31/2018 5/31/2019 6/4/2018    Hepatitis B Surface Antibody Routine 12/1/2018 12/15/2018 6/4/2018    Hepatitis B core antibody Routine 12/1/2018 12/15/2018 6/4/2018    Hepatitis C antibody Routine 12/1/2018 12/15/2018 6/4/2018    Phosphorus Routine 6/3/2018 7/3/2018 6/3/2018            Who to contact     If you have questions or need follow up information about today's clinic visit or your schedule please contact [facility] directly at [phone].  Normal or non-critical lab and imaging results will be communicated to you by MyChart, letter or phone within 4 business days after the clinic has received the results. If you do not hear from us within 7 days, please contact the clinic through MyChart or phone. If you have a critical or abnormal lab result, we will notify you by phone as  soon as possible.  Submit refill requests through [employer] or call your pharmacy and they will forward the refill request to us. Please allow 3 business days for your refill to be completed.          Additional Information About Your Visit        Care EveryWhere ID     This is your Care EveryWhere ID. This could be used by other organizations to access your [place] medical records  [id number]        Your Vitals Were     Temperature Respirations Pulse Oximetry BMI (Body Mass Index)          98.1  F (36.7  C) (Oral) 18 100% 17.32 kg/m2         Blood Pressure from Last 3 Encounters:   06/02/18 (!) 144/91   05/14/18 152/84   04/04/18 132/82    Weight from Last 3 Encounters:   06/04/18 54.7 kg (120 lb 11.2 oz)   06/02/18 53.6 kg (118 lb 1.6 oz)   05/14/18 55.3 kg (122 lb)              We Performed the Following     Basic metabolic panel     CBC with platelets differential     Magnesium     Phosphorus          Today's Medication Changes          These changes are accurate as of 6/4/18 11:36 AM.  If you have any questions, ask your nurse or doctor.               Start taking these medicines.        Dose/Directions    prochlorperazine 10 MG tablet   Commonly known as:  COMPAZINE   Used for:  Nausea   Started by:  Rachel Resendez NP        Dose:  10 mg   Take 1 tablet (10 mg) by mouth every 6 hours as needed for nausea or vomiting   Quantity:  20 tablet   Refills:  1            Where to get your medicines      These medications were sent to [address] [place] [address] [address] [place] [address]    Hours:  TRANSPLANT PHONE NUMBER [phone] Phone:  [phone]     prochlorperazine 10 MG tablet                Primary Care Provider Office Phone # Fax #    Charlie Dubose [phone]       [address]  [facility] [address]        Equal Access to Services     ALEXANDRA DOVE AH: arabella Mejias  saima marbellalaure linpascale wolff ah. So [facility] [phone].    ATENCIÓN: Si twan reid, tiene a millan disposición servicios gratuitos de asistencia lingüística. Denys al [phone].    We comply with applicable federal civil rights laws and [place] laws. We do not discriminate on the basis of race, color, national origin, age, disability, sex, sexual orientation, or gender identity.            Thank you!     Thank you for choosing [facility] SPECIALTY [facility] PROCEDURE  for your care. Our goal is always to provide you with excellent care. Hearing back from our patients is one way we can continue to improve our services. Please take a few minutes to complete the written survey that you may receive in the mail after your visit with us. Thank you!             Your Updated Medication List - Protect others around you: Learn how to safely use, store and throw away your medicines at www.disposemymeds.org.          This list is accurate as of 6/4/18 11:36 AM.  Always use your most recent med list.                   Brand Name Dispense Instructions for use Diagnosis    acetaminophen 325 MG tablet    TYLENOL    80 tablet    Take 2 tablets (650 mg) by mouth every 6 hours for 10 days    Kidney transplant recipient       amLODIPine 5 MG tablet    NORVASC    30 tablet    Take 1 tablet (5 mg) by mouth daily    Kidney transplant recipient       atorvastatin 40 MG tablet    LIPITOR     Take 40 mg by mouth daily        clonazePAM 1 MG tablet    klonoPIN     Take 0.5-1 mg by mouth 2 times daily as needed (restless leg)        docusate sodium 100 MG capsule    COLACE     Take 200 mg by mouth 2 times daily        magnesium hydroxide 400 MG/5ML suspension    MILK OF MAGNESIA    355 mL    Take 30 mLs by mouth daily    Kidney transplant recipient       magnesium oxide 400 MG tablet    MAG-OX    30 tablet    Take 1 tablet (400 mg) by mouth daily (with lunch)    Kidney  transplant recipient       metoprolol tartrate 50 MG tablet    LOPRESSOR    60 tablet    Take 1 tablet (50 mg) by mouth 2 times daily    Kidney transplant recipient       mycophenolate 250 MG capsule    GENERIC EQUIVALENT    180 capsule    Take 3 capsules (750 mg) by mouth 2 times daily    Kidney transplant recipient       NONFORMULARY      Salicylic acid 3% / 5-FU 4% in vanicream : Apply a thin layer to affected area once daily        omeprazole 40 MG capsule    priLOSEC    90 capsule    TAKE 1 CAPSULE BY MOUTH DAILY    Gastroesophageal reflux disease, esophagitis presence not specified       oxyCODONE IR 5 MG tablet    ROXICODONE    12 tablet    Take 1-2 tablets (5-10 mg) by mouth every 4 hours as needed for other (pain control or improvement in physical function. Hold dose for analgesic side effects.)    Kidney transplant recipient       PHOSPHA 250 NEUTRAL [phone] MG Tabs     120 tablet    Take 250 mg by mouth 3 times daily    Hypophosphatemia, Kidney replaced by transplant       polyethylene glycol Packet    MIRALAX/GLYCOLAX    14 packet    Take 17 g by mouth daily as needed for constipation    Kidney transplant recipient       prochlorperazine 10 MG tablet    COMPAZINE    20 tablet    Take 1 tablet (10 mg) by mouth every 6 hours as needed for nausea or vomiting    Nausea       senna-docusate 8.6-50 MG per tablet    SENOKOT-S;PERICOLACE    100 tablet    Take 2 tablets by mouth 2 times daily as needed for constipation    Kidney transplant recipient       sulfamethoxazole-trimethoprim 400-80 MG per tablet    BACTRIM/SEPTRA    30 tablet    Take 1 tablet by mouth daily    Kidney transplant recipient       SUMAtriptan 25 MG tablet    IMITREX     Take 25 mg by mouth every 2 hours as needed for migraine Max 200mg/24 hours. Max 7/week. Max 30/month.        tacrolimus 1 MG 24 hr tablet    ENVARSUS XR    150 tablet    Take 5 tablets (5 mg) by mouth every morning (before breakfast)    Kidney transplant recipient        traZODone 150 MG tablet    DESYREL     Take 300 mg by mouth At Bedtime with food        valGANciclovir 450 MG tablet    VALCYTE    60 tablet    Take 2 tablets (900 mg) by mouth daily    Kidney transplant recipient       vitamin D 2000 units tablet      Take 2,000 Units by mouth daily

## 2018-06-04 NOTE — MR AVS SNAPSHOT
After Visit Summary   6/4/2018    Gilles Henning III    MRN: [id number]           Patient Information     Date Of Birth          1970        Visit Information        Provider Department      6/4/2018 8:00 AM Rachel Resendez NP [facility]        Today's Diagnoses     Nausea    -  1       Follow-ups after your visit        Your next 10 appointments already scheduled     Jun 06, 2018  8:00 AM CDT   (Arrive by 7:45 AM)   New Transplant Visit with UC SPEC INFUSION, UC 41 ATC   [facility] Specialty and Procedure ([facility])    [address]  [address]  [place] [address]   [phone]            Jun 19, 2018  9:00 AM CDT   (Arrive by 8:45 AM)   Kidney Post Op with Mario Vallejo MD   [facility] Solid Organ Transplant ([facility])    [address]  [address]  [place] [id number] [phone]            Jul 03, 2018 10:45 AM CDT   LAB with GH LAB   [facility] [place] [facility] ([facility])    [address] [facility] [id number] [phone]           Please do not eat 10-12 hours before your appointment if you are coming in fasting for labs on lipids, cholesterol, or glucose (sugar). This does not apply to pregnant women. Water, hot tea and black coffee (with nothing added) are okay. Do not drink other fluids, diet soda or chew gum.            Jul 09, 2018  4:00 PM CDT   (Arrive by 3:30 PM)   Return Kidney Transplant with  Kidney/Pancreas Recipient   [facility] Nephrology ([facility])    [address]  [address]  [place] [address] [phone]            Jul 10, 2018 11:15 AM CDT   Return Visit with MICAH Fatima CNP   [facility] ([facility])    [address]  [place] [id number] [phone]             Aug 09, 2018  3:10 PM CDT   (Arrive by 2:40 PM)   Return Kidney Transplant with Uc Kidney/Pancreas Recipient   [facility] Nephrology ([facility])    [address]  [address]  [place] [address]   [phone]            Oct 11, 2018  1:30 PM CDT   (Arrive by 1:00 PM)   Return Kidney Transplant with Uc Kidney/Pancreas Recipient   [facility] Nephrology ([facility])    [address]  [address]  [place] [address]   [phone]            Dec 10, 2018  1:30 PM CST   (Arrive by 1:00 PM)   Return Kidney Transplant with Uc Kidney/Pancreas Recipient   [facility] Nephrology ([facility])    [address]  [address]  [place] [address]   [phone]              Future tests that were ordered for you today     Open Standing Orders        Priority Remaining Interval Expires Ordered    CBC with platelets Routine 6/6 Every Other Week 3/11/2019 6/4/2018    Basic metabolic panel Routine 6/6 Every Other Week 3/11/2019 6/4/2018    CBC with platelets Routine 4/4 Every 3 Weeks 6/4/2019 6/4/2018    Basic metabolic panel Routine 4/4 Every 3 Weeks 6/4/2019 6/4/2018    Phosphorus Routine 11/12 Weekly 10/2/2018 6/4/2018    Magnesium Routine 11/12 Weekly 10/2/2018 6/4/2018    Mycophenolic acid Routine 12/12 Weekly 10/2/2018 6/4/2018    WBC Differential Routine 11/12 Weekly 10/2/2018 6/4/2018    Hepatic panel Routine 2/2 Every 6 Months 6/4/2019 6/4/2018    Lipid Profile Routine 2/2 Every 6 Months 6/4/2019 6/4/2018    Protein  random urine with Creat Ratio Routine 2/2 Every 6 Months 6/4/2019 6/4/2018    PRA Donor Specific Antibody Routine 18/18 6/4/2019 6/4/2018    Tacrolimus level Routine 15/16 2X Week 11/11/2018 6/4/2018    Tacrolimus level Routine 12/12 Weekly 12/11/2018 6/4/2018    Tacrolimus level Routine 6/6 Every Other Week 3/11/2019 6/4/2018    Tacrolimus level Routine 4/4 Every 3 Weeks 6/4/2019 6/4/2018    BK virus PCR  quantitative Routine 12/12 Monthly 6/4/2019 6/4/2018    CBC with platelets Routine 11/12 3X Week 7/9/2018 6/4/2018    Basic metabolic panel Routine 11/12 3X Week 7/9/2018 6/4/2018    CBC with platelets Routine 16/16 2X Week 11/11/2018 6/4/2018    Basic metabolic panel Routine 16/16 2X Week 11/11/2018 6/4/2018    CBC with platelets Routine 12/12 Weekly 12/11/2018 6/4/2018    Basic metabolic panel Routine 12/12 Weekly 12/11/2018 6/4/2018          Open Future Orders        Priority Expected Expires Ordered    EBV DNA PCR Quantitative Whole Blood Routine 12/31/2018 5/31/2019 6/4/2018    Hepatitis B Surface Antibody Routine 12/1/2018 12/15/2018 6/4/2018    Hepatitis B core antibody Routine 12/1/2018 12/15/2018 6/4/2018    Hepatitis C antibody Routine 12/1/2018 12/15/2018 6/4/2018            Who to contact     If you have questions or need follow up information about today's clinic visit or your schedule please contact [facility] SPECIALTY AND PROCEDURE directly at [phone].  Normal or non-critical lab and imaging results will be communicated to you by [employer], letter or phone within 4 business days after the clinic has received the results. If you do not hear from us within 7 days, please contact the clinic through [employer] or phone. If you have a critical or abnormal lab result, we will notify you by phone as soon as possible.  Submit refill requests through [employer] or call your pharmacy and they will forward the refill request to us. Please allow 3 business days for your refill to be completed.          Additional Information About Your Visit        [employer] Information     [employer] gives you secure access to your electronic health record. If you see a primary care provider, you can also send messages to your care team and make appointments. If you have questions, please call your primary care clinic.  If you do not have a primary care provider, please call [phone] and they will assist  you.        Care EveryWhere ID     This is your Care EveryWhere ID. This could be used by other organizations to access your [facility] medical records  [id number]         Blood Pressure from Last 3 Encounters:   06/05/18 98/70   06/02/18 (!) 144/91   05/14/18 152/84    Weight from Last 3 Encounters:   06/05/18 54.4 kg (119 lb 14.9 oz)   06/04/18 54.7 kg (120 lb 11.2 oz)   06/02/18 53.6 kg (118 lb 1.6 oz)              Today, you had the following     No orders found for display         Today's Medication Changes          These changes are accurate as of 6/4/18 11:59 PM.  If you have any questions, ask your nurse or doctor.               Start taking these medicines.        Dose/Directions    prochlorperazine 10 MG tablet   Commonly known as:  COMPAZINE   Used for:  Nausea   Started by:  Rachel Resendez NP        Dose:  10 mg   Take 1 tablet (10 mg) by mouth every 6 hours as needed for nausea or vomiting   Quantity:  20 tablet   Refills:  1            Where to get your medicines      These medications were sent to [facility] [address]  [address] [address]    Hours:  TRANSPLANT PHONE NUMBER [phone] Phone:  [phone]     prochlorperazine 10 MG tablet                Primary Care Provider Office Phone # Fax #    Charlie Darrion Dubose [phone]       [address] [facility] [address]        Equal Access to Services     MARCOS DOVE AH: Hadii kev braga hadasho Sofrankali, waaxda luqadaha, qaybta kaalmada adeegyada, pascale king. So [facility] [phone].    ATENCIÓN: Si habla español, tiene a millan disposición servicios gratuitos de asistencia lingüística. Llame al [phone].    We comply with applicable federal civil rights laws and [place] laws. We do not discriminate on the basis of race, color, national origin, age, disability, sex, sexual orientation, or gender  identity.            Thank you!     Thank you for choosing [facility] SPECIALTY AND PROCEDURE  for your care. Our goal is always to provide you with excellent care. Hearing back from our patients is one way we can continue to improve our services. Please take a few minutes to complete the written survey that you may receive in the mail after your visit with us. Thank you!             Your Updated Medication List - Protect others around you: Learn how to safely use, store and throw away your medicines at www.disposemymeds.org.          This list is accurate as of 6/4/18 11:59 PM.  Always use your most recent med list.                   Brand Name Dispense Instructions for use Diagnosis    acetaminophen 325 MG tablet    TYLENOL    80 tablet    Take 2 tablets (650 mg) by mouth every 6 hours for 10 days    Kidney transplant recipient       amLODIPine 5 MG tablet    NORVASC    30 tablet    Take 1 tablet (5 mg) by mouth daily    Kidney transplant recipient       atorvastatin 40 MG tablet    LIPITOR     Take 40 mg by mouth daily        clonazePAM 1 MG tablet    klonoPIN     Take 0.5-1 mg by mouth 2 times daily as needed (restless leg)        docusate sodium 100 MG capsule    COLACE     Take 200 mg by mouth 2 times daily        magnesium hydroxide 400 MG/5ML suspension    MILK OF MAGNESIA    355 mL    Take 30 mLs by mouth daily    Kidney transplant recipient       magnesium oxide 400 MG tablet    MAG-OX    30 tablet    Take 1 tablet (400 mg) by mouth daily (with lunch)    Kidney transplant recipient       metoprolol tartrate 50 MG tablet    LOPRESSOR    60 tablet    Take 1 tablet (50 mg) by mouth 2 times daily    Kidney transplant recipient       mycophenolate 250 MG capsule    GENERIC EQUIVALENT    180 capsule    Take 3 capsules (750 mg) by mouth 2 times daily    Kidney transplant recipient       NONFORMULARY      Salicylic acid 3% / 5-FU 4% in vanicream : Apply a thin layer to affected area once  daily        omeprazole 40 MG capsule    priLOSEC    90 capsule    TAKE 1 CAPSULE BY MOUTH DAILY    Gastroesophageal reflux disease, esophagitis presence not specified       oxyCODONE IR 5 MG tablet    ROXICODONE    12 tablet    Take 1-2 tablets (5-10 mg) by mouth every 4 hours as needed for other (pain control or improvement in physical function. Hold dose for analgesic side effects.)    Kidney transplant recipient       polyethylene glycol Packet    MIRALAX/GLYCOLAX    14 packet    Take 17 g by mouth daily as needed for constipation    Kidney transplant recipient       prochlorperazine 10 MG tablet    COMPAZINE    20 tablet    Take 1 tablet (10 mg) by mouth every 6 hours as needed for nausea or vomiting    Nausea       senna-docusate 8.6-50 MG per tablet    SENOKOT-S;PERICOLACE    100 tablet    Take 2 tablets by mouth 2 times daily as needed for constipation    Kidney transplant recipient       sulfamethoxazole-trimethoprim 400-80 MG per tablet    BACTRIM/SEPTRA    30 tablet    Take 1 tablet by mouth daily    Kidney transplant recipient       SUMAtriptan 25 MG tablet    IMITREX     Take 25 mg by mouth every 2 hours as needed for migraine Max 200mg/24 hours. Max 7/week. Max 30/month.        tacrolimus 1 MG 24 hr tablet    ENVARSUS XR    150 tablet    Take 5 tablets (5 mg) by mouth every morning (before breakfast)    Kidney transplant recipient       traZODone 150 MG tablet    DESYREL     Take 300 mg by mouth At Bedtime with food        valGANciclovir 450 MG tablet    VALCYTE    60 tablet    Take 2 tablets (900 mg) by mouth daily    Kidney transplant recipient       vitamin D 2000 units tablet      Take 2,000 Units by mouth daily

## 2018-06-04 NOTE — PATIENT INSTRUCTIONS
compazine at pharmacy on 1st floor  Take 1 tab every 6 hours as needed for nausea    Continue to drink lots of fluids    Activity as tolerated to help bowels    Return to Specialty Infusion tomorrow at 7am for labs and assessment

## 2018-06-05 ENCOUNTER — INFUSION THERAPY VISIT (OUTPATIENT)
Dept: INFUSION THERAPY | Facility: CLINIC | Age: 48
End: 2018-06-05
Attending: SURGERY
Payer: MEDICARE

## 2018-06-05 ENCOUNTER — TELEPHONE (OUTPATIENT)
Dept: TRANSPLANT | Facility: CLINIC | Age: 48
End: 2018-06-05

## 2018-06-05 ENCOUNTER — OFFICE VISIT (OUTPATIENT)
Dept: TRANSPLANT | Facility: CLINIC | Age: 48
End: 2018-06-05

## 2018-06-05 ENCOUNTER — OFFICE VISIT (OUTPATIENT)
Dept: INFUSION THERAPY | Facility: CLINIC | Age: 48
End: 2018-06-05
Attending: NURSE PRACTITIONER
Payer: MEDICARE

## 2018-06-05 VITALS
BODY MASS INDEX: 17.21 KG/M2 | SYSTOLIC BLOOD PRESSURE: 98 MMHG | WEIGHT: 119.93 LBS | DIASTOLIC BLOOD PRESSURE: 70 MMHG | OXYGEN SATURATION: 100 % | TEMPERATURE: 97.4 F | HEART RATE: 81 BPM | RESPIRATION RATE: 18 BRPM

## 2018-06-05 DIAGNOSIS — Z94.0 KIDNEY TRANSPLANT RECIPIENT: ICD-10-CM

## 2018-06-05 DIAGNOSIS — E83.39 HYPOPHOSPHATEMIA: ICD-10-CM

## 2018-06-05 DIAGNOSIS — Z48.298 AFTERCARE FOLLOWING ORGAN TRANSPLANT: ICD-10-CM

## 2018-06-05 DIAGNOSIS — Z94.0 KIDNEY REPLACED BY TRANSPLANT: ICD-10-CM

## 2018-06-05 DIAGNOSIS — Z94.0 KIDNEY TRANSPLANT RECIPIENT: Primary | ICD-10-CM

## 2018-06-05 LAB
ANION GAP SERPL CALCULATED.3IONS-SCNC: 9 MMOL/L (ref 3–14)
BASOPHILS # BLD AUTO: 0 10E9/L (ref 0–0.2)
BASOPHILS NFR BLD AUTO: 0.4 %
BUN SERPL-MCNC: 17 MG/DL (ref 7–30)
CALCIUM SERPL-MCNC: 9.2 MG/DL (ref 8.5–10.1)
CHLORIDE SERPL-SCNC: 100 MMOL/L (ref 94–109)
CO2 SERPL-SCNC: 21 MMOL/L (ref 20–32)
CREAT SERPL-MCNC: 1.33 MG/DL (ref 0.66–1.25)
DEPRECATED CALCIDIOL+CALCIFEROL SERPL-MC: 57 UG/L (ref 20–75)
DIFFERENTIAL METHOD BLD: ABNORMAL
EOSINOPHIL # BLD AUTO: 0.1 10E9/L (ref 0–0.7)
EOSINOPHIL NFR BLD AUTO: 1.5 %
ERYTHROCYTE [DISTWIDTH] IN BLOOD BY AUTOMATED COUNT: 16.4 % (ref 10–15)
GFR SERPL CREATININE-BSD FRML MDRD: 57 ML/MIN/1.7M2
GLUCOSE SERPL-MCNC: 108 MG/DL (ref 70–99)
HCT VFR BLD AUTO: 35.9 % (ref 40–53)
HGB BLD-MCNC: 12.3 G/DL (ref 13.3–17.7)
IMM GRANULOCYTES # BLD: 0.1 10E9/L (ref 0–0.4)
IMM GRANULOCYTES NFR BLD: 2.1 %
LYMPHOCYTES # BLD AUTO: 0.3 10E9/L (ref 0.8–5.3)
LYMPHOCYTES NFR BLD AUTO: 6.3 %
MAGNESIUM SERPL-MCNC: 2.3 MG/DL (ref 1.6–2.3)
MCH RBC QN AUTO: 31.1 PG (ref 26.5–33)
MCHC RBC AUTO-ENTMCNC: 34.3 G/DL (ref 31.5–36.5)
MCV RBC AUTO: 91 FL (ref 78–100)
MONOCYTES # BLD AUTO: 0.7 10E9/L (ref 0–1.3)
MONOCYTES NFR BLD AUTO: 14.1 %
NEUTROPHILS # BLD AUTO: 3.6 10E9/L (ref 1.6–8.3)
NEUTROPHILS NFR BLD AUTO: 75.6 %
NRBC # BLD AUTO: 0 10*3/UL
NRBC BLD AUTO-RTO: 0 /100
PHOSPHATE SERPL-MCNC: 2.5 MG/DL (ref 2.5–4.5)
PLATELET # BLD AUTO: 240 10E9/L (ref 150–450)
POTASSIUM SERPL-SCNC: 4.6 MMOL/L (ref 3.4–5.3)
PTH-INTACT SERPL-MCNC: 198 PG/ML (ref 18–80)
RBC # BLD AUTO: 3.95 10E12/L (ref 4.4–5.9)
SODIUM SERPL-SCNC: 131 MMOL/L (ref 133–144)
TACROLIMUS BLD-MCNC: 9.6 UG/L (ref 5–15)
TME LAST DOSE: NORMAL H
WBC # BLD AUTO: 4.8 10E9/L (ref 4–11)

## 2018-06-05 PROCEDURE — 83735 ASSAY OF MAGNESIUM: CPT | Performed by: SURGERY

## 2018-06-05 PROCEDURE — 25000128 H RX IP 250 OP 636: Mod: ZF | Performed by: NURSE PRACTITIONER

## 2018-06-05 PROCEDURE — G0463 HOSPITAL OUTPT CLINIC VISIT: HCPCS | Mod: 25

## 2018-06-05 PROCEDURE — 84100 ASSAY OF PHOSPHORUS: CPT | Performed by: SURGERY

## 2018-06-05 PROCEDURE — 96361 HYDRATE IV INFUSION ADD-ON: CPT

## 2018-06-05 PROCEDURE — 85004 AUTOMATED DIFF WBC COUNT: CPT | Performed by: SURGERY

## 2018-06-05 PROCEDURE — 85027 COMPLETE CBC AUTOMATED: CPT | Performed by: SURGERY

## 2018-06-05 PROCEDURE — 83970 ASSAY OF PARATHORMONE: CPT | Performed by: SURGERY

## 2018-06-05 PROCEDURE — 80197 ASSAY OF TACROLIMUS: CPT | Performed by: SURGERY

## 2018-06-05 PROCEDURE — 96360 HYDRATION IV INFUSION INIT: CPT

## 2018-06-05 PROCEDURE — 83970 ASSAY OF PARATHORMONE: CPT | Performed by: NURSE PRACTITIONER

## 2018-06-05 PROCEDURE — 80048 BASIC METABOLIC PNL TOTAL CA: CPT | Performed by: SURGERY

## 2018-06-05 PROCEDURE — 82306 VITAMIN D 25 HYDROXY: CPT | Performed by: NURSE PRACTITIONER

## 2018-06-05 RX ORDER — DIBASIC SODIUM PHOSPHATE, MONOBASIC POTASSIUM PHOSPHATE AND MONOBASIC SODIUM PHOSPHATE 852; 155; 130 MG/1; MG/1; MG/1
250 TABLET ORAL 2 TIMES DAILY
Qty: 120 TABLET | Refills: 0 | COMMUNITY
Start: 2018-06-05 | End: 2018-07-09

## 2018-06-05 RX ORDER — METOPROLOL TARTRATE 50 MG
25 TABLET ORAL 2 TIMES DAILY
Qty: 60 TABLET | Refills: 1 | COMMUNITY
Start: 2018-06-05 | End: 2018-06-06

## 2018-06-05 RX ADMIN — SODIUM CHLORIDE 1000 ML: 9 INJECTION, SOLUTION INTRAVENOUS at 07:37

## 2018-06-05 RX ADMIN — SODIUM CHLORIDE 1000 ML: 9 INJECTION, SOLUTION INTRAVENOUS at 09:45

## 2018-06-05 NOTE — PATIENT INSTRUCTIONS
Dear Gilles Henning III    Thank you for choosing [facility] ([facility]) for your transplant cares.  The following information is a summary of our appointment as well as important reminders.      Please make sure your phone is available today because I will call to update you with your anti-rejection drug levels and possibly make changes to your anti-rejection dosages.    1.) Stop taking Norvasc  2.) Cut your Metoprolol dose in half to 25mg twice daily and only take if your SBP is >140.  3.) Change your phosphorous dose to twice daily.  4.) Keep drinking 3 liters of non caffeinated fluids or more.  5.) Return here tomorrow at 7am for labs and assessment.    We look forward in seeing you on your next appointment here at [facility].  Please don t hesitate to call us at [phone] to reschedule any of your appointments or to speak with one of the [facility] registered nurses.  It was a pleasure taking care of you today.    Sincerely,    [facility]  Specialty Infusion & Procedure Center  [address] [place] [address]  [address]  Phone:  [phone]

## 2018-06-05 NOTE — PROGRESS NOTES
Transplant Surgery Progress Note    Transplants:  2018 (Kidney); Postoperative day:  6  S: Gilles Henning (Poncho) is a 48-year old male with a history of IgA nephropathy, RCC, bilateral native nephrectomy , depression, PTSD, and substance abuse who is now s/p DDKT on 18.    Patient is doing well overall.  He states he has no issues taking his medications.  He denies any fevers, chills, nausea or vomiting.  He denies hematuria, dysuria, or frequency.    Patient states he has not had a Bowel movement in 3 days.  He has been trying many interventions and is wondering if he can have an enema today.    He also states he is light headed when he stands up.  He states it started today.        Transplant History:    Transplant Type:  DDKT  Donor Type:  - Brain Death   Transplant Date:  2018 (Kidney)   Ureteral Stent:  Yes   Crossmatch:  negative   DSA at Tx:  No  Baseline Cr: 1.2   DeNovo DSA: No    Acute Rejection Hx:  No    Present Maintenance Immunosuppression:  Tacrolimus - extended release and Mycophenolate mofetil    CMV IgG Ab Discordance:  Yes  EBV IgG Ab Discordance:  Yes    BK Viremia:  No  EBV Viremia:  No    Transplant Coordinator: Gia Romero     Transplant Office Phone Number: [phone]     Immunsupresent Medications     Immunosuppressive Agents Disp Start End    mycophenolate (GENERIC EQUIVALENT) 250 MG capsule 180 capsule 2018    Sig - Route: Take 3 capsules (750 mg) by mouth 2 times daily - Oral    Class: E-Prescribe    tacrolimus (ENVARSUS XR) 1 MG 24 hr tablet 150 tablet 2018    Sig - Route: Take 5 tablets (5 mg) by mouth every morning (before breakfast) - Oral    Class: E-Prescribe          Possible Immunosuppression-related side effects:   []             headache  []             vivid dreams  []             irritability  []             cognitive difficuties  [x]             fine tremor  []             nausea  []             diarrhea  []              neuropathy      []             edema  []             renal calcineurin toxicity  []             hyperkalemia  []             post-transplant diabetes  []             decreased appetite  []             increased appetite  []             other:  []             none    Prescription Medications as of 6/5/2018             acetaminophen (TYLENOL) 325 MG tablet Take 2 tablets (650 mg) by mouth every 6 hours for 10 days    amLODIPine (NORVASC) 5 MG tablet Take 1 tablet (5 mg) by mouth daily    atorvastatin (LIPITOR) 40 MG tablet Take 40 mg by mouth daily     Cholecalciferol (VITAMIN D) 2000 UNITS tablet Take 2,000 Units by mouth daily    clonazePAM (KLONOPIN) 1 MG tablet Take 0.5-1 mg by mouth 2 times daily as needed (restless leg)     docusate sodium (COLACE) 100 MG capsule Take 200 mg by mouth 2 times daily     K Phos Stanly-Sod Phos Di & Mono (PHOSPHA 250 NEUTRAL) 155-852-130 MG TABS Take 250 mg by mouth 3 times daily    magnesium hydroxide (MILK OF MAGNESIA) 400 MG/5ML suspension Take 30 mLs by mouth daily    magnesium oxide (MAG-OX) 400 MG tablet Take 1 tablet (400 mg) by mouth daily (with lunch)    metoprolol tartrate (LOPRESSOR) 50 MG tablet Take 1 tablet (50 mg) by mouth 2 times daily    mycophenolate (GENERIC EQUIVALENT) 250 MG capsule Take 3 capsules (750 mg) by mouth 2 times daily    NONFORMULARY Salicylic acid 3% / 5-FU 4% in vanicream : Apply a thin layer to affected area once daily    omeprazole (PRILOSEC) 40 MG capsule TAKE 1 CAPSULE BY MOUTH DAILY    oxyCODONE IR (ROXICODONE) 5 MG tablet Take 1-2 tablets (5-10 mg) by mouth every 4 hours as needed for other (pain control or improvement in physical function. Hold dose for analgesic side effects.)    polyethylene glycol (MIRALAX/GLYCOLAX) Packet Take 17 g by mouth daily as needed for constipation    prochlorperazine (COMPAZINE) 10 MG tablet Take 1 tablet (10 mg) by mouth every 6 hours as needed for nausea or vomiting    senna-docusate  (SENOKOT-S;PERICOLACE) 8.6-50 MG per tablet Take 2 tablets by mouth 2 times daily as needed for constipation    sulfamethoxazole-trimethoprim (BACTRIM/SEPTRA) 400-80 MG per tablet Take 1 tablet by mouth daily    SUMAtriptan (IMITREX) 25 MG tablet Take 25 mg by mouth every 2 hours as needed for migraine Max 200mg/24 hours. Max 7/week. Max 30/month.    tacrolimus (ENVARSUS XR) 1 MG 24 hr tablet Take 5 tablets (5 mg) by mouth every morning (before breakfast)    traZODone (DESYREL) 150 MG tablet Take 300 mg by mouth At Bedtime with food    valGANciclovir (VALCYTE) 450 MG tablet Take 2 tablets (900 mg) by mouth daily      Facility Administered Medications as of 6/5/2018             0.9% sodium chloride BOLUS Inject 1,000 mLs into the vein once    0.9% sodium chloride BOLUS Inject 1,000 mLs into the vein once    ondansetron (ZOFRAN) injection 4 mg Inject 2 mLs (4 mg) into the vein once          O:   Temp:  [97.4  F (36.3  C)] 97.4  F (36.3  C)  Pulse:  [81-91] 81  Resp:  [18] 18  BP: (74-98)/(59-70) 98/70  SpO2:  [100 %] 100 %  General Appearance: in no apparent distress.   Skin: Normal, no rashes or jaundice  Heart: regular rate and rhythm, normal S1 and S2  Lungs: easy respirations, no audible wheezing.  Abdomen: flat, The wound is dry and intact, without hernia. The abdomen is mildly tender. The kidney graft is not tender.  There is no ascites.  Extremities: Tremor present bilaterally.   Edema: absent.     Transplant Immunosuppression Labs Latest Ref Rng & Units 6/5/2018 6/4/2018 6/3/2018 6/2/2018 6/1/2018   Tacro Level 5.0 - 15.0 ug/L - - 9.5 - 6.5   Tacro Level - - - Not Provided - Not Provided   Creat 0.66 - 1.25 mg/dL 1.33(H) 1.20 1.55(H) 1.32(H) 1.74(H)   BUN 7 - 30 mg/dL 17 19 23 21 25   WBC 4.0 - 11.0 10e9/L 4.8 5.4 4.1 6.1 9.2   Neutrophil % 75.6 80.8 84.4 85.9 91.3   ANEU 1.6 - 8.3 10e9/L 3.6 4.3 3.5 5.3 8.4(H)       Chemistries:   Recent Labs   Lab Test  06/05/18   0730   BUN  17   CR  1.33*   GFRESTIMATED   57*   GLC  108*     Lab Results   Component Value Date    A1C 4.5 05/29/2018     Recent Labs   Lab Test  05/29/18   2350   ALBUMIN  3.7   BILITOTAL  0.5   ALKPHOS  102   AST  18   ALT  14     Urine Studies:  Recent Labs   Lab Test  05/30/18   0830   COLOR  Straw   APPEARANCE  Clear   URINEGLC  Negative   URINEBILI  Negative   URINEKETONE  Negative   SG  1.004   UBLD  Small*   URINEPH  5.0   PROTEIN  Negative   NITRITE  Negative   LEUKEST  Negative   RBCU  4*   WBCU  <1     No lab results found.  Hematology:   Recent Labs   Lab Test  06/05/18   0730  06/04/18   0720  06/03/18   0710   HGB  12.3*  11.9*  12.5*   PLT  240  231  247   WBC  4.8  5.4  4.1     Coags:   Recent Labs   Lab Test  05/29/18   2350  12/15/17   1150   INR  1.14  0.96     HLA antibodies:   SA1 Hi Risk Britney   Date Value Ref Range Status   05/29/2018   Final    B:7 13 35 46 48 49 50 51 52 53 55 56 57 58 60 61 62 63 67 71 72 75 76 77   81 Cw:9 10       SA1 Mod Risk Britney   Date Value Ref Range Status   05/29/2018 A:25 26 66 B:27 39 41 42 45 59 78  Final     SA2 Hi Risk Britney   Date Value Ref Range Status   05/29/2018   Final    DR:1 103 4 7 9 10 14 15 16 DRw:51 53 DQ:4 5 6 8 9 DQA:02 03 04     SA2 Mod Risk Britney   Date Value Ref Range Status   05/29/2018 DR:18  Final       Assessment: Gilles Henning III is doing fairly well s/p DDKT:  Issues we addressed during his visit include:    Plan:    1. Graft function: cr 1.20.  stable  2. Immunosuppression Management: No change continue envarsus and cellcept .  Complexity of management:Medium.  Contributing factors: recent tx, anemia  3. Hypotension:  1L of IVF today.  Increase PO intake  4. Constipation:  Likely the cause of the nausea.  Enema today.  Miralax BID  5. Nausea :  Patient given zofran in [facility]. Companezine for home   Followup: as directed    Total Time: 25 min,   Counselling Time: 15 min.

## 2018-06-05 NOTE — TELEPHONE ENCOUNTER
Kidney and Pancreas Transplant Coordinator Transition to Outpatient Discharge Note    Pt Name: Gilles Henning III  : 1970    Transplant Date: 18  Hospital Discharge Date: 18    Surgeon: Genevieve  Transplant Coordinator: Gia Romero    Gilles Henning III DDRT d/t IgA nephropathy and RCC, right side side   Stent was placed, message sent for removal: Yes..     CMV: D-/R+ : Valcyte 3 months; renal dosing  EBV: D+/R-    High Risk DSA: Yes.  PHS Increased Risk: No.    Immunosuppression: Envarsus and mycophenolate    Prophylaxis: Valcyte, bactrim    Lines / drains in place at time of discharge: LUZ    Pharmacy after discharge: [facility]  Lab after discharge: [facility]    Post-op course / additional monitoring:  None        Education:  Writer met with Gilles Henning III and wife Merline. We discussed immunosuppression medications, indications, side effects, dose adjustments, and lab monitoring. Lab draw schedule was given to pt and fully explained - Mailers given; no questions. Further education was provided on typical post transplant course, labs examined with thresholds, biopsy, infection prevention, office contact numbers, and when to call.     Pt questions for follow up: Pt would like to speak with SW regarding financial concerns about paying for IS meds    Discharge Transition Plan:   Pt will transition home, with assistance from wife. Pt will have home care [facility].   Pt states having working BP monitor, scale, and thermometer at home.      Stent removal date: 18 with Dr. Vallejo ([place] [facility]      Patient has viewed My Transplant Place, and has no additional questions at this time. RNCC encouraged on-going review.  Patient has voiced understanding of ability to utilize MyChart for self-review of lab values.    Special/Additional needs: No.    Gia Romero  Post-Kidney Transplant Coordinator  (ph) [phone]

## 2018-06-05 NOTE — MR AVS SNAPSHOT
After Visit Summary   6/5/2018    Gilles Henning III    MRN: [id number]           Patient Information     Date Of Birth          1970        Visit Information        Provider Department      6/5/2018 9:23 AM Maranda Watts, MSW [facility]        Today's Diagnoses     Kidney transplant recipient    -  1       Follow-ups after your visit        Your next 10 appointments already scheduled     Jun 19, 2018  8:00 AM CDT   (Arrive by 7:45 AM)   Cystoscopy with Mario Vallejo MD   [facility] ([facility])    [address] [place] [address]  [address]  [facility] [address]   [phone]            Jun 19, 2018  9:00 AM CDT   (Arrive by 8:45 AM)   Kidney Post Op with Mario Vallejo MD   [facility] ([facility])    [address] [place] [address]  [address]  [facility] [address]   [phone]            Jul 03, 2018 10:45 AM CDT   LAB with  LAB   [facility]    [address] [facility] [id number] [phone]           Please do not eat 10-12 hours before your appointment if you are coming in fasting for labs on lipids, cholesterol, or glucose (sugar). This does not apply to pregnant women. Water, hot tea and black coffee (with nothing added) are okay. Do not drink other fluids, diet soda or chew gum.            Jul 09, 2018  4:00 PM CDT   (Arrive by 3:30 PM)   Return Kidney Transplant with Uc Kidney/Pancreas Recipient   [facility] ([facility])    [address] [place] [address]  [address]  [facility] [address]   [phone]            Jul 10, 2018 11:15 AM CDT   Return Visit with MICAH Fatima [facility] ([facility])    [address]  [facility] [id number] [phone]            Aug 09, 2018  3:10 PM CDT   (Arrive by 2:40 PM)    Return Kidney Transplant with  Kidney/Pancreas Recipient   [facility] ([facility])    [address]  [address]  [facility] [address] [phone]            Oct 11, 2018  1:30 PM CDT   (Arrive by 1:00 PM)   Return Kidney Transplant with Uc Kidney/Pancreas Recipient   [facility] ([facility])    [address]  [address]  [facility] [address] [phone]            Dec 10, 2018  1:30 PM CST   (Arrive by 1:00 PM)   Return Kidney Transplant with Uc Kidney/Pancreas Recipient   [facility] ([facility])    [address]  [address]  [facility] [id number] [phone]              Who to contact     If you have questions or need follow up information about today's clinic visit or your schedule please contact [facility] directly at [phone].  Normal or non-critical lab and imaging results will be communicated to you by [employer], letter or phone within 4 business days after the clinic has received the results. If you do not hear from us within 7 days, please contact the clinic through [employer] or phone. If you have a critical or abnormal lab result, we will notify you by phone as soon as possible.  Submit refill requests through [employer] or call your pharmacy and they will forward the refill request to us. Please allow 3 business days for your refill to be completed.          Additional Information About Your Visit        [employer] Information     [employer] gives you secure access to your electronic health record. If you see a primary care provider, you can also send messages to your care team and make appointments. If you have questions, please call your primary care clinic.  If you do not have a primary care provider, please call [phone] and they will assist you.        Care EveryWhere ID     This is your Care EveryWhere ID. This could be used by other  organizations to access your [place] medical records  [id number]         Blood Pressure from Last 3 Encounters:   06/06/18 100/77   06/05/18 98/70   06/02/18 (!) 144/91    Weight from Last 3 Encounters:   06/06/18 54.6 kg (120 lb 6.4 oz)   06/05/18 54.4 kg (119 lb 14.9 oz)   06/04/18 54.7 kg (120 lb 11.2 oz)              Today, you had the following     No orders found for display         Today's Medication Changes          These changes are accurate as of 6/5/18 11:59 PM.  If you have any questions, ask your nurse or doctor.               These medicines have changed or have updated prescriptions.        Dose/Directions    metoprolol tartrate 50 MG tablet   Commonly known as:  LOPRESSOR   This may have changed:    - how much to take  - additional instructions   Used for:  Kidney transplant recipient        Dose:  25 mg   Take 0.5 tablets (25 mg) by mouth 2 times daily Take only as prescribed for a systolic blood pressure greater than 140.   Quantity:  60 tablet   Refills:  1       PHOSPHA 250 NEUTRAL [id number] MG Tabs   This may have changed:  when to take this   Used for:  Hypophosphatemia, Kidney replaced by transplant   Changed by:  Rachel Resendez NP        Dose:  250 mg   Take 250 mg by mouth 2 times daily   Quantity:  120 tablet   Refills:  0         Stop taking these medicines if you haven't already. Please contact your care team if you have questions.     amLODIPine 5 MG tablet   Commonly known as:  NORVASC   Stopped by:  Rachel Resendez NP                    Primary Care Provider Office Phone # Fax #    Usbwe Darrion Dubose [phone] [address] [facility] [address]        Equal Access to Services     [facility] : Hadii kev Rene, waaxda luqgraeme, qaybta deliaalpascale park. So [facility] [phone].    ATENCIÓN: Si habla español, tiene a millan disposición servicios gratuitos de asistencia lingüística. Deidraame  al [phone].    We comply with applicable federal civil rights laws and [place] laws. We do not discriminate on the basis of race, color, national origin, age, disability, sex, sexual orientation, or gender identity.            Thank you!     Thank you for choosing [facility]GAN TRANSPL[facility]  for your care. Our goal is always to provide you with excellent care. Hearing back from our patients is one way we can continue to improve our services. Please take a few minutes to complete the written survey that you may receive in the mail after your visit with us. Thank you!             Your Updated Medication List - Protect others around you: Learn how to safely use, store and throw away your medicines at www.disposemymeds.org.          This list is accurate as of 6/5/18 11:59 PM.  Always use your most recent med list.                   Brand Name Dispense Instructions for use Diagnosis    acetaminophen 325 MG tablet    TYLENOL    80 tablet    Take 2 tablets (650 mg) by mouth every 6 hours for 10 days    Kidney transplant recipient       atorvastatin 40 MG tablet    LIPITOR     Take 40 mg by mouth daily        clonazePAM 1 MG tablet    klonoPIN     Take 0.5-1 mg by mouth 2 times daily as needed (restless leg)        docusate sodium 100 MG capsule    COLACE     Take 200 mg by mouth 2 times daily        magnesium hydroxide 400 MG/5ML suspension    MILK OF MAGNESIA    355 mL    Take 30 mLs by mouth daily    Kidney transplant recipient       magnesium oxide 400 MG tablet    MAG-OX    30 tablet    Take 1 tablet (400 mg) by mouth daily (with lunch)    Kidney transplant recipient       metoprolol tartrate 50 MG tablet    LOPRESSOR    60 tablet    Take 0.5 tablets (25 mg) by mouth 2 times daily Take only as prescribed for a systolic blood pressure greater than 140.    Kidney transplant recipient       mycophenolate 250 MG capsule    GENERIC EQUIVALENT    180 capsule    Take 3 capsules (750 mg) by mouth 2 times daily     Kidney transplant recipient       NONFORMULARY      Salicylic acid 3% / 5-FU 4% in vanicream : Apply a thin layer to affected area once daily        omeprazole 40 MG capsule    priLOSEC    90 capsule    TAKE 1 CAPSULE BY MOUTH DAILY    Gastroesophageal reflux disease, esophagitis presence not specified       oxyCODONE IR 5 MG tablet    ROXICODONE    12 tablet    Take 1-2 tablets (5-10 mg) by mouth every 4 hours as needed for other (pain control or improvement in physical function. Hold dose for analgesic side effects.)    Kidney transplant recipient       PHOSPHA 250 NEUTRAL [id number] MG Tabs     120 tablet    Take 250 mg by mouth 2 times daily    Hypophosphatemia, Kidney replaced by transplant       polyethylene glycol Packet    MIRALAX/GLYCOLAX    14 packet    Take 17 g by mouth daily as needed for constipation    Kidney transplant recipient       prochlorperazine 10 MG tablet    COMPAZINE    20 tablet    Take 1 tablet (10 mg) by mouth every 6 hours as needed for nausea or vomiting    Nausea       senna-docusate 8.6-50 MG per tablet    SENOKOT-S;PERICOLACE    100 tablet    Take 2 tablets by mouth 2 times daily as needed for constipation    Kidney transplant recipient       sulfamethoxazole-trimethoprim 400-80 MG per tablet    BACTRIM/SEPTRA    30 tablet    Take 1 tablet by mouth daily    Kidney transplant recipient       SUMAtriptan 25 MG tablet    IMITREX     Take 25 mg by mouth every 2 hours as needed for migraine Max 200mg/24 hours. Max 7/week. Max 30/month.        traZODone 150 MG tablet    DESYREL     Take 300 mg by mouth At Bedtime with food        valGANciclovir 450 MG tablet    VALCYTE    60 tablet    Take 2 tablets (900 mg) by mouth daily    Kidney transplant recipient       vitamin D 2000 units tablet      Take 2,000 Units by mouth daily

## 2018-06-05 NOTE — LETTER
6/5/2018       RE: Gilles Henning III  [address]  [facility] [address]     Dear Colleague,    Thank you for referring your patient, Gilles Henning III, to the [facility]GAN [facility] at [facility]. Please see a copy of my visit note below.    Transplant Social Work Services Outpatient Progress Note      Date of transplant: 5/30/2018    Data: Medication cost concerns  Intervention: Met with patient in ATC regarding medication cost concerns he brought up to his coordinator yesterday when has in ATC. Met with patient and his wife Merline. Discussed cost of medications. Patient is currently taking mycophenolate and Envarsus. Envarsus has a  $119 copay. Per patient, when he was in the hospital after his transplant, he was told that he could use a 30 day free trial card for Envarsus but when he discharged, the discharge pharmacy stated he could not use it so he paid for the medication, which he states he cannot afford. Also stated he can't afford valganciclovir (quoted at time of discharge at $370). Discussed the cost of valganciclovir will increase as patient is not yet in the Part D donut hole. Explained what the donut hole is. Patient expressed concerns about high cost when in the donut hole. Discussed one time use of transplant funds up to $750 once his valgan cost increase. Also discussed establishing a payment plan with pharmacy which patient was agreeable to. Discussed Valcyte patient assistance program through [employer]. Informed patient it is not guaranteed that patient will qualify for the program as he has insurance coverage. Patient signed [employer] Valcyte application forms. This writer reached out to patient's transplant coordinator to discuss patient expressing that he cannot afford Envarsus and that the pharmacy (reason unknown) was unable to process the free trial card. Transplant coordinator discussed switching patient to tacrolimus once the 30  days are over as tacrolimus are much more affordable for patient. Collaborated with [address]  about Envarsus free trial card and pharmacy liaison will be connecting with pharmacy to see what happened and why patient was not able to utilize the free trial card.   Assessment: Patient has limited funds but does not qualify for MA.   Education provided by SW: Medicare Part D, patient assistance programs, transplant fund  Plan: This writer faxed Valcyte application to [employer]. If patient is not accepted to the patient assistance program, patient to contact this writer once his Valcyte cost increases and transplant funds may be used to help with part of the Valcyte bill.    VIPUL Sandoval    Kidney/Pancreas/Auto Islet Transplant Programs        Again, thank you for allowing me to participate in the care of your patient.      Sincerely,    ETTA Ibanez

## 2018-06-05 NOTE — LETTER
OUTPATIENT LABORATORY TEST ORDER    Patient Name:Gilles Henning III   Transplant Date: 5/30/2018  YOB: 1970  Issue Date & Time: 6/5/2018  12:30 PM     [facility] MR: [id number]  Expiration Date:  (1 year after date issued)      Diagnoses: Aftercare following organ transplant (ICD-10 Z48.288)   Kidney Transplant (ICD-10 Z94.0)   Long term use of medications (ICD-10  Z79.899)     ?Lab results to be available on the same day drawn.   ?Patient should release information to the [facility].     ?Please fax to the Transplant Center at [phone].    3x/week, First 4Weeks post-transplant    5/30/2018 - 6/30/2018    2x/week, Months 2-3 post-transplant    7/1/2018 - 9/1/2018       1x/week, Months 4-6 post-transplant    9/2/2018 - 12/2/2018  Every 2 weeks, Months 7-9 post-transplant    12/3/2018 - 3/3/2019  Every 3 weeks, Months 10-12 post-transplant   3/5/2019 - 5/30/2019        ?Hemogram and Platelet   ?Basic Metabolic Panel (Sodium, Potassium,Chloride, CO2, Creatinine, Urea Nitrogen, Glucose, Calcium)   ?Prograf/Tacrolimus drug level     Weekly through first 3 months post-transplant    5/30/2018 - 8/30/2018   ?Phosphorus, Magnesium   ?MPA level (mycophenolic acid) once per week for first 3 months.    ?Please add a diff to hemogram once per week for the first 3 months.    Monthly   ? BK PCR Quantitative (Polyoma virus - blood in purple top tube to Reference Lab)    At 6 & 12 months post-transplant         11/2018 & 5/2019   ? HBsurfaceAb, HBcoreAb, HCV at 6 months only -   ? Liver Function Tests(Bilirubin Direct/Total, AST, ALT, Alkaline Phosphatase)   ?Complete Lipid Panel fasting (Cholesterol, Triglycerides, HDL, LDL)   ? Random Urine for Protein/Creatinine ratio    At 7-12 months post-transplant - Monthly   ?EBV PCR QT    Month 1 post-transplant = Day 4,7,10,14,21,28  Month 2 post-transplant = Every 2 weeks  Month 3 - 12 post-transplant = Monthly   ?  PRA/DSA level (mailers provided by the patient)    If you have any questions, please call [facility]plant Center at [phone] or [phone].    Please fax all results to [phone].          Mario Vallejo MD  Department of Surgery

## 2018-06-05 NOTE — PROGRESS NOTES
Transplant Social Work Services Outpatient Progress Note      Date of transplant: 5/30/2018    Data: Medication cost concerns  Intervention: Met with patient in ATC regarding medication cost concerns he brought up to his coordinator yesterday when has in ATC. Met with patient and his wife Merline. Discussed cost of medications. Patient is currently taking mycophenolate and Envarsus. Envarsus has a  $119 copay. Per patient, when he was in the hospital after his transplant, he was told that he could use a 30 day free trial card for Envarsus but when he discharged, the discharge pharmacy stated he could not use it so he paid for the medication, which he states he cannot afford. Also stated he can't afford valganciclovir (quoted at time of discharge at $370). Discussed the cost of valganciclovir will increase as patient is not yet in the Part D donut hole. Explained what the donut hole is. Patient expressed concerns about high cost when in the donut hole. Discussed one time use of transplant funds up to $750 once his valgan cost increase. Also discussed establishing a payment plan with pharmacy which patient was agreeable to. Discussed Valcyte patient assistance program through [employer]. Informed patient it is not guaranteed that patient will qualify for the program as he has insurance coverage. Patient signed [employer] Valcyte application forms. This writer reached out to patient's transplant coordinator to discuss patient expressing that he cannot afford Envarsus and that the pharmacy (reason unknown) was unable to process the free trial card. Transplant coordinator discussed switching patient to tacrolimus once the 30 days are over as tacrolimus are much more affordable for patient. Collaborated with [address]  about Envarsus free trial card and pharmacy liaison will be connecting with pharmacy to see what happened and why patient was not able to utilize the free trial card.   Assessment: Patient has  limited funds but does not qualify for MA.   Education provided by SW: Medicare Part D, patient assistance programs, transplant fund  Plan: This writer faxed Valcyte application to [employer]. If patient is not accepted to the patient assistance program, patient to contact this writer once his Valcyte cost increases and transplant funds may be used to help with part of the Valcyte bill.    Maranda Watts [facility]    Kidney/Pancreas/Auto Islet Transplant Programs

## 2018-06-05 NOTE — TELEPHONE ENCOUNTER
Spoke to Mariya at [facility] and [facility] someone will call when he is discharged from there.  Faxed current standing lab orders to Mariya @ [phone].

## 2018-06-05 NOTE — TELEPHONE ENCOUNTER
Provider Call: General    Reason for call: Mariya from [facility] called to get patients DC date so she can set up home care.    Call back needed? Yes    Return Call Needed  Same as documented in contacts section  When to return call?: Greater than one day: Route standard priority

## 2018-06-05 NOTE — PROGRESS NOTES
"Gilles Henning III came to [facility] today for a lab and assess following a kidney transplant on 5/30/18.      Discharge date: 6/2/18  Transplant coordinator: Maliha HAMMONDS  Phone number patient can be reached at: [phone](pt)  [phone] (wife Merline)      Physical Assessment:  See physical assessment located under \"Document Flowsheets\".  Incision site: incision c/d/i. Staples intact.  Lines: LUZ drain with serosangenous drainage. 15mL emptied today- 25mL over the past 2 days.   Lucas: n/a  Urine clarity: patient reports frequency and voiding clear, yellow urine. Patient reports voiding almost every hour.  Hydration: Patient reports drinking 3 liters of non caffeinated fluids in the past 24 hours.  Nutrition: Suppressed appetite today. Some nausea yesterday relieved with 1 dose of his prescription antiemetic.   Last BM: yesterday 6/5/18 with enema  Pain: Pain 2-3/10 to abdominal incision. Pain controlled with 2.5-5mg of oxycodone and Tylenol. Patient states he is only using 1-2 doses of the oxycodone daily.  Labs drawn by [facility] staff Yes    SBP upon arrival in the 70's. Very dizzy with any position change, unable to stand.   SBP post fluids in the 110's.     Patient reports significant improvement in dizzness and hypotension symptoms.    Plan of care for today:   1.) Labs and assessment reviewed with Rachel Resendez NP. Copy of labs given to patient  2.) VORB for 2 liters of NS per Rachel Resendez NP. Each liter of fluids infused over 1 hour (total 2 hours)  3.) Social work up to see patient.  4.) Vitamin D and PTH labs added on.  5.) Assisted patient to sign up for MyChart  6.) LUZ drain removed by Rachel Resendez NP.  7.) Assisted patient to update medication card with med changes listed below.      Medication changes:   1.) Stop Norvasc  2.) Cut metoprolol dose in half to 25 mg BID and only take if SBP >140.  3.) Phosphorous now at BID      Medications administered:  Administrations This Visit     0.9% sodium chloride BOLUS  "    Admin Date Action Dose Rate Route Administered By          06/05/2018 New Bag 1000 mL 1,000 mL/hr Intravenous Severson, Ericka, RN             Admin Date Action Dose Rate Route Administered By          06/05/2018 New Bag 1000 mL 1,000 mL/hr Intravenous Severson, Ericka, RN                          Patient education:    The following teaching topics were REVIEWED: Importance of drinking 2L of non-caffeinated fluids daily, Medications (purposes, doses and times of administration), 7A discharge check list and Plan of care   Patient and wife Merline verbalized understanding and all questions answered.    Drug level:  Tacrolimus level today 9.6 Review in the AM with .  Face to face time: 30 minutes  Discharge Plan    Pt will follow up with [facility] tomorrow for labs and assessment  Discharge instructions reviewed with patient: YES  Patient/Representative verbalized understanding, all questions answered: YES    Discharged from unit at 1100  With wife to local hotel.    Ericka Severson, RN

## 2018-06-05 NOTE — MR AVS SNAPSHOT
After Visit Summary   6/5/2018    Gilles Henning III    MRN: [id number]           Patient Information     Date Of Birth          1970        Visit Information        Provider Department      6/5/2018 8:00 AM Rachel Resendez NP [facility]        Today's Diagnoses     Hypophosphatemia        Kidney replaced by transplant           Follow-ups after your visit        Your next 10 appointments already scheduled     Jun 19, 2018  8:00 AM CDT   (Arrive by 7:45 AM)   Cystoscopy with Mario Vallejo MD   [facility] ([facility])    [address] [place] [address]  [address]  [facility] [address]   [phone]            Jun 19, 2018  9:00 AM CDT   (Arrive by 8:45 AM)   Kidney Post Op with Mario Vallejo MD   [facility] ([facility])    [address] [place] [address]  [address]  [facility] [address]   [phone]            Jul 03, 2018 10:45 AM CDT   LAB with  LAB   [facility] ([facility])    [address] [facility] [id number] [phone]           Please do not eat 10-12 hours before your appointment if you are coming in fasting for labs on lipids, cholesterol, or glucose (sugar). This does not apply to pregnant women. Water, hot tea and black coffee (with nothing added) are okay. Do not drink other fluids, diet soda or chew gum.            Jul 09, 2018  4:00 PM CDT   (Arrive by 3:30 PM)   Return Kidney Transplant with Uc Kidney/Pancreas Recipient   [facility] ([facility])    [address] [place] [address]  [address]  [facility] [address]   [phone]            Jul 10, 2018 11:15 AM CDT   Return Visit with MCIAH Fatima [facility] ([facility])    [address]  [place] [id number] [phone]            Aug  09, 2018  3:10 PM CDT   (Arrive by 2:40 PM)   Return Kidney Transplant with Uc Kidney/Pancreas Recipient   [facility] ([facility])    [address]  [address]  [facility] [id number] [phone]            Oct 11, 2018  1:30 PM CDT   (Arrive by 1:00 PM)   Return Kidney Transplant with Uc Kidney/Pancreas Recipient   [facility] ([facility])    [address]  [address]  [facility] [id number] [phone]            Dec 10, 2018  1:30 PM CST   (Arrive by 1:00 PM)   Return Kidney Transplant with Uc Kidney/Pancreas Recipient   [facility] ([facility])    [address]  [address]  [facility] [address] [phone]              Who to contact     If you have questions or need follow up information about today's clinic visit or your schedule please contact [facility] SPECIALTY AND PROCEDURE directly at [phone].  Normal or non-critical lab and imaging results will be communicated to you by [employer], letter or phone within 4 business days after the clinic has received the results. If you do not hear from us within 7 days, please contact the clinic through [employer] or phone. If you have a critical or abnormal lab result, we will notify you by phone as soon as possible.  Submit refill requests through [employer] or call your pharmacy and they will forward the refill request to us. Please allow 3 business days for your refill to be completed.          Additional Information About Your Visit        [employer] Information     [employer] gives you secure access to your electronic health record. If you see a primary care provider, you can also send messages to your care team and make appointments. If you have questions, please call your primary care clinic.  If you do not have a primary care provider, please call [phone] and they will assist you.        Care EveryWhere ID      This is your Care EveryWhere ID. This could be used by other organizations to access your [facility] medical records  [id number]         Blood Pressure from Last 3 Encounters:   06/06/18 100/77   06/05/18 98/70   06/02/18 (!) 144/91    Weight from Last 3 Encounters:   06/06/18 54.6 kg (120 lb 6.4 oz)   06/05/18 54.4 kg (119 lb 14.9 oz)   06/04/18 54.7 kg (120 lb 11.2 oz)              We Performed the Following     Parathyroid Hormone Intact     Vitamin D     Vitamin D          Today's Medication Changes          These changes are accurate as of 6/5/18 11:59 PM.  If you have any questions, ask your nurse or doctor.               These medicines have changed or have updated prescriptions.        Dose/Directions    metoprolol tartrate 50 MG tablet   Commonly known as:  LOPRESSOR   This may have changed:    - how much to take  - additional instructions   Used for:  Kidney transplant recipient        Dose:  25 mg   Take 0.5 tablets (25 mg) by mouth 2 times daily Take only as prescribed for a systolic blood pressure greater than 140.   Quantity:  60 tablet   Refills:  1       PHOSPHA 250 NEUTRAL [id number] MG Tabs   This may have changed:  when to take this   Used for:  Hypophosphatemia, Kidney replaced by transplant   Changed by:  Rachel Resendez NP        Dose:  250 mg   Take 250 mg by mouth 2 times daily   Quantity:  120 tablet   Refills:  0         Stop taking these medicines if you haven't already. Please contact your care team if you have questions.     amLODIPine 5 MG tablet   Commonly known as:  NORVASC   Stopped by:  Rachel Resendez NP                    Primary Care Provider Office Phone # Fax #    Charlie Darrion Dubose [phone] [address] [facility] [address]        Equal Access to Services     ALEXANDRA DOVE : Xiao Rene, arabella landaverde, gustabo beard, pascale king. So [facility] [phone].    ATENCIÓN: Si  twan reid, tiene a millan disposición servicios gratuitos de asistencia lingüística. Denys sandhu [phone].    We comply with applicable federal civil rights laws and [place] laws. We do not discriminate on the basis of race, color, national origin, age, disability, sex, sexual orientation, or gender identity.            Thank you!     Thank you for choosing [facility] SPECIAL[facility] PROCEDURE  for your care. Our goal is always to provide you with excellent care. Hearing back from our patients is one way we can continue to improve our services. Please take a few minutes to complete the written survey that you may receive in the mail after your visit with us. Thank you!             Your Updated Medication List - Protect others around you: Learn how to safely use, store and throw away your medicines at www.disposemymeds.org.          This list is accurate as of 6/5/18 11:59 PM.  Always use your most recent med list.                   Brand Name Dispense Instructions for use Diagnosis    acetaminophen 325 MG tablet    TYLENOL    80 tablet    Take 2 tablets (650 mg) by mouth every 6 hours for 10 days    Kidney transplant recipient       atorvastatin 40 MG tablet    LIPITOR     Take 40 mg by mouth daily        clonazePAM 1 MG tablet    klonoPIN     Take 0.5-1 mg by mouth 2 times daily as needed (restless leg)        docusate sodium 100 MG capsule    COLACE     Take 200 mg by mouth 2 times daily        magnesium hydroxide 400 MG/5ML suspension    MILK OF MAGNESIA    355 mL    Take 30 mLs by mouth daily    Kidney transplant recipient       magnesium oxide 400 MG tablet    MAG-OX    30 tablet    Take 1 tablet (400 mg) by mouth daily (with lunch)    Kidney transplant recipient       metoprolol tartrate 50 MG tablet    LOPRESSOR    60 tablet    Take 0.5 tablets (25 mg) by mouth 2 times daily Take only as prescribed for a systolic blood pressure greater than 140.    Kidney transplant recipient        mycophenolate 250 MG capsule    GENERIC EQUIVALENT    180 capsule    Take 3 capsules (750 mg) by mouth 2 times daily    Kidney transplant recipient       NONFORMULARY      Salicylic acid 3% / 5-FU 4% in vanicream : Apply a thin layer to affected area once daily        omeprazole 40 MG capsule    priLOSEC    90 capsule    TAKE 1 CAPSULE BY MOUTH DAILY    Gastroesophageal reflux disease, esophagitis presence not specified       oxyCODONE IR 5 MG tablet    ROXICODONE    12 tablet    Take 1-2 tablets (5-10 mg) by mouth every 4 hours as needed for other (pain control or improvement in physical function. Hold dose for analgesic side effects.)    Kidney transplant recipient       PHOSPHA 250 NEUTRAL [id number] MG Tabs     120 tablet    Take 250 mg by mouth 2 times daily    Hypophosphatemia, Kidney replaced by transplant       polyethylene glycol Packet    MIRALAX/GLYCOLAX    14 packet    Take 17 g by mouth daily as needed for constipation    Kidney transplant recipient       senna-docusate 8.6-50 MG per tablet    SENOKOT-S;PERICOLACE    100 tablet    Take 2 tablets by mouth 2 times daily as needed for constipation    Kidney transplant recipient       sulfamethoxazole-trimethoprim 400-80 MG per tablet    BACTRIM/SEPTRA    30 tablet    Take 1 tablet by mouth daily    Kidney transplant recipient       SUMAtriptan 25 MG tablet    IMITREX     Take 25 mg by mouth every 2 hours as needed for migraine Max 200mg/24 hours. Max 7/week. Max 30/month.        traZODone 150 MG tablet    DESYREL     Take 300 mg by mouth At Bedtime with food        valGANciclovir 450 MG tablet    VALCYTE    60 tablet    Take 2 tablets (900 mg) by mouth daily    Kidney transplant recipient       vitamin D 2000 units tablet      Take 2,000 Units by mouth daily

## 2018-06-06 ENCOUNTER — INFUSION THERAPY VISIT (OUTPATIENT)
Dept: INFUSION THERAPY | Facility: CLINIC | Age: 48
End: 2018-06-06
Attending: SURGERY
Payer: MEDICARE

## 2018-06-06 ENCOUNTER — OFFICE VISIT (OUTPATIENT)
Dept: INFUSION THERAPY | Facility: CLINIC | Age: 48
End: 2018-06-06
Attending: INTERNAL MEDICINE
Payer: MEDICARE

## 2018-06-06 VITALS
WEIGHT: 120.4 LBS | HEART RATE: 98 BPM | TEMPERATURE: 97.4 F | DIASTOLIC BLOOD PRESSURE: 77 MMHG | OXYGEN SATURATION: 99 % | BODY MASS INDEX: 17.28 KG/M2 | SYSTOLIC BLOOD PRESSURE: 100 MMHG | RESPIRATION RATE: 18 BRPM

## 2018-06-06 DIAGNOSIS — Z94.0 STATUS POST KIDNEY TRANSPLANT: Primary | ICD-10-CM

## 2018-06-06 DIAGNOSIS — Z29.89 NEED FOR CMV IMMUNOTHERAPY: ICD-10-CM

## 2018-06-06 DIAGNOSIS — E87.1 HYPONATREMIA: ICD-10-CM

## 2018-06-06 DIAGNOSIS — Z29.89 NEED FOR PNEUMOCYSTIS PROPHYLAXIS: ICD-10-CM

## 2018-06-06 DIAGNOSIS — Z94.0 KIDNEY REPLACED BY TRANSPLANT: ICD-10-CM

## 2018-06-06 DIAGNOSIS — Z48.298 AFTERCARE FOLLOWING ORGAN TRANSPLANT: ICD-10-CM

## 2018-06-06 DIAGNOSIS — E83.42 HYPOMAGNESEMIA: ICD-10-CM

## 2018-06-06 DIAGNOSIS — D84.9 IMMUNOSUPPRESSION (H): ICD-10-CM

## 2018-06-06 DIAGNOSIS — R11.0 NAUSEA: ICD-10-CM

## 2018-06-06 DIAGNOSIS — K59.03 DRUG-INDUCED CONSTIPATION: ICD-10-CM

## 2018-06-06 DIAGNOSIS — Z94.0 KIDNEY TRANSPLANT RECIPIENT: ICD-10-CM

## 2018-06-06 DIAGNOSIS — I10 BENIGN ESSENTIAL HYPERTENSION: ICD-10-CM

## 2018-06-06 LAB
ANION GAP SERPL CALCULATED.3IONS-SCNC: 9 MMOL/L (ref 3–14)
BASOPHILS # BLD AUTO: 0 10E9/L (ref 0–0.2)
BASOPHILS NFR BLD AUTO: 0.4 %
BUN SERPL-MCNC: 15 MG/DL (ref 7–30)
CALCIUM SERPL-MCNC: 9.4 MG/DL (ref 8.5–10.1)
CHLORIDE SERPL-SCNC: 103 MMOL/L (ref 94–109)
CO2 SERPL-SCNC: 21 MMOL/L (ref 20–32)
CREAT SERPL-MCNC: 1.17 MG/DL (ref 0.66–1.25)
DEPRECATED CALCIDIOL+CALCIFEROL SERPL-MC: 65 UG/L (ref 20–75)
DIFFERENTIAL METHOD BLD: ABNORMAL
EOSINOPHIL # BLD AUTO: 0.1 10E9/L (ref 0–0.7)
EOSINOPHIL NFR BLD AUTO: 1.7 %
ERYTHROCYTE [DISTWIDTH] IN BLOOD BY AUTOMATED COUNT: 16.4 % (ref 10–15)
GFR SERPL CREATININE-BSD FRML MDRD: 66 ML/MIN/1.7M2
GLUCOSE SERPL-MCNC: 102 MG/DL (ref 70–99)
HCT VFR BLD AUTO: 33.7 % (ref 40–53)
HGB BLD-MCNC: 11.9 G/DL (ref 13.3–17.7)
IMM GRANULOCYTES # BLD: 0.1 10E9/L (ref 0–0.4)
IMM GRANULOCYTES NFR BLD: 1.9 %
LYMPHOCYTES # BLD AUTO: 0.3 10E9/L (ref 0.8–5.3)
LYMPHOCYTES NFR BLD AUTO: 5.3 %
MAGNESIUM SERPL-MCNC: 2.4 MG/DL (ref 1.6–2.3)
MCH RBC QN AUTO: 31.8 PG (ref 26.5–33)
MCHC RBC AUTO-ENTMCNC: 35.3 G/DL (ref 31.5–36.5)
MCV RBC AUTO: 90 FL (ref 78–100)
MONOCYTES # BLD AUTO: 0.6 10E9/L (ref 0–1.3)
MONOCYTES NFR BLD AUTO: 11.6 %
NEUTROPHILS # BLD AUTO: 4.1 10E9/L (ref 1.6–8.3)
NEUTROPHILS NFR BLD AUTO: 79.1 %
NRBC # BLD AUTO: 0 10*3/UL
NRBC BLD AUTO-RTO: 0 /100
PHOSPHATE SERPL-MCNC: 2.1 MG/DL (ref 2.5–4.5)
PLATELET # BLD AUTO: 234 10E9/L (ref 150–450)
POTASSIUM SERPL-SCNC: 4.4 MMOL/L (ref 3.4–5.3)
PTH-INTACT SERPL-MCNC: 68 PG/ML (ref 18–80)
RBC # BLD AUTO: 3.74 10E12/L (ref 4.4–5.9)
SODIUM SERPL-SCNC: 133 MMOL/L (ref 133–144)
TACROLIMUS BLD-MCNC: 9.9 UG/L (ref 5–15)
TME LAST DOSE: NORMAL H
WBC # BLD AUTO: 5.2 10E9/L (ref 4–11)

## 2018-06-06 PROCEDURE — 82306 VITAMIN D 25 HYDROXY: CPT | Performed by: NURSE PRACTITIONER

## 2018-06-06 PROCEDURE — 80197 ASSAY OF TACROLIMUS: CPT | Performed by: SURGERY

## 2018-06-06 PROCEDURE — 84100 ASSAY OF PHOSPHORUS: CPT | Performed by: SURGERY

## 2018-06-06 PROCEDURE — 80048 BASIC METABOLIC PNL TOTAL CA: CPT | Performed by: SURGERY

## 2018-06-06 PROCEDURE — 36415 COLL VENOUS BLD VENIPUNCTURE: CPT

## 2018-06-06 PROCEDURE — 85004 AUTOMATED DIFF WBC COUNT: CPT | Performed by: SURGERY

## 2018-06-06 PROCEDURE — 83735 ASSAY OF MAGNESIUM: CPT | Performed by: SURGERY

## 2018-06-06 PROCEDURE — G0463 HOSPITAL OUTPT CLINIC VISIT: HCPCS

## 2018-06-06 PROCEDURE — 83970 ASSAY OF PARATHORMONE: CPT | Performed by: NURSE PRACTITIONER

## 2018-06-06 PROCEDURE — 85027 COMPLETE CBC AUTOMATED: CPT | Performed by: SURGERY

## 2018-06-06 RX ORDER — PROCHLORPERAZINE MALEATE 10 MG
10 TABLET ORAL 3 TIMES DAILY
Qty: 30 TABLET | Refills: 1 | Status: ON HOLD | OUTPATIENT
Start: 2018-06-06 | End: 2018-08-13

## 2018-06-06 RX ORDER — VALGANCICLOVIR 450 MG/1
450 TABLET, FILM COATED ORAL DAILY
Qty: 60 TABLET | Refills: 2 | Status: ON HOLD | COMMUNITY
Start: 2018-06-06 | End: 2018-08-13

## 2018-06-06 RX ORDER — METOPROLOL TARTRATE 50 MG
25 TABLET ORAL 2 TIMES DAILY
Qty: 60 TABLET | Refills: 1 | COMMUNITY
Start: 2018-06-06 | End: 2018-06-11

## 2018-06-06 NOTE — PROGRESS NOTES
"Gilles Henning III came to [facility] today for a lab and assess following a kidney transplant on 5/30/18.      Discharge date: 6/2/18  Transplant coordinator: Maliha HAMMONDS  Phone number patient can be reached at: [phone](pt)  [phone] (wife Merline)      Physical Assessment:  See physical assessment located under \"Document Flowsheets\".  Incision site: incision c/d/i. Staples intact. Old Travis drain site dressing intact. Small at of shadow drainage noted  Lines: N/A   Lucas: n/a  Urine clarity: continues to report frequency and voiding clear, yellow urine.   Hydration: Patient reports drinking > 3 liters of non caffeinated fluids in the past 24 hours.  Nutrition: Appetite improved though still c/o intermittent nausea. Nausea relieved with compazine.   Last BM: yesterday 6/5/18- small, loose  Pain: Pain 2-3/10 to abdominal incision. Pain controlled with 2.5-5mg of oxycodone and Tylenol. Patient states he is only using 1-2 doses of the oxycodone daily.  Labs drawn by [facility] staff Yes  Patient able to ambulate into clinic. Denies dizziness today, reports he is feeling much better then he did yesterday.    Plan of care for today:   1.) Labs and assessment reviewed with Dr. Telles. Copy of labs given to patient.  2.) Assisted patient to update medication card.  3.) Holding off on IVF today to see if patient can keep up with fluid intake himself then will re-assess him tomorrow morning in [facility]. Stressed importance of continuing to drink the recommended fluid intake.      Medication changes:   1.) Hold Magnesium  2.) Change SBP parameters for metoprolol to >160  3.) Change Valcyte to 450mg daily until improvement in kidney function  4.) Have patient take PRN compazine TID scheduled.    Medications administered:  None       Patient education:    The following teaching topics were REVIEWED: Importance of drinking 2L of non-caffeinated fluids daily, Medications (purposes, doses and times of administration), 7A discharge check list and " Plan of care   Patient and wife Merline verbalized understanding and all questions answered.    Drug level:  Tacrolimus level today 9.9- Reviewed with Dr. Telles today who gave orders to keep the same dose of Envarus at 4mg every morning. Patient updated with this information.  Face to face time: 15 minutes  Discharge Plan    Pt will follow up with [facility] tomorrow for labs and assessment at 7am  Discharge instructions reviewed with patient: YES  Patient/Representative verbalized understanding, all questions answered: YES    Discharged from unit at 0930  with wife to local hotel.    Ericka Severson, RN

## 2018-06-06 NOTE — PATIENT INSTRUCTIONS
Schedule compazine three times / day  STOP mag oxide.  STOP metoprolol. Take 1/2 tab for SBP > 160  CHANGE valcyte to 450 mg po daily.    Push fluids and salt in diet for now.

## 2018-06-06 NOTE — PROGRESS NOTES
Assessment and Plan:  1. DDKT - ESRD due to IgA s/p kidney tx x 2. Good allograft function.    Immunosuppression:  Tac XR 4mg po daily   mg po bid    UPC: check at 6 / 12 months  DSA high risk DSA with pre-formed antibodies. Check per protocol  BK: check monthly    Creatinine pending today. Yesterday 1.3 from 1.2 mg / dl   2. Immunosuppression: continue tac XR and mmf. Goal tac level 8-10 ng / ml  3. Hyponatremia: likely in setting of volume depletion. Push sodium intake / fluid intake in diet.   4. HTN: hold metoprolol for now.  5. Need for cmv immunotherapy:R+ with thymo induction. Decrease to 450 mg daily for cr cl of 51 ml / min.  6. Need for pneumocystis prophylaxis: bactrim ss tab daily.  7. Constipation: docusate, miralax, mag citrate, senna.  8. Hypomagnesemia: mag on the high side. Will hold mag oxide.   9. Nausea: schedule compazine for now.     Follow up tomorrow in [facility].    Assessment and plan was discussed with patient and he voiced his understanding and agreement.    Reason for Visit:  Mr. Henning is here for routine follow up and kidney transplant.    HPI:   Gilles Henning III is a 48 year old male with ESKD from IgA Nephropathy and is status post DDKT on 5/30/18.         Transplant Hx:       Tx: DDKT  Date: 5/3/18       Present Maintenance IS: Tacrolimus - extended release and Mycophenolate mofetil       Baseline Creatinine: 1.2-1.3 mg / dl       Recent DSA: Yes  A51 and A24 with MFI between 500 and 1000       Biopsy: No    The patient is a 48-year-old male with a history of end-stage kidney disease due to IgA nephropathy who is status post kidney transplant ×2 most recently on 5/30/2018 here for follow-up of his second kidney transplant.    The patient had biopsy-proven IgA nephropathy that progressed to ESRD and he was on dialysis for 9 years prior to his first transplant.  The transplant lasted for 4.5 years and was lost in the setting of a cancer diagnosis.  He returned to dialysis and has  had his second transplant on 5/30/2018.  His second transplant has gone well without any immediate postoperative problems.    Currently the patient is having nausea without emesis.  He states that he is using Compazine and this is working well in terms of relief of the nausea.  He denies any chest pain or breathing difficulties.  He has no fever shakes or chills.  He had one loose stool.  His blood pressure last night was 150/100.    From an immunosuppression standpoint the patient is on long-acting tacrolimus and CellCept.    Home BP: 154/105 prior to meds.      ROS:   A comprehensive review of systems was obtained and negative, except as noted in the HPI or PMH.    Active Medical Problems:  Patient Active Problem List   Diagnosis     Chronic kidney disease, stage V (H)     Kidney transplant recipient     Anemia of chronic disease     Bipolar affective disorder (H)     Chest pain     Chronic insomnia     Chronic rejection of kidney transplant     CKD (chronic kidney disease) stage V requiring chronic dialysis (H)     Colitis, acute     Depression, major, recurrent (H)     Diarrhea     End stage renal disease (H)     Psychosexual dysfunction with inhibited sexual excitement     Erectile dysfunction     Gastroesophageal reflux disease     Headache     Heartburn     Hyperlipidemia     Hypertension     Nephritis and nephropathy, 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     Immunosuppressed status (H)     Lumbar disc disease with radiculopathy     Lumbar foraminal stenosis     Abnormal involuntary movement     Nausea     Night terrors     Onychocryptosis     Hereditary and idiopathic peripheral neuropathy     PTSD (post-traumatic stress disorder)     Renal cell carcinoma (H)     S/p nephrectomy     Secondary hyperparathyroidism (H)     Vitamin D deficiency     Kidney transplant candidate     Long QT interval     Kidney transplanted     Other constipation     Personal Hx:  Social History     Social History     Marital status:       Spouse name: Merline     Number of children: 4     Years of education: 13     Occupational History     disabled      Social History Main Topics     Smoking status: Passive Smoke Exposure - Never Smoker     Types: Dip, chew, snus or snuff     Smokeless tobacco: Current User     Types: Chew      Comment: Wife smoked years ago.  Weaning off chew now     Alcohol use No      Comment: none now, did treatment at age 21, relapsed with divorce (a couple months)  then now  sober 3 years.      Drug use: No      Comment: Marijuana at age 14 only.     Sexual activity: Yes     Partners: Female     Other Topics Concern     Not on file     Social History Narrative    ** Merged History Encounter **          Allergies:  Allergies   Allergen Reactions     Gabapentin Other (See Comments)     myoclonus     Medications:  Prior to Admission medications    Medication Sig Start Date End Date Taking? Authorizing Provider   acetaminophen (TYLENOL) 325 MG tablet Take 2 tablets (650 mg) by mouth every 6 hours for 10 days 6/2/18 6/12/18  Mario Vallejo MD   atorvastatin (LIPITOR) 40 MG tablet Take 40 mg by mouth daily  5/30/17   Reported, Patient   Cholecalciferol (VITAMIN D) 2000 UNITS tablet Take 2,000 Units by mouth daily    Reported, Patient   clonazePAM (KLONOPIN) 1 MG tablet Take 0.5-1 mg by mouth 2 times daily as needed (restless leg)  2/10/17   Reported, Patient   docusate sodium (COLACE) 100 MG capsule Take 200 mg by mouth 2 times daily     Reported, Patient   K Phos [place]-Sod Phos Di & Mono (PHOSPHA 250 NEUTRAL) 155-852-130 MG TABS Take 250 mg by mouth 2 times daily 6/5/18   Rachel Resendez, NP   magnesium hydroxide (MILK OF MAGNESIA) 400 MG/5ML suspension Take 30 mLs by mouth daily 6/2/18   Mario Vallejo MD   magnesium oxide (MAG-OX) 400 MG tablet Take 1 tablet (400 mg) by mouth daily (with lunch) 6/2/18 7/2/18  Mario Vallejo MD   metoprolol tartrate (LOPRESSOR) 50 MG tablet Take 0.5 tablets (25 mg) by mouth 2 times  daily Take only as prescribed for a systolic blood pressure greater than 140. 6/5/18   Rachel Resendez NP   mycophenolate (GENERIC EQUIVALENT) 250 MG capsule Take 3 capsules (750 mg) by mouth 2 times daily 6/2/18 7/2/18  Mario Vallejo MD   NONFORMULARY Salicylic acid 3% / 5-FU 4% in vanicream : Apply a thin layer to affected area once daily    Unknown, Entered By History   omeprazole (PRILOSEC) 40 MG capsule TAKE 1 CAPSULE BY MOUTH DAILY 2/20/18   Charlie Dubose MD   oxyCODONE IR (ROXICODONE) 5 MG tablet Take 1-2 tablets (5-10 mg) by mouth every 4 hours as needed for other (pain control or improvement in physical function. Hold dose for analgesic side effects.) 6/2/18   Mario Vallejo MD   polyethylene glycol (MIRALAX/GLYCOLAX) Packet Take 17 g by mouth daily as needed for constipation 6/2/18   Mario Vallejo MD   prochlorperazine (COMPAZINE) 10 MG tablet Take 1 tablet (10 mg) by mouth every 6 hours as needed for nausea or vomiting 6/4/18   Rachel Resendez NP   senna-docusate (SENOKOT-S;PERICOLACE) 8.6-50 MG per tablet Take 2 tablets by mouth 2 times daily as needed for constipation 6/2/18   Mario Vallejo MD   sulfamethoxazole-trimethoprim (BACTRIM/SEPTRA) 400-80 MG per tablet Take 1 tablet by mouth daily 6/3/18 7/3/18  Mario Vallejo MD   SUMAtriptan (IMITREX) 25 MG tablet Take 25 mg by mouth every 2 hours as needed for migraine Max 200mg/24 hours. Max 7/week. Max 30/month. 2/9/18   Reported, Patient   tacrolimus (ENVARSUS XR) 1 MG 24 hr tablet Take 4 tablets (4 mg) by mouth every morning (before breakfast) 6/6/18   Nikhil Telles MD   traZODone (DESYREL) 150 MG tablet Take 300 mg by mouth At Bedtime with food 6/22/17   Reported, Patient   valGANciclovir (VALCYTE) 450 MG tablet Take 2 tablets (900 mg) by mouth daily 6/3/18   Shaina Tinoco PA-C     Vitals:  Vitals were reviewed.  The patient's pulse rate is 98.  His blood pressure was 100/77.  When standing the patient's blood pressure  was 99/61 with a pulse of 110.    Exam:   GENERAL APPEARANCE: alert and no distress  HENT: mouth without ulcers or lesions  LYMPHATICS: no cervical or supraclavicular nodes  RESP: lungs clear to auscultation - no rales, rhonchi or wheezes  CV: regular rhythm, normal rate, no rub, no murmur  EDEMA: no LE edema bilaterally  ABDOMEN: soft, nondistended, nontender, bowel sounds normal  MS: extremities normal - no gross deformities noted, no evidence of inflammation in joints, no muscle tenderness  SKIN: no rash    Results:   Labs pending.

## 2018-06-06 NOTE — MR AVS SNAPSHOT
After Visit Summary   6/6/2018    Gilles Henning III    MRN: [id number]           Patient Information     Date Of Birth          1970        Visit Information        Provider Department      6/6/2018 8:00 AM Rachel Resendez NP [facility]        Today's Diagnoses     Nausea          Care Instructions    Schedule compazine three times / day  STOP mag oxide.  STOP metoprolol. Take 1/2 tab for SBP > 160  CHANGE valcyte to 450 mg po daily.    Push fluids and salt in diet for now.               Follow-ups after your visit        Your next 10 appointments already scheduled     Jun 07, 2018  7:00 AM CDT   (Arrive by 6:45 AM)   New Transplant Visit with UC SPEC INFUSION, UC 42 ATC   [facility] ([facility])    [address] [place] [address]  [address]  [facility] [address]   [phone]            Jun 07, 2018  8:00 AM CDT   (Arrive by 7:45 AM)   Kidney Transplant Discharge with Nikhil Telles MD   [facility] ([facility])    [address] [place] [address]  [address]  [facility] [address]   [phone]            Jun 19, 2018  8:00 AM CDT   (Arrive by 7:45 AM)   Cystoscopy with Mario Vallejo MD   [facility] Solid Organ Transplant ([facility])    [address] [place] [address]  [address]  [facility] [address]   [phone]            Jun 19, 2018  9:00 AM CDT   (Arrive by 8:45 AM)   Kidney Post Op with Mario Vallejo MD   [facility] Solid Organ Transplant ([facility])    [address] [place] [address]  [address]  [facility] [address]   [phone]            Jul 03, 2018 10:45 AM CDT   LAB with GH LAB   [facility] ([facility])    [address]  [place] [id number] [phone]           Please do not eat 10-12 hours before  your appointment if you are coming in fasting for labs on lipids, cholesterol, or glucose (sugar). This does not apply to pregnant women. Water, hot tea and black coffee (with nothing added) are okay. Do not drink other fluids, diet soda or chew gum.            Jul 09, 2018  4:00 PM CDT   (Arrive by 3:30 PM)   Return Kidney Transplant with  Kidney/Pancreas Recipient   [facility] Nephrology ([facility])    [address]  [address]  [facility] [address]   [phone]            Jul 10, 2018 11:15 AM CDT   Return Visit with MICAH Fatima CNP   [facility] [place] [facility] ([facility])    [address]  [place] [address] [phone]            Aug 09, 2018  3:10 PM CDT   (Arrive by 2:40 PM)   Return Kidney Transplant with Uc Kidney/Pancreas Recipient   [facility] Nephrology ([facility])    [address]  [address]  [facility] [address]   [phone]            Oct 11, 2018  1:30 PM CDT   (Arrive by 1:00 PM)   Return Kidney Transplant with  Kidney/Pancreas Recipient   [facility] Nephrology ([facility])    [address]  [address]  [facility] [address] [phone]              Who to contact     If you have questions or need follow up information about today's clinic visit or your schedule please contact [facility] directly at [phone].  Normal or non-critical lab and imaging results will be communicated to you by MyChart, letter or phone within 4 business days after the clinic has received the results. If you do not hear from us within 7 days, please contact the clinic through [employer] or phone. If you have a critical or abnormal lab result, we will notify you by phone as soon as possible.  Submit refill requests through [employer] or call your pharmacy and they will forward the refill request to us. Please  allow 3 business days for your refill to be completed.          Additional Information About Your Visit        MyChart Information     [employer] gives you secure access to your electronic health record. If you see a primary care provider, you can also send messages to your care team and make appointments. If you have questions, please call your primary care clinic.  If you do not have a primary care provider, please call [phone] and they will assist you.        Care EveryWhere ID     This is your Care EveryWhere ID. This could be used by other organizations to access your [place] medical records  [id number]         Blood Pressure from Last 3 Encounters:   06/06/18 100/77   06/05/18 98/70   06/02/18 (!) 144/91    Weight from Last 3 Encounters:   06/06/18 54.6 kg (120 lb 6.4 oz)   06/05/18 54.4 kg (119 lb 14.9 oz)   06/04/18 54.7 kg (120 lb 11.2 oz)              Today, you had the following     No orders found for display         Today's Medication Changes          These changes are accurate as of 6/6/18  2:41 PM.  If you have any questions, ask your nurse or doctor.               These medicines have changed or have updated prescriptions.        Dose/Directions    ENVARSUS XR 1 MG 24 hr tablet   This may have changed:  how much to take   Used for:  Kidney transplant recipient   Generic drug:  tacrolimus        Dose:  4 mg   Take 4 tablets (4 mg) by mouth every morning (before breakfast)   Quantity:  150 tablet   Refills:  11       metoprolol tartrate 50 MG tablet   Commonly known as:  LOPRESSOR   This may have changed:  additional instructions   Used for:  Kidney transplant recipient        Dose:  25 mg   Take 0.5 tablets (25 mg) by mouth 2 times daily Take only as prescribed for a systolic blood pressure greater than 160.   Quantity:  60 tablet   Refills:  1       prochlorperazine 10 MG tablet   Commonly known as:  COMPAZINE   This may have changed:    - when to take this  - reasons to take this   Used for:   Nausea   Changed by:  aRchel Resendez, NP        Dose:  10 mg   Take 1 tablet (10 mg) by mouth 3 times daily   Quantity:  30 tablet   Refills:  1       valGANciclovir 450 MG tablet   Commonly known as:  VALCYTE   Indication:  Medication Treatment to Prevent Cytomegalovirus Disease   This may have changed:  how much to take   Used for:  Kidney transplant recipient        Dose:  450 mg   Take 1 tablet (450 mg) by mouth daily   Quantity:  60 tablet   Refills:  2            Where to get your medicines      These medications were sent to [facility] [address]  [address], [facility] [address]    Hours:  TRANSPLANT PHONE NUMBER [phone] Phone:  [phone]     prochlorperazine 10 MG tablet                Primary Care Provider Office Phone # Fax #    Charlie Darrion Dubose [phone] [address] [facility] [address]        Equal Access to Services     ALEXANDRA DOVE AH: Hadii kev braga hadasho Soomaali, waaxda luqadaha, qaybta kaalmada adeegyada, waxay austinin haymarshal gibbs . So [facility] [phone].    ATENCIÓN: Si yarila español, tiene a millan disposición servicios gratuitos de asistencia lingüística. LlKing[facility] [phone].    We comply with applicable federal civil rights laws and [place] laws. We do not discriminate on the basis of race, color, national origin, age, disability, sex, sexual orientation, or gender identity.            Thank you!     Thank you for choosing [facility]  for your care. Our goal is always to provide you with excellent care. Hearing back from our patients is one way we can continue to improve our services. Please take a few minutes to complete the written survey that you may receive in the mail after your visit with us. Thank you!             Your Updated Medication List - Protect others around you: Learn how to safely use,  store and throw away your medicines at www.disposemymeds.org.          This list is accurate as of 6/6/18  2:41 PM.  Always use your most recent med list.                   Brand Name Dispense Instructions for use Diagnosis    acetaminophen 325 MG tablet    TYLENOL    80 tablet    Take 2 tablets (650 mg) by mouth every 6 hours for 10 days    Kidney transplant recipient       atorvastatin 40 MG tablet    LIPITOR     Take 40 mg by mouth daily        clonazePAM 1 MG tablet    klonoPIN     Take 0.5-1 mg by mouth 2 times daily as needed (restless leg)        docusate sodium 100 MG capsule    COLACE     Take 200 mg by mouth 2 times daily        ENVARSUS XR 1 MG 24 hr tablet   Generic drug:  tacrolimus     150 tablet    Take 4 tablets (4 mg) by mouth every morning (before breakfast)    Kidney transplant recipient       magnesium hydroxide 400 MG/5ML suspension    MILK OF MAGNESIA    355 mL    Take 30 mLs by mouth daily    Kidney transplant recipient       magnesium oxide 400 MG tablet    MAG-OX    30 tablet    Take 1 tablet (400 mg) by mouth daily (with lunch)    Kidney transplant recipient       metoprolol tartrate 50 MG tablet    LOPRESSOR    60 tablet    Take 0.5 tablets (25 mg) by mouth 2 times daily Take only as prescribed for a systolic blood pressure greater than 160.    Kidney transplant recipient       mycophenolate 250 MG capsule    GENERIC EQUIVALENT    180 capsule    Take 3 capsules (750 mg) by mouth 2 times daily    Kidney transplant recipient       NONFORMULARY      Salicylic acid 3% / 5-FU 4% in vanicream : Apply a thin layer to affected area once daily        omeprazole 40 MG capsule    priLOSEC    90 capsule    TAKE 1 CAPSULE BY MOUTH DAILY    Gastroesophageal reflux disease, esophagitis presence not specified       oxyCODONE IR 5 MG tablet    ROXICODONE    12 tablet    Take 1-2 tablets (5-10 mg) by mouth every 4 hours as needed for other (pain control or improvement in physical function. Hold dose for  analgesic side effects.)    Kidney transplant recipient       PHOSPHA 250 NEUTRAL [id number] MG Tabs     120 tablet    Take 250 mg by mouth 2 times daily    Hypophosphatemia, Kidney replaced by transplant       polyethylene glycol Packet    MIRALAX/GLYCOLAX    14 packet    Take 17 g by mouth daily as needed for constipation    Kidney transplant recipient       prochlorperazine 10 MG tablet    COMPAZINE    30 tablet    Take 1 tablet (10 mg) by mouth 3 times daily    Nausea       senna-docusate 8.6-50 MG per tablet    SENOKOT-S;PERICOLACE    100 tablet    Take 2 tablets by mouth 2 times daily as needed for constipation    Kidney transplant recipient       sulfamethoxazole-trimethoprim 400-80 MG per tablet    BACTRIM/SEPTRA    30 tablet    Take 1 tablet by mouth daily    Kidney transplant recipient       SUMAtriptan 25 MG tablet    IMITREX     Take 25 mg by mouth every 2 hours as needed for migraine Max 200mg/24 hours. Max 7/week. Max 30/month.        traZODone 150 MG tablet    DESYREL     Take 300 mg by mouth At Bedtime with food        valGANciclovir 450 MG tablet    VALCYTE    60 tablet    Take 1 tablet (450 mg) by mouth daily    Kidney transplant recipient       vitamin D 2000 units tablet      Take 2,000 Units by mouth daily

## 2018-06-06 NOTE — PATIENT INSTRUCTIONS
Dear Gilles Henning III    Thank you for choosing [facility] ([facility]) for your transplant cares.  The following information is a summary of our appointment as well as important reminders.      Please make sure your phone is available today because I will call to update you with your anti-rejection drug levels and possibly make changes to your anti-rejection dosages.    1.) STOP taking Magnesium for now.  2.) Valcyte dose should be 450mg once daily.  3.) Schedule the compazine to TID  4.) Hold metoprolol (only take if systolic blood pressure is greater than 160)  5.) Return here tomorrow at 7am for labs and assessment.  6.) Continue to push non caffienated fluids and sodium in your diet. (greater than 3 liters of fluids)    We look forward in seeing you on your next appointment here at [facility].  Please don t hesitate to call us at [phone] to reschedule any of your appointments or to speak with one of the [facility] registered nurses.  It was a pleasure taking care of you today.    Sincerely,    [facility]  [address] [place] [address]  [address]  Phone:  [phone]

## 2018-06-06 NOTE — PROGRESS NOTES
Transplant Surgery Progress Note    Transplants:  2018 (Kidney); Postoperative day:  6    S: Gilles Henning (Poncho) is a 48-year old male with a history of IgA nephropathy, RCC, bilateral native nephrectomy , depression, PTSD, and substance abuse who is now s/p DDKT on 18.     Patient is doing well overall.  He states he has no issues taking his medications.  He denies any fevers, chills, nausea or vomiting.  He denies hematuria, dysuria, or frequency.     Patient is hypotensive today.  He states he has been light headed this morning.  He states he is drinking enough fluids.    He had a bowel movement yesterday and his nausea has improved with his compazine.      Transplant History:    Transplant Type:  DDKT  Donor Type:  - Brain Death   Transplant Date:  2018 (Kidney)   Ureteral Stent:  Yes   Crossmatch:  negative   DSA at Tx:  No  Baseline Cr: 1.20  DeNovo DSA: No    Acute Rejection Hx:  No    Present Maintenance Immunosuppression:  Tacrolimus XR and Mycophenolate mofetil    CMV IgG Ab Discordance:  Yes  EBV IgG Ab Discordance:  Yes    BK Viremia:  No  EBV Viremia:  No    Transplant Coordinator: Gia Romero     Transplant Office Phone Number: [phone]     Immunsupresent Medications     Immunosuppressive Agents Disp Start End    mycophenolate (GENERIC EQUIVALENT) 250 MG capsule 180 capsule 2018    Sig - Route: Take 3 capsules (750 mg) by mouth 2 times daily - Oral    Class: E-Prescribe    tacrolimus (ENVARSUS XR) 1 MG 24 hr tablet 150 tablet 2018     Sig - Route: Take 4 tablets (4 mg) by mouth every morning (before breakfast) - Oral    Class: Historical          Possible Immunosuppression-related side effects:   []             headache  []             vivid dreams  []             irritability  []             cognitive difficuties  []             fine tremor  []             nausea  []             diarrhea  []             neuropathy      []              edema  []             renal calcineurin toxicity  []             hyperkalemia  []             post-transplant diabetes  []             decreased appetite  []             increased appetite  []             other:  [x]             none    Prescription Medications as of 6/6/2018             tacrolimus (ENVARSUS XR) 1 MG 24 hr tablet Take 4 tablets (4 mg) by mouth every morning (before breakfast)    acetaminophen (TYLENOL) 325 MG tablet Take 2 tablets (650 mg) by mouth every 6 hours for 10 days    atorvastatin (LIPITOR) 40 MG tablet Take 40 mg by mouth daily     Cholecalciferol (VITAMIN D) 2000 UNITS tablet Take 2,000 Units by mouth daily    clonazePAM (KLONOPIN) 1 MG tablet Take 0.5-1 mg by mouth 2 times daily as needed (restless leg)     docusate sodium (COLACE) 100 MG capsule Take 200 mg by mouth 2 times daily     K Phos Kings-Sod Phos Di & Mono (PHOSPHA 250 NEUTRAL) 155-852-130 MG TABS Take 250 mg by mouth 2 times daily    magnesium hydroxide (MILK OF MAGNESIA) 400 MG/5ML suspension Take 30 mLs by mouth daily    magnesium oxide (MAG-OX) 400 MG tablet Take 1 tablet (400 mg) by mouth daily (with lunch)    metoprolol tartrate (LOPRESSOR) 50 MG tablet Take 0.5 tablets (25 mg) by mouth 2 times daily Take only as prescribed for a systolic blood pressure greater than 140.    mycophenolate (GENERIC EQUIVALENT) 250 MG capsule Take 3 capsules (750 mg) by mouth 2 times daily    NONFORMULARY Salicylic acid 3% / 5-FU 4% in vanicream : Apply a thin layer to affected area once daily    omeprazole (PRILOSEC) 40 MG capsule TAKE 1 CAPSULE BY MOUTH DAILY    oxyCODONE IR (ROXICODONE) 5 MG tablet Take 1-2 tablets (5-10 mg) by mouth every 4 hours as needed for other (pain control or improvement in physical function. Hold dose for analgesic side effects.)    polyethylene glycol (MIRALAX/GLYCOLAX) Packet Take 17 g by mouth daily as needed for constipation    prochlorperazine (COMPAZINE) 10 MG tablet Take 1 tablet (10 mg) by mouth 3 times  daily    senna-docusate (SENOKOT-S;PERICOLACE) 8.6-50 MG per tablet Take 2 tablets by mouth 2 times daily as needed for constipation    sulfamethoxazole-trimethoprim (BACTRIM/SEPTRA) 400-80 MG per tablet Take 1 tablet by mouth daily    SUMAtriptan (IMITREX) 25 MG tablet Take 25 mg by mouth every 2 hours as needed for migraine Max 200mg/24 hours. Max 7/week. Max 30/month.    traZODone (DESYREL) 150 MG tablet Take 300 mg by mouth At Bedtime with food    valGANciclovir (VALCYTE) 450 MG tablet Take 2 tablets (900 mg) by mouth daily          O:   Temp:  [97.4  F (36.3  C)] 97.4  F (36.3  C)  Pulse:  [98] 98  Resp:  [18] 18  BP: (100)/(77) 100/77  SpO2:  [99 %] 99 %  General Appearance: in no apparent distress.   Skin: Normal, no rashes or jaundice  Heart: regular rate and rhythm, normal S1 and S2  Lungs: easy respirations, no audible wheezing.  Abdomen: flat, The wound is dry and intact, without hernia. The abdomen is non-tender. The kidney graft is not tender.  There is no ascites.  Extremities: Tremor absent.   Edema: absent.    Transplant Immunosuppression Labs Latest Ref Rng & Units 6/6/2018 6/5/2018 6/4/2018 6/3/2018 6/2/2018   Tacro Level 5.0 - 15.0 ug/L - 9.6 - 9.5 -   Tacro Level - - Not Provided - Not Provided -   Creat 0.66 - 1.25 mg/dL 1.17 1.33(H) 1.20 1.55(H) 1.32(H)   BUN 7 - 30 mg/dL 15 17 19 23 21   WBC 4.0 - 11.0 10e9/L 5.2 4.8 5.4 4.1 6.1   Neutrophil % 79.1 75.6 80.8 84.4 85.9   ANEU 1.6 - 8.3 10e9/L 4.1 3.6 4.3 3.5 5.3       Chemistries:   Recent Labs   Lab Test  06/06/18   0805   BUN  15   CR  1.17   GFRESTIMATED  66   GLC  102*     Lab Results   Component Value Date    A1C 4.5 05/29/2018     Recent Labs   Lab Test  05/29/18   2350   ALBUMIN  3.7   BILITOTAL  0.5   ALKPHOS  102   AST  18   ALT  14     Urine Studies:  Recent Labs   Lab Test  05/30/18   0830   COLOR  Straw   APPEARANCE  Clear   URINEGLC  Negative   URINEBILI  Negative   URINEKETONE  Negative   SG  1.004   UBLD  Small*   URINEPH  5.0    PROTEIN  Negative   NITRITE  Negative   LEUKEST  Negative   RBCU  4*   WBCU  <1     No lab results found.  Hematology:   Recent Labs   Lab Test  06/06/18   0805  06/05/18   0730  06/04/18   0720   HGB  11.9*  12.3*  11.9*   PLT  234  240  231   WBC  5.2  4.8  5.4     Coags:   Recent Labs   Lab Test  05/29/18   2350  12/15/17   1150   INR  1.14  0.96     HLA antibodies:   SA1 Hi Risk Britney   Date Value Ref Range Status   05/29/2018   Final    B:7 13 35 46 48 49 50 51 52 53 55 56 57 58 60 61 62 63 67 71 72 75 76 77   81 Cw:9 10       SA1 Mod Risk Britney   Date Value Ref Range Status   05/29/2018 A:25 26 66 B:27 39 41 42 45 59 78  Final     SA2 Hi Risk Britney   Date Value Ref Range Status   05/29/2018   Final    DR:1 103 4 7 9 10 14 15 16 DRw:51 53 DQ:4 5 6 8 9 DQA:02 03 04     SA2 Mod Risk Britney   Date Value Ref Range Status   05/29/2018 DR:18  Final       Assessment: Gilles Henning III is doing fairly well s/p DDKT:  Issues we addressed during his visit include:    Plan:    1. Graft function: trending down.  Stable.  Increase PO fluid intake   2. Immunosuppression Management: No change envarsus 4mg daily and cellcept 750mg BID .  Complexity of management:Medium.  Contributing factors: new txp  3. Hypotension: 2L of IVF today.   Decrease Metoprolol to 25mg BID.  Take this evening if SBP over 140  4. Constipation:  Miralax BID, senna BID  5. Nausea:  Continue compazine every 6 hours PRN  6. phosphorus  Decrease phosphorus dose to 250mg BID    Followup: tomorrow am    Total Time: 25 min,   Counselling Time: 15 min.

## 2018-06-06 NOTE — MR AVS SNAPSHOT
After Visit Summary   6/6/2018    Gilles Henning III    MRN: [id number]           Patient Information     Date Of Birth          1970        Visit Information        Provider Department      6/6/2018 8:00 AM UC 42 ATC; UC SPEC [facility] [place] [facility]        Today's Diagnoses     Aftercare following organ transplant        Kidney replaced by transplant        Kidney transplant recipient          Care Instructions    Dear Gilles Henning MARION    Thank you for choosing [facility] ([facility]) for your transplant cares.  The following information is a summary of our appointment as well as important reminders.      Please make sure your phone is available today because I will call to update you with your anti-rejection drug levels and possibly make changes to your anti-rejection dosages.    1.) STOP taking Magnesium for now.  2.) Valcyte dose should be 450mg once daily.  3.) Schedule the compazine to TID  4.) Hold metoprolol (only take if systolic blood pressure is greater than 160)  5.) Return here tomorrow at 7am for labs and assessment.  6.) Continue to push non caffienated fluids and sodium in your diet. (greater than 3 liters of fluids)    We look forward in seeing you on your next appointment here at [facility].  Please don t hesitate to call us at [phone] to reschedule any of your appointments or to speak with one of the [facility] registered nurses.  It was a pleasure taking care of you today.    Sincerely,    [facility]  [facility]  [address] [place] [address]  [address]  Phone:  [phone]            Follow-ups after your visit        Your next 10 appointments already scheduled     Jun 19, 2018  8:00 AM CDT   (Arrive by 7:45 AM)   Cystoscopy with Mario Vallejo MD   [facility] ([facility])     [address]  [address]  [facility] [address]   [phone]            Jun 19, 2018  9:00 AM CDT   (Arrive by 8:45 AM)   Kidney Post Op with Mario Vallejo MD   [facility] ([facility])    [address] [place] [address]  [address]  [facility] [address]   [phone]            Jul 03, 2018 10:45 AM CDT   LAB with GH LAB   [facility]    [address] [facility] [address]   [phone]           Please do not eat 10-12 hours before your appointment if you are coming in fasting for labs on lipids, cholesterol, or glucose (sugar). This does not apply to pregnant women. Water, hot tea and black coffee (with nothing added) are okay. Do not drink other fluids, diet soda or chew gum.            Jul 09, 2018  4:00 PM CDT   (Arrive by 3:30 PM)   Return Kidney Transplant with Uc Kidney/Pancreas Recipient   [facility] ([facility])    [address] [place] [address]  [address]  [facility] [address]   [phone]            Jul 10, 2018 11:15 AM CDT   Return Visit with MICAH Fatima CNP   [facility]    [address]  [place] [address] [phone]            Aug 09, 2018  3:10 PM CDT   (Arrive by 2:40 PM)   Return Kidney Transplant with Uc Kidney/Pancreas Recipient   [facility] ([facility])    [address] [place] [address]  [address]  [facility] [address]   [phone]            Oct 11, 2018  1:30 PM CDT   (Arrive by 1:00 PM)   Return Kidney Transplant with Uc Kidney/Pancreas Recipient   [facility] ([facility])    [address] [place] [address]  [address]  [facility] [address]   [phone]            Dec 10, 2018  1:30 PM CST   (Arrive by 1:00 PM)   Return Kidney Transplant with Uc Kidney/Pancreas Recipient   [facility]  ([facility])    [address]  [address]  [facility] [address] [phone]              Who to contact     If you have questions or need follow up information about today's clinic visit or your schedule please contact [facility] [place] [facility] directly at [phone].  Normal or non-critical lab and imaging results will be communicated to you by MyChart, letter or phone within 4 business days after the clinic has received the results. If you do not hear from us within 7 days, please contact the clinic through [employer] or phone. If you have a critical or abnormal lab result, we will notify you by phone as soon as possible.  Submit refill requests through [employer] or call your pharmacy and they will forward the refill request to us. Please allow 3 business days for your refill to be completed.          Additional Information About Your Visit        [employer] Information     [employer] gives you secure access to your electronic health record. If you see a primary care provider, you can also send messages to your care team and make appointments. If you have questions, please call your primary care clinic.  If you do not have a primary care provider, please call [phone] and they will assist you.        Care EveryWhere ID     This is your Care EveryWhere ID. This could be used by other organizations to access your [facility] medical records  [id number]        Your Vitals Were     Pulse Temperature Respirations Pulse Oximetry BMI (Body Mass Index)       98 97.4  F (36.3  C) (Oral) 18 99% 17.28 kg/m2        Blood Pressure from Last 3 Encounters:   06/06/18 100/77   06/05/18 98/70   06/02/18 (!) 144/91    Weight from Last 3 Encounters:   06/06/18 54.6 kg (120 lb 6.4 oz)   06/05/18 54.4 kg (119 lb 14.9 oz)   06/04/18 54.7 kg (120 lb 11.2 oz)              We Performed the Following     Basic metabolic panel     CBC with platelets     Magnesium      Phosphorus     Tacrolimus level     WBC Differential          Today's Medication Changes          These changes are accurate as of 6/6/18  9:33 AM.  If you have any questions, ask your nurse or doctor.               These medicines have changed or have updated prescriptions.        Dose/Directions    ENVARSUS XR 1 MG 24 hr tablet   This may have changed:  how much to take   Used for:  Kidney transplant recipient   Generic drug:  tacrolimus        Dose:  4 mg   Take 4 tablets (4 mg) by mouth every morning (before breakfast)   Quantity:  150 tablet   Refills:  11                Primary Care Provider Office Phone # Fax #    Charlie Darrion Dubose [phone] [address]  [facility] [address]        Equal Access to Services     [facility] : Hadii kev Rene, wasg landaverde, gustabo linalmajacqueline beard, pascale gibbs . So [facility] [phone].    ATENCIÓN: Si habla español, tiene a millan disposición servicios gratuitos de asistencia lingüística. Ll[facility] [phone].    We comply with applicable federal civil rights laws and [place] laws. We do not discriminate on the basis of race, color, national origin, age, disability, sex, sexual orientation, or gender identity.            Thank you!     Thank you for choosing [facility] PROCED[facility]  for your care. Our goal is always to provide you with excellent care. Hearing back from our patients is one way we can continue to improve our services. Please take a few minutes to complete the written survey that you may receive in the mail after your visit with us. Thank you!             Your Updated Medication List - Protect others around you: Learn how to safely use, store and throw away your medicines at www.disposemymeds.org.          This list is accurate as of 6/6/18  9:33 AM.  Always use your most recent med list.                   Brand Name Dispense Instructions for use  Diagnosis    acetaminophen 325 MG tablet    TYLENOL    80 tablet    Take 2 tablets (650 mg) by mouth every 6 hours for 10 days    Kidney transplant recipient       atorvastatin 40 MG tablet    LIPITOR     Take 40 mg by mouth daily        clonazePAM 1 MG tablet    klonoPIN     Take 0.5-1 mg by mouth 2 times daily as needed (restless leg)        docusate sodium 100 MG capsule    COLACE     Take 200 mg by mouth 2 times daily        ENVARSUS XR 1 MG 24 hr tablet   Generic drug:  tacrolimus     150 tablet    Take 4 tablets (4 mg) by mouth every morning (before breakfast)    Kidney transplant recipient       magnesium hydroxide 400 MG/5ML suspension    MILK OF MAGNESIA    355 mL    Take 30 mLs by mouth daily    Kidney transplant recipient       magnesium oxide 400 MG tablet    MAG-OX    30 tablet    Take 1 tablet (400 mg) by mouth daily (with lunch)    Kidney transplant recipient       metoprolol tartrate 50 MG tablet    LOPRESSOR    60 tablet    Take 0.5 tablets (25 mg) by mouth 2 times daily Take only as prescribed for a systolic blood pressure greater than 140.    Kidney transplant recipient       mycophenolate 250 MG capsule    GENERIC EQUIVALENT    180 capsule    Take 3 capsules (750 mg) by mouth 2 times daily    Kidney transplant recipient       NONFORMULARY      Salicylic acid 3% / 5-FU 4% in vanicream : Apply a thin layer to affected area once daily        omeprazole 40 MG capsule    priLOSEC    90 capsule    TAKE 1 CAPSULE BY MOUTH DAILY    Gastroesophageal reflux disease, esophagitis presence not specified       oxyCODONE IR 5 MG tablet    ROXICODONE    12 tablet    Take 1-2 tablets (5-10 mg) by mouth every 4 hours as needed for other (pain control or improvement in physical function. Hold dose for analgesic side effects.)    Kidney transplant recipient       PHOSPHA 250 NEUTRAL [phone] MG Tabs     120 tablet    Take 250 mg by mouth 2 times daily    Hypophosphatemia, Kidney replaced by transplant        polyethylene glycol Packet    MIRALAX/GLYCOLAX    14 packet    Take 17 g by mouth daily as needed for constipation    Kidney transplant recipient       prochlorperazine 10 MG tablet    COMPAZINE    20 tablet    Take 1 tablet (10 mg) by mouth every 6 hours as needed for nausea or vomiting    Nausea       senna-docusate 8.6-50 MG per tablet    SENOKOT-S;PERICOLACE    100 tablet    Take 2 tablets by mouth 2 times daily as needed for constipation    Kidney transplant recipient       sulfamethoxazole-trimethoprim 400-80 MG per tablet    BACTRIM/SEPTRA    30 tablet    Take 1 tablet by mouth daily    Kidney transplant recipient       SUMAtriptan 25 MG tablet    IMITREX     Take 25 mg by mouth every 2 hours as needed for migraine Max 200mg/24 hours. Max 7/week. Max 30/month.        traZODone 150 MG tablet    DESYREL     Take 300 mg by mouth At Bedtime with food        valGANciclovir 450 MG tablet    VALCYTE    60 tablet    Take 2 tablets (900 mg) by mouth daily    Kidney transplant recipient       vitamin D 2000 units tablet      Take 2,000 Units by mouth daily

## 2018-06-07 ENCOUNTER — TELEPHONE (OUTPATIENT)
Dept: TRANSPLANT | Facility: CLINIC | Age: 48
End: 2018-06-07

## 2018-06-07 ENCOUNTER — INFUSION THERAPY VISIT (OUTPATIENT)
Dept: INFUSION THERAPY | Facility: CLINIC | Age: 48
End: 2018-06-07
Attending: SURGERY
Payer: MEDICARE

## 2018-06-07 VITALS
WEIGHT: 118.9 LBS | DIASTOLIC BLOOD PRESSURE: 80 MMHG | HEART RATE: 94 BPM | BODY MASS INDEX: 17.06 KG/M2 | OXYGEN SATURATION: 100 % | SYSTOLIC BLOOD PRESSURE: 146 MMHG | RESPIRATION RATE: 18 BRPM

## 2018-06-07 DIAGNOSIS — Z94.0 KIDNEY TRANSPLANTED: Primary | ICD-10-CM

## 2018-06-07 DIAGNOSIS — E87.1 HYPONATREMIA: ICD-10-CM

## 2018-06-07 DIAGNOSIS — Z29.89 NEED FOR PNEUMOCYSTIS PROPHYLAXIS: ICD-10-CM

## 2018-06-07 DIAGNOSIS — E86.0 DEHYDRATION: ICD-10-CM

## 2018-06-07 DIAGNOSIS — D84.9 IMMUNOSUPPRESSED STATUS (H): ICD-10-CM

## 2018-06-07 DIAGNOSIS — Z29.89 NEED FOR CMV IMMUNOTHERAPY: ICD-10-CM

## 2018-06-07 DIAGNOSIS — E86.9 VOLUME DEPLETION: ICD-10-CM

## 2018-06-07 DIAGNOSIS — Z94.0 KIDNEY REPLACED BY TRANSPLANT: Primary | ICD-10-CM

## 2018-06-07 PROBLEM — N18.6 CKD (CHRONIC KIDNEY DISEASE) STAGE V REQUIRING CHRONIC DIALYSIS (H): Status: RESOLVED | Noted: 2017-06-26 | Resolved: 2018-06-07

## 2018-06-07 PROBLEM — I10 BENIGN ESSENTIAL HYPERTENSION: Status: ACTIVE | Noted: 2018-06-07

## 2018-06-07 PROBLEM — N18.6 END STAGE RENAL DISEASE (H): Status: RESOLVED | Noted: 2018-02-26 | Resolved: 2018-06-07

## 2018-06-07 PROBLEM — N18.5 CHRONIC KIDNEY DISEASE, STAGE V (H): Status: RESOLVED | Noted: 2017-06-12 | Resolved: 2018-06-07

## 2018-06-07 PROBLEM — Z99.2 CKD (CHRONIC KIDNEY DISEASE) STAGE V REQUIRING CHRONIC DIALYSIS (H): Status: RESOLVED | Noted: 2017-06-26 | Resolved: 2018-06-07

## 2018-06-07 PROBLEM — E83.42 HYPOMAGNESEMIA: Status: ACTIVE | Noted: 2018-06-07

## 2018-06-07 LAB
ANION GAP SERPL CALCULATED.3IONS-SCNC: 10 MMOL/L (ref 3–14)
BASOPHILS # BLD AUTO: 0 10E9/L (ref 0–0.2)
BASOPHILS NFR BLD AUTO: 0.6 %
BUN SERPL-MCNC: 15 MG/DL (ref 7–30)
CALCIUM SERPL-MCNC: 9.4 MG/DL (ref 8.5–10.1)
CHLORIDE SERPL-SCNC: 96 MMOL/L (ref 94–109)
CO2 SERPL-SCNC: 19 MMOL/L (ref 20–32)
CREAT SERPL-MCNC: 1.32 MG/DL (ref 0.66–1.25)
DIFFERENTIAL METHOD BLD: ABNORMAL
EOSINOPHIL # BLD AUTO: 0.1 10E9/L (ref 0–0.7)
EOSINOPHIL NFR BLD AUTO: 1.9 %
ERYTHROCYTE [DISTWIDTH] IN BLOOD BY AUTOMATED COUNT: 16.4 % (ref 10–15)
GFR SERPL CREATININE-BSD FRML MDRD: 58 ML/MIN/1.7M2
GLUCOSE SERPL-MCNC: 116 MG/DL (ref 70–99)
HCT VFR BLD AUTO: 36.3 % (ref 40–53)
HGB BLD-MCNC: 12.4 G/DL (ref 13.3–17.7)
IMM GRANULOCYTES # BLD: 0.1 10E9/L (ref 0–0.4)
IMM GRANULOCYTES NFR BLD: 1.8 %
LYMPHOCYTES # BLD AUTO: 0.4 10E9/L (ref 0.8–5.3)
LYMPHOCYTES NFR BLD AUTO: 5.3 %
MAGNESIUM SERPL-MCNC: 2.1 MG/DL (ref 1.6–2.3)
MCH RBC QN AUTO: 30.8 PG (ref 26.5–33)
MCHC RBC AUTO-ENTMCNC: 34.2 G/DL (ref 31.5–36.5)
MCV RBC AUTO: 90 FL (ref 78–100)
MONOCYTES # BLD AUTO: 0.7 10E9/L (ref 0–1.3)
MONOCYTES NFR BLD AUTO: 10.1 %
NEUTROPHILS # BLD AUTO: 5.4 10E9/L (ref 1.6–8.3)
NEUTROPHILS NFR BLD AUTO: 80.3 %
NRBC # BLD AUTO: 0 10*3/UL
NRBC BLD AUTO-RTO: 0 /100
PHOSPHATE SERPL-MCNC: 2.3 MG/DL (ref 2.5–4.5)
PLATELET # BLD AUTO: 287 10E9/L (ref 150–450)
POTASSIUM SERPL-SCNC: 4.8 MMOL/L (ref 3.4–5.3)
RBC # BLD AUTO: 4.02 10E12/L (ref 4.4–5.9)
SODIUM SERPL-SCNC: 126 MMOL/L (ref 133–144)
TACROLIMUS BLD-MCNC: 8.6 UG/L (ref 5–15)
TME LAST DOSE: NORMAL H
WBC # BLD AUTO: 6.8 10E9/L (ref 4–11)

## 2018-06-07 PROCEDURE — 83735 ASSAY OF MAGNESIUM: CPT | Performed by: INTERNAL MEDICINE

## 2018-06-07 PROCEDURE — G0463 HOSPITAL OUTPT CLINIC VISIT: HCPCS | Mod: 25

## 2018-06-07 PROCEDURE — 80048 BASIC METABOLIC PNL TOTAL CA: CPT | Performed by: INTERNAL MEDICINE

## 2018-06-07 PROCEDURE — 96360 HYDRATION IV INFUSION INIT: CPT

## 2018-06-07 PROCEDURE — 85025 COMPLETE CBC W/AUTO DIFF WBC: CPT | Performed by: INTERNAL MEDICINE

## 2018-06-07 PROCEDURE — 25000128 H RX IP 250 OP 636: Mod: ZF | Performed by: NURSE PRACTITIONER

## 2018-06-07 PROCEDURE — 84100 ASSAY OF PHOSPHORUS: CPT | Performed by: INTERNAL MEDICINE

## 2018-06-07 PROCEDURE — 80197 ASSAY OF TACROLIMUS: CPT | Performed by: INTERNAL MEDICINE

## 2018-06-07 PROCEDURE — 80180 DRUG SCRN QUAN MYCOPHENOLATE: CPT | Performed by: INTERNAL MEDICINE

## 2018-06-07 PROCEDURE — 36415 COLL VENOUS BLD VENIPUNCTURE: CPT

## 2018-06-07 RX ADMIN — SODIUM CHLORIDE 1000 ML: 9 INJECTION, SOLUTION INTRAVENOUS at 09:10

## 2018-06-07 NOTE — PATIENT INSTRUCTIONS
Dear Gilles Hennnig III    Thank you for choosing [facility] ([facility]) for your transplant cares.  The following information is a summary of our appointment as well as important reminders.      Please make sure your phone is available today because I will call to update you with your anti-rejection drug levels and possibly make changes to your anti-rejection dosages.    Additional information:       We look forward in seeing you on your next appointment here at [facility].  Please don t hesitate to call us at [phone] to reschedule any of your appointments or to speak with one of the [facility] registered nurses.  It was a pleasure taking care of you today.    Sincerely,    [facility]  Specialty Infusion & Procedure Center  [address] [place] [address]  [address]  Phone:  [phone]

## 2018-06-07 NOTE — PROGRESS NOTES
"Gilles Henning III came to [facility] today for a lab and assess following a kidney transplant on 5/30/18.      Discharge date: 6/2/18  Transplant coordinator: Gia Romero   Phone number patient can be reached at: [phone](pt)  [phone] (wife Merline)      Physical Assessment:  See physical assessment located under \"Document Flowsheets\".  Incision site: incision c/d/i. Staples intact. Old Travis drain site dressing intact. No drainage noted around TRAVIS drain. Patient reports no drainage since last night from TRAVIS drain.   Lines: N/A   Lucas: n/a  Urine clarity: clear urine reports some irritation with initiation or urination.   Hydration: Patient reports drinking > 3 liters of non caffeinated fluids in the past 24 hours.  Nutrition: Reports slowly improving appetite.denies nausea  Last BM: large BM last night with the help of 2 enemas and 2 supps.   Pain: Pain 3/10 incisional pain. Patient taking oxy 1-2 times a day with adequate relief in pain.   Labs drawn by [facility] staff Yes  Patient able to ambulate into clinic. Denies dizziness today, reports he is feeling the best he's ever felt today.     Plan of care for today:   Labs and assessment reviewed with Dr. Telles    Medications reviewed    Medication changes:   none    Medications administered:  1 L of NS for increased Creatinine and a sodium of 126       Patient education:    The following teaching topics were REVIEWED: Importance of drinking 2L of non-caffeinated fluids daily, Medications (purposes, doses and times of administration), 7A discharge check list and Plan of care   Patient and wife Merline verbalized understanding and all questions answered.    Drug level:  Tacrolimus level today was 8.6 close to 24 hour trough. Per Dr. Cabrera no change patient to continue taking 4 mg daily. Patient called no answer message left to update.     Face to face time: 15 minutes    Discharge Plan    Pt will follow up with labs and fluids in [place] tomorrow and home care to start " Monday. Report called to Mariya [facility] [place] [phone]  Discharge instructions reviewed with patient: YES  Patient/Representative verbalized understanding, all questions answered: YES    Discharged from unit at 1035  with wife to local hotel.    Michelle Yoon RN

## 2018-06-07 NOTE — PROGRESS NOTES
Transplant Surgery Progress Note    Transplants:  2018 (Kidney); Postoperative day:  8  S:  Gilles Henning (Poncho) is a 48-year old male with a history of IgA nephropathy, RCC, bilateral native nephrectomy , depression, PTSD, and substance abuse who is now s/p DDKT on 18.      Patient is doing well overall.  He states he has no issues taking his medications.  He denies any fevers, chills, nausea or vomiting.  He denies hematuria, dysuria, or frequency.        Transplant History:  Transplant Type:  DDKT  Donor Type:  - Brain Death                        Transplant Date:  2018 (Kidney)                        Ureteral Stent:  Yes                        Crossmatch:  negative                        DSA at Tx:  No  Baseline Cr: 1.20  DeNovo DSA: No     Acute Rejection Hx:  No     Present Maintenance Immunosuppression:  Tacrolimus XR and Mycophenolate mofetil     CMV IgG Ab Discordance:  Yes  EBV IgG Ab Discordance:  Yes     BK Viremia:  No  EBV Viremia:  No     Transplant Coordinator: Gia Romero                         Transplant Office Phone Number: [phone]     Immunsupresent Medications     Immunosuppressive Agents Disp Start End    mycophenolate (GENERIC EQUIVALENT) 250 MG capsule 180 capsule 2018    Sig - Route: Take 3 capsules (750 mg) by mouth 2 times daily - Oral    Class: E-Prescribe    tacrolimus (ENVARSUS XR) 1 MG 24 hr tablet 150 tablet 2018     Sig - Route: Take 4 tablets (4 mg) by mouth every morning (before breakfast) - Oral    Class: Historical          Possible Immunosuppression-related side effects:   []             headache  []             vivid dreams  []             irritability  []             cognitive difficuties  []             fine tremor  []             nausea  []             diarrhea  []             neuropathy      []             edema  []             renal calcineurin toxicity  []             hyperkalemia  []             post-transplant  diabetes  []             decreased appetite  []             increased appetite  []             other:  [x]             none    Prescription Medications as of 6/7/2018             acetaminophen (TYLENOL) 325 MG tablet Take 2 tablets (650 mg) by mouth every 6 hours for 10 days    atorvastatin (LIPITOR) 40 MG tablet Take 40 mg by mouth daily     Cholecalciferol (VITAMIN D) 2000 UNITS tablet Take 2,000 Units by mouth daily    clonazePAM (KLONOPIN) 1 MG tablet Take 0.5-1 mg by mouth 2 times daily as needed (restless leg)     docusate sodium (COLACE) 100 MG capsule Take 200 mg by mouth 2 times daily     K Phos [place]-Sod Phos Di & Mono (PHOSPHA 250 NEUTRAL) 155-852-130 MG TABS Take 250 mg by mouth 2 times daily    magnesium hydroxide (MILK OF MAGNESIA) 400 MG/5ML suspension Take 30 mLs by mouth daily    magnesium oxide (MAG-OX) 400 MG tablet Take 1 tablet (400 mg) by mouth daily (with lunch)    metoprolol tartrate (LOPRESSOR) 50 MG tablet Take 0.5 tablets (25 mg) by mouth 2 times daily Take only as prescribed for a systolic blood pressure greater than 160.    mycophenolate (GENERIC EQUIVALENT) 250 MG capsule Take 3 capsules (750 mg) by mouth 2 times daily    NONFORMULARY Salicylic acid 3% / 5-FU 4% in vanicream : Apply a thin layer to affected area once daily    omeprazole (PRILOSEC) 40 MG capsule TAKE 1 CAPSULE BY MOUTH DAILY    oxyCODONE IR (ROXICODONE) 5 MG tablet Take 1-2 tablets (5-10 mg) by mouth every 4 hours as needed for other (pain control or improvement in physical function. Hold dose for analgesic side effects.)    polyethylene glycol (MIRALAX/GLYCOLAX) Packet Take 17 g by mouth daily as needed for constipation    prochlorperazine (COMPAZINE) 10 MG tablet Take 1 tablet (10 mg) by mouth 3 times daily    senna-docusate (SENOKOT-S;PERICOLACE) 8.6-50 MG per tablet Take 2 tablets by mouth 2 times daily as needed for constipation    sulfamethoxazole-trimethoprim (BACTRIM/SEPTRA) 400-80 MG per tablet Take 1 tablet by  mouth daily    SUMAtriptan (IMITREX) 25 MG tablet Take 25 mg by mouth every 2 hours as needed for migraine Max 200mg/24 hours. Max 7/week. Max 30/month.    tacrolimus (ENVARSUS XR) 1 MG 24 hr tablet Take 4 tablets (4 mg) by mouth every morning (before breakfast)    traZODone (DESYREL) 150 MG tablet Take 300 mg by mouth At Bedtime with food    valGANciclovir (VALCYTE) 450 MG tablet Take 1 tablet (450 mg) by mouth daily      Facility Administered Medications as of 6/7/2018             0.9% sodium chloride BOLUS Inject 1,000 mLs into the vein once          O:   Pulse:  [94] 94  Resp:  [18] 18  BP: (104)/(80) 104/80  SpO2:  [100 %] 100 %  General Appearance: in no apparent distress.   Skin: Normal, no rashes or jaundice  Heart: regular rate and rhythm, normal S1 and S2  Lungs: easy respirations, no audible wheezing.  Abdomen: flat, The wound is dry and intact, without hernia. The abdomen is non-tender. The kidney graft is not tender.  There is no ascites.  Extremities: Tremor absent.   Edema: absent.     Transplant Immunosuppression Labs Latest Ref Rng & Units 6/7/2018 6/6/2018 6/5/2018 6/4/2018 6/3/2018   Tacro Level 5.0 - 15.0 ug/L - 9.9 9.6 - 9.5   Tacro Level - - Not Provided Not Provided - Not Provided   Creat 0.66 - 1.25 mg/dL 1.32(H) 1.17 1.33(H) 1.20 1.55(H)   BUN 7 - 30 mg/dL 15 15 17 19 23   WBC 4.0 - 11.0 10e9/L 6.8 5.2 4.8 5.4 4.1   Neutrophil % 80.3 79.1 75.6 80.8 84.4   ANEU 1.6 - 8.3 10e9/L 5.4 4.1 3.6 4.3 3.5       Chemistries:   Recent Labs   Lab Test  06/07/18   0723   BUN  15   CR  1.32*   GFRESTIMATED  58*   GLC  116*     Lab Results   Component Value Date    A1C 4.5 05/29/2018     Recent Labs   Lab Test  05/29/18   2350   ALBUMIN  3.7   BILITOTAL  0.5   ALKPHOS  102   AST  18   ALT  14     Urine Studies:  Recent Labs   Lab Test  05/30/18   0830   COLOR  Straw   APPEARANCE  Clear   URINEGLC  Negative   URINEBILI  Negative   URINEKETONE  Negative   SG  1.004   UBLD  Small*   URINEPH  5.0   PROTEIN   Negative   NITRITE  Negative   LEUKEST  Negative   RBCU  4*   WBCU  <1     No lab results found.  Hematology:   Recent Labs   Lab Test  06/07/18   0723  06/06/18   0805  06/05/18   0730   HGB  12.4*  11.9*  12.3*   PLT  287  234  240   WBC  6.8  5.2  4.8     Coags:   Recent Labs   Lab Test  05/29/18   2350  12/15/17   1150   INR  1.14  0.96     HLA antibodies:   SA1 Hi Risk Britney   Date Value Ref Range Status   05/29/2018   Final    B:7 13 35 46 48 49 50 51 52 53 55 56 57 58 60 61 62 63 67 71 72 75 76 77   81 Cw:9 10       SA1 Mod Risk Britney   Date Value Ref Range Status   05/29/2018 A:25 26 66 B:27 39 41 42 45 59 78  Final     SA2 Hi Risk Britney   Date Value Ref Range Status   05/29/2018   Final    DR:1 103 4 7 9 10 14 15 16 DRw:51 53 DQ:4 5 6 8 9 DQA:02 03 04     SA2 Mod Risk Britney   Date Value Ref Range Status   05/29/2018 DR:18  Final       Assessment: Gilles Henning III is doing well s/p DDKT:  Issues we addressed during his visit include:    Plan:    1. Graft function: cr 1.32.  Slightly elevated.  Patient is fluid down.  1L of fluids.    2. Immunosuppression Management: No change labs today.  continue  envarsus 4mg daily, cellcept 750mg BID.  Complexity of management:Medium.  Contributing factors: Recent txp  3. Constipation:  Largely resolved.  Continue miralax, and senna  4. Hyponatremia:  1L NS today.  Increase salt intake  5. HTN:  Stable.  Has not taken metoprolol.    Followup: tomorrow    Total Time: 25 min,   Counselling Time: 15 min.      Rachel Resendez, CNP     Patient was seen by myself, Dr. Nikhil Telles, in conjunction with Rachel Resendez, as part of a shared visit.    I personally reviewed past medical and surgical history, vital signs, medications and labs.  Present and past medical history, along with significant physical exam findings were all reviewed with ISMAEL.    My key findings:  The patient is a 48-year-old male with a history of end-stage kidney disease due to IgA nephropathy who is status  post kidney transplant ×2 most recently on 5/30/2018 here for follow-up of his second kidney transplant.     The patient had biopsy-proven IgA nephropathy that progressed to ESRD and he was on dialysis for 9 years prior to his first transplant.  The transplant lasted for 4.5 years and was lost in the setting of a cancer diagnosis.  He returned to dialysis and has had his second transplant on 5/30/2018.  His second transplant has gone well without any immediate postoperative problems.    Since I saw the patient yesterday, the patient notes that he feels much better.  He has had 2 bowel movements with the addition of his bowel regimen.  He continues to drink well but despite this has lost 1 pound of weight.    He denies any chest pain or shortness breath.  He has no nausea or vomiting.  He has no fever shakes or chills.  He has no lower extremity edema.  The patient states that he feels well and would like to discharge back to [place].    Key management decisions made by me and discussed with ISMAEL:    1. DDKT - ESRD due to IgA s/p kidney tx x 2. Good allograft function.     Immunosuppression:  Tac XR 4mg po daily   mg po bid     UPC: check at 6 / 12 months  DSA with pre-formed antibodies. Check per protocol  BK: check monthly     Creatinine up today with volume depletion. Give 1 L of NS.     2. Immunosuppression: continue tac XR and mmf. Goal tac level 8-10 ng / ml. 9.9 yesterday.  3. Hyponatremia: likely in setting of volume depletion. Push sodium intake / fluid intake in diet. Should improve with NS.  4. HTN:  Continue to hold metoprolol for now.  5. Need for cmv immunotherapy:R+ with thymo induction.  450 mg daily for cr cl of 51 ml / min.  6. Need for pneumocystis prophylaxis: bactrim ss tab daily.  7. Constipation: docusate, miralax, mag citrate, senna.  8. Hypomagnesemia:  hold mag oxide. 2.1 today.  9. Nausea: scheduled compazine for now. With no nausea. Can go back to PRN.     If the patient can be  scheduled for IV infusion of saline in [place] he is okay for discharge back there for now.  If not he will follow-up with us in the infusion center here tomorrow.

## 2018-06-07 NOTE — MR AVS SNAPSHOT
After Visit Summary   6/7/2018    Gilles Henning III    MRN: [id number]           Patient Information     Date Of Birth          1970        Visit Information        Provider Department      6/7/2018 7:00 AM UC 42 ATC; UC SPEC INFUSION [facility] Advanced Treatment [place] Specialty and Procedure        Today's Diagnoses     Kidney replaced by transplant    -  1    Dehydration          Care Instructions    Dear Gilles Henning III    Thank you for choosing [facility] Special[facility] Infusion [facility] Procedure Center ([facility]) for your transplant cares.  The following information is a summary of our appointment as well as important reminders.      Please make sure your phone is available today because I will call to update you with your anti-rejection drug levels and possibly make changes to your anti-rejection dosages.    Continue to push fluids :)     We look forward in seeing you on your next appointment here at [facility].  Please don t hesitate to call us at [phone] to reschedule any of your appointments or to speak with one of the [facility] registered nurses.  It was a pleasure taking care of you today.    Sincerely,    [facility]  Specialty Infusion & Procedure Center  [address] [place] [address]  [address]  Phone:  [phone]            Follow-ups after your visit        Your next 10 appointments already scheduled     Jun 19, 2018  8:00 AM CDT   (Arrive by 7:45 AM)   Cystoscopy with Mario Vallejo MD   [facility] Solid Organ Transplant ([facility])    [address] [place] [address]  [address] [place] [address] [phone]            Jun 19, 2018  9:00 AM CDT   (Arrive by 8:45 AM)   Kidney Post Op with Mario Vallejo MD   [facility] Solid Organ Transplant ([facility])    [address] [place] [address]  [address] [place] [id number] [phone]            Jul 03, 2018 10:45 AM CDT   LAB with  LAB   [facility] ([facility])    [address]  [facility] [address]   [phone]           Please do not eat 10-12 hours before your appointment if you are coming in fasting for labs on lipids, cholesterol, or glucose (sugar). This does not apply to pregnant women. Water, hot tea and black coffee (with nothing added) are okay. Do not drink other fluids, diet soda or chew gum.            Jul 09, 2018  4:00 PM CDT   (Arrive by 3:30 PM)   Return Kidney Transplant with  Kidney/Pancreas Recipient   [facility] Nephrology ([facility])    [address]  [address]  [facility] [address] [phone]            Jul 10, 2018 11:15 AM CDT   Return Visit with MICAH Fatima [facility] ([facility])    [address]  [facility] [address]   [phone]            Aug 09, 2018  3:10 PM CDT   (Arrive by 2:40 PM)   Return Kidney Transplant with  Kidney/Pancreas Recipient   [facility] Nephrology ([facility])    [address]  [address]  [facility] [id number] [phone]            Oct 11, 2018  1:30 PM CDT   (Arrive by 1:00 PM)   Return Kidney Transplant with  Kidney/Pancreas Recipient   [facility] Nephrology ([facility])    [address]  [address]  [facility] [address] [phone]            Dec 10, 2018  1:30 PM CST   (Arrive by 1:00 PM)   Return Kidney Transplant with  Kidney/Pancreas Recipient   [facility] Nephrology ([facility])    [address]  [address]  [facility] [address] [phone]              Who to contact     If you have questions or need follow up information about today's clinic visit or your schedule please contact [facility] SPECIALTY AND PROCEDURE directly at [phone].  Normal or non-critical lab and imaging  results will be communicated to you by MyChart, letter or phone within 4 business days after the clinic has received the results. If you do not hear from us within 7 days, please contact the clinic through [employer] or phone. If you have a critical or abnormal lab result, we will notify you by phone as soon as possible.  Submit refill requests through [employer] or call your pharmacy and they will forward the refill request to us. Please allow 3 business days for your refill to be completed.          Additional Information About Your Visit        [employer] Information     [employer] gives you secure access to your electronic health record. If you see a primary care provider, you can also send messages to your care team and make appointments. If you have questions, please call your primary care clinic.  If you do not have a primary care provider, please call [phone] and they will assist you.        Care EveryWhere ID     This is your Care EveryWhere ID. This could be used by other organizations to access your [facility] medical records  [id number]        Your Vitals Were     Pulse Respirations Pulse Oximetry BMI (Body Mass Index)          94 18 100% 17.06 kg/m2         Blood Pressure from Last 3 Encounters:   06/07/18 146/80   06/06/18 100/77   06/05/18 98/70    Weight from Last 3 Encounters:   06/07/18 53.9 kg (118 lb 14.4 oz)   06/06/18 54.6 kg (120 lb 6.4 oz)   06/05/18 54.4 kg (119 lb 14.9 oz)              We Performed the Following     Basic metabolic panel     CBC with platelets differential     Magnesium     Mycophenolic acid     Phosphorus     Tacrolimus level        Primary Care Provider Office Phone # Fax #    Charlie Darrion Dubose [phone] [address]  [place] [address]        Equal Access to Services     MARCOS DOVE : Xiao Rene, arabella landaverde, pascale read. So [facility] [phone].    ATENCIÓN:  Si habla jeanette, tiene a millan disposición servicios gratuitos de asistencia lingüística. Denys sandhu [phone].    We comply with applicable federal civil rights laws and [place] laws. We do not discriminate on the basis of race, color, national origin, age, disability, sex, sexual orientation, or gender identity.            Thank you!     Thank you for choosing [facility] SPECIALTY AND PROCEDURE  for your care. Our goal is always to provide you with excellent care. Hearing back from our patients is one way we can continue to improve our services. Please take a few minutes to complete the written survey that you may receive in the mail after your visit with us. Thank you!             Your Updated Medication List - Protect others around you: Learn how to safely use, store and throw away your medicines at www.disposemymeds.org.          This list is accurate as of 6/7/18 10:38 AM.  Always use your most recent med list.                   Brand Name Dispense Instructions for use Diagnosis    acetaminophen 325 MG tablet    TYLENOL    80 tablet    Take 2 tablets (650 mg) by mouth every 6 hours for 10 days    Kidney transplant recipient       atorvastatin 40 MG tablet    LIPITOR     Take 40 mg by mouth daily        clonazePAM 1 MG tablet    klonoPIN     Take 0.5-1 mg by mouth 2 times daily as needed (restless leg)        docusate sodium 100 MG capsule    COLACE     Take 200 mg by mouth 2 times daily        ENVARSUS XR 1 MG 24 hr tablet   Generic drug:  tacrolimus     150 tablet    Take 4 tablets (4 mg) by mouth every morning (before breakfast)    Kidney transplant recipient       magnesium hydroxide 400 MG/5ML suspension    MILK OF MAGNESIA    355 mL    Take 30 mLs by mouth daily    Kidney transplant recipient       magnesium oxide 400 MG tablet    MAG-OX    30 tablet    Take 1 tablet (400 mg) by mouth daily (with lunch)    Kidney transplant recipient       metoprolol tartrate 50 MG tablet     LOPRESSOR    60 tablet    Take 0.5 tablets (25 mg) by mouth 2 times daily Take only as prescribed for a systolic blood pressure greater than 160.    Kidney transplant recipient       mycophenolate 250 MG capsule    GENERIC EQUIVALENT    180 capsule    Take 3 capsules (750 mg) by mouth 2 times daily    Kidney transplant recipient       NONFORMULARY      Salicylic acid 3% / 5-FU 4% in vanicream : Apply a thin layer to affected area once daily        omeprazole 40 MG capsule    priLOSEC    90 capsule    TAKE 1 CAPSULE BY MOUTH DAILY    Gastroesophageal reflux disease, esophagitis presence not specified       oxyCODONE IR 5 MG tablet    ROXICODONE    12 tablet    Take 1-2 tablets (5-10 mg) by mouth every 4 hours as needed for other (pain control or improvement in physical function. Hold dose for analgesic side effects.)    Kidney transplant recipient       PHOSPHA 250 NEUTRAL 155-852-130 MG Tabs     120 tablet    Take 250 mg by mouth 2 times daily    Hypophosphatemia, Kidney replaced by transplant       polyethylene glycol Packet    MIRALAX/GLYCOLAX    14 packet    Take 17 g by mouth daily as needed for constipation    Kidney transplant recipient       prochlorperazine 10 MG tablet    COMPAZINE    30 tablet    Take 1 tablet (10 mg) by mouth 3 times daily    Nausea       senna-docusate 8.6-50 MG per tablet    SENOKOT-S;PERICOLACE    100 tablet    Take 2 tablets by mouth 2 times daily as needed for constipation    Kidney transplant recipient       sulfamethoxazole-trimethoprim 400-80 MG per tablet    BACTRIM/SEPTRA    30 tablet    Take 1 tablet by mouth daily    Kidney transplant recipient       SUMAtriptan 25 MG tablet    IMITREX     Take 25 mg by mouth every 2 hours as needed for migraine Max 200mg/24 hours. Max 7/week. Max 30/month.        traZODone 150 MG tablet    DESYREL     Take 300 mg by mouth At Bedtime with food        valGANciclovir 450 MG tablet    VALCYTE    60 tablet    Take 1 tablet (450 mg) by mouth daily     Kidney transplant recipient       vitamin D 2000 units tablet      Take 2,000 Units by mouth daily

## 2018-06-07 NOTE — PATIENT INSTRUCTIONS
Dear Gilles Henning III    Thank you for choosing [facility] Special[facility] Infusion [facility] Procedure Center ([facility]) for your transplant cares.  The following information is a summary of our appointment as well as important reminders.      Please make sure your phone is available today because I will call to update you with your anti-rejection drug levels and possibly make changes to your anti-rejection dosages.    Continue to push fluids :)     We look forward in seeing you on your next appointment here at [facility].  Please don t hesitate to call us at [phone] to reschedule any of your appointments or to speak with one of the [facility] registered nurses.  It was a pleasure taking care of you today.    Sincerely,    [facility]  Specialty Infusion & Procedure Center  [address] [place]  [address]  Phone:  [phone]

## 2018-06-07 NOTE — TELEPHONE ENCOUNTER
[facility] able to draw labs and give fluid infusion tomorrow morning at 8:30am.  Dr. Telles ok with plan, pt will go home tonight.  Therapy plan entered, labs ordered. [facility].

## 2018-06-07 NOTE — MR AVS SNAPSHOT
After Visit Summary   6/7/2018    Gilles Henning III    MRN: [id number]           Patient Information     Date Of Birth          1970        Visit Information        Provider Department      6/7/2018 8:00 AM Nikhil Telles MD [facility] Specialty and Procedure        Today's Diagnoses     Kidney transplanted    -  1    Need for pneumocystis prophylaxis        Need for CMV immunotherapy        Immunosuppressed status (H)        Hyponatremia        Volume depletion          Care Instructions    Dear Gilles HERNANDEZ Juvencio SCHULTZ    Thank you for choosing [facility]fusion [facility] ([facility]) for your transplant cares.  The following information is a summary of our appointment as well as important reminders.      Please make sure your phone is available today because I will call to update you with your anti-rejection drug levels and possibly make changes to your anti-rejection dosages.    Additional information:       We look forward in seeing you on your next appointment here at [facility].  Please don t hesitate to call us at [phone] to reschedule any of your appointments or to speak with one of the [facility] registered nurses.  It was a pleasure taking care of you today.    Sincerely,    [facility]  Specialty Infusion & Procedure Center  [address] [place] [address]  [address]  Phone:  [phone]          Follow-ups after your visit        Your next 10 appointments already scheduled     Jun 08, 2018  7:00 AM CDT   (Arrive by 6:45 AM)   New Transplant Visit with UC SPEC INFUSION, UC 42 ATC   [facility] Specialty and Procedure ([facility])    [address] [place] [address]  [address] [place] [address] [phone]            Jun 08, 2018  8:00 AM CDT   (Arrive by 7:45 AM)   Kidney Transplant Discharge with Nikhil Telles MD   [facility] Specialty and Procedure ([facility])    [address]  [address]  [facility] [address]   [phone]            Jun 19, 2018  8:00 AM CDT   (Arrive by 7:45 AM)   Cystoscopy with Mario Vallejo MD   [facility] Solid Organ Transplant ([facility])    [address]  [address]  [facility] [address]   [phone]            Jun 19, 2018  9:00 AM CDT   (Arrive by 8:45 AM)   Kidney Post Op with Mario Vallejo MD   [facility] Solid Organ Transplant ([facility])    [address]  [address]  [facility] [address]   [phone]            Jul 03, 2018 10:45 AM CDT   LAB with  LAB   [facility] ([facility]    [address]  [facility] [address]   [phone]           Please do not eat 10-12 hours before your appointment if you are coming in fasting for labs on lipids, cholesterol, or glucose (sugar). This does not apply to pregnant women. Water, hot tea and black coffee (with nothing added) are okay. Do not drink other fluids, diet soda or chew gum.            Jul 09, 2018  4:00 PM CDT   (Arrive by 3:30 PM)   Return Kidney Transplant with Uc Kidney/Pancreas Recipient   [facility] Nephrology ([facility])    [address]  [address]  [facility] [address]   [phone]            Jul 10, 2018 11:15 AM CDT   Return Visit with MICAH Fatima [facility] ([facility])    [address]  [facility] [id number] [phone]            Aug 09, 2018  3:10 PM CDT   (Arrive by 2:40 PM)   Return Kidney Transplant with Uc Kidney/Pancreas Recipient   [facility] Nephrology ([facility])    [address]  [address]  [facility] [address]   [phone]            Oct 11, 2018  1:30 PM CDT   (Arrive by 1:00 PM)   Return Kidney Transplant  with Uc Kidney/Pancreas Recipient   [facility]phrology ([facility])    [address]  [address]  [facility] [address] [phone]              Who to contact     If you have questions or need follow up information about today's clinic visit or your schedule please contact [facility] SPECIALTY AND PROCEDURE directly at [phone].  Normal or non-critical lab and imaging results will be communicated to you by [employer], letter or phone within 4 business days after the clinic has received the results. If you do not hear from us within 7 days, please contact the clinic through [employer] or phone. If you have a critical or abnormal lab result, we will notify you by phone as soon as possible.  Submit refill requests through [employer] or call your pharmacy and they will forward the refill request to us. Please allow 3 business days for your refill to be completed.          Additional Information About Your Visit        [employer] Information     [employer] gives you secure access to your electronic health record. If you see a primary care provider, you can also send messages to your care team and make appointments. If you have questions, please call your primary care clinic.  If you do not have a primary care provider, please call [phone] and they will assist you.        Care EveryWhere ID     This is your Care EveryWhere ID. This could be used by other organizations to access your [place] medical records  [id number]         Blood Pressure from Last 3 Encounters:   06/07/18 146/80   06/06/18 100/77   06/05/18 98/70    Weight from Last 3 Encounters:   06/07/18 53.9 kg (118 lb 14.4 oz)   06/06/18 54.6 kg (120 lb 6.4 oz)   06/05/18 54.4 kg (119 lb 14.9 oz)              Today, you had the following     No orders found for display       Primary Care Provider Office Phone # Fax #    Charlie Dubose [phone] [address]  [place]  [facility] [place] [address]        Equal Access to Services     [facility] : Hadii kev braga ashleyreed Lulali, wanelsonda kimberlystevenha, qalaure deliagarethpascale daniels. So [facility] [phone].    ATENCIÓN: Si habla español, tiene a millan disposición servicios gratuitos de asistencia lingüística. Deidraame al [phone].    We comply with applicable federal civil rights laws and [place] laws. We do not discriminate on the basis of race, color, national origin, age, disability, sex, sexual orientation, or gender identity.            Thank you!     Thank you for choosing [facility] SPECIALTY AND PROCEDURE  for your care. Our goal is always to provide you with excellent care. Hearing back from our patients is one way we can continue to improve our services. Please take a few minutes to complete the written survey that you may receive in the mail after your visit with us. Thank you!             Your Updated Medication List - Protect others around you: Learn how to safely use, store and throw away your medicines at www.disposemymeds.org.          This list is accurate as of 6/7/18  1:48 PM.  Always use your most recent med list.                   Brand Name Dispense Instructions for use Diagnosis    acetaminophen 325 MG tablet    TYLENOL    80 tablet    Take 2 tablets (650 mg) by mouth every 6 hours for 10 days    Kidney transplant recipient       atorvastatin 40 MG tablet    LIPITOR     Take 40 mg by mouth daily        clonazePAM 1 MG tablet    klonoPIN     Take 0.5-1 mg by mouth 2 times daily as needed (restless leg)        docusate sodium 100 MG capsule    COLACE     Take 200 mg by mouth 2 times daily        ENVARSUS XR 1 MG 24 hr tablet   Generic drug:  tacrolimus     150 tablet    Take 4 tablets (4 mg) by mouth every morning (before breakfast)    Kidney transplant recipient       magnesium hydroxide 400 MG/5ML suspension    MILK OF MAGNESIA    355 mL    Take 30 mLs by mouth daily     Kidney transplant recipient       magnesium oxide 400 MG tablet    MAG-OX    30 tablet    Take 1 tablet (400 mg) by mouth daily (with lunch)    Kidney transplant recipient       metoprolol tartrate 50 MG tablet    LOPRESSOR    60 tablet    Take 0.5 tablets (25 mg) by mouth 2 times daily Take only as prescribed for a systolic blood pressure greater than 160.    Kidney transplant recipient       mycophenolate 250 MG capsule    GENERIC EQUIVALENT    180 capsule    Take 3 capsules (750 mg) by mouth 2 times daily    Kidney transplant recipient       NONFORMULARY      Salicylic acid 3% / 5-FU 4% in vanicream : Apply a thin layer to affected area once daily        omeprazole 40 MG capsule    priLOSEC    90 capsule    TAKE 1 CAPSULE BY MOUTH DAILY    Gastroesophageal reflux disease, esophagitis presence not specified       oxyCODONE IR 5 MG tablet    ROXICODONE    12 tablet    Take 1-2 tablets (5-10 mg) by mouth every 4 hours as needed for other (pain control or improvement in physical function. Hold dose for analgesic side effects.)    Kidney transplant recipient       PHOSPHA 250 NEUTRAL [phone] MG Tabs     120 tablet    Take 250 mg by mouth 2 times daily    Hypophosphatemia, Kidney replaced by transplant       polyethylene glycol Packet    MIRALAX/GLYCOLAX    14 packet    Take 17 g by mouth daily as needed for constipation    Kidney transplant recipient       prochlorperazine 10 MG tablet    COMPAZINE    30 tablet    Take 1 tablet (10 mg) by mouth 3 times daily    Nausea       senna-docusate 8.6-50 MG per tablet    SENOKOT-S;PERICOLACE    100 tablet    Take 2 tablets by mouth 2 times daily as needed for constipation    Kidney transplant recipient       sulfamethoxazole-trimethoprim 400-80 MG per tablet    BACTRIM/SEPTRA    30 tablet    Take 1 tablet by mouth daily    Kidney transplant recipient       SUMAtriptan 25 MG tablet    IMITREX     Take 25 mg by mouth every 2 hours as needed for migraine Max 200mg/24 hours.  Max 7/week. Max 30/month.        traZODone 150 MG tablet    DESYREL     Take 300 mg by mouth At Bedtime with food        valGANciclovir 450 MG tablet    VALCYTE    60 tablet    Take 1 tablet (450 mg) by mouth daily    Kidney transplant recipient       vitamin D 2000 units tablet      Take 2,000 Units by mouth daily

## 2018-06-08 ENCOUNTER — HOSPITAL ENCOUNTER (OUTPATIENT)
Dept: INFUSION THERAPY | Facility: OTHER | Age: 48
Discharge: HOME | End: 2018-06-08
Attending: INTERNAL MEDICINE | Admitting: INTERNAL MEDICINE
Payer: MEDICARE

## 2018-06-08 VITALS
BODY MASS INDEX: 16.62 KG/M2 | TEMPERATURE: 97.7 F | WEIGHT: 115.8 LBS | SYSTOLIC BLOOD PRESSURE: 155 MMHG | RESPIRATION RATE: 18 BRPM | DIASTOLIC BLOOD PRESSURE: 99 MMHG

## 2018-06-08 DIAGNOSIS — D84.9 IMMUNOSUPPRESSED STATUS (H): ICD-10-CM

## 2018-06-08 DIAGNOSIS — E86.0 DEHYDRATION: ICD-10-CM

## 2018-06-08 DIAGNOSIS — Z94.0 KIDNEY TRANSPLANTED: ICD-10-CM

## 2018-06-08 LAB
ANION GAP SERPL CALCULATED.3IONS-SCNC: 12 MMOL/L (ref 3–14)
BUN SERPL-MCNC: 17 MG/DL (ref 7–25)
CALCIUM SERPL-MCNC: 10.1 MG/DL (ref 8.6–10.3)
CHLORIDE SERPL-SCNC: 101 MMOL/L (ref 98–107)
CO2 SERPL-SCNC: 20 MMOL/L (ref 21–31)
CREAT SERPL-MCNC: 1.26 MG/DL (ref 0.7–1.3)
ERYTHROCYTE [DISTWIDTH] IN BLOOD BY AUTOMATED COUNT: 16.9 % (ref 10–15)
GFR SERPL CREATININE-BSD FRML MDRD: 61 ML/MIN/1.7M2
GLUCOSE SERPL-MCNC: 116 MG/DL (ref 70–105)
HCT VFR BLD AUTO: 34.8 % (ref 40–53)
HGB BLD-MCNC: 12 G/DL (ref 13.3–17.7)
MAGNESIUM SERPL-MCNC: 1.9 MG/DL (ref 1.9–2.7)
MCH RBC QN AUTO: 30.8 PG (ref 26.5–33)
MCHC RBC AUTO-ENTMCNC: 34.5 G/DL (ref 31.5–36.5)
MCV RBC AUTO: 89 FL (ref 78–100)
MYCOPHENOLATE SERPL LC/MS/MS-MCNC: 1.56 MG/L (ref 1–3.5)
MYCOPHENOLATE-G SERPL LC/MS/MS-MCNC: 69.7 MG/L (ref 30–95)
PHOSPHATE SERPL-MCNC: 2.6 MG/DL (ref 2.5–5)
PLATELET # BLD AUTO: 286 10E9/L (ref 150–450)
POTASSIUM SERPL-SCNC: 4 MMOL/L (ref 3.5–5.1)
RBC # BLD AUTO: 3.9 10E12/L (ref 4.4–5.9)
SODIUM SERPL-SCNC: 133 MMOL/L (ref 134–144)
TME LAST DOSE: NORMAL H
WBC # BLD AUTO: 6.6 10E9/L (ref 4–11)

## 2018-06-08 PROCEDURE — 83735 ASSAY OF MAGNESIUM: CPT | Performed by: INTERNAL MEDICINE

## 2018-06-08 PROCEDURE — 84100 ASSAY OF PHOSPHORUS: CPT | Performed by: INTERNAL MEDICINE

## 2018-06-08 PROCEDURE — 25000128 H RX IP 250 OP 636: Performed by: INTERNAL MEDICINE

## 2018-06-08 PROCEDURE — 96360 HYDRATION IV INFUSION INIT: CPT

## 2018-06-08 PROCEDURE — 85027 COMPLETE CBC AUTOMATED: CPT | Performed by: INTERNAL MEDICINE

## 2018-06-08 PROCEDURE — 80197 ASSAY OF TACROLIMUS: CPT | Performed by: INTERNAL MEDICINE

## 2018-06-08 PROCEDURE — 80048 BASIC METABOLIC PNL TOTAL CA: CPT | Performed by: INTERNAL MEDICINE

## 2018-06-08 RX ADMIN — SODIUM CHLORIDE 1000 ML: 900 INJECTION, SOLUTION INTRAVENOUS at 08:46

## 2018-06-08 NOTE — PROGRESS NOTES
Pt here for infusion of NS IV fluids.  Labs drawn with pt given paper copy of his lab results.  IV inserted with blood return and infusing fluids without incident. Pt tolerated infusion without any incident.   IV with blood return, flushed, and discontinuedwithout incident upon discharge.  No planned scheduled return at this time.  Pt left ambulatory with his wife.... Michelle Antonio RN.

## 2018-06-09 LAB
TACROLIMUS BLD-MCNC: 7.2 UG/L (ref 5–15)
TME LAST DOSE: NORMAL H

## 2018-06-11 ENCOUNTER — MEDICAL CORRESPONDENCE (OUTPATIENT)
Dept: HEALTH INFORMATION MANAGEMENT | Facility: OTHER | Age: 48
End: 2018-06-11

## 2018-06-11 ENCOUNTER — RESULTS ONLY (OUTPATIENT)
Dept: OTHER | Facility: CLINIC | Age: 48
End: 2018-06-11

## 2018-06-11 ENCOUNTER — APPOINTMENT (OUTPATIENT)
Dept: LAB | Facility: OTHER | Age: 48
End: 2018-06-11
Attending: INTERNAL MEDICINE
Payer: MEDICARE

## 2018-06-11 ENCOUNTER — TELEPHONE (OUTPATIENT)
Dept: PEDIATRICS | Facility: OTHER | Age: 48
End: 2018-06-11

## 2018-06-11 ENCOUNTER — TELEPHONE (OUTPATIENT)
Dept: TRANSPLANT | Facility: CLINIC | Age: 48
End: 2018-06-11

## 2018-06-11 ENCOUNTER — HOME INFUSION (PRE-WILLOW HOME INFUSION) (OUTPATIENT)
Dept: PHARMACY | Facility: CLINIC | Age: 48
End: 2018-06-11

## 2018-06-11 DIAGNOSIS — Z94.0 KIDNEY REPLACED BY TRANSPLANT: ICD-10-CM

## 2018-06-11 DIAGNOSIS — T86.11 KIDNEY TRANSPLANT REJECTION: Primary | ICD-10-CM

## 2018-06-11 DIAGNOSIS — Z94.0 KIDNEY TRANSPLANT RECIPIENT: Primary | ICD-10-CM

## 2018-06-11 DIAGNOSIS — Z48.298 AFTERCARE FOLLOWING ORGAN TRANSPLANT: ICD-10-CM

## 2018-06-11 LAB
ANION GAP SERPL CALCULATED.3IONS-SCNC: 8 MMOL/L (ref 3–14)
BASOPHILS # BLD AUTO: 0 10E9/L (ref 0–0.2)
BASOPHILS NFR BLD AUTO: 0.5 %
BUN SERPL-MCNC: 15 MG/DL (ref 7–25)
CALCIUM SERPL-MCNC: 9.4 MG/DL (ref 8.6–10.3)
CHLORIDE SERPL-SCNC: 102 MMOL/L (ref 98–107)
CO2 SERPL-SCNC: 22 MMOL/L (ref 21–31)
CREAT SERPL-MCNC: 1.13 MG/DL (ref 0.7–1.3)
DIFFERENTIAL METHOD BLD: ABNORMAL
EOSINOPHIL # BLD AUTO: 0.1 10E9/L (ref 0–0.7)
EOSINOPHIL NFR BLD AUTO: 1.7 %
ERYTHROCYTE [DISTWIDTH] IN BLOOD BY AUTOMATED COUNT: 17 % (ref 10–15)
GFR SERPL CREATININE-BSD FRML MDRD: 69 ML/MIN/1.7M2
GLUCOSE SERPL-MCNC: 96 MG/DL (ref 70–105)
HCT VFR BLD AUTO: 29.8 % (ref 40–53)
HGB BLD-MCNC: 10.3 G/DL (ref 13.3–17.7)
IMM GRANULOCYTES # BLD: 0.1 10E9/L (ref 0–0.4)
IMM GRANULOCYTES NFR BLD: 0.8 %
LYMPHOCYTES # BLD AUTO: 0.2 10E9/L (ref 0.8–5.3)
LYMPHOCYTES NFR BLD AUTO: 3.6 %
MAGNESIUM SERPL-MCNC: 1.8 MG/DL (ref 1.9–2.7)
MCH RBC QN AUTO: 31.5 PG (ref 26.5–33)
MCHC RBC AUTO-ENTMCNC: 34.6 G/DL (ref 31.5–36.5)
MCV RBC AUTO: 91 FL (ref 78–100)
MONOCYTES # BLD AUTO: 0.2 10E9/L (ref 0–1.3)
MONOCYTES NFR BLD AUTO: 2.6 %
NEUTROPHILS # BLD AUTO: 5.9 10E9/L (ref 1.6–8.3)
NEUTROPHILS NFR BLD AUTO: 90.8 %
PHOSPHATE SERPL-MCNC: 2.8 MG/DL (ref 2.5–5)
PLATELET # BLD AUTO: 239 10E9/L (ref 150–450)
POTASSIUM SERPL-SCNC: 4.4 MMOL/L (ref 3.5–5.1)
RBC # BLD AUTO: 3.27 10E12/L (ref 4.4–5.9)
SODIUM SERPL-SCNC: 132 MMOL/L (ref 134–144)
WBC # BLD AUTO: 6.5 10E9/L (ref 4–11)

## 2018-06-11 PROCEDURE — 84100 ASSAY OF PHOSPHORUS: CPT | Performed by: INTERNAL MEDICINE

## 2018-06-11 PROCEDURE — 83735 ASSAY OF MAGNESIUM: CPT | Performed by: INTERNAL MEDICINE

## 2018-06-11 PROCEDURE — 80048 BASIC METABOLIC PNL TOTAL CA: CPT | Performed by: INTERNAL MEDICINE

## 2018-06-11 PROCEDURE — 86832 HLA CLASS I HIGH DEFIN QUAL: CPT | Performed by: PHYSICIAN ASSISTANT

## 2018-06-11 PROCEDURE — 85025 COMPLETE CBC W/AUTO DIFF WBC: CPT | Performed by: INTERNAL MEDICINE

## 2018-06-11 PROCEDURE — 86828 HLA CLASS I&II ANTIBODY QUAL: CPT | Mod: XU | Performed by: PHYSICIAN ASSISTANT

## 2018-06-11 PROCEDURE — 80197 ASSAY OF TACROLIMUS: CPT | Performed by: INTERNAL MEDICINE

## 2018-06-11 PROCEDURE — 80180 DRUG SCRN QUAN MYCOPHENOLATE: CPT | Performed by: INTERNAL MEDICINE

## 2018-06-11 PROCEDURE — 86833 HLA CLASS II HIGH DEFIN QUAL: CPT | Performed by: PHYSICIAN ASSISTANT

## 2018-06-11 NOTE — LETTER
OUTPATIENT LABORATORY TEST ORDER    Patient Name:Gilles Henning III   Transplant Date: 5/30/2018  YOB: 1970  Issue Date & Time: 6/5/2018  12:30 PM     [facility] MR: [id number]  Expiration Date:  (1 year after date issued)      Diagnoses: Aftercare following organ transplant (ICD-10 Z48.288)   Kidney Transplant (ICD-10 Z94.0)   Long term use of medications (ICD-10  Z79.899)     ?Lab results to be available on the same day drawn.   ?Patient should release information to the [facility], [facility].     ?Please fax to the Transplant Center at [phone].    3x/week, First 4Weeks post-transplant    5/30/2018 - 6/30/2018    2x/week, Months 2-3 post-transplant    7/1/2018 - 9/1/2018       1x/week, Months 4-6 post-transplant    9/2/2018 - 12/2/2018  Every 2 weeks, Months 7-9 post-transplant    12/3/2018 - 3/3/2019  Every 3 weeks, Months 10-12 post-transplant   3/5/2019 - 5/30/2019        ?Hemogram and Platelet   ?Basic Metabolic Panel (Sodium, Potassium,Chloride, CO2, Creatinine, Urea Nitrogen, Glucose, Calcium)   ?Prograf/Tacrolimus drug level     Weekly through first 3 months post-transplant    5/30/2018 - 8/30/2018   ?Phosphorus, Magnesium   ?MPA level (mycophenolic acid) once per week for first 3 months.    ?Please add a diff to hemogram once per week for the first 3 months.    Monthly   ? BK PCR Quantitative (Polyoma virus - blood in purple top tube to Reference Lab)    At 6 & 12 months post-transplant         11/2018 & 5/2019   ? HBsurfaceAb, HBcoreAb, HCV at 6 months only -   ? Liver Function Tests(Bilirubin Direct/Total, AST, ALT, Alkaline Phosphatase)   ?Complete Lipid Panel fasting (Cholesterol, Triglycerides, HDL, LDL)   ? Random Urine for Protein/Creatinine ratio    At 7-12 months post-transplant - Monthly   ?EBV PCR QT    Month 1 post-transplant = Day 4,7,10,14,21,28  Month 2 post-transplant = Every 2 weeks  Month 3 - 12 post-transplant = Monthly   ?  PRA/DSA level (mailers provided by the patient)    If you have any questions, please call The Transplant Center at [phone] or [phone].    Please fax all results to [phone].          Mario Vallejo MD  Department of Surgery

## 2018-06-11 NOTE — TELEPHONE ENCOUNTER
URGENT: Response needed within 24 hours    This timeframe is established by CMS as  best practice  for the delivery of home health care. The home health clinician may need to contact you again if this timeframe is not met.      S:    Medication reconciliation discrepancies and/or drug interactions/contraindications have been identified.  Home Care s drug regime review has revealed significant medication issues.    B:    You are being contacted for orders related to medication issues.     A:     Medication Interactions   Amlodipine/tacrolimus   Mag-ox/Mycophenolate   Milk of Mag/Mycophenolate   Mycophenolate/Tacrolimus   Mycophenolate/Prilosec   Roxicodone/Klonopin   Tacrolimus/Omeprazole   Tacrolimus/Trazodone   Tacrolimus/Lipitor   Klonopin/Prilosec        Medications Discontinued   Compazine   Medications Started   Roxicodone 5mg  1-2 q4h, PRN    R:    Please evaluate this information and indicate below whether or not changes are required. A copy of the patient's drug interaction/contraindications report is available upon request.     Thank you for your time. Please call with questions or concerns. [phone]  Aminah Jordan RN on 6/11/2018 at 3:50 PM

## 2018-06-11 NOTE — PROGRESS NOTES
Therapy: IV Hydration   Insurance: [employer] Platinum Medicare product (IV hydration would be covered in infusion center and not in the home per Medicare).   Ded: $  Met: $    Co-Insurance:   Max Out of Pocket: $  Met: $    Please contact Intake with any questions, [phone] or In Basket pool, [facility] ([phone]).  In reference to clinic referral made on 6/7/18 to check IV Hydration

## 2018-06-11 NOTE — TELEPHONE ENCOUNTER
Provider Call: Transplant Lab/Orders  Route to LPN  Post Transplant Days: 12  When patient is less than 60 days post-transplant, route high priority  Reason for Call: Missing labs/orders; which labs/orders? Missing Labs- PRA Ordered Labs were not done- pt had no mailers (If patient is at a clinic without orders, [facility] then page LPN)  Liver patients reporting abnormal lab results: Route to RN and Page  Document lab facility information when provider is calling about annual lab orders. Delete facility wildcards when not needed.  Facility Name: Home Care- dreq labs today 6/11/18- PRA labs due again Friday 6/15/18. Please call in regards to missed labs.      Callback needed? Yes    Return Call Needed  Same as documented in contacts section  When to return call?: Same day: Route High Priority

## 2018-06-11 NOTE — TELEPHONE ENCOUNTER
ISSUE:  Call placed to patient to check in, he returned home at the end of last week.     PLAN:  Pt states he is feeling much better, appetite is improving, minimal nausea. He received IVF on Friday at [facility]. He is drinking 3L/day. Reminded him to hydrate with other liquids as well as water d/t low sodium levels. Pt verbalized understanding.   -118/80s over the last 3 days, HR 100s. Weight is 119 lb (up from last week). He is not taking metoprolol as his BP has not reached 160.   Home care saw pt today and casey labs, will f/u on results. No need for IVF at this time.    Tac 7.2, current dose 4 mg daily  Instructed pt to take an additional 1 mg envarsus now and to INCREASE envarsus to 5 mg daily tomorrow

## 2018-06-11 NOTE — Clinical Note
Please sign home care order and close encounter.    Thanks.   Moon Cardona LPN ....................  6/26/2018   2:48 PM

## 2018-06-11 NOTE — TELEPHONE ENCOUNTER
Current standing lab orders letter mailed to patient along with DSA mailers.  Left message for Home Health care instructing dsa lab schedule.

## 2018-06-11 NOTE — TELEPHONE ENCOUNTER
Request for Home Care Skilled Nursing orders as follows:    SN eval and treat date: 6/11/18    Continuation frequency =  ____3___ X week X _____3__ weeks             Effective date= 6/11/18    Interventions include:       Assessment  Medication management  O2 sat monitoring (all disciplines as needed)  Patient/caregiver education  Fall risk: instruct on fall prevention strategies, home safety, assess environment Observe for signs and symptoms of depression  Instruct on pain relief measures and assess pain with each visit, if has pain. Assess effectiveness of medications.    Lab work:  As ordered by the [facility]    Homebound status due to post op weakness and immunocompromised status.    Please call with questions or concerns.  [phone]  Aminah Jordan RN on 6/11/2018 at 3:56 PM

## 2018-06-11 NOTE — TELEPHONE ENCOUNTER
1 week Kidney and Pancreas Transplant Patient Phone Call    Please review with the patient how to contact the Transplant Center:  Best phone contact is [phone], if the need is urgent, any care coordinator will be able to assist you.  *THIS PHONE NUMBER IS ANSWERED 24/7.*  Coordinators are available 8am-5pm M-F, otherwise there will be an on-call RN available.  If the emergency room is necessary, please present to the [facility] ER (not a local emergency room).  When to contact the Transplant Center:  - Fever > 100.5F  - If BP drops (symptoms = dizziness, lightheadedness)  - If your daily weight drops >5lbs   - If you are unable to take your immunosuppression medications.    Medications:  Please review medications, update MAR if needed.   Ensure the patient has an up to date medication card at home and is knowledgeable in updating their med card (or has someone to assist in this).    *ALWAYS BRING YOUR MED CARD TO APPOINTMENTS.*    Confirm which pharmacy the patient will use to fill medications:  Specialty Pharmacy     *ALWAYS CONTACT YOUR PHARMACY FIRST FOR REFILLS*    Labs:  Please review how to record lab values OR how to review lab results on app2you.   *Labs are expected to be drawn M/W/F for the first month of transplant.  It is recommended that you take a copy of your lab letter to each lab draw.    If the patient does not have a copy of their lab letter, please send one now.  Confirm which lab patient will utilize post-home care: [facility] and [facility] - saw pt Monday 6/11  Vitals:  Ensure the patient is recording the following:  - BP - 2x daily  - Temperature - daily  - Weight - daily  - IF DIABETIC / PANCREAS:   o Blood sugar, 4x daily  o Daily fasting blood sugar  Follow Up:    Review current scheduled follow up appointments with surgeon and nephrology.    Follow up with your PCP within 1 month post-transplant.    STENT REMOVAL - 6/19    Has the patient been contacted with initial  visit from HOME CARE? yes  Discussed f/u appts with pt, reminded him to schedule appt with PCP  Gia Romero

## 2018-06-12 ENCOUNTER — TELEPHONE (OUTPATIENT)
Dept: TRANSPLANT | Facility: CLINIC | Age: 48
End: 2018-06-12

## 2018-06-12 DIAGNOSIS — Z94.0 KIDNEY REPLACED BY TRANSPLANT: Primary | ICD-10-CM

## 2018-06-12 DIAGNOSIS — Z48.298 AFTERCARE FOLLOWING ORGAN TRANSPLANT: ICD-10-CM

## 2018-06-12 LAB
TACROLIMUS BLD-MCNC: 7.9 UG/L (ref 5–15)
TME LAST DOSE: NORMAL H

## 2018-06-12 NOTE — LETTER
OUTPATIENT LABORATORY TEST ORDER    Patient Name:Gilles Henning III   Transplant Date: 5/30/2018  YOB: 1970  Issue Date & Time:6/13/2018  8:16 AM    [facility] MR: [id number]  Expiration Date:  (1 year after date issued)      Diagnoses: Aftercare following organ transplant (ICD-10 Z48.288)   Kidney Transplant (ICD-10 Z94.0)   Long term use of medications (ICD-10  Z79.899)     ?Lab results to be available on the same day drawn.   ?Patient should release information to the [facility].     ?Please fax to the Transplant Center at [phone].    3x/week, First 4Weeks post-transplant    5/30/2018 - 6/30/2018    2x/week, Months 2-3 post-transplant    7/1/2018 - 9/1/2018       1x/week, Months 4-6 post-transplant    9/2/2018 - 12/2/2018  Every 2 weeks, Months 7-9 post-transplant    12/3/2018 - 3/3/2019  Every 3 weeks, Months 10-12 post-transplant   3/5/2019 - 5/30/2019        ?Hemogram and Platelet   ?Basic Metabolic Panel (Sodium, Potassium,Chloride, CO2, Creatinine, Urea Nitrogen, Glucose, Calcium)   ?Prograf/Tacrolimus drug level     Weekly through first 3 months post-transplant    5/30/2018 - 8/30/2018   ?Phosphorus, Magnesium   ?MPA level (mycophenolic acid) once per week for first 3 months.    ?Please add a diff to hemogram once per week for the first 3 months.    Monthly   ? BK PCR Quantitative (Polyoma virus - blood in purple top tube to Reference Lab)    At 6 & 12 months post-transplant         11/2018 & 5/2019   ? HBsurfaceAb, HBcoreAb, HCV at 6 months only -   ? Liver Function Tests(Bilirubin Direct/Total, AST, ALT, Alkaline Phosphatase)   ?Complete Lipid Panel fasting (Cholesterol, Triglycerides, HDL, LDL)   ? Random Urine for Protein/Creatinine ratio    At 7-12 months post-transplant - Monthly   ?EBV PCR QT    At 30, 60, 120, 180, 270, and 360 days, post transplant   ? PRA/DSA level (mailers provided by the patient)    If you have any  questions, please call The Transplant Center at [phone] or [phone].    Please fax all results to [phone].          Mario Vallejo MD  Department of Surgery

## 2018-06-13 ENCOUNTER — APPOINTMENT (OUTPATIENT)
Dept: LAB | Facility: OTHER | Age: 48
End: 2018-06-13
Attending: INTERNAL MEDICINE
Payer: MEDICARE

## 2018-06-13 ENCOUNTER — TELEPHONE (OUTPATIENT)
Dept: TRANSPLANT | Facility: CLINIC | Age: 48
End: 2018-06-13

## 2018-06-13 ENCOUNTER — HOSPITAL ENCOUNTER (EMERGENCY)
Facility: OTHER | Age: 48
Discharge: HOME | End: 2018-06-13
Attending: PHYSICIAN ASSISTANT | Admitting: PHYSICIAN ASSISTANT
Payer: MEDICARE

## 2018-06-13 ENCOUNTER — MEDICAL CORRESPONDENCE (OUTPATIENT)
Dept: HEALTH INFORMATION MANAGEMENT | Facility: OTHER | Age: 48
End: 2018-06-13

## 2018-06-13 ENCOUNTER — APPOINTMENT (OUTPATIENT)
Dept: ULTRASOUND IMAGING | Facility: OTHER | Age: 48
End: 2018-06-13
Attending: PHYSICIAN ASSISTANT
Payer: MEDICARE

## 2018-06-13 VITALS
OXYGEN SATURATION: 100 % | HEART RATE: 93 BPM | TEMPERATURE: 98.3 F | RESPIRATION RATE: 16 BRPM | SYSTOLIC BLOOD PRESSURE: 139 MMHG | BODY MASS INDEX: 16.89 KG/M2 | DIASTOLIC BLOOD PRESSURE: 90 MMHG | HEIGHT: 70 IN | WEIGHT: 118 LBS

## 2018-06-13 DIAGNOSIS — M25.132: ICD-10-CM

## 2018-06-13 DIAGNOSIS — Z94.0 KIDNEY TRANSPLANT RECIPIENT: ICD-10-CM

## 2018-06-13 LAB
ANION GAP SERPL CALCULATED.3IONS-SCNC: 9 MMOL/L (ref 3–14)
BUN SERPL-MCNC: 19 MG/DL (ref 7–25)
CALCIUM SERPL-MCNC: 9.8 MG/DL (ref 8.6–10.3)
CHLORIDE SERPL-SCNC: 103 MMOL/L (ref 98–107)
CO2 SERPL-SCNC: 23 MMOL/L (ref 21–31)
CREAT SERPL-MCNC: 1.24 MG/DL (ref 0.7–1.3)
ERYTHROCYTE [DISTWIDTH] IN BLOOD BY AUTOMATED COUNT: 17.4 % (ref 10–15)
GFR SERPL CREATININE-BSD FRML MDRD: 62 ML/MIN/1.7M2
GLUCOSE SERPL-MCNC: 93 MG/DL (ref 70–105)
HCT VFR BLD AUTO: 32.7 % (ref 40–53)
HGB BLD-MCNC: 11 G/DL (ref 13.3–17.7)
MCH RBC QN AUTO: 31.3 PG (ref 26.5–33)
MCHC RBC AUTO-ENTMCNC: 33.6 G/DL (ref 31.5–36.5)
MCV RBC AUTO: 93 FL (ref 78–100)
MYCOPHENOLATE SERPL LC/MS/MS-MCNC: 1.65 MG/L (ref 1–3.5)
MYCOPHENOLATE-G SERPL LC/MS/MS-MCNC: 75 MG/L (ref 30–95)
PLATELET # BLD AUTO: 251 10E9/L (ref 150–450)
POTASSIUM SERPL-SCNC: 4.8 MMOL/L (ref 3.5–5.1)
RBC # BLD AUTO: 3.52 10E12/L (ref 4.4–5.9)
SODIUM SERPL-SCNC: 135 MMOL/L (ref 134–144)
TME LAST DOSE: NORMAL H
WBC # BLD AUTO: 6.8 10E9/L (ref 4–11)

## 2018-06-13 PROCEDURE — 99283 EMERGENCY DEPT VISIT LOW MDM: CPT | Mod: Z6 | Performed by: PHYSICIAN ASSISTANT

## 2018-06-13 PROCEDURE — 93971 EXTREMITY STUDY: CPT | Mod: LT

## 2018-06-13 PROCEDURE — 80048 BASIC METABOLIC PNL TOTAL CA: CPT | Performed by: INTERNAL MEDICINE

## 2018-06-13 PROCEDURE — 80197 ASSAY OF TACROLIMUS: CPT | Performed by: INTERNAL MEDICINE

## 2018-06-13 PROCEDURE — 85027 COMPLETE CBC AUTOMATED: CPT | Performed by: INTERNAL MEDICINE

## 2018-06-13 PROCEDURE — 99284 EMERGENCY DEPT VISIT MOD MDM: CPT | Mod: 25 | Performed by: PHYSICIAN ASSISTANT

## 2018-06-13 ASSESSMENT — ENCOUNTER SYMPTOMS
SHORTNESS OF BREATH: 0
ABDOMINAL PAIN: 0
FEVER: 0
CHILLS: 0

## 2018-06-13 NOTE — TELEPHONE ENCOUNTER
Post Kidney and Pancreas Transplant Team Conference  Date: 6/13/2018  Transplant Coordinator: Gia Romero     Attendees:    [x]  Dr. Macias  [x] Thao Barnard, RN  [x] Blossom Montes LPN    [x]  Dr. Donato  [] Martha Morris RN  [] Noelle Wagner LPN  [x]  Dr. Telles   [] Beth Evans RN  [x]  Dr. Cabrera   [x] Katelyn Gallegos RN  [] Dr. Vallejo  [] Maliha Alonso RN  [x] Dr. Lorenzo   [] Mahad Steele RN  [] Dr. Finley [x] Mildred Barajas RN  [] Surgery Fellow [x] Gia Romero RN  [] Rachel Resendez NP    Verbal Plan Read Back:   Thrombosed fistula, no intervention at this time. Pt should present to ED if he develops pain, swelling, redness. Updated pt of plan.     Routed to RN Coordinator   Blossom Montes

## 2018-06-13 NOTE — TELEPHONE ENCOUNTER
ISSUE:   Call from patient, states his fistula stopped working and he can feel a clot. Reports it is tender to touch, denies any other pain or swelling.     PLAN:  Instructed pt to go to local ER to have it evaluated.   Message sent to Sum to also call pt to evaluate.     Also discussed patient's labs and hydration. Pt reports he is drinking around 4L water/day. Denies excessive thirst, states he is just making sure he stays hydrated. Discussed drinking less, more like 3L and including other liquids like gatorade. Pt verbalized undertanding.

## 2018-06-13 NOTE — LETTER
PHYSICIAN ORDERS      DATE & TIME ISSUED: 2018 10:36 AM  PATIENT NAME: Gilles Henning III   : 1970     [facility] MR# [if applicable]: [id number]     DIAGNOSIS:  Kidney Transplant  ICD-10 CODE: Z94.0     Please complete the following labs at the next routine transplant lab draw:  urine osmolality  urine potassium  urine sodium   serum osmolality    Any questions please call: [phone]  Please fax lab results to [phone].

## 2018-06-13 NOTE — TELEPHONE ENCOUNTER
Updated lab orders and faxed to patients home health facility.  Sent orders via mychart to patient.

## 2018-06-13 NOTE — ED PROVIDER NOTES
History     Chief Complaint   Patient presents with     Arm Pain     Blood clot in fistula     HPI Comments: This is a 49 yo male who has had a kidney replaced at the [facility] by Dr. Telles.  He has a fistula I his left forearm however he feels he has a clot in it and it has not been working properly.  He reports he has not needed his fistula thus far and his kidney transplant has been going well.  Denies any fever or chills.  Some minimal pain to the fistula site but no redness    The history is provided by the patient and the spouse.         Problem List:    Patient Active Problem List    Diagnosis Date Noted     Hyponatremia 06/07/2018     Priority: Medium     Benign essential hypertension 06/07/2018     Priority: Medium     Need for CMV immunotherapy 06/07/2018     Priority: Medium     Need for pneumocystis prophylaxis 06/07/2018     Priority: Medium     Hypomagnesemia 06/07/2018     Priority: Medium     Dehydration 06/07/2018     Priority: Medium     Other constipation 06/04/2018     Priority: Medium     Long QT interval 05/30/2018     Priority: Medium     Kidney transplanted 05/30/2018     Priority: Medium     Kidney transplant candidate 05/29/2018     Priority: Medium     Hyperlipidemia 02/26/2018     Priority: Medium     Hypertension 02/26/2018     Priority: Medium     Nephritis and nephropathy, 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 02/26/2018     Priority: Medium     Onychocryptosis 02/26/2018     Priority: Medium     Kidney transplant recipient 12/15/2017     Priority: Medium     Bipolar affective disorder (H) 10/13/2017     Priority: Medium     Night terrors 10/13/2017     Priority: Medium     PTSD (post-traumatic stress disorder) 10/13/2017     Priority: Medium     Lumbar disc disease with radiculopathy 07/11/2016     Priority: Medium     Lumbar foraminal stenosis 07/11/2016     Priority: Medium     Immunosuppressed status (H) 04/21/2016     Priority: Medium     Gastroesophageal reflux disease 03/15/2016      Priority: Medium     Secondary hyperparathyroidism (H) 08/11/2015     Priority: Medium     Vitamin D deficiency 08/11/2015     Priority: Medium     S/p nephrectomy 05/22/2015     Priority: Medium     Renal cell carcinoma (H) 05/19/2015     Priority: Medium     Overview:   s/p resection at [facility] 2015       Anemia of chronic disease 03/06/2015     Priority: Medium     Chronic insomnia 06/11/2014     Priority: Medium     Erectile dysfunction 06/11/2014     Priority: Medium     Nausea 10/07/2013     Priority: Medium     Colitis, acute 06/17/2012     Priority: Medium     Diarrhea 05/10/2012     Priority: Medium     Headache 10/18/2011     Priority: Medium     Heartburn 08/17/2011     Priority: Medium     Abnormal involuntary movement 08/17/2011     Priority: Medium     Psychosexual dysfunction with inhibited sexual excitement 03/29/2011     Priority: Medium     Hereditary and idiopathic peripheral neuropathy 03/29/2011     Priority: Medium     Chest pain 10/26/2010     Priority: Medium     Overview:   likely not cardiac       Depression, major, recurrent (H) 04/26/2010     Priority: Medium     Overview:   with suicide attempt.          Past Medical History:    Past Medical History:   Diagnosis Date     Anemia in chronic kidney disease      Chews tobacco      Chronic rejection of kidney transplant 2015     ESRD needing dialysis (H) 2015     History of alcoholism (H)      History of depression      History of peritoneal dialysis      History of substance abuse      Hyperlipidemia      IgA nephropathy      Migraine      Osteopenia      Peritonitis (H)      Renal cell carcinoma (H) 2015       Past Surgical History:    Past Surgical History:   Procedure Laterality Date     EXPLANT TRANSPLANTED KIDNEY N/A 12/15/2017    Procedure: EXPLANT TRANSPLANTED KIDNEY;  Transplanted Nephrectomy;  Surgeon: Mario Vallejo MD;  Location: [facility]      DIALYSIS AVF OR AVG, CENTRAL INTERVENTION ONLY Left      LAPAROSCOPIC INSERTION CATHETER  PERITONEAL DIALYSIS Left      NEPHRECTOMY BILATERAL  2015     REMOVE CATHETER PERITONEAL       TRANSPLANT KIDNEY RECIPIENT  DONOR Left 2009     TRANSPLANT KIDNEY RECIPIENT  DONOR N/A 2018    Procedure: TRANSPLANT KIDNEY RECIPIENT  DONOR;  TRANSPLANT KIDNEY RECIPIENT  DONOR and ureteral stent placement;  Surgeon: Mario Vallejo MD;  Location: [facility] OR       Family History:    No family history on file.    Social History:  Marital Status:   [2]  Social History   Substance Use Topics     Smoking status: Passive Smoke Exposure - Never Smoker     Types: Dip, chew, snus or snuff     Smokeless tobacco: Current User     Types: Chew      Comment: Wife smoked years ago.  Weaning off chew now     Alcohol use No      Comment: none now, did treatment at age 21, relapsed with divorce (a couple months)  then now  sober 3 years.         Medications:      atorvastatin (LIPITOR) 40 MG tablet   Cholecalciferol (VITAMIN D) 2000 UNITS tablet   clonazePAM (KLONOPIN) 1 MG tablet   docusate sodium (COLACE) 100 MG capsule   K Phos Mono-Sod Phos Di & Mono (PHOSPHA 250 NEUTRAL) 155-852-130 MG TABS   magnesium hydroxide (MILK OF MAGNESIA) 400 MG/5ML suspension   mycophenolate (GENERIC EQUIVALENT) 250 MG capsule   NONFORMULARY   omeprazole (PRILOSEC) 40 MG capsule   polyethylene glycol (MIRALAX/GLYCOLAX) Packet   prochlorperazine (COMPAZINE) 10 MG tablet   senna-docusate (SENOKOT-S;PERICOLACE) 8.6-50 MG per tablet   sulfamethoxazole-trimethoprim (BACTRIM/SEPTRA) 400-80 MG per tablet   tacrolimus (ENVARSUS XR) 1 MG 24 hr tablet   traZODone (DESYREL) 150 MG tablet   valGANciclovir (VALCYTE) 450 MG tablet         Review of Systems   Constitutional: Negative for chills and fever.   Respiratory: Negative for shortness of breath.    Cardiovascular: Negative for chest pain.   Gastrointestinal: Negative for abdominal pain.   Musculoskeletal:        Left forearm swelling and pain   All other systems  "reviewed and are negative.      Physical Exam   BP: (!) 134/96  Pulse: 93  Temp: 98.3  F (36.8  C)  Resp: 16  Height: 177.8 cm (5' 10\")  Weight: 53.5 kg (118 lb)  SpO2: 100 %      Physical Exam   Constitutional: He is oriented to person, place, and time. No distress.   HENT:   Head: Atraumatic.   Mouth/Throat: Oropharynx is clear and moist. No oropharyngeal exudate.   Eyes: Pupils are equal, round, and reactive to light. No scleral icterus.   Cardiovascular: Normal heart sounds and intact distal pulses.    Pulmonary/Chest: Breath sounds normal. No respiratory distress.   Abdominal: Soft. Bowel sounds are normal. There is no tenderness.   Musculoskeletal: He exhibits tenderness. He exhibits no edema.   Left forearm fistula bulges noted ,  No whurring on palpation. No hard lump. CMSx4   Neurological: He is alert and oriented to person, place, and time.   Skin: Skin is warm. No rash noted. He is not diaphoretic.       ED Course     ED Course     Procedures    6/13/18  8:05 AM [id number]    Component Results   Component Value Flag Ref Range Units Status Collected Lab   WBC 6.8  4.0 - 11.0 10e9/L Final 06/13/2018  8:05 AM [facility]   RBC Count 3.52 (L) 4.4 - 5.9 10e12/L Final 06/13/2018  8:05 AM [facility]   Hemoglobin 11.0 (L) 13.3 - 17.7 g/dL Final 06/13/2018  8:05 AM [facility]   Hematocrit 32.7 (L) 40.0 - 53.0 % Final 06/13/2018  8:05 AM [facility]   MCV 93  78 - 100 fl Final 06/13/2018  8:05 AM [facility]   MCH 31.3  26.5 - 33.0 pg Final 06/13/2018  8:05 AM [facility]   MCHC 33.6  31.5 - 36.5 g/dL Final 06/13/2018  8:05 AM [facility]   RDW 17.4 (H) 10.0 - 15.0 % Final 06/13/2018  8:05 AM [facility]   Platelet Count 251  150 - 450 10e9/L Final 06/13/2018  8:05 AM      6/13/18  8:05 AM [id number]    Component Results   Component Value Flag Ref Range Units Status Collected Lab   Sodium 135  134 - 144 mmol/L Final 06/13/2018  8:05 AM [facility]   Potassium 4.8  3.5 - 5.1 mmol/L Final 06/13/2018  8:05 AM [facility]   Chloride " 103  98 - 107 mmol/L Final 06/13/2018  8:05 AM [facility]   Carbon Dioxide 23  21 - 31 mmol/L Final 06/13/2018  8:05 AM [facility]   Anion Gap 9  3 - 14 mmol/L Final 06/13/2018  8:05 AM [facility]   Glucose 93  70 - 105 mg/dL Final 06/13/2018  8:05 AM [facility]   Urea Nitrogen 19  7 - 25 mg/dL Final 06/13/2018  8:05 AM [facility]   Creatinine 1.24  0.70 - 1.30 mg/dL Final 06/13/2018  8:05 AM [facility]   GFR Estimate 62  >60 mL/min/1.7m2 Final 06/13/2018  8:05 AM [facility]   GFR Estimate If Black 75  >60 mL/min/1.7m2 Final 06/13/2018  8:05 AM [facility]   Calcium 9.8  8.6 - 10.3 mg/dL Final 06/13/2018  8:05 AM             Results for orders placed or performed during the hospital encounter of 06/13/18 (from the past 24 hour(s))   US Upper Extremity Venous Duplex Left    Narrative    US UPPER EXTREMITY VENOUS DUPLEX LEFT    HISTORY: clotted fistula;  .    COMPARISON: None.    TECHNIQUE: Grayscale and color Doppler assessment of the veins of the  left forearm.    FINDINGS:    The superficial venous component of the left upper extremity fistula  is expanded by hypoechoic clot. No residual flow is identified.   Grayscale patency, color and spectral Doppler flow is maintained  within the left brachial, basilic, cephalic, axillary and subclavian  veins. Assessment for thoracic venous stenosis is limited.      Impression    IMPRESSION:     Thrombosed left upper extremity AV fistula.      PAUL LUNA MD       Medications - No data to display    Assessments & Plan (with Medical Decision Making)     I have reviewed the nursing notes.    I have reviewed the findings, diagnosis, plan and need for follow up with the patient.      New Prescriptions    No medications on file       Final diagnoses:   Fistula of left wrist   Kidney transplant recipient     Afebrile.  VSS.  Recent kidney transplant  Concerns for his left wrist fistual clotting.  He is not currently on dialysis.  CBC and CMP are normal and shows a good  functioning renal system. US left upper extremiiy shows The superficial venous component of the left upper extremity fistula is expanded by hypoechoic clot. No residual flow is identified.  Grayscale patency, color and spectral Doppler flow is maintained within the left brachial, basilic, cephalic, axillary and subclavian vein.  [facility] with Dr[facility] Kwabena akbar [facility]  She feels everything in normal and she wants to fistula to clot at this time.  No need for anticoagulation.  Continue to monitor and close follow up with his nephrologist in there next few days, follow up sooner if there are any concerns  6/13/2018   [facility], Eloy AKBAR PA-C  06/13/18 [id number]

## 2018-06-13 NOTE — ED TRIAGE NOTES
Patient had a right kidney transplant on May 30th in [facility].  Patient has a fistula in his left arm that yesterday began to get red and swollen.  Patient states that he told his transplant coord and she advised him that he needs to come to ER to be evaluated.  Patient states that he can fill the clot along with hardness that is approx 6cm in length. .  Patient states that he has an appointment on the 19th in [facility].

## 2018-06-13 NOTE — ED AVS SNAPSHOT
[facility]    [address]    [place] [address]    Phone:  [phone]    Fax:  [phone]                                       Gilles Henning III   MRN: [id number]    Department:  [facility]   Date of Visit:  6/13/2018           After Visit Summary Signature Page     I have received my discharge instructions, and my questions have been answered. I have discussed any challenges I see with this plan with the nurse or doctor.    ..........................................................................................................................................  Patient/Patient Representative Signature      ..........................................................................................................................................  Patient Representative Print Name and Relationship to Patient    ..................................................               ................................................  Date                                            Time    ..........................................................................................................................................  Reviewed by Signature/Title    ...................................................              ..............................................  Date                                                            Time

## 2018-06-13 NOTE — TELEPHONE ENCOUNTER
Updated standing lab orders.  Mailed copy to patient and faxed copy to home health care.  Updated standing lab orders in [facility].

## 2018-06-13 NOTE — TELEPHONE ENCOUNTER
Nikhil Telles MD Ylitalo, Kim Michelle, RN                     Can we check urine osmolality, urine potassium, urine sodium with next labs.     Also get a serum osmolality.     Vikram,     kane       LPN TASK :  Please lab orders to home care and let pt know he will need to provide urine sample

## 2018-06-13 NOTE — ED AVS SNAPSHOT
[facility]    [address]    [place] [address]    Phone:  [phone]    Fax:  [phone]                                       Gilles Henning III   MRN: [id number]    Department:  [facility]   Date of Visit:  6/13/2018           Patient Information     Date Of Birth          1970        Your diagnoses for this visit were:     Fistula of left wrist     Kidney transplant recipient        You were seen by Eloy Sweeney PA-C.      Your next 10 appointments already scheduled     Jun 19, 2018  8:00 AM CDT   (Arrive by 7:45 AM)   Cystoscopy with Mario Vallejo MD   [facility] Solid Organ Transplant ([facility])    [address]  [address]  [facility] [id number] [phone]            Jun 19, 2018  9:00 AM CDT   (Arrive by 8:45 AM)   Kidney Post Op with aMrio Vallejo MD   [facility] Solid Organ Transplant ([facility])    [address]  [address]  [facility] [id number] [phone]            Jul 03, 2018 10:45 AM CDT   LAB with GH LAB   [facility] ([facility])    [address] [facility] [address] [phone]           Please do not eat 10-12 hours before your appointment if you are coming in fasting for labs on lipids, cholesterol, or glucose (sugar). This does not apply to pregnant women. Water, hot tea and black coffee (with nothing added) are okay. Do not drink other fluids, diet soda or chew gum.            Jul 09, 2018  4:00 PM CDT   (Arrive by 3:30 PM)   Return Kidney Transplant with Uc Kidney/Pancreas Recipient   [facility] Nephrology ([facility])    [address]  [address]  [facility] [id number] [phone]            Jul 10, 2018 11:15 AM CDT   Return Visit with MICAH Fatima [facility] ([facility])    [address]  [place] [place] [address]   [phone]            Aug 09, 2018  3:10 PM CDT   (Arrive by 2:40 PM)   Return Kidney Transplant with Uc Kidney/Pancreas Recipient   [facility]phrology ([facility])    [address]  [address]  [facility] [address]   [phone]            Oct 11, 2018  1:30 PM CDT   (Arrive by 1:00 PM)   Return Kidney Transplant with Uc Kidney/Pancreas Recipient   [facility]ph[facility] ([facility])    [address]  [address]  [facility] [id number] [phone]            Dec 10, 2018  1:30 PM CST   (Arrive by 1:00 PM)   Return Kidney Transplant with Uc Kidney/Pancreas Recipient   [facility]ph[facility] ([facility])    [address]  [address]  [facility] [id number] [phone]              24 Hour Appointment Hotline     To schedule an appointment at [facility], please call [phone]. If you don't have a family doctor or clinic, we will help you find one. [facility] clinics are conveniently located to serve the needs of you and your family.           Review of your medicines      Our records show that you are taking the medicines listed below. If these are incorrect, please call your family doctor or clinic.        Dose / Directions Last dose taken    atorvastatin 40 MG tablet   Commonly known as:  LIPITOR   Dose:  40 mg        Take 40 mg by mouth daily   Refills:  0        clonazePAM 1 MG tablet   Commonly known as:  klonoPIN   Dose:  0.5-1 mg        Take 0.5-1 mg by mouth 2 times daily as needed (restless leg)   Refills:  0        docusate sodium 100 MG capsule   Commonly known as:  COLACE   Dose:  200 mg        Take 200 mg by mouth 2 times daily   Refills:  0        magnesium hydroxide 400 MG/5ML suspension   Commonly known as:  MILK OF MAGNESIA   Dose:  30 mL   Quantity:  355 mL        Take 30 mLs by mouth daily   Refills:  1        mycophenolate 250 MG capsule    Commonly known as:  GENERIC EQUIVALENT   Dose:  750 mg   Quantity:  180 capsule        Take 3 capsules (750 mg) by mouth 2 times daily   Refills:  11        NONFORMULARY        Salicylic acid 3% / 5-FU 4% in vanicream : Apply a thin layer to affected area once daily   Refills:  0        omeprazole 40 MG capsule   Commonly known as:  priLOSEC   Quantity:  90 capsule        TAKE 1 CAPSULE BY MOUTH DAILY   Refills:  3        PHOSPHA 250 NEUTRAL 155-852-130 MG Tabs   Dose:  250 mg   Quantity:  120 tablet        Take 250 mg by mouth 2 times daily   Refills:  0        polyethylene glycol Packet   Commonly known as:  MIRALAX/GLYCOLAX   Dose:  17 g   Quantity:  14 packet        Take 17 g by mouth daily as needed for constipation   Refills:  3        prochlorperazine 10 MG tablet   Commonly known as:  COMPAZINE   Dose:  10 mg   Quantity:  30 tablet        Take 1 tablet (10 mg) by mouth 3 times daily   Refills:  1        senna-docusate 8.6-50 MG per tablet   Commonly known as:  SENOKOT-S;PERICOLACE   Dose:  2 tablet   Quantity:  100 tablet        Take 2 tablets by mouth 2 times daily as needed for constipation   Refills:  3        sulfamethoxazole-trimethoprim 400-80 MG per tablet   Commonly known as:  BACTRIM/SEPTRA   Dose:  1 tablet   Indication:  PCP prophylaxis   Quantity:  30 tablet        Take 1 tablet by mouth daily   Refills:  11        tacrolimus 1 MG 24 hr tablet   Commonly known as:  ENVARSUS XR   Dose:  5 mg   Quantity:  150 tablet        Take 5 tablets (5 mg) by mouth every morning (before breakfast)   Refills:  11        traZODone 150 MG tablet   Commonly known as:  DESYREL   Dose:  300 mg        Take 300 mg by mouth At Bedtime with food   Refills:  0        valGANciclovir 450 MG tablet   Commonly known as:  VALCYTE   Dose:  450 mg   Indication:  Medication Treatment to Prevent Cytomegalovirus Disease   Quantity:  60 tablet        Take 1 tablet (450 mg) by mouth daily   Refills:  2        vitamin D 2000 units  tablet   Dose:  2000 Units        Take 2,000 Units by mouth daily   Refills:  0          ASK your doctor about these medications        Dose / Directions Last dose taken    acetaminophen 325 MG tablet   Commonly known as:  TYLENOL   Dose:  650 mg   Quantity:  80 tablet   Ask about: Should I take this medication?        Take 2 tablets (650 mg) by mouth every 6 hours for 10 days   Refills:  0                Procedures and tests performed during your visit     US Upper Extremity Venous Duplex Left      Orders Needing Specimen Collection     None      Pending Results     Date and Time Order Name Status Description    6/13/2018 0805 TACROLIMUS LEVEL In process             Pending Culture Results     No orders found from 6/11/2018 to 6/14/2018.            Pending Results Instructions     If you had any lab results that were not finalized at the time of your Discharge, you can call the ED Lab Result RN at [phone]. You will be contacted by this team for any positive Lab results or changes in treatment. The nurses are available 7 days a week from 10A to 6:30P.  You can leave a message 24 hours per day and they will return your call.        Thank you for choosing [facility]       Thank you for choosing [facility] for your care. Our goal is always to provide you with excellent care. Hearing back from our patients is one way we can continue to improve our services. Please take a few minutes to complete the written survey that you may receive in the mail after you visit with us. Thank you!        [facility] Information     [facility] gives you secure access to your electronic health record. If you see a primary care provider, you can also send messages to your care team and make appointments. If you have questions, please call your primary care clinic.  If you do not have a primary care provider, please call [phone] and they will assist you.        Care EveryWhere ID     This is your Care EveryWhere ID. This could be used by  other organizations to access your [facility] medical records  [id number]        Equal Access to Services     ALEXANDRA DOVE : Xiao Rene, arabella landaverde, pascale read. So [facility] [phone].    ATENCIÓN: Si habla español, tiene a millan disposición servicios gratuitos de asistencia lingüística. Llame al [phone].    We comply with applicable federal civil rights laws and [place] laws. We do not discriminate on the basis of race, color, national origin, age, disability, sex, sexual orientation, or gender identity.            After Visit Summary       This is your record. Keep this with you and show to your community pharmacist(s) and doctor(s) at your next visit.

## 2018-06-14 DIAGNOSIS — Z94.0 KIDNEY TRANSPLANT RECIPIENT: Primary | ICD-10-CM

## 2018-06-14 LAB
TACROLIMUS BLD-MCNC: 6.8 UG/L (ref 5–15)
TME LAST DOSE: NORMAL H

## 2018-06-14 NOTE — TELEPHONE ENCOUNTER
Provider Call: Medication Refill  Route to N  Pharmacy Name: [facility]  Pharmacy Location: [address]  Name of Medication: Envarsuf Dose: 6mg daily-  When will the patient be out of this medication?: Less than 3 days (Route high priority)  Callback needed? No     Please send UTD Rx to pharmacy w/ correct dosage changes from today 6/14/18. Fax Rx to [phone]

## 2018-06-14 NOTE — TELEPHONE ENCOUNTER
Spoke to patients wife, Merline, regarding tac level = 6.8.  Merline confirms current dose and good 24 hour trough level.  Merline verbalizes understanding of medication dose increase to Envarsus 6 mg daily.

## 2018-06-14 NOTE — TELEPHONE ENCOUNTER
Issue:    Tac low at 6.8, goal is 8-10.    Plan/LPN task:    Confirm this was a 24 hour level. If it was, increase envarsus dose from 5 mg to 6 mg daily.  Recheck with next scheduled lab draw.

## 2018-06-15 ENCOUNTER — APPOINTMENT (OUTPATIENT)
Dept: LAB | Facility: OTHER | Age: 48
End: 2018-06-15
Attending: INTERNAL MEDICINE
Payer: MEDICARE

## 2018-06-15 ENCOUNTER — MEDICAL CORRESPONDENCE (OUTPATIENT)
Dept: HEALTH INFORMATION MANAGEMENT | Facility: OTHER | Age: 48
End: 2018-06-15

## 2018-06-15 DIAGNOSIS — Z94.0 KIDNEY TRANSPLANT RECIPIENT: ICD-10-CM

## 2018-06-15 LAB
ANION GAP SERPL CALCULATED.3IONS-SCNC: 11 MMOL/L (ref 3–14)
BUN SERPL-MCNC: 16 MG/DL (ref 7–25)
CALCIUM SERPL-MCNC: 9.8 MG/DL (ref 8.6–10.3)
CHLORIDE SERPL-SCNC: 100 MMOL/L (ref 98–107)
CO2 SERPL-SCNC: 21 MMOL/L (ref 21–31)
CREAT SERPL-MCNC: 1.14 MG/DL (ref 0.7–1.3)
ERYTHROCYTE [DISTWIDTH] IN BLOOD BY AUTOMATED COUNT: 17.2 % (ref 10–15)
GFR SERPL CREATININE-BSD FRML MDRD: 68 ML/MIN/1.7M2
GLUCOSE SERPL-MCNC: 103 MG/DL (ref 70–105)
HCT VFR BLD AUTO: 33.5 % (ref 40–53)
HGB BLD-MCNC: 11.5 G/DL (ref 13.3–17.7)
MCH RBC QN AUTO: 31.4 PG (ref 26.5–33)
MCHC RBC AUTO-ENTMCNC: 34.3 G/DL (ref 31.5–36.5)
MCV RBC AUTO: 92 FL (ref 78–100)
OSMOLALITY SERPL: 277 MMOL/KG (ref 275–295)
OSMOLALITY UR: 251 MMOL/KG (ref 100–1200)
PLATELET # BLD AUTO: 253 10E9/L (ref 150–450)
POTASSIUM SERPL-SCNC: 4.3 MMOL/L (ref 3.5–5.1)
POTASSIUM UR-SCNC: 27.5 MMOL/L (ref 2.5–175)
RBC # BLD AUTO: 3.66 10E12/L (ref 4.4–5.9)
SODIUM SERPL-SCNC: 132 MMOL/L (ref 134–144)
SODIUM UR-SCNC: 63 MMOL/L (ref 30–90)
WBC # BLD AUTO: 6.1 10E9/L (ref 4–11)

## 2018-06-15 PROCEDURE — 83935 ASSAY OF URINE OSMOLALITY: CPT | Performed by: INTERNAL MEDICINE

## 2018-06-15 PROCEDURE — 80048 BASIC METABOLIC PNL TOTAL CA: CPT | Performed by: INTERNAL MEDICINE

## 2018-06-15 PROCEDURE — 80197 ASSAY OF TACROLIMUS: CPT | Performed by: INTERNAL MEDICINE

## 2018-06-15 PROCEDURE — 84133 ASSAY OF URINE POTASSIUM: CPT | Performed by: INTERNAL MEDICINE

## 2018-06-15 PROCEDURE — 83930 ASSAY OF BLOOD OSMOLALITY: CPT | Performed by: INTERNAL MEDICINE

## 2018-06-15 PROCEDURE — 84300 ASSAY OF URINE SODIUM: CPT | Performed by: INTERNAL MEDICINE

## 2018-06-15 PROCEDURE — 80197 ASSAY OF TACROLIMUS: CPT | Performed by: SURGERY

## 2018-06-15 PROCEDURE — 85027 COMPLETE CBC AUTOMATED: CPT | Performed by: INTERNAL MEDICINE

## 2018-06-16 LAB
TACROLIMUS BLD-MCNC: 3.6 UG/L (ref 5–15)
TME LAST DOSE: ABNORMAL H

## 2018-06-17 LAB
TACROLIMUS BLD-MCNC: 3.5 UG/L (ref 5–15)
TME LAST DOSE: ABNORMAL H

## 2018-06-18 ENCOUNTER — MEDICAL CORRESPONDENCE (OUTPATIENT)
Dept: HEALTH INFORMATION MANAGEMENT | Facility: OTHER | Age: 48
End: 2018-06-18

## 2018-06-18 ENCOUNTER — APPOINTMENT (OUTPATIENT)
Dept: LAB | Facility: OTHER | Age: 48
End: 2018-06-18
Attending: INTERNAL MEDICINE
Payer: MEDICARE

## 2018-06-18 LAB
ANION GAP SERPL CALCULATED.3IONS-SCNC: 8 MMOL/L (ref 3–14)
BASOPHILS NFR BLD AUTO: 1.7 %
BUN SERPL-MCNC: 25 MG/DL (ref 7–25)
CALCIUM SERPL-MCNC: 9.2 MG/DL (ref 8.6–10.3)
CHLORIDE SERPL-SCNC: 106 MMOL/L (ref 98–107)
CO2 SERPL-SCNC: 23 MMOL/L (ref 21–31)
CREAT SERPL-MCNC: 1.18 MG/DL (ref 0.7–1.3)
DIFFERENTIAL METHOD BLD: ABNORMAL
DONOR IDENTIFICATION: NORMAL
DSA COMMENTS: NORMAL
DSA PRESENT: NO
DSA TEST METHOD: NORMAL
EOSINOPHIL # BLD AUTO: 0.1 10E9/L (ref 0–0.7)
EOSINOPHIL NFR BLD AUTO: 1.4 %
ERYTHROCYTE [DISTWIDTH] IN BLOOD BY AUTOMATED COUNT: 17.1 % (ref 10–15)
GFR SERPL CREATININE-BSD FRML MDRD: 66 ML/MIN/1.7M2
GLUCOSE SERPL-MCNC: 95 MG/DL (ref 70–105)
HCT VFR BLD AUTO: 28.6 % (ref 40–53)
HGB BLD-MCNC: 9.5 G/DL (ref 13.3–17.7)
IMM GRANULOCYTES # BLD: 0 10E9/L (ref 0–0.4)
IMM GRANULOCYTES NFR BLD: 0.5 %
INTERFERING SUBST TEST METHOD: NORMAL
INTERFERING SUBSTANCE COMMENT: NORMAL
INTERFERING SUBSTANCE RESULT: NORMAL
INTERFERING SUBSTANCE: NORMAL
LYMPHOCYTES # BLD AUTO: 0.3 10E9/L (ref 0.8–5.3)
LYMPHOCYTES NFR BLD AUTO: 6 %
MAGNESIUM SERPL-MCNC: 1.7 MG/DL (ref 1.9–2.7)
MCH RBC QN AUTO: 32 PG (ref 26.5–33)
MCHC RBC AUTO-ENTMCNC: 33.2 G/DL (ref 31.5–36.5)
MCV RBC AUTO: 96 FL (ref 78–100)
MONOCYTES # BLD AUTO: 0.3 10E9/L (ref 0–1.3)
MONOCYTES NFR BLD AUTO: 6.5 %
NEUTROPHILS # BLD AUTO: 3.5 10E9/L (ref 1.6–8.3)
NEUTROPHILS NFR BLD AUTO: 83.9 %
ORGAN: NORMAL
PHOSPHATE SERPL-MCNC: 3.3 MG/DL (ref 2.5–5)
PLATELET # BLD AUTO: 184 10E9/L (ref 150–450)
POTASSIUM SERPL-SCNC: 4.7 MMOL/L (ref 3.5–5.1)
PRA DONOR SPECIFIC ABY: NORMAL
RBC # BLD AUTO: 2.97 10E12/L (ref 4.4–5.9)
SA1 CELL: NORMAL
SA1 HI RISK ABY: NORMAL
SA1 MOD RISK ABY: NORMAL
SA1 TEST METHOD: NORMAL
SA2 CELL: NORMAL
SA2 HI RISK ABY UA: NORMAL
SA2 MOD RISK ABY: NORMAL
SA2 TEST METHOD: NORMAL
SODIUM SERPL-SCNC: 137 MMOL/L (ref 134–144)
WBC # BLD AUTO: 4.2 10E9/L (ref 4–11)

## 2018-06-18 PROCEDURE — 85025 COMPLETE CBC W/AUTO DIFF WBC: CPT

## 2018-06-18 PROCEDURE — 80180 DRUG SCRN QUAN MYCOPHENOLATE: CPT

## 2018-06-18 PROCEDURE — 80048 BASIC METABOLIC PNL TOTAL CA: CPT

## 2018-06-18 PROCEDURE — 83735 ASSAY OF MAGNESIUM: CPT

## 2018-06-18 PROCEDURE — 84100 ASSAY OF PHOSPHORUS: CPT

## 2018-06-18 PROCEDURE — 80197 ASSAY OF TACROLIMUS: CPT

## 2018-06-19 ENCOUNTER — OFFICE VISIT (OUTPATIENT)
Dept: TRANSPLANT | Facility: CLINIC | Age: 48
End: 2018-06-19
Attending: SURGERY
Payer: MEDICARE

## 2018-06-19 VITALS
OXYGEN SATURATION: 99 % | BODY MASS INDEX: 17.61 KG/M2 | HEIGHT: 70 IN | SYSTOLIC BLOOD PRESSURE: 96 MMHG | HEART RATE: 84 BPM | TEMPERATURE: 97.7 F | RESPIRATION RATE: 18 BRPM | DIASTOLIC BLOOD PRESSURE: 64 MMHG | WEIGHT: 123 LBS

## 2018-06-19 DIAGNOSIS — Z94.0 KIDNEY TRANSPLANT RECIPIENT: Primary | ICD-10-CM

## 2018-06-19 DIAGNOSIS — Z46.6 ENCOUNTER FOR REMOVAL OF URETERAL STENT: Primary | ICD-10-CM

## 2018-06-19 LAB
TACROLIMUS BLD-MCNC: 12 UG/L (ref 5–15)
TME LAST DOSE: NORMAL H

## 2018-06-19 PROCEDURE — G0463 HOSPITAL OUTPT CLINIC VISIT: HCPCS | Mod: 25,ZF

## 2018-06-19 PROCEDURE — 52310 CYSTOSCOPY AND TREATMENT: CPT | Mod: ZF | Performed by: SURGERY

## 2018-06-19 RX ORDER — LEVOFLOXACIN 250 MG/1
500 TABLET, FILM COATED ORAL DAILY
Status: DISCONTINUED | OUTPATIENT
Start: 2018-06-19 | End: 2018-08-13

## 2018-06-19 ASSESSMENT — PAIN SCALES - GENERAL
PAINLEVEL: NO PAIN (0)
PAINLEVEL: NO PAIN (0)

## 2018-06-19 NOTE — PROGRESS NOTES
Transplant Surgery  Operative Note    Preop dx: Status post  Donor kidney  Re-transplant.  Post op dx: same   Procedure: Flexible cystoscopy and ureteral stent removal   Surgeon: Mario Vallejo M.D., M.S.  Assistant: Rachle Resendez NP  Anesthesia: local  EBL: 0   Specimens: none.  Findings: none abnormal. Stent inspected and noted to be intact.   Indication: The patient is status post kidney transplant and the ureteral stent is no longer needed.    Procedure: The patient was positioned supine on the table.  The groin was sterilely prepped and draped in the usual fashion. Time out was done. Urojet was applied to the urethra. A flexible cystoscope was inserted and advanced thru the urethra into the bladder. The stent was visualized and grasped. The cystoscope, grasper and stent were removed en-mass. The stent was visualized to be intact.  The bladder mucosa was normal in appearance. Antibiotics were administered. The patient tolerated the procedure well and was asked to void before leaving the clinic.

## 2018-06-19 NOTE — MR AVS SNAPSHOT
After Visit Summary   6/19/2018    Gilles Henning III    MRN: [id number]           Patient Information     Date Of Birth          1970        Visit Information        Provider Department      6/19/2018 8:00 AM Mario Vallejo MD [facility] Solid Organ Transplant        Today's Diagnoses     Encounter for removal of ureteral stent    -  1       Follow-ups after your visit        Your next 10 appointments already scheduled     Jul 03, 2018 10:45 AM CDT   LAB with GH LAB   [facility] ([facility])    [address] [facility] [id number] [phone]           Please do not eat 10-12 hours before your appointment if you are coming in fasting for labs on lipids, cholesterol, or glucose (sugar). This does not apply to pregnant women. Water, hot tea and black coffee (with nothing added) are okay. Do not drink other fluids, diet soda or chew gum.            Jul 09, 2018  4:00 PM CDT   (Arrive by 3:30 PM)   Return Kidney Transplant with Uc Kidney/Pancreas Recipient   [facility] Nephrology ([facility])    [address]  [address]  [facility] [place] [facility] [address]   [phone]            Jul 10, 2018 11:15 AM CDT   Return Visit with MICAH Fatima CNP   [facility] ([facility])    [address]  [facility] [address] [phone]            Aug 09, 2018  3:10 PM CDT   (Arrive by 2:40 PM)   Return Kidney Transplant with Uc Kidney/Pancreas Recipient   [facility] Nephrology ([facility])    [address]  [address]  [facility] [place] [facility] [address]   [phone]            Oct 11, 2018  1:30 PM CDT   (Arrive by 1:00 PM)   Return Kidney Transplant with Uc Kidney/Pancreas Recipient   [facility] Nephrology ([facility])    [address]  [address]  [facility] [place] [facility] [address]   [phone]            Dec 10, 2018  1:30  "PM CST   (Arrive by 1:00 PM)   Return Kidney Transplant with Uc Kidney/Pancreas Recipient   [facility] Nephrology ([facility])    [address]  [address]  [facility] [place] [address] [phone]              Who to contact     If you have questions or need follow up information about today's clinic visit or your schedule please contact [facility] SOLID ORGAN TRANSPLANT directly at [phone].  Normal or non-critical lab and imaging results will be communicated to you by [email or web address], letter or phone within 4 business days after the clinic has received the results. If you do not hear from us within 7 days, please contact the clinic through [employer] or phone. If you have a critical or abnormal lab result, we will notify you by phone as soon as possible.  Submit refill requests through [employer] or call your pharmacy and they will forward the refill request to us. Please allow 3 business days for your refill to be completed.          Additional Information About Your Visit        [employer] Information     [employer] gives you secure access to your electronic health record. If you see a primary care provider, you can also send messages to your care team and make appointments. If you have questions, please call your primary care clinic.  If you do not have a primary care provider, please call [phone] and they will assist you.        Care EveryWhere ID     This is your Care EveryWhere ID. This could be used by other organizations to access your [place] medical records  [id number]        Your Vitals Were     Pulse Temperature Respirations Height Pulse Oximetry BMI (Body Mass Index)    84 97.7  F (36.5  C) (Oral) 18 1.778 m (5' 10\") 99% 17.65 kg/m2       Blood Pressure from Last 3 Encounters:   06/19/18 96/64   06/13/18 139/90   06/08/18 (!) 155/99    Weight from Last 3 Encounters:   06/19/18 55.8 kg (123 lb)   06/13/18 53.5 kg (118 lb)   06/08/18 52.5 kg (115 lb 12.8 oz)              We Performed " the Following     CYSTOSCOPY W FOREIGN BODY REMOVAL, SIMPLE          Today's Medication Changes          These changes are accurate as of 6/19/18 10:27 AM.  If you have any questions, ask your nurse or doctor.               These medicines have changed or have updated prescriptions.        Dose/Directions    magnesium hydroxide 400 MG/5ML suspension   Commonly known as:  MILK OF MAGNESIA   This may have changed:    - when to take this  - reasons to take this   Used for:  Kidney transplant recipient        Dose:  30 mL   Take 30 mLs by mouth daily   Quantity:  355 mL   Refills:  1                Primary Care Provider Office Phone # Fax #    Charlie Darrion Dubose [phone] [address] [facility] [address]        Equal Access to Services     MARCOS DOVE : Hadii kev Rene, wasg landaverde, gustabo khanmajacqueline beard, pascale king. So [place] [phone].    ATENCIÓN: Si habla español, tiene a millan disposición servicios gratuitos de asistencia lingüística. Llame al [phone].    We comply with applicable federal civil rights laws and [place] laws. We do not discriminate on the basis of race, color, national origin, age, disability, sex, sexual orientation, or gender identity.            Thank you!     Thank you for choosing [facility] SOLID ORGAN TRANSPLANT  for your care. Our goal is always to provide you with excellent care. Hearing back from our patients is one way we can continue to improve our services. Please take a few minutes to complete the written survey that you may receive in the mail after your visit with us. Thank you!             Your Updated Medication List - Protect others around you: Learn how to safely use, store and throw away your medicines at www.disposemymeds.org.          This list is accurate as of 6/19/18 10:27 AM.  Always use your most recent med list.                   Brand Name Dispense Instructions for use  Diagnosis    atorvastatin 40 MG tablet    LIPITOR     Take 40 mg by mouth daily        clonazePAM 1 MG tablet    klonoPIN     Take 0.5-1 mg by mouth 2 times daily as needed (restless leg)        docusate sodium 100 MG capsule    COLACE     Take 200 mg by mouth 2 times daily        magnesium hydroxide 400 MG/5ML suspension    MILK OF MAGNESIA    355 mL    Take 30 mLs by mouth daily    Kidney transplant recipient       mycophenolate 250 MG capsule    GENERIC EQUIVALENT    180 capsule    Take 3 capsules (750 mg) by mouth 2 times daily    Kidney transplant recipient       NONFORMULARY      Salicylic acid 3% / 5-FU 4% in vanicream : Apply a thin layer to affected area once daily        omeprazole 40 MG capsule    priLOSEC    90 capsule    TAKE 1 CAPSULE BY MOUTH DAILY    Gastroesophageal reflux disease, esophagitis presence not specified       PHOSPHA 250 NEUTRAL [phone] MG Tabs     120 tablet    Take 250 mg by mouth 2 times daily    Hypophosphatemia, Kidney replaced by transplant       polyethylene glycol Packet    MIRALAX/GLYCOLAX    14 packet    Take 17 g by mouth daily as needed for constipation    Kidney transplant recipient       prochlorperazine 10 MG tablet    COMPAZINE    30 tablet    Take 1 tablet (10 mg) by mouth 3 times daily    Nausea       senna-docusate 8.6-50 MG per tablet    SENOKOT-S;PERICOLACE    100 tablet    Take 2 tablets by mouth 2 times daily as needed for constipation    Kidney transplant recipient       sulfamethoxazole-trimethoprim 400-80 MG per tablet    BACTRIM/SEPTRA    30 tablet    Take 1 tablet by mouth daily    Kidney transplant recipient       tacrolimus 1 MG 24 hr tablet    ENVARSUS XR    180 tablet    Take 6 tablets (6 mg) by mouth every morning (before breakfast)    Kidney transplant recipient       traZODone 150 MG tablet    DESYREL     Take 300 mg by mouth At Bedtime with food        valGANciclovir 450 MG tablet    VALCYTE    60 tablet    Take 1 tablet (450 mg) by mouth daily     Kidney transplant recipient       vitamin D 2000 units tablet      Take 2,000 Units by mouth daily

## 2018-06-19 NOTE — LETTER
PHYSICIAN ORDERS      DATE & TIME ISSUED: 2018 2:10 PM  PATIENT NAME: Gilles Henning III   : 1970     [facility] MR# [if applicable]: [id number]     DIAGNOSIS:  Kidney Transplant  ICD-10 CODE: Z94.0     The following dose change was made today via telephone with patient:  Envarsus 5 mg daily    Any questions please call: [phone]                Mario Vallejo MD  Department of Surgery

## 2018-06-19 NOTE — TELEPHONE ENCOUNTER
ISSUE:   Tac 12.0  Current dose: envarsus 6 mg daily    PLAN:  Verify dose and that level was close to 24 hour trough.  If accurate, DECREASE envarsus to 5 mg daily    LPN TASK:   Call pt with instructions per plan  Update rx

## 2018-06-19 NOTE — LETTER
2018      RE: Gilles Henning III  [address]  [facility] [address]         Transplant Surgery  Operative Note    Preop dx: Status post  Donor kidney  Re-transplant.  Post op dx: same   Procedure: Flexible cystoscopy and ureteral stent removal   Surgeon: Mario Vallejo M.D., M.S.  Assistant: Rachel Resendez NP  Anesthesia: local  EBL: 0   Specimens: none.  Findings: none abnormal. Stent inspected and noted to be intact.   Indication: The patient is status post kidney transplant and the ureteral stent is no longer needed.    Procedure: The patient was positioned supine on the table.  The groin was sterilely prepped and draped in the usual fashion. Time out was done. Urojet was applied to the urethra. A flexible cystoscope was inserted and advanced thru the urethra into the bladder. The stent was visualized and grasped. The cystoscope, grasper and stent were removed en-mass. The stent was visualized to be intact.  The bladder mucosa was normal in appearance. Antibiotics were administered. The patient tolerated the procedure well and was asked to void before leaving the clinic.        Mario Vallejo MD

## 2018-06-19 NOTE — TELEPHONE ENCOUNTER
Spoke to patient who confirms current dose and good 24 hour trough level.  Patient verbalizes understanding of medication dose decrease to 5 mg daily.  Sent fax to home health regarding change.

## 2018-06-20 ENCOUNTER — TELEPHONE (OUTPATIENT)
Dept: TRANSPLANT | Facility: CLINIC | Age: 48
End: 2018-06-20

## 2018-06-20 LAB
ANION GAP SERPL CALCULATED.3IONS-SCNC: 8 MMOL/L (ref 3–14)
BASOPHILS # BLD AUTO: 0.1 10E9/L (ref 0–0.2)
BASOPHILS NFR BLD AUTO: 1.9 %
BUN SERPL-MCNC: 18 MG/DL (ref 7–25)
CALCIUM SERPL-MCNC: 9.7 MG/DL (ref 8.6–10.3)
CHLORIDE SERPL-SCNC: 107 MMOL/L (ref 98–107)
CO2 SERPL-SCNC: 24 MMOL/L (ref 21–31)
CREAT SERPL-MCNC: 1.38 MG/DL (ref 0.7–1.3)
DIFFERENTIAL METHOD BLD: ABNORMAL
EOSINOPHIL # BLD AUTO: 0.1 10E9/L (ref 0–0.7)
EOSINOPHIL NFR BLD AUTO: 2.5 %
ERYTHROCYTE [DISTWIDTH] IN BLOOD BY AUTOMATED COUNT: 17.2 % (ref 10–15)
GFR SERPL CREATININE-BSD FRML MDRD: 55 ML/MIN/1.7M2
GLUCOSE SERPL-MCNC: 109 MG/DL (ref 70–105)
HCT VFR BLD AUTO: 31 % (ref 40–53)
HGB BLD-MCNC: 10.3 G/DL (ref 13.3–17.7)
IMM GRANULOCYTES # BLD: 0 10E9/L (ref 0–0.4)
IMM GRANULOCYTES NFR BLD: 0.6 %
LYMPHOCYTES # BLD AUTO: 0.2 10E9/L (ref 0.8–5.3)
LYMPHOCYTES NFR BLD AUTO: 5.4 %
MCH RBC QN AUTO: 31.7 PG (ref 26.5–33)
MCHC RBC AUTO-ENTMCNC: 33.2 G/DL (ref 31.5–36.5)
MCV RBC AUTO: 95 FL (ref 78–100)
MONOCYTES # BLD AUTO: 0.2 10E9/L (ref 0–1.3)
MONOCYTES NFR BLD AUTO: 7 %
MYCOPHENOLATE SERPL LC/MS/MS-MCNC: 3.38 MG/L (ref 1–3.5)
MYCOPHENOLATE-G SERPL LC/MS/MS-MCNC: 98 MG/L (ref 30–95)
NEUTROPHILS # BLD AUTO: 2.6 10E9/L (ref 1.6–8.3)
NEUTROPHILS NFR BLD AUTO: 82.6 %
PLATELET # BLD AUTO: 188 10E9/L (ref 150–450)
POTASSIUM SERPL-SCNC: 4.2 MMOL/L (ref 3.5–5.1)
RBC # BLD AUTO: 3.25 10E12/L (ref 4.4–5.9)
SODIUM SERPL-SCNC: 139 MMOL/L (ref 134–144)
TME LAST DOSE: ABNORMAL H
WBC # BLD AUTO: 3.2 10E9/L (ref 4–11)

## 2018-06-20 PROCEDURE — 80197 ASSAY OF TACROLIMUS: CPT | Performed by: INTERNAL MEDICINE

## 2018-06-20 PROCEDURE — 85025 COMPLETE CBC W/AUTO DIFF WBC: CPT | Performed by: INTERNAL MEDICINE

## 2018-06-20 PROCEDURE — 80048 BASIC METABOLIC PNL TOTAL CA: CPT | Performed by: INTERNAL MEDICINE

## 2018-06-20 NOTE — TELEPHONE ENCOUNTER
ISSUE:   Cr 1.38 (trending up compared to last couple results)    PLAN:  Call placed to patient. Reports he is feeling well.  States he is drinking about 3 L/day (had previously been drinking more like 4 L but decreased intake d/t low sodium)  Denies any changes in UOP or urine color, no pain.   Tac level pending, high on last draw so may be contributing to increase in cr.     Will f/u on lab recheck on Friday.

## 2018-06-21 ENCOUNTER — TELEPHONE (OUTPATIENT)
Dept: TRANSPLANT | Facility: CLINIC | Age: 48
End: 2018-06-21

## 2018-06-21 DIAGNOSIS — Z94.0 KIDNEY TRANSPLANTED: Primary | ICD-10-CM

## 2018-06-21 DIAGNOSIS — Z94.0 KIDNEY REPLACED BY TRANSPLANT: ICD-10-CM

## 2018-06-21 DIAGNOSIS — R79.89 ELEVATED SERUM CREATININE: ICD-10-CM

## 2018-06-21 DIAGNOSIS — Z48.298 AFTERCARE FOLLOWING ORGAN TRANSPLANT: ICD-10-CM

## 2018-06-21 LAB
TACROLIMUS BLD-MCNC: 5.5 UG/L (ref 5–15)
TME LAST DOSE: NORMAL H

## 2018-06-21 NOTE — TELEPHONE ENCOUNTER
Issue:    Tac low at 5.5, goal is 8-10.  Previous dose decrease 2 days ago.    Plan/LPN task:    Confirm this was a 24 hour trough. If it was, please increase envarsus from 5 mg to 6 mg daily.

## 2018-06-21 NOTE — LETTER
OUTPATIENT LABORATORY TEST ORDER    Patient Name:Gilles Henning III   Transplant Date: 5/30/2018  YOB: 1970  Issue Date & Time:6/21/2018  1:54 PM   [facility] MR: [id number]  Expiration Date:  (1 year after date issued)      Diagnoses: Aftercare following organ transplant (ICD-10 Z48.288)   Kidney Transplant (ICD-10 Z94.0)   Long term use of medications (ICD-10  Z79.899)     ?Lab results to be available on the same day drawn.   ?Patient should release information to the [facility].     ?Please fax to the Transplant Center at [phone].    3x/week, First 4Weeks post-transplant    5/30/2018 - 6/30/2018    2x/week, Months 2-3 post-transplant    7/1/2018 - 9/1/2018       1x/week, Months 4-6 post-transplant    9/2/2018 - 12/2/2018  Every 2 weeks, Months 7-9 post-transplant    12/3/2018 - 3/3/2019  Every 3 weeks, Months 10-12 post-transplant   3/5/2019 - 5/30/2019        ?Hemogram and Platelet   ?Basic Metabolic Panel (Sodium, Potassium,Chloride, CO2, Creatinine, Urea Nitrogen, Glucose, Calcium)   ?Prograf/Tacrolimus drug level     Weekly through first 3 months post-transplant    5/30/2018 - 8/30/2018   ?Phosphorus, Magnesium   ?MPA level (mycophenolic acid) once per week for first 3 months.    ?Please add a diff to hemogram once per week for the first 3 months.    Monthly   ? BK PCR Quantitative (Polyoma virus - blood in purple top tube to Reference Lab)    At 6 & 12 months post-transplant         11/2018 & 5/2019   ? HBsurfaceAb, HBcoreAb, HCV at 6 months only -   ? Liver Function Tests(Bilirubin Direct/Total, AST, ALT, Alkaline Phosphatase)   ?Complete Lipid Panel fasting (Cholesterol, Triglycerides, HDL, LDL)   ? Random Urine for Protein/Creatinine ratio    At 7-12 months post-transplant - Monthly   ?EBV PCR QT    At 30, 60, 120, 180, 270, and 360 days, post transplant   ? PRA/DSA level (mailers provided by the patient)    If you have any  questions, please call The Transplant Center at [phone] or [phone].    Please fax all results to [phone].          Mario Vallejo MD  Department of Surgery

## 2018-06-21 NOTE — TELEPHONE ENCOUNTER
Spoke to patient who confirms current Envarsus dose and good 24 hour trough level.  Patient verbalizes understanding of medication dose increase to 6 mg daily.  Patient has concerns regarding cost of Envarsus going forward, patient states that it will cost him $180 per month.  Patient is asking if her can go on Tacrolimus, which would cost him around $10 a month.  Please advise.

## 2018-06-22 ENCOUNTER — HOSPITAL ENCOUNTER (OUTPATIENT)
Dept: ULTRASOUND IMAGING | Facility: OTHER | Age: 48
Discharge: HOME | End: 2018-06-22
Attending: INTERNAL MEDICINE | Admitting: INTERNAL MEDICINE
Payer: MEDICARE

## 2018-06-22 ENCOUNTER — HOSPITAL ENCOUNTER (OUTPATIENT)
Facility: CLINIC | Age: 48
Setting detail: SPECIMEN
Discharge: HOME | End: 2018-06-22
Admitting: SURGERY
Payer: MEDICARE

## 2018-06-22 ENCOUNTER — APPOINTMENT (OUTPATIENT)
Dept: LAB | Facility: OTHER | Age: 48
End: 2018-06-22
Attending: FAMILY MEDICINE
Payer: MEDICARE

## 2018-06-22 ENCOUNTER — MEDICAL CORRESPONDENCE (OUTPATIENT)
Dept: HEALTH INFORMATION MANAGEMENT | Facility: OTHER | Age: 48
End: 2018-06-22

## 2018-06-22 LAB
ANION GAP SERPL CALCULATED.3IONS-SCNC: 9 MMOL/L (ref 3–14)
BUN SERPL-MCNC: 28 MG/DL (ref 7–25)
CALCIUM SERPL-MCNC: 9.9 MG/DL (ref 8.6–10.3)
CHLORIDE SERPL-SCNC: 106 MMOL/L (ref 98–107)
CO2 SERPL-SCNC: 22 MMOL/L (ref 21–31)
CREAT SERPL-MCNC: 1.5 MG/DL (ref 0.7–1.3)
ERYTHROCYTE [DISTWIDTH] IN BLOOD BY AUTOMATED COUNT: 17.1 % (ref 10–15)
GFR SERPL CREATININE-BSD FRML MDRD: 50 ML/MIN/1.7M2
GLUCOSE SERPL-MCNC: 105 MG/DL (ref 70–105)
HCT VFR BLD AUTO: 31.9 % (ref 40–53)
HGB BLD-MCNC: 10.7 G/DL (ref 13.3–17.7)
MCH RBC QN AUTO: 31.7 PG (ref 26.5–33)
MCHC RBC AUTO-ENTMCNC: 33.5 G/DL (ref 31.5–36.5)
MCV RBC AUTO: 94 FL (ref 78–100)
PLATELET # BLD AUTO: 212 10E9/L (ref 150–450)
POTASSIUM SERPL-SCNC: 4.9 MMOL/L (ref 3.5–5.1)
RBC # BLD AUTO: 3.38 10E12/L (ref 4.4–5.9)
SODIUM SERPL-SCNC: 137 MMOL/L (ref 134–144)
WBC # BLD AUTO: 3.3 10E9/L (ref 4–11)

## 2018-06-22 PROCEDURE — 85027 COMPLETE CBC AUTOMATED: CPT | Performed by: INTERNAL MEDICINE

## 2018-06-22 PROCEDURE — 80048 BASIC METABOLIC PNL TOTAL CA: CPT | Performed by: INTERNAL MEDICINE

## 2018-06-22 PROCEDURE — 76776 US EXAM K TRANSPL W/DOPPLER: CPT

## 2018-06-22 PROCEDURE — 80197 ASSAY OF TACROLIMUS: CPT | Performed by: SURGERY

## 2018-06-22 PROCEDURE — 80197 ASSAY OF TACROLIMUS: CPT | Performed by: INTERNAL MEDICINE

## 2018-06-22 NOTE — TELEPHONE ENCOUNTER
Edmond Donato MD Lavelle Peterson, Meghan M, RN                     Ok for the conversion. If the pt is on envarsus 6 they go to IR 8 mg/day which is 4 mg twice daily. Immediate release to envarsus you reduce by 20% the other way around, you increase by 20%   The patient's creatinine is elevated, who is following this and what is the plan. This is a fresh kidney and we need to be sure we have plan quick.   Tac level is not at goal, who is helping with this or what are we doing?            Previous Messages       ----- Message -----      From: Katelyn Fisher, RN      Sent: 6/22/2018   9:19 AM        To: Edmond Donato MD   Subject: Change from envarsus to tacrolimus               Patient has concerns regarding cost of Envarsus going forward, patient states that it will cost him $180 per month.  Patient is asking if her can go on Tacrolimus, which would cost him around $10 a month.       Can I switch him to IR tacrolimus? He is on 6 mg daily of envarsus, ok to start him on 3.5 mg bid of tacrolimus?         Per DANIELA Donis, start IR tacrolimus at 4 mg bid. Get Renal US today and 1L NS infusion if he is orthostatic.  Call placed to Gilles Henning III, is agreeable to plan. Will get renal US at [facility] today. Gilles Henning III does report some dizziness when standing but has not recorded BP's. Gilles Henning III is drinking 3L of water daily.  Fluid infusion ordered in therapy plan.  Infusion center unable to do infusion today, will schedule Monday if Cr still elevated.     LPN ask:  Please send IR tac prescription to specialty pharmacy. Thanks!

## 2018-06-23 LAB
TACROLIMUS BLD-MCNC: 4.9 UG/L (ref 5–15)
TME LAST DOSE: ABNORMAL H

## 2018-06-25 DIAGNOSIS — Z94.0 KIDNEY REPLACED BY TRANSPLANT: ICD-10-CM

## 2018-06-25 DIAGNOSIS — Z48.298 AFTERCARE FOLLOWING ORGAN TRANSPLANT: ICD-10-CM

## 2018-06-25 DIAGNOSIS — R79.89 ELEVATED SERUM CREATININE: ICD-10-CM

## 2018-06-25 DIAGNOSIS — Z94.0 KIDNEY TRANSPLANTED: Primary | ICD-10-CM

## 2018-06-25 LAB
ANION GAP SERPL CALCULATED.3IONS-SCNC: 8 MMOL/L (ref 3–14)
BUN SERPL-MCNC: 21 MG/DL (ref 7–25)
CALCIUM SERPL-MCNC: 9.5 MG/DL (ref 8.6–10.3)
CHLORIDE SERPL-SCNC: 104 MMOL/L (ref 98–107)
CO2 SERPL-SCNC: 21 MMOL/L (ref 21–31)
CREAT SERPL-MCNC: 1.17 MG/DL (ref 0.7–1.3)
ERYTHROCYTE [DISTWIDTH] IN BLOOD BY AUTOMATED COUNT: 16.9 % (ref 10–15)
GFR SERPL CREATININE-BSD FRML MDRD: 66 ML/MIN/1.7M2
GLUCOSE SERPL-MCNC: 97 MG/DL (ref 70–105)
HCT VFR BLD AUTO: 32.4 % (ref 40–53)
HGB BLD-MCNC: 10.8 G/DL (ref 13.3–17.7)
MAGNESIUM SERPL-MCNC: 1.8 MG/DL (ref 1.9–2.7)
MCH RBC QN AUTO: 32 PG (ref 26.5–33)
MCHC RBC AUTO-ENTMCNC: 33.3 G/DL (ref 31.5–36.5)
MCV RBC AUTO: 96 FL (ref 78–100)
PHOSPHATE SERPL-MCNC: 3 MG/DL (ref 2.5–5)
PLATELET # BLD AUTO: 225 10E9/L (ref 150–450)
POTASSIUM SERPL-SCNC: 4.3 MMOL/L (ref 3.5–5.1)
RBC # BLD AUTO: 3.38 10E12/L (ref 4.4–5.9)
SODIUM SERPL-SCNC: 133 MMOL/L (ref 134–144)
TACROLIMUS BLD-MCNC: 4.4 UG/L (ref 5–15)
TME LAST DOSE: ABNORMAL H
WBC # BLD AUTO: 3 10E9/L (ref 4–11)

## 2018-06-25 PROCEDURE — 80180 DRUG SCRN QUAN MYCOPHENOLATE: CPT | Performed by: SURGERY

## 2018-06-25 PROCEDURE — 36415 COLL VENOUS BLD VENIPUNCTURE: CPT | Performed by: SURGERY

## 2018-06-25 PROCEDURE — 80048 BASIC METABOLIC PNL TOTAL CA: CPT | Performed by: SURGERY

## 2018-06-25 PROCEDURE — 83735 ASSAY OF MAGNESIUM: CPT | Performed by: SURGERY

## 2018-06-25 PROCEDURE — 84100 ASSAY OF PHOSPHORUS: CPT | Performed by: SURGERY

## 2018-06-25 PROCEDURE — 80197 ASSAY OF TACROLIMUS: CPT | Performed by: SURGERY

## 2018-06-25 PROCEDURE — 85027 COMPLETE CBC AUTOMATED: CPT | Performed by: SURGERY

## 2018-06-25 RX ORDER — TACROLIMUS 1 MG/1
3 CAPSULE ORAL 2 TIMES DAILY
Qty: 180 CAPSULE | Refills: 3 | Status: SHIPPED | OUTPATIENT
Start: 2018-06-25 | End: 2018-06-25

## 2018-06-25 RX ORDER — TACROLIMUS 0.5 MG/1
0.5 CAPSULE ORAL 2 TIMES DAILY
Qty: 60 CAPSULE | Refills: 11 | Status: SHIPPED | OUTPATIENT
Start: 2018-06-25 | End: 2018-06-29

## 2018-06-25 RX ORDER — TACROLIMUS 0.5 MG/1
0.5 CAPSULE ORAL 2 TIMES DAILY
Qty: 60 CAPSULE | Refills: 3 | Status: SHIPPED | OUTPATIENT
Start: 2018-06-25 | End: 2018-06-25

## 2018-06-25 RX ORDER — TACROLIMUS 1 MG/1
3 CAPSULE ORAL 2 TIMES DAILY
Qty: 180 CAPSULE | Refills: 11 | Status: SHIPPED | OUTPATIENT
Start: 2018-06-25 | End: 2018-06-29

## 2018-06-25 NOTE — TELEPHONE ENCOUNTER
Patient Call: Medication Refill  Route to LPN  Instruct the patient to first contact their pharmacy. If they have called their pharmacy and require further assistance, route to LPN.    Pharmacy Name:  speciality   Pharmacy Location: \[facility]\""  Name of Medication: Tacrolimus  When will the patient be out of this medication?: Less than 24 hours [facility] then page if no answer)   Patient called the  pharmacy for this Tacrolimus and pharmacy told him there is no script for him.

## 2018-06-26 LAB
TACROLIMUS BLD-MCNC: 6.4 UG/L (ref 5–15)
TME LAST DOSE: 2140 H

## 2018-06-26 NOTE — PROGRESS NOTES
Dr Dubose reviewed and completed the following home care or hospice form(s) for Gilles Henning III on 6/26/18   This covers the certification period effective 6/11/18 to 8/9/18.  Moon Cardona LPN on 6/26/2018 at 2:47 PM

## 2018-06-27 ENCOUNTER — RESULTS ONLY (OUTPATIENT)
Dept: OTHER | Facility: CLINIC | Age: 48
End: 2018-06-27

## 2018-06-27 DIAGNOSIS — Z94.0 KIDNEY TRANSPLANT RECIPIENT: ICD-10-CM

## 2018-06-27 DIAGNOSIS — Z48.298 AFTERCARE FOLLOWING ORGAN TRANSPLANT: ICD-10-CM

## 2018-06-27 DIAGNOSIS — Z94.0 KIDNEY REPLACED BY TRANSPLANT: ICD-10-CM

## 2018-06-27 LAB
ANION GAP SERPL CALCULATED.3IONS-SCNC: 8 MMOL/L (ref 3–14)
BUN SERPL-MCNC: 21 MG/DL (ref 7–25)
CALCIUM SERPL-MCNC: 9.9 MG/DL (ref 8.6–10.3)
CHLORIDE SERPL-SCNC: 109 MMOL/L (ref 98–107)
CO2 SERPL-SCNC: 22 MMOL/L (ref 21–31)
CREAT SERPL-MCNC: 1.37 MG/DL (ref 0.7–1.3)
ERYTHROCYTE [DISTWIDTH] IN BLOOD BY AUTOMATED COUNT: 17.1 % (ref 10–15)
GFR SERPL CREATININE-BSD FRML MDRD: 55 ML/MIN/1.7M2
GLUCOSE SERPL-MCNC: 87 MG/DL (ref 70–105)
HCT VFR BLD AUTO: 32.4 % (ref 40–53)
HGB BLD-MCNC: 10.7 G/DL (ref 13.3–17.7)
MAGNESIUM SERPL-MCNC: 1.9 MG/DL (ref 1.9–2.7)
MCH RBC QN AUTO: 30.9 PG (ref 26.5–33)
MCHC RBC AUTO-ENTMCNC: 33 G/DL (ref 31.5–36.5)
MCV RBC AUTO: 94 FL (ref 78–100)
PHOSPHATE SERPL-MCNC: 3.2 MG/DL (ref 2.5–5)
PLATELET # BLD AUTO: 243 10E9/L (ref 150–450)
POTASSIUM SERPL-SCNC: 4.8 MMOL/L (ref 3.5–5.1)
RBC # BLD AUTO: 3.46 10E12/L (ref 4.4–5.9)
SODIUM SERPL-SCNC: 139 MMOL/L (ref 134–144)
WBC # BLD AUTO: 2.5 10E9/L (ref 4–11)

## 2018-06-27 PROCEDURE — 80180 DRUG SCRN QUAN MYCOPHENOLATE: CPT | Performed by: SURGERY

## 2018-06-27 PROCEDURE — 86828 HLA CLASS I&II ANTIBODY QUAL: CPT | Mod: XU | Performed by: PHYSICIAN ASSISTANT

## 2018-06-27 PROCEDURE — 86833 HLA CLASS II HIGH DEFIN QUAL: CPT | Performed by: PHYSICIAN ASSISTANT

## 2018-06-27 PROCEDURE — 80197 ASSAY OF TACROLIMUS: CPT | Performed by: SURGERY

## 2018-06-27 PROCEDURE — 84100 ASSAY OF PHOSPHORUS: CPT | Performed by: SURGERY

## 2018-06-27 PROCEDURE — 83735 ASSAY OF MAGNESIUM: CPT | Performed by: SURGERY

## 2018-06-27 PROCEDURE — 87799 DETECT AGENT NOS DNA QUANT: CPT | Performed by: SURGERY

## 2018-06-27 PROCEDURE — 86832 HLA CLASS I HIGH DEFIN QUAL: CPT | Performed by: PHYSICIAN ASSISTANT

## 2018-06-27 PROCEDURE — 85027 COMPLETE CBC AUTOMATED: CPT | Performed by: SURGERY

## 2018-06-27 PROCEDURE — 80048 BASIC METABOLIC PNL TOTAL CA: CPT | Performed by: SURGERY

## 2018-06-27 PROCEDURE — 36415 COLL VENOUS BLD VENIPUNCTURE: CPT | Performed by: SURGERY

## 2018-06-28 ENCOUNTER — TELEPHONE (OUTPATIENT)
Dept: TRANSPLANT | Facility: CLINIC | Age: 48
End: 2018-06-28

## 2018-06-28 LAB
MYCOPHENOLATE SERPL LC/MS/MS-MCNC: 3.58 MG/L (ref 1–3.5)
MYCOPHENOLATE-G SERPL LC/MS/MS-MCNC: 86.1 MG/L (ref 30–95)
PRA DONOR SPECIFIC ABY: NORMAL
TACROLIMUS BLD-MCNC: 10.9 UG/L (ref 5–15)
TME LAST DOSE: 900 H
TME LAST DOSE: NORMAL H

## 2018-06-28 NOTE — TELEPHONE ENCOUNTER
ISSUE:   Cr 1.37    PLAN:  Call placed to patient.   Reports he drinks 3-5 L/day. Cr increases correlate with days he only drinks 3L. Does report he was dizzy yesterday, BP was 89/68. He drank a few bottles of water, BP improved to 128/78 and he felt better.   Reports he has already drank 3L today. /75. He feels fine. No issues with voiding. He will repeat labs tomorrow, will f/u on Cr and need for IVF.     Reports he is currently taking 5 mg envarsus (never made change to 6 mg). He has one dose left, planning to start tac IR on Saturday.

## 2018-06-29 ENCOUNTER — TELEPHONE (OUTPATIENT)
Dept: TRANSPLANT | Facility: CLINIC | Age: 48
End: 2018-06-29

## 2018-06-29 ENCOUNTER — HOSPITAL ENCOUNTER (OUTPATIENT)
Dept: INFUSION THERAPY | Facility: OTHER | Age: 48
Discharge: HOME | End: 2018-06-29
Attending: INTERNAL MEDICINE | Admitting: INTERNAL MEDICINE
Payer: MEDICARE

## 2018-06-29 VITALS — TEMPERATURE: 96.3 F | SYSTOLIC BLOOD PRESSURE: 101 MMHG | RESPIRATION RATE: 16 BRPM | DIASTOLIC BLOOD PRESSURE: 66 MMHG

## 2018-06-29 DIAGNOSIS — R79.89 ELEVATED SERUM CREATININE: ICD-10-CM

## 2018-06-29 DIAGNOSIS — Z94.0 KIDNEY TRANSPLANTED: Primary | ICD-10-CM

## 2018-06-29 DIAGNOSIS — E86.0 DEHYDRATION: ICD-10-CM

## 2018-06-29 DIAGNOSIS — Z94.0 KIDNEY REPLACED BY TRANSPLANT: ICD-10-CM

## 2018-06-29 DIAGNOSIS — Z94.0 KIDNEY TRANSPLANTED: ICD-10-CM

## 2018-06-29 DIAGNOSIS — Z48.298 AFTERCARE FOLLOWING ORGAN TRANSPLANT: ICD-10-CM

## 2018-06-29 LAB
ANION GAP SERPL CALCULATED.3IONS-SCNC: 6 MMOL/L (ref 3–14)
BKV DNA # SPEC NAA+PROBE: NORMAL COPIES/ML
BKV DNA SPEC NAA+PROBE-LOG#: NORMAL LOG COPIES/ML
BUN SERPL-MCNC: 18 MG/DL (ref 7–25)
CALCIUM SERPL-MCNC: 9.4 MG/DL (ref 8.6–10.3)
CHLORIDE SERPL-SCNC: 107 MMOL/L (ref 98–107)
CO2 SERPL-SCNC: 24 MMOL/L (ref 21–31)
CREAT SERPL-MCNC: 1.66 MG/DL (ref 0.7–1.3)
DONOR IDENTIFICATION: NORMAL
DSA COMMENTS: NORMAL
DSA PRESENT: NO
DSA TEST METHOD: NORMAL
ERYTHROCYTE [DISTWIDTH] IN BLOOD BY AUTOMATED COUNT: 16.9 % (ref 10–15)
GFR SERPL CREATININE-BSD FRML MDRD: 44 ML/MIN/1.7M2
GLUCOSE SERPL-MCNC: 107 MG/DL (ref 70–105)
HCT VFR BLD AUTO: 31.9 % (ref 40–53)
HGB BLD-MCNC: 10.3 G/DL (ref 13.3–17.7)
INTERFERING SUBST TEST METHOD: NORMAL
INTERFERING SUBSTANCE COMMENT: NORMAL
INTERFERING SUBSTANCE RESULT: NORMAL
INTERFERING SUBSTANCE: NORMAL
MAGNESIUM SERPL-MCNC: 2 MG/DL (ref 1.9–2.7)
MCH RBC QN AUTO: 31.2 PG (ref 26.5–33)
MCHC RBC AUTO-ENTMCNC: 32.3 G/DL (ref 31.5–36.5)
MCV RBC AUTO: 97 FL (ref 78–100)
MYCOPHENOLATE SERPL LC/MS/MS-MCNC: 5.18 MG/L (ref 1–3.5)
MYCOPHENOLATE-G SERPL LC/MS/MS-MCNC: 92.4 MG/L (ref 30–95)
ORGAN: NORMAL
PHOSPHATE SERPL-MCNC: 3.7 MG/DL (ref 2.5–5)
PLATELET # BLD AUTO: 251 10E9/L (ref 150–450)
POTASSIUM SERPL-SCNC: 4.7 MMOL/L (ref 3.5–5.1)
RBC # BLD AUTO: 3.3 10E12/L (ref 4.4–5.9)
SA1 CELL: NORMAL
SA1 COMMENTS: NORMAL
SA1 HI RISK ABY: NORMAL
SA1 MOD RISK ABY: NORMAL
SA1 TEST METHOD: NORMAL
SA2 CELL: NORMAL
SA2 COMMENTS: NORMAL
SA2 HI RISK ABY UA: NORMAL
SA2 MOD RISK ABY: NORMAL
SA2 TEST METHOD: NORMAL
SODIUM SERPL-SCNC: 137 MMOL/L (ref 134–144)
SPECIMEN SOURCE: NORMAL
TME LAST DOSE: ABNORMAL H
WBC # BLD AUTO: 2.9 10E9/L (ref 4–11)

## 2018-06-29 PROCEDURE — 80180 DRUG SCRN QUAN MYCOPHENOLATE: CPT | Performed by: SURGERY

## 2018-06-29 PROCEDURE — 96360 HYDRATION IV INFUSION INIT: CPT

## 2018-06-29 PROCEDURE — 85027 COMPLETE CBC AUTOMATED: CPT | Performed by: SURGERY

## 2018-06-29 PROCEDURE — 80048 BASIC METABOLIC PNL TOTAL CA: CPT | Performed by: SURGERY

## 2018-06-29 PROCEDURE — 25000128 H RX IP 250 OP 636: Performed by: INTERNAL MEDICINE

## 2018-06-29 PROCEDURE — 80197 ASSAY OF TACROLIMUS: CPT | Performed by: SURGERY

## 2018-06-29 PROCEDURE — 84100 ASSAY OF PHOSPHORUS: CPT | Performed by: SURGERY

## 2018-06-29 PROCEDURE — 83735 ASSAY OF MAGNESIUM: CPT | Performed by: SURGERY

## 2018-06-29 PROCEDURE — 36415 COLL VENOUS BLD VENIPUNCTURE: CPT | Performed by: SURGERY

## 2018-06-29 RX ORDER — TACROLIMUS 1 MG/1
3 CAPSULE ORAL 2 TIMES DAILY
Qty: 180 CAPSULE | Refills: 11 | Status: SHIPPED | OUTPATIENT
Start: 2018-06-29 | End: 2018-07-03

## 2018-06-29 RX ORDER — TACROLIMUS 0.5 MG/1
CAPSULE ORAL
Qty: 60 CAPSULE | Refills: 11 | Status: SHIPPED | OUTPATIENT
Start: 2018-06-29 | End: 2018-07-03

## 2018-06-29 RX ADMIN — SODIUM CHLORIDE 1000 ML: 900 INJECTION, SOLUTION INTRAVENOUS at 14:51

## 2018-06-29 NOTE — TELEPHONE ENCOUNTER
Spoke to patient regarding elevated creatinine.  Patient confirms that he is hydrating 2-3L/daily, but states that his activity increased over the past couple of days.  Encouraged continued hydration and to relax and stay out of the heat today, patient verbalizes understanding.  Patient will have labs repeated Monday.  Patient denies and pain over incision site, denies any N/V/D, denies any fever.

## 2018-06-29 NOTE — TELEPHONE ENCOUNTER
----- Message from Nikhil Telles MD sent at 6/28/2018  6:08 PM CDT -----  Regarding: RE: Change to tac IR  Give him 1 L of NS in addition to drinking.    3 mg po bid for IR tac is the correct conversion.  kane Sue  ----- Message -----     From: Gia Romero, RN     Sent: 6/28/2018   1:39 PM       To: Nikhil Telles MD  Subject: Change to tac IR                                 Cr 1.37 (1.17, 1.5)    Cr seems very sensitive to his po hydration. Did have a low BP yesterday but states it improved after he drank extra fluid, BP today 112/75. Hydrates well, 3-5L/day. He said he has been drinking >4L yesterday and today. Repeating labs tomorrow after po hydration.     Ok with this plan? Or should he get IVF?     Tac 10.9  Envarsus 5 mg daily    Needs to convert to tac IR on Saturday when his envarsus is out.     Is tacrolimus 3 mg BID the correct conversion?       Gia Sue

## 2018-06-29 NOTE — PROGRESS NOTES
Infusion Nursing Note:  Gilles Henning III presents today for IV Fluids.    Patient seen by provider today: No   present during visit today: Not Applicable.    Note: N/A.    Intravenous Access:  Peripheral IV placed.    Treatment Conditions:  Not Applicable.      Post Infusion Assessment:  Patient tolerated infusion without incident.  Blood return noted pre and post infusion.  Site patent and intact, free from redness, edema or discomfort.  No evidence of extravasations.  Access discontinued per protocol.    Discharge Plan:   Patient discharged in stable condition accompanied by: self.  Departure Mode: Ambulatory.    Brenda J. Goodell, RN

## 2018-06-29 NOTE — TELEPHONE ENCOUNTER
Pt set of for fluid infusion today.    Instructed to drink max of 3L/daily, Gilles Henning III verbalized understanding.

## 2018-06-30 LAB
TACROLIMUS BLD-MCNC: 8.7 UG/L (ref 5–15)
TME LAST DOSE: NORMAL H

## 2018-07-02 DIAGNOSIS — N18.6 END STAGE RENAL DISEASE (H): ICD-10-CM

## 2018-07-02 DIAGNOSIS — C64.9 RENAL CELL CARCINOMA, UNSPECIFIED LATERALITY (H): ICD-10-CM

## 2018-07-02 DIAGNOSIS — Z94.0 KIDNEY REPLACED BY TRANSPLANT: ICD-10-CM

## 2018-07-02 DIAGNOSIS — Z48.298 AFTERCARE FOLLOWING ORGAN TRANSPLANT: ICD-10-CM

## 2018-07-02 DIAGNOSIS — Z76.82 ORGAN TRANSPLANT CANDIDATE: ICD-10-CM

## 2018-07-02 DIAGNOSIS — Z12.5 PROSTATE CANCER SCREENING: ICD-10-CM

## 2018-07-02 LAB
ALBUMIN SERPL-MCNC: 4.3 G/DL (ref 3.5–5.7)
ALP SERPL-CCNC: 100 U/L (ref 34–104)
ALT SERPL W P-5'-P-CCNC: 13 U/L (ref 7–52)
ANION GAP SERPL CALCULATED.3IONS-SCNC: 7 MMOL/L (ref 3–14)
AST SERPL W P-5'-P-CCNC: 17 U/L (ref 13–39)
BASOPHILS # BLD AUTO: 0.1 10E9/L (ref 0–0.2)
BASOPHILS NFR BLD AUTO: 1.6 %
BILIRUB SERPL-MCNC: 0.3 MG/DL (ref 0.3–1)
BUN SERPL-MCNC: 24 MG/DL (ref 7–25)
CALCIUM SERPL-MCNC: 9.4 MG/DL (ref 8.6–10.3)
CHLORIDE SERPL-SCNC: 104 MMOL/L (ref 98–107)
CO2 SERPL-SCNC: 22 MMOL/L (ref 21–31)
CREAT SERPL-MCNC: 1.27 MG/DL (ref 0.7–1.3)
DIFFERENTIAL METHOD BLD: ABNORMAL
EOSINOPHIL # BLD AUTO: 0.1 10E9/L (ref 0–0.7)
EOSINOPHIL NFR BLD AUTO: 1.4 %
ERYTHROCYTE [DISTWIDTH] IN BLOOD BY AUTOMATED COUNT: 16.7 % (ref 10–15)
GFR SERPL CREATININE-BSD FRML MDRD: 60 ML/MIN/1.7M2
GLUCOSE SERPL-MCNC: 131 MG/DL (ref 70–105)
HCT VFR BLD AUTO: 31.3 % (ref 40–53)
HGB BLD-MCNC: 10.2 G/DL (ref 13.3–17.7)
IMM GRANULOCYTES # BLD: 0.1 10E9/L (ref 0–0.4)
IMM GRANULOCYTES NFR BLD: 1 %
LDH SERPL L TO P-CCNC: 116 U/L (ref 140–271)
LYMPHOCYTES # BLD AUTO: 0.2 10E9/L (ref 0.8–5.3)
LYMPHOCYTES NFR BLD AUTO: 4 %
MAGNESIUM SERPL-MCNC: 1.8 MG/DL (ref 1.9–2.7)
MCH RBC QN AUTO: 31.6 PG (ref 26.5–33)
MCHC RBC AUTO-ENTMCNC: 32.6 G/DL (ref 31.5–36.5)
MCV RBC AUTO: 97 FL (ref 78–100)
MONOCYTES # BLD AUTO: 0.3 10E9/L (ref 0–1.3)
MONOCYTES NFR BLD AUTO: 5.4 %
NEUTROPHILS # BLD AUTO: 4.4 10E9/L (ref 1.6–8.3)
NEUTROPHILS NFR BLD AUTO: 86.6 %
PHOSPHATE SERPL-MCNC: 2.9 MG/DL (ref 2.5–5)
PLATELET # BLD AUTO: 211 10E9/L (ref 150–450)
POTASSIUM SERPL-SCNC: 4.6 MMOL/L (ref 3.5–5.1)
PROT SERPL-MCNC: 6.7 G/DL (ref 6.4–8.9)
PSA SERPL-MCNC: 1.29 NG/ML
RBC # BLD AUTO: 3.23 10E12/L (ref 4.4–5.9)
SODIUM SERPL-SCNC: 133 MMOL/L (ref 134–144)
WBC # BLD AUTO: 5 10E9/L (ref 4–11)

## 2018-07-02 PROCEDURE — 80197 ASSAY OF TACROLIMUS: CPT | Performed by: SURGERY

## 2018-07-02 PROCEDURE — 82232 ASSAY OF BETA-2 PROTEIN: CPT | Performed by: INTERNAL MEDICINE

## 2018-07-02 PROCEDURE — 84100 ASSAY OF PHOSPHORUS: CPT | Performed by: INTERNAL MEDICINE

## 2018-07-02 PROCEDURE — 36415 COLL VENOUS BLD VENIPUNCTURE: CPT | Performed by: INTERNAL MEDICINE

## 2018-07-02 PROCEDURE — 82784 ASSAY IGA/IGD/IGG/IGM EACH: CPT | Performed by: INTERNAL MEDICINE

## 2018-07-02 PROCEDURE — G0103 PSA SCREENING: HCPCS | Performed by: INTERNAL MEDICINE

## 2018-07-02 PROCEDURE — 84165 PROTEIN E-PHORESIS SERUM: CPT | Performed by: INTERNAL MEDICINE

## 2018-07-02 PROCEDURE — 00000402 ZZHCL STATISTIC TOTAL PROTEIN: Performed by: INTERNAL MEDICINE

## 2018-07-02 PROCEDURE — 83735 ASSAY OF MAGNESIUM: CPT | Performed by: INTERNAL MEDICINE

## 2018-07-02 PROCEDURE — 84153 ASSAY OF PSA TOTAL: CPT | Performed by: INTERNAL MEDICINE

## 2018-07-02 PROCEDURE — 85025 COMPLETE CBC W/AUTO DIFF WBC: CPT | Performed by: INTERNAL MEDICINE

## 2018-07-02 PROCEDURE — 80180 DRUG SCRN QUAN MYCOPHENOLATE: CPT | Performed by: SURGERY

## 2018-07-02 PROCEDURE — 83615 LACTATE (LD) (LDH) ENZYME: CPT | Performed by: INTERNAL MEDICINE

## 2018-07-02 PROCEDURE — 80053 COMPREHEN METABOLIC PANEL: CPT | Performed by: INTERNAL MEDICINE

## 2018-07-03 ENCOUNTER — TRANSFERRED RECORDS (OUTPATIENT)
Dept: HEALTH INFORMATION MANAGEMENT | Facility: OTHER | Age: 48
End: 2018-07-03

## 2018-07-03 ENCOUNTER — TELEPHONE (OUTPATIENT)
Dept: TRANSPLANT | Facility: CLINIC | Age: 48
End: 2018-07-03

## 2018-07-03 ENCOUNTER — TELEPHONE (OUTPATIENT)
Dept: PHARMACY | Facility: CLINIC | Age: 48
End: 2018-07-03

## 2018-07-03 DIAGNOSIS — Z94.0 KIDNEY TRANSPLANTED: ICD-10-CM

## 2018-07-03 DIAGNOSIS — R79.89 ELEVATED SERUM CREATININE: ICD-10-CM

## 2018-07-03 LAB
ALBUMIN SERPL ELPH-MCNC: 4.2 G/DL (ref 3.7–5.1)
ALPHA1 GLOB SERPL ELPH-MCNC: 0.3 G/DL (ref 0.2–0.4)
ALPHA2 GLOB SERPL ELPH-MCNC: 0.7 G/DL (ref 0.5–0.9)
B-GLOBULIN SERPL ELPH-MCNC: 0.7 G/DL (ref 0.6–1)
B2 MICROGLOB SERPL-MCNC: 3.3 MG/L
GAMMA GLOB SERPL ELPH-MCNC: 0.8 G/DL (ref 0.7–1.6)
IGA SERPL-MCNC: 346 MG/DL (ref 70–380)
IGG SERPL-MCNC: 850 MG/DL (ref 695–1620)
IGM SERPL-MCNC: 43 MG/DL (ref 60–265)
M PROTEIN SERPL ELPH-MCNC: 0 G/DL
MYCOPHENOLATE SERPL LC/MS/MS-MCNC: 4.22 MG/L (ref 1–3.5)
MYCOPHENOLATE-G SERPL LC/MS/MS-MCNC: 93.6 MG/L (ref 30–95)
PROT PATTERN SERPL ELPH-IMP: NORMAL
TACROLIMUS BLD-MCNC: 5 UG/L (ref 5–15)
TME LAST DOSE: ABNORMAL H
TME LAST DOSE: NORMAL H

## 2018-07-03 RX ORDER — TACROLIMUS 0.5 MG/1
CAPSULE ORAL
Qty: 60 CAPSULE | Refills: 11
Start: 2018-07-03 | End: 2018-07-11

## 2018-07-03 RX ORDER — TACROLIMUS 1 MG/1
5 CAPSULE ORAL 2 TIMES DAILY
Qty: 300 CAPSULE | Refills: 11 | Status: SHIPPED | OUTPATIENT
Start: 2018-07-03 | End: 2018-07-11

## 2018-07-03 NOTE — TELEPHONE ENCOUNTER
ISSUE:   Tac 5.0  Current dose: 3 mg BID (recent change to tac IR)    PLAN:  Call placed to patient.   States he has not started tac IR, has still been taking 5 mg envarsus, will start tac IR tomorrow  Instructed patient to start taking tacrolimus 5 mg BID  Pt verbalized understanding    LPN TASK:   Update tacrolimus to 5 mg BID

## 2018-07-03 NOTE — TELEPHONE ENCOUNTER
Patient's variations in Cr, occasional drops in BP and drug levels reviewed with Dr Cabrera.    PLAN:   Continue to monitor labs. If Cr spikes again, consider scheduling weekly IVF or starting florinef.

## 2018-07-04 LAB
MYCOPHENOLATE SERPL LC/MS/MS-MCNC: 4.18 MG/L (ref 1–3.5)
MYCOPHENOLATE-G SERPL LC/MS/MS-MCNC: 97.6 MG/L (ref 30–95)
TME LAST DOSE: ABNORMAL H

## 2018-07-05 DIAGNOSIS — Z48.298 AFTERCARE FOLLOWING ORGAN TRANSPLANT: ICD-10-CM

## 2018-07-05 DIAGNOSIS — Z94.0 KIDNEY REPLACED BY TRANSPLANT: ICD-10-CM

## 2018-07-05 LAB
ANION GAP SERPL CALCULATED.3IONS-SCNC: 6 MMOL/L (ref 3–14)
BASOPHILS # BLD AUTO: 0.1 10E9/L (ref 0–0.2)
BASOPHILS NFR BLD AUTO: 1.2 %
BUN SERPL-MCNC: 20 MG/DL (ref 7–25)
CALCIUM SERPL-MCNC: 9.3 MG/DL (ref 8.6–10.3)
CHLORIDE SERPL-SCNC: 104 MMOL/L (ref 98–107)
CO2 SERPL-SCNC: 23 MMOL/L (ref 21–31)
CREAT SERPL-MCNC: 1.38 MG/DL (ref 0.7–1.3)
DIFFERENTIAL METHOD BLD: ABNORMAL
EOSINOPHIL # BLD AUTO: 0.1 10E9/L (ref 0–0.7)
EOSINOPHIL NFR BLD AUTO: 1.9 %
ERYTHROCYTE [DISTWIDTH] IN BLOOD BY AUTOMATED COUNT: 16.4 % (ref 10–15)
GFR SERPL CREATININE-BSD FRML MDRD: 55 ML/MIN/1.7M2
GLUCOSE SERPL-MCNC: 98 MG/DL (ref 70–105)
HCT VFR BLD AUTO: 29.9 % (ref 40–53)
HGB BLD-MCNC: 9.9 G/DL (ref 13.3–17.7)
IMM GRANULOCYTES # BLD: 0 10E9/L (ref 0–0.4)
IMM GRANULOCYTES NFR BLD: 0.8 %
LYMPHOCYTES # BLD AUTO: 0.2 10E9/L (ref 0.8–5.3)
LYMPHOCYTES NFR BLD AUTO: 4.7 %
MCH RBC QN AUTO: 31.7 PG (ref 26.5–33)
MCHC RBC AUTO-ENTMCNC: 33.1 G/DL (ref 31.5–36.5)
MCV RBC AUTO: 96 FL (ref 78–100)
MONOCYTES # BLD AUTO: 0.3 10E9/L (ref 0–1.3)
MONOCYTES NFR BLD AUTO: 6.2 %
NEUTROPHILS # BLD AUTO: 4.4 10E9/L (ref 1.6–8.3)
NEUTROPHILS NFR BLD AUTO: 85.2 %
PLATELET # BLD AUTO: 188 10E9/L (ref 150–450)
POTASSIUM SERPL-SCNC: 4.8 MMOL/L (ref 3.5–5.1)
RBC # BLD AUTO: 3.12 10E12/L (ref 4.4–5.9)
SODIUM SERPL-SCNC: 133 MMOL/L (ref 134–144)
WBC # BLD AUTO: 5.1 10E9/L (ref 4–11)

## 2018-07-05 PROCEDURE — 85004 AUTOMATED DIFF WBC COUNT: CPT | Performed by: SURGERY

## 2018-07-05 PROCEDURE — 80048 BASIC METABOLIC PNL TOTAL CA: CPT | Performed by: SURGERY

## 2018-07-05 PROCEDURE — 85027 COMPLETE CBC AUTOMATED: CPT | Performed by: SURGERY

## 2018-07-05 PROCEDURE — 36415 COLL VENOUS BLD VENIPUNCTURE: CPT | Performed by: SURGERY

## 2018-07-09 ENCOUNTER — OFFICE VISIT (OUTPATIENT)
Dept: NEPHROLOGY | Facility: CLINIC | Age: 48
End: 2018-07-09
Payer: MEDICARE

## 2018-07-09 VITALS
SYSTOLIC BLOOD PRESSURE: 122 MMHG | HEART RATE: 75 BPM | BODY MASS INDEX: 18.67 KG/M2 | DIASTOLIC BLOOD PRESSURE: 79 MMHG | OXYGEN SATURATION: 100 % | WEIGHT: 130.1 LBS | TEMPERATURE: 98.5 F

## 2018-07-09 DIAGNOSIS — Z94.0 KIDNEY TRANSPLANTED: Primary | ICD-10-CM

## 2018-07-09 DIAGNOSIS — E83.42 HYPOMAGNESEMIA: ICD-10-CM

## 2018-07-09 DIAGNOSIS — Z29.89 NEED FOR CMV IMMUNOTHERAPY: ICD-10-CM

## 2018-07-09 DIAGNOSIS — D84.9 IMMUNOSUPPRESSED STATUS (H): ICD-10-CM

## 2018-07-09 DIAGNOSIS — E78.49 OTHER HYPERLIPIDEMIA: ICD-10-CM

## 2018-07-09 DIAGNOSIS — Z29.89 NEED FOR PNEUMOCYSTIS PROPHYLAXIS: ICD-10-CM

## 2018-07-09 DIAGNOSIS — E87.1 HYPONATREMIA: ICD-10-CM

## 2018-07-09 PROCEDURE — G0463 HOSPITAL OUTPT CLINIC VISIT: HCPCS | Mod: ZF

## 2018-07-09 ASSESSMENT — PAIN SCALES - GENERAL: PAINLEVEL: NO PAIN (0)

## 2018-07-09 NOTE — PROGRESS NOTES
Assessment and Plan:  1. DDKT - ESRD due to DMII s/p kidney tx x 2. He is on chronic immunosuppression with:    Tacrolimus 5 mg po bid: Last tacrolimus level was 5 ng/mL.  We will follow-up this weeks if continued to be low we will increase him to 7 mg p.o. twice daily.:  Mmf 750 mg po bid he has had to mycophenolic acid levels that are slightly above goal.  Given his lack of symptoms we will not adjust this given his low tacrolimus level.    UPC: Check at 6 and 12 months.  DSA: negative 6/27  BK:negative 6/27/18    Creatinine baseline is 1.2-1.3 mg / dl  2. Immunosuppression: See above.  Goal tacrolimus is 8-10 ng/mL.    3. Hyponatremia: 133 meq / L.  The underlying etiology of the hyponatremia is unclear.  However with greater than 130 mEq/L the patient is at low risk of complication.  I will add on some urine studies to be able to evaluate whether he has ADH release present.  As such we will get a serum osm, urine osm., urine sodium, and urine potassium    4. HYPOmagnesemia: continue mag oxide.    5. Need for cmv Immunotherapy: continue valcyte 450 mg po daily.    6. Need for pneumocystis: bactrim ss tab daily    7. Hypophosphatemia: stop phos supplement.     8. HLD: continue atorvastatin.    9. Hx of RCC: f/u with oncology as scheduled tomorrow for surveillance.    Assessment and plan was discussed with patient and he voiced his understanding and agreement.    Reason for Visit:  Mr. Henning is here for routine follow up and kidney transplant.    HPI:   Gilles Henning III is a 48 year old male with ESKD from DMII and is status post DDKT on 5/30/18.         Transplant Hx:       Tx: DDKT  Date: 5/30/18       Present Maintenance IS: Tacrolimus and Mycophenolate mofetil       Baseline Creatinine: 1.2-1.3 mg / dl       Recent DSA: No         Biopsy: No    The patient is a 48-year-old male with history of end-stage kidney disease status post kidney transplant ×2 with his most recent transplant on May 30 of 2018 here for  follow-up of his kidney transplant.    The patient is currently feeling quite well.  He notes that he has been adding weight.  He denies any chest pain or breathing difficulties.  He has no fever shakes or chills.  He has no problems with constipation or diarrhea.    He is on chronic immunosuppression therapy with tacrolimus and CellCept.    Home BP: at goal.      ROS:   A comprehensive review of systems was obtained and negative, except as noted in the HPI or PMH.    Active Medical Problems:  Patient Active Problem List   Diagnosis     Kidney transplant recipient     Anemia of chronic disease     Bipolar affective disorder (H)     Chest pain     Chronic insomnia     Colitis, acute     Depression, major, recurrent (H)     Diarrhea     Psychosexual dysfunction with inhibited sexual excitement     Erectile dysfunction     Gastroesophageal reflux disease     Headache     Heartburn     Hyperlipidemia     Hypertension     Nephritis and nephropathy, 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     Immunosuppressed status (H)     Lumbar disc disease with radiculopathy     Lumbar foraminal stenosis     Abnormal involuntary movement     Nausea     Night terrors     Onychocryptosis     Hereditary and idiopathic peripheral neuropathy     PTSD (post-traumatic stress disorder)     Renal cell carcinoma (H)     S/p nephrectomy     Secondary hyperparathyroidism (H)     Vitamin D deficiency     Kidney transplant candidate     Long QT interval     Kidney transplanted     Other constipation     Hyponatremia     Benign essential hypertension     Need for CMV immunotherapy     Need for pneumocystis prophylaxis     Hypomagnesemia     Dehydration     Personal Hx:  Social History     Social History     Marital status:      Spouse name: Merline     Number of children: 4     Years of education: 13     Occupational History     disabled      Social History Main Topics     Smoking status: Passive Smoke Exposure - Never Smoker     Types: Dip, chew,  snus or snuff     Smokeless tobacco: Current User     Types: Chew      Comment: Wife smoked years ago.  Weaning off chew now     Alcohol use No      Comment: none now, did treatment at age 21, relapsed with divorce (a couple months)  then now  sober 3 years.      Drug use: No      Comment: Marijuana at age 14 only.     Sexual activity: Yes     Partners: Female     Other Topics Concern     Not on file     Social History Narrative    ** Merged History Encounter **          Allergies:  Allergies   Allergen Reactions     Gabapentin Other (See Comments)     myoclonus     Medications:  Prior to Admission medications    Medication Sig Start Date End Date Taking? Authorizing Provider   atorvastatin (LIPITOR) 40 MG tablet Take 40 mg by mouth daily  5/30/17   Reported, Patient   Cholecalciferol (VITAMIN D) 2000 UNITS tablet Take 2,000 Units by mouth daily    Reported, Patient   clonazePAM (KLONOPIN) 1 MG tablet Take 0.5-1 mg by mouth 2 times daily as needed (restless leg)  2/10/17   Reported, Patient   docusate sodium (COLACE) 100 MG capsule Take 200 mg by mouth 2 times daily     Reported, Patient   K Phos Ross-Sod Phos Di & Mono (PHOSPHA 250 NEUTRAL) 155-852-130 MG TABS Take 250 mg by mouth 2 times daily 6/5/18   Rachel Resendez, NP   magnesium hydroxide (MILK OF MAGNESIA) 400 MG/5ML suspension Take 30 mLs by mouth daily  Patient taking differently: Take 30 mLs by mouth daily as needed  6/2/18   Mario Vallejo MD   mycophenolate (GENERIC EQUIVALENT) 250 MG capsule Take 3 capsules (750 mg) by mouth 2 times daily 6/2/18 7/2/18  Mario Vallejo MD   NONFORMULARY Salicylic acid 3% / 5-FU 4% in vanicream : Apply a thin layer to affected area once daily    Unknown, Entered By History   omeprazole (PRILOSEC) 40 MG capsule TAKE 1 CAPSULE BY MOUTH DAILY 2/20/18   Charlie Dubose MD   polyethylene glycol (MIRALAX/GLYCOLAX) Packet Take 17 g by mouth daily as needed for constipation 6/2/18   Mario Vallejo MD    prochlorperazine (COMPAZINE) 10 MG tablet Take 1 tablet (10 mg) by mouth 3 times daily 6/6/18   Nikhil Telles MD   senna-docusate (SENOKOT-S;PERICOLACE) 8.6-50 MG per tablet Take 2 tablets by mouth 2 times daily as needed for constipation 6/2/18   Mario Vallejo MD   tacrolimus (GENERIC EQUIVALENT) 0.5 MG capsule Hold 7/3/18   Nikhil Telles MD   tacrolimus (GENERIC EQUIVALENT) 1 MG capsule Take 5 capsules (5 mg) by mouth 2 times daily 7/3/18   Nikhil Telles MD   traZODone (DESYREL) 150 MG tablet TAKE 2 TABLETS BY MOUTH NIGHTLY AT BEDTIME 6/28/18   Indy Lynch DO   valGANciclovir (VALCYTE) 450 MG tablet Take 1 tablet (450 mg) by mouth daily 6/6/18   Nikhil Telles MD     Vitals:  /79 (BP Location: Right arm)  Pulse 75  Temp 98.5  F (36.9  C) (Oral)  Wt 59 kg (130 lb 1.6 oz)  SpO2 100%  BMI 18.67 kg/m2    Exam:   GENERAL APPEARANCE: alert and no distress  HENT: mouth without ulcers or lesions  LYMPHATICS: no cervical or supraclavicular nodes  RESP: lungs clear to auscultation - no rales, rhonchi or wheezes  CV: regular rhythm, normal rate, no rub, no murmur  EDEMA: no LE edema bilaterally  ABDOMEN: soft, nondistended, nontender, bowel sounds normal  MS: extremities normal - no gross deformities noted, no evidence of inflammation in joints, no muscle tenderness  SKIN: no rash    Results:   Labs reviewed.

## 2018-07-09 NOTE — NURSING NOTE
Chief Complaint   Patient presents with     RECHECK     follow up 1 Month Kidney TX     /79 (BP Location: Right arm)  Pulse 75  Temp 98.5  F (36.9  C) (Oral)  Wt 59 kg (130 lb 1.6 oz)  SpO2 100%  BMI 18.67 kg/m2   Deanne Lawson

## 2018-07-09 NOTE — PATIENT INSTRUCTIONS
STOP phosphorous supplement.    Labs tomorrow and I will adjust the prograf and mycophenolate meds.     Follow up will be in 1 month.    Call with questions.    [phone].

## 2018-07-09 NOTE — MR AVS SNAPSHOT
After Visit Summary   7/9/2018    Gilles Henning III    MRN: [id number]           Patient Information     Date Of Birth          1970        Visit Information        Provider Department      7/9/2018 4:00 PM Recipient,  Kidney/Pancreas [facility] Nephrology        Care Instructions    STOP phosphorous supplement.    Labs tomorrow and I will adjust the prograf and mycophenolate meds.     Follow up will be in 1 month.    Call with questions.    [phone].              Follow-ups after your visit        Follow-up notes from your care team     Return in about 1 month (around 8/9/2018).      Your next 10 appointments already scheduled     Jul 10, 2018  7:50 AM CDT   [facility]    [address] [facility] [address]   [phone]           Please do not eat 10-12 hours before your appointment if you are coming in fasting for labs on lipids, cholesterol, or glucose (sugar). This does not apply to pregnant women. Water, hot tea and black coffee (with nothing added) are okay. Do not drink other fluids, diet soda or chew gum.            Jul 10, 2018 11:15 AM CDT   Return Visit with MICAH [facility] ([facility])    [address]  [place] [address]   [phone]            Jul 12, 2018  7:50 AM CDT   LAB with GH LAB   [facility]    [address] [facility] [address]   [phone]           Please do not eat 10-12 hours before your appointment if you are coming in fasting for labs on lipids, cholesterol, or glucose (sugar). This does not apply to pregnant women. Water, hot tea and black coffee (with nothing added) are okay. Do not drink other fluids, diet soda or chew gum.            Jul 16, 2018  8:00 AM CDT   LAB with  LAB   [facility]  ([facility])    [address]  [place] [address]   [phone]           Please do not eat 10-12 hours before your appointment if you are coming in fasting for labs on lipids, cholesterol, or glucose (sugar). This does not apply to pregnant women. Water, hot tea and black coffee (with nothing added) are okay. Do not drink other fluids, diet soda or chew gum.            Jul 19, 2018  8:00 AM CDT   LAB with GH LAB   [facility]    [address]  [facility] [address]   [phone]           Please do not eat 10-12 hours before your appointment if you are coming in fasting for labs on lipids, cholesterol, or glucose (sugar). This does not apply to pregnant women. Water, hot tea and black coffee (with nothing added) are okay. Do not drink other fluids, diet soda or chew gum.            Aug 09, 2018  3:10 PM CDT   (Arrive by 2:40 PM)   Return Kidney Transplant with  Kidney/Pancreas Recipient   [facility] Nephrology ([facility])    [address]  [address]  [facility] [address] [phone]            Oct 11, 2018  1:30 PM CDT   (Arrive by 1:00 PM)   Return Kidney Transplant with [facility] Kidney/Pancreas Recipient   [facility]phrology ([facility])    [address]  [address]  [facility] [id number] [phone]            Dec 10, 2018  1:30 PM CST   (Arrive by 1:00 PM)   Return Kidney Transplant with  Kidney/Pancreas Recipient   [facility] Nephrology ([facility])    [address]  [address]  [place] [facility] [address] [phone]              Who to contact     If you have questions or need follow up information about today's clinic visit or your schedule please contact [facility] NEPHROLOGY directly at [phone].  Normal or non-critical lab and imaging results will be communicated to you by MyChart, letter or  phone within 4 business days after the clinic has received the results. If you do not hear from us within 7 days, please contact the clinic through Sjapper or phone. If you have a critical or abnormal lab result, we will notify you by phone as soon as possible.  Submit refill requests through Sjapper or call your pharmacy and they will forward the refill request to us. Please allow 3 business days for your refill to be completed.          Additional Information About Your Visit        Sjapper Information     S[employer]apper gives you secure access to your electronic health record. If you see a primary care provider, you can also send messages to your care team and make appointments. If you have questions, please call your primary care clinic.  If you do not have a primary care provider, please call [phone] and they will assist you.        Care EveryWhere ID     This is your Care EveryWhere ID. This could be used by other organizations to access your [place] medical records  [id number]        Your Vitals Were     Pulse Temperature Pulse Oximetry BMI (Body Mass Index)          75 98.5  F (36.9  C) (Oral) 100% 18.67 kg/m2         Blood Pressure from Last 3 Encounters:   07/09/18 122/79   06/29/18 101/66   06/19/18 96/64    Weight from Last 3 Encounters:   07/09/18 59 kg (130 lb 1.6 oz)   06/19/18 55.8 kg (123 lb)   06/13/18 53.5 kg (118 lb)              Today, you had the following     No orders found for display         Today's Medication Changes          These changes are accurate as of 7/9/18  4:08 PM.  If you have any questions, ask your nurse or doctor.               Stop taking these medicines if you haven't already. Please contact your care team if you have questions.     magnesium hydroxide 400 MG/5ML suspension   Commonly known as:  MILK OF MAGNESIA   Stopped by:  Recipient, [facility] Kidney/Pancreas           PHOSPHA 250 NEUTRAL 155-852-130 MG Tabs   Stopped by:  Recipient, [facility] Kidney/Pancreas                     Primary Care Provider Office Phone # Fax #    Charlie Darrion Dubose [phone] [address]   [place] [address]        Equal Access to Services     FARRUKHMARCOS PETTY : Xiao kev braga delia Rene, wanelsonda luqgraeme, qaybta kagarethda chirag, pascale souza laFlorymarshal christine. So [facility] [phone].    ATENCIÓN: Si habla español, tiene a millan disposición servicios gratuitos de asistencia lingüística. Llame al [phone].    We comply with applicable federal civil rights laws and [place] laws. We do not discriminate on the basis of race, color, national origin, age, disability, sex, sexual orientation, or gender identity.            Thank you!     Thank you for choosing [facility] NEPHROLOGY  for your care. Our goal is always to provide you with excellent care. Hearing back from our patients is one way we can continue to improve our services. Please take a few minutes to complete the written survey that you may receive in the mail after your visit with us. Thank you!             Your Updated Medication List - Protect others around you: Learn how to safely use, store and throw away your medicines at www.disposemymeds.org.          This list is accurate as of 7/9/18  4:08 PM.  Always use your most recent med list.                   Brand Name Dispense Instructions for use Diagnosis    atorvastatin 40 MG tablet    LIPITOR     Take 40 mg by mouth daily        clonazePAM 1 MG tablet    klonoPIN     Take 0.5-1 mg by mouth 2 times daily as needed (restless leg)        docusate sodium 100 MG capsule    COLACE     Take 200 mg by mouth 2 times daily        mycophenolate 250 MG capsule    GENERIC EQUIVALENT    180 capsule    Take 3 capsules (750 mg) by mouth 2 times daily    Kidney transplant recipient       NONFORMULARY      Salicylic acid 3% / 5-FU 4% in vanicream : Apply a thin layer to affected area once daily        omeprazole 40 MG capsule    priLOSEC    90 capsule    TAKE 1 CAPSULE  BY MOUTH DAILY    Gastroesophageal reflux disease, esophagitis presence not specified       polyethylene glycol Packet    MIRALAX/GLYCOLAX    14 packet    Take 17 g by mouth daily as needed for constipation    Kidney transplant recipient       prochlorperazine 10 MG tablet    COMPAZINE    30 tablet    Take 1 tablet (10 mg) by mouth 3 times daily    Nausea       senna-docusate 8.6-50 MG per tablet    SENOKOT-S;PERICOLACE    100 tablet    Take 2 tablets by mouth 2 times daily as needed for constipation    Kidney transplant recipient       * tacrolimus 0.5 MG capsule    GENERIC EQUIVALENT    60 capsule    Hold    Kidney transplanted, Elevated serum creatinine       * tacrolimus 1 MG capsule    GENERIC EQUIVALENT    300 capsule    Take 5 capsules (5 mg) by mouth 2 times daily    Kidney transplanted, Elevated serum creatinine       traZODone 150 MG tablet    DESYREL    60 tablet    TAKE 2 TABLETS BY MOUTH NIGHTLY AT BEDTIME    Insomnia, unspecified type       valGANciclovir 450 MG tablet    VALCYTE    60 tablet    Take 1 tablet (450 mg) by mouth daily    Kidney transplant recipient       vitamin D 2000 units tablet      Take 2,000 Units by mouth daily        * Notice:  This list has 2 medication(s) that are the same as other medications prescribed for you. Read the directions carefully, and ask your doctor or other care provider to review them with you.

## 2018-07-10 DIAGNOSIS — Z48.298 AFTERCARE FOLLOWING ORGAN TRANSPLANT: ICD-10-CM

## 2018-07-10 DIAGNOSIS — Z94.0 KIDNEY REPLACED BY TRANSPLANT: ICD-10-CM

## 2018-07-10 PROBLEM — Z76.82 KIDNEY TRANSPLANT CANDIDATE: Status: RESOLVED | Noted: 2018-05-29 | Resolved: 2018-07-10

## 2018-07-10 LAB
ANION GAP SERPL CALCULATED.3IONS-SCNC: 5 MMOL/L (ref 3–14)
BASOPHILS # BLD AUTO: 0.1 10E9/L (ref 0–0.2)
BASOPHILS NFR BLD AUTO: 1.3 %
BUN SERPL-MCNC: 21 MG/DL (ref 7–25)
CALCIUM SERPL-MCNC: 10.4 MG/DL (ref 8.6–10.3)
CHLORIDE SERPL-SCNC: 106 MMOL/L (ref 98–107)
CO2 SERPL-SCNC: 24 MMOL/L (ref 21–31)
CREAT SERPL-MCNC: 1.54 MG/DL (ref 0.7–1.3)
DIFFERENTIAL METHOD BLD: ABNORMAL
EOSINOPHIL # BLD AUTO: 0.1 10E9/L (ref 0–0.7)
EOSINOPHIL NFR BLD AUTO: 1.5 %
ERYTHROCYTE [DISTWIDTH] IN BLOOD BY AUTOMATED COUNT: 16.1 % (ref 10–15)
GFR SERPL CREATININE-BSD FRML MDRD: 48 ML/MIN/1.7M2
GLUCOSE SERPL-MCNC: 99 MG/DL (ref 70–105)
HCT VFR BLD AUTO: 32.7 % (ref 40–53)
HGB BLD-MCNC: 10.7 G/DL (ref 13.3–17.7)
IMM GRANULOCYTES # BLD: 0 10E9/L (ref 0–0.4)
IMM GRANULOCYTES NFR BLD: 0.4 %
LYMPHOCYTES # BLD AUTO: 0.2 10E9/L (ref 0.8–5.3)
LYMPHOCYTES NFR BLD AUTO: 3.5 %
MAGNESIUM SERPL-MCNC: 2 MG/DL (ref 1.9–2.7)
MCH RBC QN AUTO: 30.9 PG (ref 26.5–33)
MCHC RBC AUTO-ENTMCNC: 32.7 G/DL (ref 31.5–36.5)
MCV RBC AUTO: 95 FL (ref 78–100)
MONOCYTES # BLD AUTO: 0.3 10E9/L (ref 0–1.3)
MONOCYTES NFR BLD AUTO: 7.2 %
NEUTROPHILS # BLD AUTO: 3.9 10E9/L (ref 1.6–8.3)
NEUTROPHILS NFR BLD AUTO: 86.1 %
PHOSPHATE SERPL-MCNC: 3.2 MG/DL (ref 2.5–5)
PLATELET # BLD AUTO: 190 10E9/L (ref 150–450)
POTASSIUM SERPL-SCNC: 4.9 MMOL/L (ref 3.5–5.1)
RBC # BLD AUTO: 3.46 10E12/L (ref 4.4–5.9)
SODIUM SERPL-SCNC: 135 MMOL/L (ref 134–144)
WBC # BLD AUTO: 4.6 10E9/L (ref 4–11)

## 2018-07-10 PROCEDURE — 85027 COMPLETE CBC AUTOMATED: CPT | Performed by: SURGERY

## 2018-07-10 PROCEDURE — 80180 DRUG SCRN QUAN MYCOPHENOLATE: CPT | Performed by: SURGERY

## 2018-07-10 PROCEDURE — 85004 AUTOMATED DIFF WBC COUNT: CPT | Performed by: SURGERY

## 2018-07-10 PROCEDURE — 83930 ASSAY OF BLOOD OSMOLALITY: CPT | Performed by: SURGERY

## 2018-07-10 PROCEDURE — 80048 BASIC METABOLIC PNL TOTAL CA: CPT | Performed by: SURGERY

## 2018-07-10 PROCEDURE — 84100 ASSAY OF PHOSPHORUS: CPT | Performed by: SURGERY

## 2018-07-10 PROCEDURE — 36415 COLL VENOUS BLD VENIPUNCTURE: CPT | Performed by: SURGERY

## 2018-07-10 PROCEDURE — 80197 ASSAY OF TACROLIMUS: CPT | Performed by: SURGERY

## 2018-07-10 PROCEDURE — 83735 ASSAY OF MAGNESIUM: CPT | Performed by: SURGERY

## 2018-07-11 DIAGNOSIS — R79.89 ELEVATED SERUM CREATININE: ICD-10-CM

## 2018-07-11 DIAGNOSIS — Z94.0 KIDNEY TRANSPLANTED: Primary | ICD-10-CM

## 2018-07-11 LAB
OSMOLALITY SERPL: 291 MMOL/KG (ref 275–295)
TACROLIMUS BLD-MCNC: 17 UG/L (ref 5–15)
TME LAST DOSE: ABNORMAL H

## 2018-07-11 RX ORDER — TACROLIMUS 0.5 MG/1
0.5 CAPSULE ORAL 2 TIMES DAILY
Qty: 60 CAPSULE | Refills: 11 | Status: SHIPPED | OUTPATIENT
Start: 2018-07-11 | End: 2018-07-23

## 2018-07-11 RX ORDER — TACROLIMUS 1 MG/1
3 CAPSULE ORAL 2 TIMES DAILY
Qty: 180 CAPSULE | Refills: 11 | Status: SHIPPED | OUTPATIENT
Start: 2018-07-11 | End: 2018-07-23

## 2018-07-11 NOTE — TELEPHONE ENCOUNTER
ISSUE:   Tac 17  Current dose: 5 mg BID    PLAN:  Call placed to patient. Reports he does get a little shaky after taking meds. Reports slight headache. Reports dizziness occasionally, this seems to improve if he drinks more fluids.   Denies taking his meds prior to lab draw.   Instructed patient to DECREASE tacrolimus to 3.5 mg BID.     LPN TASK:   Update tacrolimus rx to 3.5 mg twice daily

## 2018-07-12 DIAGNOSIS — Z48.298 AFTERCARE FOLLOWING ORGAN TRANSPLANT: ICD-10-CM

## 2018-07-12 DIAGNOSIS — Z94.0 KIDNEY REPLACED BY TRANSPLANT: ICD-10-CM

## 2018-07-12 LAB
ANION GAP SERPL CALCULATED.3IONS-SCNC: 7 MMOL/L (ref 3–14)
BUN SERPL-MCNC: 23 MG/DL (ref 7–25)
CALCIUM SERPL-MCNC: 9.8 MG/DL (ref 8.6–10.3)
CHLORIDE SERPL-SCNC: 107 MMOL/L (ref 98–107)
CO2 SERPL-SCNC: 20 MMOL/L (ref 21–31)
CREAT SERPL-MCNC: 1.66 MG/DL (ref 0.7–1.3)
ERYTHROCYTE [DISTWIDTH] IN BLOOD BY AUTOMATED COUNT: 16.3 % (ref 10–15)
GFR SERPL CREATININE-BSD FRML MDRD: 44 ML/MIN/1.7M2
GLUCOSE SERPL-MCNC: 104 MG/DL (ref 70–105)
HCT VFR BLD AUTO: 31 % (ref 40–53)
HGB BLD-MCNC: 10.3 G/DL (ref 13.3–17.7)
MCH RBC QN AUTO: 31.6 PG (ref 26.5–33)
MCHC RBC AUTO-ENTMCNC: 33.2 G/DL (ref 31.5–36.5)
MCV RBC AUTO: 95 FL (ref 78–100)
MYCOPHENOLATE SERPL LC/MS/MS-MCNC: 3.65 MG/L (ref 1–3.5)
MYCOPHENOLATE-G SERPL LC/MS/MS-MCNC: 114.7 MG/L (ref 30–95)
PLATELET # BLD AUTO: 176 10E9/L (ref 150–450)
POTASSIUM SERPL-SCNC: 5 MMOL/L (ref 3.5–5.1)
RBC # BLD AUTO: 3.26 10E12/L (ref 4.4–5.9)
SODIUM SERPL-SCNC: 134 MMOL/L (ref 134–144)
TME LAST DOSE: ABNORMAL H
WBC # BLD AUTO: 3.8 10E9/L (ref 4–11)

## 2018-07-12 PROCEDURE — 80048 BASIC METABOLIC PNL TOTAL CA: CPT | Performed by: SURGERY

## 2018-07-12 PROCEDURE — 80197 ASSAY OF TACROLIMUS: CPT | Performed by: SURGERY

## 2018-07-12 PROCEDURE — 85027 COMPLETE CBC AUTOMATED: CPT | Performed by: SURGERY

## 2018-07-12 PROCEDURE — 36415 COLL VENOUS BLD VENIPUNCTURE: CPT | Performed by: SURGERY

## 2018-07-13 ENCOUNTER — HOSPITAL ENCOUNTER (OUTPATIENT)
Dept: INFUSION THERAPY | Facility: OTHER | Age: 48
Discharge: HOME | End: 2018-07-13
Attending: INTERNAL MEDICINE | Admitting: INTERNAL MEDICINE
Payer: MEDICARE

## 2018-07-13 ENCOUNTER — TELEPHONE (OUTPATIENT)
Dept: TRANSPLANT | Facility: CLINIC | Age: 48
End: 2018-07-13

## 2018-07-13 VITALS — DIASTOLIC BLOOD PRESSURE: 61 MMHG | TEMPERATURE: 98 F | RESPIRATION RATE: 16 BRPM | SYSTOLIC BLOOD PRESSURE: 92 MMHG

## 2018-07-13 DIAGNOSIS — E86.0 DEHYDRATION: ICD-10-CM

## 2018-07-13 DIAGNOSIS — E86.0 DEHYDRATION: Primary | ICD-10-CM

## 2018-07-13 DIAGNOSIS — Z94.0 KIDNEY TRANSPLANTED: ICD-10-CM

## 2018-07-13 LAB
TACROLIMUS BLD-MCNC: 13.1 UG/L (ref 5–15)
TME LAST DOSE: NORMAL H

## 2018-07-13 PROCEDURE — 25000128 H RX IP 250 OP 636: Performed by: INTERNAL MEDICINE

## 2018-07-13 PROCEDURE — 96360 HYDRATION IV INFUSION INIT: CPT

## 2018-07-13 RX ADMIN — Medication 1000 ML: at 14:40

## 2018-07-13 NOTE — PROGRESS NOTES
Infusion Nursing Note:  Gilles Henning III presents today for fluids ordered from [facility] transplant team due to elevated creatinine.    Patient seen by provider today: No   present during visit today: Not Applicable.    Note: N/A.    Intravenous Access:  Peripheral IV placed right forearm    Treatment Conditions:  Not Applicable.      Post Infusion Assessment:  Site patent and intact, free from redness, edema or discomfort.  No evidence of extravasations.  Access discontinued per protocol.    Discharge Plan:   Patient discharged in stable condition accompanied by: self.    Amy Melchor RN

## 2018-07-13 NOTE — TELEPHONE ENCOUNTER
ISSUE:   Cr 1.66  Tac 13.1 (dose decreased 7/11)    PLAN:  Call placed to patient. He reports still voiding 5-6x/night, large voids at a time. He reports drinking 4-5L/day, however the weather has been hot and he states he has been sweating more. Only has a small AC unit in his house. Reports feeling dizzy often in the mornings. /77, p 71, weight stable.     Spoke to Dr Telles. Will give 1L IVF today. Pt in agreement with plan.   Discussed possible kidney biopsy, pt states he does not have the means to get to [place] right now. Will discuss again next week.     No tac dose change at this time, will f/u on Monday labs.

## 2018-07-16 ENCOUNTER — RESULTS ONLY (OUTPATIENT)
Dept: OTHER | Facility: CLINIC | Age: 48
End: 2018-07-16

## 2018-07-16 DIAGNOSIS — Z48.298 AFTERCARE FOLLOWING ORGAN TRANSPLANT: ICD-10-CM

## 2018-07-16 DIAGNOSIS — E87.1 HYPONATREMIA: ICD-10-CM

## 2018-07-16 DIAGNOSIS — Z94.0 KIDNEY TRANSPLANT RECIPIENT: ICD-10-CM

## 2018-07-16 DIAGNOSIS — Z94.0 KIDNEY REPLACED BY TRANSPLANT: ICD-10-CM

## 2018-07-16 LAB
ANION GAP SERPL CALCULATED.3IONS-SCNC: 3 MMOL/L (ref 3–14)
BASOPHILS # BLD AUTO: 0 10E9/L (ref 0–0.2)
BASOPHILS NFR BLD AUTO: 1 %
BUN SERPL-MCNC: 18 MG/DL (ref 7–25)
CALCIUM SERPL-MCNC: 10.1 MG/DL (ref 8.6–10.3)
CHLORIDE SERPL-SCNC: 103 MMOL/L (ref 98–107)
CO2 SERPL-SCNC: 21 MMOL/L (ref 21–31)
CREAT SERPL-MCNC: 1.55 MG/DL (ref 0.7–1.3)
DIFFERENTIAL METHOD BLD: ABNORMAL
EOSINOPHIL # BLD AUTO: 0 10E9/L (ref 0–0.7)
EOSINOPHIL NFR BLD AUTO: 0.5 %
ERYTHROCYTE [DISTWIDTH] IN BLOOD BY AUTOMATED COUNT: 15.5 % (ref 10–15)
GFR SERPL CREATININE-BSD FRML MDRD: 48 ML/MIN/1.7M2
GLUCOSE SERPL-MCNC: 117 MG/DL (ref 70–105)
HCT VFR BLD AUTO: 31 % (ref 40–53)
HGB BLD-MCNC: 10.3 G/DL (ref 13.3–17.7)
IMM GRANULOCYTES # BLD: 0 10E9/L (ref 0–0.4)
IMM GRANULOCYTES NFR BLD: 0.5 %
LYMPHOCYTES # BLD AUTO: 0.3 10E9/L (ref 0.8–5.3)
LYMPHOCYTES NFR BLD AUTO: 7.1 %
MAGNESIUM SERPL-MCNC: 1.8 MG/DL (ref 1.9–2.7)
MCH RBC QN AUTO: 31.4 PG (ref 26.5–33)
MCHC RBC AUTO-ENTMCNC: 33.2 G/DL (ref 31.5–36.5)
MCV RBC AUTO: 95 FL (ref 78–100)
MONOCYTES # BLD AUTO: 0.4 10E9/L (ref 0–1.3)
MONOCYTES NFR BLD AUTO: 11 %
NEUTROPHILS # BLD AUTO: 3.1 10E9/L (ref 1.6–8.3)
NEUTROPHILS NFR BLD AUTO: 79.9 %
PHOSPHATE SERPL-MCNC: 2.4 MG/DL (ref 2.5–5)
PLATELET # BLD AUTO: 169 10E9/L (ref 150–450)
POTASSIUM SERPL-SCNC: 5.1 MMOL/L (ref 3.5–5.1)
POTASSIUM UR-SCNC: 27 MMOL/L
RBC # BLD AUTO: 3.28 10E12/L (ref 4.4–5.9)
SODIUM SERPL-SCNC: 127 MMOL/L (ref 134–144)
SODIUM UR-SCNC: 57 MMOL/L
WBC # BLD AUTO: 3.8 10E9/L (ref 4–11)

## 2018-07-16 PROCEDURE — 80048 BASIC METABOLIC PNL TOTAL CA: CPT | Performed by: SURGERY

## 2018-07-16 PROCEDURE — 85004 AUTOMATED DIFF WBC COUNT: CPT | Performed by: SURGERY

## 2018-07-16 PROCEDURE — 83735 ASSAY OF MAGNESIUM: CPT | Performed by: SURGERY

## 2018-07-16 PROCEDURE — 86828 HLA CLASS I&II ANTIBODY QUAL: CPT | Mod: 91,XU | Performed by: PHYSICIAN ASSISTANT

## 2018-07-16 PROCEDURE — 86832 HLA CLASS I HIGH DEFIN QUAL: CPT | Performed by: PHYSICIAN ASSISTANT

## 2018-07-16 PROCEDURE — 80180 DRUG SCRN QUAN MYCOPHENOLATE: CPT | Performed by: SURGERY

## 2018-07-16 PROCEDURE — 84300 ASSAY OF URINE SODIUM: CPT | Performed by: INTERNAL MEDICINE

## 2018-07-16 PROCEDURE — 36415 COLL VENOUS BLD VENIPUNCTURE: CPT | Performed by: SURGERY

## 2018-07-16 PROCEDURE — 84133 ASSAY OF URINE POTASSIUM: CPT | Performed by: INTERNAL MEDICINE

## 2018-07-16 PROCEDURE — 84100 ASSAY OF PHOSPHORUS: CPT | Performed by: SURGERY

## 2018-07-16 PROCEDURE — 86833 HLA CLASS II HIGH DEFIN QUAL: CPT | Performed by: PHYSICIAN ASSISTANT

## 2018-07-16 PROCEDURE — 85027 COMPLETE CBC AUTOMATED: CPT | Performed by: SURGERY

## 2018-07-16 PROCEDURE — 83935 ASSAY OF URINE OSMOLALITY: CPT | Performed by: INTERNAL MEDICINE

## 2018-07-17 ENCOUNTER — TELEPHONE (OUTPATIENT)
Dept: TRANSPLANT | Facility: CLINIC | Age: 48
End: 2018-07-17

## 2018-07-17 DIAGNOSIS — Z94.0 KIDNEY TRANSPLANTED: Primary | ICD-10-CM

## 2018-07-17 LAB
OSMOLALITY UR: 299 MMOL/KG (ref 100–1200)
PRA DONOR SPECIFIC ABY: NORMAL

## 2018-07-17 NOTE — TELEPHONE ENCOUNTER
Post Kidney and Pancreas Transplant Team Conference  Date: 7/17/2018  Transplant Coordinator: Gia Romero     Attendees:    [x]  Dr. Macias  [] Thao Barnard, DONALD  [x] Blossom Montes LPN    [x]  Dr. Donato  [x] Martha Morris RN  [] Noelle Wagner LPN  [x]  Dr. Telles   [] Beth Evans, RN  []  Dr. Cabrera   [] Katelyn Gallegos RN  [] Dr. Vallejo  [] Maliha Alonso RN  [] Dr. Lorenzo   [] Mahad Steele RN  [] Dr. Finley [] Mildred Barajas, DONALD  [] Surgery Fellow [x] Gia Romero RN  [] Rachel Resendez NP    Verbal Plan Read Back:   IVF 2X weekly.  Discuss plan with DR. Vallejo.  Contact social workers regarding funds for biopsy.    Routed to RN Coordinator   Blossom Montes     Order placed for 2x/week IVF. Call placed to patient, explained plan to give IVF and monitor creatinine. Pt will schedule IVF appointments. Message sent to  to see if there is an financial/transporation assistance for patient to get [facility]. Updated Dr Vallejo of plan.     Pt also notes he was feeling under the weather over the weekend, fatigue, cough. Also reports he has night sweats off and on. Pt is EBV discordant, will check EBV

## 2018-07-18 ENCOUNTER — HOSPITAL ENCOUNTER (OUTPATIENT)
Dept: INFUSION THERAPY | Facility: OTHER | Age: 48
Discharge: HOME | End: 2018-07-18
Attending: INTERNAL MEDICINE | Admitting: INTERNAL MEDICINE
Payer: MEDICARE

## 2018-07-18 ENCOUNTER — TELEPHONE (OUTPATIENT)
Dept: TRANSPLANT | Facility: CLINIC | Age: 48
End: 2018-07-18

## 2018-07-18 VITALS — DIASTOLIC BLOOD PRESSURE: 58 MMHG | RESPIRATION RATE: 16 BRPM | SYSTOLIC BLOOD PRESSURE: 91 MMHG

## 2018-07-18 DIAGNOSIS — Z94.0 KIDNEY TRANSPLANTED: Primary | ICD-10-CM

## 2018-07-18 DIAGNOSIS — E86.0 DEHYDRATION: ICD-10-CM

## 2018-07-18 DIAGNOSIS — Z94.0 KIDNEY TRANSPLANTED: ICD-10-CM

## 2018-07-18 LAB
B8: 650
DONOR IDENTIFICATION: NORMAL
DSA COMMENTS: NORMAL
DSA PRESENT: YES
DSA TEST METHOD: NORMAL
INTERFERING SUBST TEST METHOD: NORMAL
INTERFERING SUBST TEST METHOD: NORMAL
INTERFERING SUBSTANCE COMMENT: NORMAL
INTERFERING SUBSTANCE COMMENT: NORMAL
INTERFERING SUBSTANCE RESULT: NORMAL
INTERFERING SUBSTANCE RESULT: NORMAL
INTERFERING SUBSTANCE: NORMAL
INTERFERING SUBSTANCE: NORMAL
MYCOPHENOLATE SERPL LC/MS/MS-MCNC: 3.28 MG/L (ref 1–3.5)
MYCOPHENOLATE-G SERPL LC/MS/MS-MCNC: 102.1 MG/L (ref 30–95)
ORGAN: NORMAL
SA1 CELL: NORMAL
SA1 COMMENTS: NORMAL
SA1 HI RISK ABY: NORMAL
SA1 MOD RISK ABY: NORMAL
SA1 TEST METHOD: NORMAL
SA2 CELL: NORMAL
SA2 COMMENTS: NORMAL
SA2 HI RISK ABY UA: NORMAL
SA2 MOD RISK ABY: NORMAL
SA2 TEST METHOD: NORMAL
TME LAST DOSE: ABNORMAL H

## 2018-07-18 PROCEDURE — 96360 HYDRATION IV INFUSION INIT: CPT

## 2018-07-18 PROCEDURE — 25000128 H RX IP 250 OP 636: Performed by: INTERNAL MEDICINE

## 2018-07-18 RX ADMIN — SODIUM CHLORIDE 1000 ML: 900 INJECTION, SOLUTION INTRAVENOUS at 09:06

## 2018-07-18 NOTE — TELEPHONE ENCOUNTER
ISSUE:   New DSA B8 650    PLAN:  In the light of elevated creatinine along with new DSA, Dr Telles and Dr Vallejo suggest kidney biopsy  [facility] see [facility]

## 2018-07-18 NOTE — PROGRESS NOTES
Infusion Nursing Note:  Gilles Henning III presents today for Fluids.    Patient seen by provider today: No   present during visit today: Not Applicable.    Note: patient is being followed by transplant team with labs and now fluids two times weekly..    Intravenous Access:  Peripheral IV placed.    Treatment Conditions:  Not Applicable.      Post Infusion Assessment:  Patient tolerated infusion without incident.  Blood return noted pre and post infusion.  Site patent and intact, free from redness, edema or discomfort.  No evidence of extravasations.  Access discontinued per protocol.    Discharge Plan:   Patient discharged in stable condition accompanied by: self.  Departure Mode: Ambulatory.    Jean S. Hammann, RN

## 2018-07-19 ENCOUNTER — TELEPHONE (OUTPATIENT)
Dept: TRANSPLANT | Facility: CLINIC | Age: 48
End: 2018-07-19

## 2018-07-19 DIAGNOSIS — T86.10 COMPLICATIONS, KIDNEY TRANSPLANT: Primary | ICD-10-CM

## 2018-07-19 DIAGNOSIS — Z94.0 KIDNEY REPLACED BY TRANSPLANT: ICD-10-CM

## 2018-07-19 DIAGNOSIS — Z48.298 AFTERCARE FOLLOWING ORGAN TRANSPLANT: ICD-10-CM

## 2018-07-19 LAB
ANION GAP SERPL CALCULATED.3IONS-SCNC: 6 MMOL/L (ref 3–14)
BUN SERPL-MCNC: 15 MG/DL (ref 7–25)
CALCIUM SERPL-MCNC: 9.3 MG/DL (ref 8.6–10.3)
CHLORIDE SERPL-SCNC: 103 MMOL/L (ref 98–107)
CO2 SERPL-SCNC: 21 MMOL/L (ref 21–31)
CREAT SERPL-MCNC: 1.71 MG/DL (ref 0.7–1.3)
ERYTHROCYTE [DISTWIDTH] IN BLOOD BY AUTOMATED COUNT: 15.1 % (ref 10–15)
GFR SERPL CREATININE-BSD FRML MDRD: 43 ML/MIN/1.7M2
GLUCOSE SERPL-MCNC: 101 MG/DL (ref 70–105)
HCT VFR BLD AUTO: 29.9 % (ref 40–53)
HGB BLD-MCNC: 9.9 G/DL (ref 13.3–17.7)
MCH RBC QN AUTO: 31.3 PG (ref 26.5–33)
MCHC RBC AUTO-ENTMCNC: 33.1 G/DL (ref 31.5–36.5)
MCV RBC AUTO: 95 FL (ref 78–100)
PLATELET # BLD AUTO: 176 10E9/L (ref 150–450)
POTASSIUM SERPL-SCNC: 5.3 MMOL/L (ref 3.5–5.1)
RBC # BLD AUTO: 3.16 10E12/L (ref 4.4–5.9)
SODIUM SERPL-SCNC: 130 MMOL/L (ref 134–144)
WBC # BLD AUTO: 2.8 10E9/L (ref 4–11)

## 2018-07-19 PROCEDURE — 80048 BASIC METABOLIC PNL TOTAL CA: CPT | Performed by: SURGERY

## 2018-07-19 PROCEDURE — 87799 DETECT AGENT NOS DNA QUANT: CPT | Performed by: SURGERY

## 2018-07-19 PROCEDURE — 36415 COLL VENOUS BLD VENIPUNCTURE: CPT | Performed by: SURGERY

## 2018-07-19 PROCEDURE — 80197 ASSAY OF TACROLIMUS: CPT | Performed by: SURGERY

## 2018-07-19 PROCEDURE — 85027 COMPLETE CBC AUTOMATED: CPT | Performed by: SURGERY

## 2018-07-19 NOTE — TELEPHONE ENCOUNTER
Transplant Social Work Services     Data: Financial resources for hotel stay  Intervention: Received in-basket message from transplant coordinator that patient is in need of a kidney biopsy but cannot afford to stay overnight. Offered one time use of transplant funds to pay for stay at [facility]. Biopsy is scheduled for 7/23. [facility]. Updated transplant coordinator.  Assessment: Patient has limited funds but unfortunately does not qualify for [facility] assistance.   Education provided by SW: One time use of transplant funds.   Plan: Transplant coordinator to up date patient with hotel room information. Confirmation # is [id number]    Maranda Watts [facility]    Kidney/Pancreas/[facility] Transplant Programs

## 2018-07-19 NOTE — TELEPHONE ENCOUNTER
LPN/MA  Renal Biopsy Communication    Call to pt to confirm renal biopsy procedure. Biopsy orders entered and patient aware of date 7/23/2018, in [facility] and to arrive at 6:00AM.     No need to be NPO.      Discussed anticoagulants (i.e. fish oil, ASA, Plavix, Coumadin, Ibuprofen). Pt denies use of anticoagulants.     Take all medicine before arrival for biopsy and bring all medicine bottles with.      Report to 1st floor lab, then 5th floor for biopsy.      Use [employer] services and bring form of entertainment.     Discussed with patient need to stay overnight locally evening of procedure if live more than 70 miles away.    Call placed to scheduling at [phone] to schedule and confirm biopsy date/time    Lab appointment scheduled for morning of biopsy.

## 2018-07-19 NOTE — TELEPHONE ENCOUNTER
Transplant Coordinator Renal Biopsy Communication    Call placed to Gilles Henning III to discuss indication for kidney transplant biopsy per Dr. Telles and Dr. Vallejo     Indication for transplant renal biopsy: New DSA, increased serum creatinine   Laterality: right  Date of biopsy: 7/23/18    Patient location within 70 miles of [facility]: No.  If no, must stay overnight locally. Pt verbalizes staying locally.     Gilles Henning III's medication list was reviewed.   Anticoagulant: none   Ibuprofen: No.  Fish Oil:  No.  Medications held: None    Recent blood pressure readings are WNL or not applicable. Instructed to take medication, especially blood pressure medications, before arriving to the [facility] and [facility] at 0600 day of procedure.     Procedure expectations and duration of stay discussed. Expressed pt can expect a phone call from LPN/MA to confirm biopsy date/time/location/directions/review of medications. Pt has no additional questions at this time. Transplant Office phone number given to pt for future questions.      Katelyn Gutierrez  Kidney/Pancreas Transplant Coordinator  [phone] option 5

## 2018-07-20 ENCOUNTER — HOSPITAL ENCOUNTER (OUTPATIENT)
Dept: INFUSION THERAPY | Facility: OTHER | Age: 48
Discharge: HOME | End: 2018-07-20
Attending: INTERNAL MEDICINE | Admitting: INTERNAL MEDICINE
Payer: MEDICARE

## 2018-07-20 VITALS — TEMPERATURE: 95.9 F | DIASTOLIC BLOOD PRESSURE: 56 MMHG | SYSTOLIC BLOOD PRESSURE: 99 MMHG | RESPIRATION RATE: 16 BRPM

## 2018-07-20 DIAGNOSIS — Z94.0 KIDNEY TRANSPLANTED: Primary | ICD-10-CM

## 2018-07-20 DIAGNOSIS — Z94.0 KIDNEY TRANSPLANTED: ICD-10-CM

## 2018-07-20 DIAGNOSIS — R79.89 ELEVATED SERUM CREATININE: ICD-10-CM

## 2018-07-20 DIAGNOSIS — E86.0 DEHYDRATION: ICD-10-CM

## 2018-07-20 LAB
BKV DNA # SPEC NAA+PROBE: NORMAL COPIES/ML
BKV DNA SPEC NAA+PROBE-LOG#: NORMAL LOG COPIES/ML
SPECIMEN SOURCE: NORMAL
TACROLIMUS BLD-MCNC: 12.6 UG/L (ref 5–15)
TME LAST DOSE: NORMAL H

## 2018-07-20 PROCEDURE — 25000128 H RX IP 250 OP 636: Performed by: INTERNAL MEDICINE

## 2018-07-20 PROCEDURE — 96360 HYDRATION IV INFUSION INIT: CPT

## 2018-07-20 RX ADMIN — SODIUM CHLORIDE 1000 ML: 900 INJECTION, SOLUTION INTRAVENOUS at 08:25

## 2018-07-20 NOTE — TELEPHONE ENCOUNTER
Issue:    Tac level high at 12.6, goal is 8-10.    Plan/LPN task:    Confirm 12 hour level.  If it was, decrease tac dose from 3.5 mg bid to 3 mg bid. Thanks.

## 2018-07-20 NOTE — PROGRESS NOTES
Infusion Nursing Note:  Gilles Henning III presents today for IV Fluid bolus.    Patient seen by provider today: No   present during visit today: Not Applicable.    Note: N/A.    Intravenous Access:  Peripheral IV placed.    Treatment Conditions:  Not Applicable.      Post Infusion Assessment:  Patient tolerated infusion without incident.  Blood return noted pre and post infusion.  Site patent and intact, free from redness, edema or discomfort.  No evidence of extravasations.  Access discontinued per protocol.    Discharge Plan:   Patient discharged in stable condition accompanied by: self.  Patient will call next week to set up next visit as he goes to the [facility] on Monday, 7/23/18.  Departure Mode: Ambulatory.    Brenda J. Goodell, RN

## 2018-07-22 DIAGNOSIS — G47.00 INSOMNIA, UNSPECIFIED TYPE: ICD-10-CM

## 2018-07-23 ENCOUNTER — RESULTS ONLY (OUTPATIENT)
Dept: OTHER | Facility: CLINIC | Age: 48
End: 2018-07-23

## 2018-07-23 ENCOUNTER — SURGERY (OUTPATIENT)
Age: 48
End: 2018-07-23

## 2018-07-23 ENCOUNTER — HOSPITAL ENCOUNTER (OUTPATIENT)
Facility: AMBULATORY SURGERY CENTER | Age: 48
End: 2018-07-23
Attending: INTERNAL MEDICINE
Payer: COMMERCIAL

## 2018-07-23 ENCOUNTER — RADIANT APPOINTMENT (OUTPATIENT)
Dept: RADIOLOGY | Facility: AMBULATORY SURGERY CENTER | Age: 48
End: 2018-07-23
Attending: INTERNAL MEDICINE
Payer: COMMERCIAL

## 2018-07-23 VITALS
TEMPERATURE: 98 F | OXYGEN SATURATION: 98 % | RESPIRATION RATE: 20 BRPM | HEART RATE: 72 BPM | DIASTOLIC BLOOD PRESSURE: 64 MMHG | SYSTOLIC BLOOD PRESSURE: 122 MMHG

## 2018-07-23 DIAGNOSIS — T86.10 COMPLICATIONS, KIDNEY TRANSPLANT: ICD-10-CM

## 2018-07-23 DIAGNOSIS — G90.3 NEUROGENIC ORTHOSTATIC HYPOTENSION (H): Primary | ICD-10-CM

## 2018-07-23 DIAGNOSIS — Z94.0 KIDNEY REPLACED BY TRANSPLANT: ICD-10-CM

## 2018-07-23 DIAGNOSIS — Z48.298 AFTERCARE FOLLOWING ORGAN TRANSPLANT: ICD-10-CM

## 2018-07-23 DIAGNOSIS — Z94.0 KIDNEY TRANSPLANTED: ICD-10-CM

## 2018-07-23 LAB
ABO + RH BLD: NORMAL
ABO + RH BLD: NORMAL
ALBUMIN UR-MCNC: NEGATIVE MG/DL
ANION GAP SERPL CALCULATED.3IONS-SCNC: 9 MMOL/L (ref 3–14)
APPEARANCE UR: CLEAR
BASOPHILS # BLD AUTO: 0.1 10E9/L (ref 0–0.2)
BASOPHILS NFR BLD AUTO: 1.1 %
BILIRUB UR QL STRIP: NEGATIVE
BLD GP AB SCN SERPL QL: NORMAL
BLOOD BANK CMNT PATIENT-IMP: NORMAL
BUN SERPL-MCNC: 15 MG/DL (ref 7–30)
CALCIUM SERPL-MCNC: 9.4 MG/DL (ref 8.5–10.1)
CHLORIDE SERPL-SCNC: 107 MMOL/L (ref 94–109)
CO2 SERPL-SCNC: 21 MMOL/L (ref 20–32)
COLOR UR AUTO: ABNORMAL
CREAT SERPL-MCNC: 1.93 MG/DL (ref 0.66–1.25)
CREAT UR-MCNC: 31 MG/DL
DIFFERENTIAL METHOD BLD: ABNORMAL
EOSINOPHIL # BLD AUTO: 0.1 10E9/L (ref 0–0.7)
EOSINOPHIL NFR BLD AUTO: 1.3 %
ERYTHROCYTE [DISTWIDTH] IN BLOOD BY AUTOMATED COUNT: 15.2 % (ref 10–15)
GFR SERPL CREATININE-BSD FRML MDRD: 37 ML/MIN/1.7M2
GLUCOSE SERPL-MCNC: 106 MG/DL (ref 70–99)
GLUCOSE UR STRIP-MCNC: NEGATIVE MG/DL
HCT VFR BLD AUTO: 31.3 % (ref 40–53)
HGB BLD-MCNC: 10.1 G/DL (ref 13.3–17.7)
HGB BLD-MCNC: 10.3 G/DL (ref 13.3–17.7)
HGB UR QL STRIP: NEGATIVE
IMM GRANULOCYTES # BLD: 0 10E9/L (ref 0–0.4)
IMM GRANULOCYTES NFR BLD: 0.4 %
INR PPP: 1.1 (ref 0.86–1.14)
KETONES UR STRIP-MCNC: NEGATIVE MG/DL
LEUKOCYTE ESTERASE UR QL STRIP: NEGATIVE
LYMPHOCYTES # BLD AUTO: 0.2 10E9/L (ref 0.8–5.3)
LYMPHOCYTES NFR BLD AUTO: 5.2 %
MAGNESIUM SERPL-MCNC: 1.8 MG/DL (ref 1.6–2.3)
MCH RBC QN AUTO: 31.6 PG (ref 26.5–33)
MCHC RBC AUTO-ENTMCNC: 32.9 G/DL (ref 31.5–36.5)
MCV RBC AUTO: 96 FL (ref 78–100)
MICROALBUMIN UR-MCNC: <5 MG/L
MICROALBUMIN/CREAT UR: NORMAL MG/G CR (ref 0–17)
MONOCYTES # BLD AUTO: 0.1 10E9/L (ref 0–1.3)
MONOCYTES NFR BLD AUTO: 2.2 %
MUCOUS THREADS #/AREA URNS LPF: PRESENT /LPF
NEUTROPHILS # BLD AUTO: 4 10E9/L (ref 1.6–8.3)
NEUTROPHILS NFR BLD AUTO: 89.8 %
NITRATE UR QL: NEGATIVE
NRBC # BLD AUTO: 0 10*3/UL
NRBC BLD AUTO-RTO: 0 /100
PH UR STRIP: 7 PH (ref 5–7)
PHOSPHATE SERPL-MCNC: 3.4 MG/DL (ref 2.5–4.5)
PLATELET # BLD AUTO: 194 10E9/L (ref 150–450)
POTASSIUM SERPL-SCNC: 4.8 MMOL/L (ref 3.4–5.3)
PROT UR-MCNC: <0.05 G/L
PROT/CREAT 24H UR: NORMAL G/G CR (ref 0–0.2)
RBC # BLD AUTO: 3.26 10E12/L (ref 4.4–5.9)
RBC #/AREA URNS AUTO: 0 /HPF (ref 0–2)
SODIUM SERPL-SCNC: 137 MMOL/L (ref 133–144)
SOURCE: ABNORMAL
SP GR UR STRIP: 1 (ref 1–1.03)
SPECIMEN EXP DATE BLD: NORMAL
UROBILINOGEN UR STRIP-MCNC: 0 MG/DL (ref 0–2)
WBC # BLD AUTO: 4.5 10E9/L (ref 4–11)
WBC #/AREA URNS AUTO: 1 /HPF (ref 0–5)

## 2018-07-23 RX ORDER — FLUDROCORTISONE ACETATE 0.1 MG/1
0.1 TABLET ORAL DAILY
Qty: 30 TABLET | Refills: 3 | Status: ON HOLD | OUTPATIENT
Start: 2018-07-23 | End: 2018-08-13

## 2018-07-23 RX ORDER — TACROLIMUS 0.5 MG/1
CAPSULE ORAL
Qty: 60 CAPSULE | Refills: 11 | Status: ON HOLD
Start: 2018-07-23 | End: 2018-08-13

## 2018-07-23 RX ORDER — TACROLIMUS 1 MG/1
3 CAPSULE ORAL 2 TIMES DAILY
Qty: 180 CAPSULE | Refills: 11 | Status: ON HOLD | OUTPATIENT
Start: 2018-07-23 | End: 2018-08-13

## 2018-07-23 RX ADMIN — Medication 9 ML: at 09:02

## 2018-07-23 NOTE — IP AVS SNAPSHOT
[facility]    [address] [place] [address]    Phone:  [phone]    Fax:  [phone]                                       After Visit Summary   7/23/2018    Gilles Henning III    MRN: [id number]           After Visit Summary Signature Page     I have received my discharge instructions, and my questions have been answered. I have discussed any challenges I see with this plan with the nurse or doctor.    ..........................................................................................................................................  Patient/Patient Representative Signature      ..........................................................................................................................................  Patient Representative Print Name and Relationship to Patient    ..................................................               ................................................  Date                                            Time    ..........................................................................................................................................  Reviewed by Signature/Title    ...................................................              ..............................................  Date                                                            Time

## 2018-07-23 NOTE — IP AVS SNAPSHOT
MRN:[id number]                      After Visit Summary   7/23/2018    Gilles Henning III    MRN: [id number]           Thank you!     Thank you for choosing [facility] for your care. Our goal is always to provide you with excellent care. Hearing back from our patients is one way we can continue to improve our services. Please take a few minutes to complete the written survey that you may receive in the mail after you visit with us. Thank you!        Patient Information     Date Of Birth          1970        About your hospital stay     You were admitted on:  July 23, 2018 [facility]    You were discharged on:  July 23, 2018       Who to Call     For medical emergencies, please call 911.  For non-urgent questions about your medical care, please call your primary care provider or clinic, [phone]  For questions related to your surgery, please call your surgery clinic        Attending Provider     Provider Star Esteban MD Nephrology       Primary Care Provider Office Phone # Fax #    Charlie Darrion Dubose [phone]      After Care Instructions     Discharge Instructions       ACTIVITY:  NO heavy lifting (weight greater than 10 pounds) for 1 week. NO strenuous activity for 24 hours; relax and take it easy.     DIET:  Resume your regular diet and drink plenty of fluids UNLESS you are fluid restricted.    DRAINAGE: There should be minimal drainage from the biopsy site. If bleeding soaks the dressing, you should lie down and apply pressure to the site for a minimum of 10 minutes.  If the bleeding persists, call the emergency phone numbers below.  Whether bleeding persists or not, you should report the bleeding to one of the numbers below.     DRESSING: Keep the dressing dry and in place for 24 hours to prevent the site from reopening and bleeding.    SEDATION: If you received sedation  medications, DO NOT drive or operate heavy machinery, DO NOT drink alcoholic beverages, or DO NOT make important legal decisions.    NOTIFY PROVIDER for the following signs/symptoms:  Excessive bleeding or drainage.  Excessive swelling, redness, or tenderness at the site.  Fever above 101.5* F  Severe Pain.  Drainage that is green, yellow,  thick white or has a bad odor.  Passage of bloody urine or clots after you are discharged.    EMERGENCY CONTACT NUMBERS:  Emergency Department: [phone]  Transplant Center: [phone]  7:00 AM-6:30 PM            Discharge Instructions       No aspirin products or NSAIDs for 7 days after kidney biopsy. Provider will determine when to start (warfarin)  Coumadin, (clopidogrel) Plavix or other blood thinner if appropriate                  Your next 10 appointments already scheduled     Aug 09, 2018  3:10 PM CDT   (Arrive by 2:40 PM)   Return Kidney Transplant with Uc Kidney/Pancreas Recipient   [facility] ([facility])    [address]  [address]  [facility] [address]   [phone]            Oct 11, 2018  1:30 PM CDT   (Arrive by 1:00 PM)   Return Kidney Transplant with Uc Kidney/Pancreas Recipient   [facility] ([facility])    [address]  [address]  [facility] [place] [facility] [address]   [phone]            Dec 10, 2018  1:30 PM CST   (Arrive by 1:00 PM)   Return Kidney Transplant with Uc Kidney/Pancreas Recipient   [facility] ([facility])    [address]  [address]  [facility] [place] [facility] [address]   [phone]              Further instructions from your care team       [facility]  Home Care Following Kidney Biopsy  ACTIVITY: NO heavy lifting (weight greater than 10 pounds) for 1 week. NO strenuous activity for 24 hours; relax and take it easy.     DIET: Resume your regular diet and drink plenty of fluids UNLESS you are  fluid restricted.    DRAINAGE: There should be minimal drainage from the biopsy site. If bleeding soaks the dressing, you should lie down and apply pressure to the site for a minimum of 10 minutes. Call one of the numbers below if experienced bleeding that soaked the dressing.     DRESSING: Keep the dressing in place for 24 hours to prevent the site from re-opening and bleeding.     SEDATION: IF you received sedation medications, DO NOT drive or operate heavy machinery, DO NOT drink alcoholic beverages, and DO NOT make important legal decisions.    CALL ONE OF THE NUMBERS BELOW IF YOU EXPERIENCE ANY ONE OF THE FOLLOWING:      Excessive bleeding or drainage    Excessive swelling, redness, or tenderness at the site    Fever above 100.5 F, orally    Severe pain    Drainage that is green, yellow, thick white, or has a bad odor    If you havebloody urine or see bloody clots in your urine     Emergency Department: [phone] (open 24 hours)  Transplant Center: [phone] (open 7:00 AM - 6:30 PM)       Pending Results     Date and Time Order Name Status Description    7/23/2018 1006 Hemoglobin In process     7/23/2018 0914 Surgical pathology exam In process     7/23/2018 0605 BK VIRUS PCR QUANTITATIVE In process     7/23/2018 0605 PRA DONOR SPECIFIC ANTIBODY In process     7/23/2018 0605 MYCOPHENOLIC ACID In process             Admission Information     Date & Time Provider Department Dept. Phone    7/23/2018 Star Macias MD [facility] [phone]      Your Vitals Were     Blood Pressure Pulse Temperature Respirations Pulse Oximetry       122/64 72 98  F (36.7  C) 16 98%       MyChart Information     [employer] gives you secure access to your electronic health record. If you see a primary care provider, you can also send messages to your care team and make appointments. If you have questions, please call your primary care clinic.  If you do not have a primary care provider, please  call [phone] and they will assist you.      [employer] is an electronic gateway that provides easy, online access to your medical records. With [employer], you can request a clinic appointment, read your test results, renew a prescription or communicate with your care team.     To access your existing account, please contact your [facility] or call [phone] for assistance.        Care EveryWhere ID     This is your Care EveryWhere ID. This could be used by other organizations to access your [place] medical records  [id number]        Equal Access to Services     ALEXANDRA DOVE : Hadrandy Rene, wasg landaverde, qalaure kaalmajacqueline beard, pascale gibbs . So [facility] [phone].    ATENCIÓN: Si habla español, tiene a millan disposición servicios gratuitos de asistencia lingüística. Llame al [phone].    We comply with applicable federal civil rights laws and [place] laws. We do not discriminate on the basis of race, color, national origin, age, disability, sex, sexual orientation, or gender identity.               Review of your medicines      START taking        Dose / Directions    fludrocortisone 0.1 MG tablet   Commonly known as:  FLORINEF   Used for:  Neurogenic orthostatic hypotension (H)        Dose:  0.1 mg   Take 1 tablet (0.1 mg) by mouth daily   Quantity:  30 tablet   Refills:  3         CONTINUE these medicines which have NOT CHANGED        Dose / Directions    atorvastatin 40 MG tablet   Commonly known as:  LIPITOR        Dose:  40 mg   Take 40 mg by mouth daily   Refills:  0       clonazePAM 1 MG tablet   Commonly known as:  klonoPIN        Dose:  0.5-1 mg   Take 0.5-1 mg by mouth 2 times daily as needed (restless leg)   Refills:  0       docusate sodium 100 MG capsule   Commonly known as:  COLACE        Dose:  200 mg   Take 200 mg by mouth 2 times daily   Refills:  0       mycophenolate 250 MG capsule   Commonly known as:   GENERIC EQUIVALENT   Used for:  Kidney transplant recipient        Dose:  750 mg   Take 3 capsules (750 mg) by mouth 2 times daily   Quantity:  180 capsule   Refills:  11       NONFORMULARY        Salicylic acid 3% / 5-FU 4% in vanicream : Apply a thin layer to affected area once daily   Refills:  0       omeprazole 40 MG capsule   Commonly known as:  priLOSEC   Used for:  Gastroesophageal reflux disease, esophagitis presence not specified        TAKE 1 CAPSULE BY MOUTH DAILY   Quantity:  90 capsule   Refills:  3       polyethylene glycol Packet   Commonly known as:  MIRALAX/GLYCOLAX   Used for:  Kidney transplant recipient        Dose:  17 g   Take 17 g by mouth daily as needed for constipation   Quantity:  14 packet   Refills:  3       prochlorperazine 10 MG tablet   Commonly known as:  COMPAZINE   Used for:  Nausea        Dose:  10 mg   Take 1 tablet (10 mg) by mouth 3 times daily   Quantity:  30 tablet   Refills:  1       senna-docusate 8.6-50 MG per tablet   Commonly known as:  SENOKOT-S;PERICOLACE   Used for:  Kidney transplant recipient        Dose:  2 tablet   Take 2 tablets by mouth 2 times daily as needed for constipation   Quantity:  100 tablet   Refills:  3       * tacrolimus 1 MG capsule   Commonly known as:  GENERIC EQUIVALENT   Used for:  Kidney transplanted, Elevated serum creatinine        Dose:  3 mg   Take 3 capsules (3 mg) by mouth 2 times daily Total dose = 3.5 mg BID   Quantity:  180 capsule   Refills:  11       * tacrolimus 0.5 MG capsule   Commonly known as:  GENERIC EQUIVALENT   Used for:  Kidney transplanted, Elevated serum creatinine        Dose:  0.5 mg   Take 1 capsule (0.5 mg) by mouth 2 times daily Total dose = 3.5 mg BID   Quantity:  60 capsule   Refills:  11       traZODone 150 MG tablet   Commonly known as:  DESYREL   Used for:  Insomnia, unspecified type        TAKE 2 TABLETS BY MOUTH NIGHTLY AT BEDTIME   Quantity:  60 tablet   Refills:  0       valGANciclovir 450 MG tablet    Commonly known as:  VALCYTE   Indication:  Medication Treatment to Prevent Cytomegalovirus Disease   Used for:  Kidney transplant recipient        Dose:  450 mg   Take 1 tablet (450 mg) by mouth daily   Quantity:  60 tablet   Refills:  2       vitamin D 2000 units tablet        Dose:  2000 Units   Take 2,000 Units by mouth daily   Refills:  0       * Notice:  This list has 2 medication(s) that are the same as other medications prescribed for you. Read the directions carefully, and ask your doctor or other care provider to review them with you.         Where to get your medicines      These medications were sent to [facility] [address]  [address] [address]    Hours:  TRANSPLANT PHONE NUMBER [phone] Phone:  [phone]     fludrocortisone 0.1 MG tablet                Protect others around you: Learn how to safely use, store and throw away your medicines at www.disposemymeds.org.             Medication List: This is a list of all your medications and when to take them. Check marks below indicate your daily home schedule. Keep this list as a reference.      Medications           Morning Afternoon Evening Bedtime As Needed    atorvastatin 40 MG tablet   Commonly known as:  LIPITOR   Take 40 mg by mouth daily                                clonazePAM 1 MG tablet   Commonly known as:  klonoPIN   Take 0.5-1 mg by mouth 2 times daily as needed (restless leg)                                docusate sodium 100 MG capsule   Commonly known as:  COLACE   Take 200 mg by mouth 2 times daily                                fludrocortisone 0.1 MG tablet   Commonly known as:  FLORINEF   Take 1 tablet (0.1 mg) by mouth daily                                mycophenolate 250 MG capsule   Commonly known as:  GENERIC EQUIVALENT   Take 3 capsules (750 mg) by mouth 2 times daily                                NONFORMULARY   Salicylic acid 3% /  5-FU 4% in vanicream : Apply a thin layer to affected area once daily                                omeprazole 40 MG capsule   Commonly known as:  priLOSEC   TAKE 1 CAPSULE BY MOUTH DAILY                                polyethylene glycol Packet   Commonly known as:  MIRALAX/GLYCOLAX   Take 17 g by mouth daily as needed for constipation                                prochlorperazine 10 MG tablet   Commonly known as:  COMPAZINE   Take 1 tablet (10 mg) by mouth 3 times daily                                senna-docusate 8.6-50 MG per tablet   Commonly known as:  SENOKOT-S;PERICOLACE   Take 2 tablets by mouth 2 times daily as needed for constipation                                * tacrolimus 1 MG capsule   Commonly known as:  GENERIC EQUIVALENT   Take 3 capsules (3 mg) by mouth 2 times daily Total dose = 3.5 mg BID                                * tacrolimus 0.5 MG capsule   Commonly known as:  GENERIC EQUIVALENT   Take 1 capsule (0.5 mg) by mouth 2 times daily Total dose = 3.5 mg BID                                traZODone 150 MG tablet   Commonly known as:  DESYREL   TAKE 2 TABLETS BY MOUTH NIGHTLY AT BEDTIME                                valGANciclovir 450 MG tablet   Commonly known as:  VALCYTE   Take 1 tablet (450 mg) by mouth daily                                vitamin D 2000 units tablet   Take 2,000 Units by mouth daily                                * Notice:  This list has 2 medication(s) that are the same as other medications prescribed for you. Read the directions carefully, and ask your doctor or other care provider to review them with you.

## 2018-07-23 NOTE — PROCEDURES
Kidney Transplant Biopsy Procedure Note    Indication/Diagnosis:  Kidney transplant with elevated creatinine and DSA (B7)    Procedure:  The indications and risks of the kidney biopsy, including bleeding severe enough to require hospitalization, transfusion, surgery, or even loss of the kidney or death, were explained, understood and agreed.  Consent was signed.  The kidney, located in the right lower quadrant, was visualized under ultrasound and biopsy site marked.  Patient was prepped and draped in the usual sterile manner.  Biopsy site was anesthetized with 1% lidocaine.  Biopsy consisted of 4 passes with a 18 ga needle under direct ultrasound guidance were made and tissue was sent to pathology.  There were no immediate complications.  Pressure was held over biopsy site and patient will be observed for signs of bleeding or other complications.  Patient remained hemodynamically stable during and early post procedure.    Laurence Pinon  9:06 AM    Attestation:  This patient has been seen and evaluated by me, Star Macias MD.  I was present for the entire procedure as done by the fellow.

## 2018-07-23 NOTE — PROGRESS NOTES
Patient Information     Patient Name  Gilles Henning III MRN  [id number] Sex  Male   1970      Letter by Charlie Dubose MD at      Author:  Charlie Dubose MD Service:  (none) Author Type:  (none)    Filed:   Encounter Date:  10/13/2017 Status:  (Other)       Laura Benitez RN, BSN  [address] [place] [address]  Fax [phone]      Gilles Henning III  [address]  [address] [place] [address]          2017    Ms. Laura Benitez RN, BSN:    Gilles Henning III was seen in my clinic today 10/13/2017.  I have been his primary physician since 2013.  Since we have met, including since April, Hector has been a compliant patient.  He completes testing I request, and follows up when I ask him to.  I respectfully request he be listed for his kidney transplant.    Signed, Charlie Dubose MD  Internal Medicine & Pediatrics      CC: Mr. Gilles Henning III

## 2018-07-23 NOTE — TELEPHONE ENCOUNTER
Spoke to patient who confirms current tac dose and good 12 hour trough level.  Patient verbalizes understanding of medication dose decrease of Tacrolimus to 3 mg BID.

## 2018-07-23 NOTE — H&P
Nephrology Procedure H&P  07/23/2018     Assessment & Recommendations:   1. DDKT - baseline creatinine ~ 1.2-1.3, which has trended up. No proteinuria. Patient was noted to have new DSA (low level B7), DSA negative at transplant and negative XM. Will plan on kidney transplant biopsy to evaluate for etiology of creatinine elevation.  Possible causes include, but not limited to, AbMR, ATN, elevated CNI. Tacrolimus level high, Tac changed to 3mg BID on 7/20/18. Will make no changes in immunosuppression.    Transplant History:  Transplant: 5/30/2018 (Kidney)        Donor Class: Standard Criteria Donor  Crossmatch at time of Transplant:    DSA at time of Transplant:  No  Present Maintenance Immunosuppression:  Tacrolimus and Mycophenolate mofetil  Baseline creatinine:  1.2-1.3  Latest DSA lab date:  PRA:  Class I:   SA1 Comments   Date Value Ref Range Status   07/16/2018   Final    Test performed by modified procedure. Serum heat inactivated, treated with   immunoadsorbent beads to remove interfering Thymoglobulin and tested by a   modified ([place]) protocol including fetal calf serum addition. High-risk,   mfi >3,000. Mod-risk, mfi 500-3,000.          Class II:    SA2 Comments   Date Value Ref Range Status   07/16/2018   Final    Test performed by modified procedure. Serum heat inactivated, treated with   immunoadsorbent beads to remove interfering Thymoglobulin and tested by a   modified ([place]) protocol including fetal calf serum addition. High-risk,   mfi >3,000. Mod-risk, mfi 500-3,000.        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 Severe arteriosclerosis     Rejection History:  No  Significant Complications: None  Transplant Coordinator: Gia Romero   Transplant Office Phone Number:  [phone]     2. BP - well controlled at target of less than 140/90, but may be running a  bit low.  Will start Florinef 0.1 mg daily.     3. Electrolytes - history of hyponatremia (was 127 last week). Now back to normal limits (137). Uosm 299, Saw 57, May be hypovolemic hyponatremia.    4. Need for CMV prophylaxis - CMV R+/D-, valcyte 450mg daily x 3 months    5. Secondary renal hyperparathyroidism - PTH 68. Ca 9.4, Vitamin D 65. Continue cholecalciferol 1000U daily.    5. Hypomagnesemia - Mg 1.8, not on oral supplements     6. Hypophosphatemia - Phos 3.4, not on oral supplements    7. HLD - patient is on atorvastatin, can continue on 40mg daily.    Reason for Visit:  Mr. Henning is here for elevated creatinine and new DSA and potential kidney transplant biopsy.    History of Present Illness:  Gilles Henning III is a 48 year old male with ESKD from IgA Nephropathy and is status post DDKT on 5/30/2018.    Patient was noted to have new DSA (low level B7), DSA negative at transplant and negative XM. His baseline Cr is 1.2-1.3, and his Cr now is 1.93. No proteinuria.     Pain Over Kidney Tx:  No Pain or Burning with Urination:  No  Gross Hematuria:  No  Taking NSAIDs:  No    Home BP: Not checked    Review of Systems:  A comprehensive review of systems was obtained and negative, except as noted in the History of Present Illness or Active Medical Problems.    Active Medical Problems:  Patient Active Problem List    Diagnosis     Hyponatremia     Benign essential hypertension     Need for CMV immunotherapy     Need for pneumocystis prophylaxis     Hypomagnesemia     Dehydration     Other constipation     Long QT interval     Kidney transplanted     Hyperlipidemia     Hypertension     Nephritis and nephropathy, 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     Onychocryptosis     Kidney transplant recipient     Bipolar affective disorder (H)     Night terrors     PTSD (post-traumatic stress disorder)     Lumbar disc disease with radiculopathy     Lumbar foraminal stenosis     Immunosuppressed status (H)     Gastroesophageal  reflux disease     Secondary hyperparathyroidism (H)     Vitamin D deficiency     S/p nephrectomy     Renal cell carcinoma (H)     Anemia of chronic disease     Chronic insomnia     Erectile dysfunction     Nausea     Colitis, acute     Diarrhea     Headache     Heartburn     Abnormal involuntary movement     Psychosexual dysfunction with inhibited sexual excitement     Hereditary and idiopathic peripheral neuropathy     Chest pain     Depression, major, recurrent (H)     Current Medications:  Current Outpatient Prescriptions   Medication Sig Dispense Refill     atorvastatin (LIPITOR) 40 MG tablet Take 40 mg by mouth daily        clonazePAM (KLONOPIN) 1 MG tablet Take 0.5-1 mg by mouth 2 times daily as needed (restless leg)        traZODone (DESYREL) 150 MG tablet TAKE 2 TABLETS BY MOUTH NIGHTLY AT BEDTIME 60 tablet 0     valGANciclovir (VALCYTE) 450 MG tablet Take 1 tablet (450 mg) by mouth daily 60 tablet 2     Cholecalciferol (VITAMIN D) 2000 UNITS tablet Take 2,000 Units by mouth daily       docusate sodium (COLACE) 100 MG capsule Take 200 mg by mouth 2 times daily        mycophenolate (GENERIC EQUIVALENT) 250 MG capsule Take 3 capsules (750 mg) by mouth 2 times daily 180 capsule 11     NONFORMULARY Salicylic acid 3% / 5-FU 4% in vanicream : Apply a thin layer to affected area once daily       omeprazole (PRILOSEC) 40 MG capsule TAKE 1 CAPSULE BY MOUTH DAILY 90 capsule 3     polyethylene glycol (MIRALAX/GLYCOLAX) Packet Take 17 g by mouth daily as needed for constipation 14 packet 3     prochlorperazine (COMPAZINE) 10 MG tablet Take 1 tablet (10 mg) by mouth 3 times daily 30 tablet 1     senna-docusate (SENOKOT-S;PERICOLACE) 8.6-50 MG per tablet Take 2 tablets by mouth 2 times daily as needed for constipation 100 tablet 3     tacrolimus (GENERIC EQUIVALENT) 0.5 MG capsule Take 1 capsule (0.5 mg) by mouth 2 times daily Total dose = 3.5 mg BID 60 capsule 11     tacrolimus (GENERIC EQUIVALENT) 1 MG capsule Take 3  capsules (3 mg) by mouth 2 times daily Total dose = 3.5 mg  capsule 11     Vitals:  BP 99/65  Temp 98  F (36.7  C)  Resp 16  SpO2 100%    Physical Exam:   GENERAL APPEARANCE: alert and no distress  HENT: mouth without ulcers or lesions  PULM: lungs clear to auscultation, equal air movement  CV: regular rhythm, normal rate     - no LE edema bilaterally  GI: soft, nontender, bowel sounds are normal  MS: no evidence of inflammation in joints, no muscle tenderness  TX KIDNEY: nontender    Labs:   All labs reviewed by me  Recent Results (from the past 8 hour(s))   Routine UA with microscopic    Collection Time: 07/23/18  6:21 AM   Result Value Ref Range    Color Urine Straw     Appearance Urine Clear     Glucose Urine Negative NEG^Negative mg/dL    Bilirubin Urine Negative NEG^Negative    Ketones Urine Negative NEG^Negative mg/dL    Specific Gravity Urine 1.005 1.003 - 1.035    Blood Urine Negative NEG^Negative    pH Urine 7.0 5.0 - 7.0 pH    Protein Albumin Urine Negative NEG^Negative mg/dL    Urobilinogen mg/dL 0.0 0.0 - 2.0 mg/dL    Nitrite Urine Negative NEG^Negative    Leukocyte Esterase Urine Negative NEG^Negative    Source Midstream Urine     WBC Urine 1 0 - 5 /HPF    RBC Urine 0 0 - 2 /HPF    Mucous Urine Present (A) NEG^Negative /LPF   Albumin Random Urine Quantitative with Creat Ratio    Collection Time: 07/23/18  6:21 AM   Result Value Ref Range    Creatinine Urine 31 mg/dL    Albumin Urine mg/L <5 mg/L    Albumin Urine mg/g Cr Unable to calculate due to low value 0 - 17 mg/g Cr   Protein  random urine with Creat Ratio    Collection Time: 07/23/18  6:21 AM   Result Value Ref Range    Protein Random Urine <0.05 g/L    Protein Total Urine g/gr Creatinine Unable to calculate due to low value 0 - 0.2 g/g Cr   Basic metabolic panel    Collection Time: 07/23/18  6:23 AM   Result Value Ref Range    Sodium 137 133 - 144 mmol/L    Potassium 4.8 3.4 - 5.3 mmol/L    Chloride 107 94 - 109 mmol/L    Carbon  Dioxide 21 20 - 32 mmol/L    Anion Gap 9 3 - 14 mmol/L    Glucose 106 (H) 70 - 99 mg/dL    Urea Nitrogen 15 7 - 30 mg/dL    Creatinine 1.93 (H) 0.66 - 1.25 mg/dL    GFR Estimate 37 (L) >60 mL/min/1.7m2    GFR Estimate If Black 45 (L) >60 mL/min/1.7m2    Calcium 9.4 8.5 - 10.1 mg/dL   Phosphorus    Collection Time: 07/23/18  6:23 AM   Result Value Ref Range    Phosphorus 3.4 2.5 - 4.5 mg/dL   Magnesium    Collection Time: 07/23/18  6:23 AM   Result Value Ref Range    Magnesium 1.8 1.6 - 2.3 mg/dL   CBC with platelets differential    Collection Time: 07/23/18  6:23 AM   Result Value Ref Range    WBC 4.5 4.0 - 11.0 10e9/L    RBC Count 3.26 (L) 4.4 - 5.9 10e12/L    Hemoglobin 10.3 (L) 13.3 - 17.7 g/dL    Hematocrit 31.3 (L) 40.0 - 53.0 %    MCV 96 78 - 100 fl    MCH 31.6 26.5 - 33.0 pg    MCHC 32.9 31.5 - 36.5 g/dL    RDW 15.2 (H) 10.0 - 15.0 %    Platelet Count 194 150 - 450 10e9/L    Diff Method Automated Method     % Neutrophils 89.8 %    % Lymphocytes 5.2 %    % Monocytes 2.2 %    % Eosinophils 1.3 %    % Basophils 1.1 %    % Immature Granulocytes 0.4 %    Nucleated RBCs 0 0 /100    Absolute Neutrophil 4.0 1.6 - 8.3 10e9/L    Absolute Lymphocytes 0.2 (L) 0.8 - 5.3 10e9/L    Absolute Monocytes 0.1 0.0 - 1.3 10e9/L    Absolute Eosinophils 0.1 0.0 - 0.7 10e9/L    Absolute Basophils 0.1 0.0 - 0.2 10e9/L    Abs Immature Granulocytes 0.0 0 - 0.4 10e9/L    Absolute Nucleated RBC 0.0    INR    Collection Time: 07/23/18  6:23 AM   Result Value Ref Range    INR 1.10 0.86 - 1.14   ABO/Rh type and screen    Collection Time: 07/23/18  6:24 AM   Result Value Ref Range    ABO O     RH(D) Pos     Antibody Screen Neg     Test Valid Only At          [facility]    Specimen Expires 07/26/2018         Laurence Pinon MD     Attestation:  This patient has been seen and evaluated by me, Star Macias MD.  I have reviewed the note and agree with plan of care as documented by the fellow.

## 2018-07-23 NOTE — DISCHARGE INSTRUCTIONS
[facility]  Home Care Following Kidney Biopsy  ACTIVITY: NO heavy lifting (weight greater than 10 pounds) for 1 week. NO strenuous activity for 24 hours; relax and take it easy.     DIET: Resume your regular diet and drink plenty of fluids UNLESS you are fluid restricted.    DRAINAGE: There should be minimal drainage from the biopsy site. If bleeding soaks the dressing, you should lie down and apply pressure to the site for a minimum of 10 minutes. Call one of the numbers below if experienced bleeding that soaked the dressing.     DRESSING: Keep the dressing in place for 24 hours to prevent the site from re-opening and bleeding.     SEDATION: IF you received sedation medications, DO NOT drive or operate heavy machinery, DO NOT drink alcoholic beverages, and DO NOT make important legal decisions.    CALL ONE OF THE NUMBERS BELOW IF YOU EXPERIENCE ANY ONE OF THE FOLLOWING:      Excessive bleeding or drainage    Excessive swelling, redness, or tenderness at the site    Fever above 100.5 F, orally    Severe pain    Drainage that is green, yellow, thick white, or has a bad odor    If you havebloody urine or see bloody clots in your urine     Emergency Department: [phone] (open 24 hours)  Transplant Center: [phone] (open 7:00 AM - 6:30 PM)

## 2018-07-24 ENCOUNTER — TELEPHONE (OUTPATIENT)
Dept: TRANSPLANT | Facility: CLINIC | Age: 48
End: 2018-07-24

## 2018-07-24 DIAGNOSIS — Z98.890: Primary | ICD-10-CM

## 2018-07-24 DIAGNOSIS — Z87.898: Primary | ICD-10-CM

## 2018-07-24 LAB
B8: 630
COPATH REPORT: NORMAL
DONOR IDENTIFICATION: NORMAL
DSA COMMENTS: NORMAL
DSA PRESENT: YES
DSA TEST METHOD: NORMAL
INTERFERING SUBST TEST METHOD: NORMAL
INTERFERING SUBST TEST METHOD: NORMAL
INTERFERING SUBSTANCE COMMENT: NORMAL
INTERFERING SUBSTANCE COMMENT: NORMAL
INTERFERING SUBSTANCE RESULT: NORMAL
INTERFERING SUBSTANCE RESULT: NORMAL
INTERFERING SUBSTANCE: NORMAL
INTERFERING SUBSTANCE: NORMAL
MYCOPHENOLATE SERPL LC/MS/MS-MCNC: 6.03 MG/L (ref 1–3.5)
MYCOPHENOLATE-G SERPL LC/MS/MS-MCNC: 130.9 MG/L (ref 30–95)
ORGAN: NORMAL
PRA DONOR SPECIFIC ABY: NORMAL
PROTOCOL CUTOFF: NORMAL
SA1 CELL: NORMAL
SA1 COMMENTS: NORMAL
SA1 HI RISK ABY: NORMAL
SA1 MOD RISK ABY: NORMAL
SA1 TEST METHOD: NORMAL
SA2 CELL: NORMAL
SA2 COMMENTS: NORMAL
SA2 HI RISK ABY UA: NORMAL
SA2 MOD RISK ABY: NORMAL
SA2 TEST METHOD: NORMAL
TME LAST DOSE: 2045 H
UNACCEPTABLE ANTIGEN: NORMAL

## 2018-07-24 NOTE — TELEPHONE ENCOUNTER
Call returned to patient.     Per Dr Macias, preliminary biopsy results show no rejection. Relayed this information to patient and let him know he can return home today. Pt v/u.

## 2018-07-25 LAB
BKV DNA # SPEC NAA+PROBE: NORMAL COPIES/ML
BKV DNA SPEC NAA+PROBE-LOG#: NORMAL LOG COPIES/ML
SPECIMEN SOURCE: NORMAL

## 2018-07-25 RX ORDER — TRAZODONE HYDROCHLORIDE 150 MG/1
TABLET ORAL
Qty: 60 TABLET | Refills: 5 | Status: ON HOLD | OUTPATIENT
Start: 2018-07-25 | End: 2018-08-13

## 2018-07-25 NOTE — TELEPHONE ENCOUNTER
Prescription approved per [facility] Refill Protocol.  LOV 12-28-17.   Michelle San RN on 7/25/2018 at 10:45 AM

## 2018-07-26 ENCOUNTER — TRANSFERRED RECORDS (OUTPATIENT)
Dept: HEALTH INFORMATION MANAGEMENT | Facility: CLINIC | Age: 48
End: 2018-07-26

## 2018-07-26 LAB — INR PPP: 1.3 (ref 0.9–1.2)

## 2018-07-27 ENCOUNTER — TELEPHONE (OUTPATIENT)
Dept: TRANSPLANT | Facility: CLINIC | Age: 48
End: 2018-07-27

## 2018-07-27 ENCOUNTER — TRANSFERRED RECORDS (OUTPATIENT)
Dept: HEALTH INFORMATION MANAGEMENT | Facility: CLINIC | Age: 48
End: 2018-07-27

## 2018-07-27 ENCOUNTER — TRANSFERRED RECORDS (OUTPATIENT)
Dept: HEALTH INFORMATION MANAGEMENT | Facility: OTHER | Age: 48
End: 2018-07-27

## 2018-07-27 LAB — INR PPP: 1.2 (ref 0.9–1.2)

## 2018-07-27 NOTE — TELEPHONE ENCOUNTER
Received phone call from [place] this morning. Hector was transferred to [facility] in [place] last night after attempted suicide.  Dr. Vallejo and Dr. Macias aware.  Recent notes faxed to [facility].

## 2018-07-29 ENCOUNTER — APPOINTMENT (OUTPATIENT)
Dept: CT IMAGING | Facility: CLINIC | Age: 48
DRG: 131 | End: 2018-07-29
Attending: SURGERY
Payer: MEDICARE

## 2018-07-29 ENCOUNTER — HOSPITAL ENCOUNTER (INPATIENT)
Facility: CLINIC | Age: 48
LOS: 15 days | Discharge: NURSING, REHAB OR LONG-TERM CARE | DRG: 131 | End: 2018-08-13
Attending: SURGERY | Admitting: SURGERY
Payer: MEDICARE

## 2018-07-29 DIAGNOSIS — Z94.0 KIDNEY TRANSPLANTED: ICD-10-CM

## 2018-07-29 DIAGNOSIS — K59.09 OTHER CONSTIPATION: ICD-10-CM
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
## 2018-07-29 DIAGNOSIS — F43.10 PTSD (POST-TRAUMATIC STRESS DISORDER): ICD-10-CM

## 2018-07-29 DIAGNOSIS — R25.9 ABNORMAL INVOLUNTARY MOVEMENT: ICD-10-CM

## 2018-07-29 DIAGNOSIS — Z78.9 DEEP VEIN THROMBOSIS (DVT) PROPHYLAXIS PRESCRIBED AT DISCHARGE: ICD-10-CM

## 2018-07-29 DIAGNOSIS — R73.9 HYPERGLYCEMIA: ICD-10-CM

## 2018-07-29 DIAGNOSIS — E44.0 MODERATE PROTEIN-CALORIE MALNUTRITION (H): ICD-10-CM

## 2018-07-29 DIAGNOSIS — E78.5 HYPERLIPIDEMIA, UNSPECIFIED HYPERLIPIDEMIA TYPE: ICD-10-CM

## 2018-07-29 DIAGNOSIS — Z29.9 PROPHYLACTIC MEASURE: ICD-10-CM

## 2018-07-29 DIAGNOSIS — Z46.6 ENCOUNTER FOR REMOVAL OF URETERAL STENT: ICD-10-CM

## 2018-07-29 DIAGNOSIS — F31.9 BIPOLAR AFFECTIVE DISORDER, REMISSION STATUS UNSPECIFIED (H): ICD-10-CM

## 2018-07-29 LAB
ABO + RH BLD: NORMAL
ABO + RH BLD: NORMAL
ALT SERPL-CCNC: 16 U/L (ref 18–65)
ANION GAP SERPL CALCULATED.3IONS-SCNC: 8 MMOL/L (ref 3–14)
AST SERPL-CCNC: 29 U/L (ref 10–40)
BASE DEFICIT BLDA-SCNC: 1.8 MMOL/L
BLD GP AB SCN SERPL QL: NORMAL
BLOOD BANK CMNT PATIENT-IMP: NORMAL
BUN SERPL-MCNC: 8 MG/DL (ref 7–30)
CA-I BLD-MCNC: 4.6 MG/DL (ref 4.4–5.2)
CALCIUM SERPL-MCNC: 8.4 MG/DL (ref 8.5–10.1)
CHLORIDE SERPL-SCNC: 113 MMOL/L (ref 94–109)
CO2 SERPL-SCNC: 22 MMOL/L (ref 20–32)
CREAT SERPL-MCNC: 0.94 MG/DL (ref 0.66–1.25)
CREAT SERPL-MCNC: 0.99 MG/DL (ref 0.8–1.5)
ERYTHROCYTE [DISTWIDTH] IN BLOOD BY AUTOMATED COUNT: 16.2 % (ref 10–15)
GFR SERPL CREATININE-BSD FRML MDRD: 86 ML/MIN/1.7M2
GLUCOSE BLDC GLUCOMTR-MCNC: 100 MG/DL (ref 70–99)
GLUCOSE SERPL-MCNC: 119 MG/DL (ref 60–99)
GLUCOSE SERPL-MCNC: 124 MG/DL (ref 60–99)
GLUCOSE SERPL-MCNC: 130 MG/DL (ref 70–99)
GLUCOSE SERPL-MCNC: 99 MG/DL (ref 60–99)
HCO3 BLD-SCNC: 23 MMOL/L (ref 21–28)
HCT VFR BLD AUTO: 28.8 % (ref 40–53)
HGB BLD-MCNC: 9.7 G/DL (ref 13.3–17.7)
MAGNESIUM SERPL-MCNC: 1.3 MG/DL (ref 1.6–2.3)
MCH RBC QN AUTO: 30.1 PG (ref 26.5–33)
MCHC RBC AUTO-ENTMCNC: 33.7 G/DL (ref 31.5–36.5)
MCV RBC AUTO: 89 FL (ref 78–100)
MRSA DNA SPEC QL NAA+PROBE: NEGATIVE
O2/TOTAL GAS SETTING VFR VENT: 40 %
OXYHGB MFR BLD: 96 % (ref 92–100)
PCO2 BLD: 36 MM HG (ref 35–45)
PH BLD: 7.41 PH (ref 7.35–7.45)
PHOSPHATE SERPL-MCNC: 3 MG/DL (ref 2.5–4.5)
PLATELET # BLD AUTO: 126 10E9/L (ref 150–450)
PO2 BLD: 112 MM HG (ref 80–105)
POTASSIUM SERPL-SCNC: 3.7 MMOL/L (ref 3.4–5.3)
POTASSIUM SERPL-SCNC: 3.7 MMOL/L (ref 3.5–5.1)
RBC # BLD AUTO: 3.22 10E12/L (ref 4.4–5.9)
SODIUM SERPL-SCNC: 144 MMOL/L (ref 133–144)
SPECIMEN EXP DATE BLD: NORMAL
SPECIMEN SOURCE: NORMAL
TRIGL SERPL-MCNC: 125 MG/DL
WBC # BLD AUTO: 9.7 10E9/L (ref 4–11)

## 2018-07-29 PROCEDURE — 82805 BLOOD GASES W/O2 SATURATION: CPT | Performed by: SURGERY

## 2018-07-29 PROCEDURE — 87640 STAPH A DNA AMP PROBE: CPT | Performed by: SURGERY

## 2018-07-29 PROCEDURE — 83735 ASSAY OF MAGNESIUM: CPT | Performed by: SURGERY

## 2018-07-29 PROCEDURE — 25000128 H RX IP 250 OP 636: Performed by: STUDENT IN AN ORGANIZED HEALTH CARE EDUCATION/TRAINING PROGRAM

## 2018-07-29 PROCEDURE — 40000275 ZZH STATISTIC RCP TIME EA 10 MIN

## 2018-07-29 PROCEDURE — 99291 CRITICAL CARE FIRST HOUR: CPT | Mod: GC | Performed by: SURGERY

## 2018-07-29 PROCEDURE — 25000131 ZZH RX MED GY IP 250 OP 636 PS 637: Mod: GY | Performed by: STUDENT IN AN ORGANIZED HEALTH CARE EDUCATION/TRAINING PROGRAM

## 2018-07-29 PROCEDURE — 20000004 ZZH R&B ICU UMMC

## 2018-07-29 PROCEDURE — 85027 COMPLETE CBC AUTOMATED: CPT | Performed by: SURGERY

## 2018-07-29 PROCEDURE — 70486 CT MAXILLOFACIAL W/O DYE: CPT

## 2018-07-29 PROCEDURE — 40000014 ZZH STATISTIC ARTERIAL MONITORING DAILY

## 2018-07-29 PROCEDURE — 25000128 H RX IP 250 OP 636

## 2018-07-29 PROCEDURE — 86850 RBC ANTIBODY SCREEN: CPT | Performed by: SURGERY

## 2018-07-29 PROCEDURE — 00000146 ZZHCL STATISTIC GLUCOSE BY METER IP

## 2018-07-29 PROCEDURE — 86900 BLOOD TYPING SEROLOGIC ABO: CPT | Performed by: SURGERY

## 2018-07-29 PROCEDURE — 25000125 ZZHC RX 250: Performed by: STUDENT IN AN ORGANIZED HEALTH CARE EDUCATION/TRAINING PROGRAM

## 2018-07-29 PROCEDURE — 82330 ASSAY OF CALCIUM: CPT | Performed by: SURGERY

## 2018-07-29 PROCEDURE — 84100 ASSAY OF PHOSPHORUS: CPT | Performed by: SURGERY

## 2018-07-29 PROCEDURE — 80048 BASIC METABOLIC PNL TOTAL CA: CPT | Performed by: SURGERY

## 2018-07-29 PROCEDURE — 87641 MR-STAPH DNA AMP PROBE: CPT | Performed by: SURGERY

## 2018-07-29 PROCEDURE — 27210995 ZZH RX 272

## 2018-07-29 PROCEDURE — 86901 BLOOD TYPING SEROLOGIC RH(D): CPT | Performed by: SURGERY

## 2018-07-29 PROCEDURE — 94002 VENT MGMT INPAT INIT DAY: CPT

## 2018-07-29 PROCEDURE — 40000281 ZZH STATISTIC TRANSPORT TIME EA 15 MIN

## 2018-07-29 RX ORDER — SODIUM CHLORIDE 9 MG/ML
INJECTION, SOLUTION INTRAVENOUS
Status: COMPLETED
Start: 2018-07-29 | End: 2018-07-29

## 2018-07-29 RX ORDER — GINSENG 100 MG
CAPSULE ORAL 2 TIMES DAILY
Status: DISCONTINUED | OUTPATIENT
Start: 2018-07-29 | End: 2018-08-13 | Stop reason: HOSPADM

## 2018-07-29 RX ORDER — VALGANCICLOVIR 450 MG/1
450 TABLET, FILM COATED ORAL DAILY
Status: DISCONTINUED | OUTPATIENT
Start: 2018-07-30 | End: 2018-07-30 | Stop reason: ALTCHOICE

## 2018-07-29 RX ORDER — MAGNESIUM SULFATE HEPTAHYDRATE 40 MG/ML
4 INJECTION, SOLUTION INTRAVENOUS EVERY 4 HOURS PRN
Status: DISCONTINUED | OUTPATIENT
Start: 2018-07-29 | End: 2018-08-13 | Stop reason: HOSPADM

## 2018-07-29 RX ORDER — LEVOFLOXACIN 500 MG/1
500 TABLET, FILM COATED ORAL DAILY
Status: DISCONTINUED | OUTPATIENT
Start: 2018-07-29 | End: 2018-07-29

## 2018-07-29 RX ORDER — POTASSIUM CHLORIDE 1.5 G/1.58G
20-40 POWDER, FOR SOLUTION ORAL
Status: DISCONTINUED | OUTPATIENT
Start: 2018-07-29 | End: 2018-08-13 | Stop reason: HOSPADM

## 2018-07-29 RX ORDER — AMPICILLIN AND SULBACTAM 1; .5 G/1; G/1
1.5 INJECTION, POWDER, FOR SOLUTION INTRAMUSCULAR; INTRAVENOUS EVERY 6 HOURS
Status: DISCONTINUED | OUTPATIENT
Start: 2018-07-29 | End: 2018-07-29

## 2018-07-29 RX ORDER — POTASSIUM CHLORIDE 750 MG/1
20-40 TABLET, EXTENDED RELEASE ORAL
Status: DISCONTINUED | OUTPATIENT
Start: 2018-07-29 | End: 2018-08-13 | Stop reason: HOSPADM

## 2018-07-29 RX ORDER — PROPOFOL 10 MG/ML
5-75 INJECTION, EMULSION INTRAVENOUS CONTINUOUS
Status: DISCONTINUED | OUTPATIENT
Start: 2018-07-29 | End: 2018-08-01

## 2018-07-29 RX ORDER — MAGNESIUM HYDROXIDE 1200 MG/15ML
LIQUID ORAL
Status: COMPLETED
Start: 2018-07-29 | End: 2018-07-29

## 2018-07-29 RX ORDER — AMPICILLIN AND SULBACTAM 2; 1 G/1; G/1
3 INJECTION, POWDER, FOR SOLUTION INTRAMUSCULAR; INTRAVENOUS EVERY 6 HOURS
Status: DISCONTINUED | OUTPATIENT
Start: 2018-07-30 | End: 2018-08-13 | Stop reason: HOSPADM

## 2018-07-29 RX ORDER — MYCOPHENOLATE MOFETIL 200 MG/ML
750 POWDER, FOR SUSPENSION ORAL 2 TIMES DAILY
Status: DISCONTINUED | OUTPATIENT
Start: 2018-07-29 | End: 2018-08-02

## 2018-07-29 RX ORDER — DEXTROSE MONOHYDRATE, SODIUM CHLORIDE, AND POTASSIUM CHLORIDE 50; 1.49; 4.5 G/1000ML; G/1000ML; G/1000ML
INJECTION, SOLUTION INTRAVENOUS CONTINUOUS
Status: DISCONTINUED | OUTPATIENT
Start: 2018-07-30 | End: 2018-07-30

## 2018-07-29 RX ORDER — POTASSIUM CL/LIDO/0.9 % NACL 10MEQ/0.1L
10 INTRAVENOUS SOLUTION, PIGGYBACK (ML) INTRAVENOUS
Status: DISCONTINUED | OUTPATIENT
Start: 2018-07-29 | End: 2018-08-05 | Stop reason: RX

## 2018-07-29 RX ORDER — SODIUM CHLORIDE 9 MG/ML
INJECTION, SOLUTION INTRAVENOUS CONTINUOUS
Status: DISCONTINUED | OUTPATIENT
Start: 2018-07-29 | End: 2018-07-29

## 2018-07-29 RX ORDER — MIDAZOLAM (PF) 1 MG/ML IN 0.9 % SODIUM CHLORIDE INTRAVENOUS SOLUTION
1-8 CONTINUOUS
Status: DISCONTINUED | OUTPATIENT
Start: 2018-07-29 | End: 2018-07-30

## 2018-07-29 RX ORDER — POTASSIUM CHLORIDE 7.45 MG/ML
10 INJECTION INTRAVENOUS
Status: DISCONTINUED | OUTPATIENT
Start: 2018-07-29 | End: 2018-08-13 | Stop reason: HOSPADM

## 2018-07-29 RX ORDER — POTASSIUM CHLORIDE 29.8 MG/ML
20 INJECTION INTRAVENOUS
Status: DISCONTINUED | OUTPATIENT
Start: 2018-07-29 | End: 2018-08-13 | Stop reason: HOSPADM

## 2018-07-29 RX ORDER — NALOXONE HYDROCHLORIDE 0.4 MG/ML
.1-.4 INJECTION, SOLUTION INTRAMUSCULAR; INTRAVENOUS; SUBCUTANEOUS
Status: DISCONTINUED | OUTPATIENT
Start: 2018-07-29 | End: 2018-08-13 | Stop reason: HOSPADM

## 2018-07-29 RX ADMIN — PROPOFOL 75 MCG/KG/MIN: 10 INJECTION, EMULSION INTRAVENOUS at 21:59

## 2018-07-29 RX ADMIN — BACITRACIN: 500 OINTMENT TOPICAL at 22:20

## 2018-07-29 RX ADMIN — SODIUM CHLORIDE 1000 ML: 900 IRRIGANT IRRIGATION at 20:39

## 2018-07-29 RX ADMIN — Medication 2 MG/HR: at 22:25

## 2018-07-29 RX ADMIN — SODIUM CHLORIDE: 9 INJECTION, SOLUTION INTRAVENOUS at 20:38

## 2018-07-29 RX ADMIN — Medication 3 MG: at 21:59

## 2018-07-29 RX ADMIN — MAGNESIUM SULFATE HEPTAHYDRATE 4 G: 40 INJECTION, SOLUTION INTRAVENOUS at 22:02

## 2018-07-29 RX ADMIN — PROPOFOL 75 MCG/KG/MIN: 10 INJECTION, EMULSION INTRAVENOUS at 19:59

## 2018-07-29 RX ADMIN — AMPICILLIN SODIUM AND SULBACTAM SODIUM 1.5 G: 1; .5 INJECTION, POWDER, FOR SOLUTION INTRAMUSCULAR; INTRAVENOUS at 20:34

## 2018-07-29 RX ADMIN — Medication 25 MCG/HR: at 20:03

## 2018-07-29 RX ADMIN — MYCOPHENOLATE MOFETIL 750 MG: 200 POWDER, FOR SUSPENSION ORAL at 23:06

## 2018-07-29 ASSESSMENT — ACTIVITIES OF DAILY LIVING (ADL): ADLS_ACUITY_SCORE: 9

## 2018-07-29 NOTE — PROGRESS NOTES
[facility] Progress Note    Spoke with [facility] in [place] on 7/28 at 9:45 AM regarding the patient. He will be transferred to the ICU at [facility], and we will manage his facial injuries when he arrives.    Please page [facility] resident on call when patient arrives.    Truman Vega DDS  [phone]  Pager: [phone]

## 2018-07-29 NOTE — IP AVS SNAPSHOT
` `     UNIT 6A [facility] BANK: [phone]            Medication Administration Report for Gilles Henning III as of 18 1410   Legend:    Given Hold Not Given Due Canceled Entry Other Actions    Time Time (Time) Time  Time-Action       Inactive    Active    Linked        Medications 08/07/18 08/08/18 08/09/18 08/10/18 08/11/18 08/12/18 08/13/18    3% salicylic acid, 4% 5FU in vanicream (compounded topical agent)  Freq: DAILY Route: Top  Start: 18 1130   Order specific questions:  Medication Name Salicylic acid 3% / 5-FU 4% in vanicream     Dispense Loc: [facility]         (5892)-Not Given [C]        (808)-Not Given [C]                   acetaminophen (TYLENOL) solution 650 mg  Dose: 650 mg  Freq: EVERY 4 HOURS PRN Route: PO  PRN Reasons: mild pain,fever  Start: 08/10/18 1827   Admin Instructions: Maximum acetaminophen dose from all sources= 75 mg/kg/day not to exceed 4 grams/day.    Admin. Amount: 650 mg = 20.3 mL Conc: 32 mg/mL  Last Admin: 08/10/18 2033  Dispense Loc: [facility] ADS 6A  Volume: 20.3125 mL         (650 mg)-Given              ampicillin-sulbactam (UNASYN) 3 g vial to attach to  mL bag  Dose: 3 g  Freq: EVERY 6 HOURS Route: IV  Indications Comment: PPX  Last Dose: 3 g (18 0155)  Start: 18   End: 18   Admin. Amount: 3 g  Last Admin: 18 0752  Dispense Loc: [facility]  Infused Over: 1 Hours  Administrations Remainin     227 (3 g)-New Bag       0813 (3 g)-New Bag       1452 (3 g)-New Bag       195 (3 g)-New Bag        0209 (3 g)-New Bag       0806 (3 g)-New Bag       1407 (3 g)-New Bag       2037 (3 g)-New Bag        0238 (3 g)-New Bag       0921 (3 g)-New Bag              1454 (3 g)-New Bag       2049 (3 g)-New Bag        0218 (3 g)-New Bag       0923 (3 g)-New Bag       1444 (3 g)-New Bag       2103 (3 g)-New Bag        0232 (3 g)-New Bag       0824 (3 g)-New Bag       1339 (3 g)-New Bag       2027 (3 g)-New Bag         0227 (3 g)-New Bag       0754 (3 g)-New Bag       1403 (3 g)-New Bag       2015 (3 g)-New Bag        0214 (3 g)-New Bag       0752 (3 g)-New Bag       (1305)-Not Given       [ ] 2000           atorvastatin (LIPITOR) tablet 40 mg  Dose: 40 mg  Freq: EVERY EVENING Route: PER FEEDING   Start: 08/01/18 2000   Admin. Amount: 1 tablet (1 × 40 mg tablet)  Last Admin: 08/12/18 2015  Dispense Loc: [facility] ADS 6A     2009 (40 mg)-Given        2038 (40 mg)-Given        2043 (40 mg)-Given        2034 (40 mg)-Given        2027 (40 mg)-Given        2015 (40 mg)-Given        [ ] 2000           bacitracin ointment  Freq: 2 TIMES DAILY Route: Top  Start: 07/31/18 0800   Admin Instructions: Apply to urethral meatus.    Last Admin: 08/13/18 1009  Dispense Loc: [facility] Floor Stock     0811 ( )-Given       1959 ( )-Given        0853 ( )-Given       2112 ( )-Given        0925 ( )-Given       2043 ( )-Given        0925 ( )-Given       2043 ( )-Given        0826 ( )-Given by Other [C]       2027 ( )-Given        0808 ( )-Given by Other [C]       2015 ( )-Given        1009 ( )-Given [C]       [ ] 2000           bacitracin ointment  Freq: 2 TIMES DAILY Route: Top  Start: 07/29/18 2100   Admin Instructions: Apply to facial lacs BID.    Last Admin: 08/13/18 1010  Dispense Loc: [facility] Floor Stock     0811 ( )-Given       1958 ( )-Given        0853 ( )-Given [C]       2113 ( )-Given        0925 ( )-Given       2043 ( )-Given        0925 ( )-Given       2043 ( )-Given        0825 ( )-Given       2027 ( )-Given        0808 ( )-Given       2015 ( )-Given        1010 ( )-Given [C]       [ ] 2000           bisacodyl (DULCOLAX) Suppository 10 mg  Dose: 10 mg  Freq: DAILY PRN Route: RE  PRN Reason: constipation  Start: 08/01/18 1203   Admin. Amount: 1 suppository (1 × 10 mg suppository)  Dispense Loc: [facility] ADS 6A               chlorhexidine (PERIDEX) 0.12 % solution 15 mL  Dose: 15 mL  Freq: EVERY 6 HOURS Route: SWISH & SPIT  Start: 08/03/18  1800   Admin Instructions: Oral cares with pink swab+ peridex solution    Admin. Amount: 15 mL  Last Admin: 08/13/18 1337  Dispense Loc: [facility] ADS 6A  Volume: 15 mL     0552 (15 mL)-Given       1148 (15 mL)-Given       1741 (15 mL)-Given       2357 (15 mL)-Given        0807 (15 mL)-Given       1212 (15 mL)-Given       1807 (15 mL)-Given        (0117)-Not Given       (0750)-Not Given       0922 (15 mL)-Given       1315 (15 mL)-Given       1855 (15 mL)-Given        0218 (15 mL)-Given       0923 (15 mL)-Given       1443 (15 mL)-Given       1801 (15 mL)-Given        0010 (15 mL)-Given       (0600)-Not Given       0832 (15 mL)-Given       1341 (15 mL)-Given       2027 (15 mL)-Given        0237 (15 mL)-Given       0809 (15 mL)-Given       1402 (15 mL)-Given       2015 (15 mL)-Given        0212 (15 mL)-Given       0757 (15 mL)-Given       1337 (15 mL)-Given       [ ] 2000           dextrose 10 % 1,000 mL infusion  Freq: CONTINUOUS PRN Route: IV  PRN Comment: Hypoglycemia prevention  Start: 07/30/18 1510   Admin Instructions: For Hypoglycemia Prevention for patients on long-acting subcutaneous basal insulin (Glargine, Detemir, NPH) or continuous insulin infusion. Whenever nutrition support is held or interrupted:   1) Infuse IV D10W at nutrition support rate  2) Notify provider for further instructions    Dispense Loc: [facility] Floor Stock  Volume: 1,000 mL   Mixture Administration Information:   Medication Type Amount   dextrose 10 % SOLN Base 1,000 mL                       docusate (COLACE) 50 MG/5ML liquid 100 mg  Dose: 100 mg  Freq: 2 TIMES DAILY Route: ORAL OR FEED  Start: 07/30/18 2000   Admin Instructions: Should be administered in milk or fruit juice to mask the taste.    Admin. Amount: 100 mg = 10 mL Conc: 10 mg/mL  Last Admin: 08/06/18 0743  Dispense Loc: [facility] ADS 6A  Volume: 10 mL     (0813)-Not Given       (1943)-Not Given        (0752)-Not Given       (2113)-Not Given        (0959)-Not Given        (2104)-Not Given        (0925)-Not Given       (2035)-Not Given        (0819)-Not Given       (2028)-Not Given        (0811)-Not Given       (2016)-Not Given        (0758)-Not Given       [ ] 2000           enoxaparin (LOVENOX) injection 30 mg  Dose: 30 mg  Freq: EVERY 12 HOURS Route: SC  Start: 08/04/18 1400   Admin. Amount: 30 mg = 0.3 mL Conc: 30 mg/0.3 mL  Last Admin: 08/13/18 1337  Dispense Loc: [facility]  Volume: 0.3 mL     0227 (30 mg)-Given       1451 (30 mg)-Given        0313 (30 mg)-Given       1407 (30 mg)-Given        0238 (30 mg)-Given       1318 (30 mg)-Given        0218 (30 mg)-Given       1444 (30 mg)-Given        0232 (30 mg)-Given       1341 (30 mg)-Given        0226 (30 mg)-Given       1403 (30 mg)-Given        0212 (30 mg)-Given       1337 (30 mg)-Given           escitalopram (LEXAPRO) solution 10 mg  Dose: 10 mg  Freq: DAILY Route: PO  Start: 08/03/18 1330   End: 08/17/18 0759   Admin. Amount: 10 mg = 10 mL Conc: 1 mg/mL  Last Admin: 08/13/18 0757  Dispense Loc: [facility]  Administrations Remaining: 3  Volume: 10 mL     0809 (10 mg)-Given        0806 (10 mg)-Given        0923 (10 mg)-Given        0921 (10 mg)-Given        0803 (10 mg)-Given        0757 (10 mg)-Given        0757 (10 mg)-Given           fludrocortisone (FLORINEF) half-tab 50 mcg  Dose: 50 mcg  Freq: DAILY Route: PO  Start: 08/09/18 0800   Admin. Amount: 1 half-tab (1 × 50 mcg half-tab)  Last Admin: 08/13/18 0758  Dispense Loc: [facility]       0921 (50 mcg)-Given        0923 (50 mcg)-Given        0804 (50 mcg)-Given        0757 (50 mcg)-Given        0758 (50 mcg)-Given           folic acid (FOLATE) oral solution 1 mg  Dose: 1 mg  Freq: DAILY Route: ORAL OR FEED  Start: 07/30/18 1500   Admin. Amount: 1 mg = 2 mL Conc: 0.5 mg/mL  Last Admin: 08/13/18 0756  Dispense Loc: [facility]  Volume: 2 mL     0810 (1 mg)-Given        0807 (1 mg)-Given        0923 (1 mg)-Given        0922 (1  mg)-Given        0804 (1 mg)-Given        0757 (1 mg)-Given        0758 (1 mg)-Given           glucose gel 15-30 g  Dose: 15-30 g  Freq: EVERY 15 MIN PRN Route: PO  PRN Reason: low blood sugar  Start: 07/31/18 1333   Admin Instructions: Give 15 g for BG 51 to 69 mg/dL IF patient is conscious and able to swallow. Give 30 g for BG less than or equal to 50 mg/dL IF patient is conscious and able to swallow. Do NOT give glucose gel via enteral tube.  IF patient has enteral tube: give apple juice 120 mL (4 oz or 15 g of CHO) via enteral tube for BG 51 to 69 mg/dL.  Give apple juice 240 mL (8 oz or 30 g of CHO) via enteral tube for BG less than or equal to 50 mg/dL.    ~Oral gel is preferable for conscious and able to swallow patient.   ~IF gel unavailable or patient refuses may provide apple juice 120 mL (4 oz or 15 g of CHO). Document juice on I and O flowsheet.    Admin. Amount: 15-30 g  Dispense Loc: [facility] ADS 6A  Volume: 93.75 mL              Or  dextrose 50 % injection 25-50 mL  Dose: 25-50 mL  Freq: EVERY 15 MIN PRN Route: IV  PRN Reason: low blood sugar  Start: 07/31/18 1333   Admin Instructions: Use if have IV access, BG less than 70 mg/dL and meet dose criteria below:  Dose if conscious and alert (or disorientated) and NPO = 25 mL  Dose if unconscious / not alert = 50 mL  Vesicant. For ordered doses up to 25 g, give IV Push undiluted. Give each 5g over 1 minute.    Admin. Amount: 25-50 mL  Dispense Loc: [facility]S 6[facility]  Infused Over: 1-5 Minutes  Volume: 50 mL              Or  glucagon injection 1 mg  Dose: 1 mg  Freq: EVERY 15 MIN PRN Route: SC  PRN Reason: low blood sugar  PRN Comment: May repeat x 1 only  Start: 07/31/18 1333   Admin Instructions: May give SQ or IM. ONLY use glucagon IF patient has NO IV access AND is UNABLE to swallow AND blood glucose is LESS than or EQUAL to 50 mg/dL.  If ordered IV, give IV Push over 1 minute. Reconstitute with 1mL sterile water.    Admin. Amount: 1 mg  Dispense Loc:   [facility] ADS 6[facility]               heparin lock flush 10 UNIT/ML injection 2-5 mL  Dose: 2-5 mL  Freq: ONCE PRN Route: IK  PRN Reason: line flush  PRN Comment: for locking each dormant lumen with line placement  Start: 18 0823   Admin Instructions: May repeat x 1    Admin. Amount: 2-5 mL  Dispense Loc: [facility] ADS 6A  Administrations Remainin  Volume: 5 mL               heparin lock flush 10 UNIT/ML injection 5-10 mL  Dose: 5-10 mL  Freq: EVERY 1 HOUR PRN Route: IK  PRN Reason: other  PRN Comment: to lock each CVC - Open Ended (Tunneled and Non-Tunneled) dormant lumen.  Start: 18 1518   Admin Instructions: MAX: 5 mL per lumen.    Admin. Amount: 5-10 mL  Last Admin: 18 215  Dispense Loc: [facility] ADS 6A  Volume: 10 mL       0903 (5 mL)-Given       1840 (5 mL)-Given          2159 (5 mL)-Given            heparin lock flush 10 UNIT/ML injection 5-10 mL  Dose: 5-10 mL  Freq: EVERY 24 HOURS Route: IK  Start: 18 1530   Admin Instructions: To lock each CVC - Open Ended (Tunneled and Non-Tunneled) dormant lumen. Check PRN heparin flush order to see when last dose of PRN heparin was given before administering.  MAX: 5 mL per lumen..    Admin. Amount: 5-10 mL  Last Admin: 18 1654  Dispense Loc: [facility] ADS 6A  Volume: 10 mL     (1453)-Not Given        2038 (5 mL)-Given        (1854)-Not Given [C]        1450 (5 mL)-Given        1148 (5 mL)-Given       1150 (5 mL)-Given       1617 (5 mL)-Given        1054 (5 mL)-Given       1654 (5 mL)-Given        [ ] 1530           insulin aspart (NovoLOG) inj (RAPID ACTING)  Dose: 1-6 Units  Freq: EVERY 4 HOURS Route: SC  Start: 18 1600   Admin Instructions: Correction Scale - MEDIUM INSULIN RESISTANCE DOSING     Do Not give Correction Insulin if BG less than 140.  For  - 189 give 1 unit.  For  - 239 give 2 units.  For  - 289 give 3 units.  For  - 339 give 4 units.  For  - 399 give 5 units.  For BG greater than or equal to 400 give  6 units.  Check blood glucose Q4H and administer based on blood glucose.  Notify provider if glucose greater than or equal to 350 mg/dL after administration of correction dose.  If given at mealtime, must be administered 5 min before meal or immediately after.    Admin. Amount: 1-6 Units  Last Admin: 08/13/18 0409  Dispense Loc: Contact Rx for dose  Volume: 3 mL     (0334)-Not Given       0822 (1 Units)-Given       1152 (1 Units)-Given       1616 (1 Units)-Given       (1958)-Not Given       (2355)-Not Given        (0517)-Not Given       0809 (1 Units)-Given [C]       1212 (1 Units)-Given       (1620)-Not Given       (2109)-Not Given        (0117)-Not Given       0451 (1 Units)-Given       0924 (1 Units)-Given       (1217)-Not Given       (1700)-Not Given       (2104)-Not Given        (0000)-Not Given       0421 (1 Units)-Given       0920 (1 Units)-Given       (1224)-Not Given       (1801)-Not Given       (2000)-Not Given [C]       (2219)-Not Given [C]               (0240)-Not Given [C]              0804 (1 Units)-Given       1229 (1 Units)-Given       (1621)-Not Given       (2028)-Not Given        (0019)-Not Given [C]       0400 (1 Units)-Given [C]       (0807)-Not Given       (1343)-Not Given       (1656)-Not Given [C]       (2209)-Not Given [C]        (0009)-Not Given [C]       0409 (1 Units)-Given [C]       (0758)-Not Given [C]       (1236)-Not Given       [ ] 1600       [ ] 2000           labetalol (NORMODYNE/TRANDATE) injection 10-20 mg  Dose: 10-20 mg  Freq: EVERY 4 HOURS PRN Route: IV  PRN Reason: high blood pressure  PRN Comment: SBP > 180 and/or DBP > 95. Hold for HR < 60  Start: 08/04/18 0831   Admin Instructions: For ordered doses up to 80 mg, give IV Push undiluted. Give each 20 mg over 2 minutes.    Admin. Amount: 10-20 mg = 2-4 mL Conc: 5 mg/mL  Dispense Loc: [facility]  Infused Over: 2-8 Minutes  Volume: 4 mL               lidocaine (LMX4) cream  Freq: ONCE PRN Route: Top  PRN Reason:  moderate pain  PRN Comment: for local anesthetic during PICC insertion  Start: 18   Admin Instructions: Apply to PICC Insertion Site. Apply 30 minutes prior to procedure. MAX Dose: 2.5 gm  (1/2 of 5 gm tube)      Dispense Loc: [facility]S 6[facility]  Administrations Remainin               lidocaine 1 % 0.5-5 mL  Dose: 0.5-5 mL  Freq: ONCE PRN Route: OTHER  PRN Comment: mild pain For local anesthetic during PICC insertion.  Start: 18   Admin Instructions: Give Sub-Q/Intradermal.  Give in divided doses as needed.    Admin. Amount: 0.5-5 mL  Dispense Loc: [facility]S 6A  Administrations Remainin  Volume: 5 mL               magnesium sulfate 4 g in 100 mL sterile water (premade)  Dose: 4 g  Freq: EVERY 4 HOURS PRN Route: IV  PRN Reason: magnesium supplementation  Last Dose: 4 g (18)  Start: 18   Admin Instructions: For serum Mg++ less than 1.6 mg/dL  Give 4 g and recheck magnesium level 2 hours after dose, and next AM.    Admin. Amount: 4 g = 100 mL Conc: 4 g/100 mL  Last Admin: 18  Dispense Loc: [facility]  Infused Over: 120 Minutes  Volume: 100 mL               multivitamins with minerals (CERTAVITE/CEROVITE) liquid 15 mL  Dose: 15 mL  Freq: DAILY Route: PER FEEDING   Start: 18 1515   Admin. Amount: 15 mL  Last Admin: 18 0757  Dispense Loc: [facility]S 6A  Volume: 15 mL     0811 (15 mL)-Given        0806 (15 mL)-Given        0924 (15 mL)-Given        0921 (15 mL)-Given        0804 (15 mL)-Given        0758 (15 mL)-Given        0757 (15 mL)-Given           mycophenolate (CELLCEPT BRAND) suspension 1,000 mg  Dose: 1,000 mg  Freq: 2 TIMES DAILY Route: ORAL OR FEED  Start: 18 0830   Admin Instructions: Check if BLOOD LEVEL is needed BEFORE administering dose.  This order is specifically written for CELLCEPT (BRAND NAME).<br><br>When possible, take 1 to 2 hours apart from any product containing magnesium or aluminum.    Admin. Amount: 1,000  mg = 5 mL Conc: 200 mg/mL  Last Admin: 08/13/18 0756  Dispense Loc: [facility]  Volume: 5 mL     0812 (1,000 mg)-Given       1956 (1,000 mg)-Given        0807 (1,000 mg)-Given       2037 (1,000 mg)-Given        0921 (1,000 mg)-Given       2042 (1,000 mg)-Given        0920 (1,000 mg)-Given       2103 (1,000 mg)-Given        0804 (1,000 mg)-Given       2027 (1,000 mg)-Given        0758 (1,000 mg)-Given       2015 (1,000 mg)-Given        0756 (1,000 mg)-Given       [ ] 2000           naloxone (NARCAN) injection 0.1-0.4 mg  Dose: 0.1-0.4 mg  Freq: EVERY 2 MIN PRN Route: IV  PRN Reason: opioid reversal  Start: 07/29/18 1934   Admin Instructions: For respiratory rate LESS than or EQUAL to 8.  Partial reversal dose:  0.1 mg titrated q 2 minutes for Analgesia Side Effects Monitoring Sedation Level of 3 (frequently drowsy, arousable, drifts to sleep during conversation).Full reversal dose:  0.4 mg bolus for Analgesia Side Effects Monitoring Sedation Level of 4 (somnolent, minimal or no response to stimulation).  For ordered doses up to 2mg give IVP. Give each 0.4mg over 15 seconds in emergency situations. For non-emergent situations further dilute in 9mL of NS to facilitate titration of response.    Admin. Amount: 0.1-0.4 mg = 0.25-1 mL Conc: 0.4 mg/mL  Dispense Loc: [facility]S 6A  Volume: 1 mL               omeprazole (priLOSEC) suspension 20 mg  Dose: 20 mg  Freq: EVERY MORNING BEFORE BREAKFAST Route: ORAL OR FEED  Start: 08/02/18 0730   Admin Instructions: Shake well.    Admin. Amount: 20 mg = 10 mL Conc: 2 mg/mL  Last Admin: 08/13/18 0756  Dispense Loc: [facility]  Volume: 10 mL     0808 (20 mg)-Given        0806 (20 mg)-Given        0921 (20 mg)-Given        0920 (20 mg)-Given        0803 (20 mg)-Given        0757 (20 mg)-Given        0756 (20 mg)-Given           oxyCODONE (ROXICODONE) solution 5-10 mg  Dose: 5-10 mg  Freq: EVERY 4 HOURS PRN Route: PO  PRN Reason: moderate to severe pain  Start:  08/08/18 1252   Admin. Amount: 5-10 mg = 5-10 mL Conc: 1 mg/mL  Last Admin: 08/09/18 1315  Dispense Loc: [facility]S 6[facility]  Volume: 10 mL       1315 (5 mg)-Given               polyethylene glycol (MIRALAX/GLYCOLAX) Packet 17 g  Dose: 17 g  Freq: 2 TIMES DAILY Route: ORAL OR FEED  Start: 08/11/18 2000   Admin Instructions: 1 Packet = 17 grams. Mixed prescribed dose in 8 ounces of water. Follow with 8 oz. of water.    Admin. Amount: 17 g  Dispense Loc: [facility] ADS 6A         (2028)-Not Given        (0807)-Not Given       (2016)-Not Given        (0759)-Not Given       [ ] 2000           potassium chloride (KLOR-CON) Packet 20-40 mEq  Dose: 20-40 mEq  Freq: EVERY 2 HOURS PRN Route: ORAL OR FEED  PRN Reason: potassium supplementation  Start: 07/29/18 1934   Admin Instructions: Use if unable to tolerate tablets.  If Serum K+ 3.0-3.3, dose = 60 mEq po total dose (40 mEq x1 followed in 2 hours by 20 mEq x1). Recheck K+ level 4 hours after dose and the next AM.  If Serum K+ 2.5-2.9, dose = 80 mEq po total dose (40 mEq Q2H x2). Recheck K+ level 4 hours after dose and the next AM.  If Serum K+ less than 2.5, See IV order.  Dissolve packet contents in 4-8 ounces of cold water or juice.    Admin. Amount: 20-40 mEq  Last Admin: 08/05/18 0009  Dispense Loc: [facility] ADS 6A               potassium chloride 10 mEq in 100 mL sterile water intermittent infusion (premix)  Dose: 10 mEq  Freq: EVERY 1 HOUR PRN Route: IV  PRN Reason: potassium supplementation  Start: 07/29/18 1934   Admin Instructions: Infuse via PERIPHERAL LINE or CENTRAL LINE. Use for central line replacement if patient weight less than 65 kg, if patient is on TPN with high potassium content or if unit does not stock 20 mEq bags.   If Serum K+ 3.0-3.3, dose = 10 mEq/hr x4 doses (40 mEq IV total dose). Recheck K+ level 2 hours after dose and the next AM.   If Serum K+ less than 3.0, dose = 10 mEq/hr x6 doses (60 mEq IV total dose). Recheck K+ level 2 hours after dose and the  next AM.    Admin. Amount: 10 mEq = 100 mL Conc: 10 mEq/100 mL  Dispense Loc: [facility]S 6A  Infused Over: 60 Minutes  Volume: 100 mL               potassium chloride 20 mEq in 50 mL intermittent infusion  Dose: 20 mEq  Freq: EVERY 1 HOUR PRN Route: IV  PRN Reason: potassium supplementation  Start: 07/29/18 1934   Admin Instructions: Infuse via CENTRAL LINE Only. May need EKG if less than 65 kg or on TPN - Max rate is 0.3 mEq/kg/hr for patients not on EKG monitoring.   If Serum K+ 3.0-3.3, dose = 20 mEq/hr x2 doses (40 mEq IV total dose). Recheck K+ level 2 hours after dose and the next AM.  If Serum K+ less than 3.0, dose = 20 mEq/hr x3 doses (60 mEq IV total dose). Recheck K+ level 2 hours after dose and the next AM.    Admin. Amount: 20 mEq = 50 mL Conc: 20 mEq/50 mL  Dispense Loc: [facility]  Volume: 50 mL               potassium chloride SA (K-DUR/KLOR-CON M) CR tablet 20-40 mEq  Dose: 20-40 mEq  Freq: EVERY 2 HOURS PRN Route: PO  PRN Reason: potassium supplementation  Start: 07/29/18 1934   Admin Instructions: Use if able to take PO.   If Serum K+ 3.0-3.3, dose = 60 mEq po total dose (40 mEq x1 followed in 2 hours by 20 mEq x1). Recheck K+ level 4 hours after dose and the next AM.  If Serum K+ 2.5-2.9, dose = 80 mEq po total dose (40 mEq Q2H x2). Recheck K+ level 4 hours after dose and the next AM.  If Serum K+ less than 2.5, See IV order.  DO NOT CRUSH.    Admin. Amount: 2-4 tablet (2-4 × 10 mEq tablet)  Dispense Loc: [facility] ADS 6A               potassium phosphate 15 mmol in D5W 250 mL intermittent infusion  Dose: 15 mmol  Freq: DAILY PRN Route: IV  PRN Reason: phosphorous supplementation  Last Dose: 15 mmol (08/05/18 0611)  Start: 07/29/18 1934   Admin Instructions: For serum phosphorus level 2-2.4  Do not infuse Phosphorus in the same line as TPN.   Give 15 mmol and recheck phosphorus level next AM.  Each mmol of phosphate provides 1.47 mEq of Potassium. Multiply the patient's phosphate dose  by 1.47 to determine the amount of potassium in this dose.    Admin. Amount: 15 mmol  Last Admin: 08/05/18 0611  Dispense Loc: [facility]  Infused Over: 4 Hours  Volume: 250 mL   Mixture Administration Information:   Medication Type Amount   potassium phosphate 3 MMOLE/ML SOLN Medications 15 mmol   D5W 5 % SOLN Base 250 mL                       potassium phosphate 20 mmol in D5W 250 mL intermittent infusion  Dose: 20 mmol  Freq: EVERY 6 HOURS PRN Route: IV  PRN Reason: phosphorous supplementation  Start: 07/29/18 1934   Admin Instructions: For serum phosphorus level 1.1-1.9  For CENTRAL Line ONLY  Do not infuse Phosphorus in the same line as TPN.   Give 20 mmol and recheck phosphorus level 2 hours after last dose and next AM.  Each mmol of phosphate provides 1.47 mEq of Potassium. Multiply the patient's phosphate dose by 1.47 to determine the amount of potassium in this dose.    Admin. Amount: 20 mmol  Dispense Loc: [facility]  Infused Over: 4 Hours  Volume: 250 mL   Mixture Administration Information:   Medication Type Amount   potassium phosphate 3 MMOLE/ML SOLN Medications 20 mmol   D5W 5 % SOLN Base 250 mL                       potassium phosphate 20 mmol in D5W 500 mL intermittent infusion  Dose: 20 mmol  Freq: EVERY 6 HOURS PRN Route: IV  PRN Reason: phosphorous supplementation  Start: 07/29/18 1934   Admin Instructions: For serum phosphorus level 1.1-1.9  For Peripheral Line  Do not infuse Phosphorus in the same line as TPN.   Give 20 mmol and recheck phosphorus level 2 hours after last dose and next AM.  Each mmol of phosphate provides 1.47 mEq of Potassium. Multiply the patient's phosphate dose by 1.47 to determine the amount of potassium in this dose.    Admin. Amount: 20 mmol  Dispense Loc: [facility]  Infused Over: 4 Hours  Volume: 500 mL   Mixture Administration Information:   Medication Type Amount   potassium phosphate 3 MMOLE/ML SOLN Medications 20 mmol   D5W 5 % SOLN  Base 500 mL                       potassium phosphate 25 mmol in D5W 500 mL intermittent infusion  Dose: 25 mmol  Freq: EVERY 8 HOURS PRN Route: IV  PRN Reason: phosphorous supplementation  Start: 07/29/18 1934   Admin Instructions: For serum phosphorus level less than 1.1  Do not infuse Phosphorus in the same line as TPN.   Give 25 mmol and recheck phosphorus level 2 hours after last dose and next AM.  Each mmol of phosphate provides 1.47 mEq of Potassium. Multiply the patient's phosphate dose by 1.47 to determine the amount of potassium in this dose.    Admin. Amount: 25 mmol  Dispense Loc: [facility]  Infused Over: 6 Hours  Volume: 500 mL   Mixture Administration Information:   Medication Type Amount   potassium phosphate 3 MMOLE/ML SOLN Medications 25 mmol   D5W 5 % SOLN Base 500 mL                       predniSONE (DELTASONE) tablet 5 mg  Dose: 5 mg  Freq: DAILY Route: PO  Start: 07/30/18 1800   Admin. Amount: 1 tablet (1 × 5 mg tablet)  Last Admin: 08/13/18 0758  Dispense Loc: [facility] ADS 6A     0811 (5 mg)-Given        0806 (5 mg)-Given        0926 (5 mg)-Given        0923 (5 mg)-Given        0804 (5 mg)-Given        0756 (5 mg)-Given        0758 (5 mg)-Given           QUEtiapine (SEROquel) tablet 50 mg  Dose: 50 mg  Freq: AT BEDTIME Route: PO  Start: 08/10/18 2200   Admin. Amount: 2 tablet (2 × 25 mg tablet)  Last Admin: 08/12/18 2159  Dispense Loc: [facility] ADS 6A        2212 (50 mg)-Given        2224 (50 mg)-Given        2159 (50 mg)-Given        [ ] 2200           sennosides (SENOKOT) syrup 10 mL  Dose: 10 mL  Freq: 2 TIMES DAILY Route: ORAL OR FEED  Start: 07/30/18 2000   Admin. Amount: 10 mL  Last Admin: 08/07/18 0811  Dispense Loc: [facility]  Volume: 10 mL     0811 (10 mL)-Given       (1944)-Not Given        (0753)-Not Given       (1946)-Not Given        (1000)-Not Given       (2104)-Not Given        (0924)-Not Given       (2036)-Not Given        (0819)-Not Given       (2016)-Not  Given        (807)-Not Given       ()-Not Given        (755)-Not Given       [ ]            sodium chloride (PF) 0.9% PF flush 10-20 mL  Dose: 10-20 mL  Freq: EVERY 1 HOUR PRN Route: IK  PRN Reasons: line flush,post meds or blood draw  Start: 18 1518   Admin Instructions: to flush CVC - Open Ended (Tunneled and Non-Tunneled).   10 mL post IV meds; 20 mL post blood draw.    Admin. Amount: 10-20 mL  Last Admin: 18 1054  Dispense Loc: [facility]  Volume: 20 mL       0725 (20 mL)-Given [C]       1840 (20 mL)-Given          1054 (10 mL)-Given            sodium chloride (PF) 0.9% PF flush 5-50 mL  Dose: 5-50 mL  Freq: ONCE PRN Route: IK  PRN Reason: line flush  PRN Comment: to flush each lumen with line placement  Start: 18 08   Admin Instructions: May repeat x 1    Admin. Amount: 5-50 mL  Dispense Loc: [facility]  Administrations Remainin  Volume: 50 mL               sulfamethoxazole-trimethoprim (BACTRIM/SEPTRA) 400-80 MG per tablet 1 tablet  Dose: 1 tablet  Freq: DAILY Route: PO  Indications Comment: PCP prophylaxis  Start: 08/10/18 0800   Admin. Amount: 1 tablet  Last Admin: 18 0758  Dispense Loc: [facility] ADS 6A        0923 (1 tablet)-Given        0804 (1 tablet)-Given        0756 (1 tablet)-Given        0758 (1 tablet)-Given           tacrolimus (GENERIC EQUIVALENT) suspension 3.5 mg  Dose: 3.5 mg  Freq: 2 TIMES DAILY Route: ORAL OR FEED  Start: 18 1800   Admin Instructions: Check if BLOOD LEVEL is needed BEFORE administering dose.  As this medication is not commercially available as a liquid,  [employer] manufactures this product using tacrolimus (GENERIC EQUIV) capsules.    Admin. Amount: 3.5 mg = 3.5 mL Conc: 1 mg/mL  Last Admin: 18 0756  Dispense Loc: [facility]  Volume: 3.5 mL        0000 (3.5 mg)-Given       0921 (3.5 mg)-Given       1801 (3.5 mg)-Given        0803 (3.5 mg)-Given       1814 (3.5 mg)-Given        0757 (3.5  mg)-Given       1841 (3.5 mg)-Given        0756 (3.5 mg)-Given       [ ] 1800           thiamine 100 MG/ML suspension 250 mg  Dose: 250 mg  Freq: DAILY Route: ORAL OR FEED  Start: 18 0800   Admin. Amount: 250 mg = 2.5 mL Conc: 100 mg/mL  Last Admin: 18  Dispense Loc: [facility]  Volume: 2.5 mL     0809 (250 mg)-Given        0807 (250 mg)-Given        0920 (250 mg)-Given        0921 (250 mg)-Given        0804 (250 mg)-Given        0757 (250 mg)-Given        0756 (250 mg)-Given           valGANciclovir (VALCYTE) solution 450 mg  Dose: 450 mg  Freq: DAILY Route: ORAL OR FEED  Indications of Use: CYTOMEGALOVIRUS DISEASE PHARMACOPROPHYLAXIS  Start: 18 0800   Admin. Amount: 450 mg = 9 mL Conc: 50 mg/mL  Last Admin: 18  Dispense Loc: [facility]  Volume: 9 mL     0807 (450 mg)-Given        0807 (450 mg)-Given        0922 (450 mg)-Given        0921 (450 mg)-Given        0804 (450 mg)-Given        0757 (450 mg)-Given        0757 (450 mg)-Given          Future Medications  Medications 08/07/18 08/08/18 08/09/18 08/10/18 08/11/18 08/12/18 08/13/18       escitalopram (LEXAPRO) solution 20 mg  Dose: 20 mg  Freq: DAILY Route: PO  Start: 18 08   Admin. Amount: 20 mg = 20 mL Conc: 1 mg/mL  Dispense Loc: [facility]  Volume: 20 mL              Completed Medications  Medications 08/07/18 08/08/18 08/09/18 08/10/18 08/11/18 08/12/18 08/13/18         Dose: 2 g  Freq: ONCE Route: IV  Last Dose: 2 g (18)  Start: 18   End: 18   Admin. Amount: 2 g = 50 mL Conc: 0.04 g/mL  Last Admin: 18  Dispense Loc: [facility]  Administrations Remainin  Volume: 50 mL         1044 (2 g)-New Bag            Discontinued Medications  Medications 08/07/18 08/08/18 08/09/18 08/10/18 08/11/18 08/12/18 08/13/18         Dose: 10 mg  Freq: EVERY 8 HOURS Route: RE  Start: 18   End: 18 1159   Admin Instructions: Hold if  pt has had moderate sized stool in the past 24 hours    Admin. Amount: 1 suppository (1 × 10 mg suppository)  Dispense Loc: [facility] ADS 6A         (912)-Not Given [C]       1159-Med Discontinued           Dose: 1 mg  Freq: 2 TIMES DAILY PRN Route: PO  PRN Reason: anxiety  Start: 18 0820   End: 08/10/18 1559   Admin Instructions: Shake Well.    Admin. Amount: 1 mg = 10 mL Conc: 0.1 mg/mL  Last Admin: 18 08  Dispense Loc: [facility]  Volume: 10 mL     0436 (1 mg)-Given       0808 (1 mg)-Given          1559-Med Discontinued            Dose: 1 mg  Freq: DAILY Route: PO  Start: 18 08   End: 18 115   Admin Instructions: Shake Well.    Admin. Amount: 1 mg = 10 mL Conc: 0.1 mg/mL  Last Admin: 18  Dispense Loc: [facility]  Volume: 10 mL     0812 (1 mg)-Given        (852)-Not Given        (1 mg)-Given                (1 mg)-Given        (924)-Not Given       ()-Not Given [C]        1159-Med Discontinued           Dose: 0.5 mg  Freq: EVERY 1 HOUR PRN Route: IV  PRN Reason: moderate to severe pain  Start: 18 1510   End: 08/10/18 1603   Admin Instructions: For ordered doses up to 4 mg give IV Push undiluted. Administer each 2mg over 2-5 minutes.    Admin. Amount: 0.5 mg  Last Admin: 18 1336  Dispense Loc: [facility] ADS 6A        1603-Med Discontinued            Dose: 2 g  Freq: ONCE Route: IV  Start: 18 0915   End: 18 09   Admin. Amount: 2 g = 50 mL Conc: 2 g/50 mL  Dispense Loc: [facility]  Administrations Remainin  Volume: 50 mL                19-Med Discontinued           Dose: 5 mg  Freq: EVERY 6 HOURS PRN Route: PO  PRN Reason: agitation  PRN Comment: anxiety  Start: 18 1042   End: 08/10/18 1603   Admin Instructions: Shake Well. Combined IM and PO doses may significantly increase the risk of orthostatic hypotension at 30 mg per day or higher.    Admin. Amount: 5 mg = 5 mL Conc: 1 mg/mL  [facility] Loc[facility]  Volume: 5 mL        1603-Med Discontinued            Dose: 17 g  Freq: DAILY Route: ORAL OR FEED  Start: 08/01/18 1215   End: 08/11/18 0911   Admin Instructions: 1 Packet = 17 grams. Mixed prescribed dose in 8 ounces of water. Follow with 8 oz. of water.    Admin. Amount: 17 g  Last Admin: 08/06/18 0744  Dispense Loc: [facility] ADS 6A     (0813)-Not Given        (0752)-Not Given        (1000)-Not Given        (0924)-Not Given        (0819)-Not Given       0911-Med Discontinued           Dose: 300 mg  Freq: EVERY EVENING Route: PO  Start: 08/03/18 2000   End: 08/10/18 1559   Admin. Amount: 3 tablet (3 × 100 mg tablet)  Last Admin: 08/09/18 2043  Dispense Loc: [facility] ADS 6A     1957 (300 mg)-Given        2038 (300 mg)-Given        2043 (300 mg)-Given        1559-Med Discontinued       Medications 08/07/18 08/08/18 08/09/18 08/10/18 08/11/18 08/12/18 08/13/18
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
## 2018-07-30 LAB
ALBUMIN UR-MCNC: NEGATIVE MG/DL
ANION GAP SERPL CALCULATED.3IONS-SCNC: 5 MMOL/L (ref 3–14)
ANION GAP SERPL CALCULATED.3IONS-SCNC: 7 MMOL/L (ref 3–14)
APPEARANCE UR: CLEAR
BACTERIA #/AREA URNS HPF: ABNORMAL /HPF
BILIRUB UR QL STRIP: NEGATIVE
BUN SERPL-MCNC: 6 MG/DL (ref 7–30)
BUN SERPL-MCNC: 6 MG/DL (ref 7–30)
CALCIUM SERPL-MCNC: 8.6 MG/DL (ref 8.5–10.1)
CALCIUM SERPL-MCNC: 8.9 MG/DL (ref 8.5–10.1)
CHLORIDE SERPL-SCNC: 109 MMOL/L (ref 94–109)
CHLORIDE SERPL-SCNC: 110 MMOL/L (ref 94–109)
CO2 SERPL-SCNC: 24 MMOL/L (ref 20–32)
CO2 SERPL-SCNC: 25 MMOL/L (ref 20–32)
COLOR UR AUTO: ABNORMAL
CREAT SERPL-MCNC: 0.83 MG/DL (ref 0.66–1.25)
CREAT SERPL-MCNC: 0.84 MG/DL (ref 0.66–1.25)
ERYTHROCYTE [DISTWIDTH] IN BLOOD BY AUTOMATED COUNT: 15.9 % (ref 10–15)
GFR SERPL CREATININE-BSD FRML MDRD: >90 ML/MIN/1.7M2
GFR SERPL CREATININE-BSD FRML MDRD: >90 ML/MIN/1.7M2
GLUCOSE BLDC GLUCOMTR-MCNC: 133 MG/DL (ref 70–99)
GLUCOSE SERPL-MCNC: 117 MG/DL (ref 70–99)
GLUCOSE SERPL-MCNC: 172 MG/DL (ref 70–99)
GLUCOSE UR STRIP-MCNC: NEGATIVE MG/DL
HCT VFR BLD AUTO: 30.5 % (ref 40–53)
HGB BLD-MCNC: 10.4 G/DL (ref 13.3–17.7)
HGB UR QL STRIP: NEGATIVE
HYALINE CASTS #/AREA URNS LPF: 1 /LPF (ref 0–2)
INR PPP: 1.1 (ref 0.86–1.14)
KETONES UR STRIP-MCNC: NEGATIVE MG/DL
LEUKOCYTE ESTERASE UR QL STRIP: NEGATIVE
MAGNESIUM SERPL-MCNC: 2 MG/DL (ref 1.6–2.3)
MAGNESIUM SERPL-MCNC: 2.3 MG/DL (ref 1.6–2.3)
MCH RBC QN AUTO: 30.4 PG (ref 26.5–33)
MCHC RBC AUTO-ENTMCNC: 34.1 G/DL (ref 31.5–36.5)
MCV RBC AUTO: 89 FL (ref 78–100)
NITRATE UR QL: NEGATIVE
OSMOLALITY SERPL: 289 MMOL/KG (ref 275–295)
OSMOLALITY UR: 346 MMOL/KG (ref 100–1200)
PH UR STRIP: 7 PH (ref 5–7)
PHOSPHATE SERPL-MCNC: 3.6 MG/DL (ref 2.5–4.5)
PLATELET # BLD AUTO: 142 10E9/L (ref 150–450)
POTASSIUM SERPL-SCNC: 4.2 MMOL/L (ref 3.4–5.3)
POTASSIUM SERPL-SCNC: 4.6 MMOL/L (ref 3.4–5.3)
RBC # BLD AUTO: 3.42 10E12/L (ref 4.4–5.9)
RBC #/AREA URNS AUTO: 1 /HPF (ref 0–2)
SODIUM SERPL-SCNC: 139 MMOL/L (ref 133–144)
SODIUM SERPL-SCNC: 141 MMOL/L (ref 133–144)
SODIUM UR-SCNC: 145 MMOL/L
SOURCE: ABNORMAL
SP GR UR STRIP: 1.01 (ref 1–1.03)
UROBILINOGEN UR STRIP-MCNC: NORMAL MG/DL (ref 0–2)
WBC # BLD AUTO: 9.3 10E9/L (ref 4–11)
WBC #/AREA URNS AUTO: 1 /HPF (ref 0–5)

## 2018-07-30 PROCEDURE — 25000128 H RX IP 250 OP 636: Performed by: NURSE PRACTITIONER

## 2018-07-30 PROCEDURE — 99233 SBSQ HOSP IP/OBS HIGH 50: CPT | Performed by: NURSE PRACTITIONER

## 2018-07-30 PROCEDURE — 84300 ASSAY OF URINE SODIUM: CPT | Performed by: NURSE PRACTITIONER

## 2018-07-30 PROCEDURE — 85610 PROTHROMBIN TIME: CPT | Performed by: SURGERY

## 2018-07-30 PROCEDURE — 25000128 H RX IP 250 OP 636: Performed by: STUDENT IN AN ORGANIZED HEALTH CARE EDUCATION/TRAINING PROGRAM

## 2018-07-30 PROCEDURE — 85027 COMPLETE CBC AUTOMATED: CPT | Performed by: SURGERY

## 2018-07-30 PROCEDURE — A9270 NON-COVERED ITEM OR SERVICE: HCPCS | Mod: GY | Performed by: STUDENT IN AN ORGANIZED HEALTH CARE EDUCATION/TRAINING PROGRAM

## 2018-07-30 PROCEDURE — 25000132 ZZH RX MED GY IP 250 OP 250 PS 637: Mod: GY

## 2018-07-30 PROCEDURE — 83935 ASSAY OF URINE OSMOLALITY: CPT | Performed by: NURSE PRACTITIONER

## 2018-07-30 PROCEDURE — 25800025 ZZH RX 258: Performed by: STUDENT IN AN ORGANIZED HEALTH CARE EDUCATION/TRAINING PROGRAM

## 2018-07-30 PROCEDURE — 94003 VENT MGMT INPAT SUBQ DAY: CPT

## 2018-07-30 PROCEDURE — 27210429 ZZH NUTRITION PRODUCT INTERMEDIATE LITER

## 2018-07-30 PROCEDURE — 25000132 ZZH RX MED GY IP 250 OP 250 PS 637: Mod: GY | Performed by: STUDENT IN AN ORGANIZED HEALTH CARE EDUCATION/TRAINING PROGRAM

## 2018-07-30 PROCEDURE — 80048 BASIC METABOLIC PNL TOTAL CA: CPT | Performed by: SURGERY

## 2018-07-30 PROCEDURE — 81001 URINALYSIS AUTO W/SCOPE: CPT | Performed by: NURSE PRACTITIONER

## 2018-07-30 PROCEDURE — 80048 BASIC METABOLIC PNL TOTAL CA: CPT | Performed by: NURSE PRACTITIONER

## 2018-07-30 PROCEDURE — 25000131 ZZH RX MED GY IP 250 OP 636 PS 637: Mod: GY | Performed by: STUDENT IN AN ORGANIZED HEALTH CARE EDUCATION/TRAINING PROGRAM

## 2018-07-30 PROCEDURE — 25000132 ZZH RX MED GY IP 250 OP 250 PS 637: Mod: GY | Performed by: NURSE PRACTITIONER

## 2018-07-30 PROCEDURE — 40000014 ZZH STATISTIC ARTERIAL MONITORING DAILY

## 2018-07-30 PROCEDURE — 83930 ASSAY OF BLOOD OSMOLALITY: CPT | Performed by: NURSE PRACTITIONER

## 2018-07-30 PROCEDURE — 25800025 ZZH RX 258

## 2018-07-30 PROCEDURE — 20000004 ZZH R&B ICU UMMC

## 2018-07-30 PROCEDURE — 84100 ASSAY OF PHOSPHORUS: CPT | Performed by: NURSE PRACTITIONER

## 2018-07-30 PROCEDURE — 83735 ASSAY OF MAGNESIUM: CPT | Performed by: SURGERY

## 2018-07-30 PROCEDURE — A9270 NON-COVERED ITEM OR SERVICE: HCPCS | Mod: GY

## 2018-07-30 PROCEDURE — A9270 NON-COVERED ITEM OR SERVICE: HCPCS | Mod: GY | Performed by: NURSE PRACTITIONER

## 2018-07-30 PROCEDURE — 00000146 ZZHCL STATISTIC GLUCOSE BY METER IP

## 2018-07-30 PROCEDURE — 40000275 ZZH STATISTIC RCP TIME EA 10 MIN

## 2018-07-30 PROCEDURE — 25000125 ZZHC RX 250: Performed by: STUDENT IN AN ORGANIZED HEALTH CARE EDUCATION/TRAINING PROGRAM

## 2018-07-30 PROCEDURE — 83735 ASSAY OF MAGNESIUM: CPT | Performed by: NURSE PRACTITIONER

## 2018-07-30 PROCEDURE — 99291 CRITICAL CARE FIRST HOUR: CPT | Mod: GC | Performed by: SURGERY

## 2018-07-30 RX ORDER — SODIUM CHLORIDE, SODIUM LACTATE, POTASSIUM CHLORIDE, CALCIUM CHLORIDE 600; 310; 30; 20 MG/100ML; MG/100ML; MG/100ML; MG/100ML
INJECTION, SOLUTION INTRAVENOUS CONTINUOUS
Status: DISCONTINUED | OUTPATIENT
Start: 2018-07-30 | End: 2018-07-31

## 2018-07-30 RX ORDER — FLUDROCORTISONE ACETATE 0.1 MG/1
100 TABLET ORAL DAILY
Status: DISCONTINUED | OUTPATIENT
Start: 2018-07-30 | End: 2018-08-08

## 2018-07-30 RX ORDER — VALGANCICLOVIR HYDROCHLORIDE 50 MG/ML
450 POWDER, FOR SOLUTION ORAL DAILY
Status: DISCONTINUED | OUTPATIENT
Start: 2018-07-31 | End: 2018-08-13 | Stop reason: HOSPADM

## 2018-07-30 RX ORDER — PREDNISONE 5 MG/1
5 TABLET ORAL DAILY
Status: DISCONTINUED | OUTPATIENT
Start: 2018-07-30 | End: 2018-08-13 | Stop reason: HOSPADM

## 2018-07-30 RX ORDER — SODIUM CHLORIDE, SODIUM LACTATE, POTASSIUM CHLORIDE, CALCIUM CHLORIDE 600; 310; 30; 20 MG/100ML; MG/100ML; MG/100ML; MG/100ML
INJECTION, SOLUTION INTRAVENOUS
Status: DISCONTINUED
Start: 2018-07-30 | End: 2018-08-02 | Stop reason: HOSPADM

## 2018-07-30 RX ORDER — LABETALOL HYDROCHLORIDE 5 MG/ML
10-20 INJECTION, SOLUTION INTRAVENOUS EVERY 4 HOURS PRN
Status: DISCONTINUED | OUTPATIENT
Start: 2018-07-30 | End: 2018-08-04

## 2018-07-30 RX ORDER — ACETAMINOPHEN 325 MG/1
325 TABLET ORAL EVERY 4 HOURS
Status: DISCONTINUED | OUTPATIENT
Start: 2018-07-30 | End: 2018-07-31

## 2018-07-30 RX ADMIN — PREDNISONE 5 MG: 5 TABLET ORAL at 20:49

## 2018-07-30 RX ADMIN — DOCUSATE SODIUM 100 MG: 50 LIQUID ORAL at 20:49

## 2018-07-30 RX ADMIN — PROPOFOL 75 MCG/KG/MIN: 10 INJECTION, EMULSION INTRAVENOUS at 06:14

## 2018-07-30 RX ADMIN — DEXTROSE AND SODIUM CHLORIDE 1000 ML: 5; 450 INJECTION, SOLUTION INTRAVENOUS at 13:58

## 2018-07-30 RX ADMIN — VALGANCICLOVIR 450 MG: 450 TABLET, FILM COATED ORAL at 07:53

## 2018-07-30 RX ADMIN — MULTIVITAMIN 15 ML: LIQUID ORAL at 16:57

## 2018-07-30 RX ADMIN — AMPICILLIN SODIUM AND SULBACTAM SODIUM 3 G: 2; 1 INJECTION, POWDER, FOR SOLUTION INTRAMUSCULAR; INTRAVENOUS at 13:57

## 2018-07-30 RX ADMIN — SENNOSIDES 10 ML: 8.8 SYRUP ORAL at 20:50

## 2018-07-30 RX ADMIN — PROPOFOL 65 MCG/KG/MIN: 10 INJECTION, EMULSION INTRAVENOUS at 12:50

## 2018-07-30 RX ADMIN — Medication 3 MG: at 18:50

## 2018-07-30 RX ADMIN — PROPOFOL 75 MCG/KG/MIN: 10 INJECTION, EMULSION INTRAVENOUS at 16:53

## 2018-07-30 RX ADMIN — PROPOFOL 75 MCG/KG/MIN: 10 INJECTION, EMULSION INTRAVENOUS at 21:01

## 2018-07-30 RX ADMIN — ENOXAPARIN SODIUM 30 MG: 30 INJECTION SUBCUTANEOUS at 14:03

## 2018-07-30 RX ADMIN — RANITIDINE HYDROCHLORIDE 150 MG: 150 SOLUTION ORAL at 20:50

## 2018-07-30 RX ADMIN — Medication 100 MCG/HR: at 11:21

## 2018-07-30 RX ADMIN — AMPICILLIN SODIUM AND SULBACTAM SODIUM 3 G: 2; 1 INJECTION, POWDER, FOR SOLUTION INTRAMUSCULAR; INTRAVENOUS at 20:51

## 2018-07-30 RX ADMIN — NITROGLYCERIN 15 MG: 20 OINTMENT TOPICAL at 07:54

## 2018-07-30 RX ADMIN — MYCOPHENOLATE MOFETIL 750 MG: 200 POWDER, FOR SUSPENSION ORAL at 20:51

## 2018-07-30 RX ADMIN — PROPOFOL 75 MCG/KG/MIN: 10 INJECTION, EMULSION INTRAVENOUS at 01:41

## 2018-07-30 RX ADMIN — ACETAMINOPHEN 325 MG: 325 TABLET, FILM COATED ORAL at 16:57

## 2018-07-30 RX ADMIN — Medication 3 MG: at 07:53

## 2018-07-30 RX ADMIN — AMPICILLIN SODIUM AND SULBACTAM SODIUM 3 G: 2; 1 INJECTION, POWDER, FOR SOLUTION INTRAMUSCULAR; INTRAVENOUS at 01:40

## 2018-07-30 RX ADMIN — Medication 100 MG: at 16:54

## 2018-07-30 RX ADMIN — PROPOFOL 75 MCG/KG/MIN: 10 INJECTION, EMULSION INTRAVENOUS at 09:21

## 2018-07-30 RX ADMIN — BACITRACIN: 500 OINTMENT TOPICAL at 21:11

## 2018-07-30 RX ADMIN — POTASSIUM CHLORIDE, DEXTROSE MONOHYDRATE AND SODIUM CHLORIDE: 150; 5; 450 INJECTION, SOLUTION INTRAVENOUS at 09:24

## 2018-07-30 RX ADMIN — Medication 1 MG: at 16:54

## 2018-07-30 RX ADMIN — AMPICILLIN SODIUM AND SULBACTAM SODIUM 3 G: 2; 1 INJECTION, POWDER, FOR SOLUTION INTRAMUSCULAR; INTRAVENOUS at 07:54

## 2018-07-30 RX ADMIN — POTASSIUM CHLORIDE, DEXTROSE MONOHYDRATE AND SODIUM CHLORIDE: 150; 5; 450 INJECTION, SOLUTION INTRAVENOUS at 00:15

## 2018-07-30 RX ADMIN — FLUDROCORTISONE ACETATE 100 MCG: 0.1 TABLET ORAL at 18:53

## 2018-07-30 RX ADMIN — BACITRACIN: 500 OINTMENT TOPICAL at 07:54

## 2018-07-30 RX ADMIN — SODIUM CHLORIDE, POTASSIUM CHLORIDE, SODIUM LACTATE AND CALCIUM CHLORIDE: 600; 310; 30; 20 INJECTION, SOLUTION INTRAVENOUS at 13:59

## 2018-07-30 RX ADMIN — ACETAMINOPHEN 325 MG: 325 TABLET, FILM COATED ORAL at 20:49

## 2018-07-30 RX ADMIN — MYCOPHENOLATE MOFETIL 750 MG: 200 POWDER, FOR SUSPENSION ORAL at 07:53

## 2018-07-30 ASSESSMENT — ACTIVITIES OF DAILY LIVING (ADL)
ADLS_ACUITY_SCORE: 12

## 2018-07-30 NOTE — PLAN OF CARE
Problem: Patient Care Overview  Goal: Plan of Care/Patient Progress Review  Outcome: No Change  Patient sedated but able to move all extremities.  Eyes swollen and red, pupils reactive with left gaze at times.  Does not follow commands.  Fentanyl drip for pain.  Face is swollen and asymmetrical, bacitracin applied to keep stitches moist.  Sinus rhythm through night, no ectopy noted.  Pulses strong in uppers, weak in lowers.  Extremities cool to touch.  Pressures 130's to 150's.  Trached, inner cannula and site care done x2 this shift.  Suctioning small amounts thick, tan secretions occasionally.  Lungs course to clear over diminished bases.  Oral care with red aquino and normal saline.  Serosanguinous drainage from mouth.  OG to LIS, ~250 tan output overnight.  Bowel sounds active.  Urine output excessive, SICU notified and aware.  Left great toe scabbed and missing the toenail.  Fistula in left arm without thrill or bruit.  Lines include double lumen picc, piv, plascencia, trach.  Drips include versed, fentanyl, propofol, mIV.  Will continue to monitor and update team with changes or concerns.

## 2018-07-30 NOTE — PLAN OF CARE
Problem: Patient Care Overview  Goal: Plan of Care/Patient Progress Review    OT-4a: Hold OT eval, pt not following commands, will hold OT eval and initiate when pt able to participate more in therapies, PT to follow.

## 2018-07-30 NOTE — PHARMACY-CONSULT NOTE
Pharmacy Tube Feeding Consult    Medication reviewed for administration by feeding tube and for potential food/drug interactions.    Recommendation: No changes are needed at this time. Valganciclovir was changed to suspension as tablets should not be crushed.     Pharmacy will continue to follow as new medications are ordered.    Katelyn Covarrubias, PharmD, BCPS
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
## 2018-07-30 NOTE — PLAN OF CARE
Problem: Restraint for Non-Violent/Non-Self-Destructive Behavior  Goal: Prevent/Manage Potential Problems  Maintain safety of patient and others during period of restraint.  Promote psychological and physical wellbeing.  Prevent injury to skin and involved body parts.  Promote nutrition, hydration, and elimination.   Outcome: No Change  Patient requires bilateral wrist restraints to keep from pulling at tubes and lines.
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
## 2018-07-30 NOTE — PLAN OF CARE
Problem: Patient Care Overview  Goal: Plan of Care/Patient Progress Review  S:  VSS, sedated, high UOP  B:  VSS, Versed gtt stopped, range of fent gtt possible dose increased, remains on propofol.       Pt does not withdraw to pain but does open eyes, to painful stim, no tracking.  Eyes noted to deviate laterally bilaterally (both eyes, both R and L directions----)SICU assessed this on rounds.  Pt has cough, not breathing above vent. Pupils pinpoint and sluggish.       No weaning done on vent, pt riding rate of vent, peak pressures wnl, secretions wnl.      Pt w/ dark serousang dng from mouth, red robbin cath used to gently suction mouth.         MDs aware of large UOP past 20 hours, UA spec and chemistry panel sent checking elect, osmo, etc.              Wife called for update and requested that MDs call her with update on plan of care as she is 3.5 hours away-------oral max facial resident texted this info and SICU notified as well.        VSS.  A:  As noted.  R:  Cont w/ care plan.

## 2018-07-30 NOTE — PLAN OF CARE
Problem: Patient Care Overview  Goal: Plan of Care/Patient Progress Review  4A-PT: PT consult received and appreciated. Per chart review, pt sedated and not following commands. Pt also with no active activity orders at this time. CXL PT. Will continue to follow and initiate as appropriate. Request updated activity orders as well.
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
## 2018-07-31 ENCOUNTER — APPOINTMENT (OUTPATIENT)
Dept: PHYSICAL THERAPY | Facility: CLINIC | Age: 48
DRG: 131 | End: 2018-07-31
Attending: SURGERY
Payer: MEDICARE

## 2018-07-31 LAB
ANION GAP SERPL CALCULATED.3IONS-SCNC: 9 MMOL/L (ref 3–14)
BASE DEFICIT BLDA-SCNC: 1.7 MMOL/L
BUN SERPL-MCNC: 6 MG/DL (ref 7–30)
CALCIUM SERPL-MCNC: 9.2 MG/DL (ref 8.5–10.1)
CHLORIDE SERPL-SCNC: 109 MMOL/L (ref 94–109)
CO2 SERPL-SCNC: 22 MMOL/L (ref 20–32)
CREAT SERPL-MCNC: 0.77 MG/DL (ref 0.66–1.25)
ERYTHROCYTE [DISTWIDTH] IN BLOOD BY AUTOMATED COUNT: 15.9 % (ref 10–15)
GFR SERPL CREATININE-BSD FRML MDRD: >90 ML/MIN/1.7M2
GLUCOSE BLDC GLUCOMTR-MCNC: 132 MG/DL (ref 70–99)
GLUCOSE BLDC GLUCOMTR-MCNC: 156 MG/DL (ref 70–99)
GLUCOSE BLDC GLUCOMTR-MCNC: 162 MG/DL (ref 70–99)
GLUCOSE BLDC GLUCOMTR-MCNC: 164 MG/DL (ref 70–99)
GLUCOSE SERPL-MCNC: 167 MG/DL (ref 70–99)
HCO3 BLD-SCNC: 23 MMOL/L (ref 21–28)
HCT VFR BLD AUTO: 34 % (ref 40–53)
HGB BLD-MCNC: 11.4 G/DL (ref 13.3–17.7)
INR PPP: 1.08 (ref 0.86–1.14)
MAGNESIUM SERPL-MCNC: 1.7 MG/DL (ref 1.6–2.3)
MCH RBC QN AUTO: 30.6 PG (ref 26.5–33)
MCHC RBC AUTO-ENTMCNC: 33.5 G/DL (ref 31.5–36.5)
MCV RBC AUTO: 91 FL (ref 78–100)
O2/TOTAL GAS SETTING VFR VENT: 30 %
OXYHGB MFR BLD: 97 % (ref 92–100)
PCO2 BLD: 38 MM HG (ref 35–45)
PH BLD: 7.39 PH (ref 7.35–7.45)
PHOSPHATE SERPL-MCNC: 3.5 MG/DL (ref 2.5–4.5)
PLATELET # BLD AUTO: 184 10E9/L (ref 150–450)
PO2 BLD: 161 MM HG (ref 80–105)
POTASSIUM SERPL-SCNC: 4.6 MMOL/L (ref 3.4–5.3)
RBC # BLD AUTO: 3.73 10E12/L (ref 4.4–5.9)
SODIUM SERPL-SCNC: 140 MMOL/L (ref 133–144)
TACROLIMUS BLD-MCNC: 13.5 UG/L (ref 5–15)
TME LAST DOSE: NORMAL H
WBC # BLD AUTO: 10.6 10E9/L (ref 4–11)

## 2018-07-31 PROCEDURE — 97110 THERAPEUTIC EXERCISES: CPT | Mod: GP

## 2018-07-31 PROCEDURE — A9270 NON-COVERED ITEM OR SERVICE: HCPCS | Mod: GY | Performed by: NURSE PRACTITIONER

## 2018-07-31 PROCEDURE — 99231 SBSQ HOSP IP/OBS SF/LOW 25: CPT | Performed by: NURSE PRACTITIONER

## 2018-07-31 PROCEDURE — 25000131 ZZH RX MED GY IP 250 OP 636 PS 637: Mod: GY | Performed by: STUDENT IN AN ORGANIZED HEALTH CARE EDUCATION/TRAINING PROGRAM

## 2018-07-31 PROCEDURE — 40000014 ZZH STATISTIC ARTERIAL MONITORING DAILY

## 2018-07-31 PROCEDURE — 85027 COMPLETE CBC AUTOMATED: CPT | Performed by: SURGERY

## 2018-07-31 PROCEDURE — 85610 PROTHROMBIN TIME: CPT | Performed by: SURGERY

## 2018-07-31 PROCEDURE — 99291 CRITICAL CARE FIRST HOUR: CPT | Mod: GC | Performed by: SURGERY

## 2018-07-31 PROCEDURE — 25000125 ZZHC RX 250: Performed by: STUDENT IN AN ORGANIZED HEALTH CARE EDUCATION/TRAINING PROGRAM

## 2018-07-31 PROCEDURE — 25000128 H RX IP 250 OP 636: Performed by: PHYSICIAN ASSISTANT

## 2018-07-31 PROCEDURE — 25000132 ZZH RX MED GY IP 250 OP 250 PS 637: Mod: GY

## 2018-07-31 PROCEDURE — 80197 ASSAY OF TACROLIMUS: CPT | Performed by: INTERNAL MEDICINE

## 2018-07-31 PROCEDURE — 25000132 ZZH RX MED GY IP 250 OP 250 PS 637: Mod: GY | Performed by: NURSE PRACTITIONER

## 2018-07-31 PROCEDURE — 27210429 ZZH NUTRITION PRODUCT INTERMEDIATE LITER

## 2018-07-31 PROCEDURE — 25000128 H RX IP 250 OP 636: Performed by: NURSE PRACTITIONER

## 2018-07-31 PROCEDURE — 00000146 ZZHCL STATISTIC GLUCOSE BY METER IP

## 2018-07-31 PROCEDURE — 25000132 ZZH RX MED GY IP 250 OP 250 PS 637: Mod: GY | Performed by: STUDENT IN AN ORGANIZED HEALTH CARE EDUCATION/TRAINING PROGRAM

## 2018-07-31 PROCEDURE — 25000128 H RX IP 250 OP 636

## 2018-07-31 PROCEDURE — 25000132 ZZH RX MED GY IP 250 OP 250 PS 637: Mod: GY | Performed by: SURGERY

## 2018-07-31 PROCEDURE — 40000275 ZZH STATISTIC RCP TIME EA 10 MIN

## 2018-07-31 PROCEDURE — 84100 ASSAY OF PHOSPHORUS: CPT | Performed by: SURGERY

## 2018-07-31 PROCEDURE — A9270 NON-COVERED ITEM OR SERVICE: HCPCS | Mod: GY

## 2018-07-31 PROCEDURE — 25000128 H RX IP 250 OP 636: Performed by: STUDENT IN AN ORGANIZED HEALTH CARE EDUCATION/TRAINING PROGRAM

## 2018-07-31 PROCEDURE — 25800025 ZZH RX 258: Performed by: NURSE PRACTITIONER

## 2018-07-31 PROCEDURE — 82805 BLOOD GASES W/O2 SATURATION: CPT | Performed by: STUDENT IN AN ORGANIZED HEALTH CARE EDUCATION/TRAINING PROGRAM

## 2018-07-31 PROCEDURE — A9270 NON-COVERED ITEM OR SERVICE: HCPCS | Mod: GY | Performed by: STUDENT IN AN ORGANIZED HEALTH CARE EDUCATION/TRAINING PROGRAM

## 2018-07-31 PROCEDURE — 97162 PT EVAL MOD COMPLEX 30 MIN: CPT | Mod: GP

## 2018-07-31 PROCEDURE — 20000004 ZZH R&B ICU UMMC

## 2018-07-31 PROCEDURE — 40000193 ZZH STATISTIC PT WARD VISIT

## 2018-07-31 PROCEDURE — A9270 NON-COVERED ITEM OR SERVICE: HCPCS | Mod: GY | Performed by: PHYSICIAN ASSISTANT

## 2018-07-31 PROCEDURE — 80180 DRUG SCRN QUAN MYCOPHENOLATE: CPT | Performed by: INTERNAL MEDICINE

## 2018-07-31 PROCEDURE — 94003 VENT MGMT INPAT SUBQ DAY: CPT

## 2018-07-31 PROCEDURE — 80048 BASIC METABOLIC PNL TOTAL CA: CPT | Performed by: SURGERY

## 2018-07-31 PROCEDURE — 83735 ASSAY OF MAGNESIUM: CPT | Performed by: SURGERY

## 2018-07-31 PROCEDURE — 25000132 ZZH RX MED GY IP 250 OP 250 PS 637: Mod: GY | Performed by: PHYSICIAN ASSISTANT

## 2018-07-31 RX ORDER — HYDROMORPHONE HYDROCHLORIDE 1 MG/ML
0.5 INJECTION, SOLUTION INTRAMUSCULAR; INTRAVENOUS; SUBCUTANEOUS
Status: DISCONTINUED | OUTPATIENT
Start: 2018-07-31 | End: 2018-08-10

## 2018-07-31 RX ORDER — ACETAMINOPHEN 325 MG/1
650 TABLET ORAL EVERY 4 HOURS
Status: DISCONTINUED | OUTPATIENT
Start: 2018-07-31 | End: 2018-08-08

## 2018-07-31 RX ORDER — HYDROMORPHONE HYDROCHLORIDE 1 MG/ML
INJECTION, SOLUTION INTRAMUSCULAR; INTRAVENOUS; SUBCUTANEOUS
Status: COMPLETED
Start: 2018-07-31 | End: 2018-07-31

## 2018-07-31 RX ORDER — NICOTINE POLACRILEX 4 MG
15-30 LOZENGE BUCCAL
Status: DISCONTINUED | OUTPATIENT
Start: 2018-07-31 | End: 2018-08-13 | Stop reason: HOSPADM

## 2018-07-31 RX ORDER — DEXTROSE MONOHYDRATE 25 G/50ML
25-50 INJECTION, SOLUTION INTRAVENOUS
Status: DISCONTINUED | OUTPATIENT
Start: 2018-07-31 | End: 2018-08-13 | Stop reason: HOSPADM

## 2018-07-31 RX ORDER — FENTANYL CITRATE 50 UG/ML
50 INJECTION, SOLUTION INTRAMUSCULAR; INTRAVENOUS
Status: DISCONTINUED | OUTPATIENT
Start: 2018-07-31 | End: 2018-07-31

## 2018-07-31 RX ORDER — OXYCODONE HYDROCHLORIDE 5 MG/1
5-10 TABLET ORAL EVERY 4 HOURS PRN
Status: DISCONTINUED | OUTPATIENT
Start: 2018-07-31 | End: 2018-08-08

## 2018-07-31 RX ORDER — GINSENG 100 MG
CAPSULE ORAL 2 TIMES DAILY
Status: DISCONTINUED | OUTPATIENT
Start: 2018-07-31 | End: 2018-08-13 | Stop reason: HOSPADM

## 2018-07-31 RX ORDER — FENTANYL CITRATE 50 UG/ML
25-50 INJECTION, SOLUTION INTRAMUSCULAR; INTRAVENOUS
Status: DISCONTINUED | OUTPATIENT
Start: 2018-07-31 | End: 2018-07-31

## 2018-07-31 RX ORDER — FENTANYL CITRATE 50 UG/ML
INJECTION, SOLUTION INTRAMUSCULAR; INTRAVENOUS
Status: COMPLETED
Start: 2018-07-31 | End: 2018-07-31

## 2018-07-31 RX ORDER — CHLORHEXIDINE GLUCONATE ORAL RINSE 1.2 MG/ML
15 SOLUTION DENTAL 2 TIMES DAILY
Status: DISCONTINUED | OUTPATIENT
Start: 2018-07-31 | End: 2018-08-03

## 2018-07-31 RX ADMIN — DEXTROSE AND SODIUM CHLORIDE: 5; 450 INJECTION, SOLUTION INTRAVENOUS at 18:36

## 2018-07-31 RX ADMIN — ACETAMINOPHEN 325 MG: 325 TABLET, FILM COATED ORAL at 13:47

## 2018-07-31 RX ADMIN — OXYCODONE HYDROCHLORIDE 10 MG: 5 TABLET ORAL at 19:39

## 2018-07-31 RX ADMIN — FENTANYL CITRATE 50 MCG: 50 INJECTION INTRAMUSCULAR; INTRAVENOUS at 14:28

## 2018-07-31 RX ADMIN — CHLORHEXIDINE GLUCONATE 15 ML: 1.2 RINSE ORAL at 19:42

## 2018-07-31 RX ADMIN — BACITRACIN: 500 OINTMENT TOPICAL at 19:39

## 2018-07-31 RX ADMIN — ACETAMINOPHEN 650 MG: 325 TABLET, FILM COATED ORAL at 17:11

## 2018-07-31 RX ADMIN — Medication 1 MG: at 08:04

## 2018-07-31 RX ADMIN — NITROGLYCERIN 15 MG: 20 OINTMENT TOPICAL at 09:36

## 2018-07-31 RX ADMIN — OXYCODONE HYDROCHLORIDE 10 MG: 5 TABLET ORAL at 14:51

## 2018-07-31 RX ADMIN — DOCUSATE SODIUM 100 MG: 50 LIQUID ORAL at 08:03

## 2018-07-31 RX ADMIN — PREDNISONE 5 MG: 5 TABLET ORAL at 08:04

## 2018-07-31 RX ADMIN — BACITRACIN: 500 OINTMENT TOPICAL at 08:04

## 2018-07-31 RX ADMIN — PROPOFOL 50 MCG/KG/MIN: 10 INJECTION, EMULSION INTRAVENOUS at 20:50

## 2018-07-31 RX ADMIN — Medication 0.5 MG: at 15:15

## 2018-07-31 RX ADMIN — AMPICILLIN SODIUM AND SULBACTAM SODIUM 3 G: 2; 1 INJECTION, POWDER, FOR SOLUTION INTRAMUSCULAR; INTRAVENOUS at 02:20

## 2018-07-31 RX ADMIN — RANITIDINE HYDROCHLORIDE 150 MG: 150 SOLUTION ORAL at 19:42

## 2018-07-31 RX ADMIN — ACETAMINOPHEN 325 MG: 325 TABLET, FILM COATED ORAL at 00:05

## 2018-07-31 RX ADMIN — BACITRACIN: 500 OINTMENT TOPICAL at 08:05

## 2018-07-31 RX ADMIN — FLUDROCORTISONE ACETATE 100 MCG: 0.1 TABLET ORAL at 08:04

## 2018-07-31 RX ADMIN — Medication 3 MG: at 08:04

## 2018-07-31 RX ADMIN — MULTIVITAMIN 15 ML: LIQUID ORAL at 08:04

## 2018-07-31 RX ADMIN — ENOXAPARIN SODIUM 30 MG: 30 INJECTION SUBCUTANEOUS at 02:19

## 2018-07-31 RX ADMIN — ENOXAPARIN SODIUM 30 MG: 30 INJECTION SUBCUTANEOUS at 13:47

## 2018-07-31 RX ADMIN — AMPICILLIN SODIUM AND SULBACTAM SODIUM 3 G: 2; 1 INJECTION, POWDER, FOR SOLUTION INTRAMUSCULAR; INTRAVENOUS at 13:47

## 2018-07-31 RX ADMIN — PROPOFOL 75 MCG/KG/MIN: 10 INJECTION, EMULSION INTRAVENOUS at 07:24

## 2018-07-31 RX ADMIN — SENNOSIDES 10 ML: 8.8 SYRUP ORAL at 19:41

## 2018-07-31 RX ADMIN — AMPICILLIN SODIUM AND SULBACTAM SODIUM 3 G: 2; 1 INJECTION, POWDER, FOR SOLUTION INTRAMUSCULAR; INTRAVENOUS at 20:20

## 2018-07-31 RX ADMIN — DOCUSATE SODIUM 100 MG: 50 LIQUID ORAL at 19:39

## 2018-07-31 RX ADMIN — DEXTROSE AND SODIUM CHLORIDE: 5; 450 INJECTION, SOLUTION INTRAVENOUS at 10:18

## 2018-07-31 RX ADMIN — PROPOFOL 75 MCG/KG/MIN: 10 INJECTION, EMULSION INTRAVENOUS at 00:51

## 2018-07-31 RX ADMIN — MYCOPHENOLATE MOFETIL 750 MG: 200 POWDER, FOR SUSPENSION ORAL at 09:36

## 2018-07-31 RX ADMIN — FENTANYL CITRATE 50 MCG: 50 INJECTION, SOLUTION INTRAMUSCULAR; INTRAVENOUS at 14:28

## 2018-07-31 RX ADMIN — RANITIDINE HYDROCHLORIDE 150 MG: 150 SOLUTION ORAL at 08:04

## 2018-07-31 RX ADMIN — HYDROMORPHONE HYDROCHLORIDE 0.5 MG: 1 INJECTION, SOLUTION INTRAMUSCULAR; INTRAVENOUS; SUBCUTANEOUS at 18:58

## 2018-07-31 RX ADMIN — INSULIN ASPART 1 UNITS: 100 INJECTION, SOLUTION INTRAVENOUS; SUBCUTANEOUS at 16:02

## 2018-07-31 RX ADMIN — PROPOFOL 40 MCG/KG/MIN: 10 INJECTION, EMULSION INTRAVENOUS at 15:01

## 2018-07-31 RX ADMIN — HYDROMORPHONE HYDROCHLORIDE 0.5 MG: 1 INJECTION, SOLUTION INTRAMUSCULAR; INTRAVENOUS; SUBCUTANEOUS at 15:15

## 2018-07-31 RX ADMIN — AMPICILLIN SODIUM AND SULBACTAM SODIUM 3 G: 2; 1 INJECTION, POWDER, FOR SOLUTION INTRAMUSCULAR; INTRAVENOUS at 08:04

## 2018-07-31 RX ADMIN — DEXTROSE AND SODIUM CHLORIDE: 5; 450 INJECTION, SOLUTION INTRAVENOUS at 00:08

## 2018-07-31 RX ADMIN — ACETAMINOPHEN 650 MG: 325 TABLET, FILM COATED ORAL at 21:04

## 2018-07-31 RX ADMIN — Medication 3 MG: at 17:11

## 2018-07-31 RX ADMIN — PROPOFOL 75 MCG/KG/MIN: 10 INJECTION, EMULSION INTRAVENOUS at 10:51

## 2018-07-31 RX ADMIN — PROPOFOL 75 MCG/KG/MIN: 10 INJECTION, EMULSION INTRAVENOUS at 04:26

## 2018-07-31 RX ADMIN — ACETAMINOPHEN 325 MG: 325 TABLET, FILM COATED ORAL at 08:04

## 2018-07-31 RX ADMIN — Medication 150 MCG/HR: at 10:21

## 2018-07-31 RX ADMIN — Medication 150 MCG/HR: at 00:43

## 2018-07-31 RX ADMIN — SENNOSIDES 10 ML: 8.8 SYRUP ORAL at 08:04

## 2018-07-31 RX ADMIN — ACETAMINOPHEN 325 MG: 325 TABLET, FILM COATED ORAL at 04:27

## 2018-07-31 RX ADMIN — Medication 100 MG: at 08:04

## 2018-07-31 RX ADMIN — Medication 200 MCG/HR: at 19:08

## 2018-07-31 RX ADMIN — HYDROMORPHONE HYDROCHLORIDE 0.5 MG: 1 INJECTION, SOLUTION INTRAMUSCULAR; INTRAVENOUS; SUBCUTANEOUS at 17:11

## 2018-07-31 RX ADMIN — MYCOPHENOLATE MOFETIL 750 MG: 200 POWDER, FOR SUSPENSION ORAL at 19:41

## 2018-07-31 RX ADMIN — VALGANCICLOVIR HYDROCHLORIDE 450 MG: 50 POWDER, FOR SOLUTION ORAL at 08:04

## 2018-07-31 ASSESSMENT — ACTIVITIES OF DAILY LIVING (ADL)
ADLS_ACUITY_SCORE: 14

## 2018-07-31 NOTE — PLAN OF CARE
Problem: Patient Care Overview  Goal: Plan of Care/Patient Progress Review  Discharge Planner PT   Patient plan for discharge: Unknown  Current status: Follows 25% commands and delivered PROM/AAROM. Tachy into 130s with stimulation.  Barriers to return to prior living situation: Medical status, deconditioning, cognition  Recommendations for discharge: TCU at this time  Rationale for recommendations: Current level of function       Entered by: Alfredo Winkler 07/31/2018 5:03 PM
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
## 2018-07-31 NOTE — PLAN OF CARE
"Problem: Patient Care Overview  Goal: Plan of Care/Patient Progress Review  Outcome: Improving  Neuro: Sedation lightened to RASS goal -1 - -2. Pt more alert and following commands. Nods head \"yes\" when asked about pain. MD aware: pain regimen increased.  Cardio: SR-ST. HR 140s when sedation lightened, improvement with PRN dilaudid. Stable BP and afebrile.  Resp: #8 Shiley CMV 14/450/5/30%. Thick rust colored secretions from ETT and mouth. Oral CHG rinse ordered and PRN red aquino for oral cares.  GI: TF via OG increased per orders. No BM. sliding scale insulin started.  : Lucas with copious clear yellow urine, MD aware. Continue to monitor.  Skin: Penile abrasion with bacitracin. Facial incisions with bacitracin. L great toe scab. Bilat soft wrist restraints in place to prevent patient from pulling at lines/tubes.  Plan: OR Thursday with OMFS. Plan for PEG placement at that time. Decrease sedation as able. Continue current cares and notify MD with any changes in pt condition.      "
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
## 2018-08-01 ENCOUNTER — APPOINTMENT (OUTPATIENT)
Dept: GENERAL RADIOLOGY | Facility: CLINIC | Age: 48
DRG: 131 | End: 2018-08-01
Attending: PHYSICIAN ASSISTANT
Payer: MEDICARE

## 2018-08-01 ENCOUNTER — APPOINTMENT (OUTPATIENT)
Dept: CT IMAGING | Facility: CLINIC | Age: 48
DRG: 131 | End: 2018-08-01
Attending: PHYSICIAN ASSISTANT
Payer: MEDICARE

## 2018-08-01 ENCOUNTER — APPOINTMENT (OUTPATIENT)
Dept: PHYSICAL THERAPY | Facility: CLINIC | Age: 48
DRG: 131 | End: 2018-08-01
Attending: SURGERY
Payer: MEDICARE

## 2018-08-01 LAB
ABO + RH BLD: NORMAL
ABO + RH BLD: NORMAL
ANION GAP SERPL CALCULATED.3IONS-SCNC: 7 MMOL/L (ref 3–14)
BLD GP AB SCN SERPL QL: NORMAL
BLOOD BANK CMNT PATIENT-IMP: NORMAL
BUN SERPL-MCNC: 9 MG/DL (ref 7–30)
CALCIUM SERPL-MCNC: 8.9 MG/DL (ref 8.5–10.1)
CHLORIDE SERPL-SCNC: 112 MMOL/L (ref 94–109)
CO2 SERPL-SCNC: 24 MMOL/L (ref 20–32)
CREAT SERPL-MCNC: 0.88 MG/DL (ref 0.66–1.25)
GFR SERPL CREATININE-BSD FRML MDRD: >90 ML/MIN/1.7M2
GLUCOSE BLDC GLUCOMTR-MCNC: 100 MG/DL (ref 70–99)
GLUCOSE BLDC GLUCOMTR-MCNC: 107 MG/DL (ref 70–99)
GLUCOSE BLDC GLUCOMTR-MCNC: 111 MG/DL (ref 70–99)
GLUCOSE BLDC GLUCOMTR-MCNC: 112 MG/DL (ref 70–99)
GLUCOSE BLDC GLUCOMTR-MCNC: 129 MG/DL (ref 70–99)
GLUCOSE BLDC GLUCOMTR-MCNC: 131 MG/DL (ref 70–99)
GLUCOSE BLDC GLUCOMTR-MCNC: 153 MG/DL (ref 70–99)
GLUCOSE SERPL-MCNC: 116 MG/DL (ref 70–99)
INR PPP: 1.13 (ref 0.86–1.14)
MYCOPHENOLATE SERPL LC/MS/MS-MCNC: 0.32 MG/L (ref 1–3.5)
MYCOPHENOLATE-G SERPL LC/MS/MS-MCNC: 34.5 MG/L (ref 30–95)
POTASSIUM SERPL-SCNC: 3.6 MMOL/L (ref 3.4–5.3)
SODIUM SERPL-SCNC: 143 MMOL/L (ref 133–144)
SPECIMEN EXP DATE BLD: NORMAL
TACROLIMUS BLD-MCNC: 9.1 UG/L (ref 5–15)
TME LAST DOSE: ABNORMAL H
TME LAST DOSE: NORMAL H
TRIGL SERPL-MCNC: 76 MG/DL

## 2018-08-01 PROCEDURE — 25000128 H RX IP 250 OP 636: Performed by: PHYSICIAN ASSISTANT

## 2018-08-01 PROCEDURE — 25000128 H RX IP 250 OP 636: Performed by: STUDENT IN AN ORGANIZED HEALTH CARE EDUCATION/TRAINING PROGRAM

## 2018-08-01 PROCEDURE — 86901 BLOOD TYPING SEROLOGIC RH(D): CPT | Performed by: SURGERY

## 2018-08-01 PROCEDURE — 25000131 ZZH RX MED GY IP 250 OP 636 PS 637: Mod: GY | Performed by: STUDENT IN AN ORGANIZED HEALTH CARE EDUCATION/TRAINING PROGRAM

## 2018-08-01 PROCEDURE — A9270 NON-COVERED ITEM OR SERVICE: HCPCS | Mod: GY | Performed by: PHYSICIAN ASSISTANT

## 2018-08-01 PROCEDURE — 25000128 H RX IP 250 OP 636: Performed by: NURSE PRACTITIONER

## 2018-08-01 PROCEDURE — 25000132 ZZH RX MED GY IP 250 OP 250 PS 637: Mod: GY | Performed by: NURSE PRACTITIONER

## 2018-08-01 PROCEDURE — 40000014 ZZH STATISTIC ARTERIAL MONITORING DAILY

## 2018-08-01 PROCEDURE — 40000275 ZZH STATISTIC RCP TIME EA 10 MIN

## 2018-08-01 PROCEDURE — 00000146 ZZHCL STATISTIC GLUCOSE BY METER IP

## 2018-08-01 PROCEDURE — 40000281 ZZH STATISTIC TRANSPORT TIME EA 15 MIN

## 2018-08-01 PROCEDURE — 25000132 ZZH RX MED GY IP 250 OP 250 PS 637: Mod: GY | Performed by: PHYSICIAN ASSISTANT

## 2018-08-01 PROCEDURE — 94003 VENT MGMT INPAT SUBQ DAY: CPT

## 2018-08-01 PROCEDURE — 74018 RADEX ABDOMEN 1 VIEW: CPT

## 2018-08-01 PROCEDURE — 85610 PROTHROMBIN TIME: CPT | Performed by: SURGERY

## 2018-08-01 PROCEDURE — 25800025 ZZH RX 258: Performed by: NURSE PRACTITIONER

## 2018-08-01 PROCEDURE — 97530 THERAPEUTIC ACTIVITIES: CPT | Mod: GP

## 2018-08-01 PROCEDURE — 84478 ASSAY OF TRIGLYCERIDES: CPT | Performed by: SURGERY

## 2018-08-01 PROCEDURE — 25000132 ZZH RX MED GY IP 250 OP 250 PS 637: Mod: GY | Performed by: SURGERY

## 2018-08-01 PROCEDURE — 20000004 ZZH R&B ICU UMMC

## 2018-08-01 PROCEDURE — 86900 BLOOD TYPING SEROLOGIC ABO: CPT | Performed by: SURGERY

## 2018-08-01 PROCEDURE — 86850 RBC ANTIBODY SCREEN: CPT | Performed by: SURGERY

## 2018-08-01 PROCEDURE — 99231 SBSQ HOSP IP/OBS SF/LOW 25: CPT | Performed by: NURSE PRACTITIONER

## 2018-08-01 PROCEDURE — 74176 CT ABD & PELVIS W/O CONTRAST: CPT

## 2018-08-01 PROCEDURE — 27210429 ZZH NUTRITION PRODUCT INTERMEDIATE LITER

## 2018-08-01 PROCEDURE — 80197 ASSAY OF TACROLIMUS: CPT | Performed by: INTERNAL MEDICINE

## 2018-08-01 PROCEDURE — 25000132 ZZH RX MED GY IP 250 OP 250 PS 637: Mod: GY | Performed by: STUDENT IN AN ORGANIZED HEALTH CARE EDUCATION/TRAINING PROGRAM

## 2018-08-01 PROCEDURE — 80048 BASIC METABOLIC PNL TOTAL CA: CPT | Performed by: SURGERY

## 2018-08-01 PROCEDURE — 25000125 ZZHC RX 250: Performed by: STUDENT IN AN ORGANIZED HEALTH CARE EDUCATION/TRAINING PROGRAM

## 2018-08-01 PROCEDURE — A9270 NON-COVERED ITEM OR SERVICE: HCPCS | Mod: GY | Performed by: STUDENT IN AN ORGANIZED HEALTH CARE EDUCATION/TRAINING PROGRAM

## 2018-08-01 PROCEDURE — A9270 NON-COVERED ITEM OR SERVICE: HCPCS | Mod: GY

## 2018-08-01 PROCEDURE — 25000132 ZZH RX MED GY IP 250 OP 250 PS 637: Mod: GY

## 2018-08-01 PROCEDURE — A9270 NON-COVERED ITEM OR SERVICE: HCPCS | Mod: GY | Performed by: NURSE PRACTITIONER

## 2018-08-01 PROCEDURE — 40000193 ZZH STATISTIC PT WARD VISIT

## 2018-08-01 RX ORDER — ATORVASTATIN CALCIUM 40 MG/1
40 TABLET, FILM COATED ORAL EVERY EVENING
Status: DISCONTINUED | OUTPATIENT
Start: 2018-08-01 | End: 2018-08-13 | Stop reason: HOSPADM

## 2018-08-01 RX ORDER — BISACODYL 10 MG
10 SUPPOSITORY, RECTAL RECTAL DAILY PRN
Status: DISCONTINUED | OUTPATIENT
Start: 2018-08-01 | End: 2018-08-13 | Stop reason: HOSPADM

## 2018-08-01 RX ORDER — PROPOFOL 10 MG/ML
5-75 INJECTION, EMULSION INTRAVENOUS CONTINUOUS
Status: DISCONTINUED | OUTPATIENT
Start: 2018-08-01 | End: 2018-08-04

## 2018-08-01 RX ORDER — POLYETHYLENE GLYCOL 3350 17 G/17G
17 POWDER, FOR SOLUTION ORAL DAILY
Status: DISCONTINUED | OUTPATIENT
Start: 2018-08-01 | End: 2018-08-11

## 2018-08-01 RX ORDER — SODIUM CHLORIDE, SODIUM LACTATE, POTASSIUM CHLORIDE, CALCIUM CHLORIDE 600; 310; 30; 20 MG/100ML; MG/100ML; MG/100ML; MG/100ML
INJECTION, SOLUTION INTRAVENOUS
Status: DISCONTINUED
Start: 2018-08-01 | End: 2018-08-02 | Stop reason: HOSPADM

## 2018-08-01 RX ORDER — SULFAMETHOXAZOLE AND TRIMETHOPRIM 400; 80 MG/1; MG/1
1 TABLET ORAL DAILY
Status: DISCONTINUED | OUTPATIENT
Start: 2018-08-01 | End: 2018-08-09

## 2018-08-01 RX ADMIN — BACITRACIN: 500 OINTMENT TOPICAL at 20:14

## 2018-08-01 RX ADMIN — Medication 200 MCG/HR: at 02:23

## 2018-08-01 RX ADMIN — CHLORHEXIDINE GLUCONATE 15 ML: 1.2 RINSE ORAL at 08:34

## 2018-08-01 RX ADMIN — PROPOFOL 50 MCG/KG/MIN: 10 INJECTION, EMULSION INTRAVENOUS at 07:10

## 2018-08-01 RX ADMIN — AMPICILLIN SODIUM AND SULBACTAM SODIUM 3 G: 2; 1 INJECTION, POWDER, FOR SOLUTION INTRAMUSCULAR; INTRAVENOUS at 20:12

## 2018-08-01 RX ADMIN — Medication 100 MG: at 08:42

## 2018-08-01 RX ADMIN — ACETAMINOPHEN 650 MG: 325 TABLET, FILM COATED ORAL at 01:50

## 2018-08-01 RX ADMIN — PROPOFOL 60 MCG/KG/MIN: 10 INJECTION, EMULSION INTRAVENOUS at 20:54

## 2018-08-01 RX ADMIN — SENNOSIDES 10 ML: 8.8 SYRUP ORAL at 20:13

## 2018-08-01 RX ADMIN — HYDROMORPHONE HYDROCHLORIDE 0.5 MG: 1 INJECTION, SOLUTION INTRAMUSCULAR; INTRAVENOUS; SUBCUTANEOUS at 00:13

## 2018-08-01 RX ADMIN — RANITIDINE HYDROCHLORIDE 150 MG: 150 SOLUTION ORAL at 08:42

## 2018-08-01 RX ADMIN — PROPOFOL 60 MCG/KG/MIN: 10 INJECTION, EMULSION INTRAVENOUS at 12:57

## 2018-08-01 RX ADMIN — ENOXAPARIN SODIUM 30 MG: 30 INJECTION SUBCUTANEOUS at 02:27

## 2018-08-01 RX ADMIN — ACETAMINOPHEN 650 MG: 325 TABLET, FILM COATED ORAL at 17:59

## 2018-08-01 RX ADMIN — PROPOFOL 50 MCG/KG/MIN: 10 INJECTION, EMULSION INTRAVENOUS at 01:50

## 2018-08-01 RX ADMIN — Medication 200 MCG/HR: at 17:18

## 2018-08-01 RX ADMIN — MULTIVITAMIN 15 ML: LIQUID ORAL at 08:42

## 2018-08-01 RX ADMIN — BACITRACIN: 500 OINTMENT TOPICAL at 08:31

## 2018-08-01 RX ADMIN — HYDROMORPHONE HYDROCHLORIDE 0.5 MG: 1 INJECTION, SOLUTION INTRAMUSCULAR; INTRAVENOUS; SUBCUTANEOUS at 10:42

## 2018-08-01 RX ADMIN — VALGANCICLOVIR HYDROCHLORIDE 450 MG: 50 POWDER, FOR SOLUTION ORAL at 08:42

## 2018-08-01 RX ADMIN — AMPICILLIN SODIUM AND SULBACTAM SODIUM 3 G: 2; 1 INJECTION, POWDER, FOR SOLUTION INTRAMUSCULAR; INTRAVENOUS at 08:26

## 2018-08-01 RX ADMIN — OXYCODONE HYDROCHLORIDE 10 MG: 5 TABLET ORAL at 09:51

## 2018-08-01 RX ADMIN — MYCOPHENOLATE MOFETIL 750 MG: 200 POWDER, FOR SUSPENSION ORAL at 20:13

## 2018-08-01 RX ADMIN — PROPOFOL 60 MCG/KG/MIN: 10 INJECTION, EMULSION INTRAVENOUS at 16:52

## 2018-08-01 RX ADMIN — NITROGLYCERIN 15 MG: 20 OINTMENT TOPICAL at 08:40

## 2018-08-01 RX ADMIN — FLUDROCORTISONE ACETATE 100 MCG: 0.1 TABLET ORAL at 08:42

## 2018-08-01 RX ADMIN — Medication 3 MG: at 17:59

## 2018-08-01 RX ADMIN — AMPICILLIN SODIUM AND SULBACTAM SODIUM 3 G: 2; 1 INJECTION, POWDER, FOR SOLUTION INTRAMUSCULAR; INTRAVENOUS at 02:27

## 2018-08-01 RX ADMIN — SENNOSIDES 10 ML: 8.8 SYRUP ORAL at 08:42

## 2018-08-01 RX ADMIN — DOCUSATE SODIUM 100 MG: 50 LIQUID ORAL at 08:42

## 2018-08-01 RX ADMIN — HYDROMORPHONE HYDROCHLORIDE 0.5 MG: 1 INJECTION, SOLUTION INTRAMUSCULAR; INTRAVENOUS; SUBCUTANEOUS at 04:32

## 2018-08-01 RX ADMIN — ACETAMINOPHEN 650 MG: 325 TABLET, FILM COATED ORAL at 21:57

## 2018-08-01 RX ADMIN — ATORVASTATIN CALCIUM 40 MG: 40 TABLET, FILM COATED ORAL at 20:14

## 2018-08-01 RX ADMIN — DEXTROSE AND SODIUM CHLORIDE: 5; 450 INJECTION, SOLUTION INTRAVENOUS at 02:42

## 2018-08-01 RX ADMIN — ACETAMINOPHEN 650 MG: 325 TABLET, FILM COATED ORAL at 14:04

## 2018-08-01 RX ADMIN — ACETAMINOPHEN 650 MG: 325 TABLET, FILM COATED ORAL at 08:49

## 2018-08-01 RX ADMIN — ENOXAPARIN SODIUM 30 MG: 30 INJECTION SUBCUTANEOUS at 14:04

## 2018-08-01 RX ADMIN — AMPICILLIN SODIUM AND SULBACTAM SODIUM 3 G: 2; 1 INJECTION, POWDER, FOR SOLUTION INTRAMUSCULAR; INTRAVENOUS at 14:03

## 2018-08-01 RX ADMIN — CHLORHEXIDINE GLUCONATE 15 ML: 1.2 RINSE ORAL at 20:14

## 2018-08-01 RX ADMIN — Medication 1 MG: at 08:42

## 2018-08-01 RX ADMIN — Medication 3 MG: at 08:42

## 2018-08-01 RX ADMIN — DOCUSATE SODIUM 100 MG: 50 LIQUID ORAL at 20:13

## 2018-08-01 RX ADMIN — ACETAMINOPHEN 650 MG: 325 TABLET, FILM COATED ORAL at 05:27

## 2018-08-01 RX ADMIN — HYDROMORPHONE HYDROCHLORIDE 0.5 MG: 1 INJECTION, SOLUTION INTRAMUSCULAR; INTRAVENOUS; SUBCUTANEOUS at 23:40

## 2018-08-01 RX ADMIN — PREDNISONE 5 MG: 5 TABLET ORAL at 08:42

## 2018-08-01 RX ADMIN — INSULIN ASPART 1 UNITS: 100 INJECTION, SOLUTION INTRAVENOUS; SUBCUTANEOUS at 11:48

## 2018-08-01 RX ADMIN — MYCOPHENOLATE MOFETIL 750 MG: 200 POWDER, FOR SUSPENSION ORAL at 08:42

## 2018-08-01 RX ADMIN — SULFAMETHOXAZOLE AND TRIMETHOPRIM 1 TABLET: 400; 80 TABLET ORAL at 15:49

## 2018-08-01 RX ADMIN — Medication 200 MCG/HR: at 09:47

## 2018-08-01 RX ADMIN — POLYETHYLENE GLYCOL 3350 17 G: 17 POWDER, FOR SOLUTION ORAL at 14:24

## 2018-08-01 ASSESSMENT — ACTIVITIES OF DAILY LIVING (ADL)
ADLS_ACUITY_SCORE: 11
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
## 2018-08-01 NOTE — PLAN OF CARE
Problem: Patient Care Overview  Goal: Plan of Care/Patient Progress Review  Discharge Planner PT   Patient plan for discharge: Unknown  Current status: Utilized ceiling lift to get pt OOB to chair. VSS with activity and tolerating upright very well.  Barriers to return to prior living situation: Cognition, deconditioning  Recommendations for discharge: TCU once LTACH goals met  Rationale for recommendations: Current level of function       Entered by: Alfredo Winkler 08/01/2018 4:59 PM
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
## 2018-08-01 NOTE — PLAN OF CARE
Problem: Patient Care Overview  Goal: Individualization & Mutuality  Outcome: No Change  D  Gilles is sedated by propofol 50mcg/kg/min, on 40 mcg he was restless. He opens eyes spontaneously, 3-4 round/brisk, he mostly stares upwards, he looked down once to command, did not move them side to side, but did nod that he could see the light, he has not responded to questions about his vision, he does hear enough to nod that yes he has pain. He does move all extrem, all warm, normal pulses. Tmax 99.9 ax, HR , BP /49-89, [id number]. RR 12-14 on CMV 30%/14/450/5. Suctioning daljit thick tenacious sputum, very thick daljit sputum orally also, oral care with peridex and NS, suctioned with red aquino. LS clear/diminished. BS +, TF at goal 55cc/hour with free water 30cc/q 4hours. Plascencia with large output yellow /greenish urine. Facial sutures cleansed with NS, bacitracin ointment. Penile abrasion site pink, moist, plascencia care kit, bacitracin applied. L great toe scab intact. Nods yes to questions of pain, fentanly 200 mcg/hour, supplemented by dilaudid 0.5mg x 2, oxycodone 10mg x 1, tylenol scheduled. Restraints continued for safety. Sutured OG . Trach site clean and dry, sutures intact.   P: Pulmonary toilet, wound care, reorient and reassure, plan for OR Thurs. Plate mandible and repair maxilla and other reconstruction as needed.

## 2018-08-01 NOTE — PLAN OF CARE
Problem: Restraint for Non-Violent/Non-Self-Destructive Behavior  Goal: Prevent/Manage Potential Problems  Maintain safety of patient and others during period of restraint.  Promote psychological and physical wellbeing.  Prevent injury to skin and involved body parts.  Promote nutrition, hydration, and elimination.   Outcome: No Change  No injury is noted on patient's skin. Patient has tube feeds running at goal, plascencia catheter in place, no bowel movement. Safety of patient maintained.  Patient attempts to grab toward tracheostomy tube and OG tube with hands, to maintain the safety of the patient and prevent him from pulling out his OG or trach bilateral wrist restraints will be maintained at this time.
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
## 2018-08-01 NOTE — PLAN OF CARE
Problem: Patient Care Overview  Goal: Plan of Care/Patient Progress Review  Outcome: Improving  Status  D/I: Patient on unit 4A Surgical/Neuro ICU   Neuro- sedated on 60 of propofol, can move all extremities to command, pupils equal and reactive, does not seem to focus and can not track, eyes seem to deviate up and right, difficult to assess vision at this time, nods yes to pain, fentanyl PCA continuous  CV- Sinus tachy to sinus rhythm  Pulm- BP low 100's-150's, trached with 8 shiley, CMV settings, unable to pressure support this morning due to sedation  GI/- OG tube feeds at goal, moderate residuals throughout the day, plascencia in place, large amounts of urine out, no bowel movement today, mirilax added   Skin- facial sutures, red aquino for oral suction only, abdominal scars  Gtts- TKO, fentanyl, propofol  Plan is for surgery tomorrow  See flow sheets for further interventions and assessments.  P: Continue to monitor pt closely, Notify MD of changes/concerns.
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
## 2018-08-02 ENCOUNTER — ANESTHESIA (OUTPATIENT)
Dept: SURGERY | Facility: CLINIC | Age: 48
DRG: 131 | End: 2018-08-02
Payer: MEDICARE

## 2018-08-02 ENCOUNTER — ANESTHESIA EVENT (OUTPATIENT)
Dept: SURGERY | Facility: CLINIC | Age: 48
DRG: 131 | End: 2018-08-02
Payer: MEDICARE

## 2018-08-02 ENCOUNTER — APPOINTMENT (OUTPATIENT)
Dept: GENERAL RADIOLOGY | Facility: CLINIC | Age: 48
DRG: 131 | End: 2018-08-02
Attending: SURGERY
Payer: MEDICARE

## 2018-08-02 LAB
ANION GAP SERPL CALCULATED.3IONS-SCNC: 4 MMOL/L (ref 3–14)
BASE DEFICIT BLDA-SCNC: 1.6 MMOL/L
BUN SERPL-MCNC: 13 MG/DL (ref 7–30)
CA-I BLD-MCNC: 5.1 MG/DL (ref 4.4–5.2)
CALCIUM SERPL-MCNC: 8.8 MG/DL (ref 8.5–10.1)
CHLORIDE SERPL-SCNC: 109 MMOL/L (ref 94–109)
CO2 SERPL-SCNC: 26 MMOL/L (ref 20–32)
CREAT SERPL-MCNC: 0.85 MG/DL (ref 0.66–1.25)
GFR SERPL CREATININE-BSD FRML MDRD: >90 ML/MIN/1.7M2
GLUCOSE BLD-MCNC: 157 MG/DL (ref 70–99)
GLUCOSE BLDC GLUCOMTR-MCNC: 102 MG/DL (ref 70–99)
GLUCOSE BLDC GLUCOMTR-MCNC: 105 MG/DL (ref 70–99)
GLUCOSE BLDC GLUCOMTR-MCNC: 107 MG/DL (ref 70–99)
GLUCOSE BLDC GLUCOMTR-MCNC: 119 MG/DL (ref 70–99)
GLUCOSE BLDC GLUCOMTR-MCNC: 123 MG/DL (ref 70–99)
GLUCOSE SERPL-MCNC: 104 MG/DL (ref 70–99)
HCO3 BLD-SCNC: 23 MMOL/L (ref 21–28)
HGB BLD-MCNC: 10.3 G/DL (ref 13.3–17.7)
INR PPP: 1.07 (ref 0.86–1.14)
MAGNESIUM SERPL-MCNC: 1.5 MG/DL (ref 1.6–2.3)
O2/TOTAL GAS SETTING VFR VENT: 40 %
PCO2 BLD: 39 MM HG (ref 35–45)
PH BLD: 7.38 PH (ref 7.35–7.45)
PHOSPHATE SERPL-MCNC: 3.5 MG/DL (ref 2.5–4.5)
PO2 BLD: 135 MM HG (ref 80–105)
POTASSIUM BLD-SCNC: 4.8 MMOL/L (ref 3.4–5.3)
POTASSIUM SERPL-SCNC: 4.2 MMOL/L (ref 3.4–5.3)
SODIUM BLD-SCNC: 140 MMOL/L (ref 133–144)
SODIUM SERPL-SCNC: 140 MMOL/L (ref 133–144)

## 2018-08-02 PROCEDURE — 25000131 ZZH RX MED GY IP 250 OP 636 PS 637: Mod: GY | Performed by: INTERNAL MEDICINE

## 2018-08-02 PROCEDURE — 25000132 ZZH RX MED GY IP 250 OP 250 PS 637: Mod: GY

## 2018-08-02 PROCEDURE — 84295 ASSAY OF SERUM SODIUM: CPT | Performed by: SURGERY

## 2018-08-02 PROCEDURE — 82330 ASSAY OF CALCIUM: CPT | Performed by: SURGERY

## 2018-08-02 PROCEDURE — 0NSV04Z REPOSITION LEFT MANDIBLE WITH INTERNAL FIXATION DEVICE, OPEN APPROACH: ICD-10-PCS | Performed by: DENTIST

## 2018-08-02 PROCEDURE — 25000125 ZZHC RX 250: Performed by: DENTIST

## 2018-08-02 PROCEDURE — 25800025 ZZH RX 258: Performed by: STUDENT IN AN ORGANIZED HEALTH CARE EDUCATION/TRAINING PROGRAM

## 2018-08-02 PROCEDURE — 25000132 ZZH RX MED GY IP 250 OP 250 PS 637: Mod: GY | Performed by: DENTIST

## 2018-08-02 PROCEDURE — 25000128 H RX IP 250 OP 636: Performed by: NURSE ANESTHETIST, CERTIFIED REGISTERED

## 2018-08-02 PROCEDURE — 0NSG04Z REPOSITION LEFT ETHMOID BONE WITH INTERNAL FIXATION DEVICE, OPEN APPROACH: ICD-10-PCS | Performed by: DENTIST

## 2018-08-02 PROCEDURE — A9270 NON-COVERED ITEM OR SERVICE: HCPCS | Mod: GY | Performed by: PHYSICIAN ASSISTANT

## 2018-08-02 PROCEDURE — 84100 ASSAY OF PHOSPHORUS: CPT | Performed by: SURGERY

## 2018-08-02 PROCEDURE — 25000132 ZZH RX MED GY IP 250 OP 250 PS 637: Mod: GY | Performed by: PHYSICIAN ASSISTANT

## 2018-08-02 PROCEDURE — 25000125 ZZHC RX 250: Performed by: STUDENT IN AN ORGANIZED HEALTH CARE EDUCATION/TRAINING PROGRAM

## 2018-08-02 PROCEDURE — 99231 SBSQ HOSP IP/OBS SF/LOW 25: CPT | Performed by: NURSE PRACTITIONER

## 2018-08-02 PROCEDURE — 0NBR0ZZ EXCISION OF MAXILLA, OPEN APPROACH: ICD-10-PCS | Performed by: DENTIST

## 2018-08-02 PROCEDURE — 25000128 H RX IP 250 OP 636: Performed by: PHYSICIAN ASSISTANT

## 2018-08-02 PROCEDURE — 0NST04Z REPOSITION RIGHT MANDIBLE WITH INTERNAL FIXATION DEVICE, OPEN APPROACH: ICD-10-PCS | Performed by: DENTIST

## 2018-08-02 PROCEDURE — 25000128 H RX IP 250 OP 636: Performed by: STUDENT IN AN ORGANIZED HEALTH CARE EDUCATION/TRAINING PROGRAM

## 2018-08-02 PROCEDURE — C1713 ANCHOR/SCREW BN/BN,TIS/BN: HCPCS | Performed by: DENTIST

## 2018-08-02 PROCEDURE — 36000062 ZZH SURGERY LEVEL 4 1ST 30 MIN - UMMC: Performed by: DENTIST

## 2018-08-02 PROCEDURE — 83735 ASSAY OF MAGNESIUM: CPT | Performed by: SURGERY

## 2018-08-02 PROCEDURE — 25000132 ZZH RX MED GY IP 250 OP 250 PS 637: Mod: GY | Performed by: STUDENT IN AN ORGANIZED HEALTH CARE EDUCATION/TRAINING PROGRAM

## 2018-08-02 PROCEDURE — 94003 VENT MGMT INPAT SUBQ DAY: CPT

## 2018-08-02 PROCEDURE — 25000565 ZZH ISOFLURANE, EA 15 MIN: Performed by: DENTIST

## 2018-08-02 PROCEDURE — 82803 BLOOD GASES ANY COMBINATION: CPT | Performed by: SURGERY

## 2018-08-02 PROCEDURE — 25000132 ZZH RX MED GY IP 250 OP 250 PS 637: Mod: GY | Performed by: SURGERY

## 2018-08-02 PROCEDURE — 37000008 ZZH ANESTHESIA TECHNICAL FEE, 1ST 30 MIN: Performed by: DENTIST

## 2018-08-02 PROCEDURE — A9270 NON-COVERED ITEM OR SERVICE: HCPCS | Mod: GY | Performed by: NURSE PRACTITIONER

## 2018-08-02 PROCEDURE — 27211024 ZZHC OR SUPPLY OTHER OPNP: Performed by: DENTIST

## 2018-08-02 PROCEDURE — A9270 NON-COVERED ITEM OR SERVICE: HCPCS | Mod: GY | Performed by: STUDENT IN AN ORGANIZED HEALTH CARE EDUCATION/TRAINING PROGRAM

## 2018-08-02 PROCEDURE — 40000986 XR ABDOMEN PORT 1 VW

## 2018-08-02 PROCEDURE — 0NSF04Z REPOSITION RIGHT ETHMOID BONE WITH INTERNAL FIXATION DEVICE, OPEN APPROACH: ICD-10-PCS | Performed by: DENTIST

## 2018-08-02 PROCEDURE — 80048 BASIC METABOLIC PNL TOTAL CA: CPT | Performed by: SURGERY

## 2018-08-02 PROCEDURE — 27210794 ZZH OR GENERAL SUPPLY STERILE: Performed by: DENTIST

## 2018-08-02 PROCEDURE — 99291 CRITICAL CARE FIRST HOUR: CPT | Mod: GC | Performed by: SURGERY

## 2018-08-02 PROCEDURE — A9270 NON-COVERED ITEM OR SERVICE: HCPCS | Mod: GY | Performed by: DENTIST

## 2018-08-02 PROCEDURE — 82947 ASSAY GLUCOSE BLOOD QUANT: CPT | Performed by: SURGERY

## 2018-08-02 PROCEDURE — 36000064 ZZH SURGERY LEVEL 4 EA 15 ADDTL MIN - UMMC: Performed by: DENTIST

## 2018-08-02 PROCEDURE — 25000131 ZZH RX MED GY IP 250 OP 636 PS 637: Mod: GY | Performed by: STUDENT IN AN ORGANIZED HEALTH CARE EDUCATION/TRAINING PROGRAM

## 2018-08-02 PROCEDURE — 20000004 ZZH R&B ICU UMMC

## 2018-08-02 PROCEDURE — 27210429 ZZH NUTRITION PRODUCT INTERMEDIATE LITER

## 2018-08-02 PROCEDURE — 40000275 ZZH STATISTIC RCP TIME EA 10 MIN

## 2018-08-02 PROCEDURE — 84132 ASSAY OF SERUM POTASSIUM: CPT | Performed by: SURGERY

## 2018-08-02 PROCEDURE — 25000125 ZZHC RX 250: Performed by: SURGERY

## 2018-08-02 PROCEDURE — A9270 NON-COVERED ITEM OR SERVICE: HCPCS | Mod: GY

## 2018-08-02 PROCEDURE — 25000132 ZZH RX MED GY IP 250 OP 250 PS 637: Mod: GY | Performed by: NURSE PRACTITIONER

## 2018-08-02 PROCEDURE — 25000125 ZZHC RX 250: Performed by: NURSE ANESTHETIST, CERTIFIED REGISTERED

## 2018-08-02 PROCEDURE — 37000009 ZZH ANESTHESIA TECHNICAL FEE, EACH ADDTL 15 MIN: Performed by: DENTIST

## 2018-08-02 PROCEDURE — 00000146 ZZHCL STATISTIC GLUCOSE BY METER IP

## 2018-08-02 PROCEDURE — 85610 PROTHROMBIN TIME: CPT | Performed by: SURGERY

## 2018-08-02 DEVICE — IMPLANTABLE DEVICE: Type: IMPLANTABLE DEVICE | Site: MANDIBLE | Status: FUNCTIONAL

## 2018-08-02 RX ORDER — DEXTROSE MONOHYDRATE, SODIUM CHLORIDE, AND POTASSIUM CHLORIDE 50; 1.49; 4.5 G/1000ML; G/1000ML; G/1000ML
INJECTION, SOLUTION INTRAVENOUS CONTINUOUS
Status: DISCONTINUED | OUTPATIENT
Start: 2018-08-02 | End: 2018-08-03

## 2018-08-02 RX ORDER — PROPOFOL 10 MG/ML
INJECTION, EMULSION INTRAVENOUS PRN
Status: DISCONTINUED | OUTPATIENT
Start: 2018-08-02 | End: 2018-08-02

## 2018-08-02 RX ORDER — DEXMEDETOMIDINE HYDROCHLORIDE 4 UG/ML
0.2-1.2 INJECTION, SOLUTION INTRAVENOUS CONTINUOUS
Status: DISCONTINUED | OUTPATIENT
Start: 2018-08-02 | End: 2018-08-02 | Stop reason: HOSPADM

## 2018-08-02 RX ORDER — GINSENG 100 MG
CAPSULE ORAL PRN
Status: DISCONTINUED | OUTPATIENT
Start: 2018-08-02 | End: 2018-08-09 | Stop reason: CLARIF

## 2018-08-02 RX ORDER — FENTANYL CITRATE 50 UG/ML
INJECTION, SOLUTION INTRAMUSCULAR; INTRAVENOUS PRN
Status: DISCONTINUED | OUTPATIENT
Start: 2018-08-02 | End: 2018-08-02

## 2018-08-02 RX ORDER — CHLORHEXIDINE GLUCONATE ORAL RINSE 1.2 MG/ML
10 SOLUTION DENTAL ONCE
Status: COMPLETED | OUTPATIENT
Start: 2018-08-02 | End: 2018-08-02

## 2018-08-02 RX ORDER — MYCOPHENOLATE MOFETIL 200 MG/ML
1000 POWDER, FOR SUSPENSION ORAL 2 TIMES DAILY
Status: DISCONTINUED | OUTPATIENT
Start: 2018-08-02 | End: 2018-08-13 | Stop reason: HOSPADM

## 2018-08-02 RX ORDER — CHLORHEXIDINE GLUCONATE ORAL RINSE 1.2 MG/ML
SOLUTION DENTAL PRN
Status: DISCONTINUED | OUTPATIENT
Start: 2018-08-02 | End: 2018-08-02 | Stop reason: HOSPADM

## 2018-08-02 RX ORDER — SODIUM CHLORIDE, SODIUM LACTATE, POTASSIUM CHLORIDE, CALCIUM CHLORIDE 600; 310; 30; 20 MG/100ML; MG/100ML; MG/100ML; MG/100ML
INJECTION, SOLUTION INTRAVENOUS CONTINUOUS PRN
Status: DISCONTINUED | OUTPATIENT
Start: 2018-08-02 | End: 2018-08-02

## 2018-08-02 RX ORDER — BUPIVACAINE HYDROCHLORIDE AND EPINEPHRINE 5; 5 MG/ML; UG/ML
INJECTION, SOLUTION PERINEURAL PRN
Status: DISCONTINUED | OUTPATIENT
Start: 2018-08-02 | End: 2018-08-02 | Stop reason: HOSPADM

## 2018-08-02 RX ADMIN — PROPOFOL 70 MCG/KG/MIN: 10 INJECTION, EMULSION INTRAVENOUS at 15:36

## 2018-08-02 RX ADMIN — HYDROMORPHONE HYDROCHLORIDE 0.5 MG: 1 INJECTION, SOLUTION INTRAMUSCULAR; INTRAVENOUS; SUBCUTANEOUS at 02:33

## 2018-08-02 RX ADMIN — POTASSIUM CHLORIDE, DEXTROSE MONOHYDRATE AND SODIUM CHLORIDE: 150; 5; 450 INJECTION, SOLUTION INTRAVENOUS at 01:04

## 2018-08-02 RX ADMIN — PROPOFOL 150 MG: 10 INJECTION, EMULSION INTRAVENOUS at 16:52

## 2018-08-02 RX ADMIN — POTASSIUM CHLORIDE, DEXTROSE MONOHYDRATE AND SODIUM CHLORIDE: 150; 5; 450 INJECTION, SOLUTION INTRAVENOUS at 23:21

## 2018-08-02 RX ADMIN — PHENYLEPHRINE HYDROCHLORIDE 100 MCG: 10 INJECTION, SOLUTION INTRAMUSCULAR; INTRAVENOUS; SUBCUTANEOUS at 20:34

## 2018-08-02 RX ADMIN — SODIUM CHLORIDE, POTASSIUM CHLORIDE, SODIUM LACTATE AND CALCIUM CHLORIDE: 600; 310; 30; 20 INJECTION, SOLUTION INTRAVENOUS at 16:44

## 2018-08-02 RX ADMIN — FLUDROCORTISONE ACETATE 100 MCG: 0.1 TABLET ORAL at 08:23

## 2018-08-02 RX ADMIN — BACITRACIN: 500 OINTMENT TOPICAL at 08:23

## 2018-08-02 RX ADMIN — MULTIVITAMIN 15 ML: LIQUID ORAL at 08:23

## 2018-08-02 RX ADMIN — AMPICILLIN SODIUM AND SULBACTAM SODIUM 3 G: 2; 1 INJECTION, POWDER, FOR SOLUTION INTRAMUSCULAR; INTRAVENOUS at 13:39

## 2018-08-02 RX ADMIN — Medication 20 MG: at 08:30

## 2018-08-02 RX ADMIN — BACITRACIN: 500 OINTMENT TOPICAL at 23:35

## 2018-08-02 RX ADMIN — ACETAMINOPHEN 650 MG: 325 TABLET, FILM COATED ORAL at 09:54

## 2018-08-02 RX ADMIN — PHENYLEPHRINE HYDROCHLORIDE 200 MCG: 10 INJECTION, SOLUTION INTRAMUSCULAR; INTRAVENOUS; SUBCUTANEOUS at 18:14

## 2018-08-02 RX ADMIN — PROPOFOL 20 MG: 10 INJECTION, EMULSION INTRAVENOUS at 20:11

## 2018-08-02 RX ADMIN — HYDROMORPHONE HYDROCHLORIDE 0.5 MG: 1 INJECTION, SOLUTION INTRAMUSCULAR; INTRAVENOUS; SUBCUTANEOUS at 12:00

## 2018-08-02 RX ADMIN — Medication 200 MCG/HR: at 08:12

## 2018-08-02 RX ADMIN — PROPOFOL 40 MG: 10 INJECTION, EMULSION INTRAVENOUS at 17:41

## 2018-08-02 RX ADMIN — SULFAMETHOXAZOLE AND TRIMETHOPRIM 1 TABLET: 400; 80 TABLET ORAL at 08:23

## 2018-08-02 RX ADMIN — ACETAMINOPHEN 650 MG: 325 TABLET, FILM COATED ORAL at 01:41

## 2018-08-02 RX ADMIN — ROCURONIUM BROMIDE 50 MG: 10 INJECTION INTRAVENOUS at 21:44

## 2018-08-02 RX ADMIN — PREDNISONE 5 MG: 5 TABLET ORAL at 08:23

## 2018-08-02 RX ADMIN — POTASSIUM CHLORIDE, DEXTROSE MONOHYDRATE AND SODIUM CHLORIDE: 150; 5; 450 INJECTION, SOLUTION INTRAVENOUS at 11:27

## 2018-08-02 RX ADMIN — ACETAMINOPHEN 650 MG: 325 TABLET, FILM COATED ORAL at 06:31

## 2018-08-02 RX ADMIN — ROCURONIUM BROMIDE 30 MG: 10 INJECTION INTRAVENOUS at 18:19

## 2018-08-02 RX ADMIN — Medication 3 MG: at 08:26

## 2018-08-02 RX ADMIN — ROCURONIUM BROMIDE 50 MG: 10 INJECTION INTRAVENOUS at 20:42

## 2018-08-02 RX ADMIN — CHLORHEXIDINE GLUCONATE 15 ML: 1.2 RINSE ORAL at 08:24

## 2018-08-02 RX ADMIN — FENTANYL CITRATE 50 MCG: 50 INJECTION, SOLUTION INTRAMUSCULAR; INTRAVENOUS at 17:34

## 2018-08-02 RX ADMIN — PROPOFOL 65 MCG/KG/MIN: 10 INJECTION, EMULSION INTRAVENOUS at 04:28

## 2018-08-02 RX ADMIN — Medication 200 MCG/HR: at 00:49

## 2018-08-02 RX ADMIN — PROPOFOL 60 MCG/KG/MIN: 10 INJECTION, EMULSION INTRAVENOUS at 00:59

## 2018-08-02 RX ADMIN — HYDROMORPHONE HYDROCHLORIDE 0.5 MG: 1 INJECTION, SOLUTION INTRAMUSCULAR; INTRAVENOUS; SUBCUTANEOUS at 19:21

## 2018-08-02 RX ADMIN — HYDROMORPHONE HYDROCHLORIDE 0.5 MG: 1 INJECTION, SOLUTION INTRAMUSCULAR; INTRAVENOUS; SUBCUTANEOUS at 08:16

## 2018-08-02 RX ADMIN — Medication 100 MG: at 08:25

## 2018-08-02 RX ADMIN — PROPOFOL 30 MG: 10 INJECTION, EMULSION INTRAVENOUS at 19:38

## 2018-08-02 RX ADMIN — ACETAMINOPHEN 650 MG: 325 TABLET, FILM COATED ORAL at 13:39

## 2018-08-02 RX ADMIN — Medication 200 MCG/HR: at 22:44

## 2018-08-02 RX ADMIN — DEXMEDETOMIDINE HYDROCHLORIDE 0.7 MCG/KG/HR: 4 INJECTION, SOLUTION INTRAVENOUS at 17:27

## 2018-08-02 RX ADMIN — ROCURONIUM BROMIDE 20 MG: 10 INJECTION INTRAVENOUS at 17:45

## 2018-08-02 RX ADMIN — HYDROMORPHONE HYDROCHLORIDE 0.5 MG: 1 INJECTION, SOLUTION INTRAMUSCULAR; INTRAVENOUS; SUBCUTANEOUS at 17:43

## 2018-08-02 RX ADMIN — PHENYLEPHRINE HYDROCHLORIDE 100 MCG: 10 INJECTION, SOLUTION INTRAMUSCULAR; INTRAVENOUS; SUBCUTANEOUS at 17:24

## 2018-08-02 RX ADMIN — SENNOSIDES 10 ML: 8.8 SYRUP ORAL at 08:25

## 2018-08-02 RX ADMIN — ROCURONIUM BROMIDE 50 MG: 10 INJECTION INTRAVENOUS at 17:21

## 2018-08-02 RX ADMIN — CHLORHEXIDINE GLUCONATE 10 ML: 1.2 RINSE ORAL at 08:25

## 2018-08-02 RX ADMIN — POLYETHYLENE GLYCOL 3350 17 G: 17 POWDER, FOR SOLUTION ORAL at 08:23

## 2018-08-02 RX ADMIN — FENTANYL CITRATE 50 MCG: 50 INJECTION, SOLUTION INTRAMUSCULAR; INTRAVENOUS at 16:44

## 2018-08-02 RX ADMIN — PHENYLEPHRINE HYDROCHLORIDE 100 MCG: 10 INJECTION, SOLUTION INTRAMUSCULAR; INTRAVENOUS; SUBCUTANEOUS at 20:48

## 2018-08-02 RX ADMIN — PHENYLEPHRINE HYDROCHLORIDE 100 MCG: 10 INJECTION, SOLUTION INTRAMUSCULAR; INTRAVENOUS; SUBCUTANEOUS at 19:25

## 2018-08-02 RX ADMIN — DEXMEDETOMIDINE HYDROCHLORIDE 15 MCG: 100 INJECTION, SOLUTION INTRAVENOUS at 17:56

## 2018-08-02 RX ADMIN — Medication 200 MCG/HR: at 15:35

## 2018-08-02 RX ADMIN — PHENYLEPHRINE HYDROCHLORIDE 100 MCG: 10 INJECTION, SOLUTION INTRAMUSCULAR; INTRAVENOUS; SUBCUTANEOUS at 20:20

## 2018-08-02 RX ADMIN — DOCUSATE SODIUM 100 MG: 50 LIQUID ORAL at 08:23

## 2018-08-02 RX ADMIN — MAGNESIUM SULFATE HEPTAHYDRATE 4 G: 40 INJECTION, SOLUTION INTRAVENOUS at 06:29

## 2018-08-02 RX ADMIN — AMPICILLIN SODIUM AND SULBACTAM SODIUM 3 G: 2; 1 INJECTION, POWDER, FOR SOLUTION INTRAMUSCULAR; INTRAVENOUS at 19:04

## 2018-08-02 RX ADMIN — AMPICILLIN SODIUM AND SULBACTAM SODIUM 3 G: 2; 1 INJECTION, POWDER, FOR SOLUTION INTRAMUSCULAR; INTRAVENOUS at 08:21

## 2018-08-02 RX ADMIN — Medication 1 MG: at 08:24

## 2018-08-02 RX ADMIN — MIDAZOLAM 2 MG: 1 INJECTION INTRAMUSCULAR; INTRAVENOUS at 16:44

## 2018-08-02 RX ADMIN — MYCOPHENOLATE MOFETIL 1000 MG: 200 POWDER, FOR SUSPENSION ORAL at 09:17

## 2018-08-02 RX ADMIN — CHLORHEXIDINE GLUCONATE 15 ML: 1.2 RINSE ORAL at 23:34

## 2018-08-02 RX ADMIN — NITROGLYCERIN 15 MG: 20 OINTMENT TOPICAL at 08:27

## 2018-08-02 RX ADMIN — PROPOFOL 70 MCG/KG/MIN: 10 INJECTION, EMULSION INTRAVENOUS at 13:00

## 2018-08-02 RX ADMIN — ROCURONIUM BROMIDE 50 MG: 10 INJECTION INTRAVENOUS at 19:21

## 2018-08-02 RX ADMIN — AMPICILLIN SODIUM AND SULBACTAM SODIUM 3 G: 2; 1 INJECTION, POWDER, FOR SOLUTION INTRAMUSCULAR; INTRAVENOUS at 01:41

## 2018-08-02 RX ADMIN — PROPOFOL 65 MCG/KG/MIN: 10 INJECTION, EMULSION INTRAVENOUS at 08:14

## 2018-08-02 RX ADMIN — VALGANCICLOVIR HYDROCHLORIDE 450 MG: 50 POWDER, FOR SOLUTION ORAL at 08:29

## 2018-08-02 ASSESSMENT — ACTIVITIES OF DAILY LIVING (ADL)
ADLS_ACUITY_SCORE: 11
ADLS_ACUITY_SCORE: 11
ADLS_ACUITY_SCORE: 13
ADLS_ACUITY_SCORE: 13
ADLS_ACUITY_SCORE: 11

## 2018-08-02 NOTE — PLAN OF CARE
Problem: Patient Care Overview  Goal: Plan of Care/Patient Progress Review  Outcome: No Change  -Neuro: sedated on 65 of propofol, opens eyes spontaneously, will nod yes/no to questions if prompted. Moves all extremities to command with equal strength. PERRL.   -Cardiac: SBP goal <160, no issues. HR NSR 70-90s, no ectopy noted. Tmax 99.3  -Resp: 8 shiley, inner cannula last changed at 0000. Small amt of Serosanguinous secretions from trach and orally. CMV settings , RR 14, PEEP 5, FiO2 30%.  Lungs clear/dim in bases  -GI: TF held at 0000 for OR today. OG sutured for meds. Residuals 100-130 ml. No signs of nausea. +bowel sounds, no flatus or BM. Give sched bowel meds.   -: plascencia with good urine output  -Pain: fentanyl gtt at 200 mcg/hr. Pt nods yes to pain when asked. Prn bumps of IV dilaudid given x2.  -Skin: facial incisions with small amt of sero-sang and dried drainage. Oral suction with red aquino only. Bacitracin per MAR. Scab to L toe open to air. Penile lesion open to air, applying bacitracin per MAR  -Activity: up with lift to chair yesterday  -Access: L DL PICC, R brachial arterial line, PIV  -Gtts: fentanyl at 200 mcg/hr, propofol at 65 mcg/kg/min, D5 1/2 NS with 20 mEq KCl at 100 ml/hr, TKO with abx  -Labs: Mag 1.5, replaced IV    Plan: OR today at 1530, consent still needs to be obtained from wife.    Problem: Restraint for Non-Violent/Non-Self-Destructive Behavior  Goal: Prevent/Manage Potential Problems  Maintain safety of patient and others during period of restraint.  Promote psychological and physical wellbeing.  Prevent injury to skin and involved body parts.  Promote nutrition, hydration, and elimination.   Outcome: No Change  Patient still sedated at this time but awakens spontaneously. For safety purposes, restraints remain in place to prevent pulling at lines and tubes. No signs of injury    Problem: Suicide Risk (Adult)  Goal: Identify Related Risk Factors and Signs and  Symptoms  Related risk factors and signs and symptoms are identified upon initiation of Human Response Clinical Practice Guideline (CPG).   Outcome: No Change  No attendant at bedside needed. Patient in restraints and sedated at this time. Will continue to evaluate need for bedside attendant.    Gillian Willoughby  8/2/2018  7:05 AM
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
## 2018-08-03 ENCOUNTER — APPOINTMENT (OUTPATIENT)
Dept: GENERAL RADIOLOGY | Facility: CLINIC | Age: 48
DRG: 131 | End: 2018-08-03
Attending: SURGERY
Payer: MEDICARE

## 2018-08-03 ENCOUNTER — APPOINTMENT (OUTPATIENT)
Dept: CT IMAGING | Facility: CLINIC | Age: 48
DRG: 131 | End: 2018-08-03
Attending: SURGERY
Payer: MEDICARE

## 2018-08-03 LAB
ANION GAP SERPL CALCULATED.3IONS-SCNC: 6 MMOL/L (ref 3–14)
BUN SERPL-MCNC: 10 MG/DL (ref 7–30)
CALCIUM SERPL-MCNC: 8.9 MG/DL (ref 8.5–10.1)
CHLORIDE SERPL-SCNC: 107 MMOL/L (ref 94–109)
CK SERPL-CCNC: 43 U/L (ref 30–300)
CO2 SERPL-SCNC: 25 MMOL/L (ref 20–32)
CREAT SERPL-MCNC: 0.82 MG/DL (ref 0.66–1.25)
ERYTHROCYTE [DISTWIDTH] IN BLOOD BY AUTOMATED COUNT: 15.4 % (ref 10–15)
GFR SERPL CREATININE-BSD FRML MDRD: >90 ML/MIN/1.7M2
GLUCOSE BLDC GLUCOMTR-MCNC: 120 MG/DL (ref 70–99)
GLUCOSE BLDC GLUCOMTR-MCNC: 125 MG/DL (ref 70–99)
GLUCOSE BLDC GLUCOMTR-MCNC: 125 MG/DL (ref 70–99)
GLUCOSE BLDC GLUCOMTR-MCNC: 132 MG/DL (ref 70–99)
GLUCOSE BLDC GLUCOMTR-MCNC: 139 MG/DL (ref 70–99)
GLUCOSE SERPL-MCNC: 148 MG/DL (ref 70–99)
HCT VFR BLD AUTO: 31.5 % (ref 40–53)
HGB BLD-MCNC: 10.4 G/DL (ref 13.3–17.7)
INR PPP: 1.06 (ref 0.86–1.14)
MAGNESIUM SERPL-MCNC: 1.8 MG/DL (ref 1.6–2.3)
MCH RBC QN AUTO: 30.7 PG (ref 26.5–33)
MCHC RBC AUTO-ENTMCNC: 33 G/DL (ref 31.5–36.5)
MCV RBC AUTO: 93 FL (ref 78–100)
PHOSPHATE SERPL-MCNC: 3.5 MG/DL (ref 2.5–4.5)
PLATELET # BLD AUTO: 258 10E9/L (ref 150–450)
POTASSIUM SERPL-SCNC: 5.2 MMOL/L (ref 3.4–5.3)
RBC # BLD AUTO: 3.39 10E12/L (ref 4.4–5.9)
SODIUM SERPL-SCNC: 138 MMOL/L (ref 133–144)
TRIGL SERPL-MCNC: 240 MG/DL
WBC # BLD AUTO: 14.8 10E9/L (ref 4–11)

## 2018-08-03 PROCEDURE — 84478 ASSAY OF TRIGLYCERIDES: CPT | Performed by: SURGERY

## 2018-08-03 PROCEDURE — 25000128 H RX IP 250 OP 636: Performed by: PHYSICIAN ASSISTANT

## 2018-08-03 PROCEDURE — 27210195 ZZH KIT POWER PICC DOUBLE LUMEN

## 2018-08-03 PROCEDURE — 27210577 ZZ H INTRODUCER MICRO SET

## 2018-08-03 PROCEDURE — 84100 ASSAY OF PHOSPHORUS: CPT | Performed by: SURGERY

## 2018-08-03 PROCEDURE — 93005 ELECTROCARDIOGRAM TRACING: CPT

## 2018-08-03 PROCEDURE — 25000131 ZZH RX MED GY IP 250 OP 636 PS 637: Mod: GY | Performed by: STUDENT IN AN ORGANIZED HEALTH CARE EDUCATION/TRAINING PROGRAM

## 2018-08-03 PROCEDURE — 40000986 XR CHEST PORT 1 VW

## 2018-08-03 PROCEDURE — 25000132 ZZH RX MED GY IP 250 OP 250 PS 637: Mod: GY | Performed by: NURSE PRACTITIONER

## 2018-08-03 PROCEDURE — 25000128 H RX IP 250 OP 636

## 2018-08-03 PROCEDURE — 44500 INTRO GASTROINTESTINAL TUBE: CPT

## 2018-08-03 PROCEDURE — A9270 NON-COVERED ITEM OR SERVICE: HCPCS | Mod: GY | Performed by: NURSE PRACTITIONER

## 2018-08-03 PROCEDURE — 00000146 ZZHCL STATISTIC GLUCOSE BY METER IP

## 2018-08-03 PROCEDURE — 25000128 H RX IP 250 OP 636: Performed by: STUDENT IN AN ORGANIZED HEALTH CARE EDUCATION/TRAINING PROGRAM

## 2018-08-03 PROCEDURE — 25000131 ZZH RX MED GY IP 250 OP 636 PS 637: Mod: GY | Performed by: NURSE PRACTITIONER

## 2018-08-03 PROCEDURE — 25800025 ZZH RX 258: Performed by: NURSE PRACTITIONER

## 2018-08-03 PROCEDURE — 70486 CT MAXILLOFACIAL W/O DYE: CPT

## 2018-08-03 PROCEDURE — 94003 VENT MGMT INPAT SUBQ DAY: CPT

## 2018-08-03 PROCEDURE — 80048 BASIC METABOLIC PNL TOTAL CA: CPT | Performed by: SURGERY

## 2018-08-03 PROCEDURE — 25000132 ZZH RX MED GY IP 250 OP 250 PS 637: Mod: GY | Performed by: SURGERY

## 2018-08-03 PROCEDURE — 36569 INSJ PICC 5 YR+ W/O IMAGING: CPT

## 2018-08-03 PROCEDURE — 25800025 ZZH RX 258: Performed by: STUDENT IN AN ORGANIZED HEALTH CARE EDUCATION/TRAINING PROGRAM

## 2018-08-03 PROCEDURE — 25000125 ZZHC RX 250: Performed by: RADIOLOGY

## 2018-08-03 PROCEDURE — 0DH97UZ INSERTION OF FEEDING DEVICE INTO DUODENUM, VIA NATURAL OR ARTIFICIAL OPENING: ICD-10-PCS | Performed by: RADIOLOGY

## 2018-08-03 PROCEDURE — 82550 ASSAY OF CK (CPK): CPT | Performed by: SURGERY

## 2018-08-03 PROCEDURE — 25000132 ZZH RX MED GY IP 250 OP 250 PS 637: Mod: GY | Performed by: PHYSICIAN ASSISTANT

## 2018-08-03 PROCEDURE — 25000132 ZZH RX MED GY IP 250 OP 250 PS 637: Mod: GY | Performed by: STUDENT IN AN ORGANIZED HEALTH CARE EDUCATION/TRAINING PROGRAM

## 2018-08-03 PROCEDURE — 40000275 ZZH STATISTIC RCP TIME EA 10 MIN

## 2018-08-03 PROCEDURE — A9270 NON-COVERED ITEM OR SERVICE: HCPCS | Mod: GY | Performed by: PHYSICIAN ASSISTANT

## 2018-08-03 PROCEDURE — 85610 PROTHROMBIN TIME: CPT | Performed by: SURGERY

## 2018-08-03 PROCEDURE — 83735 ASSAY OF MAGNESIUM: CPT | Performed by: SURGERY

## 2018-08-03 PROCEDURE — 25000125 ZZHC RX 250: Performed by: STUDENT IN AN ORGANIZED HEALTH CARE EDUCATION/TRAINING PROGRAM

## 2018-08-03 PROCEDURE — 93010 ELECTROCARDIOGRAM REPORT: CPT | Performed by: INTERNAL MEDICINE

## 2018-08-03 PROCEDURE — 99231 SBSQ HOSP IP/OBS SF/LOW 25: CPT | Performed by: NURSE PRACTITIONER

## 2018-08-03 PROCEDURE — 74018 RADEX ABDOMEN 1 VIEW: CPT

## 2018-08-03 PROCEDURE — 20000004 ZZH R&B ICU UMMC

## 2018-08-03 PROCEDURE — A9270 NON-COVERED ITEM OR SERVICE: HCPCS | Mod: GY | Performed by: STUDENT IN AN ORGANIZED HEALTH CARE EDUCATION/TRAINING PROGRAM

## 2018-08-03 PROCEDURE — 25000131 ZZH RX MED GY IP 250 OP 636 PS 637: Mod: GY | Performed by: INTERNAL MEDICINE

## 2018-08-03 PROCEDURE — 40000281 ZZH STATISTIC TRANSPORT TIME EA 15 MIN

## 2018-08-03 PROCEDURE — 85027 COMPLETE CBC AUTOMATED: CPT | Performed by: SURGERY

## 2018-08-03 PROCEDURE — 71045 X-RAY EXAM CHEST 1 VIEW: CPT

## 2018-08-03 PROCEDURE — 40000014 ZZH STATISTIC ARTERIAL MONITORING DAILY

## 2018-08-03 RX ORDER — SODIUM CHLORIDE 9 MG/ML
INJECTION, SOLUTION INTRAVENOUS
Status: DISCONTINUED
Start: 2018-08-03 | End: 2018-08-06 | Stop reason: HOSPADM

## 2018-08-03 RX ORDER — CLONAZEPAM 0.5 MG/1
.5-1 TABLET ORAL 2 TIMES DAILY
Status: DISCONTINUED | OUTPATIENT
Start: 2018-08-04 | End: 2018-08-04

## 2018-08-03 RX ORDER — SODIUM CHLORIDE, SODIUM LACTATE, POTASSIUM CHLORIDE, CALCIUM CHLORIDE 600; 310; 30; 20 MG/100ML; MG/100ML; MG/100ML; MG/100ML
INJECTION, SOLUTION INTRAVENOUS CONTINUOUS
Status: DISCONTINUED | OUTPATIENT
Start: 2018-08-03 | End: 2018-08-03

## 2018-08-03 RX ORDER — ESCITALOPRAM OXALATE 5 MG/5ML
20 SOLUTION ORAL DAILY
Status: DISCONTINUED | OUTPATIENT
Start: 2018-08-17 | End: 2018-08-13 | Stop reason: HOSPADM

## 2018-08-03 RX ORDER — CLONAZEPAM 0.5 MG/1
1 TABLET ORAL ONCE
Status: COMPLETED | OUTPATIENT
Start: 2018-08-03 | End: 2018-08-03

## 2018-08-03 RX ORDER — ESCITALOPRAM OXALATE 5 MG/5ML
10 SOLUTION ORAL DAILY
Status: DISCONTINUED | OUTPATIENT
Start: 2018-08-03 | End: 2018-08-13 | Stop reason: HOSPADM

## 2018-08-03 RX ORDER — SODIUM CHLORIDE, SODIUM LACTATE, POTASSIUM CHLORIDE, CALCIUM CHLORIDE 600; 310; 30; 20 MG/100ML; MG/100ML; MG/100ML; MG/100ML
INJECTION, SOLUTION INTRAVENOUS
Status: COMPLETED
Start: 2018-08-03 | End: 2018-08-03

## 2018-08-03 RX ORDER — CLONAZEPAM 0.5 MG/1
.5-1 TABLET ORAL 2 TIMES DAILY
Status: DISCONTINUED | OUTPATIENT
Start: 2018-08-03 | End: 2018-08-03

## 2018-08-03 RX ORDER — SODIUM CHLORIDE 9 MG/ML
INJECTION, SOLUTION INTRAVENOUS
Status: COMPLETED
Start: 2018-08-03 | End: 2018-08-03

## 2018-08-03 RX ORDER — TRAZODONE HYDROCHLORIDE 100 MG/1
300 TABLET ORAL EVERY EVENING
Status: DISCONTINUED | OUTPATIENT
Start: 2018-08-03 | End: 2018-08-10

## 2018-08-03 RX ORDER — ATORVASTATIN CALCIUM 40 MG/1
40 TABLET, FILM COATED ORAL DAILY
Status: DISCONTINUED | OUTPATIENT
Start: 2018-08-03 | End: 2018-08-03

## 2018-08-03 RX ORDER — CHLORHEXIDINE GLUCONATE ORAL RINSE 1.2 MG/ML
15 SOLUTION DENTAL EVERY 6 HOURS
Status: DISCONTINUED | OUTPATIENT
Start: 2018-08-03 | End: 2018-08-13 | Stop reason: HOSPADM

## 2018-08-03 RX ADMIN — HYDROMORPHONE HYDROCHLORIDE 0.5 MG: 1 INJECTION, SOLUTION INTRAMUSCULAR; INTRAVENOUS; SUBCUTANEOUS at 17:05

## 2018-08-03 RX ADMIN — DEXTROSE AND SODIUM CHLORIDE: 5; 450 INJECTION, SOLUTION INTRAVENOUS at 20:29

## 2018-08-03 RX ADMIN — PROPOFOL 55 MCG/KG/MIN: 10 INJECTION, EMULSION INTRAVENOUS at 08:38

## 2018-08-03 RX ADMIN — TRAZODONE HYDROCHLORIDE 300 MG: 150 TABLET ORAL at 19:44

## 2018-08-03 RX ADMIN — POLYETHYLENE GLYCOL 3350 17 G: 17 POWDER, FOR SOLUTION ORAL at 14:51

## 2018-08-03 RX ADMIN — ACETAMINOPHEN 650 MG: 325 TABLET, FILM COATED ORAL at 17:51

## 2018-08-03 RX ADMIN — DEXTROSE AND SODIUM CHLORIDE: 5; 450 INJECTION, SOLUTION INTRAVENOUS at 09:25

## 2018-08-03 RX ADMIN — SODIUM CHLORIDE, SODIUM LACTATE, POTASSIUM CHLORIDE, CALCIUM CHLORIDE: 600; 310; 30; 20 INJECTION, SOLUTION INTRAVENOUS at 06:46

## 2018-08-03 RX ADMIN — Medication 1000 ML: at 20:43

## 2018-08-03 RX ADMIN — Medication 200 MCG/HR: at 12:53

## 2018-08-03 RX ADMIN — SULFAMETHOXAZOLE AND TRIMETHOPRIM 1 TABLET: 400; 80 TABLET ORAL at 14:51

## 2018-08-03 RX ADMIN — PROPOFOL 50 MCG/KG/MIN: 10 INJECTION, EMULSION INTRAVENOUS at 23:44

## 2018-08-03 RX ADMIN — ACETAMINOPHEN 650 MG: 325 TABLET, FILM COATED ORAL at 14:13

## 2018-08-03 RX ADMIN — Medication 3 MG: at 17:51

## 2018-08-03 RX ADMIN — HYDROMORPHONE HYDROCHLORIDE 0.5 MG: 1 INJECTION, SOLUTION INTRAMUSCULAR; INTRAVENOUS; SUBCUTANEOUS at 02:12

## 2018-08-03 RX ADMIN — DEXTROSE AND SODIUM CHLORIDE 100 ML/HR: 10; .45 INJECTION, SOLUTION INTRAVENOUS at 05:04

## 2018-08-03 RX ADMIN — LIDOCAINE HYDROCHLORIDE 5 ML: 20 SOLUTION ORAL; TOPICAL at 14:08

## 2018-08-03 RX ADMIN — Medication 20 MG: at 02:36

## 2018-08-03 RX ADMIN — ACETAMINOPHEN 650 MG: 325 TABLET, FILM COATED ORAL at 21:47

## 2018-08-03 RX ADMIN — Medication 200 MCG/HR: at 20:28

## 2018-08-03 RX ADMIN — CLONAZEPAM 1 MG: 0.5 TABLET ORAL at 17:51

## 2018-08-03 RX ADMIN — BACITRACIN: 500 OINTMENT TOPICAL at 19:44

## 2018-08-03 RX ADMIN — SODIUM CHLORIDE, POTASSIUM CHLORIDE, SODIUM LACTATE AND CALCIUM CHLORIDE: 600; 310; 30; 20 INJECTION, SOLUTION INTRAVENOUS at 06:46

## 2018-08-03 RX ADMIN — BACITRACIN: 500 OINTMENT TOPICAL at 09:05

## 2018-08-03 RX ADMIN — CHLORHEXIDINE GLUCONATE 0.12% ORAL RINSE 15 ML: 1.2 LIQUID ORAL at 23:19

## 2018-08-03 RX ADMIN — PREDNISONE 5 MG: 5 TABLET ORAL at 14:50

## 2018-08-03 RX ADMIN — AMPICILLIN SODIUM AND SULBACTAM SODIUM 3 G: 2; 1 INJECTION, POWDER, FOR SOLUTION INTRAMUSCULAR; INTRAVENOUS at 14:13

## 2018-08-03 RX ADMIN — HYDROMORPHONE HYDROCHLORIDE 0.5 MG: 1 INJECTION, SOLUTION INTRAMUSCULAR; INTRAVENOUS; SUBCUTANEOUS at 12:29

## 2018-08-03 RX ADMIN — CHLORHEXIDINE GLUCONATE 15 ML: 1.2 RINSE ORAL at 09:25

## 2018-08-03 RX ADMIN — HYDROMORPHONE HYDROCHLORIDE 0.5 MG: 1 INJECTION, SOLUTION INTRAMUSCULAR; INTRAVENOUS; SUBCUTANEOUS at 00:43

## 2018-08-03 RX ADMIN — PROPOFOL 65 MCG/KG/MIN: 10 INJECTION, EMULSION INTRAVENOUS at 18:21

## 2018-08-03 RX ADMIN — PROPOFOL 65 MCG/KG/MIN: 10 INJECTION, EMULSION INTRAVENOUS at 03:48

## 2018-08-03 RX ADMIN — FLUDROCORTISONE ACETATE 100 MCG: 0.1 TABLET ORAL at 14:51

## 2018-08-03 RX ADMIN — Medication 20 MG: at 14:53

## 2018-08-03 RX ADMIN — MULTIVITAMIN 15 ML: LIQUID ORAL at 14:51

## 2018-08-03 RX ADMIN — ENOXAPARIN SODIUM 40 MG: 100 INJECTION SUBCUTANEOUS at 19:43

## 2018-08-03 RX ADMIN — HYDROMORPHONE HYDROCHLORIDE 0.5 MG: 1 INJECTION, SOLUTION INTRAMUSCULAR; INTRAVENOUS; SUBCUTANEOUS at 09:22

## 2018-08-03 RX ADMIN — ATORVASTATIN CALCIUM 40 MG: 40 TABLET, FILM COATED ORAL at 19:43

## 2018-08-03 RX ADMIN — VALGANCICLOVIR HYDROCHLORIDE 450 MG: 50 POWDER, FOR SOLUTION ORAL at 14:53

## 2018-08-03 RX ADMIN — AMPICILLIN SODIUM AND SULBACTAM SODIUM 3 G: 2; 1 INJECTION, POWDER, FOR SOLUTION INTRAMUSCULAR; INTRAVENOUS at 09:04

## 2018-08-03 RX ADMIN — AMPICILLIN SODIUM AND SULBACTAM SODIUM 3 G: 2; 1 INJECTION, POWDER, FOR SOLUTION INTRAMUSCULAR; INTRAVENOUS at 19:43

## 2018-08-03 RX ADMIN — AMPICILLIN SODIUM AND SULBACTAM SODIUM 3 G: 2; 1 INJECTION, POWDER, FOR SOLUTION INTRAMUSCULAR; INTRAVENOUS at 01:17

## 2018-08-03 RX ADMIN — PROPOFOL 75 MCG/KG/MIN: 10 INJECTION, EMULSION INTRAVENOUS at 00:57

## 2018-08-03 RX ADMIN — Medication 200 MCG/HR: at 05:26

## 2018-08-03 RX ADMIN — MYCOPHENOLATE MOFETIL 1000 MG: 200 POWDER, FOR SUSPENSION ORAL at 19:45

## 2018-08-03 RX ADMIN — Medication 1 MG: at 14:52

## 2018-08-03 RX ADMIN — SODIUM CHLORIDE 1000 ML: 900 INJECTION, SOLUTION INTRAVENOUS at 20:43

## 2018-08-03 RX ADMIN — ESCITALOPRAM OXALATE 10 MG: 5 SOLUTION ORAL at 14:32

## 2018-08-03 RX ADMIN — CHLORHEXIDINE GLUCONATE 0.12% ORAL RINSE 15 ML: 1.2 LIQUID ORAL at 18:10

## 2018-08-03 ASSESSMENT — ACTIVITIES OF DAILY LIVING (ADL)
ADLS_ACUITY_SCORE: 12
ADLS_ACUITY_SCORE: 13
ADLS_ACUITY_SCORE: 12
ADLS_ACUITY_SCORE: 12
ADLS_ACUITY_SCORE: 11
ADLS_ACUITY_SCORE: 14

## 2018-08-03 NOTE — BRIEF OP NOTE
[facility], [place]    Brief Operative Note    Pre-operative diagnosis: Bilateral Mandible, Maxilla, Naso Orbitbial Ethmoidal and Left Orbital Rim Fractures   Post-operative diagnosis * No post-op diagnosis entered *  Procedure: Procedure(s):  Open Reduction Interral Fixation of Bilateral Mandible, Maxilla, Naso Orbitbial Ethmoidal Fractures Nasal-gastric feeding tube placement - Wound Class: II-Clean Contaminated   - Wound Class: II-Clean Contaminated  Surgeon: Surgeon(s) and Role:     * Denzel Hernández DDS - Primary     * Jefferson Ruiz MD - Resident - Assisting     * Camron Medina MD - Resident - Assisting     * Truman Vega MD - Resident - Assisting     * Gerry Mg - Resident - Assisting  Anesthesia: General   Estimated blood loss: 250cc  Drains: NJ tube to left nare  Specimens: none  Findings:  Fractured mandible, davina, maxilla, devitalized left maxillary lip and ala  Complications: None.  Implants: None.    LA: 10cc 0.25% marcaine w/1:200k epi  Fluids: 2L LR  EBL: 250cc    OMFS Recs:  1.  Oral cares: needs oral suctioning q1h, pink swabs with peridex q6h  2.  Bacitracin to lacerations  3.  KUB to confirm placement of NJ tube  4.  CT facial bones w/o contrast with 1mm slices, with 3D reconstruction  5.  Analgesia per SICU  6.  Continue Unasyn  7.  Nasal trumpets and sutures to remain in place for 7 days  8.  Nursing or SICU ok to adjust NJ tube if needed  9.  Okay to wean sedation  10. Patient is not wired together      Jefferson Ruiz DDS
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
## 2018-08-03 NOTE — PROGRESS NOTES
Patient arrived to 4A from OR direct at approx 2215. Verbal SBAR report received from OR RN and Anesthesia. Wife updated that surgery is complete by surgical team. Vitals stable, will continue to monitor patient and update MD with any changes in pt condition.    Gillian Willoughby  8/2/2018

## 2018-08-03 NOTE — ANESTHESIA CARE TRANSFER NOTE
Patient: Gilles HERNANDEZ Henning III    Procedure(s):  Open Reduction Interral Fixation of Bilateral Mandible, Maxilla, Naso Orbitbial Ethmoidal Fractures Nasal-gastric feeding tube placement - Wound Class: II-Clean Contaminated   - Wound Class: II-Clean Contaminated    Diagnosis: Bilateral Mandible, Maxilla, Naso Orbitbial Ethmoidal and Left Orbital Rim Fractures   Diagnosis Additional Information: No value filed.    Anesthesia Type:   General     Note:  Airway :Tracheostomy  Patient transferred to:ICU  Comments: VSS on arrival, report to RN. ICU Handoff: Call for PAUSE to initiate/utilize ICU HANDOFF, Identified Patient, Identified Responsible Provider, Reviewed the Pertinent Medical History, Discussed Surgical Course, Reviewed Intra-OP Anesthesia Management and Issues during Anesthesia, Set Expectations for Post Procedure Period and Allowed Opportunity for Questions and Acknowledgement of Understanding      Vitals: (Last set prior to Anesthesia Care Transfer)    CRNA VITALS  8/2/2018 2134 - 8/2/2018 2218      8/2/2018             Resp Rate (observed): 28    Resp Rate (set): 10                Electronically Signed By: MICAH France CRNA  August 2, 2018  10:18 PM

## 2018-08-03 NOTE — ANESTHESIA POSTPROCEDURE EVALUATION
Patient: Gilles HERNANDEZ Juvencio III    Procedure(s):  Open Reduction Interral Fixation of Bilateral Mandible, Maxilla, Naso Orbitbial Ethmoidal Fractures Nasal-gastric feeding tube placement - Wound Class: II-Clean Contaminated   - Wound Class: II-Clean Contaminated    Diagnosis:Bilateral Mandible, Maxilla, Naso Orbitbial Ethmoidal and Left Orbital Rim Fractures   Diagnosis Additional Information: No value filed.    Anesthesia Type:  General    Note:  Anesthesia Post Evaluation    Patient location during evaluation: ICU  Patient participation: Unable to evaluate secondary to administered sedation  Level of consciousness: obtunded/minimal responses  Pain management: unable to assess  Airway patency: patent  Cardiovascular status: acceptable  Respiratory status: ETT (trach)  Hydration status: acceptable  PONV: unable to assess     Anesthetic complications: None          Last vitals:  Vitals:    08/02/18 1600 08/02/18 2215 08/02/18 2230   BP:   109/72   Resp: 13  14   Temp: 36.6  C (97.8  F) 37  C (98.6  F)    SpO2: 98% 99% 98%         Electronically Signed By: Radha Ellison MD  August 2, 2018  10:43 PM
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
## 2018-08-03 NOTE — PLAN OF CARE
Problem: Patient Care Overview  Goal: Plan of Care/Patient Progress Review  Outcome: No Change  Patient arrived from OR last night at 2215. Wife updated by surgical team last night.     -Neuro: sedated on propofol, arouses to voice, will nod yes/no to questions if prompted. Moves all extremities to command with equal strength. PERRL.   -Cardiac: SBP goal <160, prn labetolol given x1. HR NSR 70-low 100s, no ectopy noted. Tmax 100.1, MD aware.  -Resp: 8 shiley, inner cannula last changed at 0000. Small amt of sanguinous secretions from trach and orally. CMV settings , RR 14, PEEP 5, FiO2 30%.  Lungs clear/dim in bases  -GI: no oral route as NJT placed in OR was removed for being in R lung. MD aware. Transplant also notified that patient did not receive evening transplant medications. MD stated this was okay. Will need oral route for meds soon. +bowel sounds, no flatus or BM.   -: plascencia with good urine output  -Pain: fentanyl gtt at 200 mcg/hr. Pt nods yes to pain when asked. Prn bumps of IV dilaudid given x2.  -Skin: facial incisions with small amt of sero-sang and dried drainage. Oral suction as needed with scheduled peridex solution . Bacitracin per MAR. Scab to L toe open to air. Penile lesion open to air, applying bacitracin per MAR  -Activity: up with lift to chair yesterday, turn q2h  -Access: L DL PICC, R brachial arterial line, PIV  -Gtts: fentanyl at 200 mcg/hr, propofol at 65 mcg/kg/min, D10 1/2 NS at 100 ml/hr, TKO with abx  -Labs: K 5.2 (MD aware), Hemoglobin stable. WBC 14.2       Plan: wean sedation, pain control, monitor and notify MD with any changes in patient condition.     Problem: Restraint for Non-Violent/Non-Self-Destructive Behavior  Goal: Prevent/Manage Potential Problems  Maintain safety of patient and others during period of restraint.  Promote psychological and physical wellbeing.  Prevent injury to skin and involved body parts.  Promote nutrition, hydration, and elimination.    Outcome: No Change  Patient still sedated at this time but awakens spontaneously. For safety purposes, restraints remain in place to prevent pulling at lines and tubes/vent/trach. No signs of injury     Problem: Suicide Risk (Adult)  Goal: Identify Related Risk Factors and Signs and Symptoms  Related risk factors and signs and symptoms are identified upon initiation of Human Response Clinical Practice Guideline (CPG).   Outcome: No Change  No attendant at bedside needed. Patient in restraints and sedated at this time. Will continue to evaluate need for bedside attendant.    Gillian Willoughby  8/3/2018  5:44 AM

## 2018-08-04 ENCOUNTER — APPOINTMENT (OUTPATIENT)
Dept: GENERAL RADIOLOGY | Facility: CLINIC | Age: 48
DRG: 131 | End: 2018-08-04
Attending: NURSE PRACTITIONER
Payer: MEDICARE

## 2018-08-04 ENCOUNTER — APPOINTMENT (OUTPATIENT)
Dept: PHYSICAL THERAPY | Facility: CLINIC | Age: 48
DRG: 131 | End: 2018-08-04
Attending: SURGERY
Payer: MEDICARE

## 2018-08-04 LAB
ABO + RH BLD: NORMAL
ABO + RH BLD: NORMAL
ANION GAP SERPL CALCULATED.3IONS-SCNC: 7 MMOL/L (ref 3–14)
ANION GAP SERPL CALCULATED.3IONS-SCNC: 8 MMOL/L (ref 3–14)
BLD GP AB SCN SERPL QL: NORMAL
BLOOD BANK CMNT PATIENT-IMP: NORMAL
BUN SERPL-MCNC: 10 MG/DL (ref 7–30)
BUN SERPL-MCNC: 11 MG/DL (ref 7–30)
CALCIUM SERPL-MCNC: 8.6 MG/DL (ref 8.5–10.1)
CALCIUM SERPL-MCNC: 8.7 MG/DL (ref 8.5–10.1)
CHLORIDE SERPL-SCNC: 105 MMOL/L (ref 94–109)
CHLORIDE SERPL-SCNC: 108 MMOL/L (ref 94–109)
CO2 SERPL-SCNC: 22 MMOL/L (ref 20–32)
CO2 SERPL-SCNC: 24 MMOL/L (ref 20–32)
CREAT SERPL-MCNC: 0.67 MG/DL (ref 0.66–1.25)
CREAT SERPL-MCNC: 0.81 MG/DL (ref 0.66–1.25)
ERYTHROCYTE [DISTWIDTH] IN BLOOD BY AUTOMATED COUNT: 15.3 % (ref 10–15)
GFR SERPL CREATININE-BSD FRML MDRD: >90 ML/MIN/1.7M2
GFR SERPL CREATININE-BSD FRML MDRD: >90 ML/MIN/1.7M2
GLUCOSE BLDC GLUCOMTR-MCNC: 116 MG/DL (ref 70–99)
GLUCOSE BLDC GLUCOMTR-MCNC: 117 MG/DL (ref 70–99)
GLUCOSE BLDC GLUCOMTR-MCNC: 126 MG/DL (ref 70–99)
GLUCOSE BLDC GLUCOMTR-MCNC: 128 MG/DL (ref 70–99)
GLUCOSE BLDC GLUCOMTR-MCNC: 129 MG/DL (ref 70–99)
GLUCOSE BLDC GLUCOMTR-MCNC: 132 MG/DL (ref 70–99)
GLUCOSE BLDC GLUCOMTR-MCNC: 136 MG/DL (ref 70–99)
GLUCOSE SERPL-MCNC: 156 MG/DL (ref 70–99)
GLUCOSE SERPL-MCNC: 272 MG/DL (ref 70–99)
HCT VFR BLD AUTO: 25 % (ref 40–53)
HGB BLD-MCNC: 8.3 G/DL (ref 13.3–17.7)
INR PPP: 1.23 (ref 0.86–1.14)
MAGNESIUM SERPL-MCNC: 1.5 MG/DL (ref 1.6–2.3)
MAGNESIUM SERPL-MCNC: 1.6 MG/DL (ref 1.6–2.3)
MCH RBC QN AUTO: 30.6 PG (ref 26.5–33)
MCHC RBC AUTO-ENTMCNC: 33.2 G/DL (ref 31.5–36.5)
MCV RBC AUTO: 92 FL (ref 78–100)
PHOSPHATE SERPL-MCNC: 3.5 MG/DL (ref 2.5–4.5)
PLATELET # BLD AUTO: 250 10E9/L (ref 150–450)
POTASSIUM SERPL-SCNC: 3.2 MMOL/L (ref 3.4–5.3)
POTASSIUM SERPL-SCNC: 4.2 MMOL/L (ref 3.4–5.3)
RBC # BLD AUTO: 2.71 10E12/L (ref 4.4–5.9)
SODIUM SERPL-SCNC: 136 MMOL/L (ref 133–144)
SODIUM SERPL-SCNC: 139 MMOL/L (ref 133–144)
SPECIMEN EXP DATE BLD: NORMAL
WBC # BLD AUTO: 11.2 10E9/L (ref 4–11)

## 2018-08-04 PROCEDURE — 83735 ASSAY OF MAGNESIUM: CPT | Performed by: SURGERY

## 2018-08-04 PROCEDURE — 93005 ELECTROCARDIOGRAM TRACING: CPT

## 2018-08-04 PROCEDURE — 40000275 ZZH STATISTIC RCP TIME EA 10 MIN

## 2018-08-04 PROCEDURE — 86850 RBC ANTIBODY SCREEN: CPT | Performed by: SURGERY

## 2018-08-04 PROCEDURE — 27210185 ZZH KIT OPEN ENDED DOUBLE LUMEN

## 2018-08-04 PROCEDURE — 99232 SBSQ HOSP IP/OBS MODERATE 35: CPT | Performed by: NURSE PRACTITIONER

## 2018-08-04 PROCEDURE — 25000132 ZZH RX MED GY IP 250 OP 250 PS 637: Mod: GY | Performed by: SURGERY

## 2018-08-04 PROCEDURE — A9270 NON-COVERED ITEM OR SERVICE: HCPCS | Mod: GY | Performed by: NURSE PRACTITIONER

## 2018-08-04 PROCEDURE — 25000128 H RX IP 250 OP 636: Performed by: PHYSICIAN ASSISTANT

## 2018-08-04 PROCEDURE — 80048 BASIC METABOLIC PNL TOTAL CA: CPT | Performed by: SURGERY

## 2018-08-04 PROCEDURE — 25000131 ZZH RX MED GY IP 250 OP 636 PS 637: Mod: GY | Performed by: STUDENT IN AN ORGANIZED HEALTH CARE EDUCATION/TRAINING PROGRAM

## 2018-08-04 PROCEDURE — 97530 THERAPEUTIC ACTIVITIES: CPT | Mod: GP | Performed by: PHYSICAL THERAPIST

## 2018-08-04 PROCEDURE — 40000193 ZZH STATISTIC PT WARD VISIT: Performed by: PHYSICAL THERAPIST

## 2018-08-04 PROCEDURE — 71045 X-RAY EXAM CHEST 1 VIEW: CPT

## 2018-08-04 PROCEDURE — 25000131 ZZH RX MED GY IP 250 OP 636 PS 637: Mod: GY | Performed by: INTERNAL MEDICINE

## 2018-08-04 PROCEDURE — 00000146 ZZHCL STATISTIC GLUCOSE BY METER IP

## 2018-08-04 PROCEDURE — 85027 COMPLETE CBC AUTOMATED: CPT | Performed by: SURGERY

## 2018-08-04 PROCEDURE — 86901 BLOOD TYPING SEROLOGIC RH(D): CPT | Performed by: SURGERY

## 2018-08-04 PROCEDURE — 99291 CRITICAL CARE FIRST HOUR: CPT | Performed by: NURSE PRACTITIONER

## 2018-08-04 PROCEDURE — 85610 PROTHROMBIN TIME: CPT | Performed by: SURGERY

## 2018-08-04 PROCEDURE — 36569 INSJ PICC 5 YR+ W/O IMAGING: CPT

## 2018-08-04 PROCEDURE — 93010 ELECTROCARDIOGRAM REPORT: CPT | Performed by: INTERNAL MEDICINE

## 2018-08-04 PROCEDURE — A9270 NON-COVERED ITEM OR SERVICE: HCPCS | Mod: GY | Performed by: STUDENT IN AN ORGANIZED HEALTH CARE EDUCATION/TRAINING PROGRAM

## 2018-08-04 PROCEDURE — 25000128 H RX IP 250 OP 636: Performed by: NURSE PRACTITIONER

## 2018-08-04 PROCEDURE — 25000128 H RX IP 250 OP 636: Performed by: SURGERY

## 2018-08-04 PROCEDURE — 25000128 H RX IP 250 OP 636: Performed by: STUDENT IN AN ORGANIZED HEALTH CARE EDUCATION/TRAINING PROGRAM

## 2018-08-04 PROCEDURE — 25000132 ZZH RX MED GY IP 250 OP 250 PS 637: Mod: GY | Performed by: STUDENT IN AN ORGANIZED HEALTH CARE EDUCATION/TRAINING PROGRAM

## 2018-08-04 PROCEDURE — 25000128 H RX IP 250 OP 636

## 2018-08-04 PROCEDURE — A9270 NON-COVERED ITEM OR SERVICE: HCPCS | Mod: GY | Performed by: PHYSICIAN ASSISTANT

## 2018-08-04 PROCEDURE — 25800025 ZZH RX 258: Performed by: NURSE PRACTITIONER

## 2018-08-04 PROCEDURE — 25000132 ZZH RX MED GY IP 250 OP 250 PS 637: Mod: GY | Performed by: NURSE PRACTITIONER

## 2018-08-04 PROCEDURE — 86900 BLOOD TYPING SEROLOGIC ABO: CPT | Performed by: SURGERY

## 2018-08-04 PROCEDURE — 25000132 ZZH RX MED GY IP 250 OP 250 PS 637: Mod: GY | Performed by: PHYSICIAN ASSISTANT

## 2018-08-04 PROCEDURE — 94003 VENT MGMT INPAT SUBQ DAY: CPT

## 2018-08-04 PROCEDURE — 20000004 ZZH R&B ICU UMMC

## 2018-08-04 PROCEDURE — 40000014 ZZH STATISTIC ARTERIAL MONITORING DAILY

## 2018-08-04 PROCEDURE — 25000125 ZZHC RX 250: Performed by: STUDENT IN AN ORGANIZED HEALTH CARE EDUCATION/TRAINING PROGRAM

## 2018-08-04 PROCEDURE — 84100 ASSAY OF PHOSPHORUS: CPT | Performed by: SURGERY

## 2018-08-04 RX ORDER — ADENOSINE 3 MG/ML
6 INJECTION, SOLUTION INTRAVENOUS ONCE
Status: COMPLETED | OUTPATIENT
Start: 2018-08-04 | End: 2018-08-04

## 2018-08-04 RX ORDER — HEPARIN SODIUM,PORCINE 10 UNIT/ML
5-10 VIAL (ML) INTRAVENOUS
Status: DISCONTINUED | OUTPATIENT
Start: 2018-08-04 | End: 2018-08-13 | Stop reason: HOSPADM

## 2018-08-04 RX ORDER — HEPARIN SODIUM,PORCINE 10 UNIT/ML
5-10 VIAL (ML) INTRAVENOUS EVERY 24 HOURS
Status: DISCONTINUED | OUTPATIENT
Start: 2018-08-04 | End: 2018-08-13 | Stop reason: HOSPADM

## 2018-08-04 RX ORDER — MIDAZOLAM (PF) 1 MG/ML IN 0.9 % SODIUM CHLORIDE INTRAVENOUS SOLUTION
1-4 CONTINUOUS
Status: DISCONTINUED | OUTPATIENT
Start: 2018-08-04 | End: 2018-08-05

## 2018-08-04 RX ORDER — SODIUM CHLORIDE, SODIUM LACTATE, POTASSIUM CHLORIDE, CALCIUM CHLORIDE 600; 310; 30; 20 MG/100ML; MG/100ML; MG/100ML; MG/100ML
INJECTION, SOLUTION INTRAVENOUS
Status: COMPLETED
Start: 2018-08-04 | End: 2018-08-04

## 2018-08-04 RX ORDER — ADENOSINE 3 MG/ML
INJECTION, SOLUTION INTRAVENOUS
Status: DISCONTINUED
Start: 2018-08-04 | End: 2018-08-06 | Stop reason: HOSPADM

## 2018-08-04 RX ORDER — LIDOCAINE 40 MG/G
CREAM TOPICAL
Status: DISCONTINUED | OUTPATIENT
Start: 2018-08-04 | End: 2018-08-13 | Stop reason: HOSPADM

## 2018-08-04 RX ORDER — MIDAZOLAM (PF) 1 MG/ML IN 0.9 % SODIUM CHLORIDE INTRAVENOUS SOLUTION
1-8 CONTINUOUS
Status: DISCONTINUED | OUTPATIENT
Start: 2018-08-04 | End: 2018-08-04

## 2018-08-04 RX ORDER — HEPARIN SODIUM,PORCINE 10 UNIT/ML
2-5 VIAL (ML) INTRAVENOUS
Status: DISCONTINUED | OUTPATIENT
Start: 2018-08-04 | End: 2018-08-13 | Stop reason: HOSPADM

## 2018-08-04 RX ORDER — LABETALOL HYDROCHLORIDE 5 MG/ML
10-20 INJECTION, SOLUTION INTRAVENOUS EVERY 4 HOURS PRN
Status: DISCONTINUED | OUTPATIENT
Start: 2018-08-04 | End: 2018-08-13 | Stop reason: HOSPADM

## 2018-08-04 RX ADMIN — ENOXAPARIN SODIUM 30 MG: 30 INJECTION SUBCUTANEOUS at 15:24

## 2018-08-04 RX ADMIN — MULTIVITAMIN 15 ML: LIQUID ORAL at 09:30

## 2018-08-04 RX ADMIN — VALGANCICLOVIR HYDROCHLORIDE 450 MG: 50 POWDER, FOR SOLUTION ORAL at 09:32

## 2018-08-04 RX ADMIN — ACETAMINOPHEN 650 MG: 325 TABLET, FILM COATED ORAL at 05:59

## 2018-08-04 RX ADMIN — SODIUM CHLORIDE, POTASSIUM CHLORIDE, SODIUM LACTATE AND CALCIUM CHLORIDE 1000 ML: 600; 310; 30; 20 INJECTION, SOLUTION INTRAVENOUS at 20:49

## 2018-08-04 RX ADMIN — SODIUM CHLORIDE, POTASSIUM CHLORIDE, SODIUM LACTATE AND CALCIUM CHLORIDE 1000 ML: 600; 310; 30; 20 INJECTION, SOLUTION INTRAVENOUS at 01:10

## 2018-08-04 RX ADMIN — AMPICILLIN SODIUM AND SULBACTAM SODIUM 3 G: 2; 1 INJECTION, POWDER, FOR SOLUTION INTRAMUSCULAR; INTRAVENOUS at 14:55

## 2018-08-04 RX ADMIN — OXYCODONE HYDROCHLORIDE 10 MG: 5 TABLET ORAL at 21:54

## 2018-08-04 RX ADMIN — Medication 1 MG: at 09:36

## 2018-08-04 RX ADMIN — ACETAMINOPHEN 650 MG: 325 TABLET, FILM COATED ORAL at 09:31

## 2018-08-04 RX ADMIN — CHLORHEXIDINE GLUCONATE 0.12% ORAL RINSE 15 ML: 1.2 LIQUID ORAL at 18:00

## 2018-08-04 RX ADMIN — AMPICILLIN SODIUM AND SULBACTAM SODIUM 3 G: 2; 1 INJECTION, POWDER, FOR SOLUTION INTRAMUSCULAR; INTRAVENOUS at 20:11

## 2018-08-04 RX ADMIN — PREDNISONE 5 MG: 5 TABLET ORAL at 09:31

## 2018-08-04 RX ADMIN — AMPICILLIN SODIUM AND SULBACTAM SODIUM 3 G: 2; 1 INJECTION, POWDER, FOR SOLUTION INTRAMUSCULAR; INTRAVENOUS at 09:32

## 2018-08-04 RX ADMIN — BACITRACIN: 500 OINTMENT TOPICAL at 20:15

## 2018-08-04 RX ADMIN — DEXTROSE AND SODIUM CHLORIDE: 5; 450 INJECTION, SOLUTION INTRAVENOUS at 06:00

## 2018-08-04 RX ADMIN — ADENOSINE 6 MG: 3 INJECTION, SOLUTION INTRAVENOUS at 20:26

## 2018-08-04 RX ADMIN — OXYCODONE HYDROCHLORIDE 10 MG: 5 TABLET ORAL at 18:00

## 2018-08-04 RX ADMIN — CHLORHEXIDINE GLUCONATE 0.12% ORAL RINSE 15 ML: 1.2 LIQUID ORAL at 11:57

## 2018-08-04 RX ADMIN — ACETAMINOPHEN 650 MG: 325 TABLET, FILM COATED ORAL at 18:00

## 2018-08-04 RX ADMIN — Medication 1 MG/HR: at 01:35

## 2018-08-04 RX ADMIN — MAGNESIUM SULFATE HEPTAHYDRATE 4 G: 40 INJECTION, SOLUTION INTRAVENOUS at 21:53

## 2018-08-04 RX ADMIN — BACITRACIN: 500 OINTMENT TOPICAL at 09:34

## 2018-08-04 RX ADMIN — AMPICILLIN SODIUM AND SULBACTAM SODIUM 3 G: 2; 1 INJECTION, POWDER, FOR SOLUTION INTRAMUSCULAR; INTRAVENOUS at 01:24

## 2018-08-04 RX ADMIN — ATORVASTATIN CALCIUM 40 MG: 40 TABLET, FILM COATED ORAL at 20:15

## 2018-08-04 RX ADMIN — FLUDROCORTISONE ACETATE 100 MCG: 0.1 TABLET ORAL at 09:31

## 2018-08-04 RX ADMIN — CHLORHEXIDINE GLUCONATE 0.12% ORAL RINSE 15 ML: 1.2 LIQUID ORAL at 06:00

## 2018-08-04 RX ADMIN — MYCOPHENOLATE MOFETIL 1000 MG: 200 POWDER, FOR SUSPENSION ORAL at 20:16

## 2018-08-04 RX ADMIN — ESCITALOPRAM OXALATE 10 MG: 5 SOLUTION ORAL at 09:36

## 2018-08-04 RX ADMIN — TRAZODONE HYDROCHLORIDE 300 MG: 150 TABLET ORAL at 20:15

## 2018-08-04 RX ADMIN — MYCOPHENOLATE MOFETIL 1000 MG: 200 POWDER, FOR SUSPENSION ORAL at 09:34

## 2018-08-04 RX ADMIN — ACETAMINOPHEN 650 MG: 325 TABLET, FILM COATED ORAL at 14:55

## 2018-08-04 RX ADMIN — Medication 250 MG: at 09:37

## 2018-08-04 RX ADMIN — Medication 3 MG: at 18:00

## 2018-08-04 RX ADMIN — Medication 200 MCG/HR: at 03:31

## 2018-08-04 RX ADMIN — POTASSIUM CHLORIDE 40 MEQ: 1.5 POWDER, FOR SOLUTION ORAL at 21:54

## 2018-08-04 RX ADMIN — DOCUSATE SODIUM 100 MG: 50 LIQUID ORAL at 20:14

## 2018-08-04 RX ADMIN — ACETAMINOPHEN 650 MG: 325 TABLET, FILM COATED ORAL at 21:54

## 2018-08-04 RX ADMIN — SULFAMETHOXAZOLE AND TRIMETHOPRIM 1 TABLET: 400; 80 TABLET ORAL at 09:31

## 2018-08-04 RX ADMIN — DEXTROSE AND SODIUM CHLORIDE: 5; 450 INJECTION, SOLUTION INTRAVENOUS at 20:04

## 2018-08-04 RX ADMIN — Medication 20 MG: at 14:55

## 2018-08-04 RX ADMIN — Medication 3 MG: at 09:33

## 2018-08-04 RX ADMIN — ACETAMINOPHEN 650 MG: 325 TABLET, FILM COATED ORAL at 01:24

## 2018-08-04 ASSESSMENT — ACTIVITIES OF DAILY LIVING (ADL)
ADLS_ACUITY_SCORE: 12

## 2018-08-04 NOTE — PLAN OF CARE
Problem: Patient Care Overview  Goal: Plan of Care/Patient Progress Review  PT 4A    Discharge Planner PT   Patient plan for discharge: not discussed today  Current status: patient transferred to EOB with min assist.  Stood with min assist and sidestepped L<> R next to bed with min assist.  Bed > chair with min assist, able to maintain standing x 2 min with min assist.  VSS on AC 40% FiO2 and peep 5.    Barriers to return to prior living situation: assistance needed for safe mobility.  Recommendations for discharge: TCU  Rationale for recommendations: dependence with functional mobility.        Entered by: Surya Og 08/04/2018 12:43 PM

## 2018-08-04 NOTE — PROVIDER NOTIFICATION
Pressure Support Trial Summary    Settings:  Ventilation Mode: PS  PEEP (cm H2O): 5 cmH2O  Pressure Support (cm H2O): 7 cmH2O  Oxygen Concentration (%): 30 %    Patient Parameters:  Heart Rate: 96  BP: 112/79  BP - Mean: 92  Spontaneous Respiratory Rate: 14 breaths/min  Spontaneous Tidal Volume: 0.56 mL  Spontaneous Minute Ventilation: 7.3 mL  RSBI (calculation): 25  RSBI trend: rising  Breathing Trial Total Time   (min): 111 minutes  Weaning trial discontinued due to:: Increased SOB  See RT Accordian for complete documentation.    Based on pressure support trial results and RT assessment, recommend do not extubate without further physician assessment.    Frankie Acharya, RRT  8/4/2018 10:04 AM
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
## 2018-08-04 NOTE — PROVIDER NOTIFICATION
MD was called to bedside when patient's pulse became thready, was no longer following commands, and MAPs were low 40s, 1 L NS given, propofol turned off until patient returned to baseline.  Will titrate propofol as long as MAPs continue to stabilize, will update MDs with any acute changes/concerns.
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
## 2018-08-04 NOTE — PROGRESS NOTES
PICC tip in azygos per xray,  PICC pulled back 2 cm and power flush done. Repeat CXR to be done.  Joyce STARKEY RN will follow up.

## 2018-08-04 NOTE — PLAN OF CARE
Problem: Patient Care Overview  Goal: Plan of Care/Patient Progress Review  Outcome: Improving  Patient calmer and willing to follow commands, so restraints were discontinued this morning. A sitter remains at his bedside. Fentanyl decreased to 100 mcg/hr and pt has not complained of additional pain. With assist of two people, pt got to chair and sat for 30 min.

## 2018-08-04 NOTE — PLAN OF CARE
Problem: Patient Care Overview  Goal: Plan of Care/Patient Progress Review  Outcome: No Change  Neuro-alert/restless, sedation turned off d/t low blood pressures, midazolam started for sedation-somewhat drowsy but still arousable, follows commands, restraints/mitts for pulling at lines, PERRL, able to make needs known  CV-normal sinus rhythm/sinus tachycardia with no ectopy, BP stabilized after propofol shut off, arterial line correlating with blood pressure cuff, afebrile, pulses palpable throughout  Pulm-CMV settings, PS overnight x2 hours until sedation shut off, will attempt again tomorrow morning, CMV settings unchanged, #8 kavitha  GI-NG placed under fluro with TF @20, increase by 10 @0800 8/4 and then every 8hours if tolerating to a goal of 55, standard FWF, loose BM overnight  -plascencia patent with adequate urine output  Skin-nose incision with dressing and nasal trumpets in place, bilateral jaw incisions, scab on left toe, penile abrasion  Lines-double lumen PICC left side that needs to be [id number] as midline currently-no blood return, PIV right hand  Gtts-midazolam @4, fentanyl @200, D5w+0.45% NS @100    P: continue to vent wean and update MDs with any acute changes/concerns.    Problem: Restraint for Non-Violent/Non-Self-Destructive Behavior  Goal: Prevent/Manage Potential Problems  Maintain safety of patient and others during period of restraint.  Promote psychological and physical wellbeing.  Prevent injury to skin and involved body parts.  Promote nutrition, hydration, and elimination.   Outcome: No Change  Patient continues to be anxious/restless wanting to pull at lines.    Problem: Suicide Risk (Adult)  Goal: Identify Related Risk Factors and Signs and Symptoms  Related risk factors and signs and symptoms are identified upon initiation of Human Response Clinical Practice Guideline (CPG).   Outcome: No Change  Unable to assess patient's risk for suicidal ideation d/t being sedated/trached.   Continue to have upper limb restraints and sitter at bedisde.    Problem: Surgery Nonspecified (Adult)  Goal: Signs and Symptoms of Listed Potential Problems Will be Absent, Minimized or Managed (Surgery Nonspecified)  Signs and symptoms of listed potential problems will be absent, minimized or managed by discharge/transition of care (reference Surgery Nonspecified (Adult) CPG).   Outcome: No Change  Assess problems related to surgery and update MDs with any acute changes/concerns.
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
## 2018-08-04 NOTE — PROGRESS NOTES
PICC line is OK to use as a midline overnight. Have informed primary nurse, Thao, and she will order a re-wire for tomorrow. Have placed green midline sticker on the catheter.

## 2018-08-04 NOTE — PROVIDER NOTIFICATION
Patient's pressures began getting more labile again.  MD called.  1 L LR bolus given via pressure bag, patient placed in trendelenberg, propofol shut off.  Midazolam will be titrated up for sedation. Will continue to update MDs with any new concerns.

## 2018-08-04 NOTE — PLAN OF CARE
Problem: Suicide Risk (Adult)  Goal: Identify Related Risk Factors and Signs and Symptoms  Related risk factors and signs and symptoms are identified upon initiation of Human Response Clinical Practice Guideline (CPG).   Outcome: No Change  Shift durration: [id number]    LOC: sedated on propofol, pt able to follow commands and move all 4 extremities on 55-65/hr.  Clonazepam and lexapro started today to manage agitation so sedation break can be more successful next time.  Sedation break weaned propofol to 35/hr and pt began pulling at lines, interfering with cares, and became very agitated, sedation increased and team notified.  Neuro: incomplete neuro assessment d/t communication barrier and agitation/sedation.  Able to follow commands when calm, equal round reactive pupils.  Cards: NSR/ST  depending on agitation level.  S1, S2 heart tones.  Not on vasoactive gtts.  Pulm: LS clear, CMV settings via Shiley 8 trach 14/450/5/30%, bloody secretions via oral and tracheal suctioning.  GI/: adequate UOP via plascencia, no bm yet, NJ placed under fluoro today and TF started at rate of 10/hr with 30Q4H flushes.  Skin: no changes to skin this shift.  Mobility: L R repositioning in bed, therapy did not come to see pt today, will attempt up in chair tomorrow.  Vasc access: PICC needing repositioning at shift change, PIV infusing to R hand.    Special/Event:  No critical events today, NJ placed and sutured to L nasal trumpet which both trumpets should remain in place for 7 days per OMF surgery, CT of facial bones this AM.  Restarted enteral meds and feeds, Pt on suicide precautions with sitter at bedside.

## 2018-08-05 LAB
ANION GAP SERPL CALCULATED.3IONS-SCNC: 5 MMOL/L (ref 3–14)
BUN SERPL-MCNC: 11 MG/DL (ref 7–30)
CALCIUM SERPL-MCNC: 8.6 MG/DL (ref 8.5–10.1)
CHLORIDE SERPL-SCNC: 105 MMOL/L (ref 94–109)
CO2 SERPL-SCNC: 26 MMOL/L (ref 20–32)
CREAT SERPL-MCNC: 0.64 MG/DL (ref 0.66–1.25)
ERYTHROCYTE [DISTWIDTH] IN BLOOD BY AUTOMATED COUNT: 15 % (ref 10–15)
GFR SERPL CREATININE-BSD FRML MDRD: >90 ML/MIN/1.7M2
GLUCOSE BLDC GLUCOMTR-MCNC: 108 MG/DL (ref 70–99)
GLUCOSE BLDC GLUCOMTR-MCNC: 118 MG/DL (ref 70–99)
GLUCOSE BLDC GLUCOMTR-MCNC: 131 MG/DL (ref 70–99)
GLUCOSE BLDC GLUCOMTR-MCNC: 133 MG/DL (ref 70–99)
GLUCOSE BLDC GLUCOMTR-MCNC: 136 MG/DL (ref 70–99)
GLUCOSE BLDC GLUCOMTR-MCNC: 142 MG/DL (ref 70–99)
GLUCOSE BLDC GLUCOMTR-MCNC: 145 MG/DL (ref 70–99)
GLUCOSE BLDC GLUCOMTR-MCNC: 149 MG/DL (ref 70–99)
GLUCOSE SERPL-MCNC: 338 MG/DL (ref 70–99)
HCT VFR BLD AUTO: 24.6 % (ref 40–53)
HGB BLD-MCNC: 8 G/DL (ref 13.3–17.7)
INR PPP: 1.09 (ref 0.86–1.14)
MAGNESIUM SERPL-MCNC: 1.8 MG/DL (ref 1.6–2.3)
MAGNESIUM SERPL-MCNC: 2.5 MG/DL (ref 1.6–2.3)
MAGNESIUM SERPL-MCNC: 2.8 MG/DL (ref 1.6–2.3)
MCH RBC QN AUTO: 30.4 PG (ref 26.5–33)
MCHC RBC AUTO-ENTMCNC: 32.5 G/DL (ref 31.5–36.5)
MCV RBC AUTO: 94 FL (ref 78–100)
PHOSPHATE SERPL-MCNC: 2.3 MG/DL (ref 2.5–4.5)
PHOSPHATE SERPL-MCNC: 2.9 MG/DL (ref 2.5–4.5)
PLATELET # BLD AUTO: 281 10E9/L (ref 150–450)
POTASSIUM SERPL-SCNC: 4.1 MMOL/L (ref 3.4–5.3)
POTASSIUM SERPL-SCNC: 4.4 MMOL/L (ref 3.4–5.3)
RBC # BLD AUTO: 2.63 10E12/L (ref 4.4–5.9)
SODIUM SERPL-SCNC: 137 MMOL/L (ref 133–144)
WBC # BLD AUTO: 10 10E9/L (ref 4–11)

## 2018-08-05 PROCEDURE — 20000004 ZZH R&B ICU UMMC

## 2018-08-05 PROCEDURE — 40000275 ZZH STATISTIC RCP TIME EA 10 MIN

## 2018-08-05 PROCEDURE — 80048 BASIC METABOLIC PNL TOTAL CA: CPT | Performed by: SURGERY

## 2018-08-05 PROCEDURE — 83735 ASSAY OF MAGNESIUM: CPT | Performed by: SURGERY

## 2018-08-05 PROCEDURE — 25000132 ZZH RX MED GY IP 250 OP 250 PS 637: Mod: GY | Performed by: NURSE PRACTITIONER

## 2018-08-05 PROCEDURE — 84100 ASSAY OF PHOSPHORUS: CPT | Performed by: SURGERY

## 2018-08-05 PROCEDURE — 40000014 ZZH STATISTIC ARTERIAL MONITORING DAILY

## 2018-08-05 PROCEDURE — 84132 ASSAY OF SERUM POTASSIUM: CPT | Performed by: SURGERY

## 2018-08-05 PROCEDURE — 94003 VENT MGMT INPAT SUBQ DAY: CPT

## 2018-08-05 PROCEDURE — 85610 PROTHROMBIN TIME: CPT | Performed by: SURGERY

## 2018-08-05 PROCEDURE — 99231 SBSQ HOSP IP/OBS SF/LOW 25: CPT | Performed by: NURSE PRACTITIONER

## 2018-08-05 PROCEDURE — A9270 NON-COVERED ITEM OR SERVICE: HCPCS | Mod: GY | Performed by: NURSE PRACTITIONER

## 2018-08-05 PROCEDURE — A9270 NON-COVERED ITEM OR SERVICE: HCPCS | Mod: GY | Performed by: STUDENT IN AN ORGANIZED HEALTH CARE EDUCATION/TRAINING PROGRAM

## 2018-08-05 PROCEDURE — 25000128 H RX IP 250 OP 636: Performed by: STUDENT IN AN ORGANIZED HEALTH CARE EDUCATION/TRAINING PROGRAM

## 2018-08-05 PROCEDURE — 25000131 ZZH RX MED GY IP 250 OP 636 PS 637: Mod: GY | Performed by: INTERNAL MEDICINE

## 2018-08-05 PROCEDURE — 25000125 ZZHC RX 250: Performed by: STUDENT IN AN ORGANIZED HEALTH CARE EDUCATION/TRAINING PROGRAM

## 2018-08-05 PROCEDURE — 25000128 H RX IP 250 OP 636: Performed by: PHYSICIAN ASSISTANT

## 2018-08-05 PROCEDURE — 27210429 ZZH NUTRITION PRODUCT INTERMEDIATE LITER

## 2018-08-05 PROCEDURE — 25000128 H RX IP 250 OP 636: Performed by: NURSE PRACTITIONER

## 2018-08-05 PROCEDURE — 99231 SBSQ HOSP IP/OBS SF/LOW 25: CPT | Mod: GC | Performed by: SURGERY

## 2018-08-05 PROCEDURE — A9270 NON-COVERED ITEM OR SERVICE: HCPCS | Mod: GY | Performed by: PHYSICIAN ASSISTANT

## 2018-08-05 PROCEDURE — 25000132 ZZH RX MED GY IP 250 OP 250 PS 637: Mod: GY | Performed by: PHYSICIAN ASSISTANT

## 2018-08-05 PROCEDURE — 00000146 ZZHCL STATISTIC GLUCOSE BY METER IP

## 2018-08-05 PROCEDURE — 25000132 ZZH RX MED GY IP 250 OP 250 PS 637: Mod: GY | Performed by: STUDENT IN AN ORGANIZED HEALTH CARE EDUCATION/TRAINING PROGRAM

## 2018-08-05 PROCEDURE — 99222 1ST HOSP IP/OBS MODERATE 55: CPT

## 2018-08-05 PROCEDURE — 85027 COMPLETE CBC AUTOMATED: CPT | Performed by: SURGERY

## 2018-08-05 PROCEDURE — 25000131 ZZH RX MED GY IP 250 OP 636 PS 637: Mod: GY | Performed by: STUDENT IN AN ORGANIZED HEALTH CARE EDUCATION/TRAINING PROGRAM

## 2018-08-05 PROCEDURE — 25000132 ZZH RX MED GY IP 250 OP 250 PS 637: Mod: GY | Performed by: SURGERY

## 2018-08-05 PROCEDURE — 40000047 ZZH STATISTIC CTO2 CONT OXYGEN TECH TIME EA 90 MIN

## 2018-08-05 RX ORDER — ADENOSINE 3 MG/ML
6 INJECTION, SOLUTION INTRAVENOUS ONCE
Status: COMPLETED | OUTPATIENT
Start: 2018-08-05 | End: 2018-08-05

## 2018-08-05 RX ORDER — ADENOSINE 3 MG/ML
INJECTION, SOLUTION INTRAVENOUS
Status: DISCONTINUED
Start: 2018-08-05 | End: 2018-08-06 | Stop reason: HOSPADM

## 2018-08-05 RX ADMIN — DOCUSATE SODIUM 100 MG: 50 LIQUID ORAL at 08:19

## 2018-08-05 RX ADMIN — AMPICILLIN SODIUM AND SULBACTAM SODIUM 3 G: 2; 1 INJECTION, POWDER, FOR SOLUTION INTRAMUSCULAR; INTRAVENOUS at 14:22

## 2018-08-05 RX ADMIN — MULTIVITAMIN 15 ML: LIQUID ORAL at 08:26

## 2018-08-05 RX ADMIN — MYCOPHENOLATE MOFETIL 1000 MG: 200 POWDER, FOR SUSPENSION ORAL at 08:26

## 2018-08-05 RX ADMIN — ACETAMINOPHEN 650 MG: 325 TABLET, FILM COATED ORAL at 11:10

## 2018-08-05 RX ADMIN — AMPICILLIN SODIUM AND SULBACTAM SODIUM 3 G: 2; 1 INJECTION, POWDER, FOR SOLUTION INTRAMUSCULAR; INTRAVENOUS at 01:55

## 2018-08-05 RX ADMIN — ATORVASTATIN CALCIUM 40 MG: 40 TABLET, FILM COATED ORAL at 19:21

## 2018-08-05 RX ADMIN — OXYCODONE HYDROCHLORIDE 10 MG: 5 TABLET ORAL at 11:09

## 2018-08-05 RX ADMIN — AMPICILLIN SODIUM AND SULBACTAM SODIUM 3 G: 2; 1 INJECTION, POWDER, FOR SOLUTION INTRAMUSCULAR; INTRAVENOUS at 08:20

## 2018-08-05 RX ADMIN — TRAZODONE HYDROCHLORIDE 300 MG: 150 TABLET ORAL at 19:21

## 2018-08-05 RX ADMIN — POTASSIUM PHOSPHATE, MONOBASIC AND POTASSIUM PHOSPHATE, DIBASIC 15 MMOL: 224; 236 INJECTION, SOLUTION INTRAVENOUS at 06:11

## 2018-08-05 RX ADMIN — INSULIN ASPART 1 UNITS: 100 INJECTION, SOLUTION INTRAVENOUS; SUBCUTANEOUS at 04:10

## 2018-08-05 RX ADMIN — Medication 100 MCG/HR: at 01:15

## 2018-08-05 RX ADMIN — POLYETHYLENE GLYCOL 3350 17 G: 17 POWDER, FOR SOLUTION ORAL at 08:21

## 2018-08-05 RX ADMIN — BACITRACIN: 500 OINTMENT TOPICAL at 09:26

## 2018-08-05 RX ADMIN — ACETAMINOPHEN 650 MG: 325 TABLET, FILM COATED ORAL at 18:37

## 2018-08-05 RX ADMIN — Medication 3 MG: at 08:28

## 2018-08-05 RX ADMIN — OXYCODONE HYDROCHLORIDE 10 MG: 5 TABLET ORAL at 05:59

## 2018-08-05 RX ADMIN — ACETAMINOPHEN 650 MG: 325 TABLET, FILM COATED ORAL at 01:55

## 2018-08-05 RX ADMIN — BACITRACIN: 500 OINTMENT TOPICAL at 09:27

## 2018-08-05 RX ADMIN — ADENOSINE 6 MG: 3 INJECTION, SOLUTION INTRAVENOUS at 14:21

## 2018-08-05 RX ADMIN — CHLORHEXIDINE GLUCONATE 0.12% ORAL RINSE 15 ML: 1.2 LIQUID ORAL at 12:12

## 2018-08-05 RX ADMIN — OXYCODONE HYDROCHLORIDE 10 MG: 5 TABLET ORAL at 01:55

## 2018-08-05 RX ADMIN — ENOXAPARIN SODIUM 30 MG: 30 INJECTION SUBCUTANEOUS at 14:22

## 2018-08-05 RX ADMIN — FLUDROCORTISONE ACETATE 100 MCG: 0.1 TABLET ORAL at 08:20

## 2018-08-05 RX ADMIN — ENOXAPARIN SODIUM 30 MG: 30 INJECTION SUBCUTANEOUS at 01:55

## 2018-08-05 RX ADMIN — CHLORHEXIDINE GLUCONATE 0.12% ORAL RINSE 15 ML: 1.2 LIQUID ORAL at 18:39

## 2018-08-05 RX ADMIN — OXYCODONE HYDROCHLORIDE 10 MG: 5 TABLET ORAL at 16:40

## 2018-08-05 RX ADMIN — Medication 250 MG: at 08:29

## 2018-08-05 RX ADMIN — SENNOSIDES 10 ML: 8.8 SYRUP ORAL at 08:25

## 2018-08-05 RX ADMIN — CLONAZEPAM 1 MG: 2 TABLET ORAL at 12:12

## 2018-08-05 RX ADMIN — Medication 2 MG/HR: at 07:05

## 2018-08-05 RX ADMIN — HYDROMORPHONE HYDROCHLORIDE 0.5 MG: 1 INJECTION, SOLUTION INTRAMUSCULAR; INTRAVENOUS; SUBCUTANEOUS at 19:21

## 2018-08-05 RX ADMIN — Medication 1 MG: at 08:25

## 2018-08-05 RX ADMIN — AMPICILLIN SODIUM AND SULBACTAM SODIUM 3 G: 2; 1 INJECTION, POWDER, FOR SOLUTION INTRAMUSCULAR; INTRAVENOUS at 19:21

## 2018-08-05 RX ADMIN — VALGANCICLOVIR HYDROCHLORIDE 450 MG: 50 POWDER, FOR SOLUTION ORAL at 08:31

## 2018-08-05 RX ADMIN — HYDROMORPHONE HYDROCHLORIDE 0.5 MG: 1 INJECTION, SOLUTION INTRAMUSCULAR; INTRAVENOUS; SUBCUTANEOUS at 14:10

## 2018-08-05 RX ADMIN — SULFAMETHOXAZOLE AND TRIMETHOPRIM 1 TABLET: 400; 80 TABLET ORAL at 08:20

## 2018-08-05 RX ADMIN — DOCUSATE SODIUM 100 MG: 50 LIQUID ORAL at 19:21

## 2018-08-05 RX ADMIN — CHLORHEXIDINE GLUCONATE 0.12% ORAL RINSE 15 ML: 1.2 LIQUID ORAL at 05:58

## 2018-08-05 RX ADMIN — MYCOPHENOLATE MOFETIL 1000 MG: 200 POWDER, FOR SUSPENSION ORAL at 19:23

## 2018-08-05 RX ADMIN — BACITRACIN: 500 OINTMENT TOPICAL at 19:22

## 2018-08-05 RX ADMIN — Medication 3 MG: at 18:37

## 2018-08-05 RX ADMIN — PREDNISONE 5 MG: 5 TABLET ORAL at 08:20

## 2018-08-05 RX ADMIN — Medication 20 MG: at 08:25

## 2018-08-05 RX ADMIN — ESCITALOPRAM OXALATE 10 MG: 5 SOLUTION ORAL at 08:29

## 2018-08-05 RX ADMIN — POTASSIUM CHLORIDE 20 MEQ: 1.5 POWDER, FOR SOLUTION ORAL at 00:09

## 2018-08-05 RX ADMIN — ACETAMINOPHEN 650 MG: 325 TABLET, FILM COATED ORAL at 05:58

## 2018-08-05 RX ADMIN — HYDROMORPHONE HYDROCHLORIDE 0.5 MG: 1 INJECTION, SOLUTION INTRAMUSCULAR; INTRAVENOUS; SUBCUTANEOUS at 22:24

## 2018-08-05 RX ADMIN — SENNOSIDES 10 ML: 8.8 SYRUP ORAL at 19:22

## 2018-08-05 ASSESSMENT — ACTIVITIES OF DAILY LIVING (ADL)
ADLS_ACUITY_SCORE: 12
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
## 2018-08-05 NOTE — PLAN OF CARE
Problem: Surgery Nonspecified (Adult)  Goal: Signs and Symptoms of Listed Potential Problems Will be Absent, Minimized or Managed (Surgery Nonspecified)  Signs and symptoms of listed potential problems will be absent, minimized or managed by discharge/transition of care (reference Surgery Nonspecified (Adult) CPG).   Outcome: No Change  Assess patient for problems associated with patient's surgery and update MDs with any acute changes/concerns.
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
## 2018-08-05 NOTE — PLAN OF CARE
Problem: Suicide Risk (Adult)  Goal: Identify Related Risk Factors and Signs and Symptoms  Related risk factors and signs and symptoms are identified upon initiation of Human Response Clinical Practice Guideline (CPG).   Outcome: No Change  Continue to monitor patient for risk factors associated with suicidal risk.  Keep bedside attendant until patient can be seen formally for psychiatry evaluation
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
## 2018-08-05 NOTE — PLAN OF CARE
Problem: Patient Care Overview  Goal: Plan of Care/Patient Progress Review  Outcome: Therapy, progress toward functional goals as expected  Neuro-alert and oriented, moves all extremities and independently positions in bed. Bedside attendant for suicide precautions.  Fair amount of pain controlled with fentanyl infusion and prn oxycodone  CV-normal sinus rhythm;  Earlier in evening, patient went into SVT-primary MD called to bedside-EKG ordered, adenosine given with results as expected.  Afebrile, BP stable with MAPs>65  Pulm-vent settings unchanged, Fi02 decreased to 30%, trach cares done, suctioning prn  GI-NG in place with TF at goal and standard FWF, bowel regimen restarted-no BM  -plascencia patent with adequate urine output  Skin-nasal incision with hard plate and nasal trumpets in place, bilateral jaw incisions, scabbing on toe at baseline, generalized bruising, right arm edema-MD notified of possible increase in size-will let day team decide if US needed  Lines-double lumen PICC, PIV pulled d/t pain  Gtts-Fentanyl, midazolam, D5+0.45% @50    P: continue to monitor patient's status and update MDs with any acute changes/concerns.

## 2018-08-05 NOTE — PLAN OF CARE
Problem: Patient Care Overview  Goal: Plan of Care/Patient Progress Review  Outcome: Improving  Versed off, fentanyl off, trach dome now 40% fio2. SVT 190s x1 adenosine 6mg and conversion to SR/ST 90's-110's. BM x1, up to chair x2. Pain managed with oxi and dilaudid. Less anxious than yesterday. Plan for floor tomorrow after 24hr on TD.

## 2018-08-06 ENCOUNTER — APPOINTMENT (OUTPATIENT)
Dept: SPEECH THERAPY | Facility: CLINIC | Age: 48
DRG: 131 | End: 2018-08-06
Attending: SURGERY
Payer: MEDICARE

## 2018-08-06 ENCOUNTER — APPOINTMENT (OUTPATIENT)
Dept: OCCUPATIONAL THERAPY | Facility: CLINIC | Age: 48
DRG: 131 | End: 2018-08-06
Attending: SURGERY
Payer: MEDICARE

## 2018-08-06 LAB
ANION GAP SERPL CALCULATED.3IONS-SCNC: 6 MMOL/L (ref 3–14)
BUN SERPL-MCNC: 16 MG/DL (ref 7–30)
CALCIUM SERPL-MCNC: 9.4 MG/DL (ref 8.5–10.1)
CHLORIDE SERPL-SCNC: 106 MMOL/L (ref 94–109)
CO2 SERPL-SCNC: 25 MMOL/L (ref 20–32)
CREAT SERPL-MCNC: 0.8 MG/DL (ref 0.66–1.25)
ERYTHROCYTE [DISTWIDTH] IN BLOOD BY AUTOMATED COUNT: 14.6 % (ref 10–15)
GFR SERPL CREATININE-BSD FRML MDRD: >90 ML/MIN/1.7M2
GLUCOSE BLDC GLUCOMTR-MCNC: 113 MG/DL (ref 70–99)
GLUCOSE BLDC GLUCOMTR-MCNC: 119 MG/DL (ref 70–99)
GLUCOSE BLDC GLUCOMTR-MCNC: 123 MG/DL (ref 70–99)
GLUCOSE BLDC GLUCOMTR-MCNC: 130 MG/DL (ref 70–99)
GLUCOSE BLDC GLUCOMTR-MCNC: 133 MG/DL (ref 70–99)
GLUCOSE BLDC GLUCOMTR-MCNC: 143 MG/DL (ref 70–99)
GLUCOSE BLDC GLUCOMTR-MCNC: 150 MG/DL (ref 70–99)
GLUCOSE SERPL-MCNC: 130 MG/DL (ref 70–99)
HCT VFR BLD AUTO: 27 % (ref 40–53)
HGB BLD-MCNC: 8.8 G/DL (ref 13.3–17.7)
INR PPP: 1.09 (ref 0.86–1.14)
INTERPRETATION ECG - MUSE: NORMAL
INTERPRETATION ECG - MUSE: NORMAL
LACTATE BLD-SCNC: 0.5 MMOL/L (ref 0.7–2)
MAGNESIUM SERPL-MCNC: 1.7 MG/DL (ref 1.6–2.3)
MCH RBC QN AUTO: 30.2 PG (ref 26.5–33)
MCHC RBC AUTO-ENTMCNC: 32.6 G/DL (ref 31.5–36.5)
MCV RBC AUTO: 93 FL (ref 78–100)
PHOSPHATE SERPL-MCNC: 2.9 MG/DL (ref 2.5–4.5)
PLATELET # BLD AUTO: 352 10E9/L (ref 150–450)
POTASSIUM SERPL-SCNC: 4.1 MMOL/L (ref 3.4–5.3)
RBC # BLD AUTO: 2.91 10E12/L (ref 4.4–5.9)
SODIUM SERPL-SCNC: 137 MMOL/L (ref 133–144)
WBC # BLD AUTO: 12.9 10E9/L (ref 4–11)

## 2018-08-06 PROCEDURE — 40000225 ZZH STATISTIC SLP WARD VISIT

## 2018-08-06 PROCEDURE — A9270 NON-COVERED ITEM OR SERVICE: HCPCS | Mod: GY | Performed by: NURSE PRACTITIONER

## 2018-08-06 PROCEDURE — 25000132 ZZH RX MED GY IP 250 OP 250 PS 637: Mod: GY | Performed by: NURSE PRACTITIONER

## 2018-08-06 PROCEDURE — 40000047 ZZH STATISTIC CTO2 CONT OXYGEN TECH TIME EA 90 MIN

## 2018-08-06 PROCEDURE — A9270 NON-COVERED ITEM OR SERVICE: HCPCS | Mod: GY | Performed by: STUDENT IN AN ORGANIZED HEALTH CARE EDUCATION/TRAINING PROGRAM

## 2018-08-06 PROCEDURE — 12000006 ZZH R&B IMCU INTERMEDIATE UMMC

## 2018-08-06 PROCEDURE — 40000133 ZZH STATISTIC OT WARD VISIT

## 2018-08-06 PROCEDURE — 83605 ASSAY OF LACTIC ACID: CPT | Performed by: SURGERY

## 2018-08-06 PROCEDURE — 25000132 ZZH RX MED GY IP 250 OP 250 PS 637: Mod: GY | Performed by: SURGERY

## 2018-08-06 PROCEDURE — 25000128 H RX IP 250 OP 636: Performed by: PHYSICIAN ASSISTANT

## 2018-08-06 PROCEDURE — 99233 SBSQ HOSP IP/OBS HIGH 50: CPT | Performed by: NURSE PRACTITIONER

## 2018-08-06 PROCEDURE — 84100 ASSAY OF PHOSPHORUS: CPT | Performed by: SURGERY

## 2018-08-06 PROCEDURE — 25000131 ZZH RX MED GY IP 250 OP 636 PS 637: Mod: GY | Performed by: STUDENT IN AN ORGANIZED HEALTH CARE EDUCATION/TRAINING PROGRAM

## 2018-08-06 PROCEDURE — 83735 ASSAY OF MAGNESIUM: CPT | Performed by: SURGERY

## 2018-08-06 PROCEDURE — 25000125 ZZHC RX 250: Performed by: STUDENT IN AN ORGANIZED HEALTH CARE EDUCATION/TRAINING PROGRAM

## 2018-08-06 PROCEDURE — 25000131 ZZH RX MED GY IP 250 OP 636 PS 637: Mod: GY | Performed by: INTERNAL MEDICINE

## 2018-08-06 PROCEDURE — 25000132 ZZH RX MED GY IP 250 OP 250 PS 637: Mod: GY | Performed by: STUDENT IN AN ORGANIZED HEALTH CARE EDUCATION/TRAINING PROGRAM

## 2018-08-06 PROCEDURE — 25000132 ZZH RX MED GY IP 250 OP 250 PS 637: Mod: GY | Performed by: PHYSICIAN ASSISTANT

## 2018-08-06 PROCEDURE — 85610 PROTHROMBIN TIME: CPT | Performed by: SURGERY

## 2018-08-06 PROCEDURE — 92597 ORAL SPEECH DEVICE EVAL: CPT | Mod: GN

## 2018-08-06 PROCEDURE — 27210429 ZZH NUTRITION PRODUCT INTERMEDIATE LITER

## 2018-08-06 PROCEDURE — 92507 TX SP LANG VOICE COMM INDIV: CPT | Mod: GN

## 2018-08-06 PROCEDURE — 25000128 H RX IP 250 OP 636: Performed by: NURSE PRACTITIONER

## 2018-08-06 PROCEDURE — 85027 COMPLETE CBC AUTOMATED: CPT | Performed by: NURSE PRACTITIONER

## 2018-08-06 PROCEDURE — 00000146 ZZHCL STATISTIC GLUCOSE BY METER IP

## 2018-08-06 PROCEDURE — 25000128 H RX IP 250 OP 636: Performed by: STUDENT IN AN ORGANIZED HEALTH CARE EDUCATION/TRAINING PROGRAM

## 2018-08-06 PROCEDURE — 80048 BASIC METABOLIC PNL TOTAL CA: CPT | Performed by: SURGERY

## 2018-08-06 PROCEDURE — 36592 COLLECT BLOOD FROM PICC: CPT | Performed by: SURGERY

## 2018-08-06 PROCEDURE — 97165 OT EVAL LOW COMPLEX 30 MIN: CPT | Mod: GO

## 2018-08-06 PROCEDURE — A9270 NON-COVERED ITEM OR SERVICE: HCPCS | Mod: GY | Performed by: PHYSICIAN ASSISTANT

## 2018-08-06 PROCEDURE — 97535 SELF CARE MNGMENT TRAINING: CPT | Mod: GO

## 2018-08-06 PROCEDURE — 99232 SBSQ HOSP IP/OBS MODERATE 35: CPT | Mod: GC | Performed by: ANESTHESIOLOGY

## 2018-08-06 RX ADMIN — CLONAZEPAM 1 MG: 2 TABLET ORAL at 13:36

## 2018-08-06 RX ADMIN — HYDROMORPHONE HYDROCHLORIDE 0.5 MG: 1 INJECTION, SOLUTION INTRAMUSCULAR; INTRAVENOUS; SUBCUTANEOUS at 13:36

## 2018-08-06 RX ADMIN — OXYCODONE HYDROCHLORIDE 10 MG: 5 TABLET ORAL at 07:01

## 2018-08-06 RX ADMIN — MYCOPHENOLATE MOFETIL 1000 MG: 200 POWDER, FOR SUSPENSION ORAL at 07:44

## 2018-08-06 RX ADMIN — BACITRACIN: 500 OINTMENT TOPICAL at 20:28

## 2018-08-06 RX ADMIN — AMPICILLIN SODIUM AND SULBACTAM SODIUM 3 G: 2; 1 INJECTION, POWDER, FOR SOLUTION INTRAMUSCULAR; INTRAVENOUS at 01:31

## 2018-08-06 RX ADMIN — ESCITALOPRAM OXALATE 10 MG: 5 SOLUTION ORAL at 07:45

## 2018-08-06 RX ADMIN — CLONAZEPAM 1 MG: 2 TABLET ORAL at 07:55

## 2018-08-06 RX ADMIN — ENOXAPARIN SODIUM 30 MG: 30 INJECTION SUBCUTANEOUS at 13:36

## 2018-08-06 RX ADMIN — INSULIN ASPART 1 UNITS: 100 INJECTION, SOLUTION INTRAVENOUS; SUBCUTANEOUS at 23:55

## 2018-08-06 RX ADMIN — CHLORHEXIDINE GLUCONATE 0.12% ORAL RINSE 15 ML: 1.2 LIQUID ORAL at 18:18

## 2018-08-06 RX ADMIN — CHLORHEXIDINE GLUCONATE 0.12% ORAL RINSE 15 ML: 1.2 LIQUID ORAL at 23:55

## 2018-08-06 RX ADMIN — ATORVASTATIN CALCIUM 40 MG: 40 TABLET, FILM COATED ORAL at 20:27

## 2018-08-06 RX ADMIN — MYCOPHENOLATE MOFETIL 1000 MG: 200 POWDER, FOR SUSPENSION ORAL at 20:28

## 2018-08-06 RX ADMIN — ACETAMINOPHEN 650 MG: 325 TABLET, FILM COATED ORAL at 18:19

## 2018-08-06 RX ADMIN — DOCUSATE SODIUM 100 MG: 50 LIQUID ORAL at 07:43

## 2018-08-06 RX ADMIN — BACITRACIN: 500 OINTMENT TOPICAL at 07:43

## 2018-08-06 RX ADMIN — OXYCODONE HYDROCHLORIDE 10 MG: 5 TABLET ORAL at 00:11

## 2018-08-06 RX ADMIN — Medication 250 MG: at 07:47

## 2018-08-06 RX ADMIN — AMPICILLIN SODIUM AND SULBACTAM SODIUM 3 G: 2; 1 INJECTION, POWDER, FOR SOLUTION INTRAMUSCULAR; INTRAVENOUS at 07:42

## 2018-08-06 RX ADMIN — SENNOSIDES 10 ML: 8.8 SYRUP ORAL at 07:47

## 2018-08-06 RX ADMIN — ACETAMINOPHEN 650 MG: 325 TABLET, FILM COATED ORAL at 13:36

## 2018-08-06 RX ADMIN — Medication 20 MG: at 07:47

## 2018-08-06 RX ADMIN — Medication 3 MG: at 07:44

## 2018-08-06 RX ADMIN — ENOXAPARIN SODIUM 30 MG: 30 INJECTION SUBCUTANEOUS at 01:31

## 2018-08-06 RX ADMIN — MULTIVITAMIN 15 ML: LIQUID ORAL at 07:43

## 2018-08-06 RX ADMIN — ACETAMINOPHEN 650 MG: 325 TABLET, FILM COATED ORAL at 10:37

## 2018-08-06 RX ADMIN — CHLORHEXIDINE GLUCONATE 0.12% ORAL RINSE 15 ML: 1.2 LIQUID ORAL at 00:11

## 2018-08-06 RX ADMIN — ACETAMINOPHEN 650 MG: 325 TABLET, FILM COATED ORAL at 22:22

## 2018-08-06 RX ADMIN — PREDNISONE 5 MG: 5 TABLET ORAL at 07:43

## 2018-08-06 RX ADMIN — INSULIN ASPART 1 UNITS: 100 INJECTION, SOLUTION INTRAVENOUS; SUBCUTANEOUS at 12:17

## 2018-08-06 RX ADMIN — HYDROMORPHONE HYDROCHLORIDE 0.5 MG: 1 INJECTION, SOLUTION INTRAMUSCULAR; INTRAVENOUS; SUBCUTANEOUS at 09:03

## 2018-08-06 RX ADMIN — SULFAMETHOXAZOLE AND TRIMETHOPRIM 1 TABLET: 400; 80 TABLET ORAL at 07:43

## 2018-08-06 RX ADMIN — AMPICILLIN SODIUM AND SULBACTAM SODIUM 3 G: 2; 1 INJECTION, POWDER, FOR SOLUTION INTRAMUSCULAR; INTRAVENOUS at 20:26

## 2018-08-06 RX ADMIN — AMPICILLIN SODIUM AND SULBACTAM SODIUM 3 G: 2; 1 INJECTION, POWDER, FOR SOLUTION INTRAMUSCULAR; INTRAVENOUS at 13:36

## 2018-08-06 RX ADMIN — Medication 3 MG: at 18:18

## 2018-08-06 RX ADMIN — POLYETHYLENE GLYCOL 3350 17 G: 17 POWDER, FOR SOLUTION ORAL at 07:44

## 2018-08-06 RX ADMIN — VALGANCICLOVIR HYDROCHLORIDE 450 MG: 50 POWDER, FOR SOLUTION ORAL at 07:45

## 2018-08-06 RX ADMIN — SODIUM CHLORIDE, PRESERVATIVE FREE 5 ML: 5 INJECTION INTRAVENOUS at 18:19

## 2018-08-06 RX ADMIN — Medication 1 MG: at 07:46

## 2018-08-06 RX ADMIN — CHLORHEXIDINE GLUCONATE 0.12% ORAL RINSE 15 ML: 1.2 LIQUID ORAL at 12:17

## 2018-08-06 RX ADMIN — CHLORHEXIDINE GLUCONATE 0.12% ORAL RINSE 15 ML: 1.2 LIQUID ORAL at 05:42

## 2018-08-06 RX ADMIN — FLUDROCORTISONE ACETATE 100 MCG: 0.1 TABLET ORAL at 07:43

## 2018-08-06 RX ADMIN — TRAZODONE HYDROCHLORIDE 300 MG: 150 TABLET ORAL at 20:28

## 2018-08-06 ASSESSMENT — ACTIVITIES OF DAILY LIVING (ADL)
ADLS_ACUITY_SCORE: 10
ADLS_ACUITY_SCORE: 12
ADLS_ACUITY_SCORE: 14
ADLS_ACUITY_SCORE: 12
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
## 2018-08-06 NOTE — PLAN OF CARE
Problem: Patient Care Overview  Goal: Plan of Care/Patient Progress Review  Outcome: No Change  Neurologically intact, independently positioning in bed, out of bed with stand by assist, having some pain in his face that is controlled with scheduled tylenol, prn oxy, prn hydromorphone.  Sinus tachycardia with no ectopy, BP stable, afebrile, pulses palpable throughout, no edema.  Trach dome all night, independently clearing secretions, lungs clear/diminished throughout.  NG throught nasal trumpet with TF @ goal and free water flushes 60 ml Q4H, no BM overnight.  Voiding spontaneously into urinal at bedside.  No new skin issues-continue to monitor patient's facial incisions.  Double lumen PICC with no gtts.     P: transfer patient to floor when can trach dome >24 hours-most likely today.  Continue to monitor patient's status and update MDs with any acute concerns/changes in patient's condition.    Problem: Suicide Risk (Adult)  Goal: Identify Related Risk Factors and Signs and Symptoms  Related risk factors and signs and symptoms are identified upon initiation of Human Response Clinical Practice Guideline (CPG).   Outcome: No Change  Continue to monitor patient for suicidal risk factors and intervene when able.  Continue to have bedside attendant and involve psychiatry for support.    Problem: Surgery Nonspecified (Adult)  Goal: Signs and Symptoms of Listed Potential Problems Will be Absent, Minimized or Managed (Surgery Nonspecified)  Signs and symptoms of listed potential problems will be absent, minimized or managed by discharge/transition of care (reference Surgery Nonspecified (Adult) CPG).   Outcome: No Change  Continue to monitor patient for side effects r/t surgery and update MDs with any changes in patient's condition    Problem: Mood Alteration Comorbidity  Goal: Mood Alteration Comorbidity  Patient comorbidity will be monitored for signs and symptoms of Mood Alteration condition.  Problems will be absent,  minimized or managed by discharge/transition of care.   Outcome: No Change  Monitor patient for signs/symptoms of mood alterations.
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
## 2018-08-06 NOTE — PLAN OF CARE
Problem: Patient Care Overview  Goal: Plan of Care/Patient Progress Review  Discharge Planner OT   Patient plan for discharge: rehab   Current status: OT evaluation completed. Pt reports previously IND with ADLs/IADLs and occasional use of FWW. Pt min A for bed mobility and sit <> stands. Pt completed pivot transfer to chair and ambulating at bedside with CGA-min A and FWW. Pt on trach dome 30% FiO2, HR elevated to 132 with activity HR 120s at rest SpO2 >93%.   Barriers to return to prior living situation: trach cares, level of assist with ADls and mobility   Recommendations for discharge: TCU   Rationale for recommendations: Pt below baseline in ADLs and would benefit from continued rehab to return to PLOF.        Entered by: Nicky Bolton 08/06/2018 3:42 PM
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
## 2018-08-06 NOTE — PLAN OF CARE
Problem: Patient Care Overview  Goal: Plan of Care/Patient Progress Review  PT 6B: CANCEL; pt transferring units. Will reschedule.
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
## 2018-08-06 NOTE — PLAN OF CARE
Problem: Patient Care Overview  Goal: Plan of Care/Patient Progress Review  Discharge Planner SLP   Patient plan for discharge: Did not discuss   Current status: Patient seen for cuff deflation and speaking valve evaluation per providers orders. Trach size 8, Guy, on trach dome, on 30% FiO2. Pt was suctioned by RN prior to evaluation and trach tube was replaced. Mild amounts of secretions from trach following trach tube change, drooling noted from patient's mouth bilaterally d/t oral facial damage/surgery. Pt tolerated cuff deflation without difficulty. No need for additional suctioning. Minimal leak speak, adequate voicing with finger occlusion. Pt tolerated speaking valve in place for 30 minutes while maintianing his O2 sats on 100 and respiratory rates within functional limits. Pt's vocal quality is hoarse, and dysphonic but functional. His articulation is pretty severely impaired, pt's speech is only approximatly 20% intelligibile. His most effective way to communicate is verbalization in combination with gestures and writing. Sufficient airflow t/o evaluation. No air emission upon removel. Pt intermittently developed a wet vocal quality, allowing secretions to pool, especially noted during times he was attempting to say multiple sentences in a row. Pt does eventually sense this and clears his throat/coughs to clear the secretions.    Patient appears appropriate for cuff deflation as long as tolerated. Recommend speaking valve be in place for approximately 1 hour at a time, allowing for rest breaks in between. Frequent and thorough oral cares    Barriers to return to prior living situation: Trach, below baseline, medical complexity   Recommendations for discharge: Ongoing speech services, likely needs assistance or placement defer to PT/OT and medical team  Rationale for recommendations: Tracheostomy        Entered by: Christina Allred 08/06/2018 1:55 PM

## 2018-08-06 NOTE — PROGRESS NOTES
CLINICAL NUTRITION SERVICES - REASSESSMENT NOTE     Nutrition Prescription    RECOMMENDATIONS FOR MDs/PROVIDERS TO ORDER:  -Would recommend PEG placement when appropriate given plan for long-term NPO  -Free water flush adjustment per MD discretion pending sodium and fluid status    Malnutrition Status:    Severe malnutrition in the context of acute illness    Recommendations already ordered by Registered Dietitian (RD):  Discontinue Assess gastric residuals and HOB >30 for feeding with placement of post-pyloric FT on 8/3    Future/Additional Recommendations:  1. PEG plan    2. If bolus TF's are indicated, would recommend:  -Once tolerating continuous goal TF for at least 9 hours and approp to transition to [place] Bolus regimen, recommend do so as follows: begin first bolus with 0.5 cans (125 ml) and if tolerates after approx 4 hrs without GI complaints and/or residuals < 500 ml (if able to accurately return with smaller bore FT), rec adv each subsequent bolus by 0.5 cans (125 ml) every ~4 hrs until reach goal regimen of 2 cans (500 ml) BID + 1 can (250 ml) at third bolus = 5 cans daily (1250 ml/day) = 1875 kcals (33 kcal/kg), 85g PRO (1.5 g/kg), 950 ml H2O, 220 g CHO and 19 g Fiber daily.  *Change H2O flushes to 150 ml H2O before and after each bolus (addtl 900 ml H2O) to meet est fld needs.      EVALUATION OF THE PROGRESS TOWARD GOALS   Diet: NPO (trach, NPO since admit)    Nutrition Support: Isosource 1.5 @ goal 55 ml/hr (1320 ml/day) to provide 1980 kcals (35 kcal/kg/day), 90 g PRO (1.6 g/kg/day), 1003 ml free H2O, 232 g CHO and 20 g Fiber daily.    Intake:   -6 day TF infusion average = 699 mL, 53% of goal rate to provide 1049 kcal (19 kcal/kg) and 48 g PRO (0.9 g/kg)  -6 day Propofol infusion average = 348 mL to provide 382 kcal (7 kcal/kg)  -6 day D5W average intake = 281 kcal (5 kcal/kg)     NEW FINDINGS   8/2: Open Reduction Interral Fixation of Bilateral Mandible, Maxilla, Naso Orbitbial Ethmoidal  Fractures Nasal-gastric feeding tube placement, OGT removed; NGT removed after found to be in the lung  8/3: NDT (Rads), sutured to nasal trumpet    GI: Pt has been tolerating TF @ goal rate. He has had 2 BMs so far this admit, on scheduled bowel meds    Labs: TGs = 240 (H)    Weight: Today's weight of 59 kg is up from lowest admit wt of 56.2 kg on 7/30.    MALNUTRITION  % Intake: Decreased intake does not meet criteria  % Weight Loss: None noted  Subcutaneous Fat Loss: Facial region, Upper arm, Lower arm and Thoracic/intercostal:  Moderate-Severe  Muscle Loss: Temporal, Facial & jaw region, Thoracic region (clavicle, acromium bone, deltoid, trapezius, pectoral), Upper arm (bicep, tricep), Lower arm  (forearm), Dorsal hand, Upper leg (quadricep, hamstring), Patellar region and Posterior calf:  Severe  Fluid Accumulation/Edema: None noted  Malnutrition Diagnosis: Severe malnutrition in the context of acute illness    Previous Goals   Total avg nutritional intake to meet a minimum of 30 kcal/kg and 1.5 g PRO/kg daily (per dosing wt 56 kg).  Evaluation: Met for kcal, not for PRO    Previous Nutrition Diagnosis  Inadequate protein-energy intake related to intubation and ENT surgery as evidenced by NPO day 1 with EN yet to start, will need long-term enteral nutrition therapy    Evaluation: Improving    CURRENT NUTRITION DIAGNOSIS  Inadequate protein intake related to interruptions to EN regimen over the past week as evidenced by 6 day average intake = 0.9 g PRO/kg      INTERVENTIONS  Implementation  Collaboration and Referral of Nutrition care - Discussed plan for FEN/GI on rounds with Providers  Continue current EN regimen    Goals  Total avg nutritional intake to meet a minimum of 30 kcal/kg and 1.5 g PRO/kg daily (per dosing wt 56 kg).    Monitoring/Evaluation  Progress toward goals will be monitored and evaluated per protocol.    Margaux Alfaro RD, LD  SICU RD Pgr: [phone]
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
## 2018-08-07 ENCOUNTER — APPOINTMENT (OUTPATIENT)
Dept: OCCUPATIONAL THERAPY | Facility: CLINIC | Age: 48
DRG: 131 | End: 2018-08-07
Attending: SURGERY
Payer: MEDICARE

## 2018-08-07 ENCOUNTER — APPOINTMENT (OUTPATIENT)
Dept: PHYSICAL THERAPY | Facility: CLINIC | Age: 48
DRG: 131 | End: 2018-08-07
Attending: SURGERY
Payer: MEDICARE

## 2018-08-07 ENCOUNTER — APPOINTMENT (OUTPATIENT)
Dept: SPEECH THERAPY | Facility: CLINIC | Age: 48
DRG: 131 | End: 2018-08-07
Attending: SURGERY
Payer: MEDICARE

## 2018-08-07 LAB
ABO + RH BLD: NORMAL
ABO + RH BLD: NORMAL
ANION GAP SERPL CALCULATED.3IONS-SCNC: 6 MMOL/L (ref 3–14)
BLD GP AB SCN SERPL QL: NORMAL
BLOOD BANK CMNT PATIENT-IMP: NORMAL
BUN SERPL-MCNC: 23 MG/DL (ref 7–30)
CALCIUM SERPL-MCNC: 9.4 MG/DL (ref 8.5–10.1)
CHLORIDE SERPL-SCNC: 104 MMOL/L (ref 94–109)
CO2 SERPL-SCNC: 25 MMOL/L (ref 20–32)
CREAT SERPL-MCNC: 0.93 MG/DL (ref 0.66–1.25)
GFR SERPL CREATININE-BSD FRML MDRD: 86 ML/MIN/1.7M2
GLUCOSE BLDC GLUCOMTR-MCNC: 118 MG/DL (ref 70–99)
GLUCOSE BLDC GLUCOMTR-MCNC: 122 MG/DL (ref 70–99)
GLUCOSE BLDC GLUCOMTR-MCNC: 134 MG/DL (ref 70–99)
GLUCOSE BLDC GLUCOMTR-MCNC: 140 MG/DL (ref 70–99)
GLUCOSE BLDC GLUCOMTR-MCNC: 143 MG/DL (ref 70–99)
GLUCOSE BLDC GLUCOMTR-MCNC: 150 MG/DL (ref 70–99)
GLUCOSE SERPL-MCNC: 108 MG/DL (ref 70–99)
INR PPP: 1.06 (ref 0.86–1.14)
LACTATE BLD-SCNC: 0.5 MMOL/L (ref 0.7–2)
MAGNESIUM SERPL-MCNC: 1.7 MG/DL (ref 1.6–2.3)
POTASSIUM SERPL-SCNC: 4.5 MMOL/L (ref 3.4–5.3)
SODIUM SERPL-SCNC: 135 MMOL/L (ref 133–144)
SPECIMEN EXP DATE BLD: NORMAL

## 2018-08-07 PROCEDURE — 93005 ELECTROCARDIOGRAM TRACING: CPT

## 2018-08-07 PROCEDURE — 25000132 ZZH RX MED GY IP 250 OP 250 PS 637: Mod: GY | Performed by: PHYSICIAN ASSISTANT

## 2018-08-07 PROCEDURE — 97116 GAIT TRAINING THERAPY: CPT | Mod: GP

## 2018-08-07 PROCEDURE — 40000225 ZZH STATISTIC SLP WARD VISIT

## 2018-08-07 PROCEDURE — A9270 NON-COVERED ITEM OR SERVICE: HCPCS | Mod: GY | Performed by: PHYSICIAN ASSISTANT

## 2018-08-07 PROCEDURE — 25000132 ZZH RX MED GY IP 250 OP 250 PS 637: Mod: GY | Performed by: SURGERY

## 2018-08-07 PROCEDURE — 25000128 H RX IP 250 OP 636: Performed by: STUDENT IN AN ORGANIZED HEALTH CARE EDUCATION/TRAINING PROGRAM

## 2018-08-07 PROCEDURE — 25000132 ZZH RX MED GY IP 250 OP 250 PS 637: Mod: GY | Performed by: NURSE PRACTITIONER

## 2018-08-07 PROCEDURE — 83605 ASSAY OF LACTIC ACID: CPT | Performed by: SURGERY

## 2018-08-07 PROCEDURE — 40000047 ZZH STATISTIC CTO2 CONT OXYGEN TECH TIME EA 90 MIN

## 2018-08-07 PROCEDURE — 92507 TX SP LANG VOICE COMM INDIV: CPT | Mod: GN

## 2018-08-07 PROCEDURE — 85610 PROTHROMBIN TIME: CPT | Performed by: SURGERY

## 2018-08-07 PROCEDURE — 80180 DRUG SCRN QUAN MYCOPHENOLATE: CPT | Performed by: SURGERY

## 2018-08-07 PROCEDURE — A9270 NON-COVERED ITEM OR SERVICE: HCPCS | Mod: GY | Performed by: NURSE PRACTITIONER

## 2018-08-07 PROCEDURE — 83735 ASSAY OF MAGNESIUM: CPT | Performed by: SURGERY

## 2018-08-07 PROCEDURE — 12000003 ZZH R&B CRITICAL UMMC

## 2018-08-07 PROCEDURE — 00000146 ZZHCL STATISTIC GLUCOSE BY METER IP

## 2018-08-07 PROCEDURE — 40000802 ZZH SITE CHECK

## 2018-08-07 PROCEDURE — 86901 BLOOD TYPING SEROLOGIC RH(D): CPT | Performed by: SURGERY

## 2018-08-07 PROCEDURE — 25000132 ZZH RX MED GY IP 250 OP 250 PS 637: Mod: GY | Performed by: STUDENT IN AN ORGANIZED HEALTH CARE EDUCATION/TRAINING PROGRAM

## 2018-08-07 PROCEDURE — 36415 COLL VENOUS BLD VENIPUNCTURE: CPT | Performed by: SURGERY

## 2018-08-07 PROCEDURE — 27210429 ZZH NUTRITION PRODUCT INTERMEDIATE LITER

## 2018-08-07 PROCEDURE — 80048 BASIC METABOLIC PNL TOTAL CA: CPT | Performed by: SURGERY

## 2018-08-07 PROCEDURE — 99233 SBSQ HOSP IP/OBS HIGH 50: CPT | Performed by: NURSE PRACTITIONER

## 2018-08-07 PROCEDURE — 93010 ELECTROCARDIOGRAM REPORT: CPT | Performed by: INTERNAL MEDICINE

## 2018-08-07 PROCEDURE — 25000131 ZZH RX MED GY IP 250 OP 636 PS 637: Mod: GY | Performed by: INTERNAL MEDICINE

## 2018-08-07 PROCEDURE — 86900 BLOOD TYPING SEROLOGIC ABO: CPT | Performed by: SURGERY

## 2018-08-07 PROCEDURE — 40000275 ZZH STATISTIC RCP TIME EA 10 MIN

## 2018-08-07 PROCEDURE — A9270 NON-COVERED ITEM OR SERVICE: HCPCS | Mod: GY | Performed by: STUDENT IN AN ORGANIZED HEALTH CARE EDUCATION/TRAINING PROGRAM

## 2018-08-07 PROCEDURE — 36592 COLLECT BLOOD FROM PICC: CPT | Performed by: SURGERY

## 2018-08-07 PROCEDURE — 97530 THERAPEUTIC ACTIVITIES: CPT | Mod: GP

## 2018-08-07 PROCEDURE — 25000125 ZZHC RX 250: Performed by: STUDENT IN AN ORGANIZED HEALTH CARE EDUCATION/TRAINING PROGRAM

## 2018-08-07 PROCEDURE — 25000131 ZZH RX MED GY IP 250 OP 636 PS 637: Mod: GY | Performed by: STUDENT IN AN ORGANIZED HEALTH CARE EDUCATION/TRAINING PROGRAM

## 2018-08-07 PROCEDURE — 97110 THERAPEUTIC EXERCISES: CPT | Mod: GO

## 2018-08-07 PROCEDURE — 25000128 H RX IP 250 OP 636: Performed by: NURSE PRACTITIONER

## 2018-08-07 PROCEDURE — 86850 RBC ANTIBODY SCREEN: CPT | Performed by: SURGERY

## 2018-08-07 PROCEDURE — 27210338 ZZH CIRCUIT HUMID FACE/TRACH MSK

## 2018-08-07 PROCEDURE — 40000193 ZZH STATISTIC PT WARD VISIT

## 2018-08-07 PROCEDURE — 40000133 ZZH STATISTIC OT WARD VISIT

## 2018-08-07 PROCEDURE — 97535 SELF CARE MNGMENT TRAINING: CPT | Mod: GO

## 2018-08-07 RX ORDER — MAGNESIUM SULFATE HEPTAHYDRATE 40 MG/ML
2 INJECTION, SOLUTION INTRAVENOUS ONCE
Status: COMPLETED | OUTPATIENT
Start: 2018-08-07 | End: 2018-08-07

## 2018-08-07 RX ADMIN — MYCOPHENOLATE MOFETIL 1000 MG: 200 POWDER, FOR SUSPENSION ORAL at 19:56

## 2018-08-07 RX ADMIN — INSULIN ASPART 1 UNITS: 100 INJECTION, SOLUTION INTRAVENOUS; SUBCUTANEOUS at 16:16

## 2018-08-07 RX ADMIN — BACITRACIN: 500 OINTMENT TOPICAL at 19:59

## 2018-08-07 RX ADMIN — Medication 3 MG: at 08:10

## 2018-08-07 RX ADMIN — CLONAZEPAM 1 MG: 2 TABLET ORAL at 08:08

## 2018-08-07 RX ADMIN — ENOXAPARIN SODIUM 30 MG: 30 INJECTION SUBCUTANEOUS at 14:51

## 2018-08-07 RX ADMIN — AMPICILLIN SODIUM AND SULBACTAM SODIUM 3 G: 2; 1 INJECTION, POWDER, FOR SOLUTION INTRAMUSCULAR; INTRAVENOUS at 14:52

## 2018-08-07 RX ADMIN — CLONAZEPAM 1 MG: 2 TABLET ORAL at 08:12

## 2018-08-07 RX ADMIN — AMPICILLIN SODIUM AND SULBACTAM SODIUM 3 G: 2; 1 INJECTION, POWDER, FOR SOLUTION INTRAMUSCULAR; INTRAVENOUS at 19:57

## 2018-08-07 RX ADMIN — ACETAMINOPHEN 650 MG: 325 TABLET, FILM COATED ORAL at 14:50

## 2018-08-07 RX ADMIN — Medication 1 MG: at 08:10

## 2018-08-07 RX ADMIN — TRAZODONE HYDROCHLORIDE 300 MG: 150 TABLET ORAL at 19:57

## 2018-08-07 RX ADMIN — MULTIVITAMIN 15 ML: LIQUID ORAL at 08:11

## 2018-08-07 RX ADMIN — AMPICILLIN SODIUM AND SULBACTAM SODIUM 3 G: 2; 1 INJECTION, POWDER, FOR SOLUTION INTRAMUSCULAR; INTRAVENOUS at 08:13

## 2018-08-07 RX ADMIN — CLONAZEPAM 1 MG: 2 TABLET ORAL at 04:36

## 2018-08-07 RX ADMIN — BACITRACIN: 500 OINTMENT TOPICAL at 19:58

## 2018-08-07 RX ADMIN — ENOXAPARIN SODIUM 30 MG: 30 INJECTION SUBCUTANEOUS at 02:27

## 2018-08-07 RX ADMIN — ESCITALOPRAM OXALATE 10 MG: 5 SOLUTION ORAL at 08:09

## 2018-08-07 RX ADMIN — MYCOPHENOLATE MOFETIL 1000 MG: 200 POWDER, FOR SUSPENSION ORAL at 08:12

## 2018-08-07 RX ADMIN — SULFAMETHOXAZOLE AND TRIMETHOPRIM 1 TABLET: 400; 80 TABLET ORAL at 08:10

## 2018-08-07 RX ADMIN — FLUDROCORTISONE ACETATE 100 MCG: 0.1 TABLET ORAL at 08:10

## 2018-08-07 RX ADMIN — CHLORHEXIDINE GLUCONATE 0.12% ORAL RINSE 15 ML: 1.2 LIQUID ORAL at 23:57

## 2018-08-07 RX ADMIN — SENNOSIDES 10 ML: 8.8 SYRUP ORAL at 08:11

## 2018-08-07 RX ADMIN — Medication 3 MG: at 17:41

## 2018-08-07 RX ADMIN — INSULIN ASPART 1 UNITS: 100 INJECTION, SOLUTION INTRAVENOUS; SUBCUTANEOUS at 08:22

## 2018-08-07 RX ADMIN — ATORVASTATIN CALCIUM 40 MG: 40 TABLET, FILM COATED ORAL at 20:09

## 2018-08-07 RX ADMIN — CHLORHEXIDINE GLUCONATE 0.12% ORAL RINSE 15 ML: 1.2 LIQUID ORAL at 11:48

## 2018-08-07 RX ADMIN — Medication 250 MG: at 08:09

## 2018-08-07 RX ADMIN — Medication 20 MG: at 08:08

## 2018-08-07 RX ADMIN — CHLORHEXIDINE GLUCONATE 0.12% ORAL RINSE 15 ML: 1.2 LIQUID ORAL at 05:52

## 2018-08-07 RX ADMIN — VALGANCICLOVIR HYDROCHLORIDE 450 MG: 50 POWDER, FOR SOLUTION ORAL at 08:07

## 2018-08-07 RX ADMIN — MAGNESIUM SULFATE HEPTAHYDRATE 2 G: 40 INJECTION, SOLUTION INTRAVENOUS at 22:36

## 2018-08-07 RX ADMIN — AMPICILLIN SODIUM AND SULBACTAM SODIUM 3 G: 2; 1 INJECTION, POWDER, FOR SOLUTION INTRAMUSCULAR; INTRAVENOUS at 02:27

## 2018-08-07 RX ADMIN — BACITRACIN: 500 OINTMENT TOPICAL at 08:11

## 2018-08-07 RX ADMIN — INSULIN ASPART 1 UNITS: 100 INJECTION, SOLUTION INTRAVENOUS; SUBCUTANEOUS at 11:52

## 2018-08-07 RX ADMIN — PREDNISONE 5 MG: 5 TABLET ORAL at 08:11

## 2018-08-07 RX ADMIN — CHLORHEXIDINE GLUCONATE 0.12% ORAL RINSE 15 ML: 1.2 LIQUID ORAL at 17:41

## 2018-08-07 ASSESSMENT — ACTIVITIES OF DAILY LIVING (ADL)
ADLS_ACUITY_SCORE: 13
ADLS_ACUITY_SCORE: 10
ADLS_ACUITY_SCORE: 14

## 2018-08-07 NOTE — PLAN OF CARE
Problem: Patient Care Overview  Goal: Plan of Care/Patient Progress Review  Discharge Planner SLP   Patient plan for discharge: Did not discuss  Current status: Pt seen for speaking valve treatment session following initial evaluation on previous date. Pt tolerated the speaking valve for 20 minutes without difficulty; articulation and drooling improved from previous date. Pt was able to place and remove speaking valve with moderate assistance from SLP, it is recommended he continue to have nursing staff assist him in placing and removing until he is more comfortable with it.     Recommend PMSV as tolerated t/o the day with supervision, allowing breaks as needed, and it be removed when sleeping/resting. Recommend nursing staff continue to provide pt assistance when placing and removing the valve at this time.     Barriers to return to prior living situation: Trach, below baseline  Recommendations for discharge: Ongoing speech at next facility/setting  Rationale for recommendations: New trach, speaking valve, NPO status        Entered by: Christina Allred 08/07/2018 3:40 PM

## 2018-08-07 NOTE — PLAN OF CARE
Problem: Patient Care Overview  Goal: Plan of Care/Patient Progress Review    6B / Discharge Planner PT   Patient plan for discharge: not discussed today  Current status: Pt completes bed mobility and transfers with CGA/min A, continues to be mildly impulsive but overall just very eager for therapy and to go for a walk. Ambulated 250' + 450' with 4WW and SBA. HR 110s-120s, SpO2>97% on trach dome FiO2 21% at rest, on RA for ambulation.  Barriers to return to prior living situation: medical needs, trach, deconditioning  Recommendations for discharge: From a mobility standpoint anticipate patient would be appropriate to discharge home with assist  Rationale for recommendations: Patient is mobilizing well, previously IND and very motivated for therapy, would benefit from increased supervision for mild impulsivity. May require increased level of care for medical needs due to new trach and facial wounds, as well as recent suicide attempt.       Entered by: Jamel Hunter 08/07/2018 3:40 PM

## 2018-08-07 NOTE — PLAN OF CARE
Patient arrived 6B from 4A at about 1530. Afebrile, HR 110s, BP 110s-120s/80s-90s. Trach #8 Shiley. O2 sats 100% on 30% trach dome. Copious oral secretions/saliva. Breath sounds clear. Declined offers to suction trach, and did not appear to need suctioning. Pt has been denying pain since arrival to 6B.   Full skin assessment performed by PHILIP Solorzano RN and Dorothea DIANA RN.  Suicide precautions in place, sitter at bedside. Pt is calm and cooperative.

## 2018-08-07 NOTE — PLAN OF CARE
Problem: Patient Care Overview  Goal: Plan of Care/Patient Progress Review      Patient plan for discharge: rehab   Current status: Pt educated on craniotomy/sinus precautions and ways to adapt, grade, and modify ADLs. Pt mod I for LB dressing. Pt SBA with FWW for mobility in hallway, pt needing cues for safety with line management and impulsivity. Pt tolerated 10 min on nu-step at level 2 workload. Pt on RA, SpO2 >93% HR 120s.   Barriers to return to prior living situation: trach cares, level of assist with ADls and mobility   Recommendations for discharge: TCU  Rationale for recommendations: Pt would benefit from continued rehab as pt continues to present with impulsivity limiting safety and IND with ADLs and mobility. Pt may also require TCU stay for continued medical attention 2/2 to recent trach and wound cares.

## 2018-08-07 NOTE — PLAN OF CARE
Problem: Patient Care Overview  Goal: Plan of Care/Patient Progress Review  Outcome: No Change  /75 (BP Location: Right arm)  Temp 97.6  F (36.4  C) (Axillary)  Resp 20  Wt 59 kg (130 lb 1.1 oz)  SpO2 99%  BMI 18.66 kg/m2    A&Ox4.  Forgetful times.  No suicidal ideations, sitter at bedside.  ST. -120s.  Lungs diminished in bases and requiring 21% trach dome.  Strong cough.  Suctioned X2.  Active bowel sounds, no BM on shift. TF at goal, no nausea or vomiting.  Voidng in urinal.  Bilateral nasal trumpets in place.  Blood sugars every 4 hours.  Refused pain medications on shift. Report given to 4A nurse. No replacements needed.      Problem: Mood Alteration Comorbidity  Goal: Mood Alteration Comorbidity  Patient comorbidity will be monitored for signs and symptoms of Mood Alteration condition.  Problems will be absent, minimized or managed by discharge/transition of care.   Outcome: Improving  No reports of suicidal ideation. No PRN klonopin given.
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
## 2018-08-07 NOTE — PLAN OF CARE
Problem: Patient Care Overview  Goal: Plan of Care/Patient Progress Review  Outcome: No Change  Neuro: A&Ox4, but occasionally makes illogical statements, forgetful.   Cardiac: Afebrile. ST with HR in 120s. VSS.   Respiratory: Shiley #8 trach on 25% TD. Suctioned x1, but otherwise able to clear own secretions with cough. Denies SOB.   GI/: Adequate urine output. No BM overnight.   Diet/appetite: NPO with TF via NJ at 55 mL/hr. BG checked Q 4, ranging 120-140s.   Activity:  Independently positioning in bed.   Pain: Denies.   Skin: Facial lacerations/incisions VALE. Bilat nasal trumpets in place.   LDA's: R) PICC.     Plan: Continue with POC. Notify primary team with changes.      Problem: Mood Alteration Comorbidity  Goal: Mood Alteration Comorbidity  Patient comorbidity will be monitored for signs and symptoms of Mood Alteration condition.  Problems will be absent, minimized or managed by discharge/transition of care.   Outcome: No Change  No reports of suicidal ideation. Reported anxiety overnight, given klonopin x1.
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
## 2018-08-08 ENCOUNTER — APPOINTMENT (OUTPATIENT)
Dept: OCCUPATIONAL THERAPY | Facility: CLINIC | Age: 48
DRG: 131 | End: 2018-08-08
Attending: SURGERY
Payer: MEDICARE

## 2018-08-08 ENCOUNTER — APPOINTMENT (OUTPATIENT)
Dept: PHYSICAL THERAPY | Facility: CLINIC | Age: 48
DRG: 131 | End: 2018-08-08
Attending: SURGERY
Payer: MEDICARE

## 2018-08-08 LAB
GLUCOSE BLDC GLUCOMTR-MCNC: 125 MG/DL (ref 70–99)
GLUCOSE BLDC GLUCOMTR-MCNC: 131 MG/DL (ref 70–99)
GLUCOSE BLDC GLUCOMTR-MCNC: 134 MG/DL (ref 70–99)
GLUCOSE BLDC GLUCOMTR-MCNC: 144 MG/DL (ref 70–99)
GLUCOSE BLDC GLUCOMTR-MCNC: 155 MG/DL (ref 70–99)
GLUCOSE BLDC GLUCOMTR-MCNC: 164 MG/DL (ref 70–99)
INTERPRETATION ECG - MUSE: NORMAL
MAGNESIUM SERPL-MCNC: 2.4 MG/DL (ref 1.6–2.3)
MYCOPHENOLATE SERPL LC/MS/MS-MCNC: 1.26 MG/L (ref 1–3.5)
MYCOPHENOLATE-G SERPL LC/MS/MS-MCNC: 43.5 MG/L (ref 30–95)
TACROLIMUS BLD-MCNC: 6.8 UG/L (ref 5–15)
TME LAST DOSE: NORMAL H
TME LAST DOSE: NORMAL H

## 2018-08-08 PROCEDURE — A9270 NON-COVERED ITEM OR SERVICE: HCPCS | Mod: GY | Performed by: NURSE PRACTITIONER

## 2018-08-08 PROCEDURE — 97112 NEUROMUSCULAR REEDUCATION: CPT | Mod: GP

## 2018-08-08 PROCEDURE — 36592 COLLECT BLOOD FROM PICC: CPT | Performed by: NURSE PRACTITIONER

## 2018-08-08 PROCEDURE — 40000133 ZZH STATISTIC OT WARD VISIT: Performed by: OCCUPATIONAL THERAPIST

## 2018-08-08 PROCEDURE — 00000146 ZZHCL STATISTIC GLUCOSE BY METER IP

## 2018-08-08 PROCEDURE — 25000132 ZZH RX MED GY IP 250 OP 250 PS 637: Mod: GY | Performed by: SURGERY

## 2018-08-08 PROCEDURE — 25000131 ZZH RX MED GY IP 250 OP 636 PS 637: Mod: GY | Performed by: STUDENT IN AN ORGANIZED HEALTH CARE EDUCATION/TRAINING PROGRAM

## 2018-08-08 PROCEDURE — 25000132 ZZH RX MED GY IP 250 OP 250 PS 637: Mod: GY | Performed by: STUDENT IN AN ORGANIZED HEALTH CARE EDUCATION/TRAINING PROGRAM

## 2018-08-08 PROCEDURE — 25000128 H RX IP 250 OP 636: Performed by: NURSE PRACTITIONER

## 2018-08-08 PROCEDURE — 25000128 H RX IP 250 OP 636: Performed by: STUDENT IN AN ORGANIZED HEALTH CARE EDUCATION/TRAINING PROGRAM

## 2018-08-08 PROCEDURE — 97116 GAIT TRAINING THERAPY: CPT | Mod: GP

## 2018-08-08 PROCEDURE — 40000047 ZZH STATISTIC CTO2 CONT OXYGEN TECH TIME EA 90 MIN

## 2018-08-08 PROCEDURE — A9270 NON-COVERED ITEM OR SERVICE: HCPCS | Mod: GY | Performed by: PHYSICIAN ASSISTANT

## 2018-08-08 PROCEDURE — 25000125 ZZHC RX 250: Performed by: STUDENT IN AN ORGANIZED HEALTH CARE EDUCATION/TRAINING PROGRAM

## 2018-08-08 PROCEDURE — A9270 NON-COVERED ITEM OR SERVICE: HCPCS | Mod: GY | Performed by: STUDENT IN AN ORGANIZED HEALTH CARE EDUCATION/TRAINING PROGRAM

## 2018-08-08 PROCEDURE — 40000558 ZZH STATISTIC CVC DRESSING CHANGE

## 2018-08-08 PROCEDURE — 40000275 ZZH STATISTIC RCP TIME EA 10 MIN

## 2018-08-08 PROCEDURE — 12000003 ZZH R&B CRITICAL UMMC

## 2018-08-08 PROCEDURE — 25000132 ZZH RX MED GY IP 250 OP 250 PS 637: Mod: GY | Performed by: NURSE PRACTITIONER

## 2018-08-08 PROCEDURE — 25000132 ZZH RX MED GY IP 250 OP 250 PS 637: Mod: GY | Performed by: PHYSICIAN ASSISTANT

## 2018-08-08 PROCEDURE — 83735 ASSAY OF MAGNESIUM: CPT | Performed by: SURGERY

## 2018-08-08 PROCEDURE — 80197 ASSAY OF TACROLIMUS: CPT | Performed by: NURSE PRACTITIONER

## 2018-08-08 PROCEDURE — 97535 SELF CARE MNGMENT TRAINING: CPT | Mod: GO | Performed by: OCCUPATIONAL THERAPIST

## 2018-08-08 PROCEDURE — 40000193 ZZH STATISTIC PT WARD VISIT

## 2018-08-08 PROCEDURE — 27210429 ZZH NUTRITION PRODUCT INTERMEDIATE LITER

## 2018-08-08 PROCEDURE — 25000131 ZZH RX MED GY IP 250 OP 636 PS 637: Mod: GY | Performed by: INTERNAL MEDICINE

## 2018-08-08 PROCEDURE — 99233 SBSQ HOSP IP/OBS HIGH 50: CPT | Performed by: NURSE PRACTITIONER

## 2018-08-08 RX ORDER — OXYCODONE HCL 5 MG/5 ML
5-10 SOLUTION, ORAL ORAL EVERY 4 HOURS PRN
Status: DISCONTINUED | OUTPATIENT
Start: 2018-08-08 | End: 2018-08-13 | Stop reason: HOSPADM

## 2018-08-08 RX ADMIN — ENOXAPARIN SODIUM 30 MG: 30 INJECTION SUBCUTANEOUS at 03:13

## 2018-08-08 RX ADMIN — PREDNISONE 5 MG: 5 TABLET ORAL at 08:06

## 2018-08-08 RX ADMIN — BACITRACIN: 500 OINTMENT TOPICAL at 21:13

## 2018-08-08 RX ADMIN — MYCOPHENOLATE MOFETIL 1000 MG: 200 POWDER, FOR SUSPENSION ORAL at 20:37

## 2018-08-08 RX ADMIN — MYCOPHENOLATE MOFETIL 1000 MG: 200 POWDER, FOR SUSPENSION ORAL at 08:07

## 2018-08-08 RX ADMIN — CHLORHEXIDINE GLUCONATE 0.12% ORAL RINSE 15 ML: 1.2 LIQUID ORAL at 12:12

## 2018-08-08 RX ADMIN — INSULIN ASPART 1 UNITS: 100 INJECTION, SOLUTION INTRAVENOUS; SUBCUTANEOUS at 12:12

## 2018-08-08 RX ADMIN — ENOXAPARIN SODIUM 30 MG: 30 INJECTION SUBCUTANEOUS at 14:07

## 2018-08-08 RX ADMIN — CHLORHEXIDINE GLUCONATE 0.12% ORAL RINSE 15 ML: 1.2 LIQUID ORAL at 08:07

## 2018-08-08 RX ADMIN — AMPICILLIN SODIUM AND SULBACTAM SODIUM 3 G: 2; 1 INJECTION, POWDER, FOR SOLUTION INTRAMUSCULAR; INTRAVENOUS at 14:07

## 2018-08-08 RX ADMIN — BACITRACIN: 500 OINTMENT TOPICAL at 08:53

## 2018-08-08 RX ADMIN — SODIUM CHLORIDE 500 ML: 9 INJECTION, SOLUTION INTRAVENOUS at 00:04

## 2018-08-08 RX ADMIN — Medication 3 MG: at 18:07

## 2018-08-08 RX ADMIN — VALGANCICLOVIR HYDROCHLORIDE 450 MG: 50 POWDER, FOR SOLUTION ORAL at 08:07

## 2018-08-08 RX ADMIN — SODIUM CHLORIDE, PRESERVATIVE FREE 5 ML: 5 INJECTION INTRAVENOUS at 20:38

## 2018-08-08 RX ADMIN — CLONAZEPAM 1 MG: 2 TABLET ORAL at 20:38

## 2018-08-08 RX ADMIN — INSULIN ASPART 1 UNITS: 100 INJECTION, SOLUTION INTRAVENOUS; SUBCUTANEOUS at 08:09

## 2018-08-08 RX ADMIN — Medication 1 MG: at 08:07

## 2018-08-08 RX ADMIN — Medication 20 MG: at 08:06

## 2018-08-08 RX ADMIN — FLUDROCORTISONE ACETATE 100 MCG: 0.1 TABLET ORAL at 08:06

## 2018-08-08 RX ADMIN — ATORVASTATIN CALCIUM 40 MG: 40 TABLET, FILM COATED ORAL at 20:38

## 2018-08-08 RX ADMIN — MULTIVITAMIN 15 ML: LIQUID ORAL at 08:06

## 2018-08-08 RX ADMIN — Medication 250 MG: at 08:07

## 2018-08-08 RX ADMIN — ESCITALOPRAM OXALATE 10 MG: 5 SOLUTION ORAL at 08:06

## 2018-08-08 RX ADMIN — CHLORHEXIDINE GLUCONATE 0.12% ORAL RINSE 15 ML: 1.2 LIQUID ORAL at 18:07

## 2018-08-08 RX ADMIN — BACITRACIN: 500 OINTMENT TOPICAL at 21:12

## 2018-08-08 RX ADMIN — AMPICILLIN SODIUM AND SULBACTAM SODIUM 3 G: 2; 1 INJECTION, POWDER, FOR SOLUTION INTRAMUSCULAR; INTRAVENOUS at 20:37

## 2018-08-08 RX ADMIN — SULFAMETHOXAZOLE AND TRIMETHOPRIM 1 TABLET: 400; 80 TABLET ORAL at 08:06

## 2018-08-08 RX ADMIN — TRAZODONE HYDROCHLORIDE 300 MG: 150 TABLET ORAL at 20:38

## 2018-08-08 RX ADMIN — AMPICILLIN SODIUM AND SULBACTAM SODIUM 3 G: 2; 1 INJECTION, POWDER, FOR SOLUTION INTRAMUSCULAR; INTRAVENOUS at 08:06

## 2018-08-08 RX ADMIN — Medication 3 MG: at 08:06

## 2018-08-08 RX ADMIN — OXYCODONE HYDROCHLORIDE 5 MG: 5 TABLET ORAL at 12:18

## 2018-08-08 RX ADMIN — AMPICILLIN SODIUM AND SULBACTAM SODIUM 3 G: 2; 1 INJECTION, POWDER, FOR SOLUTION INTRAMUSCULAR; INTRAVENOUS at 02:09

## 2018-08-08 ASSESSMENT — ACTIVITIES OF DAILY LIVING (ADL)
ADLS_ACUITY_SCORE: 13
ADLS_ACUITY_SCORE: 9.5
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
## 2018-08-08 NOTE — PLAN OF CARE
Problem: Patient Care Overview  Goal: Plan of Care/Patient Progress Review  Outcome: No Change  VSS aside from tachy (100's-110's). #8 Shiley in place; capped in 1-1.5hr increments; 21% HTD with sat's 100% when not capped. Site cares last performed at 1500; suctioned x2; no need for lavaging. Redness and skin breakdown surrounding trach plate; cleansed with NS; aquaphor applied; Mepilex lite dressing placed. Nasal trumpets to bilat nares; small amounts of crusted blood drainage surrounding; aquaphor applied. Facial incision intact with sutures; bacitracin applied per MAR. Oral sutures visualized; peridex rinse completed x2. C/o moderate face pain; well managed with oxy x1. Red-Hermilo at bedside; pt using independently for oral suction. PICC TKO between ABX. TF infusing via L nare NG at goal of 55mL/hr; tolerating well. Voiding spontaneously via bedside urinal. Penis with excoriated sloughing skin d/t previous condom cath. Sitter at bedside; no reports of suicidal thoughts or ideations. Up with SBA; to chair x1 and ambulated x1. Will continue to monitor and update MD's accordingly.

## 2018-08-08 NOTE — PLAN OF CARE
Problem: Patient Care Overview  Goal: Plan of Care/Patient Progress Review  Discharge Planner OT   Patient plan for discharge: Rehab  Current status: To increase functional endurance, facilitated seated ADLs in bathroom, pt requiring min A for all ADLs including bathing, dressing, grooming tasks for thoroughness due to fatigue, deconditioning and activity tolerance. Ambulating within room with SBA and IV pole.   Barriers to return to prior living situation: medical needs, trach cares, level of assist ADLs  Recommendations for discharge: TCU  Rationale for recommendations: Pt with limited activity tolerance and requiring assist for ADLs this date. Pt will benefit from continued therapy at TCU to progress strength and endurance for increased independence with ADLs/Functional mobility. Anticipate ongoing medical attention with trach and wound cares.         Entered by: Rachel Huffman 08/08/2018 9:35 AM

## 2018-08-08 NOTE — PLAN OF CARE
Problem: Patient Care Overview  Goal: Plan of Care/Patient Progress Review  PT 6A: Discharge Planner PT   Patient plan for discharge: TCU  Current status: Pt ambulated x750ft total asymptomatic with varying assistive device use. Pt more stable and improved gait pattern with 4WW or IV pole rather than no assistive device. Static standing balance decreased with B single leg stance and tandem R LE posterior [id number].    Barriers to return to prior living situation: imbalance, medical status, steps to enter home  Recommendations for discharge: From a mobility standpoint, anticipate pt would be safe to discharge home with assist.   Rationale for recommendations: Pt is increasing (I) in mobility, has a 4WW available at home for mobilization post-discharge to increase stability. However, most likely will require increased medical/nursing needs and supervision for recent facial wounds and trach, and recent suicide attempt.        Entered by: Lady Morrow 08/08/2018 4:17 PM

## 2018-08-08 NOTE — PROGRESS NOTES
Trauma cross cover    Paged 2/2 pt's HR elevated to 120-130s.  Pt asx and MAPs >65.  Voiding spontaneously, but per RN and pt he has not voided this shift.  Chart review demonstrates tachycardia in 100-120 rage throughout day.  BP 93/56  Pulse 132  Temp 97.6  F (36.4  C) (Axillary)  Resp 18  Wt 59 kg (130 lb 1.1 oz)  SpO2 99%  BMI 18.66 kg/m2  NAD, nontoxic, alert and pleasant  NLB via trach  Sinus tachycardia  extrem wwp  Answers Qs appropriately    A/P: tachycardia mildly elevated compared to baseline throughout day.  Pt remains asx and not hypotensive.  -500cc NS bolus x1 to assess if tachycardia is volume responsive  -strict I&O  -2g magnesium x1   -ECG reviewed and demonstrates sinus tach.  -please call with any concerns or questions    Plan discussed with bedside RN and pt who are happy with the plan    Blas Howell MD  Trauma [facility]
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
## 2018-08-08 NOTE — PLAN OF CARE
Problem: Patient Care Overview  Goal: Plan of Care/Patient Progress Review  Outcome: No Change  Arrived from 6B at 1700. VSS aside from tachy (110's-120's). #8 Shiley in place; capped x1.5 hours this evening; 21% HTD with sat's 100% when not capped. Site cares last performed at 1900; no suction or lavage needed. Redness and skin breakdown surrounding trach plate; cleansed with NS; aquaphor applied; Mepilex lite dressing placed. Nasal trumpets to bilat nares; large amounts of blood drainage surrounding; aquaphor applied. Facial incision intact with sutures. Oral sutures visualized; peridex rinse completed x1. No c/o pain. Red-Hermilo at bedside; pt using independently for oral suction. PICC SL. TF infusing via L nare NG at goal of 55mL/hr; tolerating well. Voiding spontaneously via bedside urinal. Penis with excoriated sloughing skin d/t previous condom cath; WOC consult on list for MD's. Sitter at bedside; no reports of suicidal thoughts or ideations. Up with assist of 1 GB. Will continue to monitor and update MD's accordingly.

## 2018-08-09 LAB
ANION GAP SERPL CALCULATED.3IONS-SCNC: 7 MMOL/L (ref 3–14)
BUN SERPL-MCNC: 25 MG/DL (ref 7–30)
CALCIUM SERPL-MCNC: 9.3 MG/DL (ref 8.5–10.1)
CHLORIDE SERPL-SCNC: 104 MMOL/L (ref 94–109)
CO2 SERPL-SCNC: 23 MMOL/L (ref 20–32)
CREAT SERPL-MCNC: 0.94 MG/DL (ref 0.66–1.25)
GFR SERPL CREATININE-BSD FRML MDRD: 85 ML/MIN/1.7M2
GLUCOSE BLDC GLUCOMTR-MCNC: 122 MG/DL (ref 70–99)
GLUCOSE BLDC GLUCOMTR-MCNC: 122 MG/DL (ref 70–99)
GLUCOSE BLDC GLUCOMTR-MCNC: 137 MG/DL (ref 70–99)
GLUCOSE BLDC GLUCOMTR-MCNC: 142 MG/DL (ref 70–99)
GLUCOSE BLDC GLUCOMTR-MCNC: 149 MG/DL (ref 70–99)
GLUCOSE BLDC GLUCOMTR-MCNC: 96 MG/DL (ref 70–99)
GLUCOSE SERPL-MCNC: 128 MG/DL (ref 70–99)
LACTATE BLD-SCNC: 0.9 MMOL/L (ref 0.7–2)
MAGNESIUM SERPL-MCNC: 1.9 MG/DL (ref 1.6–2.3)
PLATELET # BLD AUTO: 467 10E9/L (ref 150–450)
POTASSIUM SERPL-SCNC: 4.4 MMOL/L (ref 3.4–5.3)
SODIUM SERPL-SCNC: 133 MMOL/L (ref 133–144)
TACROLIMUS BLD-MCNC: 7.1 UG/L (ref 5–15)
TME LAST DOSE: NORMAL H

## 2018-08-09 PROCEDURE — 25000131 ZZH RX MED GY IP 250 OP 636 PS 637: Mod: GY | Performed by: STUDENT IN AN ORGANIZED HEALTH CARE EDUCATION/TRAINING PROGRAM

## 2018-08-09 PROCEDURE — 25000128 H RX IP 250 OP 636: Performed by: STUDENT IN AN ORGANIZED HEALTH CARE EDUCATION/TRAINING PROGRAM

## 2018-08-09 PROCEDURE — 83735 ASSAY OF MAGNESIUM: CPT | Performed by: NURSE PRACTITIONER

## 2018-08-09 PROCEDURE — 25000132 ZZH RX MED GY IP 250 OP 250 PS 637: Mod: GY | Performed by: NURSE PRACTITIONER

## 2018-08-09 PROCEDURE — 25000132 ZZH RX MED GY IP 250 OP 250 PS 637: Mod: GY | Performed by: STUDENT IN AN ORGANIZED HEALTH CARE EDUCATION/TRAINING PROGRAM

## 2018-08-09 PROCEDURE — 83605 ASSAY OF LACTIC ACID: CPT | Performed by: SURGERY

## 2018-08-09 PROCEDURE — A9270 NON-COVERED ITEM OR SERVICE: HCPCS | Mod: GY | Performed by: NURSE PRACTITIONER

## 2018-08-09 PROCEDURE — 36592 COLLECT BLOOD FROM PICC: CPT | Performed by: INTERNAL MEDICINE

## 2018-08-09 PROCEDURE — 25000131 ZZH RX MED GY IP 250 OP 636 PS 637: Mod: GY | Performed by: INTERNAL MEDICINE

## 2018-08-09 PROCEDURE — 99231 SBSQ HOSP IP/OBS SF/LOW 25: CPT | Performed by: NURSE PRACTITIONER

## 2018-08-09 PROCEDURE — 27210429 ZZH NUTRITION PRODUCT INTERMEDIATE LITER

## 2018-08-09 PROCEDURE — A9270 NON-COVERED ITEM OR SERVICE: HCPCS | Mod: GY | Performed by: STUDENT IN AN ORGANIZED HEALTH CARE EDUCATION/TRAINING PROGRAM

## 2018-08-09 PROCEDURE — 80048 BASIC METABOLIC PNL TOTAL CA: CPT | Performed by: NURSE PRACTITIONER

## 2018-08-09 PROCEDURE — 25000128 H RX IP 250 OP 636: Performed by: NURSE PRACTITIONER

## 2018-08-09 PROCEDURE — 12000003 ZZH R&B CRITICAL UMMC

## 2018-08-09 PROCEDURE — 25000125 ZZHC RX 250: Performed by: STUDENT IN AN ORGANIZED HEALTH CARE EDUCATION/TRAINING PROGRAM

## 2018-08-09 PROCEDURE — 85049 AUTOMATED PLATELET COUNT: CPT | Performed by: SURGERY

## 2018-08-09 PROCEDURE — 36592 COLLECT BLOOD FROM PICC: CPT | Performed by: NURSE PRACTITIONER

## 2018-08-09 PROCEDURE — 25000132 ZZH RX MED GY IP 250 OP 250 PS 637: Mod: GY | Performed by: SURGERY

## 2018-08-09 PROCEDURE — 80197 ASSAY OF TACROLIMUS: CPT | Performed by: INTERNAL MEDICINE

## 2018-08-09 PROCEDURE — 00000146 ZZHCL STATISTIC GLUCOSE BY METER IP

## 2018-08-09 PROCEDURE — A9270 NON-COVERED ITEM OR SERVICE: HCPCS | Mod: GY | Performed by: PHYSICIAN ASSISTANT

## 2018-08-09 PROCEDURE — 25000132 ZZH RX MED GY IP 250 OP 250 PS 637: Mod: GY | Performed by: PHYSICIAN ASSISTANT

## 2018-08-09 PROCEDURE — 40000802 ZZH SITE CHECK

## 2018-08-09 PROCEDURE — A9270 NON-COVERED ITEM OR SERVICE: HCPCS | Mod: GY | Performed by: SURGERY

## 2018-08-09 PROCEDURE — 36592 COLLECT BLOOD FROM PICC: CPT | Performed by: SURGERY

## 2018-08-09 RX ORDER — SULFAMETHOXAZOLE AND TRIMETHOPRIM 400; 80 MG/1; MG/1
1 TABLET ORAL DAILY
Status: DISCONTINUED | OUTPATIENT
Start: 2018-08-10 | End: 2018-08-13 | Stop reason: HOSPADM

## 2018-08-09 RX ADMIN — INSULIN ASPART 1 UNITS: 100 INJECTION, SOLUTION INTRAVENOUS; SUBCUTANEOUS at 04:51

## 2018-08-09 RX ADMIN — PREDNISONE 5 MG: 5 TABLET ORAL at 09:26

## 2018-08-09 RX ADMIN — Medication 250 MG: at 09:20

## 2018-08-09 RX ADMIN — BACITRACIN: 500 OINTMENT TOPICAL at 20:43

## 2018-08-09 RX ADMIN — CLONAZEPAM 1 MG: 2 TABLET ORAL at 20:41

## 2018-08-09 RX ADMIN — ENOXAPARIN SODIUM 30 MG: 30 INJECTION SUBCUTANEOUS at 02:38

## 2018-08-09 RX ADMIN — Medication 3 MG: at 09:24

## 2018-08-09 RX ADMIN — Medication 20 MG: at 09:21

## 2018-08-09 RX ADMIN — SODIUM CHLORIDE, PRESERVATIVE FREE 5 ML: 5 INJECTION INTRAVENOUS at 18:40

## 2018-08-09 RX ADMIN — CHLORHEXIDINE GLUCONATE 0.12% ORAL RINSE 15 ML: 1.2 LIQUID ORAL at 18:55

## 2018-08-09 RX ADMIN — INSULIN ASPART 1 UNITS: 100 INJECTION, SOLUTION INTRAVENOUS; SUBCUTANEOUS at 09:24

## 2018-08-09 RX ADMIN — SULFAMETHOXAZOLE AND TRIMETHOPRIM 1 TABLET: 400; 80 TABLET ORAL at 09:24

## 2018-08-09 RX ADMIN — CHLORHEXIDINE GLUCONATE 0.12% ORAL RINSE 15 ML: 1.2 LIQUID ORAL at 13:15

## 2018-08-09 RX ADMIN — ENOXAPARIN SODIUM 30 MG: 30 INJECTION SUBCUTANEOUS at 13:18

## 2018-08-09 RX ADMIN — VALGANCICLOVIR HYDROCHLORIDE 450 MG: 50 POWDER, FOR SOLUTION ORAL at 09:22

## 2018-08-09 RX ADMIN — AMPICILLIN SODIUM AND SULBACTAM SODIUM 3 G: 2; 1 INJECTION, POWDER, FOR SOLUTION INTRAMUSCULAR; INTRAVENOUS at 20:49

## 2018-08-09 RX ADMIN — Medication 50 MCG: at 09:21

## 2018-08-09 RX ADMIN — TRAZODONE HYDROCHLORIDE 300 MG: 150 TABLET ORAL at 20:43

## 2018-08-09 RX ADMIN — OXYCODONE HYDROCHLORIDE 5 MG: 5 SOLUTION ORAL at 13:15

## 2018-08-09 RX ADMIN — BACITRACIN: 500 OINTMENT TOPICAL at 09:25

## 2018-08-09 RX ADMIN — CHLORHEXIDINE GLUCONATE 0.12% ORAL RINSE 15 ML: 1.2 LIQUID ORAL at 09:22

## 2018-08-09 RX ADMIN — Medication 1 MG: at 09:23

## 2018-08-09 RX ADMIN — MULTIVITAMIN 15 ML: LIQUID ORAL at 09:24

## 2018-08-09 RX ADMIN — MYCOPHENOLATE MOFETIL 1000 MG: 200 POWDER, FOR SUSPENSION ORAL at 09:21

## 2018-08-09 RX ADMIN — AMPICILLIN SODIUM AND SULBACTAM SODIUM 3 G: 2; 1 INJECTION, POWDER, FOR SOLUTION INTRAMUSCULAR; INTRAVENOUS at 14:54

## 2018-08-09 RX ADMIN — AMPICILLIN SODIUM AND SULBACTAM SODIUM 3 G: 2; 1 INJECTION, POWDER, FOR SOLUTION INTRAMUSCULAR; INTRAVENOUS at 09:21

## 2018-08-09 RX ADMIN — MYCOPHENOLATE MOFETIL 1000 MG: 200 POWDER, FOR SUSPENSION ORAL at 20:42

## 2018-08-09 RX ADMIN — ESCITALOPRAM OXALATE 10 MG: 5 SOLUTION ORAL at 09:23

## 2018-08-09 RX ADMIN — AMPICILLIN SODIUM AND SULBACTAM SODIUM 3 G: 2; 1 INJECTION, POWDER, FOR SOLUTION INTRAMUSCULAR; INTRAVENOUS at 02:38

## 2018-08-09 RX ADMIN — SODIUM CHLORIDE, PRESERVATIVE FREE 5 ML: 5 INJECTION INTRAVENOUS at 09:03

## 2018-08-09 RX ADMIN — ATORVASTATIN CALCIUM 40 MG: 40 TABLET, FILM COATED ORAL at 20:43

## 2018-08-09 ASSESSMENT — ACTIVITIES OF DAILY LIVING (ADL)
ADLS_ACUITY_SCORE: 9.5
ADLS_ACUITY_SCORE: 13
ADLS_ACUITY_SCORE: 9.5

## 2018-08-09 NOTE — PROGRESS NOTES
CLINICAL NUTRITION SERVICES - BRIEF NOTE (see RD note on 8/6 for full assessment)    New findings   Tolerating continuous feeds @ 55 ml/hr. Pt requesting time off enteral pump.      Interventions  Transition to cycle TF via NDT   -- Advance current rate by 15 ml q4h (pending pt tolerance) to goal of 85 ml/hr. Goal cycle of Isosource 1.5 @ 85 ml/hr x 16 hrs (6pm - 10am) will provide 1360 ml/day, 2040 kcal (36 kcal/kg), 92 g PRO (1.6 g/kg), 239 g CHO, 1034 ml H2O, 20 g Fiber.   --Change to 120 ml H2O flushes before and after cyclic feed.       Marianna Fink, MOUNIKA, LD  Pager: [phone]
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
## 2018-08-09 NOTE — PLAN OF CARE
Problem: Patient Care Overview  Goal: Plan of Care/Patient Progress Review  OT 6A Cx Pt declined, reports he just walked with sitter but is very interested in OT for tomorrow.
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
## 2018-08-09 NOTE — PLAN OF CARE
Problem: Patient Care Overview  Goal: Plan of Care/Patient Progress Review  Outcome: No Change  VS: VS with tachycardia (100-120), pt alternating between capped/HTD 21% always sating in high 90s.    Neuros: Intact.   GI: NG in place with TF at 55ml/hr with FWF 60 Q4hrs. Tolerating well. States he had bowel movements earlier today, none this shift.   : Voiding spont to bathroom.  IV: R DL PICC, purple port with blood return/hep locked, other port TKO between abx.   Activity: Up with SBA.  Pain: Declined pain medication.   Respiratory/Trach: #8 Shiley in place, inner cannula changed x1. Pt with strong cough, suction attempted x1 this shift with no sputum. Bilateral nasal trumpets in place with some crusted, bloody drainage. Complete trach supply/emergency bag at bedside, cont pulse ox on. Trach ties in place and secure.    Skin: Facial incisions cleaned x1 and bacitracin applied. Mepilex lite under trach plate as skin is reddened. Nasal dressing slid off in the night and reinforced with tape.   Social: Pt is pleasant and eager to cooperate with cares.      Plan of care: Continue to monitor.

## 2018-08-09 NOTE — PLAN OF CARE
Problem: Patient Care Overview  Goal: Plan of Care/Patient Progress Review  SLP: Treatment session cancelled per pt's request. Pt stated he wore the speaking valve 3 times earlier today and did not wish to place it again. Pt reports that he tolerated the PMSV well and was able to place and remove the valve without difficulty. 1:1 staff member was present during previous trials and verifies that he did well and was able to place and remove the valve. Will continue to follow per POC.

## 2018-08-09 NOTE — PROGRESS NOTES
Social Work Services Progress Note    Hospital Day: 12  Date of Initial Social Work Evaluation:  8/3/18  Collaborated with:  Andra Paul (Trauma)    Data:  Pt has a new trach, NJ for tube feedings, is on IV antibiotics, requires suctioning zero to 2x per shift (per recent nursing notes).      Intervention:  CHELSEA spoke with Amari Edgar who indicates:  - that psych will be following up with pt and wife tomorrow  - that psych will determine whether or not sitter can be discontinued  - she will follow up with ID in regards to what antibiotic pt will discharge on and the frequency/duration of the antibiotic  - CHELSEA also asked that Andra follow up in regards to whether or not pt will be weanable from the trach and tube feeding.      CHELSEA completed PAS referral (reference number [id number]) which triggered a OBRA Level 2 screen (must be completed prior to discharge to a SNF).    CHELSEA reviewed Oral/Max 8/8/18 progress note entry which indicates that it is okay to wean pt from the trach, that the tube feeding is long term and that pt's nasal trumpet/sutures will remain in place until 8/9/18 (today). The note also indicates that pt is receiving oral suction q 1 hour. CHELSEA discussed this with Andra Paul who indicates that suctioning is only 1x per shift per nursing notes.    CHELSEA phoned Admissions at [facility] (Kaylee [phone]) and requested that she assess for admit to [place] for trach weaning.      CHELSEA paged Dr. Gerry Mg (Oral Max) to inquire as to whether or not pt has any known future surgery's planned. CHELSEA will await return call.    CHELSEA phoned the following SNF's near to pt's home to inquire as to whether or not they accept pt's with new trach's and nasal tube feedings:         The following facility's do not accept new trach's or nasal tube feedings:        - [facility]        - [facility]        - [facility]        - [facility]               The following facility will take a nasal tube feeding but not a new trach:        - [facility]               SW is awaiting return calls from the following facilities:        - [facility]        - [facility]          The following facility will consider pt's with new trach's and nasal tube feedings:        - [facility].    SW spoke with Erickwi Humberto again this afternoon.  Andra states that she spoke with Oral/Max and pt sutures will be removed today.  Per Andra, it is likely that pt will not discharge on IV antibiotics, tube feeding will be long term, no immediate surgery's are planned and they will start downsizing the trach today.        Plan:    Anticipated Disposition: CHELSEA is pursuing short term placement (options being explored include SNF and LTACH)    Barriers to d/c plan:  Pt has a sitter in place    Follow Up:  SW will continue to follow.    GERRY Persaud  Social Work, 6A  Phone:  [phone]  Pager:  [phone]  8/9/2018

## 2018-08-10 ENCOUNTER — APPOINTMENT (OUTPATIENT)
Dept: GENERAL RADIOLOGY | Facility: CLINIC | Age: 48
DRG: 131 | End: 2018-08-10
Attending: NURSE PRACTITIONER
Payer: MEDICARE

## 2018-08-10 ENCOUNTER — APPOINTMENT (OUTPATIENT)
Dept: SPEECH THERAPY | Facility: CLINIC | Age: 48
DRG: 131 | End: 2018-08-10
Attending: SURGERY
Payer: MEDICARE

## 2018-08-10 ENCOUNTER — APPOINTMENT (OUTPATIENT)
Dept: OCCUPATIONAL THERAPY | Facility: CLINIC | Age: 48
DRG: 131 | End: 2018-08-10
Attending: SURGERY
Payer: MEDICARE

## 2018-08-10 LAB
GLUCOSE BLDC GLUCOMTR-MCNC: 112 MG/DL (ref 70–99)
GLUCOSE BLDC GLUCOMTR-MCNC: 119 MG/DL (ref 70–99)
GLUCOSE BLDC GLUCOMTR-MCNC: 123 MG/DL (ref 70–99)
GLUCOSE BLDC GLUCOMTR-MCNC: 134 MG/DL (ref 70–99)
GLUCOSE BLDC GLUCOMTR-MCNC: 144 MG/DL (ref 70–99)
GLUCOSE BLDC GLUCOMTR-MCNC: 153 MG/DL (ref 70–99)

## 2018-08-10 PROCEDURE — 40000802 ZZH SITE CHECK

## 2018-08-10 PROCEDURE — 40000133 ZZH STATISTIC OT WARD VISIT: Performed by: OCCUPATIONAL THERAPIST

## 2018-08-10 PROCEDURE — A9270 NON-COVERED ITEM OR SERVICE: HCPCS | Mod: GY | Performed by: PHYSICIAN ASSISTANT

## 2018-08-10 PROCEDURE — A9270 NON-COVERED ITEM OR SERVICE: HCPCS | Mod: GY | Performed by: SURGERY

## 2018-08-10 PROCEDURE — 25000132 ZZH RX MED GY IP 250 OP 250 PS 637: Mod: GY | Performed by: SURGERY

## 2018-08-10 PROCEDURE — 92507 TX SP LANG VOICE COMM INDIV: CPT | Mod: GN | Performed by: SPEECH-LANGUAGE PATHOLOGIST

## 2018-08-10 PROCEDURE — A9270 NON-COVERED ITEM OR SERVICE: HCPCS | Mod: GY | Performed by: NURSE PRACTITIONER

## 2018-08-10 PROCEDURE — 12000003 ZZH R&B CRITICAL UMMC

## 2018-08-10 PROCEDURE — 25000132 ZZH RX MED GY IP 250 OP 250 PS 637: Mod: GY | Performed by: NURSE PRACTITIONER

## 2018-08-10 PROCEDURE — 25000128 H RX IP 250 OP 636: Performed by: NURSE PRACTITIONER

## 2018-08-10 PROCEDURE — 40000225 ZZH STATISTIC SLP WARD VISIT: Performed by: SPEECH-LANGUAGE PATHOLOGIST

## 2018-08-10 PROCEDURE — 40000047 ZZH STATISTIC CTO2 CONT OXYGEN TECH TIME EA 90 MIN

## 2018-08-10 PROCEDURE — 25000125 ZZHC RX 250: Performed by: STUDENT IN AN ORGANIZED HEALTH CARE EDUCATION/TRAINING PROGRAM

## 2018-08-10 PROCEDURE — 25000132 ZZH RX MED GY IP 250 OP 250 PS 637: Mod: GY | Performed by: PHYSICIAN ASSISTANT

## 2018-08-10 PROCEDURE — 25000131 ZZH RX MED GY IP 250 OP 636 PS 637: Mod: GY | Performed by: INTERNAL MEDICINE

## 2018-08-10 PROCEDURE — 40000275 ZZH STATISTIC RCP TIME EA 10 MIN

## 2018-08-10 PROCEDURE — 97535 SELF CARE MNGMENT TRAINING: CPT | Mod: GO | Performed by: OCCUPATIONAL THERAPIST

## 2018-08-10 PROCEDURE — 74018 RADEX ABDOMEN 1 VIEW: CPT

## 2018-08-10 PROCEDURE — 99231 SBSQ HOSP IP/OBS SF/LOW 25: CPT | Performed by: NURSE PRACTITIONER

## 2018-08-10 PROCEDURE — 00000146 ZZHCL STATISTIC GLUCOSE BY METER IP

## 2018-08-10 PROCEDURE — 92526 ORAL FUNCTION THERAPY: CPT | Mod: GN | Performed by: SPEECH-LANGUAGE PATHOLOGIST

## 2018-08-10 PROCEDURE — 99232 SBSQ HOSP IP/OBS MODERATE 35: CPT | Performed by: PSYCHIATRY & NEUROLOGY

## 2018-08-10 PROCEDURE — 25000128 H RX IP 250 OP 636: Performed by: STUDENT IN AN ORGANIZED HEALTH CARE EDUCATION/TRAINING PROGRAM

## 2018-08-10 RX ORDER — QUETIAPINE FUMARATE 25 MG/1
50 TABLET, FILM COATED ORAL AT BEDTIME
Status: DISCONTINUED | OUTPATIENT
Start: 2018-08-10 | End: 2018-08-13 | Stop reason: HOSPADM

## 2018-08-10 RX ADMIN — INSULIN ASPART 1 UNITS: 100 INJECTION, SOLUTION INTRAVENOUS; SUBCUTANEOUS at 09:20

## 2018-08-10 RX ADMIN — SULFAMETHOXAZOLE AND TRIMETHOPRIM 1 TABLET: 400; 80 TABLET ORAL at 09:23

## 2018-08-10 RX ADMIN — BACITRACIN: 500 OINTMENT TOPICAL at 20:43

## 2018-08-10 RX ADMIN — Medication 50 MCG: at 09:23

## 2018-08-10 RX ADMIN — Medication 1 MG: at 09:22

## 2018-08-10 RX ADMIN — CHLORHEXIDINE GLUCONATE 0.12% ORAL RINSE 15 ML: 1.2 LIQUID ORAL at 02:18

## 2018-08-10 RX ADMIN — VALGANCICLOVIR HYDROCHLORIDE 450 MG: 50 POWDER, FOR SOLUTION ORAL at 09:21

## 2018-08-10 RX ADMIN — ACETAMINOPHEN 650 MG: 325 SOLUTION ORAL at 20:33

## 2018-08-10 RX ADMIN — AMPICILLIN SODIUM AND SULBACTAM SODIUM 3 G: 2; 1 INJECTION, POWDER, FOR SOLUTION INTRAMUSCULAR; INTRAVENOUS at 09:23

## 2018-08-10 RX ADMIN — Medication 250 MG: at 09:21

## 2018-08-10 RX ADMIN — MYCOPHENOLATE MOFETIL 1000 MG: 200 POWDER, FOR SUSPENSION ORAL at 09:20

## 2018-08-10 RX ADMIN — CHLORHEXIDINE GLUCONATE 0.12% ORAL RINSE 15 ML: 1.2 LIQUID ORAL at 14:43

## 2018-08-10 RX ADMIN — PREDNISONE 5 MG: 5 TABLET ORAL at 09:23

## 2018-08-10 RX ADMIN — ENOXAPARIN SODIUM 30 MG: 30 INJECTION SUBCUTANEOUS at 02:18

## 2018-08-10 RX ADMIN — ENOXAPARIN SODIUM 30 MG: 30 INJECTION SUBCUTANEOUS at 14:44

## 2018-08-10 RX ADMIN — QUETIAPINE FUMARATE 50 MG: 25 TABLET ORAL at 22:12

## 2018-08-10 RX ADMIN — AMPICILLIN SODIUM AND SULBACTAM SODIUM 3 G: 2; 1 INJECTION, POWDER, FOR SOLUTION INTRAMUSCULAR; INTRAVENOUS at 14:44

## 2018-08-10 RX ADMIN — MULTIVITAMIN 15 ML: LIQUID ORAL at 09:21

## 2018-08-10 RX ADMIN — AMPICILLIN SODIUM AND SULBACTAM SODIUM 3 G: 2; 1 INJECTION, POWDER, FOR SOLUTION INTRAMUSCULAR; INTRAVENOUS at 21:03

## 2018-08-10 RX ADMIN — BACITRACIN: 500 OINTMENT TOPICAL at 09:25

## 2018-08-10 RX ADMIN — CHLORHEXIDINE GLUCONATE 0.12% ORAL RINSE 15 ML: 1.2 LIQUID ORAL at 09:23

## 2018-08-10 RX ADMIN — Medication 3.5 MG: at 00:00

## 2018-08-10 RX ADMIN — AMPICILLIN SODIUM AND SULBACTAM SODIUM 3 G: 2; 1 INJECTION, POWDER, FOR SOLUTION INTRAMUSCULAR; INTRAVENOUS at 02:18

## 2018-08-10 RX ADMIN — ESCITALOPRAM OXALATE 10 MG: 5 SOLUTION ORAL at 09:21

## 2018-08-10 RX ADMIN — ATORVASTATIN CALCIUM 40 MG: 40 TABLET, FILM COATED ORAL at 20:34

## 2018-08-10 RX ADMIN — SODIUM CHLORIDE, PRESERVATIVE FREE 5 ML: 5 INJECTION INTRAVENOUS at 14:50

## 2018-08-10 RX ADMIN — Medication 3.5 MG: at 09:21

## 2018-08-10 RX ADMIN — INSULIN ASPART 1 UNITS: 100 INJECTION, SOLUTION INTRAVENOUS; SUBCUTANEOUS at 04:21

## 2018-08-10 RX ADMIN — Medication 20 MG: at 09:20

## 2018-08-10 RX ADMIN — CHLORHEXIDINE GLUCONATE 0.12% ORAL RINSE 15 ML: 1.2 LIQUID ORAL at 18:01

## 2018-08-10 RX ADMIN — Medication 3.5 MG: at 18:01

## 2018-08-10 RX ADMIN — MYCOPHENOLATE MOFETIL 1000 MG: 200 POWDER, FOR SUSPENSION ORAL at 21:03

## 2018-08-10 ASSESSMENT — ACTIVITIES OF DAILY LIVING (ADL)
ADLS_ACUITY_SCORE: 9.5
ADLS_ACUITY_SCORE: 13
ADLS_ACUITY_SCORE: 9.5
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
## 2018-08-10 NOTE — PLAN OF CARE
Problem: Patient Care Overview  Goal: Plan of Care/Patient Progress Review  Discharge Planner SLP   Patient plan for discharge: Unknown   Current status: Pt seen bedside for PMSV f/u.  PMSV in place upon clinician entrance with O2 sats in 90's.  Pt donning/doffing with independence.  Recommend PMSV placement as tolerated.  Cuff must be deflated for placement.  Remove PMSV if fatigued, SOB, or sleeping.  ST to follow with reduced frequency given progress/independence.    Barriers to return to prior living situation: [facility] FT, safety concerns   Recommendations for discharge: Defer to OT/PT/Psych   Rationale for recommendations: Pt managing PMSV with independence        Entered by: Evelyn Ca 08/10/2018 10:53 AM

## 2018-08-10 NOTE — PROGRESS NOTES
Social Work Services Progress Note     Hospital Day: 12  Date of Initial Social Work Evaluation:  8/3/18  Collaborated with:  Patient, wife, SNF Admissions Coordinator's and [place] Financial Counselor (La Glez).     Data:  Pt has a new trach, NJ for tube feedings, is on IV antibiotics, requires suctioning zero to 2x per shift (per recent nursing notes).    Pt continues to have a one to one sitter in place.     Intervention:  CHELSEA Received return calls from the follow facility's:                 - [facility], does not accepts new trach's or nasal tube feedings                 - [facility], does not accept pt's with new trach's or nasal tube feedings                 - [facility], does not accept pt's with new trach's.    CHELSEA received a call from CHELSEA Trinidad with [facility] ([phone]) requesting a copy of pt's H/P and information on the discharge destination.  CHELSEA faxed Vivi a copy of the h/p and informed Vivi that accepting SNF is not yet known.  Vivi will fax a copy of the OBRA Level 2 to the accepting facility when the accepting facility is known.      CHELSEA met with pt and wife and provided an update in regards to pursuit of SNF.  Pt and wife voice understanding and agreement that [place] if facility[place] to [place] cannot accept.  Per discussion, a referral will be made to [facility] when sitter is discontinued.  Per discussion and pt agreement, CHELSEA referred to [facility] ([facility]).  CHELSEA updated pt/wife in regards to OBRA Level 2 screen.  CHELSEA spoke with pt and wife regarding insurance.  Pt has medicare and a Blue Cross medicare supplement policy.  Pt and wife both receive SSD benefits and pt's wife is on [employer].  Pt and wife pay privately for pt's [employer] policy. CHELSEA met with [place] Financial Counselor, La Glez and asked that she meet with pt and wife today, to complete MA for LTC application  with them.  La has confirmed that she will follow through on this task.  SW inquired about mental health services pt was receiving prior to hospitalization. Per discussion, pt was not seeing a psychiatrist.  Pt states that she had an appointment with a therapist (Mg Young at [facility] in [place]) that he missed (it would have been his first therapy session with Mg) due to current hospitalization.            Plan:    Anticipated Disposition: SW is pursuing short term placement (options being explored include SNF and LTACH)    Barriers to d/c plan:  Pt has a sitter in place    Follow Up:  SW will continue to follow.     GERRY Persaud  Social Work, 6A  Phone:  [phone]  Pager:  [phone]  8/10/2018

## 2018-08-10 NOTE — PLAN OF CARE
Problem: Patient Care Overview  Goal: Plan of Care/Patient Progress Review  6A PT: Cancel, attempted pt in AM though multiple providers present, pt requesting check back. Therapist's schedule did not allow check back in PM. Will reschedule per PT POC.
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
## 2018-08-10 NOTE — PLAN OF CARE
"Problem: Patient Care Overview  Goal: Plan of Care/Patient Progress Review  Discharge Planner OT   Patient plan for discharge: none stated  Current status: Pt much improved with IND with ADLs.  Pt able to demonstrate progression to IND bed mobility with lower body dressing and reports (as confirmed by wife) that he is IND in the bathroom with ADLs.  Educated re cognitive strategies and issued worksheets for \"cognitive exercises\". Pt and wife agreeable to dc from OT with PT&SLP to follow.   Barriers to return to prior living situation: medical needs, mental health  Recommendations for discharge: home w A PRN  Rationale for recommendations: new precautions       Entered by: Rachel Jensen 08/10/2018 4:40 PM       All goals met, dc from OT.  PT and SLP to follow for IP therapies.            "

## 2018-08-11 LAB
ANION GAP SERPL CALCULATED.3IONS-SCNC: 14 MMOL/L (ref 3–14)
BUN SERPL-MCNC: 32 MG/DL (ref 7–30)
CALCIUM SERPL-MCNC: 9.2 MG/DL (ref 8.5–10.1)
CHLORIDE SERPL-SCNC: 101 MMOL/L (ref 94–109)
CO2 SERPL-SCNC: 22 MMOL/L (ref 20–32)
CREAT SERPL-MCNC: 1 MG/DL (ref 0.66–1.25)
GFR SERPL CREATININE-BSD FRML MDRD: 80 ML/MIN/1.7M2
GLUCOSE BLDC GLUCOMTR-MCNC: 115 MG/DL (ref 70–99)
GLUCOSE BLDC GLUCOMTR-MCNC: 119 MG/DL (ref 70–99)
GLUCOSE BLDC GLUCOMTR-MCNC: 124 MG/DL (ref 70–99)
GLUCOSE BLDC GLUCOMTR-MCNC: 134 MG/DL (ref 70–99)
GLUCOSE BLDC GLUCOMTR-MCNC: 156 MG/DL (ref 70–99)
GLUCOSE SERPL-MCNC: 141 MG/DL (ref 70–99)
LACTATE BLD-SCNC: 0.7 MMOL/L (ref 0.7–2)
MAGNESIUM SERPL-MCNC: 1.8 MG/DL (ref 1.6–2.3)
PHOSPHATE SERPL-MCNC: 3.1 MG/DL (ref 2.5–4.5)
POTASSIUM SERPL-SCNC: 4.6 MMOL/L (ref 3.4–5.3)
SODIUM SERPL-SCNC: 137 MMOL/L (ref 133–144)

## 2018-08-11 PROCEDURE — 12000008 ZZH R&B INTERMEDIATE UMMC

## 2018-08-11 PROCEDURE — A9270 NON-COVERED ITEM OR SERVICE: HCPCS | Mod: GY | Performed by: PHYSICIAN ASSISTANT

## 2018-08-11 PROCEDURE — 25000132 ZZH RX MED GY IP 250 OP 250 PS 637: Mod: GY | Performed by: PHYSICIAN ASSISTANT

## 2018-08-11 PROCEDURE — 25000125 ZZHC RX 250: Performed by: STUDENT IN AN ORGANIZED HEALTH CARE EDUCATION/TRAINING PROGRAM

## 2018-08-11 PROCEDURE — A9270 NON-COVERED ITEM OR SERVICE: HCPCS | Mod: GY | Performed by: NURSE PRACTITIONER

## 2018-08-11 PROCEDURE — 80048 BASIC METABOLIC PNL TOTAL CA: CPT | Performed by: NURSE PRACTITIONER

## 2018-08-11 PROCEDURE — 25000132 ZZH RX MED GY IP 250 OP 250 PS 637: Mod: GY | Performed by: NURSE PRACTITIONER

## 2018-08-11 PROCEDURE — 84100 ASSAY OF PHOSPHORUS: CPT | Performed by: NURSE PRACTITIONER

## 2018-08-11 PROCEDURE — 25000128 H RX IP 250 OP 636: Performed by: NURSE PRACTITIONER

## 2018-08-11 PROCEDURE — 27210429 ZZH NUTRITION PRODUCT INTERMEDIATE LITER

## 2018-08-11 PROCEDURE — 00000146 ZZHCL STATISTIC GLUCOSE BY METER IP

## 2018-08-11 PROCEDURE — 83605 ASSAY OF LACTIC ACID: CPT | Performed by: SURGERY

## 2018-08-11 PROCEDURE — 25000132 ZZH RX MED GY IP 250 OP 250 PS 637: Mod: GY | Performed by: SURGERY

## 2018-08-11 PROCEDURE — 25000131 ZZH RX MED GY IP 250 OP 636 PS 637: Mod: GY | Performed by: INTERNAL MEDICINE

## 2018-08-11 PROCEDURE — 83735 ASSAY OF MAGNESIUM: CPT | Performed by: NURSE PRACTITIONER

## 2018-08-11 PROCEDURE — 0B21XFZ CHANGE TRACHEOSTOMY DEVICE IN TRACHEA, EXTERNAL APPROACH: ICD-10-PCS | Performed by: SURGERY

## 2018-08-11 PROCEDURE — 36592 COLLECT BLOOD FROM PICC: CPT | Performed by: SURGERY

## 2018-08-11 PROCEDURE — 36592 COLLECT BLOOD FROM PICC: CPT | Performed by: NURSE PRACTITIONER

## 2018-08-11 PROCEDURE — 25000128 H RX IP 250 OP 636: Performed by: SURGERY

## 2018-08-11 RX ORDER — POLYETHYLENE GLYCOL 3350 17 G/17G
17 POWDER, FOR SOLUTION ORAL 2 TIMES DAILY
Status: DISCONTINUED | OUTPATIENT
Start: 2018-08-11 | End: 2018-08-13 | Stop reason: HOSPADM

## 2018-08-11 RX ORDER — MAGNESIUM SULFATE HEPTAHYDRATE 40 MG/ML
2 INJECTION, SOLUTION INTRAVENOUS ONCE
Status: COMPLETED | OUTPATIENT
Start: 2018-08-11 | End: 2018-08-11

## 2018-08-11 RX ORDER — BISACODYL 10 MG
10 SUPPOSITORY, RECTAL RECTAL EVERY 8 HOURS
Status: DISCONTINUED | OUTPATIENT
Start: 2018-08-11 | End: 2018-08-11

## 2018-08-11 RX ADMIN — Medication 20 MG: at 08:03

## 2018-08-11 RX ADMIN — BACITRACIN: 500 OINTMENT TOPICAL at 08:26

## 2018-08-11 RX ADMIN — MYCOPHENOLATE MOFETIL 1000 MG: 200 POWDER, FOR SUSPENSION ORAL at 08:04

## 2018-08-11 RX ADMIN — AMPICILLIN SODIUM AND SULBACTAM SODIUM 3 G: 2; 1 INJECTION, POWDER, FOR SOLUTION INTRAMUSCULAR; INTRAVENOUS at 02:32

## 2018-08-11 RX ADMIN — SODIUM CHLORIDE, PRESERVATIVE FREE 5 ML: 5 INJECTION INTRAVENOUS at 11:50

## 2018-08-11 RX ADMIN — BACITRACIN: 500 OINTMENT TOPICAL at 08:25

## 2018-08-11 RX ADMIN — PREDNISONE 5 MG: 5 TABLET ORAL at 08:04

## 2018-08-11 RX ADMIN — ESCITALOPRAM OXALATE 10 MG: 5 SOLUTION ORAL at 08:03

## 2018-08-11 RX ADMIN — MAGNESIUM SULFATE HEPTAHYDRATE 2 G: 40 INJECTION, SOLUTION INTRAVENOUS at 10:44

## 2018-08-11 RX ADMIN — ENOXAPARIN SODIUM 30 MG: 30 INJECTION SUBCUTANEOUS at 13:41

## 2018-08-11 RX ADMIN — AMPICILLIN SODIUM AND SULBACTAM SODIUM 3 G: 2; 1 INJECTION, POWDER, FOR SOLUTION INTRAMUSCULAR; INTRAVENOUS at 08:24

## 2018-08-11 RX ADMIN — Medication 3.5 MG: at 18:14

## 2018-08-11 RX ADMIN — Medication 50 MCG: at 08:04

## 2018-08-11 RX ADMIN — SULFAMETHOXAZOLE AND TRIMETHOPRIM 1 TABLET: 400; 80 TABLET ORAL at 08:04

## 2018-08-11 RX ADMIN — INSULIN ASPART 1 UNITS: 100 INJECTION, SOLUTION INTRAVENOUS; SUBCUTANEOUS at 08:04

## 2018-08-11 RX ADMIN — AMPICILLIN SODIUM AND SULBACTAM SODIUM 3 G: 2; 1 INJECTION, POWDER, FOR SOLUTION INTRAMUSCULAR; INTRAVENOUS at 20:27

## 2018-08-11 RX ADMIN — MYCOPHENOLATE MOFETIL 1000 MG: 200 POWDER, FOR SUSPENSION ORAL at 20:27

## 2018-08-11 RX ADMIN — CHLORHEXIDINE GLUCONATE 0.12% ORAL RINSE 15 ML: 1.2 LIQUID ORAL at 00:10

## 2018-08-11 RX ADMIN — SODIUM CHLORIDE, PRESERVATIVE FREE 5 ML: 5 INJECTION INTRAVENOUS at 11:48

## 2018-08-11 RX ADMIN — CHLORHEXIDINE GLUCONATE 0.12% ORAL RINSE 15 ML: 1.2 LIQUID ORAL at 13:41

## 2018-08-11 RX ADMIN — SODIUM CHLORIDE, PRESERVATIVE FREE 5 ML: 5 INJECTION INTRAVENOUS at 16:17

## 2018-08-11 RX ADMIN — Medication 250 MG: at 08:04

## 2018-08-11 RX ADMIN — INSULIN ASPART 1 UNITS: 100 INJECTION, SOLUTION INTRAVENOUS; SUBCUTANEOUS at 12:29

## 2018-08-11 RX ADMIN — ENOXAPARIN SODIUM 30 MG: 30 INJECTION SUBCUTANEOUS at 02:32

## 2018-08-11 RX ADMIN — MULTIVITAMIN 15 ML: LIQUID ORAL at 08:04

## 2018-08-11 RX ADMIN — CHLORHEXIDINE GLUCONATE 0.12% ORAL RINSE 15 ML: 1.2 LIQUID ORAL at 08:32

## 2018-08-11 RX ADMIN — Medication 1 MG: at 08:04

## 2018-08-11 RX ADMIN — ATORVASTATIN CALCIUM 40 MG: 40 TABLET, FILM COATED ORAL at 20:27

## 2018-08-11 RX ADMIN — AMPICILLIN SODIUM AND SULBACTAM SODIUM 3 G: 2; 1 INJECTION, POWDER, FOR SOLUTION INTRAMUSCULAR; INTRAVENOUS at 13:39

## 2018-08-11 RX ADMIN — CHLORHEXIDINE GLUCONATE 0.12% ORAL RINSE 15 ML: 1.2 LIQUID ORAL at 20:27

## 2018-08-11 RX ADMIN — VALGANCICLOVIR HYDROCHLORIDE 450 MG: 50 POWDER, FOR SOLUTION ORAL at 08:04

## 2018-08-11 RX ADMIN — BACITRACIN: 500 OINTMENT TOPICAL at 20:27

## 2018-08-11 RX ADMIN — Medication 3.5 MG: at 08:03

## 2018-08-11 RX ADMIN — QUETIAPINE FUMARATE 50 MG: 25 TABLET ORAL at 22:24

## 2018-08-11 ASSESSMENT — ACTIVITIES OF DAILY LIVING (ADL)
ADLS_ACUITY_SCORE: 9.5
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
## 2018-08-11 NOTE — PLAN OF CARE
Problem: Patient Care Overview  Goal: Plan of Care/Patient Progress Review  Outcome: No Change  VS: VSS on RA/21% HTD; tachy in 110s. Hypotensive at times.  Neuros: A&Ox4. Communicating via writing/speaking valve. When tired or sore. Neuros intact. Edema bilaterally on mouth, R>L  Trach: Guy # 8, secured w/sutures and trach ties. Inner cannula changed x1 at 0200. No suctioning required. Oral secretions managed w/red samson. Pulse oximeter on pt at all times. Trach ties assessed and WNL>  GI: NPO. Cyclical TF via NG. 85mL/hr from 6pm-10am. BM x1 Thursady, passing gas, BS+  : Voiding w/out difficulty, although small amounts- see flowsheet.  IV: R PICC Hep locked between abx  Activity: SBA w/gb  Pain: Denied pain most of shift, but noted increased discomfort in mouth from talking so much today. Doesn't like to take oxy, trauma team paged for tylenol  Social:  Wife at bedside. Pt educated on call light use and BA put on.  Plan of care: Pt resting comfortably. POssible discharge this weekend to rehab. PLCs still to be scheduled. Will continue to monitor and follow plan of care.

## 2018-08-11 NOTE — PLAN OF CARE
Problem: Patient Care Overview  Goal: Plan of Care/Patient Progress Review  Outcome: Improving  Status:VPM DC'd at 3pm. He has been standing at bedside to void if wife is present in room and has been calling approprietly for assistance if she is not here. He has been pleasant and cooperative all shift,  GI:Tolerating night feeds well. States 3 loose stools yesterday. Had one this AM too.   :Voiding spont. ?  IV:DL PICC for antibiotics. AT TKO or heplocked for meds.  Activity:Up in sandra with wife and walked behind wheelchair.Is steady on feet.  Pain:Denies.  Respiratory/Trach:Has had talking valve in all shift. No suctioning needed. Inner cannula clean and dry. Trach changed to #6 shiley. Tolerating well. No longer sutured, so can apply mepilix under trach plate for offloading--has redness on bottom edge of where trach plate rests. Could not effectively apply before while sutures were in place.Continuous Sat monitoring % on room air with talking valve in.  Social:Wife at bedside and is helpful.    Skin:Applying own bacitracin to penis. Penis is reddened, but he feels that bacitracin is helping. Facial incisions and mouth incisions cared for as ordered.    Problem: Suicide Risk (Adult)  Goal: Identify Related Risk Factors and Signs and Symptoms  Related risk factors and signs and symptoms are identified upon initiation of Human Response Clinical Practice Guideline (CPG).   Outcome: Improving  Has been in good spirits today.
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
## 2018-08-12 ENCOUNTER — APPOINTMENT (OUTPATIENT)
Dept: PHYSICAL THERAPY | Facility: CLINIC | Age: 48
DRG: 131 | End: 2018-08-12
Attending: SURGERY
Payer: MEDICARE

## 2018-08-12 LAB
ANION GAP SERPL CALCULATED.3IONS-SCNC: 14 MMOL/L (ref 3–14)
BUN SERPL-MCNC: 33 MG/DL (ref 7–30)
CALCIUM SERPL-MCNC: 9.1 MG/DL (ref 8.5–10.1)
CHLORIDE SERPL-SCNC: 102 MMOL/L (ref 94–109)
CO2 SERPL-SCNC: 21 MMOL/L (ref 20–32)
CREAT SERPL-MCNC: 1 MG/DL (ref 0.66–1.25)
GFR SERPL CREATININE-BSD FRML MDRD: 80 ML/MIN/1.7M2
GLUCOSE BLDC GLUCOMTR-MCNC: 110 MG/DL (ref 70–99)
GLUCOSE BLDC GLUCOMTR-MCNC: 119 MG/DL (ref 70–99)
GLUCOSE BLDC GLUCOMTR-MCNC: 128 MG/DL (ref 70–99)
GLUCOSE BLDC GLUCOMTR-MCNC: 134 MG/DL (ref 70–99)
GLUCOSE BLDC GLUCOMTR-MCNC: 136 MG/DL (ref 70–99)
GLUCOSE BLDC GLUCOMTR-MCNC: 144 MG/DL (ref 70–99)
GLUCOSE SERPL-MCNC: 132 MG/DL (ref 70–99)
POTASSIUM SERPL-SCNC: 4.7 MMOL/L (ref 3.4–5.3)
SODIUM SERPL-SCNC: 137 MMOL/L (ref 133–144)

## 2018-08-12 PROCEDURE — 97530 THERAPEUTIC ACTIVITIES: CPT | Mod: GP | Performed by: REHABILITATION PRACTITIONER

## 2018-08-12 PROCEDURE — 25000128 H RX IP 250 OP 636: Performed by: NURSE PRACTITIONER

## 2018-08-12 PROCEDURE — 25000131 ZZH RX MED GY IP 250 OP 636 PS 637: Mod: GY | Performed by: INTERNAL MEDICINE

## 2018-08-12 PROCEDURE — 25000132 ZZH RX MED GY IP 250 OP 250 PS 637: Mod: GY | Performed by: NURSE PRACTITIONER

## 2018-08-12 PROCEDURE — 40000193 ZZH STATISTIC PT WARD VISIT: Performed by: REHABILITATION PRACTITIONER

## 2018-08-12 PROCEDURE — A9270 NON-COVERED ITEM OR SERVICE: HCPCS | Mod: GY | Performed by: NURSE PRACTITIONER

## 2018-08-12 PROCEDURE — A9270 NON-COVERED ITEM OR SERVICE: HCPCS | Mod: GY | Performed by: PHYSICIAN ASSISTANT

## 2018-08-12 PROCEDURE — 25000132 ZZH RX MED GY IP 250 OP 250 PS 637: Mod: GY | Performed by: PHYSICIAN ASSISTANT

## 2018-08-12 PROCEDURE — 12000003 ZZH R&B CRITICAL UMMC

## 2018-08-12 PROCEDURE — 97116 GAIT TRAINING THERAPY: CPT | Mod: GP | Performed by: REHABILITATION PRACTITIONER

## 2018-08-12 PROCEDURE — 27210429 ZZH NUTRITION PRODUCT INTERMEDIATE LITER

## 2018-08-12 PROCEDURE — 36592 COLLECT BLOOD FROM PICC: CPT | Performed by: STUDENT IN AN ORGANIZED HEALTH CARE EDUCATION/TRAINING PROGRAM

## 2018-08-12 PROCEDURE — 80048 BASIC METABOLIC PNL TOTAL CA: CPT | Performed by: STUDENT IN AN ORGANIZED HEALTH CARE EDUCATION/TRAINING PROGRAM

## 2018-08-12 PROCEDURE — 25000125 ZZHC RX 250: Performed by: STUDENT IN AN ORGANIZED HEALTH CARE EDUCATION/TRAINING PROGRAM

## 2018-08-12 PROCEDURE — 00000146 ZZHCL STATISTIC GLUCOSE BY METER IP

## 2018-08-12 PROCEDURE — 97110 THERAPEUTIC EXERCISES: CPT | Mod: GP | Performed by: REHABILITATION PRACTITIONER

## 2018-08-12 PROCEDURE — 25000132 ZZH RX MED GY IP 250 OP 250 PS 637: Mod: GY | Performed by: SURGERY

## 2018-08-12 RX ADMIN — Medication 3.5 MG: at 18:41

## 2018-08-12 RX ADMIN — ATORVASTATIN CALCIUM 40 MG: 40 TABLET, FILM COATED ORAL at 20:15

## 2018-08-12 RX ADMIN — Medication 50 MCG: at 07:57

## 2018-08-12 RX ADMIN — MYCOPHENOLATE MOFETIL 1000 MG: 200 POWDER, FOR SUSPENSION ORAL at 07:58

## 2018-08-12 RX ADMIN — ENOXAPARIN SODIUM 30 MG: 30 INJECTION SUBCUTANEOUS at 02:26

## 2018-08-12 RX ADMIN — SULFAMETHOXAZOLE AND TRIMETHOPRIM 1 TABLET: 400; 80 TABLET ORAL at 07:56

## 2018-08-12 RX ADMIN — AMPICILLIN SODIUM AND SULBACTAM SODIUM 3 G: 2; 1 INJECTION, POWDER, FOR SOLUTION INTRAMUSCULAR; INTRAVENOUS at 07:54

## 2018-08-12 RX ADMIN — VALGANCICLOVIR HYDROCHLORIDE 450 MG: 50 POWDER, FOR SOLUTION ORAL at 07:57

## 2018-08-12 RX ADMIN — CHLORHEXIDINE GLUCONATE 0.12% ORAL RINSE 15 ML: 1.2 LIQUID ORAL at 08:09

## 2018-08-12 RX ADMIN — ENOXAPARIN SODIUM 30 MG: 30 INJECTION SUBCUTANEOUS at 14:03

## 2018-08-12 RX ADMIN — QUETIAPINE FUMARATE 50 MG: 25 TABLET ORAL at 21:59

## 2018-08-12 RX ADMIN — MYCOPHENOLATE MOFETIL 1000 MG: 200 POWDER, FOR SUSPENSION ORAL at 20:15

## 2018-08-12 RX ADMIN — Medication 20 MG: at 07:57

## 2018-08-12 RX ADMIN — SODIUM CHLORIDE, PRESERVATIVE FREE 5 ML: 5 INJECTION INTRAVENOUS at 21:59

## 2018-08-12 RX ADMIN — SODIUM CHLORIDE, PRESERVATIVE FREE 5 ML: 5 INJECTION INTRAVENOUS at 16:54

## 2018-08-12 RX ADMIN — PREDNISONE 5 MG: 5 TABLET ORAL at 07:56

## 2018-08-12 RX ADMIN — Medication 250 MG: at 07:57

## 2018-08-12 RX ADMIN — INSULIN ASPART 1 UNITS: 100 INJECTION, SOLUTION INTRAVENOUS; SUBCUTANEOUS at 04:00

## 2018-08-12 RX ADMIN — SODIUM CHLORIDE, PRESERVATIVE FREE 5 ML: 5 INJECTION INTRAVENOUS at 10:54

## 2018-08-12 RX ADMIN — AMPICILLIN SODIUM AND SULBACTAM SODIUM 3 G: 2; 1 INJECTION, POWDER, FOR SOLUTION INTRAMUSCULAR; INTRAVENOUS at 14:03

## 2018-08-12 RX ADMIN — CHLORHEXIDINE GLUCONATE 0.12% ORAL RINSE 15 ML: 1.2 LIQUID ORAL at 02:37

## 2018-08-12 RX ADMIN — MULTIVITAMIN 15 ML: LIQUID ORAL at 07:58

## 2018-08-12 RX ADMIN — AMPICILLIN SODIUM AND SULBACTAM SODIUM 3 G: 2; 1 INJECTION, POWDER, FOR SOLUTION INTRAMUSCULAR; INTRAVENOUS at 20:15

## 2018-08-12 RX ADMIN — BACITRACIN: 500 OINTMENT TOPICAL at 08:08

## 2018-08-12 RX ADMIN — AMPICILLIN SODIUM AND SULBACTAM SODIUM 3 G: 2; 1 INJECTION, POWDER, FOR SOLUTION INTRAMUSCULAR; INTRAVENOUS at 02:27

## 2018-08-12 RX ADMIN — Medication 3.5 MG: at 07:57

## 2018-08-12 RX ADMIN — CHLORHEXIDINE GLUCONATE 0.12% ORAL RINSE 15 ML: 1.2 LIQUID ORAL at 20:15

## 2018-08-12 RX ADMIN — BACITRACIN: 500 OINTMENT TOPICAL at 20:15

## 2018-08-12 RX ADMIN — Medication 1 MG: at 07:57

## 2018-08-12 RX ADMIN — CHLORHEXIDINE GLUCONATE 0.12% ORAL RINSE 15 ML: 1.2 LIQUID ORAL at 14:02

## 2018-08-12 RX ADMIN — ESCITALOPRAM OXALATE 10 MG: 5 SOLUTION ORAL at 07:57

## 2018-08-12 ASSESSMENT — ACTIVITIES OF DAILY LIVING (ADL)
ADLS_ACUITY_SCORE: 9.5
ADLS_ACUITY_SCORE: 13
ADLS_ACUITY_SCORE: 9.5
ADLS_ACUITY_SCORE: 9.5

## 2018-08-12 NOTE — PLAN OF CARE
Problem: Patient Care Overview  Goal: Plan of Care/Patient Progress Review  Discharge Planner PT  6A  Patient plan for discharge: TCU  Current status: Pt amb ~ 10 minutes in hallway continuously with 4WW and SBA. Pt amb up and down 3 stairs x 2 trials with CGA, single handrail reciprocal gait pattern. Pt participated in standing LE Therex x 10 reps.   Barriers to return to prior living situation: medical status, trach, tube feeding  Recommendations for discharge: TCU  Rationale for recommendations: Pt will benefit from additional skilled PT to address balance, strength, and safety awareness.        Entered by: Danni Jones 08/12/2018 2:31 PM
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
## 2018-08-12 NOTE — PLAN OF CARE
Problem: Patient Care Overview  Goal: Plan of Care/Patient Progress Review  Outcome: Improving  Status:Instructed him on incision cares and had him do this AM. Also had him giving himself fluid flushes, because he feels dry. He will record and tell us so we can enter in computer.  GI:Loose BM this AM.  :voiding spont. ?  IV:Has DL PICC at TKO after antibiotic this afternoon. Had heplocked for a while this shift, so he could be up walking more.  Activity:Up in halls walking behind wheelchair.  Pain:Denies.  Respiratory/Trach:No suctioning needed has been wearing passie delgado. Inner cannula changed x 1 and was clean and dry.Continous pulse oximetry satting % on RA.   Skin:He did bacitracin on penis and feels that it is helping.Drooling is constant ,used jaw bra with ABD's at bottom for walks.   Social:Wife went home today.    Plan of care:Rehab.        Problem: Suicide Risk (Adult)  Goal: Identify Related Risk Factors and Signs and Symptoms  Related risk factors and signs and symptoms are identified upon initiation of Human [employer] Clinical Practice Guideline (CPG).   Outcome: Completed Date Met: 08/12/18  Psych stated that he was no longer a suicide risk on 8-10.Suicide cart removed and belongings returned to patient-shoes and family pictures.
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
## 2018-08-12 NOTE — PLAN OF CARE
Problem: Patient Care Overview  Goal: Plan of Care/Patient Progress Review  Outcome: No Change  VS: Stable, pt had speaking valve in for entire shift up until 2200 sating in high 90's, then placed on HTD at 21% when pt was ready to go to bed. Hypotensive at times  Neuros: Communicates by speaking when valve in place, otherwise writes. A&Ox4  GI: NPO, TF started at 1900 d/t faulty pump, at goal rate of 85ml/hr and tolerating well.  : Voiding spontaneously via bedside urinal, pt starting to have low UOP, MDs notified.  IV: DL PICC HL in between unasyn  Activity: Up SBA, alarms off when wife is in room  Pain: No complaints of pain this shift.   Respiratory/Trach: Clear, has #6shiley in place, CDI, no suction needed this shift  Skin: Facial incisions CDI, VALE, bacitracin applied  Social: Wife at bedside  Plan of care: Continue to monitor and follow POC

## 2018-08-13 ENCOUNTER — APPOINTMENT (OUTPATIENT)
Dept: SPEECH THERAPY | Facility: CLINIC | Age: 48
DRG: 131 | End: 2018-08-13
Attending: SURGERY
Payer: MEDICARE

## 2018-08-13 ENCOUNTER — HOSPITAL ENCOUNTER (INPATIENT)
Facility: SKILLED NURSING FACILITY | Age: 48
LOS: 5 days | Discharge: ANOTHER HOSPITAL | DRG: 560 | End: 2018-08-18
Attending: INTERNAL MEDICINE | Admitting: INTERNAL MEDICINE
Payer: MEDICARE

## 2018-08-13 VITALS
OXYGEN SATURATION: 100 % | TEMPERATURE: 95.4 F | HEART RATE: 104 BPM | WEIGHT: 114.3 LBS | RESPIRATION RATE: 18 BRPM | DIASTOLIC BLOOD PRESSURE: 73 MMHG | BODY MASS INDEX: 16.4 KG/M2 | SYSTOLIC BLOOD PRESSURE: 103 MMHG

## 2018-08-13 DIAGNOSIS — R19.7 DIARRHEA, UNSPECIFIED TYPE: ICD-10-CM

## 2018-08-13 DIAGNOSIS — Z78.9 DEEP VEIN THROMBOSIS (DVT) PROPHYLAXIS PRESCRIBED AT DISCHARGE: Primary | ICD-10-CM

## 2018-08-13 DIAGNOSIS — E43 SEVERE PROTEIN-CALORIE MALNUTRITION (H): ICD-10-CM

## 2018-08-13 DIAGNOSIS — E87.20 ACIDOSIS: ICD-10-CM
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
## 2018-08-13 PROBLEM — E46 MALNUTRITION (H): Status: ACTIVE | Noted: 2018-08-13

## 2018-08-13 LAB
CREAT SERPL-MCNC: 1.08 MG/DL (ref 0.66–1.25)
GFR SERPL CREATININE-BSD FRML MDRD: 73 ML/MIN/1.7M2
GLUCOSE BLDC GLUCOMTR-MCNC: 121 MG/DL (ref 70–99)
GLUCOSE BLDC GLUCOMTR-MCNC: 127 MG/DL (ref 70–99)
GLUCOSE BLDC GLUCOMTR-MCNC: 128 MG/DL (ref 70–99)
GLUCOSE BLDC GLUCOMTR-MCNC: 135 MG/DL (ref 70–99)
GLUCOSE BLDC GLUCOMTR-MCNC: 135 MG/DL (ref 70–99)
GLUCOSE BLDC GLUCOMTR-MCNC: 140 MG/DL (ref 70–99)
GLUCOSE BLDC GLUCOMTR-MCNC: 153 MG/DL (ref 70–99)
MAGNESIUM SERPL-MCNC: 1.8 MG/DL (ref 1.6–2.3)
PHOSPHATE SERPL-MCNC: 2.7 MG/DL (ref 2.5–4.5)
PLATELET # BLD AUTO: 482 10E9/L (ref 150–450)

## 2018-08-13 PROCEDURE — 00000146 ZZHCL STATISTIC GLUCOSE BY METER IP

## 2018-08-13 PROCEDURE — 40000802 ZZH SITE CHECK

## 2018-08-13 PROCEDURE — 36592 COLLECT BLOOD FROM PICC: CPT | Performed by: NURSE PRACTITIONER

## 2018-08-13 PROCEDURE — 40000983 ZZH STATISTIC HFNC ADULT NON-CPAP

## 2018-08-13 PROCEDURE — A9270 NON-COVERED ITEM OR SERVICE: HCPCS | Mod: GY | Performed by: PHYSICIAN ASSISTANT

## 2018-08-13 PROCEDURE — 40000275 ZZH STATISTIC RCP TIME EA 10 MIN

## 2018-08-13 PROCEDURE — 25000132 ZZH RX MED GY IP 250 OP 250 PS 637: Mod: GY | Performed by: PHYSICIAN ASSISTANT

## 2018-08-13 PROCEDURE — 25000132 ZZH RX MED GY IP 250 OP 250 PS 637: Mod: GY | Performed by: SURGERY

## 2018-08-13 PROCEDURE — 27210429 ZZH NUTRITION PRODUCT INTERMEDIATE LITER

## 2018-08-13 PROCEDURE — 25000128 H RX IP 250 OP 636: Performed by: NURSE PRACTITIONER

## 2018-08-13 PROCEDURE — 83735 ASSAY OF MAGNESIUM: CPT | Performed by: NURSE PRACTITIONER

## 2018-08-13 PROCEDURE — 12000022 ZZH R&B SNF

## 2018-08-13 PROCEDURE — 25000125 ZZHC RX 250: Performed by: STUDENT IN AN ORGANIZED HEALTH CARE EDUCATION/TRAINING PROGRAM

## 2018-08-13 PROCEDURE — 92507 TX SP LANG VOICE COMM INDIV: CPT | Mod: GN

## 2018-08-13 PROCEDURE — 25000132 ZZH RX MED GY IP 250 OP 250 PS 637: Mod: GY | Performed by: NURSE PRACTITIONER

## 2018-08-13 PROCEDURE — A9270 NON-COVERED ITEM OR SERVICE: HCPCS | Mod: GY | Performed by: NURSE PRACTITIONER

## 2018-08-13 PROCEDURE — 25000131 ZZH RX MED GY IP 250 OP 636 PS 637: Mod: GY | Performed by: PHYSICIAN ASSISTANT

## 2018-08-13 PROCEDURE — 25000125 ZZHC RX 250: Performed by: PHYSICIAN ASSISTANT

## 2018-08-13 PROCEDURE — 36416 COLLJ CAPILLARY BLOOD SPEC: CPT | Performed by: PHYSICIAN ASSISTANT

## 2018-08-13 PROCEDURE — 85049 AUTOMATED PLATELET COUNT: CPT | Performed by: PHYSICIAN ASSISTANT

## 2018-08-13 PROCEDURE — 25000131 ZZH RX MED GY IP 250 OP 636 PS 637: Mod: GY | Performed by: INTERNAL MEDICINE

## 2018-08-13 PROCEDURE — 82565 ASSAY OF CREATININE: CPT | Performed by: PHYSICIAN ASSISTANT

## 2018-08-13 PROCEDURE — 99309 SBSQ NF CARE MODERATE MDM 30: CPT | Performed by: PHYSICIAN ASSISTANT

## 2018-08-13 PROCEDURE — 40000225 ZZH STATISTIC SLP WARD VISIT

## 2018-08-13 PROCEDURE — 84100 ASSAY OF PHOSPHORUS: CPT | Performed by: NURSE PRACTITIONER

## 2018-08-13 RX ORDER — ACETAMINOPHEN 650 MG/1
650 SUPPOSITORY RECTAL EVERY 4 HOURS PRN
Status: DISCONTINUED | OUTPATIENT
Start: 2018-08-13 | End: 2018-08-18 | Stop reason: HOSPADM

## 2018-08-13 RX ORDER — PREDNISONE 5 MG/1
5 TABLET ORAL DAILY
Status: DISCONTINUED | OUTPATIENT
Start: 2018-08-14 | End: 2018-08-16

## 2018-08-13 RX ORDER — VALGANCICLOVIR HYDROCHLORIDE 50 MG/ML
450 POWDER, FOR SOLUTION ORAL DAILY
Status: DISCONTINUED | OUTPATIENT
Start: 2018-08-14 | End: 2018-08-18 | Stop reason: HOSPADM

## 2018-08-13 RX ORDER — QUETIAPINE FUMARATE 50 MG/1
50 TABLET, FILM COATED ORAL AT BEDTIME
Status: DISCONTINUED | OUTPATIENT
Start: 2018-08-13 | End: 2018-08-16

## 2018-08-13 RX ORDER — FOLIC ACID 1 MG/1
1 TABLET ORAL DAILY
Status: DISCONTINUED | OUTPATIENT
Start: 2018-08-14 | End: 2018-08-18 | Stop reason: HOSPADM

## 2018-08-13 RX ORDER — ATORVASTATIN CALCIUM 40 MG/1
40 TABLET, FILM COATED ORAL EVERY EVENING
Qty: 30 TABLET | Status: ON HOLD | DISCHARGE
Start: 2018-08-13 | End: 2018-08-26

## 2018-08-13 RX ORDER — SULFAMETHOXAZOLE AND TRIMETHOPRIM 400; 80 MG/1; MG/1
1 TABLET ORAL DAILY
Refills: 0 | Status: ON HOLD | DISCHARGE
Start: 2018-08-14 | End: 2018-08-26

## 2018-08-13 RX ORDER — MYCOPHENOLATE MOFETIL 200 MG/ML
1000 POWDER, FOR SUSPENSION ORAL 2 TIMES DAILY
Status: DISCONTINUED | OUTPATIENT
Start: 2018-08-13 | End: 2018-08-18 | Stop reason: HOSPADM

## 2018-08-13 RX ORDER — GINSENG 100 MG
CAPSULE ORAL 2 TIMES DAILY
Status: DISCONTINUED | OUTPATIENT
Start: 2018-08-13 | End: 2018-08-18 | Stop reason: HOSPADM

## 2018-08-13 RX ORDER — VALGANCICLOVIR HYDROCHLORIDE 50 MG/ML
450 POWDER, FOR SOLUTION ORAL DAILY
Status: ON HOLD | DISCHARGE
Start: 2018-08-14 | End: 2018-08-26

## 2018-08-13 RX ORDER — ATORVASTATIN CALCIUM 20 MG/1
40 TABLET, FILM COATED ORAL EVERY EVENING
Status: DISCONTINUED | OUTPATIENT
Start: 2018-08-13 | End: 2018-08-18 | Stop reason: HOSPADM

## 2018-08-13 RX ORDER — GINSENG 100 MG
CAPSULE ORAL 2 TIMES DAILY
DISCHARGE
Start: 2018-08-13 | End: 2018-09-11

## 2018-08-13 RX ORDER — ESCITALOPRAM OXALATE 5 MG/5ML
10 SOLUTION ORAL DAILY
Status: COMPLETED | OUTPATIENT
Start: 2018-08-14 | End: 2018-08-15

## 2018-08-13 RX ORDER — ESCITALOPRAM OXALATE 5 MG/5ML
20 SOLUTION ORAL DAILY
Status: DISCONTINUED | OUTPATIENT
Start: 2018-08-16 | End: 2018-08-18 | Stop reason: HOSPADM

## 2018-08-13 RX ORDER — QUETIAPINE FUMARATE 50 MG/1
50 TABLET, FILM COATED ORAL AT BEDTIME
Qty: 60 TABLET | Status: ON HOLD | DISCHARGE
Start: 2018-08-13 | End: 2018-08-26

## 2018-08-13 RX ORDER — PREDNISONE 5 MG/1
5 TABLET ORAL DAILY
Status: ON HOLD | DISCHARGE
Start: 2018-08-14 | End: 2018-08-26

## 2018-08-13 RX ORDER — POLYETHYLENE GLYCOL 3350 17 G/17G
17 POWDER, FOR SOLUTION ORAL 2 TIMES DAILY
Qty: 7 PACKET | Status: ON HOLD | DISCHARGE
Start: 2018-08-13 | End: 2018-08-26

## 2018-08-13 RX ORDER — SULFAMETHOXAZOLE AND TRIMETHOPRIM 400; 80 MG/1; MG/1
1 TABLET ORAL DAILY
Status: DISCONTINUED | OUTPATIENT
Start: 2018-08-14 | End: 2018-08-16

## 2018-08-13 RX ORDER — ESCITALOPRAM OXALATE 5 MG/5ML
10 SOLUTION ORAL DAILY
Qty: 300 ML | Status: ON HOLD | DISCHARGE
Start: 2018-08-14 | End: 2018-08-17

## 2018-08-13 RX ORDER — POLYETHYLENE GLYCOL 3350 17 G/17G
17 POWDER, FOR SOLUTION ORAL 2 TIMES DAILY
Status: DISCONTINUED | OUTPATIENT
Start: 2018-08-13 | End: 2018-08-14

## 2018-08-13 RX ORDER — MYCOPHENOLATE MOFETIL 200 MG/ML
1000 POWDER, FOR SUSPENSION ORAL 2 TIMES DAILY
Qty: 300 ML | Status: ON HOLD | DISCHARGE
Start: 2018-08-13 | End: 2018-08-26

## 2018-08-13 RX ORDER — CLONAZEPAM 1 MG/1
1 TABLET ORAL DAILY
Qty: 90 TABLET | Status: ON HOLD | DISCHARGE
Start: 2018-08-13 | End: 2018-08-17

## 2018-08-13 RX ORDER — CHLORHEXIDINE GLUCONATE ORAL RINSE 1.2 MG/ML
15 SOLUTION DENTAL EVERY 6 HOURS
Status: DISCONTINUED | OUTPATIENT
Start: 2018-08-13 | End: 2018-08-18 | Stop reason: HOSPADM

## 2018-08-13 RX ORDER — FLUDROCORTISONE ACETATE 0.1 MG/1
0.05 TABLET ORAL DAILY
Status: ON HOLD | DISCHARGE
Start: 2018-08-14 | End: 2018-08-26

## 2018-08-13 RX ORDER — ESCITALOPRAM OXALATE 5 MG/5ML
20 SOLUTION ORAL DAILY
Qty: 300 ML | Status: ON HOLD | DISCHARGE
Start: 2018-08-17 | End: 2018-08-26

## 2018-08-13 RX ORDER — CHLORHEXIDINE GLUCONATE ORAL RINSE 1.2 MG/ML
15 SOLUTION DENTAL EVERY 6 HOURS
Status: ON HOLD | DISCHARGE
Start: 2018-08-13 | End: 2018-08-26

## 2018-08-13 RX ADMIN — VALGANCICLOVIR HYDROCHLORIDE 450 MG: 50 POWDER, FOR SOLUTION ORAL at 07:57

## 2018-08-13 RX ADMIN — CHLORHEXIDINE GLUCONATE 0.12% ORAL RINSE 15 ML: 1.2 LIQUID ORAL at 02:12

## 2018-08-13 RX ADMIN — AMOXICILLIN AND CLAVULANATE POTASSIUM 1 TABLET: 875; 125 TABLET, FILM COATED ORAL at 22:05

## 2018-08-13 RX ADMIN — BACITRACIN: 500 OINTMENT TOPICAL at 10:10

## 2018-08-13 RX ADMIN — CHLORHEXIDINE GLUCONATE 0.12% ORAL RINSE 15 ML: 1.2 LIQUID ORAL at 07:57

## 2018-08-13 RX ADMIN — ESCITALOPRAM OXALATE 10 MG: 5 SOLUTION ORAL at 07:57

## 2018-08-13 RX ADMIN — CHLORHEXIDINE GLUCONATE 0.12% ORAL RINSE 15 ML: 1.2 LIQUID ORAL at 13:37

## 2018-08-13 RX ADMIN — BACITRACIN ZINC: 500 OINTMENT TOPICAL at 22:11

## 2018-08-13 RX ADMIN — AMPICILLIN SODIUM AND SULBACTAM SODIUM 3 G: 2; 1 INJECTION, POWDER, FOR SOLUTION INTRAMUSCULAR; INTRAVENOUS at 02:14

## 2018-08-13 RX ADMIN — QUETIAPINE FUMARATE 50 MG: 50 TABLET ORAL at 22:05

## 2018-08-13 RX ADMIN — ENOXAPARIN SODIUM 30 MG: 30 INJECTION SUBCUTANEOUS at 13:37

## 2018-08-13 RX ADMIN — Medication 1 MG: at 07:58

## 2018-08-13 RX ADMIN — CHLORHEXIDINE GLUCONATE 0.12% ORAL RINSE 15 ML: 1.2 LIQUID ORAL at 17:25

## 2018-08-13 RX ADMIN — MULTIVITAMIN 15 ML: LIQUID ORAL at 07:57

## 2018-08-13 RX ADMIN — Medication 20 MG: at 07:56

## 2018-08-13 RX ADMIN — ACETAMINOPHEN 650 MG: 325 SOLUTION ORAL at 17:22

## 2018-08-13 RX ADMIN — AMPICILLIN SODIUM AND SULBACTAM SODIUM 3 G: 2; 1 INJECTION, POWDER, FOR SOLUTION INTRAMUSCULAR; INTRAVENOUS at 07:52

## 2018-08-13 RX ADMIN — BACITRACIN: 500 OINTMENT TOPICAL at 10:09

## 2018-08-13 RX ADMIN — Medication 250 MG: at 07:56

## 2018-08-13 RX ADMIN — MYCOPHENOLATE MOFETIL 1000 MG: 200 POWDER, FOR SUSPENSION ORAL at 22:05

## 2018-08-13 RX ADMIN — ATORVASTATIN CALCIUM 40 MG: 20 TABLET, FILM COATED ORAL at 22:05

## 2018-08-13 RX ADMIN — INSULIN ASPART 1 UNITS: 100 INJECTION, SOLUTION INTRAVENOUS; SUBCUTANEOUS at 04:09

## 2018-08-13 RX ADMIN — MYCOPHENOLATE MOFETIL 1000 MG: 200 POWDER, FOR SUSPENSION ORAL at 07:56

## 2018-08-13 RX ADMIN — Medication 3.5 MG: at 22:05

## 2018-08-13 RX ADMIN — SULFAMETHOXAZOLE AND TRIMETHOPRIM 1 TABLET: 400; 80 TABLET ORAL at 07:58

## 2018-08-13 RX ADMIN — Medication 50 MCG: at 07:58

## 2018-08-13 RX ADMIN — CHLORHEXIDINE GLUCONATE 0.12% ORAL RINSE 15 ML: 1.2 LIQUID ORAL at 22:05

## 2018-08-13 RX ADMIN — ENOXAPARIN SODIUM 30 MG: 30 INJECTION SUBCUTANEOUS at 02:12

## 2018-08-13 RX ADMIN — PREDNISONE 5 MG: 5 TABLET ORAL at 07:58

## 2018-08-13 RX ADMIN — Medication 3.5 MG: at 07:56

## 2018-08-13 ASSESSMENT — ACTIVITIES OF DAILY LIVING (ADL)
ADLS_ACUITY_SCORE: 13
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
## 2018-08-13 NOTE — PLAN OF CARE
Problem: Patient Care Overview  Goal: Plan of Care/Patient Progress Review  Outcome: No Change  VS: Stable, pt had speaking valve in for entire shift up until 2200 sating in high 90's, then placed on HTD at 21% when pt was ready to go to bed. Hypotensive at times  Neuros: Communicates by speaking when valve in place, otherwise writes. A&Ox4  GI: NPO, TF started at 1800 at goal of 85ml/hr, pt tolerating well  : Voiding spontaneously via bedside urinal good UOP  IV: DL PICC HL in between unasyn  Activity: Up SBA, alarms off when wife is in room  Pain: No complaints of pain this shift.   Respiratory/Trach: Clear, has #6 shiley in place, CDI, no suction needed this shift  Skin: Facial incisions CDI, VALE, bacitracin applied  Social: No family present this shift  Plan of care: Continue to monitor and follow POC
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
## 2018-08-13 NOTE — PROGRESS NOTES
Patient is a 48 year old male  admitted to room 421 via litter.  Patient is alert and oriented X 3. See Epic for VS and assessment.  Patient is able to transfer assist of one using walker. Patient was settled into their room, shown call light, tv, mealtimes etc. Oriented to unit. Will continue monitoring pain level and VS. Notifying MD with any concerns.  Follow MD orders for cares and medications.    Level of Schooling:college  Ethnicity:  Marital Status:  Dentures: No  Hearing Aid: No  Smoker:  No  Glasses: Yes  Occupation: social security  Falls 0-1 mo: 0 2-6 mo: 0   [facility] Care Directive Referral to Social Work?Yes

## 2018-08-13 NOTE — PLAN OF CARE
Problem: Surgery Nonspecified (Adult)  Goal: Signs and Symptoms of Listed Potential Problems Will be Absent, Minimized or Managed (Surgery Nonspecified)  Signs and symptoms of listed potential problems will be absent, minimized or managed by discharge/transition of care (reference Surgery Nonspecified (Adult) CPG).   Outcome: Adequate for Discharge Date Met: 08/13/18  VSS, denies pain. Pt has #6 shiley, inner canula changed x 1. Pt did not require any suctioning this shift, had speaking valve in place. Mepilex lite was place under trach plate. Pt did own cares to facial incisions, also oral cares. Pt is NPO. On cycled tube feeding via NG. Voiding spontaneiuosly, having loose stools (had one today). Up indep in room and halls. PICC ling was removed, dressing CDI. Pt is being discharged to  rehab at this time, report called wto receiving RN. [facility] transport here at this time to bring pt there, all belongings sent with pt.

## 2018-08-13 NOTE — PLAN OF CARE
Problem: Patient Care Overview  Goal: Plan of Care/Patient Progress Review  Physical Therapy Discharge Summary    Reason for therapy discharge:    Discharged to transitional care facility.    Progress towards therapy goal(s). See goals on Care Plan in [facility] electronic health record for goal details.  Goals partially met.  Barriers to achieving goals:   discharge from facility.    Therapy recommendation(s):    Continued therapy is recommended.  Rationale/Recommendations:  Continue gait and balance training, wean from gait AD, increase functional strength endurance and safety..

## 2018-08-13 NOTE — PLAN OF CARE
Problem: Patient Care Overview  Goal: Plan of Care/Patient Progress Review  Discharge Planner SLP   Patient plan for discharge: TCU  Current status: Recommend continue PMSV use as tolerated. Pt is independent with donning/doffing speaking valve. Likely appropriate to initiate trach capping/plugging trials once trach changed/downsized to cuffless (recommend #4 cuffless Shiley vs. #5 Jeremiah trach).   Barriers to return to prior living situation: trach cares; TF dependence  Recommendations for discharge: TCU  Rationale for recommendations: anticipate ongoing ST Needs       Entered by: Katharine Mccauley 08/13/2018 3:45 PM     Speech Language Therapy Discharge Summary    Reason for therapy discharge:    Discharged to transitional care facility.    Progress towards therapy goal(s). See goals on Care Plan in [facility] electronic health record for goal details.  Goals partially met.  Barriers to achieving goals:   discharge from facility.    Therapy recommendation(s):    Continued therapy is recommended.  Rationale/Recommendations:  to assist with trach capping/plugging once appropriate.   Pt will require swallow evaluation once ok for PO from medical standpoint. Pt is independent with PMSV donning/doffing and is able to wear as tolerated. Discharged to TCU with purple passy delgado speaking valve.

## 2018-08-13 NOTE — PROGRESS NOTES
Social Work Services Discharge Note      Patient Name:  Gilles Henning III     Anticipated Discharge Date: 08/13/18 @ 1300    Discharge Disposition:   Facility: [facility]  [phone]  Nurse to Nurse Call: PH: [phone]    Following MD: Charlie Dubose  [address] / [place] [address]     Pre-Admission Screening (PAS) online form has been completed.  The Level of Care (LOC) is:  Determined  Confirmation Code is:  PAS:[id number]  Patient/caregiver informed of referral to [facility] for Pre-Admission Screening for skilled nursing facility (SNF) placement and to expect a phone call post discharge from SNF.     Additional Services/Equipment Arranged:    - Transportation: [facility] [facility] (PH: [phone]) stretcher scheduled for 1300.  Indication for stretcher transport is pt needing trach dome O2 during transport.  Pt and family are aware and in agreement that insurance may not cover stretcher transport: NA  PCS form completed prior to transport.     Patient / Family response to discharge plan:  Pt in agreement with the plan.     Persons notified of above discharge plan:  Pt, bedside RN, Neuro team, PCP, SNF admissions.    Staff Discharge Instructions:  Please fax discharge orders and signed hard scripts for any controlled substances.  Please print a packet and send with patient.     CTS Handoff completed:  YES    Medicare Notice of Rights provided to the patient/family:  YES    VIPUL Sams, QUINNW  [address] Unit   Phone: [phone]  Pager: [phone]  Unit: [phone]

## 2018-08-14 ENCOUNTER — HOME INFUSION (PRE-WILLOW HOME INFUSION) (OUTPATIENT)
Dept: PHARMACY | Facility: CLINIC | Age: 48
End: 2018-08-14

## 2018-08-14 LAB
ANION GAP SERPL CALCULATED.3IONS-SCNC: 12 MMOL/L (ref 3–14)
BUN SERPL-MCNC: 28 MG/DL (ref 7–30)
CALCIUM SERPL-MCNC: 9.2 MG/DL (ref 8.5–10.1)
CHLORIDE SERPL-SCNC: 100 MMOL/L (ref 94–109)
CO2 SERPL-SCNC: 20 MMOL/L (ref 20–32)
CREAT SERPL-MCNC: 0.88 MG/DL (ref 0.66–1.25)
ERYTHROCYTE [DISTWIDTH] IN BLOOD BY AUTOMATED COUNT: 15.6 % (ref 10–15)
GFR SERPL CREATININE-BSD FRML MDRD: >90 ML/MIN/1.7M2
GLUCOSE BLDC GLUCOMTR-MCNC: 117 MG/DL (ref 70–99)
GLUCOSE SERPL-MCNC: 120 MG/DL (ref 70–99)
HCT VFR BLD AUTO: 29.6 % (ref 40–53)
HGB BLD-MCNC: 9.9 G/DL (ref 13.3–17.7)
MCH RBC QN AUTO: 31 PG (ref 26.5–33)
MCHC RBC AUTO-ENTMCNC: 33.4 G/DL (ref 31.5–36.5)
MCV RBC AUTO: 93 FL (ref 78–100)
PLATELET # BLD AUTO: 500 10E9/L (ref 150–450)
POTASSIUM SERPL-SCNC: 5.2 MMOL/L (ref 3.4–5.3)
RBC # BLD AUTO: 3.19 10E12/L (ref 4.4–5.9)
SODIUM SERPL-SCNC: 132 MMOL/L (ref 133–144)
WBC # BLD AUTO: 8.2 10E9/L (ref 4–11)

## 2018-08-14 PROCEDURE — 80048 BASIC METABOLIC PNL TOTAL CA: CPT | Performed by: PHYSICIAN ASSISTANT

## 2018-08-14 PROCEDURE — 25000131 ZZH RX MED GY IP 250 OP 636 PS 637: Mod: GY | Performed by: PHYSICIAN ASSISTANT

## 2018-08-14 PROCEDURE — 12000022 ZZH R&B SNF

## 2018-08-14 PROCEDURE — 97162 PT EVAL MOD COMPLEX 30 MIN: CPT | Mod: GP | Performed by: PHYSICAL THERAPIST

## 2018-08-14 PROCEDURE — 85027 COMPLETE CBC AUTOMATED: CPT | Performed by: PHYSICIAN ASSISTANT

## 2018-08-14 PROCEDURE — 97530 THERAPEUTIC ACTIVITIES: CPT | Mod: GP | Performed by: PHYSICAL THERAPIST

## 2018-08-14 PROCEDURE — 40000225 ZZH STATISTIC SLP WARD VISIT: Performed by: SPEECH-LANGUAGE PATHOLOGIST

## 2018-08-14 PROCEDURE — 36415 COLL VENOUS BLD VENIPUNCTURE: CPT | Performed by: PHYSICIAN ASSISTANT

## 2018-08-14 PROCEDURE — 40000275 ZZH STATISTIC RCP TIME EA 10 MIN

## 2018-08-14 PROCEDURE — 25000128 H RX IP 250 OP 636: Performed by: PHYSICIAN ASSISTANT

## 2018-08-14 PROCEDURE — 40000193 ZZH STATISTIC PT WARD VISIT: Performed by: PHYSICAL THERAPIST

## 2018-08-14 PROCEDURE — 92523 SPEECH SOUND LANG COMPREHEN: CPT | Mod: GN | Performed by: SPEECH-LANGUAGE PATHOLOGIST

## 2018-08-14 PROCEDURE — 27210429 ZZH NUTRITION PRODUCT INTERMEDIATE LITER

## 2018-08-14 PROCEDURE — 99306 1ST NF CARE HIGH MDM 50: CPT | Performed by: INTERNAL MEDICINE

## 2018-08-14 PROCEDURE — A9270 NON-COVERED ITEM OR SERVICE: HCPCS | Mod: GY | Performed by: INTERNAL MEDICINE

## 2018-08-14 PROCEDURE — 40000047 ZZH STATISTIC CTO2 CONT OXYGEN TECH TIME EA 90 MIN

## 2018-08-14 PROCEDURE — 25000132 ZZH RX MED GY IP 250 OP 250 PS 637: Mod: GY | Performed by: PHYSICIAN ASSISTANT

## 2018-08-14 PROCEDURE — 00000146 ZZHCL STATISTIC GLUCOSE BY METER IP

## 2018-08-14 PROCEDURE — A9270 NON-COVERED ITEM OR SERVICE: HCPCS | Mod: GY | Performed by: PHYSICIAN ASSISTANT

## 2018-08-14 PROCEDURE — 25000132 ZZH RX MED GY IP 250 OP 250 PS 637: Mod: GY | Performed by: INTERNAL MEDICINE

## 2018-08-14 PROCEDURE — 25000125 ZZHC RX 250: Performed by: PHYSICIAN ASSISTANT

## 2018-08-14 RX ORDER — POLYETHYLENE GLYCOL 3350 17 G/17G
17 POWDER, FOR SOLUTION ORAL DAILY PRN
Status: DISCONTINUED | OUTPATIENT
Start: 2018-08-14 | End: 2018-08-18 | Stop reason: HOSPADM

## 2018-08-14 RX ORDER — GUAR GUM
1 PACKET (EA) ORAL 3 TIMES DAILY
Status: DISCONTINUED | OUTPATIENT
Start: 2018-08-14 | End: 2018-08-18 | Stop reason: HOSPADM

## 2018-08-14 RX ORDER — SENNOSIDES 8.6 MG
8.6 TABLET ORAL 2 TIMES DAILY
Status: DISCONTINUED | OUTPATIENT
Start: 2018-08-14 | End: 2018-08-14

## 2018-08-14 RX ADMIN — MULTIVITAMIN 15 ML: LIQUID ORAL at 08:11

## 2018-08-14 RX ADMIN — Medication 0.05 MG: at 08:12

## 2018-08-14 RX ADMIN — FOLIC ACID 1 MG: 1 TABLET ORAL at 08:13

## 2018-08-14 RX ADMIN — Medication 1 PACKET: at 18:18

## 2018-08-14 RX ADMIN — VALGANCICLOVIR HYDROCHLORIDE 450 MG: 50 POWDER, FOR SOLUTION ORAL at 08:13

## 2018-08-14 RX ADMIN — ACETAMINOPHEN 650 MG: 325 SOLUTION ORAL at 11:01

## 2018-08-14 RX ADMIN — MYCOPHENOLATE MOFETIL 1000 MG: 200 POWDER, FOR SUSPENSION ORAL at 08:13

## 2018-08-14 RX ADMIN — CHLORHEXIDINE GLUCONATE 0.12% ORAL RINSE 15 ML: 1.2 LIQUID ORAL at 22:04

## 2018-08-14 RX ADMIN — CHLORHEXIDINE GLUCONATE 0.12% ORAL RINSE 15 ML: 1.2 LIQUID ORAL at 14:23

## 2018-08-14 RX ADMIN — ATORVASTATIN CALCIUM 40 MG: 20 TABLET, FILM COATED ORAL at 22:03

## 2018-08-14 RX ADMIN — SULFAMETHOXAZOLE AND TRIMETHOPRIM 1 TABLET: 400; 80 TABLET ORAL at 08:13

## 2018-08-14 RX ADMIN — MYCOPHENOLATE MOFETIL 1000 MG: 200 POWDER, FOR SUSPENSION ORAL at 22:04

## 2018-08-14 RX ADMIN — QUETIAPINE FUMARATE 50 MG: 50 TABLET ORAL at 22:04

## 2018-08-14 RX ADMIN — SODIUM CHLORIDE 1000 ML: 9 INJECTION, SOLUTION INTRAVENOUS at 14:23

## 2018-08-14 RX ADMIN — Medication: at 08:15

## 2018-08-14 RX ADMIN — ESCITALOPRAM OXALATE 10 MG: 5 SOLUTION ORAL at 08:12

## 2018-08-14 RX ADMIN — CHLORHEXIDINE GLUCONATE 0.12% ORAL RINSE 15 ML: 1.2 LIQUID ORAL at 08:16

## 2018-08-14 RX ADMIN — Medication 3.5 MG: at 08:13

## 2018-08-14 RX ADMIN — Medication 20 MG: at 07:13

## 2018-08-14 RX ADMIN — BACITRACIN ZINC: 500 OINTMENT TOPICAL at 08:13

## 2018-08-14 RX ADMIN — AMOXICILLIN AND CLAVULANATE POTASSIUM 1 TABLET: 875; 125 TABLET, FILM COATED ORAL at 22:03

## 2018-08-14 RX ADMIN — ENOXAPARIN SODIUM 40 MG: 40 INJECTION SUBCUTANEOUS at 07:13

## 2018-08-14 RX ADMIN — PREDNISONE 5 MG: 5 TABLET ORAL at 08:13

## 2018-08-14 RX ADMIN — AMOXICILLIN AND CLAVULANATE POTASSIUM 1 TABLET: 875; 125 TABLET, FILM COATED ORAL at 08:11

## 2018-08-14 RX ADMIN — Medication 5 UNITS: at 08:13

## 2018-08-14 RX ADMIN — Medication 3.5 MG: at 22:04

## 2018-08-14 RX ADMIN — BACITRACIN ZINC: 500 OINTMENT TOPICAL at 22:05
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
## 2018-08-14 NOTE — PROGRESS NOTES
Therapy: Enteral  Insurance: Platinum [employer] Medicare replacement  Ded: $  Met: $    Co-Insurance: 80%  Max Out of Pocket: $4000  Met: $82    Please contact Intake with any questions, [phone] or In Basket pool,  Home Infusion ([phone]).  In reference to admission date 8/13/18 to check Enteral coverage
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
## 2018-08-14 NOTE — PROGRESS NOTES
08/14/18 1100   General Information   Patient Profile Review See Profile for full history and prior level of function   Daily Contact with Relatives or Friends Phone call;Visit   Pets Dog  (2 dogs)   Community Involvement   Community Involvement Disabled   Spiritual Practice Tenriism   [facility] ? No   Hobbies/Interests   Cards (Rummy)   Word Puzzles Word Search;Crossword  (has own here)   Music   Music Preferences (Loves music and all kinds)   Media   Computer Will use patient computer   TV / Movies TV;Movie list   Reading Magazines;Books  (will use leisure rooms to find reading material)   Sports / Physical Activities   Outdoor Activities Lawn / yard care;Hunting;Fishing;Boating   Sports/Exercise Walks   Sports Fan Baseball;Football;Basketball;Other (comments)  (mostly hockey fan)   Impression   Open to Socializing with Others Independent   Barriers to Leisure No barriers / independent   Patient, family and / or staff in agreement with Plan of Care Yes   Treatment Plan   Independent Activities Pt. is Indep on unit   Structured Groups Game groups;Social performances   Type of Intervention Independent with activity;1:1 intervention;Structured groups   One to One Therapeutic Intervention Hand massage;Volunteer   Equipment and Supplies While on Unit Movies   Assessment Assessment completed with patient. Pt. pleasant and interested in being invited to group activites during stay on unit. Pt. is Indep with mobility and will use leisure rooms during stay. Pt. agrees to notify staff of any needs from recreational services. Pt. is interested in hand massage.

## 2018-08-14 NOTE — PLAN OF CARE
Problem: TCU Patient Goals  Goal: TCU Plan of Care  The patient is pleasant, alert and oriented x 3. VSS. O2 sat 95% RA. Denies pain or discomfort at this time. Trach is intact. Inner cannula changed x 1. Tolerating tube feeding without difficulty. Bilateral incision to mouth and face intact and bacitracin applied. Independent with some cares. Sleeping comfortably at this time. Continue to monitor for comfort.
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
## 2018-08-15 LAB
GLUCOSE BLDC GLUCOMTR-MCNC: 105 MG/DL (ref 70–99)
GLUCOSE BLDC GLUCOMTR-MCNC: 125 MG/DL (ref 70–99)

## 2018-08-15 PROCEDURE — 40000983 ZZH STATISTIC HFNC ADULT NON-CPAP

## 2018-08-15 PROCEDURE — 25000131 ZZH RX MED GY IP 250 OP 636 PS 637: Mod: GY | Performed by: PHYSICIAN ASSISTANT

## 2018-08-15 PROCEDURE — 25000132 ZZH RX MED GY IP 250 OP 250 PS 637: Mod: GY | Performed by: INTERNAL MEDICINE

## 2018-08-15 PROCEDURE — 40000193 ZZH STATISTIC PT WARD VISIT: Performed by: PHYSICAL THERAPIST

## 2018-08-15 PROCEDURE — 97116 GAIT TRAINING THERAPY: CPT | Mod: GP | Performed by: PHYSICAL THERAPIST

## 2018-08-15 PROCEDURE — A9270 NON-COVERED ITEM OR SERVICE: HCPCS | Mod: GY | Performed by: INTERNAL MEDICINE

## 2018-08-15 PROCEDURE — 25000128 H RX IP 250 OP 636: Performed by: PHYSICIAN ASSISTANT

## 2018-08-15 PROCEDURE — A9270 NON-COVERED ITEM OR SERVICE: HCPCS | Mod: GY | Performed by: PHYSICIAN ASSISTANT

## 2018-08-15 PROCEDURE — 27210429 ZZH NUTRITION PRODUCT INTERMEDIATE LITER

## 2018-08-15 PROCEDURE — 92606 NON-SPEECH DEVICE SERVICE: CPT | Mod: GN | Performed by: SPEECH-LANGUAGE PATHOLOGIST

## 2018-08-15 PROCEDURE — 25000125 ZZHC RX 250: Performed by: PHYSICIAN ASSISTANT

## 2018-08-15 PROCEDURE — 25000132 ZZH RX MED GY IP 250 OP 250 PS 637: Mod: GY | Performed by: PHYSICIAN ASSISTANT

## 2018-08-15 PROCEDURE — 12000022 ZZH R&B SNF

## 2018-08-15 PROCEDURE — 00000146 ZZHCL STATISTIC GLUCOSE BY METER IP

## 2018-08-15 PROCEDURE — 40000225 ZZH STATISTIC SLP WARD VISIT: Performed by: SPEECH-LANGUAGE PATHOLOGIST

## 2018-08-15 PROCEDURE — 97110 THERAPEUTIC EXERCISES: CPT | Mod: GP | Performed by: PHYSICAL THERAPIST

## 2018-08-15 PROCEDURE — 40000275 ZZH STATISTIC RCP TIME EA 10 MIN

## 2018-08-15 RX ORDER — NALOXONE HYDROCHLORIDE 0.4 MG/ML
.1-.4 INJECTION, SOLUTION INTRAMUSCULAR; INTRAVENOUS; SUBCUTANEOUS
Status: DISCONTINUED | OUTPATIENT
Start: 2018-08-15 | End: 2018-08-18 | Stop reason: HOSPADM

## 2018-08-15 RX ORDER — OXYCODONE HCL 5 MG/5 ML
5 SOLUTION, ORAL ORAL EVERY 4 HOURS PRN
Status: COMPLETED | OUTPATIENT
Start: 2018-08-15 | End: 2018-08-15

## 2018-08-15 RX ADMIN — Medication: at 09:17

## 2018-08-15 RX ADMIN — FOLIC ACID 1 MG: 1 TABLET ORAL at 09:17

## 2018-08-15 RX ADMIN — MULTIVITAMIN 15 ML: LIQUID ORAL at 09:17

## 2018-08-15 RX ADMIN — SULFAMETHOXAZOLE AND TRIMETHOPRIM 1 TABLET: 400; 80 TABLET ORAL at 09:17

## 2018-08-15 RX ADMIN — Medication: at 21:36

## 2018-08-15 RX ADMIN — ATORVASTATIN CALCIUM 40 MG: 20 TABLET, FILM COATED ORAL at 21:35

## 2018-08-15 RX ADMIN — Medication 0.05 MG: at 09:17

## 2018-08-15 RX ADMIN — CHLORHEXIDINE GLUCONATE 0.12% ORAL RINSE 15 ML: 1.2 LIQUID ORAL at 13:12

## 2018-08-15 RX ADMIN — CHLORHEXIDINE GLUCONATE 0.12% ORAL RINSE 15 ML: 1.2 LIQUID ORAL at 01:27

## 2018-08-15 RX ADMIN — AMOXICILLIN AND CLAVULANATE POTASSIUM 1 TABLET: 875; 125 TABLET, FILM COATED ORAL at 09:17

## 2018-08-15 RX ADMIN — Medication 1 PACKET: at 21:36

## 2018-08-15 RX ADMIN — PREDNISONE 5 MG: 5 TABLET ORAL at 09:17

## 2018-08-15 RX ADMIN — OXYCODONE HYDROCHLORIDE 5 MG: 5 SOLUTION ORAL at 18:31

## 2018-08-15 RX ADMIN — ACETAMINOPHEN 650 MG: 325 SOLUTION ORAL at 09:25

## 2018-08-15 RX ADMIN — OXYCODONE HYDROCHLORIDE 5 MG: 5 SOLUTION ORAL at 13:12

## 2018-08-15 RX ADMIN — ENOXAPARIN SODIUM 40 MG: 40 INJECTION SUBCUTANEOUS at 06:11

## 2018-08-15 RX ADMIN — ACETAMINOPHEN 650 MG: 325 SOLUTION ORAL at 01:19

## 2018-08-15 RX ADMIN — Medication 1 PACKET: at 09:21

## 2018-08-15 RX ADMIN — CHLORHEXIDINE GLUCONATE 0.12% ORAL RINSE 15 ML: 1.2 LIQUID ORAL at 09:16

## 2018-08-15 RX ADMIN — Medication 3.5 MG: at 21:37

## 2018-08-15 RX ADMIN — CHLORHEXIDINE GLUCONATE 0.12% ORAL RINSE 15 ML: 1.2 LIQUID ORAL at 21:36

## 2018-08-15 RX ADMIN — QUETIAPINE FUMARATE 50 MG: 50 TABLET ORAL at 21:37

## 2018-08-15 RX ADMIN — AMOXICILLIN AND CLAVULANATE POTASSIUM 1 TABLET: 875; 125 TABLET, FILM COATED ORAL at 21:35

## 2018-08-15 RX ADMIN — MYCOPHENOLATE MOFETIL 1000 MG: 200 POWDER, FOR SUSPENSION ORAL at 21:37

## 2018-08-15 RX ADMIN — BACITRACIN ZINC: 500 OINTMENT TOPICAL at 09:16

## 2018-08-15 RX ADMIN — ESCITALOPRAM OXALATE 10 MG: 5 SOLUTION ORAL at 09:17

## 2018-08-15 RX ADMIN — Medication 3.5 MG: at 09:16

## 2018-08-15 RX ADMIN — MYCOPHENOLATE MOFETIL 1000 MG: 200 POWDER, FOR SUSPENSION ORAL at 09:18

## 2018-08-15 RX ADMIN — Medication 1 PACKET: at 13:12

## 2018-08-15 RX ADMIN — VALGANCICLOVIR HYDROCHLORIDE 450 MG: 50 POWDER, FOR SOLUTION ORAL at 09:16

## 2018-08-15 RX ADMIN — Medication 20 MG: at 06:11

## 2018-08-15 NOTE — PLAN OF CARE
Problem: Goal Outcome Summary  Goal: Goal Outcome Summary  Patient alert and oriented x 4. Patient complained of L face pain ( mouth, tongue, nose and jaw) stated new pain- tylenol and chlorhexedine swish and spit given- patient suctioned mouth after with red ruby catheter independently, unable to spit completely, drools- suctioning helpful. Trach care done, suction x 1, no secretions noted. Patient appears sleeping during rounds, patient was checked frequently this shift but patient claimed didn't sleep well but slept some. B nostrils with dried blood secretions noted- removed some with wet saline gauze. No respiratory distress noted. Patient transfer with assist of 1 staff, uses IV pole during ambulation. TF running @ 85 ml/hr infusing well via NDT-patient tolerating well. Will continue to monitor patient's status.

## 2018-08-15 NOTE — PLAN OF CARE
Problem: Goal Outcome Summary  Goal: Goal Outcome Summary  Pt BP more stable today (see vital sheet); after amb 120 ft, 4ww, SBA; increased to 114/67 (taken in sitting). asymptomatic with standing. Pt states got fluids overnight. Gave pt ok to IND go to bathroom as long as not dizzy--pt reports he was one to tell staff was dizzy yesterday. Progressing per POC

## 2018-08-15 NOTE — H&P
I have reviewed the H&P dated 8/14 by Dr. Bowman.    Increased frequency of stools overnight (8/13-8/14) with transient hypotension (SBP 70's on standing, improved to 's on lying down) and lightheadedness. Suspect orthostatic hypotension d/t volume depletion in setting of GI losses. Afebrile. WBC wnl. No abdominal pain. At risk for C.diff, but currently not meeting criteria to send stool for PCR. Suspect loose stools d/t TF.  Plan  - Give 1 L bolus of NS  - Monitor stool output  - Consider imodium if frequency of stools persists  - Consider C.diff PCR if develops fever and abdominal pain    Lito Coates PA-C  Internal Medicine [facility] Hospitalist  Pager [phone]
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
## 2018-08-15 NOTE — PLAN OF CARE
Problem: Goal Outcome Summary  Goal: Goal Outcome Summary  Rn: Declined need for suctioning.  Oral cares done per order.  No resp distress.  Using PM valve and using humidifed trach dome while it is not in place. Complain of increased pain to mouth over night, new order for 5 mg oxcodone and effective, continue to monitor. NPO and cycled tube feed overnight.  SBA transfers/ambulates to bathroom. Loose stool and urinating without problems.
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
## 2018-08-15 NOTE — PLAN OF CARE
Problem: Goal Outcome Summary  Goal: Goal Outcome Summary  RN: Pt alert and oriented, VSS. Pt is on NPO/TF. Tube feeding started at 1800 as ordered without issues. Trach care done per routine, inner canula replaced. Pt declined suctioning. Trach dome applied. Pt denies pain or SOB and has no complaints.
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
## 2018-08-15 NOTE — PLAN OF CARE
"Problem: Goal Outcome Summary  Goal: Goal Outcome Summary  Outcome: Improving  SLP: pt seen for communication - able to don/doff PMSV (he had just removed before session), encouraged pt to continue to wear.  Also educated further on how to clean at night (gentle soap/water, air dry in container), guidelines posted in room.  Per PA, ok to try assess if pt can tolerate ice chips/sips water. Pt did not want to try any water - stating \"nerve pain\" significant in mouth/face - just last day or so.  Educated on speech strategies (slow rate, face listener, reword PRN, articulate as able ).  Understood approximately 75% of conversation, first attempt.   Pt can continue to wear PMSV duriong day as tolerated (ie remove when sleeping, shortness of breath, drop inO2 sats). Also further discussed with PA -next step (downsize trach/plugging, etc). will await further recmomendations from OMF team.      "

## 2018-08-16 ENCOUNTER — TELEPHONE (OUTPATIENT)
Dept: TRANSPLANT | Facility: CLINIC | Age: 48
End: 2018-08-16

## 2018-08-16 LAB
ALBUMIN UR-MCNC: NEGATIVE MG/DL
ANION GAP SERPL CALCULATED.3IONS-SCNC: 15 MMOL/L (ref 3–14)
APPEARANCE UR: CLEAR
BILIRUB UR QL STRIP: NEGATIVE
BUN SERPL-MCNC: 34 MG/DL (ref 7–30)
CALCIUM SERPL-MCNC: 10.2 MG/DL (ref 8.5–10.1)
CHLORIDE SERPL-SCNC: 105 MMOL/L (ref 94–109)
CO2 SERPL-SCNC: 15 MMOL/L (ref 20–32)
COLOR UR AUTO: YELLOW
CREAT SERPL-MCNC: 1.14 MG/DL (ref 0.66–1.25)
ERYTHROCYTE [DISTWIDTH] IN BLOOD BY AUTOMATED COUNT: 16.3 % (ref 10–15)
GFR SERPL CREATININE-BSD FRML MDRD: 68 ML/MIN/1.7M2
GLUCOSE BLDC GLUCOMTR-MCNC: 114 MG/DL (ref 70–99)
GLUCOSE BLDC GLUCOMTR-MCNC: 141 MG/DL (ref 70–99)
GLUCOSE SERPL-MCNC: 123 MG/DL (ref 70–99)
GLUCOSE UR STRIP-MCNC: NEGATIVE MG/DL
HCT VFR BLD AUTO: 34.7 % (ref 40–53)
HGB BLD-MCNC: 11.1 G/DL (ref 13.3–17.7)
HGB UR QL STRIP: NEGATIVE
HYALINE CASTS #/AREA URNS LPF: 2 /LPF (ref 0–2)
KETONES UR STRIP-MCNC: NEGATIVE MG/DL
LEUKOCYTE ESTERASE UR QL STRIP: NEGATIVE
MCH RBC QN AUTO: 31 PG (ref 26.5–33)
MCHC RBC AUTO-ENTMCNC: 32 G/DL (ref 31.5–36.5)
MCV RBC AUTO: 97 FL (ref 78–100)
MUCOUS THREADS #/AREA URNS LPF: PRESENT /LPF
NITRATE UR QL: NEGATIVE
PH UR STRIP: 5 PH (ref 5–7)
PLATELET # BLD AUTO: 576 10E9/L (ref 150–450)
PLATELET # BLD AUTO: NORMAL 10E9/L (ref 150–450)
POTASSIUM SERPL-SCNC: 5.6 MMOL/L (ref 3.4–5.3)
RBC # BLD AUTO: 3.58 10E12/L (ref 4.4–5.9)
RBC #/AREA URNS AUTO: 1 /HPF (ref 0–2)
SODIUM SERPL-SCNC: 135 MMOL/L (ref 133–144)
SOURCE: ABNORMAL
SP GR UR STRIP: 1.02 (ref 1–1.03)
TACROLIMUS BLD-MCNC: 21.1 UG/L (ref 5–15)
TME LAST DOSE: ABNORMAL H
UROBILINOGEN UR STRIP-MCNC: NORMAL MG/DL (ref 0–2)
WBC # BLD AUTO: 11.3 10E9/L (ref 4–11)
WBC #/AREA URNS AUTO: 4 /HPF (ref 0–5)

## 2018-08-16 PROCEDURE — 84520 ASSAY OF UREA NITROGEN: CPT | Performed by: PHYSICIAN ASSISTANT

## 2018-08-16 PROCEDURE — 80051 ELECTROLYTE PANEL: CPT | Performed by: PHYSICIAN ASSISTANT

## 2018-08-16 PROCEDURE — A9270 NON-COVERED ITEM OR SERVICE: HCPCS | Mod: GY | Performed by: PHYSICIAN ASSISTANT

## 2018-08-16 PROCEDURE — 40000275 ZZH STATISTIC RCP TIME EA 10 MIN

## 2018-08-16 PROCEDURE — 40000225 ZZH STATISTIC SLP WARD VISIT: Performed by: SPEECH-LANGUAGE PATHOLOGIST

## 2018-08-16 PROCEDURE — 27210429 ZZH NUTRITION PRODUCT INTERMEDIATE LITER

## 2018-08-16 PROCEDURE — 97110 THERAPEUTIC EXERCISES: CPT | Mod: GP | Performed by: PHYSICAL THERAPIST

## 2018-08-16 PROCEDURE — 25000128 H RX IP 250 OP 636: Performed by: PHYSICIAN ASSISTANT

## 2018-08-16 PROCEDURE — 00000146 ZZHCL STATISTIC GLUCOSE BY METER IP

## 2018-08-16 PROCEDURE — 25000125 ZZHC RX 250: Performed by: PHYSICIAN ASSISTANT

## 2018-08-16 PROCEDURE — 40000193 ZZH STATISTIC PT WARD VISIT: Performed by: PHYSICAL THERAPIST

## 2018-08-16 PROCEDURE — 25000132 ZZH RX MED GY IP 250 OP 250 PS 637: Mod: GY | Performed by: PHYSICIAN ASSISTANT

## 2018-08-16 PROCEDURE — 82565 ASSAY OF CREATININE: CPT | Performed by: PHYSICIAN ASSISTANT

## 2018-08-16 PROCEDURE — 85027 COMPLETE CBC AUTOMATED: CPT | Performed by: PHYSICIAN ASSISTANT

## 2018-08-16 PROCEDURE — 40000047 ZZH STATISTIC CTO2 CONT OXYGEN TECH TIME EA 90 MIN

## 2018-08-16 PROCEDURE — 80197 ASSAY OF TACROLIMUS: CPT | Performed by: PHYSICIAN ASSISTANT

## 2018-08-16 PROCEDURE — 12000022 ZZH R&B SNF

## 2018-08-16 PROCEDURE — 92507 TX SP LANG VOICE COMM INDIV: CPT | Mod: GN | Performed by: SPEECH-LANGUAGE PATHOLOGIST

## 2018-08-16 PROCEDURE — 82310 ASSAY OF CALCIUM: CPT | Performed by: PHYSICIAN ASSISTANT

## 2018-08-16 PROCEDURE — 25000131 ZZH RX MED GY IP 250 OP 636 PS 637: Mod: GY | Performed by: PHYSICIAN ASSISTANT

## 2018-08-16 PROCEDURE — A9270 NON-COVERED ITEM OR SERVICE: HCPCS | Mod: GY | Performed by: INTERNAL MEDICINE

## 2018-08-16 PROCEDURE — 36415 COLL VENOUS BLD VENIPUNCTURE: CPT | Performed by: PHYSICIAN ASSISTANT

## 2018-08-16 PROCEDURE — 25000132 ZZH RX MED GY IP 250 OP 250 PS 637: Mod: GY | Performed by: INTERNAL MEDICINE

## 2018-08-16 PROCEDURE — 99309 SBSQ NF CARE MODERATE MDM 30: CPT | Performed by: PHYSICIAN ASSISTANT

## 2018-08-16 PROCEDURE — 82947 ASSAY GLUCOSE BLOOD QUANT: CPT | Performed by: PHYSICIAN ASSISTANT

## 2018-08-16 PROCEDURE — 81001 URINALYSIS AUTO W/SCOPE: CPT | Performed by: PHYSICIAN ASSISTANT

## 2018-08-16 RX ORDER — PREDNISONE 5 MG/ML
5 SOLUTION ORAL DAILY
Status: DISCONTINUED | OUTPATIENT
Start: 2018-08-17 | End: 2018-08-18 | Stop reason: HOSPADM

## 2018-08-16 RX ORDER — QUETIAPINE FUMARATE 50 MG/1
50 TABLET, FILM COATED ORAL AT BEDTIME
Status: DISCONTINUED | OUTPATIENT
Start: 2018-08-16 | End: 2018-08-18 | Stop reason: HOSPADM

## 2018-08-16 RX ORDER — LOPERAMIDE HYDROCHLORIDE 1 MG/5ML
2 SOLUTION ORAL 4 TIMES DAILY PRN
Status: DISCONTINUED | OUTPATIENT
Start: 2018-08-16 | End: 2018-08-18 | Stop reason: HOSPADM

## 2018-08-16 RX ORDER — SULFAMETHOXAZOLE AND TRIMETHOPRIM 400; 80 MG/1; MG/1
1 TABLET ORAL 2 TIMES DAILY
Status: DISCONTINUED | OUTPATIENT
Start: 2018-08-16 | End: 2018-08-16

## 2018-08-16 RX ORDER — FLUDROCORTISONE ACETATE 0.1 MG/1
100 TABLET ORAL DAILY
Status: DISCONTINUED | OUTPATIENT
Start: 2018-08-17 | End: 2018-08-16

## 2018-08-16 RX ORDER — SULFAMETHOXAZOLE AND TRIMETHOPRIM 400; 80 MG/1; MG/1
1 TABLET ORAL DAILY
Status: DISCONTINUED | OUTPATIENT
Start: 2018-08-17 | End: 2018-08-18 | Stop reason: HOSPADM

## 2018-08-16 RX ORDER — SODIUM BICARBONATE 650 MG/1
650 TABLET ORAL 4 TIMES DAILY
Status: DISCONTINUED | OUTPATIENT
Start: 2018-08-16 | End: 2018-08-16

## 2018-08-16 RX ORDER — AMOXICILLIN AND CLAVULANATE POTASSIUM 400; 57 MG/5ML; MG/5ML
875 POWDER, FOR SUSPENSION ORAL 2 TIMES DAILY
Status: DISCONTINUED | OUTPATIENT
Start: 2018-08-16 | End: 2018-08-18 | Stop reason: HOSPADM

## 2018-08-16 RX ORDER — LOPERAMIDE HCL 2 MG
2 CAPSULE ORAL 4 TIMES DAILY PRN
Status: DISCONTINUED | OUTPATIENT
Start: 2018-08-16 | End: 2018-08-16

## 2018-08-16 RX ORDER — SODIUM BICARBONATE 650 MG/1
650 TABLET ORAL 4 TIMES DAILY
Status: DISCONTINUED | OUTPATIENT
Start: 2018-08-16 | End: 2018-08-18 | Stop reason: HOSPADM

## 2018-08-16 RX ADMIN — Medication 1 PACKET: at 10:35

## 2018-08-16 RX ADMIN — Medication 1 PACKET: at 13:41

## 2018-08-16 RX ADMIN — CHLORHEXIDINE GLUCONATE 0.12% ORAL RINSE 15 ML: 1.2 LIQUID ORAL at 20:51

## 2018-08-16 RX ADMIN — AMOXICILLIN AND CLAVULANATE POTASSIUM 875 MG: 400; 57 POWDER, FOR SUSPENSION ORAL at 20:48

## 2018-08-16 RX ADMIN — Medication 20 MG: at 05:20

## 2018-08-16 RX ADMIN — AMOXICILLIN AND CLAVULANATE POTASSIUM 1 TABLET: 875; 125 TABLET, FILM COATED ORAL at 09:22

## 2018-08-16 RX ADMIN — ACETAMINOPHEN 650 MG: 325 SOLUTION ORAL at 10:36

## 2018-08-16 RX ADMIN — ESCITALOPRAM OXALATE 20 MG: 5 SOLUTION ORAL at 09:25

## 2018-08-16 RX ADMIN — Medication 3.5 MG: at 09:25

## 2018-08-16 RX ADMIN — CHLORHEXIDINE GLUCONATE 0.12% ORAL RINSE 15 ML: 1.2 LIQUID ORAL at 09:26

## 2018-08-16 RX ADMIN — ENOXAPARIN SODIUM 40 MG: 40 INJECTION SUBCUTANEOUS at 05:20

## 2018-08-16 RX ADMIN — ATORVASTATIN CALCIUM 40 MG: 20 TABLET, FILM COATED ORAL at 20:50

## 2018-08-16 RX ADMIN — SULFAMETHOXAZOLE AND TRIMETHOPRIM 1 TABLET: 400; 80 TABLET ORAL at 09:22

## 2018-08-16 RX ADMIN — Medication: at 09:39

## 2018-08-16 RX ADMIN — SODIUM CHLORIDE 1000 ML: 9 INJECTION, SOLUTION INTRAVENOUS at 14:21

## 2018-08-16 RX ADMIN — MYCOPHENOLATE MOFETIL 1000 MG: 200 POWDER, FOR SUSPENSION ORAL at 09:25

## 2018-08-16 RX ADMIN — SODIUM BICARBONATE 650 MG TABLET 650 MG: at 16:56

## 2018-08-16 RX ADMIN — BACITRACIN ZINC: 500 OINTMENT TOPICAL at 09:33

## 2018-08-16 RX ADMIN — VALGANCICLOVIR HYDROCHLORIDE 450 MG: 50 POWDER, FOR SOLUTION ORAL at 09:25

## 2018-08-16 RX ADMIN — QUETIAPINE FUMARATE 50 MG: 50 TABLET ORAL at 20:52

## 2018-08-16 RX ADMIN — Medication 0.05 MG: at 09:22

## 2018-08-16 RX ADMIN — MYCOPHENOLATE MOFETIL 1000 MG: 200 POWDER, FOR SUSPENSION ORAL at 20:51

## 2018-08-16 RX ADMIN — Medication: at 20:50

## 2018-08-16 RX ADMIN — OXYCODONE HYDROCHLORIDE 2.5 MG: 5 TABLET ORAL at 20:52

## 2018-08-16 RX ADMIN — Medication 1 PACKET: at 20:51

## 2018-08-16 RX ADMIN — SODIUM BICARBONATE 650 MG TABLET 650 MG: at 20:51

## 2018-08-16 RX ADMIN — FOLIC ACID 1 MG: 1 TABLET ORAL at 09:22

## 2018-08-16 RX ADMIN — MULTIVITAMIN 15 ML: LIQUID ORAL at 09:26

## 2018-08-16 RX ADMIN — CHLORHEXIDINE GLUCONATE 0.12% ORAL RINSE 15 ML: 1.2 LIQUID ORAL at 13:41

## 2018-08-16 RX ADMIN — LOPERAMIDE HYDROCHLORIDE 2 MG: 2 CAPSULE ORAL at 17:00

## 2018-08-16 RX ADMIN — PREDNISONE 5 MG: 5 TABLET ORAL at 09:22

## 2018-08-16 RX ADMIN — SODIUM BICARBONATE 650 MG TABLET 650 MG: at 14:20

## 2018-08-16 NOTE — PLAN OF CARE
Problem: Goal Outcome Summary  Goal: Goal Outcome Summary  Pt seen for communication tx; Pt continues to marge and doff the PMSV  Independently and tolerates wearing it throughout the day without difficulty. When this SLP entered today- pt already had been wearing it for 30 mins- O2 sats continued to be maintained at 100% with it on- pt able to verbalize that he needs to remove it when sleeping or napping and that he should remove it if he becomes SOB or increased WOB or O2 sats drop. Pt also able ot verbalize the guidelines for clearing the PMSV- gentle soap and water and air dry in container. Completed some gentle oral motor excs with pt- achieving some labial ROM- retraction and protusion but having difficulty fully closing lips-- resulting in some drooling; tongue ROM also impaired.Completed use od dysarthria strategies: slow rate, exaggerated articulation, face the listener, pausing at sentence level- pt able to follow clinician model with these strategies. Noting that the sounds that are giving pt the most difficulty are bilabials: w, m, b,p and r- continue to target closure with these. Pt at conversational level is judged to be at 75% accuracy with intellgibiilty.  Although PA had OK'd for us to assess for ice chips or small sips of water- pt is still declining any po trials 2/2 ongoing nerve pain. Pt can continue to wear PMSV during day as tolerated (ie remove when sleeping, shortness of breath, drop inO2 sats). SLP had discussed with PA yesterday  -next step (downsize trach/plugging, etc). will await further recmomendations from OMF team- pt has appointment in 1 week

## 2018-08-16 NOTE — PLAN OF CARE
Problem: Goal Outcome Summary  Goal: Goal Outcome Summary  Patient alert and oriented x 4. Denies pain and discomfort. Trach intact, trach care done, inner cannula changed. No respiratory distress noted. TF running @ 85 ml/hr infusing,patient tolerating well via NDT. Patient independent in the room with transfer and ambulation. Patient uses urinal and staff to empty. Patient slept well this shift per patient's report. Will continue with current plan of care.

## 2018-08-16 NOTE — PROGRESS NOTES
Writer talked to Reva with [facility] ([phone])and she wanted some additional information on the pt's plan. Writer stated that we round tomorrow and then would have more information. Reva stated that she would follow up next week but that [facility]  will take the pt on for nursing cares and other cares as needed like OT/SLP. SW will continue to follow and assist as needed.    GERRY Pham  [facility]   P: [phone]  Pgr: [phone]

## 2018-08-16 NOTE — PHARMACY-TCU REVIEW OF H&P
I have reviewed this patient's TCU admission History & Physical for medication related changes/recommendations identified by the admitting provider.  The following recommendations/changes have been identified: the patient is and has been on Bactrim sliding scale - not DS as mentioned in H&P.  These modifications/changes have been put into effect or appropriately addressed by the provider.    Peg Arizmendi, MayurD, BCPS August 16, 2018

## 2018-08-16 NOTE — PLAN OF CARE
Problem: Goal Outcome Summary  Goal: Goal Outcome Summary  BP dropping when sitting so deferred standing today. Supine ex in AM and seated ex in PM. Potential transfer back to hospital for BP issues per nursing.

## 2018-08-16 NOTE — PLAN OF CARE
Pt is alert and oriented x4. Independent in room with walker. Continent of bowel and bladder. Denies SOB and chest pain during cares. Declined need for suctioning. Replaced inner cannula. Oral cares completed this shift. Administered acetaminophen this shift; pt requesting for oxycodone due to increased mouth pain. However, BP were 78/48, 83/55, 78/57. Flushed with 200 ml free water, went up to 80/59. Informed PA re pt's current status; administered 0.9% sodium chloride bolus 1000 ml 300 ml/hr via right PIV per orders, running with no signs and symptoms of concern. Will continue to monitor.     Vital signs:  Temp: 96.9  F (36.1  C) Temp src: Axillary BP: 110/72 Pulse: 92 Heart Rate: 93 Resp: 16 SpO2: 100 % O2 Device: Dejamore
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
## 2018-08-17 ENCOUNTER — TELEPHONE (OUTPATIENT)
Dept: TRANSPLANT | Facility: CLINIC | Age: 48
End: 2018-08-17

## 2018-08-17 LAB
ANION GAP SERPL CALCULATED.3IONS-SCNC: 9 MMOL/L (ref 3–14)
BUN SERPL-MCNC: 33 MG/DL (ref 7–30)
CALCIUM SERPL-MCNC: 9.5 MG/DL (ref 8.5–10.1)
CHLORIDE SERPL-SCNC: 108 MMOL/L (ref 94–109)
CO2 SERPL-SCNC: 19 MMOL/L (ref 20–32)
CREAT SERPL-MCNC: 1.23 MG/DL (ref 0.66–1.25)
ERYTHROCYTE [DISTWIDTH] IN BLOOD BY AUTOMATED COUNT: 16 % (ref 10–15)
GFR SERPL CREATININE-BSD FRML MDRD: 62 ML/MIN/1.7M2
GLUCOSE BLDC GLUCOMTR-MCNC: 114 MG/DL (ref 70–99)
GLUCOSE BLDC GLUCOMTR-MCNC: 116 MG/DL (ref 70–99)
GLUCOSE SERPL-MCNC: 112 MG/DL (ref 70–99)
HCT VFR BLD AUTO: 32.7 % (ref 40–53)
HGB BLD-MCNC: 10.5 G/DL (ref 13.3–17.7)
MCH RBC QN AUTO: 30.9 PG (ref 26.5–33)
MCHC RBC AUTO-ENTMCNC: 32.1 G/DL (ref 31.5–36.5)
MCV RBC AUTO: 96 FL (ref 78–100)
PLATELET # BLD AUTO: 459 10E9/L (ref 150–450)
POTASSIUM SERPL-SCNC: 5.1 MMOL/L (ref 3.4–5.3)
RBC # BLD AUTO: 3.4 10E12/L (ref 4.4–5.9)
SODIUM SERPL-SCNC: 136 MMOL/L (ref 133–144)
TACROLIMUS BLD-MCNC: 13.4 UG/L (ref 5–15)
TME LAST DOSE: NORMAL H
WBC # BLD AUTO: 11.2 10E9/L (ref 4–11)

## 2018-08-17 PROCEDURE — 27210429 ZZH NUTRITION PRODUCT INTERMEDIATE LITER

## 2018-08-17 PROCEDURE — 12000022 ZZH R&B SNF

## 2018-08-17 PROCEDURE — 25000128 H RX IP 250 OP 636: Performed by: INTERNAL MEDICINE

## 2018-08-17 PROCEDURE — 80048 BASIC METABOLIC PNL TOTAL CA: CPT | Performed by: PHYSICIAN ASSISTANT

## 2018-08-17 PROCEDURE — A9270 NON-COVERED ITEM OR SERVICE: HCPCS | Mod: GY | Performed by: PHYSICIAN ASSISTANT

## 2018-08-17 PROCEDURE — 85027 COMPLETE CBC AUTOMATED: CPT | Performed by: PHYSICIAN ASSISTANT

## 2018-08-17 PROCEDURE — 25000128 H RX IP 250 OP 636: Performed by: PHYSICIAN ASSISTANT

## 2018-08-17 PROCEDURE — 25000132 ZZH RX MED GY IP 250 OP 250 PS 637: Mod: GY | Performed by: PHYSICIAN ASSISTANT

## 2018-08-17 PROCEDURE — 99316 NF DSCHRG MGMT 30 MIN+: CPT | Performed by: PHYSICIAN ASSISTANT

## 2018-08-17 PROCEDURE — 40000275 ZZH STATISTIC RCP TIME EA 10 MIN

## 2018-08-17 PROCEDURE — 25000131 ZZH RX MED GY IP 250 OP 636 PS 637: Mod: GY | Performed by: PHYSICIAN ASSISTANT

## 2018-08-17 PROCEDURE — 80197 ASSAY OF TACROLIMUS: CPT | Performed by: INTERNAL MEDICINE

## 2018-08-17 PROCEDURE — 36415 COLL VENOUS BLD VENIPUNCTURE: CPT | Performed by: PHYSICIAN ASSISTANT

## 2018-08-17 PROCEDURE — 00000146 ZZHCL STATISTIC GLUCOSE BY METER IP

## 2018-08-17 RX ORDER — SODIUM BICARBONATE 650 MG/1
650 TABLET ORAL 3 TIMES DAILY
DISCHARGE
Start: 2018-08-17 | End: 2018-09-04

## 2018-08-17 RX ORDER — SODIUM CHLORIDE, SODIUM LACTATE, POTASSIUM CHLORIDE, CALCIUM CHLORIDE 600; 310; 30; 20 MG/100ML; MG/100ML; MG/100ML; MG/100ML
INJECTION, SOLUTION INTRAVENOUS
Status: DISPENSED
Start: 2018-08-17 | End: 2018-08-17

## 2018-08-17 RX ORDER — SODIUM CHLORIDE 9 MG/ML
INJECTION, SOLUTION INTRAVENOUS CONTINUOUS
Status: DISCONTINUED | OUTPATIENT
Start: 2018-08-17 | End: 2018-08-18 | Stop reason: HOSPADM

## 2018-08-17 RX ORDER — LOPERAMIDE HYDROCHLORIDE 1 MG/5ML
2 SOLUTION ORAL 4 TIMES DAILY PRN
Qty: 200 ML | Status: ON HOLD | DISCHARGE
Start: 2018-08-17 | End: 2018-08-26

## 2018-08-17 RX ORDER — GUAR GUM
1 PACKET (EA) ORAL 3 TIMES DAILY
Status: ON HOLD | DISCHARGE
Start: 2018-08-17 | End: 2018-08-26

## 2018-08-17 RX ORDER — OXYCODONE HYDROCHLORIDE 5 MG/1
2.5 TABLET ORAL EVERY 6 HOURS PRN
Qty: 6 TABLET | Refills: 0 | Status: ON HOLD
Start: 2018-08-17 | End: 2018-08-27

## 2018-08-17 RX ORDER — SODIUM CHLORIDE, SODIUM LACTATE, POTASSIUM CHLORIDE, CALCIUM CHLORIDE 600; 310; 30; 20 MG/100ML; MG/100ML; MG/100ML; MG/100ML
INJECTION, SOLUTION INTRAVENOUS CONTINUOUS
Status: DISCONTINUED | OUTPATIENT
Start: 2018-08-17 | End: 2018-08-17

## 2018-08-17 RX ORDER — HYDROMORPHONE HYDROCHLORIDE 1 MG/ML
1-2 SOLUTION ORAL EVERY 4 HOURS PRN
Status: DISCONTINUED | OUTPATIENT
Start: 2018-08-17 | End: 2018-08-18 | Stop reason: HOSPADM

## 2018-08-17 RX ADMIN — SODIUM CHLORIDE, POTASSIUM CHLORIDE, SODIUM LACTATE AND CALCIUM CHLORIDE 1000 ML: 600; 310; 30; 20 INJECTION, SOLUTION INTRAVENOUS at 06:12

## 2018-08-17 RX ADMIN — SODIUM CHLORIDE, POTASSIUM CHLORIDE, SODIUM LACTATE AND CALCIUM CHLORIDE: 600; 310; 30; 20 INJECTION, SOLUTION INTRAVENOUS at 08:31

## 2018-08-17 RX ADMIN — BACITRACIN ZINC: 500 OINTMENT TOPICAL at 22:14

## 2018-08-17 RX ADMIN — Medication 2 MG: at 22:09

## 2018-08-17 RX ADMIN — CHLORHEXIDINE GLUCONATE 0.12% ORAL RINSE 15 ML: 1.2 LIQUID ORAL at 19:11

## 2018-08-17 RX ADMIN — ENOXAPARIN SODIUM 40 MG: 40 INJECTION SUBCUTANEOUS at 06:22

## 2018-08-17 RX ADMIN — Medication: at 22:16

## 2018-08-17 RX ADMIN — CHLORHEXIDINE GLUCONATE 0.12% ORAL RINSE 15 ML: 1.2 LIQUID ORAL at 09:57

## 2018-08-17 RX ADMIN — BACITRACIN ZINC: 500 OINTMENT TOPICAL at 10:53

## 2018-08-17 RX ADMIN — CHLORHEXIDINE GLUCONATE 0.12% ORAL RINSE 15 ML: 1.2 LIQUID ORAL at 14:27

## 2018-08-17 RX ADMIN — MYCOPHENOLATE MOFETIL 1000 MG: 200 POWDER, FOR SUSPENSION ORAL at 22:09

## 2018-08-17 RX ADMIN — SODIUM CHLORIDE: 9 INJECTION, SOLUTION INTRAVENOUS at 09:57

## 2018-08-17 RX ADMIN — HYDROMORPHONE HYDROCHLORIDE 2 MG: 1 SOLUTION ORAL at 18:36
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
## 2018-08-17 NOTE — PLAN OF CARE
1700: Pt is alert and oriented x4. Denies SOB and chest pain during cares. Pt denies need for suctioning this shift. Trach cares completed, inner cannula changed. Pt is currently NPO d/t trach and recent mandibular/maxilla fracture. ND tube was accidentally pulled out by pt at 0240 on [id number]. Pt reported pain to mouth and anxiety; PA aware. SpO2 at 100%, vitals stable. IV NS is running at 125 ml/hr. Pt is ready for transfer, pending room assignment.     1930: Administered hydromorphone 2 mg orally, per PA orders of which pt tolerated about 1 mg as the other 1 mg dribbled from mouth as pt is having difficulty swallowing. Administered Cellcept and Tacrolimus orally, per PA orders. Pt was able to tolerate about half of prescribed meds and the remaining half continues to dribble from pt's mouth.  IV NS was running at 125 ml/hr, pt complained of burning sensation. IV site swollen, stopped IV NS. Flyer came; new IV site is patent and currently running continuous  ml/hr. Continue to monitor.     Temp: 97.1  F (36.2  C) Temp src: Axillary BP: 94/54   Heart Rate: 89 Resp: 14 SpO2: 100 % O2 Device: Trach dome Oxygen Delivery: 6 LPM

## 2018-08-17 NOTE — UTILIZATION REVIEW
Pain Interview  Admission, DC    1. Have you had pain or hurting at any time in the last 5 days?  Yes  2. How much of the time have you experienced pain or hurting over the last 5 days? Occasionally  3. Over the past 5 days, has pain made it hard for you to sleep at night?  No  4. Over the past 5 days, have you limited your day-to-day activities because of pain?No  5. Rate pain intensity: Numeric 6

## 2018-08-17 NOTE — PLAN OF CARE
Was notified that patient's tacro level from this AM is critical high of level 21.1. Paged house officer to see if tonight's dose needs to be held with follow up tomorrow. Pending call return for orders.

## 2018-08-17 NOTE — PLAN OF CARE
Problem: Goal Outcome Summary  Goal: Goal Outcome Summary  Outcome: No Change  RN: Around 0240, pt called to report to this nurse that his feeding tube is out. Found pt's feeding tube completely out. Per pt, he was fixing his trach dome and as he pulled the string,  He accidentally pulled out his feeding tube as well. A little bit of pain since it was sutured in place. Blood noted on patients L nares, cheeks and pillow case. Cleaned him up and no active bleeding noted. Reassurance provided as pt admit of being so scared when he reliazed he pulled it out. Dr. Gracie Romo paged and updated. No new order for now and will figure out in the morning. Pt agreed to this. Pt is NPO d/t trach and recent mandibular/maxilla fracture. Pt is not diabetic. Would probably need tube replacement today as pt's appointment with OMFS is still in a week where diet advancement will be decided. Note left to providers. Trach intact with trach dome in place. Denies need for suctioning at this time. Will do trach cares once awake. Appear to be sleeping/resting. Continue with plan of care.    0535: Dr. Bowman called for orders: Check BP, BG and to give IVF to start with 1 L Bolus or LR followed by IV maintenance of 125 ml/hr. BP= 89/56 mmHg, BG= 114. Lactated Ringer bolus currently infusing at 500 ml/hr via PIV x 2H- until 0815. AM nurse informed to follow- up. Pt declined need for trach care this shift. Allowed the nurse to check the area. Area is clean but slightly reddened. It has the protective foam in place between skin and trach. Trach dome in place. Continue with plan of care.
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
## 2018-08-17 NOTE — PLAN OF CARE
Problem: Goal Outcome Summary  Goal: Goal Outcome Summary  Outcome: No Change  Alert and oriented x4. Denies any pain. Denies SOB.Trach cares completed, inner cannula changed. Patient did not require suctioning this shift.  Patient's NG tube out. PA aware. Patient to transfer back to the hospital sometime this afternoon. On continuous IV NS at 125 ml/hr.

## 2018-08-17 NOTE — TELEPHONE ENCOUNTER
FK 21.1 this AM.  Spoke to Hilda at rehab, confirmed this is accurate trough level.  Discussed medications, no recent changes with the exception of addition of Augmentin.  Spoke to Dr. Martinez, questioning if this should just be repeated - reviewed chemistry panel, K up to 5.6, creatinine up to 1.14 (from 0.88 two days ago).  Per Dr. Martinez, hold Prograf tonight & repeat level in the AM.  Will have primary coordinator f/u for dose adjustment tomorrow.

## 2018-08-17 NOTE — PROGRESS NOTES
NG accidentally pulled out.   bp soft.   Check blood glucose  RL 1L bolus f/by /hr.     B/P: 89/56, T: 96.8, P: 102, R: 18    DMelvin w DONALD Romo.

## 2018-08-17 NOTE — TELEPHONE ENCOUNTER
RNCC called over to TCU to discuss dose change for tacrolimus due to level of >13. Dose change HAS NOT YET BEEN MADE.  Spoke with LAVELLE Evans who informed me that NJ was accidentally removed and thus the patient was going to be admitted to [facility] - medicine service with referral to ENT for replacement of NJ and referral to transplant nephrology for dosing of IV tacrolimus.    RNCC advised that patient be admitted to transplant surgery and notified LAVELLE Hua.  LAVELLE Evans agreed.

## 2018-08-17 NOTE — PLAN OF CARE
No call back from [facility] yet. [facility] follow up of critical lab value with tacro medication. Pending call back at this time.

## 2018-08-17 NOTE — PLAN OF CARE
"Problem: Goal Outcome Summary  Goal: Goal Outcome Summary  Cancel PT session: feeding tube came out last night; pt on IV fluids; pt declines therapy--states is \"scared\" to      "

## 2018-08-17 NOTE — PLAN OF CARE
Call back received from nephrology txp coordinator who advised staff to hold dose tonight and recheck level tomorrow. See orders. txp will follow up in AM.

## 2018-08-18 ENCOUNTER — APPOINTMENT (OUTPATIENT)
Dept: GENERAL RADIOLOGY | Facility: CLINIC | Age: 48
End: 2018-08-18
Attending: SURGERY
Payer: MEDICARE

## 2018-08-18 ENCOUNTER — HOSPITAL ENCOUNTER (OUTPATIENT)
Facility: CLINIC | Age: 48
Setting detail: OBSERVATION
Discharge: NURSING, REHAB OR LONG-TERM CARE | End: 2018-08-21
Attending: SURGERY | Admitting: SURGERY
Payer: MEDICARE

## 2018-08-18 VITALS
HEART RATE: 98 BPM | SYSTOLIC BLOOD PRESSURE: 108 MMHG | TEMPERATURE: 96.5 F | BODY MASS INDEX: 16.47 KG/M2 | RESPIRATION RATE: 16 BRPM | DIASTOLIC BLOOD PRESSURE: 62 MMHG | WEIGHT: 114.8 LBS | OXYGEN SATURATION: 100 %

## 2018-08-18 DIAGNOSIS — I95.9 HYPOTENSION, UNSPECIFIED HYPOTENSION TYPE: ICD-10-CM

## 2018-08-18 DIAGNOSIS — K59.09 OTHER CONSTIPATION: ICD-10-CM

## 2018-08-18 DIAGNOSIS — F31.9 BIPOLAR AFFECTIVE DISORDER, REMISSION STATUS UNSPECIFIED (H): ICD-10-CM

## 2018-08-18 DIAGNOSIS — R12 HEARTBURN: ICD-10-CM

## 2018-08-18 DIAGNOSIS — E78.5 HYPERLIPIDEMIA, UNSPECIFIED HYPERLIPIDEMIA TYPE: ICD-10-CM
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
## 2018-08-18 DIAGNOSIS — R19.7 DIARRHEA, UNSPECIFIED TYPE: ICD-10-CM

## 2018-08-18 DIAGNOSIS — E87.1 HYPONATREMIA: ICD-10-CM

## 2018-08-18 DIAGNOSIS — Z94.0 KIDNEY TRANSPLANT RECIPIENT: Primary | ICD-10-CM

## 2018-08-18 PROBLEM — Z46.59 ENCOUNTER FOR NASOJEJUNAL (NJ) TUBE PLACEMENT: Status: ACTIVE | Noted: 2018-08-18

## 2018-08-18 LAB
ALBUMIN SERPL-MCNC: 3.1 G/DL (ref 3.4–5)
ALP SERPL-CCNC: 103 U/L (ref 40–150)
ALT SERPL W P-5'-P-CCNC: 26 U/L (ref 0–70)
ANION GAP SERPL CALCULATED.3IONS-SCNC: 12 MMOL/L (ref 3–14)
ANION GAP SERPL CALCULATED.3IONS-SCNC: 9 MMOL/L (ref 3–14)
AST SERPL W P-5'-P-CCNC: 17 U/L (ref 0–45)
BILIRUB SERPL-MCNC: 0.6 MG/DL (ref 0.2–1.3)
BUN SERPL-MCNC: 17 MG/DL (ref 7–30)
BUN SERPL-MCNC: 19 MG/DL (ref 7–30)
CALCIUM SERPL-MCNC: 9 MG/DL (ref 8.5–10.1)
CALCIUM SERPL-MCNC: 9.3 MG/DL (ref 8.5–10.1)
CHLORIDE SERPL-SCNC: 106 MMOL/L (ref 94–109)
CHLORIDE SERPL-SCNC: 109 MMOL/L (ref 94–109)
CO2 SERPL-SCNC: 18 MMOL/L (ref 20–32)
CO2 SERPL-SCNC: 20 MMOL/L (ref 20–32)
CREAT SERPL-MCNC: 0.88 MG/DL (ref 0.66–1.25)
CREAT SERPL-MCNC: 0.88 MG/DL (ref 0.66–1.25)
ERYTHROCYTE [DISTWIDTH] IN BLOOD BY AUTOMATED COUNT: 15.5 % (ref 10–15)
GFR SERPL CREATININE-BSD FRML MDRD: >90 ML/MIN/1.7M2
GFR SERPL CREATININE-BSD FRML MDRD: >90 ML/MIN/1.7M2
GLUCOSE SERPL-MCNC: 79 MG/DL (ref 70–99)
GLUCOSE SERPL-MCNC: 90 MG/DL (ref 70–99)
HCT VFR BLD AUTO: 27.4 % (ref 40–53)
HGB BLD-MCNC: 9.2 G/DL (ref 13.3–17.7)
INR PPP: 1.17 (ref 0.86–1.14)
MAGNESIUM SERPL-MCNC: 1.3 MG/DL (ref 1.6–2.3)
MCH RBC QN AUTO: 31.7 PG (ref 26.5–33)
MCHC RBC AUTO-ENTMCNC: 33.6 G/DL (ref 31.5–36.5)
MCV RBC AUTO: 95 FL (ref 78–100)
PHOSPHATE SERPL-MCNC: 2.8 MG/DL (ref 2.5–4.5)
PLATELET # BLD AUTO: 301 10E9/L (ref 150–450)
POTASSIUM SERPL-SCNC: 4.7 MMOL/L (ref 3.4–5.3)
POTASSIUM SERPL-SCNC: 5.1 MMOL/L (ref 3.4–5.3)
PROT SERPL-MCNC: 6.5 G/DL (ref 6.8–8.8)
RBC # BLD AUTO: 2.9 10E12/L (ref 4.4–5.9)
SODIUM SERPL-SCNC: 136 MMOL/L (ref 133–144)
SODIUM SERPL-SCNC: 139 MMOL/L (ref 133–144)
TACROLIMUS BLD-MCNC: 8.3 UG/L (ref 5–15)
TME LAST DOSE: NORMAL H
WBC # BLD AUTO: 7.4 10E9/L (ref 4–11)

## 2018-08-18 PROCEDURE — 40000225 ZZH STATISTIC SLP WARD VISIT: Performed by: SPEECH-LANGUAGE PATHOLOGIST

## 2018-08-18 PROCEDURE — 25000125 ZZHC RX 250: Performed by: STUDENT IN AN ORGANIZED HEALTH CARE EDUCATION/TRAINING PROGRAM

## 2018-08-18 PROCEDURE — 80048 BASIC METABOLIC PNL TOTAL CA: CPT | Performed by: PHYSICIAN ASSISTANT

## 2018-08-18 PROCEDURE — 25000128 H RX IP 250 OP 636: Performed by: STUDENT IN AN ORGANIZED HEALTH CARE EDUCATION/TRAINING PROGRAM

## 2018-08-18 PROCEDURE — 92507 TX SP LANG VOICE COMM INDIV: CPT | Mod: GN | Performed by: SPEECH-LANGUAGE PATHOLOGIST

## 2018-08-18 PROCEDURE — 36415 COLL VENOUS BLD VENIPUNCTURE: CPT | Performed by: PHYSICIAN ASSISTANT

## 2018-08-18 PROCEDURE — A9270 NON-COVERED ITEM OR SERVICE: HCPCS | Mod: GY | Performed by: STUDENT IN AN ORGANIZED HEALTH CARE EDUCATION/TRAINING PROGRAM

## 2018-08-18 PROCEDURE — 80053 COMPREHEN METABOLIC PANEL: CPT | Performed by: STUDENT IN AN ORGANIZED HEALTH CARE EDUCATION/TRAINING PROGRAM

## 2018-08-18 PROCEDURE — 96376 TX/PRO/DX INJ SAME DRUG ADON: CPT

## 2018-08-18 PROCEDURE — 25000132 ZZH RX MED GY IP 250 OP 250 PS 637: Mod: GY | Performed by: STUDENT IN AN ORGANIZED HEALTH CARE EDUCATION/TRAINING PROGRAM

## 2018-08-18 PROCEDURE — 80197 ASSAY OF TACROLIMUS: CPT | Performed by: STUDENT IN AN ORGANIZED HEALTH CARE EDUCATION/TRAINING PROGRAM

## 2018-08-18 PROCEDURE — 96374 THER/PROPH/DIAG INJ IV PUSH: CPT

## 2018-08-18 PROCEDURE — 27210429 ZZH NUTRITION PRODUCT INTERMEDIATE LITER

## 2018-08-18 PROCEDURE — 74018 RADEX ABDOMEN 1 VIEW: CPT | Mod: 77

## 2018-08-18 PROCEDURE — 25000131 ZZH RX MED GY IP 250 OP 636 PS 637: Mod: GY | Performed by: PHYSICIAN ASSISTANT

## 2018-08-18 PROCEDURE — 25000128 H RX IP 250 OP 636: Performed by: PHYSICIAN ASSISTANT

## 2018-08-18 PROCEDURE — 85027 COMPLETE CBC AUTOMATED: CPT | Performed by: STUDENT IN AN ORGANIZED HEALTH CARE EDUCATION/TRAINING PROGRAM

## 2018-08-18 PROCEDURE — 96375 TX/PRO/DX INJ NEW DRUG ADDON: CPT

## 2018-08-18 PROCEDURE — 85610 PROTHROMBIN TIME: CPT | Performed by: STUDENT IN AN ORGANIZED HEALTH CARE EDUCATION/TRAINING PROGRAM

## 2018-08-18 PROCEDURE — 36415 COLL VENOUS BLD VENIPUNCTURE: CPT | Performed by: STUDENT IN AN ORGANIZED HEALTH CARE EDUCATION/TRAINING PROGRAM

## 2018-08-18 PROCEDURE — 84100 ASSAY OF PHOSPHORUS: CPT | Performed by: STUDENT IN AN ORGANIZED HEALTH CARE EDUCATION/TRAINING PROGRAM

## 2018-08-18 PROCEDURE — G0378 HOSPITAL OBSERVATION PER HR: HCPCS

## 2018-08-18 PROCEDURE — 27210338 ZZH CIRCUIT HUMID FACE/TRACH MSK: Performed by: OPTOMETRIST

## 2018-08-18 PROCEDURE — 40000047 ZZH STATISTIC CTO2 CONT OXYGEN TECH TIME EA 90 MIN: Performed by: OPTOMETRIST

## 2018-08-18 PROCEDURE — 83735 ASSAY OF MAGNESIUM: CPT | Performed by: STUDENT IN AN ORGANIZED HEALTH CARE EDUCATION/TRAINING PROGRAM

## 2018-08-18 PROCEDURE — 00220000 ZZH SNF RUG CODE OPNP

## 2018-08-18 PROCEDURE — 25000131 ZZH RX MED GY IP 250 OP 636 PS 637: Performed by: STUDENT IN AN ORGANIZED HEALTH CARE EDUCATION/TRAINING PROGRAM

## 2018-08-18 PROCEDURE — 25000132 ZZH RX MED GY IP 250 OP 250 PS 637: Mod: GY | Performed by: PHYSICIAN ASSISTANT

## 2018-08-18 PROCEDURE — 74018 RADEX ABDOMEN 1 VIEW: CPT

## 2018-08-18 PROCEDURE — A9270 NON-COVERED ITEM OR SERVICE: HCPCS | Mod: GY | Performed by: PHYSICIAN ASSISTANT

## 2018-08-18 RX ORDER — VALGANCICLOVIR HYDROCHLORIDE 50 MG/ML
450 POWDER, FOR SOLUTION ORAL DAILY
Status: DISCONTINUED | OUTPATIENT
Start: 2018-08-19 | End: 2018-08-21

## 2018-08-18 RX ORDER — GUAR GUM
1 PACKET (EA) ORAL 3 TIMES DAILY
Status: DISCONTINUED | OUTPATIENT
Start: 2018-08-18 | End: 2018-08-21 | Stop reason: HOSPADM

## 2018-08-18 RX ORDER — POLYETHYLENE GLYCOL 3350 17 G/17G
17 POWDER, FOR SOLUTION ORAL 2 TIMES DAILY
Status: DISCONTINUED | OUTPATIENT
Start: 2018-08-18 | End: 2018-08-21 | Stop reason: HOSPADM

## 2018-08-18 RX ORDER — SODIUM BICARBONATE 650 MG/1
650 TABLET ORAL 3 TIMES DAILY
Status: DISCONTINUED | OUTPATIENT
Start: 2018-08-18 | End: 2018-08-21 | Stop reason: HOSPADM

## 2018-08-18 RX ORDER — HYDROMORPHONE HCL/0.9% NACL/PF 0.2MG/0.2
0.2 SYRINGE (ML) INTRAVENOUS
Status: DISCONTINUED | OUTPATIENT
Start: 2018-08-18 | End: 2018-08-20

## 2018-08-18 RX ORDER — LOPERAMIDE HYDROCHLORIDE 1 MG/5ML
2 SOLUTION ORAL 4 TIMES DAILY PRN
Status: DISCONTINUED | OUTPATIENT
Start: 2018-08-18 | End: 2018-08-21 | Stop reason: HOSPADM

## 2018-08-18 RX ORDER — LIDOCAINE 40 MG/G
CREAM TOPICAL
Status: DISCONTINUED | OUTPATIENT
Start: 2018-08-18 | End: 2018-08-21 | Stop reason: HOSPADM

## 2018-08-18 RX ORDER — ATORVASTATIN CALCIUM 40 MG/1
40 TABLET, FILM COATED ORAL EVERY EVENING
Status: DISCONTINUED | OUTPATIENT
Start: 2018-08-18 | End: 2018-08-21 | Stop reason: HOSPADM

## 2018-08-18 RX ORDER — ESCITALOPRAM OXALATE 5 MG/5ML
20 SOLUTION ORAL DAILY
Status: DISCONTINUED | OUTPATIENT
Start: 2018-08-19 | End: 2018-08-21 | Stop reason: HOSPADM

## 2018-08-18 RX ORDER — MYCOPHENOLATE MOFETIL 200 MG/ML
1000 POWDER, FOR SUSPENSION ORAL 2 TIMES DAILY
Status: DISCONTINUED | OUTPATIENT
Start: 2018-08-18 | End: 2018-08-21 | Stop reason: HOSPADM

## 2018-08-18 RX ORDER — QUETIAPINE FUMARATE 50 MG/1
50 TABLET, FILM COATED ORAL AT BEDTIME
Status: DISCONTINUED | OUTPATIENT
Start: 2018-08-18 | End: 2018-08-21 | Stop reason: HOSPADM

## 2018-08-18 RX ORDER — NALOXONE HYDROCHLORIDE 0.4 MG/ML
.1-.4 INJECTION, SOLUTION INTRAMUSCULAR; INTRAVENOUS; SUBCUTANEOUS
Status: DISCONTINUED | OUTPATIENT
Start: 2018-08-18 | End: 2018-08-21 | Stop reason: HOSPADM

## 2018-08-18 RX ORDER — PREDNISONE 5 MG/1
5 TABLET ORAL DAILY
Status: DISCONTINUED | OUTPATIENT
Start: 2018-08-19 | End: 2018-08-21 | Stop reason: HOSPADM

## 2018-08-18 RX ORDER — SULFAMETHOXAZOLE AND TRIMETHOPRIM 200; 40 MG/5ML; MG/5ML
80 SUSPENSION ORAL DAILY
Status: DISCONTINUED | OUTPATIENT
Start: 2018-08-19 | End: 2018-08-21 | Stop reason: HOSPADM

## 2018-08-18 RX ORDER — GINSENG 100 MG
CAPSULE ORAL 2 TIMES DAILY
Status: DISCONTINUED | OUTPATIENT
Start: 2018-08-18 | End: 2018-08-21 | Stop reason: HOSPADM

## 2018-08-18 RX ORDER — SODIUM CHLORIDE 9 MG/ML
INJECTION, SOLUTION INTRAVENOUS
Status: DISCONTINUED
Start: 2018-08-18 | End: 2018-08-18 | Stop reason: HOSPADM

## 2018-08-18 RX ADMIN — Medication 2 MG: at 08:18

## 2018-08-18 RX ADMIN — MAGNESIUM SULFATE HEPTAHYDRATE 2 G: 500 INJECTION, SOLUTION INTRAMUSCULAR; INTRAVENOUS at 23:58

## 2018-08-18 RX ADMIN — SODIUM CHLORIDE: 9 INJECTION, SOLUTION INTRAVENOUS at 03:56

## 2018-08-18 RX ADMIN — TACROLIMUS 3.5 MG: 5 CAPSULE ORAL at 22:34

## 2018-08-18 RX ADMIN — BACITRACIN: 500 OINTMENT TOPICAL at 22:35

## 2018-08-18 RX ADMIN — MYCOPHENOLATE MOFETIL 1000 MG: 200 POWDER, FOR SUSPENSION ORAL at 08:18

## 2018-08-18 RX ADMIN — HYDROMORPHONE HYDROCHLORIDE 2 MG: 1 SOLUTION ORAL at 04:09

## 2018-08-18 RX ADMIN — BACITRACIN ZINC: 500 OINTMENT TOPICAL at 08:18

## 2018-08-18 RX ADMIN — Medication 0.2 MG: at 20:35

## 2018-08-18 RX ADMIN — PHENYLEPHRINE HYDROCHLORIDE 5 ML: 10 INJECTION INTRAVENOUS at 18:46

## 2018-08-18 RX ADMIN — SODIUM BICARBONATE 650 MG TABLET 650 MG: at 22:42

## 2018-08-18 RX ADMIN — MYCOPHENOLATE MOFETIL 1000 MG: 200 POWDER, FOR SUSPENSION ORAL at 23:59

## 2018-08-18 RX ADMIN — DEXTROSE MONOHYDRATE AND SODIUM CHLORIDE 1000 ML: 5; .9 INJECTION, SOLUTION INTRAVENOUS at 16:54

## 2018-08-18 RX ADMIN — VALGANCICLOVIR HYDROCHLORIDE 450 MG: 50 POWDER, FOR SOLUTION ORAL at 08:18

## 2018-08-18 RX ADMIN — ATORVASTATIN CALCIUM 40 MG: 40 TABLET ORAL at 22:42

## 2018-08-18 RX ADMIN — Medication: at 10:30

## 2018-08-18 RX ADMIN — HYDROMORPHONE HYDROCHLORIDE 2 MG: 1 SOLUTION ORAL at 08:17

## 2018-08-18 RX ADMIN — QUETIAPINE FUMARATE 50 MG: 50 TABLET ORAL at 22:42

## 2018-08-18 RX ADMIN — Medication 20 MG: at 08:29

## 2018-08-18 RX ADMIN — SODIUM CHLORIDE: 9 INJECTION, SOLUTION INTRAVENOUS at 12:35

## 2018-08-18 RX ADMIN — Medication 0.2 MG: at 16:54

## 2018-08-18 RX ADMIN — CHLORHEXIDINE GLUCONATE 0.12% ORAL RINSE 15 ML: 1.2 LIQUID ORAL at 08:18

## 2018-08-18 RX ADMIN — ENOXAPARIN SODIUM 40 MG: 40 INJECTION SUBCUTANEOUS at 06:29

## 2018-08-18 ASSESSMENT — ACTIVITIES OF DAILY LIVING (ADL)
AMBULATION: 0-->INDEPENDENT
WHICH_OF_THE_ABOVE_FUNCTIONAL_RISKS_HAD_A_RECENT_ONSET_OR_CHANGE?: EATING
BATHING: 0-->INDEPENDENT
SWALLOWING: 2-->DIFFICULTY SWALLOWING LIQUIDS/FOODS
RETIRED_COMMUNICATION: 0-->UNDERSTANDS/COMMUNICATES WITHOUT DIFFICULTY
TRANSFERRING: 0-->INDEPENDENT
COGNITION: 0 - NO COGNITION ISSUES REPORTED
RETIRED_EATING: 0-->INDEPENDENT
DRESS: 0-->INDEPENDENT
FALL_HISTORY_WITHIN_LAST_SIX_MONTHS: NO
TOILETING: 0-->INDEPENDENT
ADLS_ACUITY_SCORE: 10

## 2018-08-18 ASSESSMENT — PAIN DESCRIPTION - DESCRIPTORS
DESCRIPTORS: SORE
DESCRIPTORS: SORE;TENDER
DESCRIPTORS: SORE

## 2018-08-18 NOTE — PLAN OF CARE
Problem: Goal Outcome Summary  Goal: Goal Outcome Summary  Outcome: No Change  Alert and oriented x4. Able to communicate needs. Patient was transferred to back to the hospital for NG placement. Report given to the charge nurse earlier this morning.  All his belongings sent with.

## 2018-08-18 NOTE — PLAN OF CARE
Problem: Goal Outcome Summary  Goal: Goal Outcome Summary  SLP: pt seen for communication - did not have PMSV on when entered room - advised him to try wear during waking hours - pt easily placed and tolerates welll.  Worked on speech strategies of slow rate, few words at a time, overarticulating as able (many sounds pt cannot produce , ie ones involving lips).  In conversation, pt understood more than 80% - when not, he can repeat word, rephrase, spell word aloud.  Pt wanted to try ice chip ( ok'd by PA to try), unable to manage orally, or swallow -  much ran out of mouth, pt tipped head slightly - swallowed, but followed by throat clearing/cough.  Pt to continue to wear PMSV during day, take off for sleeping, intermittent nursing checks - pt to remove if change of breathing/O2 sats drop below 90%.  Will resume tx when pt returns from [facility] for replacing NG.
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
## 2018-08-18 NOTE — CONSULTS
Otolaryngology Consult Note  August 18, 2018      CC: Feeding tube placement    HPI: Mr Henning is a 48-year-old male with a PMH of ESRD 2/2 IgA nephropathy s/p left kidney transplantation in 2009 that was rejected and repeat kidney transplantation on 05/30/2018, secondary hyperparathyroidism, HTN, HLD, chronic anemia, GERD, MDD, PTSD, and bipolar disorder who was transferred to the [facility] for feeding tube placement. Patient underwent ORIF of mandibular and DON fractures with OMFS on 08/02/2018 and was kept inpatient for postoperative recovery. [facility], where his NJ tube was accidentally removed. Patient has since been transferred back to the [facility] for repeat NJ tube placement.    Past Medical History:   Diagnosis Date     Anemia in chronic kidney disease      Bipolar affective disorder (H)      Chronic rejection of kidney transplant 2015    Chronic rejection of 2009 kidney, failed 2015. Graft nephrectomy 2017.     Developmental delay      ESRD needing dialysis (H) 2015     History of alcoholism (H)      History of peritoneal dialysis     7 years     Hyperlipidemia      IgA nephropathy      MDD (major depressive disorder)     Hx multiple suicide attempts     Migraine      Osteopenia      Peritonitis (H)      Polysubstance abuse     in remission     PTSD (post-traumatic stress disorder)      Renal cell carcinoma (H) 2015    Left native kidney, s/p nephrectomy     Secondary hyperparathyroidism (H)      Tobacco abuse     Chewing tobacco       Past Surgical History:   Procedure Laterality Date     EXPLANT TRANSPLANTED KIDNEY N/A 12/15/2017    Procedure: EXPLANT TRANSPLANTED KIDNEY;  Transplanted Nephrectomy;  Surgeon: Mario Vallejo MD;  Location: [facility] OR     HC DIALYSIS AVF OR AVG, CENTRAL INTERVENTION ONLY Left      LAPAROSCOPIC INSERTION CATHETER PERITONEAL DIALYSIS Left      NEPHRECTOMY BILATERAL  2015     OPEN REDUCTION INTERNAL FIXATION MANDIBLE N/A 8/2/2018    Procedure:  OPEN REDUCTION INTERNAL FIXATION MANDIBLE;  Open Reduction Interral Fixation of Bilateral Mandible, Maxilla, Naso Orbitbial Ethmoidal Fractures Nasal-gastric feeding tube placement;  Surgeon: Denzel Hernández DDS;  Location: [facility] OR     OPEN REDUCTION INTERNAL FIXATION MAXILLA N/A 2018    Procedure: OPEN REDUCTION INTERNAL FIXATION MAXILLA;;  Surgeon: Denzel Hernández DDS;  Location: [facility] OR     PERCUTANEOUS BIOPSY KIDNEY Right 2018    Procedure: PERCUTANEOUS BIOPSY KIDNEY;  Right Kidney Biopsy;  Surgeon: Star Macias MD;  Location: [facility] OR     REMOVE CATHETER PERITONEAL       TRANSPLANT KIDNEY RECIPIENT  DONOR Left 2009     TRANSPLANT KIDNEY RECIPIENT  DONOR N/A 2018    Procedure: TRANSPLANT KIDNEY RECIPIENT  DONOR;  TRANSPLANT KIDNEY RECIPIENT  DONOR and ureteral stent placement;  Surgeon: Mario Vallejo MD;  Location: [facility] OR       No current outpatient prescriptions on file.          Allergies   Allergen Reactions     Gabapentin Other (See Comments)     myoclonus       Social History     Social History     Marital status:      Spouse name: Merline     Number of children: 4     Years of education: 13     Occupational History     disabled      Social History Main Topics     Smoking status: Passive Smoke Exposure - Never Smoker     Types: Dip, chew, snus or snuff     Smokeless tobacco: Current User     Types: Chew      Comment: Wife smoked years ago.  Weaning off chew now     Alcohol use No      Comment: none now, did treatment at age 21, relapsed with divorce (a couple months)  then now  sober 3 years.      Drug use: No      Comment: Marijuana at age 14 only.     Sexual activity: Yes     Partners: Female     Other Topics Concern     Not on file     Social History Narrative    ** Merged History Encounter **            REVIEW OF SYSTEMS:  A ROS was performed and was negative except for that reported above.    PHYSICAL EXAM:  /87 (BP Location: Left arm)   Pulse 83  Temp (P) 98.3  F (36.8  C) (Axillary)  Resp (P) 18  SpO2 100%   General: resting comfortably in bed; not in acute distress  Neuro: alert, oriented, and answering questions appropriately  HEENT: nasal and right mental lacerations s/p repair; nasal dorsum flat 2/2 trauma; 6-0 Shiley in place- cuff down  Pulm: breathing comfortably on room air    FIBEROPTIC ENDOSCOPY:  Due to need for enteral access with recent history of extensive facial trauma s/p reconstruction, fiberoptic laryngoscopy was indicated. After obtaining verbal consent, the nose was topically decongested and anesthetized. The fiberoptic laryngoscope was passed under endoscopic vision through the nasal passage. A 10-[place] Kangaroo feeding tube was introduced into the nasal cavity and advanced into the nasopharynx. The feeding tube was visualized as it entered the esophagus and was advanced to a distance of ~60cm at the nasal alae. The feeding tube was secured with tape.    CBC    Recent Labs  Lab 08/17/18  0609 08/16/18  0708 08/14/18  0659 08/13/18  1620   WBC 11.2* 11.3* 8.2  --    RBC 3.40* 3.58* 3.19*  --    HGB 10.5* 11.1* 9.9*  --    HCT 32.7* 34.7* 29.6*  --    MCV 96 97 93  --    MCHC 32.1 32.0 33.4  --    RDW 16.0* 16.3* 15.6*  --    * 576*  Canceled, Test credited 500* 482*       BMP    Recent Labs  Lab 08/18/18  0855 08/17/18  0609 08/16/18  0708 08/14/18  0659    136 135 132*   POTASSIUM 4.7 5.1 5.6* 5.2   CHLORIDE 109 108 105 100   SHIRAZ 9.0 9.5 10.2* 9.2   CO2 18* 19* 15* 20   BUN 19 33* 34* 28   CR 0.88 1.23 1.14 0.88   GLC 90 112* 123* 120*       No lab results found in last 7 days.      Assessment and Plan  Mr Henning is a 48-year-old male with a PMH of ESRD 2/2 IgA nephropathy s/p left kidney transplantation in 2009 that was rejected and repeat kidney transplantation on 05/30/2018, secondary hyperparathyroidism, HTN, HLD, chronic anemia, GERD, MDD, PTSD, and bipolar disorder who was transferred to the [facility] for [place]  tube placement. A feeding tube was placed under direct visualization with flexible laryngoscope. Abdominal XR was ordered to confirm position. Feeding tube can be advanced by primary team as desired. Once feeding tube is in position per primary team, stylet can be removed and feeds started per primary team.      -- Patient and plan to be discussed with staff on-call, Dr. Sophy Trotter MD  Otolaryngology- Head and Neck Surgery, PGY-2  Pager: [phone]  Please contact ENT with questions by dialing [phone] and entering job code [phone] when prompted.
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
## 2018-08-18 NOTE — PLAN OF CARE
Problem: Goal Outcome Summary  Goal: Goal Outcome Summary  Physical Therapy Discharge Summary    Reason for therapy discharge:    Discharged to hospital 8/18 for feeding tube replacement    Progress towards therapy goal(s). See goals on Care Plan in [facility] electronic health record for goal details.  Goals partially met.  Barriers to achieving goals:   discharge from facility. Pt had issues with low BP limiting participation also    Therapy recommendation(s):    pt near goals--only plan couple more sessions for Nustep, car transfer; gait

## 2018-08-18 NOTE — LETTER
Transition Communication Hand-off for Care Transitions to Next Level of Care Provider    Name: Gilles Henning III  : 1970  MRN #: [id number]  Primary Care Provider: Charlie Dubose     Primary Clinic: [address]  [place] [address]     Reason for Hospitalization:  Nasal Tube need replaced after complex facial surgery  Encounter for nasojejunal (NJ) tube placement  Encounter for nasojejunal (NJ) tube placement  Admit Date/Time: 2018  2:54 PM  Discharge Date: 2018   Payor Source: Payor: BCBS / Plan: BCBS PLATINUM BLUE / Product Type: PPO /     Transplant Social Work Service Discharge Note      Patient Name:  Gilles Henning III     Anticipated Discharge Date:  2018    Discharge Disposition:   Transitional Care Unit  [address]  [phone]  [place] [address]     Plan for 24 hour care for immediate post transplant period: Rehab, then pts wife.    If not local, plans for short term stay:  Pt can return home after rehab.    Additional Services/Equipment Arranged:  CHELSEA arranged for WC ride via [employer] [phone] for 1pm.      Persons notified of above discharge plan:  Pt and wife, medical team.    Patient / Family response to discharge plan:  In agreement.    Education and resources provided by CHELSEA at discharge: role of transplant  in out patient setting and provided contact info for , availability of support groups.    Discussed anticipated pharmacy out of pocket costs: NO. Discussed after pts transplant admission.    Provided [employer] Donor Letter Writing packet : NO. Discussed after pts transplant admission.    Plan: Plan is for pt to go to  TCU to complete trach and pump teaching. He will then return home and follow up with his physicians near his home. He will also follow up with outpatient mental health once he returns home.    ETTA Bee,  LGSW  7A   Ph: [phone], Pager: [phone]

## 2018-08-18 NOTE — IP AVS SNAPSHOT
[address] [address]    Phone:  [phone]                                       After Visit Summary   8/18/2018    Gilles Henning III    MRN: [id number]           After Visit Summary Signature Page     I have received my discharge instructions, and my questions have been answered. I have discussed any challenges I see with this plan with the nurse or doctor.    ..........................................................................................................................................  Patient/Patient Representative Signature      ..........................................................................................................................................  Patient Representative Print Name and Relationship to Patient    ..................................................               ................................................  Date                                            Time    ..........................................................................................................................................  Reviewed by Signature/Title    ...................................................              ..............................................  Date                                                            Time

## 2018-08-18 NOTE — PLAN OF CARE
Problem: Goal Outcome Summary  Goal: Goal Outcome Summary  Patient alert and oriented x 4. Complained of mouth pain-dilaudid given, patient approximately had 3/4 amount of 2 ml dilaudid as patient drools and not able to swallows well. No complaints of chest pain, SOB, dizziness or any N/V noted. Trach intact, trach dome on. Patient to transfer to the [facility]- still awaiting call from intake- nursing supervisor updated. Patient independent in the room with transfer and ambulation. Will continue to monitor patient's status.
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
## 2018-08-19 LAB
ALBUMIN UR-MCNC: NEGATIVE MG/DL
ANION GAP SERPL CALCULATED.3IONS-SCNC: 8 MMOL/L (ref 3–14)
APPEARANCE UR: CLEAR
APTT PPP: 31 SEC (ref 22–37)
BASOPHILS # BLD AUTO: 0 10E9/L (ref 0–0.2)
BASOPHILS NFR BLD AUTO: 0.5 %
BILIRUB UR QL STRIP: NEGATIVE
BUN SERPL-MCNC: 12 MG/DL (ref 7–30)
CALCIUM SERPL-MCNC: 9.3 MG/DL (ref 8.5–10.1)
CHLORIDE SERPL-SCNC: 108 MMOL/L (ref 94–109)
CO2 SERPL-SCNC: 21 MMOL/L (ref 20–32)
COLOR UR AUTO: NORMAL
CREAT SERPL-MCNC: 0.8 MG/DL (ref 0.66–1.25)
DIFFERENTIAL METHOD BLD: ABNORMAL
EOSINOPHIL # BLD AUTO: 0.1 10E9/L (ref 0–0.7)
EOSINOPHIL NFR BLD AUTO: 0.9 %
ERYTHROCYTE [DISTWIDTH] IN BLOOD BY AUTOMATED COUNT: 15.7 % (ref 10–15)
GFR SERPL CREATININE-BSD FRML MDRD: >90 ML/MIN/1.7M2
GLUCOSE SERPL-MCNC: 124 MG/DL (ref 70–99)
GLUCOSE UR STRIP-MCNC: NEGATIVE MG/DL
HCT VFR BLD AUTO: 29.9 % (ref 40–53)
HGB BLD-MCNC: 9.7 G/DL (ref 13.3–17.7)
HGB UR QL STRIP: NEGATIVE
IMM GRANULOCYTES # BLD: 0 10E9/L (ref 0–0.4)
IMM GRANULOCYTES NFR BLD: 0.3 %
KETONES UR STRIP-MCNC: NEGATIVE MG/DL
LEUKOCYTE ESTERASE UR QL STRIP: NEGATIVE
LYMPHOCYTES # BLD AUTO: 0.5 10E9/L (ref 0.8–5.3)
LYMPHOCYTES NFR BLD AUTO: 6.1 %
MAGNESIUM SERPL-MCNC: 1.7 MG/DL (ref 1.6–2.3)
MCH RBC QN AUTO: 30.8 PG (ref 26.5–33)
MCHC RBC AUTO-ENTMCNC: 32.4 G/DL (ref 31.5–36.5)
MCV RBC AUTO: 95 FL (ref 78–100)
MONOCYTES # BLD AUTO: 0.2 10E9/L (ref 0–1.3)
MONOCYTES NFR BLD AUTO: 3.1 %
NEUTROPHILS # BLD AUTO: 6.6 10E9/L (ref 1.6–8.3)
NEUTROPHILS NFR BLD AUTO: 89.1 %
NITRATE UR QL: NEGATIVE
NRBC # BLD AUTO: 0 10*3/UL
NRBC BLD AUTO-RTO: 0 /100
PH UR STRIP: 5 PH (ref 5–7)
PHOSPHATE SERPL-MCNC: 2.8 MG/DL (ref 2.5–4.5)
PLATELET # BLD AUTO: 322 10E9/L (ref 150–450)
POTASSIUM SERPL-SCNC: 4 MMOL/L (ref 3.4–5.3)
RBC # BLD AUTO: 3.15 10E12/L (ref 4.4–5.9)
RBC #/AREA URNS AUTO: 0 /HPF (ref 0–2)
SODIUM SERPL-SCNC: 137 MMOL/L (ref 133–144)
SOURCE: NORMAL
SP GR UR STRIP: 1 (ref 1–1.03)
TACROLIMUS BLD-MCNC: 7.4 UG/L (ref 5–15)
TME LAST DOSE: NORMAL H
UROBILINOGEN UR STRIP-MCNC: NORMAL MG/DL (ref 0–2)
WBC # BLD AUTO: 7.4 10E9/L (ref 4–11)
WBC #/AREA URNS AUTO: 1 /HPF (ref 0–5)

## 2018-08-19 PROCEDURE — 96376 TX/PRO/DX INJ SAME DRUG ADON: CPT

## 2018-08-19 PROCEDURE — 25000128 H RX IP 250 OP 636: Performed by: STUDENT IN AN ORGANIZED HEALTH CARE EDUCATION/TRAINING PROGRAM

## 2018-08-19 PROCEDURE — A9270 NON-COVERED ITEM OR SERVICE: HCPCS | Mod: GY | Performed by: STUDENT IN AN ORGANIZED HEALTH CARE EDUCATION/TRAINING PROGRAM

## 2018-08-19 PROCEDURE — 25000125 ZZHC RX 250: Performed by: STUDENT IN AN ORGANIZED HEALTH CARE EDUCATION/TRAINING PROGRAM

## 2018-08-19 PROCEDURE — 25000131 ZZH RX MED GY IP 250 OP 636 PS 637: Performed by: STUDENT IN AN ORGANIZED HEALTH CARE EDUCATION/TRAINING PROGRAM

## 2018-08-19 PROCEDURE — A9270 NON-COVERED ITEM OR SERVICE: HCPCS | Mod: GY | Performed by: SURGERY

## 2018-08-19 PROCEDURE — 84100 ASSAY OF PHOSPHORUS: CPT | Performed by: SURGERY

## 2018-08-19 PROCEDURE — 83735 ASSAY OF MAGNESIUM: CPT | Performed by: SURGERY

## 2018-08-19 PROCEDURE — G0378 HOSPITAL OBSERVATION PER HR: HCPCS

## 2018-08-19 PROCEDURE — 25000132 ZZH RX MED GY IP 250 OP 250 PS 637: Mod: GY | Performed by: STUDENT IN AN ORGANIZED HEALTH CARE EDUCATION/TRAINING PROGRAM

## 2018-08-19 PROCEDURE — 96372 THER/PROPH/DIAG INJ SC/IM: CPT

## 2018-08-19 PROCEDURE — 27210429 ZZH NUTRITION PRODUCT INTERMEDIATE LITER

## 2018-08-19 PROCEDURE — 85730 THROMBOPLASTIN TIME PARTIAL: CPT | Performed by: STUDENT IN AN ORGANIZED HEALTH CARE EDUCATION/TRAINING PROGRAM

## 2018-08-19 PROCEDURE — 85025 COMPLETE CBC W/AUTO DIFF WBC: CPT | Performed by: SURGERY

## 2018-08-19 PROCEDURE — 36415 COLL VENOUS BLD VENIPUNCTURE: CPT | Performed by: STUDENT IN AN ORGANIZED HEALTH CARE EDUCATION/TRAINING PROGRAM

## 2018-08-19 PROCEDURE — 80048 BASIC METABOLIC PNL TOTAL CA: CPT | Performed by: SURGERY

## 2018-08-19 PROCEDURE — 81001 URINALYSIS AUTO W/SCOPE: CPT | Performed by: STUDENT IN AN ORGANIZED HEALTH CARE EDUCATION/TRAINING PROGRAM

## 2018-08-19 PROCEDURE — 25000132 ZZH RX MED GY IP 250 OP 250 PS 637: Mod: GY | Performed by: SURGERY

## 2018-08-19 RX ORDER — OXYCODONE HCL 5 MG/5 ML
2.5 SOLUTION, ORAL ORAL EVERY 6 HOURS PRN
Status: DISCONTINUED | OUTPATIENT
Start: 2018-08-19 | End: 2018-08-21 | Stop reason: HOSPADM

## 2018-08-19 RX ORDER — CHLORHEXIDINE GLUCONATE ORAL RINSE 1.2 MG/ML
15 SOLUTION DENTAL 2 TIMES DAILY
Status: DISCONTINUED | OUTPATIENT
Start: 2018-08-19 | End: 2018-08-21 | Stop reason: HOSPADM

## 2018-08-19 RX ADMIN — FLUDROCORTISONE ACETATE 0.05 MG: 0.1 TABLET ORAL at 08:50

## 2018-08-19 RX ADMIN — OXYCODONE HYDROCHLORIDE 2.5 MG: 5 SOLUTION ORAL at 18:21

## 2018-08-19 RX ADMIN — SODIUM BICARBONATE 650 MG TABLET 650 MG: at 16:06

## 2018-08-19 RX ADMIN — Medication 1 PACKET: at 16:06

## 2018-08-19 RX ADMIN — BACITRACIN: 500 OINTMENT TOPICAL at 20:06

## 2018-08-19 RX ADMIN — SODIUM BICARBONATE 650 MG TABLET 650 MG: at 08:50

## 2018-08-19 RX ADMIN — ATORVASTATIN CALCIUM 40 MG: 40 TABLET ORAL at 20:05

## 2018-08-19 RX ADMIN — Medication 0.2 MG: at 09:04

## 2018-08-19 RX ADMIN — LOPERAMIDE HYDROCHLORIDE 2 MG: 1 SOLUTION ORAL at 12:57

## 2018-08-19 RX ADMIN — VALGANCICLOVIR HYDROCHLORIDE 450 MG: 50 POWDER, FOR SOLUTION ORAL at 08:49

## 2018-08-19 RX ADMIN — BACITRACIN: 500 OINTMENT TOPICAL at 09:24

## 2018-08-19 RX ADMIN — OXYCODONE HYDROCHLORIDE 2.5 MG: 5 SOLUTION ORAL at 12:09

## 2018-08-19 RX ADMIN — PREDNISONE 5 MG: 5 TABLET ORAL at 08:48

## 2018-08-19 RX ADMIN — CHLORHEXIDINE GLUCONATE 0.12% ORAL RINSE 15 ML: 1.2 LIQUID ORAL at 12:57

## 2018-08-19 RX ADMIN — MYCOPHENOLATE MOFETIL 1000 MG: 200 POWDER, FOR SUSPENSION ORAL at 08:49

## 2018-08-19 RX ADMIN — Medication 20 MG: at 08:49

## 2018-08-19 RX ADMIN — ESCITALOPRAM OXALATE 20 MG: 5 SOLUTION ORAL at 08:50

## 2018-08-19 RX ADMIN — DEXTROSE MONOHYDRATE AND SODIUM CHLORIDE 1000 ML: 5; .9 INJECTION, SOLUTION INTRAVENOUS at 03:39

## 2018-08-19 RX ADMIN — MYCOPHENOLATE MOFETIL 1000 MG: 200 POWDER, FOR SUSPENSION ORAL at 20:05

## 2018-08-19 RX ADMIN — Medication 1 MG: at 08:49

## 2018-08-19 RX ADMIN — LOPERAMIDE HYDROCHLORIDE 2 MG: 1 SOLUTION ORAL at 18:21

## 2018-08-19 RX ADMIN — Medication 1 PACKET: at 12:15

## 2018-08-19 RX ADMIN — DEXTROSE MONOHYDRATE AND SODIUM CHLORIDE 1000 ML: 5; .9 INJECTION, SOLUTION INTRAVENOUS at 16:13

## 2018-08-19 RX ADMIN — ENOXAPARIN SODIUM 40 MG: 100 INJECTION SUBCUTANEOUS at 08:50

## 2018-08-19 RX ADMIN — QUETIAPINE FUMARATE 50 MG: 50 TABLET ORAL at 22:31

## 2018-08-19 RX ADMIN — SULFAMETHOXAZOLE AND TRIMETHOPRIM 80 MG: 200; 40 SUSPENSION ORAL at 08:48

## 2018-08-19 RX ADMIN — CHLORHEXIDINE GLUCONATE 0.12% ORAL RINSE 15 ML: 1.2 LIQUID ORAL at 20:05

## 2018-08-19 RX ADMIN — TACROLIMUS 3.5 MG: 5 CAPSULE ORAL at 08:48

## 2018-08-19 RX ADMIN — SODIUM BICARBONATE 650 MG TABLET 650 MG: at 20:05

## 2018-08-19 RX ADMIN — Medication 1 PACKET: at 20:06

## 2018-08-19 RX ADMIN — TACROLIMUS 3.5 MG: 5 CAPSULE ORAL at 20:04

## 2018-08-19 RX ADMIN — MULTIVITAMIN 15 ML: LIQUID ORAL at 08:49

## 2018-08-19 ASSESSMENT — PAIN DESCRIPTION - DESCRIPTORS
DESCRIPTORS: ACHING

## 2018-08-19 NOTE — PROGRESS NOTES
Observation goals PER UNIT ROUTINE     1. Maintain oxygen sats >94% on room air - MET  2. Pain controlled on oral pain medication - MET  3. Tolerating tube feeds. -  MET  4. Voiding spontaneously - MET  5. Monitor vitals - MET  6. Ambulating independently - MET  7. Safe discharge plan in place - NOT MET     8205-5448  VSS on breonna ponce RA. Poncho's pain has been well controlled on oxycodone 2.5mg elixir via NJ tube. Tolerating TF's at goal rate of 85cc/hour. Did have one loose small BM after feeds started. Has received two doses of immodium and two doses of benefiber. Ambulated in room back and forth from bed and door at least 5 times with SBA, tolerated well. Has met all goals except discharge plan to yet be in place. MD will be paged with update on goals.

## 2018-08-19 NOTE — PLAN OF CARE
Problem: Patient Care Overview  Goal: Plan of Care/Patient Progress Review  Outcome: No Change   08/18/18 2310   OTHER   Plan Of Care Reviewed With patient   Plan of Care Review   Progress no change     Temp: 97.9  F (36.6  C) Temp src: Axillary BP: 119/87 Pulse: 85   Resp: 18 SpO2: 100 % O2 Device: None (Room air)       Patient is a new admit for placement of tube feeding:    -tube feeding placed by ENT this evening, okay to use per MD  -Mg 1.3, replacement coming from pharmacy  -given 0.2 IV Dilaudid for facial pain with relief  -up independently  -D5 NS @ 100 ml/hr via PIV  -voiding frequently in urinal  -trach care done this evening  -alert and oriented x 4  -strict NPO    Plan: strict NPO. Tube feeding tomorrow.  Needs urine for UA/UC.

## 2018-08-19 NOTE — PLAN OF CARE
Problem: Goal Outcome Summary  Goal: Goal Outcome Summary  Speech Language Therapy Discharge Summary    Reason for therapy discharge:    Change in medical status.    Progress towards therapy goal(s). See goals on Care Plan in [facility] electronic health record for goal details.  Goals partially met.  Barriers to achieving goals:   discharge from facility.    Therapy recommendation(s):    Continued therapy is recommended.  Rationale/Recommendations:  SLP pt discharged to hospital, for replacement NG tube.  Pt had been tolerating PMSV, during day, independent with donning/doffing. Pt working on speech strategies/compensations.  Pt NPO, severe oral deficits - frequent drooling, occaisonal suctioning Further tx pending status.

## 2018-08-19 NOTE — PROGRESS NOTES
Observation goals PER UNIT ROUTINE     1. Maintain oxygen sats >94% on room air - MET  2. Pain controlled on oral pain medication - NOT MET, waiting for MD rounds to change pain meds to po  3. Tolerating tube feeds. - NOT MET, waiting for MD rounds to order formula and feeding schedule  4. Voiding spontaneously - MET  5. Monitor vitals - MET  6. Ambulating independently - NOT MET  7. Safe discharge plan in place - NOT MET

## 2018-08-19 NOTE — PROGRESS NOTES
CLINICAL NUTRITION SERVICES - ASSESSMENT NOTE     Nutrition Prescription    RECOMMENDATIONS FOR MDs/PROVIDERS TO ORDER:  Monitor tolerance of TF through new gastric FT access.  Could transition to bolus TF desired.    When no longer on IV fluids, recommend increase free water flushes to 120 mL Q4H (additional 720 mL free water daily)    Malnutrition Status:    Unable to determine due to pt busy during visits, but suspect meets criteria given previous RD note on 8/13    Recommendations already ordered by Registered Dietitian (RD):  1. Mg++ and Phos add-ons   2. Adjusted FT access to NG and updated gastric EN access precautions (elevate HOB 30 degrees and check residuals per protocol)  3. Adjusted goal cycled schedule to be 16 hours from 6 PM - 10 AM.  4. 30 mL Q4H free water flushes for FT patency     REASON FOR ASSESSMENT  Gilles Henning III is a/an 48 year old male assessed by the dietitian for Admission Nutrition Risk Screen for tube feeding or parenteral nutrition and Provider Order - Registered Dietitian to Assess and Order TF per Medical Nutrition Therapy Protocol    NUTRITION HISTORY  Pt admit from [facility] on 8/17 after NJ accidentally pulled out by patient evening of 8/16.  Had been on TF at TCU prior to that and tolerating since 8/13.  Initially started on TF via OGT on 7/30 (goal Isosource 1.5 @ 55 mL/hr), on 8/3 a NDT was placed and TF continued through that, and began cycling TF on 8/9 (Isosource 1.5 @ 85 mL/hr x 16 hours) which pt has continued on until 8/16.   Nutrisource fiber (1 pkt TID) started on 8/13 due to loose stools    Most Recent TF/Free Water Regimen:  TF: Isosource 1.5 @ 85 ml/hr x 16 hrs (6pm - 10am) will provide 1360 ml/day, 2040 kcal (36 kcal/kg), 92 g PRO (1.6 g/kg), 239 g CHO, 1034 ml H2O, 20 g Fiber.   Modulars: 1 pkt Nutrisource fiber TID (3 g soluble fiber per packet)  Free Water: 120 mL Q4H (additional 720 mL free water daily)    CURRENT NUTRITION ORDERS  Diet: NPO  Intake/Tolerance:  "NPO and no TF x 3 days.  Provider ordered TF to restart today via NGT, to start @ 45 mL/hr and advance by 10 mL/hr to goal rate 85 mL/hr and run per cycled schedule 6 PM - 8 AM    LABS  Labs reviewed  - K+, Mg++, Phos WNL    MEDICATIONS  Medications reviewed  - Nutrisource fiber (1 pkt TID)  - D5 @ 100 mL/hr    ANTHROPOMETRICS  Height: 177.8 cm (5' 10\")  Most Recent Weight: 50.6 kg (111 lb 8.8 oz)    IBW: 75.5 kg   BMI: Underweight BMI <18.5  Weight History: Wt is down 3 lb over the past week (2.6% wt loss), overall weight is down 8 lb over the past 2 months (6.7% wt loss).  Overall down 18 lb in the past 4 months (14% wt loss)  Wt Readings from Last 20 Encounters:   08/19/18 50.6 kg (111 lb 8.8 oz)   08/18/18 52.1 kg (114 lb 12.8 oz)   08/12/18 51.8 kg (114 lb 4.8 oz)   07/09/18 59 kg (130 lb 1.6 oz)   06/19/18 55.8 kg (123 lb)   06/13/18 53.5 kg (118 lb)   06/08/18 52.5 kg (115 lb 12.8 oz)   06/07/18 53.9 kg (118 lb 14.4 oz)   06/06/18 54.6 kg (120 lb 6.4 oz)   06/05/18 54.4 kg (119 lb 14.9 oz)   06/04/18 54.7 kg (120 lb 11.2 oz)   06/02/18 53.6 kg (118 lb 1.6 oz)   05/14/18 55.3 kg (122 lb)   04/04/18 58.7 kg (129 lb 6.4 oz)   03/09/18 57.2 kg (126 lb)   12/28/17 58.7 kg (129 lb 6.4 oz)   12/17/17 61.1 kg (134 lb 12.8 oz)   11/28/17 59.9 kg (132 lb 1.6 oz)   10/13/17 63.8 kg (140 lb 9.6 oz)   09/08/17 61.2 kg (135 lb)      Dosing Weight: 51 kg (actual)    ASSESSED NUTRITION NEEDS  Estimated Energy Needs: 4380-5108 kcals/day (35 - 40 kcals/kg)  Justification: Repletion and Underweight  Estimated Protein Needs:  grams protein/day (1.5 - 2 grams of pro/kg)  Justification: Repletion  Estimated Fluid Needs: 5496-2849 mL/day (30 - 35 mL/kg)   Justification: Maintenance, or other per provider pending fluid status    PHYSICAL FINDINGS  See malnutrition section below.    MALNUTRITION  % Intake: Decreased intake does not meet criteria  % Weight Loss: > 7.5% in 3 months (severe)  Subcutaneous Fat Loss: Unable to " assess  Muscle Loss: Unable to assess  Fluid Accumulation/Edema: None noted per provider note yesterday  Malnutrition Diagnosis: Unable to determine due to pt busy during visits, but suspect meets criteria given previous RD note on 8/13    NUTRITION DIAGNOSIS  Inadequate protein-energy intake related to no enteral access x 3 days as evidenced by no TF x 3 days and pt NPO    INTERVENTIONS  Implementation  Nutrition Education: Unable to complete due to pt busy during attempts to visit   Collaboration with other providers: spoke with provider who says it is okay to use FT in current gastric position, per provider no reason for it to be post-pyloric.  Provider wondering if with new gastric EN access if pt should transition to bolus TF, advised that since goal rate is not terrible high (85 mL/hr), should be okay to continue with previous cycled regimen that was being run through previous post-pyloric FT as long as provider not terribly concerned about aspiration. Discussed that pt could transition to bolus TF if desired  Enteral Nutrition - see above  Feeding tube flush - see above    Goals  Total avg nutritional intake to meet a minimum of 35 kcal/kg and 1.5 g PRO/kg daily (per dosing wt 51 kg).     Monitoring/Evaluation  Progress toward goals will be monitored and evaluated per protocol.     Maria A Georges, RD, LD  Weekend/Holiday RD Pager: [phone]    Weekday 7A RD Pager: [phone]

## 2018-08-19 NOTE — PLAN OF CARE
Problem: Patient Care Overview  Goal: Plan of Care/Patient Progress Review  Outcome: Improving  /73 (BP Location: Left arm)  Pulse 85  Temp 98.1  F (36.7  C) (Axillary)  Resp 18  Wt 50.6 kg (111 lb 8.8 oz)  SpO2 100%  BMI 16.01 kg/m2    2220-2715: A/Ox4. VSS on RA. Trach dome in place; pt placing and removing trach dome as needed. Up independently. Strict NPO. Plan for TF to be initiated today. NJ clamped; placement x-ray verified and okay to use per MD. Pt denies pain and nausea. Urine output 1270mL; no BM overnight. R PIV with D5NS @ 100mL/hr. Trach supplies at the bedside; speaking valve used independently per patient. Continuous pulse ox on. Pt refusing trach suctioning due to secretions only by mouth. Will continue to monitor closely and notify the team of any acute changes.

## 2018-08-19 NOTE — PLAN OF CARE
Problem: Patient Care Overview  Goal: Plan of Care/Patient Progress Review  Outcome: No Change  Observation goals PER UNIT ROUTINE     1. Maintain oxygen sats >94% on room air - MET  2. Pain controlled on oral pain medication - MET  3. Tolerating tube feeds. - NOT MET  4. Voiding spontaneously - MET  5. Monitor vitals - MET  6. Ambulating independently - NOT MET  7. Safe discharge plan in place - NOT MET     9267-1473  Afebrile, VSS on trach dome RA. Trach cares done this am without incident. Pain in facial incisions, dilaudid IV given this am prior to oxycodone order placed. Oxycodone given once with good pain relief. Tube feeding started at noon, increasing by 10ml/hour to goal of 85cc/hour and will continue overnight. Hector has not had a BM for 4 days but did state that the tube feeds give him diarrhea and therefore has gotten immodium and fiber supplements this shift as well. Voiding adeq amts. Continues with MIV fluid at 100cc/hour. Mg 1.7, no replacement given. Pt states he will try to ambulate this afternoon once his nutrition is at goal rate.     Pt given the observation status information sheet.

## 2018-08-19 NOTE — PROGRESS NOTES
1. Maintain oxygen sats >94% on room air  -- yes, pt maintaining sats % on RA  2. Pain controlled on oral pain medication -- no pain from 5466-3510  3. Tolerating tube feeds. -- no; TF not yet started  4. Voiding spontaneously  -- yes  5. Monitor vitals -- yes, WDL  6. Ambulating independently -- yes  7. Safe discharge plan in place -- no

## 2018-08-19 NOTE — PROGRESS NOTES
Observation [place]    1. Maintain oxygen sats >94% on room air = Yes  2. Pain controlled on oral pain medication  = No  3. Tolerating tube feeds. = No  4. Voiding spontaneously = Yes  5. Monitor vitals = Yes  6. Ambulating independently= Yes

## 2018-08-20 ENCOUNTER — APPOINTMENT (OUTPATIENT)
Dept: PHYSICAL THERAPY | Facility: CLINIC | Age: 48
End: 2018-08-20
Attending: PHYSICIAN ASSISTANT
Payer: MEDICARE

## 2018-08-20 PROCEDURE — 25000132 ZZH RX MED GY IP 250 OP 250 PS 637: Mod: GY | Performed by: SURGERY

## 2018-08-20 PROCEDURE — 25000131 ZZH RX MED GY IP 250 OP 636 PS 637: Mod: GY | Performed by: STUDENT IN AN ORGANIZED HEALTH CARE EDUCATION/TRAINING PROGRAM

## 2018-08-20 PROCEDURE — 27210429 ZZH NUTRITION PRODUCT INTERMEDIATE LITER

## 2018-08-20 PROCEDURE — G0378 HOSPITAL OBSERVATION PER HR: HCPCS

## 2018-08-20 PROCEDURE — G8980 MOBILITY D/C STATUS: HCPCS | Mod: CI

## 2018-08-20 PROCEDURE — 40000894 ZZH STATISTIC OT IP EVAL DEFER

## 2018-08-20 PROCEDURE — G8978 MOBILITY CURRENT STATUS: HCPCS | Mod: GP,CJ

## 2018-08-20 PROCEDURE — 25000132 ZZH RX MED GY IP 250 OP 250 PS 637: Mod: GY | Performed by: STUDENT IN AN ORGANIZED HEALTH CARE EDUCATION/TRAINING PROGRAM

## 2018-08-20 PROCEDURE — 25000128 H RX IP 250 OP 636: Performed by: STUDENT IN AN ORGANIZED HEALTH CARE EDUCATION/TRAINING PROGRAM

## 2018-08-20 PROCEDURE — 96372 THER/PROPH/DIAG INJ SC/IM: CPT | Mod: 59

## 2018-08-20 PROCEDURE — 97161 PT EVAL LOW COMPLEX 20 MIN: CPT | Mod: GP

## 2018-08-20 PROCEDURE — 97530 THERAPEUTIC ACTIVITIES: CPT | Mod: GP

## 2018-08-20 PROCEDURE — A9270 NON-COVERED ITEM OR SERVICE: HCPCS | Mod: GY | Performed by: SURGERY

## 2018-08-20 PROCEDURE — A9270 NON-COVERED ITEM OR SERVICE: HCPCS | Mod: GY | Performed by: STUDENT IN AN ORGANIZED HEALTH CARE EDUCATION/TRAINING PROGRAM

## 2018-08-20 PROCEDURE — 40000047 ZZH STATISTIC CTO2 CONT OXYGEN TECH TIME EA 90 MIN

## 2018-08-20 PROCEDURE — G8979 MOBILITY GOAL STATUS: HCPCS | Mod: CI

## 2018-08-20 PROCEDURE — 40000275 ZZH STATISTIC RCP TIME EA 10 MIN

## 2018-08-20 PROCEDURE — G0463 HOSPITAL OUTPT CLINIC VISIT: HCPCS

## 2018-08-20 PROCEDURE — 40000193 ZZH STATISTIC PT WARD VISIT

## 2018-08-20 PROCEDURE — 25000128 H RX IP 250 OP 636: Performed by: PHYSICIAN ASSISTANT

## 2018-08-20 PROCEDURE — 25000125 ZZHC RX 250: Performed by: STUDENT IN AN ORGANIZED HEALTH CARE EDUCATION/TRAINING PROGRAM

## 2018-08-20 RX ADMIN — PREDNISONE 5 MG: 5 TABLET ORAL at 09:26

## 2018-08-20 RX ADMIN — SULFAMETHOXAZOLE AND TRIMETHOPRIM 80 MG: 200; 40 SUSPENSION ORAL at 09:27

## 2018-08-20 RX ADMIN — OXYCODONE HYDROCHLORIDE 2.5 MG: 5 SOLUTION ORAL at 23:15

## 2018-08-20 RX ADMIN — CHLORHEXIDINE GLUCONATE 0.12% ORAL RINSE 15 ML: 1.2 LIQUID ORAL at 09:48

## 2018-08-20 RX ADMIN — BACITRACIN: 500 OINTMENT TOPICAL at 20:25

## 2018-08-20 RX ADMIN — Medication 1 PACKET: at 15:10

## 2018-08-20 RX ADMIN — CHLORHEXIDINE GLUCONATE 0.12% ORAL RINSE 15 ML: 1.2 LIQUID ORAL at 20:06

## 2018-08-20 RX ADMIN — BACITRACIN: 500 OINTMENT TOPICAL at 09:49

## 2018-08-20 RX ADMIN — DEXTROSE AND SODIUM CHLORIDE 1000 ML: 5; 900 INJECTION, SOLUTION INTRAVENOUS at 06:26

## 2018-08-20 RX ADMIN — Medication 1 PACKET: at 09:25

## 2018-08-20 RX ADMIN — OXYCODONE HYDROCHLORIDE 2.5 MG: 5 SOLUTION ORAL at 09:25

## 2018-08-20 RX ADMIN — LOPERAMIDE HYDROCHLORIDE 2 MG: 1 SOLUTION ORAL at 21:33

## 2018-08-20 RX ADMIN — FLUDROCORTISONE ACETATE 0.05 MG: 0.1 TABLET ORAL at 09:26

## 2018-08-20 RX ADMIN — QUETIAPINE FUMARATE 50 MG: 50 TABLET ORAL at 22:19

## 2018-08-20 RX ADMIN — ENOXAPARIN SODIUM 40 MG: 100 INJECTION SUBCUTANEOUS at 09:28

## 2018-08-20 RX ADMIN — SODIUM BICARBONATE 650 MG TABLET 650 MG: at 20:06

## 2018-08-20 RX ADMIN — Medication 1 PACKET: at 20:07

## 2018-08-20 RX ADMIN — ATORVASTATIN CALCIUM 40 MG: 40 TABLET ORAL at 20:06

## 2018-08-20 RX ADMIN — MYCOPHENOLATE MOFETIL 1000 MG: 200 POWDER, FOR SUSPENSION ORAL at 09:27

## 2018-08-20 RX ADMIN — MULTIVITAMIN 15 ML: LIQUID ORAL at 09:28

## 2018-08-20 RX ADMIN — LOPERAMIDE HYDROCHLORIDE 2 MG: 1 SOLUTION ORAL at 01:24

## 2018-08-20 RX ADMIN — SODIUM BICARBONATE 650 MG TABLET 650 MG: at 09:26

## 2018-08-20 RX ADMIN — SODIUM BICARBONATE 650 MG TABLET 650 MG: at 15:11

## 2018-08-20 RX ADMIN — ESCITALOPRAM OXALATE 20 MG: 5 SOLUTION ORAL at 09:27

## 2018-08-20 RX ADMIN — TACROLIMUS 3.5 MG: 5 CAPSULE ORAL at 09:25

## 2018-08-20 RX ADMIN — OXYCODONE HYDROCHLORIDE 2.5 MG: 5 SOLUTION ORAL at 16:39

## 2018-08-20 RX ADMIN — Medication 1 MG: at 09:27

## 2018-08-20 RX ADMIN — VALGANCICLOVIR HYDROCHLORIDE 450 MG: 50 POWDER, FOR SOLUTION ORAL at 09:25

## 2018-08-20 RX ADMIN — MYCOPHENOLATE MOFETIL 1000 MG: 200 POWDER, FOR SUSPENSION ORAL at 20:06

## 2018-08-20 RX ADMIN — Medication 20 MG: at 09:27

## 2018-08-20 RX ADMIN — TACROLIMUS 3.5 MG: 5 CAPSULE ORAL at 20:06

## 2018-08-20 ASSESSMENT — PAIN DESCRIPTION - DESCRIPTORS
DESCRIPTORS: ACHING
DESCRIPTORS: ACHING
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
## 2018-08-20 NOTE — PROGRESS NOTES
1. Maintain oxygen sats >94% on room air -- Yes  2. Pain controlled on oral pain medication -- Yes. No pain meds required from 3344-4123  3. Tolerating tube feeds. -- No orders  4. Voiding spontaneously -- Yes  5. Monitor vitals -- Yes  6. Ambulating independently -- Yes  7. Safe discharge plan in place -- No

## 2018-08-20 NOTE — PROGRESS NOTES
Observation goals:    1. Maintain oxygen sats >94% on room air  - MET  2. Pain controlled on oral pain medication - MET  3. Tolerating tube feeds - MET  4. Voiding spontaneously - MET  5. Monitor vitals - MET  6. Ambulating independently - MET  7. Safe discharge plan in place - NOT MET, waiting for orders to transfer back to TCU

## 2018-08-20 NOTE — PROGRESS NOTES
CLINICAL NUTRITION SERVICES     TF at goal rate of 85 ml/hr with good tolerance.    Interventions:  Adjusted TF regimen times back to usual schedule of 6pm-10am (16 hour cycle).    Adjusted FW to provide 180 ml q 4 hours (1080 ml/day) to meet full hydration needs.  Total fluids (TF + FW) will meet 2100 ml per day.     Danni Marley MS, RD, LD  Pager [phone]

## 2018-08-20 NOTE — PLAN OF CARE
Problem: Patient Care Overview  Goal: Plan of Care/Patient Progress Review  Outcome: Improving  /72 (BP Location: Left arm)  Pulse 95  Temp 97.5  F (36.4  C) (Oral)  Resp 18  Wt 50.6 kg (111 lb 8.8 oz)  SpO2 100%  BMI 16.01 kg/m2    1347-5541: A/Ox4. VSS on RA with trach dome. Up independently in room. Strict NPO. NJ with TF @ goal rate @ 85mL/hr; tolerating well. Pt requested Imodium x1 with anticipation of continued diarrhea related to tube feeds; no BM overnight. Urine output 600mL. Trach cares done. WOC consult to be completed today to assess trach flange and skin beneath. Pt tolerating trach well. Refuses suctioning. Oral cares completed. R PIV with D5NS @ 50mL/hr. Still needs a discharge plan.     1. Maintain oxygen sats >94% on room air -- Yes, on RA with trach dome  2. Pain controlled on oral pain medication -- Yes, pt did not require pain meds from 2623-2029  3. Tolerating tube feeds. -- No, waiting for TF orders  4. Voiding spontaneously -- Yes  5. Monitor vitals -- Yes  6. Ambulating independently -- Yes  7. Safe discharge plan in place --No

## 2018-08-20 NOTE — PLAN OF CARE
Problem: Patient Care Overview  Goal: Plan of Care/Patient Progress Review  Outcome: Improving  1. Maintain oxygen sats >94% on room air  - MET  2. Pain controlled on oral pain medication - MET  3. Tolerating tube feeds - MET  4. Voiding spontaneously - MET  5. Monitor vitals - MET  6. Ambulating independently - MET  7. Safe discharge plan in place - NOT MET, waiting for orders to transfer back to TCU      /72 (BP Location: Left arm)  Pulse 90  Temp 98.2  F (36.8  C) (Axillary)  Resp 18  Wt 50.6 kg (111 lb 8.8 oz)  SpO2 100%  BMI 16.01 kg/m2     0194-8374:    Alert and oriented x4. VSS, on RA with trach dome. Received oxy x1 for facial pain, with relief. Denies nausea. Voiding adequately, BM x1 today. Strict NPO. NJ with TF @ goal rate 85 mL/hr. Up independently in room. Worked with PT, walked around unit and up several stairs. R PIV saline locked. Will continue to monitor and follow plan of care.

## 2018-08-20 NOTE — PHARMACY-ADMISSION MEDICATION HISTORY
Admission medication history interview status for the 8/18/2018 admission is complete. See Epic admission navigator for allergy information, pharmacy, prior to admission medications and immunization status.     Medication history interview sources:  Mr Henning was admitted to [facility] from 7/29/18-8/3/18 and transferred to  transitional care from 8/13/18-8/17/18 prior to being transferred back to [facility] on 8/18/18. MAR from  transitional care unit used to verify last doses.    Changes made to PTA medication list (reason)  Added: none  Deleted: none  Changed: none    Additional medication history information (including reliability of information, actions taken by pharmacist):  -Tacrolimus was decreased from 3.5mg po bid to 2mg po bid on 8/17/18. Will follow up tacrolimus levels and adjust as needed while in the hospital.  -Hydromorphone 1-2mg PO q4h prn mod-severe pain was ordered on 8/17/18-8/18/18 and last administered on 8/18/18 @ 0817, but he had previously been on oxycodone.    Prior to Admission medications    Medication Sig Last Dose Taking? Auth Provider   acetaminophen (TYLENOL) 32 mg/mL solution Take 20.3 mLs (650 mg) by mouth every 4 hours as needed for mild pain or fever 8/16/2018 at 1036  Kaylee May APRN CNP   atorvastatin (LIPITOR) 40 MG tablet 1 tablet (40 mg) by Per Feeding Tube route every evening 8/16/2018 at 20:50  Kaylee May APRN CNP   bacitracin 500 UNIT/GM OINT Apply topically 2 times daily Apply to facial lacs BID. 8/18/2018 at 0818  Kaylee May APRN CNP   bacitracin 500 UNIT/GM OINT Apply topically 2 times daily Apply to urethral meatus. 8/18/2018 at 1030  Kaylee May APRN CNP   CELLCEPT (BRAND) 200 MG/ML SUSPENSION 5 mLs (1,000 mg) by Oral or Feeding Tube route 2 times daily 8/18/2018 at 0818  Kaylee May APRN CNP   chlorhexidine (PERIDEX) 0.12 % solution Swish and spit 15 mLs in mouth every 6 hours 8/18/2018 at 0818  Kaylee Mayn, APRN  CNP   enoxaparin (LOVENOX) 40 MG/0.4ML injection Inject 0.4 mLs (40 mg) Subcutaneous every 24 hours 8/18/2018 at 0629  Deep Coates PA-C   escitalopram (LEXAPRO) 5 MG/5ML solution Take 20 mLs (20 mg) by mouth daily 8/16/2018 at 0925  Kaylee May APRN CNP   fiber modular (NUTRISOURCE FIBER) packet 1 packet by Per Feeding Tube route 3 times daily 8/16/2018 at 2051  Deep Coates PA-C   fludrocortisone (FLORINEF) 0.1 MG tablet Take 0.5 tablets (0.05 mg) by mouth daily 8/16/2018 at 0922  Kaylee May APRN CNP   folic acid (FOLATE) 500 mcg/mL SOLN 2 mLs (1 mg) by Oral or Feeding Tube route daily 8/16/2018 at 0922  Kaylee May APRN CNP   loperamide (IMODIUM) 1 MG/5ML liquid 10 mLs (2 mg) by Per Feeding Tube route 4 times daily as needed for diarrhea 8/16/2018 at 1700  Deep Coates PA-C   multivitamins with minerals (CERTAVITE/CEROVITE) LIQD liquid 15 mLs by Per Feeding Tube route daily 8/16/2018 at 0926  Kaylee May APRN CNP   NONFORMULARY Salicylic acid 3% / 5-FU 4% in vanicream : Apply a thin layer to affected area once daily   Kaylee May APRN CNP   omeprazole (PRILOSEC) 2 mg/mL SUSP 10 mLs (20 mg) by Oral or Feeding Tube route every morning (before breakfast) 8/18/2018 at 0829  Kaylee May APRN CNP   oxyCODONE IR (ROXICODONE) 5 MG tablet Take 0.5 tablets (2.5 mg) by mouth every 6 hours as needed for moderate to severe pain 8/16/2018 at 2052  Deep Coates PA-C   polyethylene glycol (MIRALAX/GLYCOLAX) Packet 17 g by Oral or Feeding Tube route 2 times daily 8/6/2018 at 0744  Kaylee May APRN CNP   predniSONE (DELTASONE) 5 MG tablet Take 1 tablet (5 mg) by mouth daily 8/16/2018 at 0922  Kaylee May APRN CNP   QUEtiapine (SEROQUEL) 50 MG tablet Take 1 tablet (50 mg) by mouth At Bedtime 8/16/2018 at 2052  Kaylee May APRN CNP   sodium bicarbonate 650 MG tablet 1 tablet (650 mg) by Per Feeding Tube route 3  times daily 8/16/2018 at 2051  Deep Coates PA-C   sulfamethoxazole-trimethoprim (BACTRIM/SEPTRA) 400-80 MG per tablet Take 1 tablet by mouth daily 8/16/2018 at 0922  Kaylee May APRN CNP   tacrolimus (GENERIC EQUIVALENT) 1 mg/mL suspension 3.5 mLs (3.5 mg) by Oral or Feeding Tube route 2 times daily 8/18/2018 at 0818  Kaylee May APRN CNP   valGANciclovir (VALCYTE) 50 MG/ML SOLR solution 9 mLs (450 mg) by Oral or Feeding Tube route daily 8/18/2018 at 0818  Kaylee May APRN CNP     Medication history completed by: Flor Guthrie, Pharm.D., [facility]  Pager [phone]

## 2018-08-20 NOTE — PROGRESS NOTES
Post Transplant Patient Social Work Assessment     Patient Name: Gilles Henning III  : 1970  Age: 48 year old  MRN: [id number]  Date of transplant: 2018    Patient known to me from follow up in the transplant program.  Admitted on 2018 for repeat NJ tube placement.  Seen today to update assessment.      Presenting Information   Living Situation: Pt resides with his wife in a rental home in [place].  Functional Status: Prior to TCU stay pt was independent. Pt is now mostly independent (mobility wise), but is needing trach and TF teaching.  Cultural/Language/Spiritual Considerations: Pt is a male, english speaking.    Support System  Primary Support Person Wife Merline.  Other support:  Adult children.  Plan for support in immediate post-hospital period: Pts wife.    Health Care Directive  Decision Maker: Pt is his own decision maker.  Alternate Decision Maker: NOK wife.  Health Care Directive: Declined completing    Mental Health/Coping:   History of Mental Health: Pt has a history of PTSD, night terrors, bipolar affective disorder. Pt had a recent suicide attempt resulting in admission to the hospital.  History of Chemical Health: Quit drinking 6-7 years ago.  Current status: Pt had a recent suicide attempt (about a month ago). Per psychiatry note on 8/10 there were not SI concerns.  Coping: Pt seemed to be in good spirits. Pts wife is stressed out about the discharge plan and assumed pt could return to rehab. She was under the impression he would not be discharged until the weekend of  at least. She states their house is under construction and is not suitable for his return due to his medical complexity.  Services Needed/Recommended: Pt would benefit from outpatient therapy follow up. If returning home after this hospital stay and not going back to TCU discussed with team another psychiatry consult.    Financial   Income: Pt is on social security.  Impact of transplant on income: No  concerns.  Insurance and medication coverage: Pt has Medicare and BCBS.  Financial concerns: None.  Resources needed: None.    Discharge Plan   Patient and family discharge goal: Pts wife has concerns about him returning home right now due to the condition of their home (it is under construction). Pt and wife are agreeable to returning to TCU if possible.  Barriers to discharge: TF and trach teaching.    Education provided by CHELSEA: Social Work role inpatient setting, availability of support groups, parking information.    Assessment and recommendations and plan:  CHELSEA spoke with pts wife who is understandably overwhelmed with the discharge plan. She was under the impression pt could return to TCU and would not be discharged from there until 8/24 or 8/25. Their home is under construction so there is dust everywhere and the only working bathroom is down several stairs. CHELSEA updated her that rehab is reviewing pt to take him back for teaching before returning home. If they will not take him back we will figure out the teaching and discharge plan. She requested SW keep her in the loop which she was appreciative of.    CHELSEA updated pt as well. Pt asked that CHELSEA call Merline again and let her know that he is not disappointed he cannot return home right now (due to construction, which will hopefully be done by the end of the week) and that he knows teaching needs to be done before he can safely return home. Merline found relief that CHELSEA passed this info along and she was feeling less stressed after hearing this information.     CHELSEA will remain available regarding safe discharge plan.    ETTA Bee, [facility]  7A   Ph: [phone], Pager: [phone]

## 2018-08-20 NOTE — PROGRESS NOTES
1. Maintain oxygen sats >94% on room air -- Yes, on RA with trach dome  2. Pain controlled on oral pain medication -- Yes, pt did not require pain meds from 0963-2164  3. Tolerating tube feeds. -- No, waiting for TF orders  4. Voiding spontaneously -- Yes  5. Monitor vitals -- Yes  6. Ambulating independently -- Yes  7. Safe discharge plan in place --No
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
## 2018-08-20 NOTE — PLAN OF CARE
Problem: Patient Care Overview  Goal: Plan of Care/Patient Progress Review  Discharge Planner PT   Patient plan for discharge: TCU or home  Current status: Pt Cata bed mobility with HOB elevated, Cata sit<>stand to RW and IND sit<>stand to no AD, he ambulates x400' using 4WW (available at home) Cata + 20' no AD SBA/IND. He has no difficulty with stairs, navigates 5x4 with reciprocal pattern and single railing support IND, safe technique throughout. Pt feels confident in his ability to return home. His wife works part time and is available by phone in am and at home for assist in pm. Pt's bathroom currently being remodeled with no toilet available in their unit, but is able to access downstairs duplex bathroom for toileting needs until their unit bathroom finished in 1-2 days. Pt able to don gown and socks IND. VSS throughout with no complaints of fatigue, dizziness, or SOB. Pt encouraged to continue ambulating with nursing during hospital stay. PT to complete orders at this time as pt with no acute care needs.  Barriers to return to prior living situation: none  Recommendations for discharge: From a mobility standpoint, home with assist + HH PT   Rationale for recommendations: Pt demonstrates safe mobility with no concerns and good understanding for 4WW use for balance and seated rest breaks as needed. He has no acute care rehab needs at this time as he is Cata with 4WW for gait and transfers and has shower chair available at home for bathing needs with assist from anticipated home RN services. He would benefit from HH PT for higher level balance impairments and promote IND mobility to reduce caregiver burden.       Entered by: Thao Perez 08/20/2018 1:53 PM

## 2018-08-20 NOTE — PLAN OF CARE
Problem: Patient Care Overview  Goal: Discharge Needs Assessment  08/20/18 0586     Akila Oakley-Registered Nurse (Nursing)  Patient seen at bedside for NG cares. RD on self with flushing, and medication administration. Discussed anchoring tube and site cares.  information. Needs to learn how to use feeding pump. PLC appt. 8/21 for trach cares and use of feeding pump. Wife unable to attended appointments. Patient capable of doing all cares independently. Asked many relevant questions. Answered all teach-back questions appropriately.
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
## 2018-08-21 ENCOUNTER — HOSPITAL ENCOUNTER (INPATIENT)
Facility: SKILLED NURSING FACILITY | Age: 48
LOS: 6 days | Discharge: HOME | DRG: 560 | End: 2018-08-27
Attending: INTERNAL MEDICINE | Admitting: INTERNAL MEDICINE
Payer: MEDICARE

## 2018-08-21 VITALS
TEMPERATURE: 98.4 F | OXYGEN SATURATION: 100 % | SYSTOLIC BLOOD PRESSURE: 111 MMHG | RESPIRATION RATE: 18 BRPM | WEIGHT: 111.55 LBS | BODY MASS INDEX: 16.01 KG/M2 | DIASTOLIC BLOOD PRESSURE: 70 MMHG | HEART RATE: 94 BPM

## 2018-08-21 DIAGNOSIS — K59.09 OTHER CONSTIPATION: ICD-10-CM

## 2018-08-21 DIAGNOSIS — E43 SEVERE PROTEIN-CALORIE MALNUTRITION (H): ICD-10-CM

## 2018-08-21 DIAGNOSIS — Z94.0 KIDNEY TRANSPLANT RECIPIENT: ICD-10-CM

## 2018-08-21 DIAGNOSIS — K21.9 GASTROESOPHAGEAL REFLUX DISEASE WITHOUT ESOPHAGITIS: ICD-10-CM
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
## 2018-08-21 DIAGNOSIS — F31.9 BIPOLAR AFFECTIVE DISORDER, REMISSION STATUS UNSPECIFIED (H): ICD-10-CM

## 2018-08-21 DIAGNOSIS — E78.5 HYPERLIPIDEMIA LDL GOAL <100: ICD-10-CM

## 2018-08-21 DIAGNOSIS — Z46.59 ENCOUNTER FOR NASOJEJUNAL (NJ) TUBE PLACEMENT: ICD-10-CM

## 2018-08-21 LAB
ANION GAP SERPL CALCULATED.3IONS-SCNC: 8 MMOL/L (ref 3–14)
BUN SERPL-MCNC: 20 MG/DL (ref 7–30)
CALCIUM SERPL-MCNC: 9.3 MG/DL (ref 8.5–10.1)
CHLORIDE SERPL-SCNC: 108 MMOL/L (ref 94–109)
CO2 SERPL-SCNC: 24 MMOL/L (ref 20–32)
CREAT SERPL-MCNC: 0.95 MG/DL (ref 0.66–1.25)
ERYTHROCYTE [DISTWIDTH] IN BLOOD BY AUTOMATED COUNT: 16.2 % (ref 10–15)
GFR SERPL CREATININE-BSD FRML MDRD: 84 ML/MIN/1.7M2
GLUCOSE SERPL-MCNC: 118 MG/DL (ref 70–99)
HCT VFR BLD AUTO: 30.3 % (ref 40–53)
HGB BLD-MCNC: 9.8 G/DL (ref 13.3–17.7)
MAGNESIUM SERPL-MCNC: 1.6 MG/DL (ref 1.6–2.3)
MCH RBC QN AUTO: 31.6 PG (ref 26.5–33)
MCHC RBC AUTO-ENTMCNC: 32.3 G/DL (ref 31.5–36.5)
MCV RBC AUTO: 98 FL (ref 78–100)
PHOSPHATE SERPL-MCNC: 3 MG/DL (ref 2.5–4.5)
PLATELET # BLD AUTO: 302 10E9/L (ref 150–450)
POTASSIUM SERPL-SCNC: 4.8 MMOL/L (ref 3.4–5.3)
RBC # BLD AUTO: 3.1 10E12/L (ref 4.4–5.9)
SODIUM SERPL-SCNC: 140 MMOL/L (ref 133–144)
TACROLIMUS BLD-MCNC: 15.6 UG/L (ref 5–15)
TME LAST DOSE: ABNORMAL H
WBC # BLD AUTO: 4.4 10E9/L (ref 4–11)

## 2018-08-21 PROCEDURE — A9270 NON-COVERED ITEM OR SERVICE: HCPCS | Mod: GY | Performed by: NURSE PRACTITIONER

## 2018-08-21 PROCEDURE — 96372 THER/PROPH/DIAG INJ SC/IM: CPT

## 2018-08-21 PROCEDURE — 25000128 H RX IP 250 OP 636: Performed by: STUDENT IN AN ORGANIZED HEALTH CARE EDUCATION/TRAINING PROGRAM

## 2018-08-21 PROCEDURE — 25000131 ZZH RX MED GY IP 250 OP 636 PS 637: Mod: GY | Performed by: STUDENT IN AN ORGANIZED HEALTH CARE EDUCATION/TRAINING PROGRAM

## 2018-08-21 PROCEDURE — A9270 NON-COVERED ITEM OR SERVICE: HCPCS | Mod: GY | Performed by: SURGERY

## 2018-08-21 PROCEDURE — 36415 COLL VENOUS BLD VENIPUNCTURE: CPT | Performed by: PHYSICIAN ASSISTANT

## 2018-08-21 PROCEDURE — 83735 ASSAY OF MAGNESIUM: CPT | Performed by: PHYSICIAN ASSISTANT

## 2018-08-21 PROCEDURE — 00000146 ZZHCL STATISTIC GLUCOSE BY METER IP

## 2018-08-21 PROCEDURE — 80048 BASIC METABOLIC PNL TOTAL CA: CPT | Performed by: PHYSICIAN ASSISTANT

## 2018-08-21 PROCEDURE — 27210338 ZZH CIRCUIT HUMID FACE/TRACH MSK

## 2018-08-21 PROCEDURE — 25000132 ZZH RX MED GY IP 250 OP 250 PS 637: Mod: GY | Performed by: SURGERY

## 2018-08-21 PROCEDURE — 25000131 ZZH RX MED GY IP 250 OP 636 PS 637: Mod: GY | Performed by: NURSE PRACTITIONER

## 2018-08-21 PROCEDURE — 27210429 ZZH NUTRITION PRODUCT INTERMEDIATE LITER

## 2018-08-21 PROCEDURE — 40000047 ZZH STATISTIC CTO2 CONT OXYGEN TECH TIME EA 90 MIN

## 2018-08-21 PROCEDURE — A9270 NON-COVERED ITEM OR SERVICE: HCPCS | Mod: GY | Performed by: STUDENT IN AN ORGANIZED HEALTH CARE EDUCATION/TRAINING PROGRAM

## 2018-08-21 PROCEDURE — 25000125 ZZHC RX 250: Performed by: STUDENT IN AN ORGANIZED HEALTH CARE EDUCATION/TRAINING PROGRAM

## 2018-08-21 PROCEDURE — 84100 ASSAY OF PHOSPHORUS: CPT | Performed by: PHYSICIAN ASSISTANT

## 2018-08-21 PROCEDURE — 25000131 ZZH RX MED GY IP 250 OP 636 PS 637: Mod: GY | Performed by: PHYSICIAN ASSISTANT

## 2018-08-21 PROCEDURE — 85027 COMPLETE CBC AUTOMATED: CPT | Performed by: PHYSICIAN ASSISTANT

## 2018-08-21 PROCEDURE — 25000125 ZZHC RX 250: Performed by: NURSE PRACTITIONER

## 2018-08-21 PROCEDURE — 25000132 ZZH RX MED GY IP 250 OP 250 PS 637: Mod: GY | Performed by: STUDENT IN AN ORGANIZED HEALTH CARE EDUCATION/TRAINING PROGRAM

## 2018-08-21 PROCEDURE — 25000132 ZZH RX MED GY IP 250 OP 250 PS 637: Mod: GY | Performed by: NURSE PRACTITIONER

## 2018-08-21 PROCEDURE — 12000022 ZZH R&B SNF

## 2018-08-21 PROCEDURE — 80197 ASSAY OF TACROLIMUS: CPT | Performed by: PHYSICIAN ASSISTANT

## 2018-08-21 PROCEDURE — G0378 HOSPITAL OBSERVATION PER HR: HCPCS

## 2018-08-21 RX ORDER — LOPERAMIDE HYDROCHLORIDE 1 MG/5ML
2 SOLUTION ORAL 4 TIMES DAILY PRN
Status: DISCONTINUED | OUTPATIENT
Start: 2018-08-21 | End: 2018-08-27 | Stop reason: HOSPADM

## 2018-08-21 RX ORDER — SODIUM BICARBONATE 650 MG/1
650 TABLET ORAL 3 TIMES DAILY
Status: DISCONTINUED | OUTPATIENT
Start: 2018-08-21 | End: 2018-08-27 | Stop reason: HOSPADM

## 2018-08-21 RX ORDER — QUETIAPINE FUMARATE 50 MG/1
50 TABLET, FILM COATED ORAL AT BEDTIME
Status: DISCONTINUED | OUTPATIENT
Start: 2018-08-21 | End: 2018-08-23

## 2018-08-21 RX ORDER — ATORVASTATIN CALCIUM 20 MG/1
40 TABLET, FILM COATED ORAL EVERY EVENING
Status: DISCONTINUED | OUTPATIENT
Start: 2018-08-21 | End: 2018-08-23

## 2018-08-21 RX ORDER — OXYCODONE HCL 5 MG/5 ML
2.5 SOLUTION, ORAL ORAL EVERY 6 HOURS PRN
Status: DISCONTINUED | OUTPATIENT
Start: 2018-08-21 | End: 2018-08-22

## 2018-08-21 RX ORDER — SULFAMETHOXAZOLE AND TRIMETHOPRIM 200; 40 MG/5ML; MG/5ML
80 SUSPENSION ORAL DAILY
Status: DISCONTINUED | OUTPATIENT
Start: 2018-08-22 | End: 2018-08-27 | Stop reason: HOSPADM

## 2018-08-21 RX ORDER — MYCOPHENOLATE MOFETIL 200 MG/ML
1000 POWDER, FOR SUSPENSION ORAL 2 TIMES DAILY
Status: DISCONTINUED | OUTPATIENT
Start: 2018-08-21 | End: 2018-08-21

## 2018-08-21 RX ORDER — VALGANCICLOVIR HYDROCHLORIDE 50 MG/ML
900 POWDER, FOR SOLUTION ORAL DAILY
Status: DISCONTINUED | OUTPATIENT
Start: 2018-08-22 | End: 2018-08-21 | Stop reason: HOSPADM

## 2018-08-21 RX ORDER — GUAR GUM
1 PACKET (EA) ORAL 3 TIMES DAILY
Status: DISCONTINUED | OUTPATIENT
Start: 2018-08-21 | End: 2018-08-22 | Stop reason: CLARIF

## 2018-08-21 RX ORDER — MAGNESIUM HYDROXIDE 1200 MG/15ML
LIQUID ORAL
Status: DISPENSED
Start: 2018-08-21 | End: 2018-08-22

## 2018-08-21 RX ORDER — BACITRACIN ZINC 500 [USP'U]/G
OINTMENT TOPICAL 2 TIMES DAILY
Status: DISCONTINUED | OUTPATIENT
Start: 2018-08-21 | End: 2018-08-27 | Stop reason: HOSPADM

## 2018-08-21 RX ORDER — ACETAMINOPHEN 650 MG/1
650 SUPPOSITORY RECTAL EVERY 4 HOURS PRN
Status: DISCONTINUED | OUTPATIENT
Start: 2018-08-21 | End: 2018-08-22

## 2018-08-21 RX ORDER — VALGANCICLOVIR HYDROCHLORIDE 50 MG/ML
450 POWDER, FOR SOLUTION ORAL DAILY
Status: DISCONTINUED | OUTPATIENT
Start: 2018-08-22 | End: 2018-08-27 | Stop reason: HOSPADM

## 2018-08-21 RX ORDER — PREDNISONE 5 MG/1
5 TABLET ORAL DAILY
Status: DISCONTINUED | OUTPATIENT
Start: 2018-08-22 | End: 2018-08-21

## 2018-08-21 RX ORDER — VALGANCICLOVIR HYDROCHLORIDE 50 MG/ML
450 POWDER, FOR SOLUTION ORAL DAILY
Status: DISCONTINUED | OUTPATIENT
Start: 2018-08-22 | End: 2018-08-21

## 2018-08-21 RX ORDER — PREDNISONE 5 MG/1
5 TABLET ORAL DAILY
Status: DISCONTINUED | OUTPATIENT
Start: 2018-08-22 | End: 2018-08-27 | Stop reason: HOSPADM

## 2018-08-21 RX ORDER — CHLORHEXIDINE GLUCONATE ORAL RINSE 1.2 MG/ML
15 SOLUTION DENTAL EVERY 6 HOURS
Status: DISCONTINUED | OUTPATIENT
Start: 2018-08-21 | End: 2018-08-23

## 2018-08-21 RX ORDER — GINSENG 100 MG
CAPSULE ORAL 2 TIMES DAILY
Status: DISCONTINUED | OUTPATIENT
Start: 2018-08-21 | End: 2018-08-27 | Stop reason: HOSPADM

## 2018-08-21 RX ORDER — MYCOPHENOLATE MOFETIL 200 MG/ML
1000 POWDER, FOR SUSPENSION ORAL 2 TIMES DAILY
Status: DISCONTINUED | OUTPATIENT
Start: 2018-08-21 | End: 2018-08-23

## 2018-08-21 RX ORDER — SULFAMETHOXAZOLE AND TRIMETHOPRIM 400; 80 MG/1; MG/1
1 TABLET ORAL DAILY
Status: DISCONTINUED | OUTPATIENT
Start: 2018-08-22 | End: 2018-08-21

## 2018-08-21 RX ORDER — ESCITALOPRAM OXALATE 5 MG/5ML
20 SOLUTION ORAL DAILY
Status: DISCONTINUED | OUTPATIENT
Start: 2018-08-22 | End: 2018-08-23

## 2018-08-21 RX ORDER — ESCITALOPRAM OXALATE 5 MG/5ML
20 SOLUTION ORAL DAILY
Status: DISCONTINUED | OUTPATIENT
Start: 2018-08-22 | End: 2018-08-21

## 2018-08-21 RX ORDER — QUETIAPINE FUMARATE 50 MG/1
50 TABLET, FILM COATED ORAL AT BEDTIME
Status: DISCONTINUED | OUTPATIENT
Start: 2018-08-21 | End: 2018-08-21

## 2018-08-21 RX ADMIN — MULTIVITAMIN 15 ML: LIQUID ORAL at 08:02

## 2018-08-21 RX ADMIN — SODIUM BICARBONATE 650 MG TABLET 650 MG: at 21:00

## 2018-08-21 RX ADMIN — Medication 1 PACKET: at 08:04

## 2018-08-21 RX ADMIN — OXYCODONE HYDROCHLORIDE 2.5 MG: 5 SOLUTION ORAL at 11:59

## 2018-08-21 RX ADMIN — FLUDROCORTISONE ACETATE 0.05 MG: 0.1 TABLET ORAL at 07:58

## 2018-08-21 RX ADMIN — Medication 1 MG: at 07:56

## 2018-08-21 RX ADMIN — CHLORHEXIDINE GLUCONATE 0.12% ORAL RINSE 15 ML: 1.2 LIQUID ORAL at 07:58

## 2018-08-21 RX ADMIN — QUETIAPINE FUMARATE 50 MG: 50 TABLET ORAL at 21:03

## 2018-08-21 RX ADMIN — SODIUM BICARBONATE 650 MG TABLET 650 MG: at 17:51

## 2018-08-21 RX ADMIN — BACITRACIN: 500 OINTMENT TOPICAL at 08:12

## 2018-08-21 RX ADMIN — Medication 1.5 MG: at 17:52

## 2018-08-21 RX ADMIN — Medication 20 MG: at 07:57

## 2018-08-21 RX ADMIN — OXYCODONE HYDROCHLORIDE 2.5 MG: 5 SOLUTION ORAL at 21:00

## 2018-08-21 RX ADMIN — Medication 1 PACKET: at 21:01

## 2018-08-21 RX ADMIN — ATORVASTATIN CALCIUM 40 MG: 20 TABLET, FILM COATED ORAL at 21:00

## 2018-08-21 RX ADMIN — ENOXAPARIN SODIUM 40 MG: 100 INJECTION SUBCUTANEOUS at 08:01

## 2018-08-21 RX ADMIN — VALGANCICLOVIR HYDROCHLORIDE 450 MG: 50 POWDER, FOR SOLUTION ORAL at 07:58

## 2018-08-21 RX ADMIN — CHLORHEXIDINE GLUCONATE 15 ML: 1.2 RINSE ORAL at 17:51

## 2018-08-21 RX ADMIN — Medication 1 PACKET: at 17:52

## 2018-08-21 RX ADMIN — MYCOPHENOLATE MOFETIL 1000 MG: 200 POWDER, FOR SUSPENSION ORAL at 21:00

## 2018-08-21 RX ADMIN — ESCITALOPRAM OXALATE 20 MG: 5 SOLUTION ORAL at 07:57

## 2018-08-21 RX ADMIN — SULFAMETHOXAZOLE AND TRIMETHOPRIM 80 MG: 200; 40 SUSPENSION ORAL at 07:57

## 2018-08-21 RX ADMIN — MYCOPHENOLATE MOFETIL 1000 MG: 200 POWDER, FOR SUSPENSION ORAL at 07:57

## 2018-08-21 RX ADMIN — BACITRACIN: 500 OINTMENT TOPICAL at 21:01

## 2018-08-21 RX ADMIN — LOPERAMIDE HYDROCHLORIDE 2 MG: 1 SOLUTION ORAL at 12:00

## 2018-08-21 RX ADMIN — CHLORHEXIDINE GLUCONATE 15 ML: 1.2 RINSE ORAL at 21:00

## 2018-08-21 RX ADMIN — TACROLIMUS 3.5 MG: 5 CAPSULE ORAL at 07:58

## 2018-08-21 RX ADMIN — LOPERAMIDE HYDROCHLORIDE 2 MG: 1 SOLUTION ORAL at 21:57

## 2018-08-21 RX ADMIN — SODIUM BICARBONATE 650 MG TABLET 650 MG: at 08:03

## 2018-08-21 RX ADMIN — PREDNISONE 5 MG: 5 TABLET ORAL at 07:57

## 2018-08-21 ASSESSMENT — ACTIVITIES OF DAILY LIVING (ADL)
FALL_HISTORY_WITHIN_LAST_SIX_MONTHS: NO
SWALLOWING: 2-->DIFFICULTY SWALLOWING LIQUIDS/FOODS
COGNITION: 0 - NO COGNITION ISSUES REPORTED
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
## 2018-08-21 NOTE — DISCHARGE INSTRUCTIONS
[facility]---  Use [facility] for enteral feeds and they were going to use [facility]  in [place], [place]

## 2018-08-21 NOTE — IP AVS SNAPSHOT
` `     TR TRANSITIONAL CARE: [phone]            Medication Administration Report for Gilles Henning III as of 08/23/18 1044   Legend:    Given Hold Not Given Due Canceled Entry Other Actions    Time Time (Time) Time  Time-Action       Inactive    Active    Linked        Medications 08/17/18 08/18/18 08/19/18 08/20/18 08/21/18 08/22/18 08/23/18    acetaminophen (TYLENOL) solution 975 mg  Dose: 975 mg  Freq: EVERY 8 HOURS SCHEDULED Route: PER FEEDING   Start: 08/22/18 1445   Admin Instructions: Maximum acetaminophen dose from all sources= 75 mg/kg/day not to exceed 4 grams/day.    Admin. Amount: 975 mg = 30.45 mL Conc: 32 mg/mL  Last Admin: 08/23/18 0654  Dispense Loc: [facility]  Volume: 30.4688 mL          (1504)-Not Given       2004 (975 mg)-Given               0654 (975 mg)-Given       [ ] 1400       [ ] 2200           atorvastatin (LIPITOR) tablet 40 mg  Dose: 40 mg  Freq: EVERY EVENING Route: PER FEEDING   Start: 08/21/18 2100   Admin. Amount: 2 tablet (2 × 20 mg tablet)  Last Admin: 08/22/18 2005  Dispense Loc: [facility]         2100 (40 mg)-Given        2005 (40 mg)-Given        [ ] 2100           bacitracin ointment  Freq: 2 TIMES DAILY Route: Top  Start: 08/21/18 2000   Admin Instructions: Apply to urethral meatus.    Last Admin: 08/23/18 0826  Dispense Loc: [facility]         2101 ( )-Given        0820 ( )-Given       2235 ( )-Given        0826 ( )-Given       [ ] 2100           bacitracin ointment  Freq: 2 TIMES DAILY Route: Top  Start: 08/21/18 2000   Admin Instructions: Apply to facial lacerations.    Last Admin: 08/23/18 0826  Dispense Loc: [facility]         (2314)-Not Given        0822 ( )-Given       2235 ( )-Given        0826 ( )-Given       [ ] 2100           chlorhexidine (PERIDEX) 0.12 % solution 15 mL  Dose: 15 mL  Freq: 4 TIMES DAILY Route: SWISH & SPIT  Start: 08/23/18 0815   Admin. Amount: 15 mL  Last Admin: 08/23/18  0826  Dispense Loc: [facility]  Volume: 15 mL           0826 (15 mL)-Given       [ ] 1300       [ ] 1800       [ ] 2200           dextrose 10 % 1,000 mL infusion  Freq: CONTINUOUS PRN Route: IV  PRN Comment: Hypoglycemia prevention  Start: 08/21/18 1545   Admin Instructions: For Hypoglycemia Prevention for patients on long-acting subcutaneous basal insulin (Glargine, Detemir, NPH) or continuous insulin infusion. Whenever nutrition support is held or interrupted:   1) Infuse IV D10W at nutrition support rate  2) Notify provider for further instructions    Dispense Loc: [facility]  Volume: 1,000 mL   Mixture Administration Information:   Medication Type Amount   dextrose 10 % SOLN Base 1,000 mL                       enoxaparin (LOVENOX) injection 40 mg  Dose: 40 mg  Freq: EVERY 24 HOURS Route: SC  Indications Comment: dvt prophylaxis  Start: 08/22/18 0800   Admin. Amount: 40 mg = 0.4 mL Conc: 40 mg/0.4 mL  Last Admin: 08/23/18 0829  Dispense Loc: [facility]  Volume: 0.4 mL          0826 (40 mg)-Given        0829 (40 mg)-Given           escitalopram (LEXAPRO) solution 20 mg  Dose: 20 mg  Freq: DAILY Route: PER FEEDING   Start: 08/22/18 0800   Admin. Amount: 20 mg = 20 mL Conc: 1 mg/mL  Last Admin: 08/23/18 0829  Dispense Loc: [facility]  Volume: 20 mL          0818 (20 mg)-Given        0829 (20 mg)-Given           fludrocortisone (FLORINEF) half-tab 0.05 mg  Dose: 0.05 mg  Freq: DAILY Route: PER FEEDING   Start: 08/22/18 0800   Admin. Amount: 1 half-tab (1 × 0.05 mg half-tab)  Last Admin: 08/23/18 0829  Dispense Loc: [facility]          0822 (0.05 mg)-Given        0829 (0.05 mg)-Given           folic acid (FOLATE) oral solution 1 mg  Dose: 1 mg  Freq: DAILY Route: PER FEEDING   Start: 08/22/18 0800   Admin. Amount: 1 mg = 2 mL Conc: 0.5 mg/mL  Last Admin: 08/23/18 0828  Dispense Loc: [facility]  Volume: 2 mL          0818  (1 mg)-Given        0828 (1 mg)-Given           loperamide (IMODIUM) liquid 2 mg  Dose: 2 mg  Freq: 4 TIMES DAILY PRN Route: PER FEEDING   PRN Reason: diarrhea  Start: 08/21/18 1454   Admin. Amount: 2 mg = 10 mL Conc: 0.2 mg/mL  Last Admin: 08/22/18 1350  Dispense Loc: [facility]  Volume: 10 mL         2157 (2 mg)-Given        1350 (2 mg)-Given            medication instructions  Freq: CONTINUOUS PRN Route: XX  Start: 08/21/18 1454   Admin Instructions: Routine PRN Nurse may request from Pharmacy a change of form of medication (e.g. Liquid to tablet).      Dispense Loc: [facility]               multivitamins with minerals (CERTAVITE/CEROVITE) liquid 15 mL  Dose: 15 mL  Freq: DAILY Route: PER FEEDING   Start: 08/22/18 0800   Admin. Amount: 15 mL  Last Admin: 08/23/18 0828  Dispense Loc: [facility]  Volume: 15 mL          0826 (15 mL)-Given        0828 (15 mL)-Given           mycophenolate (CELLCEPT BRAND) suspension 1,000 mg  Dose: 1,000 mg  Freq: 2 TIMES DAILY Route: PER FEEDING   Start: 08/21/18 2100   Admin Instructions: Check if BLOOD LEVEL is needed BEFORE administering dose. Shake well.  This order is specifically written for CELLCEPT (BRAND NAME).    When possible, take 1 to 2 hours apart from any product containing magnesium or aluminum.    Admin. Amount: 1,000 mg = 5 mL Conc: 200 mg/mL  Last Admin: 08/23/18 0829  Dispense Loc: [facility]  Volume: 5 mL         2100 (1,000 mg)-Given        0819 (1,000 mg)-Given       2007 (1,000 mg)-Given        0829 (1,000 mg)-Given       [ ] 2100           omeprazole (priLOSEC) suspension 20 mg  Dose: 20 mg  Freq: EVERY MORNING BEFORE BREAKFAST Route: PER FEEDING   Start: 08/22/18 0730   Admin Instructions: Shake well.    Admin. Amount: 20 mg = 10 mL Conc: 2 mg/mL  Last Admin: 08/23/18 0654  Dispense Loc: [facility]  Volume: 10 mL          0649 (20 mg)-Given        0654 (20 mg)-Given           oxyCODONE  (ROXICODONE) solution 2.5-5 mg  Dose: 2.5-5 mg  Freq: EVERY 6 HOURS PRN Route: PER FEEDING   PRN Reason: moderate to severe pain  Start: 08/22/18 1442   Admin. Amount: 2.5-5 mg = 2.5-5 mL Conc: 1 mg/mL  Last Admin: 08/22/18 2004  [facility]  Volume: 5 mL          2004 (5 mg)-Given            predniSONE (DELTASONE) tablet 5 mg  Dose: 5 mg  Freq: DAILY Route: PER FEEDING   Start: 08/22/18 0800   Admin. Amount: 1 tablet (1 × 5 mg tablet)  Last Admin: 08/23/18 0830  [facility]          0826 (5 mg)-Given        0830 (5 mg)-Given           QUEtiapine (SEROquel) tablet 50 mg  Dose: 50 mg  Freq: AT BEDTIME Route: PER FEEDING   Start: 08/21/18 2200   Admin. Amount: 1 tablet (1 × 50 mg tablet)  Last Admin: 08/22/18 2235  [facility]         2103 (50 mg)-Given        2235 (50 mg)-Given        [ ] 2200           sodium bicarbonate tablet 650 mg  Dose: 650 mg  Freq: 3 TIMES DAILY Route: PER FEEDING   Indications Comment: supplement  Start: 08/21/18 1500   Admin. Amount: 1 tablet (1 × 650 mg tablet)  Last Admin: 08/23/18 0830  Dispense Loc: [facility]         1751 (650 mg)-Given       2100 (650 mg)-Given        0826 (650 mg)-Given       1345 (650 mg)-Given       2005 (650 mg)-Given        0830 (650 mg)-Given       [ ] 1400       [ ] 2000           sulfamethoxazole-trimethoprim (BACTRIM/SEPTRA) suspension 80 mg  Dose: 80 mg  Freq: DAILY Route: PER FEEDING   Indications Comment: PCP prophylaxis  Start: 08/22/18 0800   Admin. Amount: 80 mg = 10 mL Conc: 8 mg/mL  Last Admin: 08/23/18 0827  Dispense Loc: [facility]  Volume: 10 mL          0820 (80 mg)-Given        0827 (80 mg)-Given           tacrolimus (GENERIC EQUIVALENT) suspension 2.5 mg  Dose: 2.5 mg  Freq: 2 TIMES DAILY. Route: PO  Start: 08/22/18 0800   Admin Instructions: Shake well. Check if BLOOD LEVEL is needed BEFORE administering dose. Not  recommended to administer via jejunal route, but jejunal route may be used if that is only route available.  As this medication is not commercially available as a liquid, [employer] manufactures this product using tacrolimus (GENERIC EQUIV) capsules.    Admin. Amount: 2.5 mg = 2.5 mL Conc: 1 mg/mL  Last Admin: 18  Dispense Loc: [facility]  Volume: 2.5 mL          0820 (2.5 mg)-Given       1754 (2.5 mg)-Given        0828 (2.5 mg)-Given       [ ] 1800           tuberculin injection 5 Units  Dose: 5 Units  Freq: EVERY 21 DAYS Route: ID  Start: 18   End: 10/03/18 0859   Admin Instructions: For tuberculosis diagnostic without controls.  Read skin test in 48 to 72 hours.  Adjust reading time if needed based on time of administration.   Repeat Mantoux test 3 weeks after the initial test if result is negative.  If test is positive, RN charting the positive result should discontinue this order.    Admin. Amount: 5 Units = 0.1 mL Conc: 5 Units/0.1 mL  Last Admin: 18  Dispense Loc: [facility]  Administrations Remainin  Volume: 0.1 mL          0827 (5 Units)-Given            valGANciclovir (VALCYTE) solution 450 mg  Dose: 450 mg  Freq: DAILY Route: PER NG TUBE  Indications of Use: CYTOMEGALOVIRUS DISEASE PHARMACOPROPHYLAXIS  Start: 18   Admin Instructions: This is a clear to light-brown solution.    Admin. Amount: 450 mg = 9 mL Conc: 50 mg/mL  Last Admin: 18  Dispense Loc: [facility]  Volume: 9 mL          0819 (450 mg)-Given        0828 (450 mg)-Given          Future Medications  Medications 18       - Skin Test Reading -  Freq: EVERY 21 DAYS Route: XX  Start: 18   End: 10/05/18 0859   Admin Instructions: For tuberculosis diagnostic:  Adjust reading time if needed based on time of administration.  Reading must occur between 48-72 hours after administration.    Dispense  Loc: [facility]  Administrations Remainin              Completed Medications  Medications 18         Dose: 1.5 mg  Freq: ONCE Route: PER FEEDING   Start: 18 1800   End: 18   Admin Instructions: Shake well. Check if BLOOD LEVEL is needed BEFORE administering dose. Not recommended to administer via jejunal route, but jejunal route may be used if that is only route available.  As this medication is not commercially available as a liquid,  [employer] manufactures this product using tacrolimus (GENERIC EQUIV) capsules.    Admin. Amount: 1.5 mg = 1.5 mL Conc: 1 mg/mL  Last Admin: 18  Dispense Loc: [facility]  Administrations Remainin  Volume: 1.5 mL         1752 (1.5 mg)-Given            Discontinued Medications  Medications 18         Dose: 650 mg  Freq: EVERY 4 HOURS PRN Route: PER FEEDING   PRN Reasons: mild pain,fever  Start: 18 1526   End: 18 1441   Admin Instructions: Maximum acetaminophen dose from all sources= 75 mg/kg/day not to exceed 4 grams/day.    Admin. Amount: 650 mg = 20.3 mL Conc: 32 mg/mL  Dispense Loc: [facility]  Volume: 20.3125 mL          1441-Med Discontinued          Dose: 650 mg  Freq: EVERY 4 HOURS PRN Route: PO  PRN Reasons: mild pain,fever  Start: 18 1454   End: 18 1527   Admin Instructions: Maximum acetaminophen dose from all sources= 75 mg/kg/day not to exceed 4 grams/day.    Admin. Amount: 650 mg = 20.3 mL Conc: 32 mg/mL  Dispense Loc: [facility]  Volume: 20.3125 mL         1527-Med Discontinued           Dose: 975 mg  Freq: EVERY 8 HOURS SCHEDULED Route: PO  Start: 18 1445   End: 18 1442   Admin Instructions: Maximum acetaminophen dose from all sources= 75 mg/kg/day not to exceed 4 grams/day.    Admin. Amount: 975 mg = 30.45 mL Conc: 32  mg/mL  Volume: 30.45 mL          1442-Med Discontinued          Dose: 650 mg  Freq: EVERY 4 HOURS PRN Route: RE  PRN Reasons: mild pain,fever  PRN Comment: greater than 101 degrees.  Start: 08/21/18 1454   End: 08/22/18 1441   Admin Instructions: Maximum acetaminophen dose from all sources = 75 mg/kg/day not to exceed 4 grams/day.    Admin. Amount: 1 suppository (1 × 650 mg suppository)  Dispense Loc: [facility]          1441-Med Discontinued          Dose: 15 mL  Freq: 4 TIMES DAILY Route: SWISH & SPIT  Start: 08/23/18 1300   End: 08/23/18 0802   Admin. Amount: 15 mL  Volume: 15 mL           0802-Med Discontinued         Dose: 15 mL  Freq: EVERY 6 HOURS Route: SWISH & SPIT  Start: 08/21/18 1500   End: 08/23/18 0802   Admin. Amount: 15 mL  Last Admin: 08/22/18 2004  Dispense Loc: [facility]  Volume: 15 mL                1751 (15 mL)-Given       2100 (15 mL)-Given        (0300)-Not Given       0826 (15 mL)-Given       1346 (15 mL)-Given              2004 (15 mL)-Given        (0654)-Not Given [C]       0802-Med Discontinued  (0826)-Not Given [C]             Dose: 20 mg  Freq: DAILY Route: PO  Start: 08/22/18 0800   End: 08/21/18 1526   Admin. Amount: 20 mg = 20 mL Conc: 1 mg/mL  Dispense Loc: [facility]  Volume: 20 mL         1526-Med Discontinued           Dose: 1 packet  Freq: 3 TIMES DAILY Route: PER FEEDING   Start: 08/21/18 1500   End: 08/22/18 1452   Admin Instructions: Mix each packet with 60 mL water before administering. SUPPLIED BY NUTRITION DEPARTMENT.    Admin. Amount: 1 packet  Last Admin: 08/22/18 1345  Dispense Loc: [facility]         1752 (1 packet)-Given       2101 (1 packet)-Given        0822 (1 packet)-Given       1345 (1 packet)-Given       1452-Med Discontinued          Dose: 0.05 mg  Freq: DAILY Route: PO  Start: 08/22/18 0800   End: 08/21/18 1526   Admin. Amount: 1 half-tab (1 × 0.05 mg half-tab)  Dispense Loc: [facility]         1526-Med  Discontinued           Dose: 1 mg  Freq: DAILY Route: ORAL OR FEED  Start: 08/22/18 0800   End: 08/21/18 1526   Admin. Amount: 1 mg = 2 mL Conc: 0.5 mg/mL  Dispense Loc: [facility]  Volume: 2 mL         1526-Med Discontinued           Dose: 1,000 mg  Freq: 2 TIMES DAILY Route: ORAL OR FEED  Start: 08/21/18 2100   End: 08/21/18 1526   Admin Instructions: Check if BLOOD LEVEL is needed BEFORE administering dose. Shake well.  This order is specifically written for CELLCEPT (BRAND NAME).    When possible, take 1 to 2 hours apart from any product containing magnesium or aluminum.    Admin. Amount: 1,000 mg = 5 mL Conc: 200 mg/mL  Volume: 5 mL         1526-Med Discontinued           Dose: 20 mg  Freq: EVERY MORNING BEFORE BREAKFAST Route: ORAL OR FEED  Start: 08/22/18 0730   End: 08/21/18 1526   Admin Instructions: Shake well.    Admin. Amount: 20 mg = 10 mL Conc: 2 mg/mL  Dispense Loc: [facility]  Volume: 10 mL         1526-Med Discontinued           Dose: 2.5 mg  Freq: EVERY 6 HOURS PRN Route: PER FEEDING   PRN Reason: moderate to severe pain  Start: 08/21/18 1800   End: 08/22/18 1442   Admin. Amount: 2.5 mg = 2.5 mL Conc: 1 mg/mL  Last Admin: 08/22/18 1146  [facility]  Volume: 5 mL         2100 (2.5 mg)-Given        1146 (2.5 mg)-Given       1442-Med Discontinued          Dose: 2.5 mg  Freq: EVERY 6 HOURS PRN Route: PO  PRN Reason: moderate to severe pain  Start: 08/21/18 1800   End: 08/21/18 1527   Admin. Amount: 1 half-tab (1 × 2.5 mg half-tab)         1527-Med Discontinued           Dose: 5 mg  Freq: DAILY Route: PO  Start: 08/22/18 0800   End: 08/21/18 1526   Admin. Amount: 1 tablet (1 × 5 mg tablet)  Dispense Loc: [facility]         1526-Med Discontinued           Dose: 50 mg  Freq: AT BEDTIME Route: PO  Start: 08/21/18 2200   End: 08/21/18 1526   Admin. Amount: 1 tablet (1 × 50 mg tablet)         1526-Med Discontinued           Start:  18   End: 18 1014   Admin Instructions: Chito Teresa : cabinet override    Administrations Remainin         6618 ( )-Handoff [C]               1014-Med Discontinued          Dose: 1 tablet  Freq: DAILY Route: PO  Indications Comment: PCP prophylaxis  Start: 18   End: 18 1526   Admin. Amount: 1 tablet         1526-Med Discontinued           Dose: 3.5 mg  Freq: 2 TIMES DAILY Route: PER FEEDING   Start: 18   End: 18   Admin Instructions: Shake well. Check if BLOOD LEVEL is needed BEFORE administering dose. Not recommended to administer via jejunal route, but jejunal route may be used if that is only route available.  As this medication is not commercially available as a liquid,  [employer] manufactures this product using tacrolimus (GENERIC EQUIV) capsules.    Admin. Amount: 3.5 mg = 3.5 mL Conc: 1 mg/mL  Volume: 3.5 mL         1539-Med Discontinued           Dose: 3.5 mg  Freq: 2 TIMES DAILY Route: ORAL OR FEED  Start: 18   End: 18   Admin Instructions: Shake well. Check if BLOOD LEVEL is needed BEFORE administering dose. Not recommended to administer via jejunal route, but jejunal route may be used if that is only route available.  As this medication is not commercially available as a liquid,  [employer] manufactures this product using tacrolimus (GENERIC EQUIV) capsules.    Admin. Amount: 3.5 mg = 3.5 mL Conc: 1 mg/mL  Volume: 3.5 mL         1526-Med Discontinued           Dose: 450 mg  Freq: DAILY Route: ORAL OR FEED  Indications of Use: CYTOMEGALOVIRUS DISEASE PHARMACOPROPHYLAXIS  Start: 18   End: 18   Admin Instructions: This is a clear to light-brown solution.    Admin. Amount: 450 mg = 9 mL Conc: 50 mg/mL  Volume: 9 mL         1526-Med Discontinued      Medications 18

## 2018-08-21 NOTE — IP AVS SNAPSHOT
[address] [address]    Phone:  [phone]                                       After Visit Summary   8/21/2018    Gilles Henning III    MRN: [id number]           After Visit Summary Signature Page     I have received my discharge instructions, and my questions have been answered. I have discussed any challenges I see with this plan with the nurse or doctor.    ..........................................................................................................................................  Patient/Patient Representative Signature      ..........................................................................................................................................  Patient Representative Print Name and Relationship to Patient    ..................................................               ................................................  Date                                            Time    ..........................................................................................................................................  Reviewed by Signature/Title    ...................................................              ..............................................  Date                                                            Time          22EPIC Rev 08/18
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
## 2018-08-21 NOTE — PROGRESS NOTES
Transplant Social Work Service Discharge Note      Patient Name:  Gilles Henning III     Anticipated Discharge Date:  8/21/2018    Discharge Disposition:   Transitional Care Unit  [facility] [address]  [phone]  [place] [address]     Plan for 24 hour care for immediate post transplant period: Rehab, then pts wife.    If not local, plans for short term stay:  Pt can return home after rehab.    Additional Services/Equipment Arranged:  CHELSEA arranged for WC ride via [employer] Transportation [phone] for 1pm.      Persons notified of above discharge plan:  Pt and wife, medical team.    Patient / Family response to discharge plan:  In agreement.    Education and resources provided by SW at discharge: role of transplant  in out patient setting and provided contact info for , availability of support groups.    Discussed anticipated pharmacy out of pocket costs: NO. Discussed after pts transplant admission.    Provided [employer] Donor Letter Writing packet : NO. Discussed after pts transplant admission.    Plan: Plan is for pt to go to  TCU to complete trach and pump teaching. He will then return home and follow up with his physicians near his home. He will also follow up with outpatient mental health once he returns home.    ETTA Bee, [facility]  7A   Ph: [phone], Pager: [phone]
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
## 2018-08-21 NOTE — PROGRESS NOTES
Transplant Social Work Services Progress Note      Date of Initial Social Work Evaluation: 8/20/2018  Collaborated with: Pts wife, [facility], financial counseling.    Data:  financial counseling is reviewing to see if pt might meet criteria for certain populations MA as a supplement to his other insurances. They will call pts wife later today. CHELSEA spoke with  rehab who is willing to take pt back today, WC ride scheduled for 1pm. Per pts wife the construction on their home will be completed on Monday. SW informed her that rehab will likely discharge him before Monday, depending on how teaching goes, and if they do discharge him before their home is done then SW could use transplant funds for a hotel since their house will not be suitable (medically) for pts return until Monday.  Pts wife also plans to make him a therapy appointment near their home for when pt comes home.  Intervention: SW spoke with  financial counseling regarding MA application. Updated pt and pts wife. Scheduled ride. Coordinated admission with [facility].  Assessment: Pts wife relieved he can return to [facility]. Tearful, but appreciative of SW checking on MA application and for keeping her updated on the plan.   Education provided by SW: Discharge plan, MA application.  Plan:    Discharge Plans in Progress: Pt is returning to [facility] today at 1pm.    Barriers to d/c plan: None.    Follow up Plan: SW will continue to follow if needs arise.    ETTA Bee, [facility]  7A   Ph: [phone], Pager: [phone]

## 2018-08-21 NOTE — PLAN OF CARE
Problem: Patient Care Overview  Goal: Individualization & Mutuality  Outcome: Adequate for Discharge Date Met: 08/21/18  /70  Pulse 94  Temp 98.4  F (36.9  C) (Axillary)  Resp 18  Wt 50.6 kg (111 lb 8.8 oz)  SpO2 100%  BMI 16.01 kg/m2   Pt A/Ox4. VSS on RA. Patient c/o pain and received oxycodone x1. Patient denies nausea. Urine Output - voiding in urinal without difficulty. Bowel Function - diarrhea 2x and received Imodium 2mgx1. Nutrition - NPO, TF at 85ml/h and pt wanted to keep till bag fineshed. Drains - trach tube Stephanialey #6 in place, inner cannula tube changed, PMV on and pt able to speak. Facial incision care completed and wounds healing. Activity - independent. This writer called and reported to ARU RN. Pt left the floor around 1300.

## 2018-08-21 NOTE — PLAN OF CARE
Observation Goals:    1. Maintain oxygen sats >94% on room air  - MET  2. Pain controlled on oral pain medication - MET  3. Tolerating tube feeds - MET  4. Voiding spontaneously - MET  5. Monitor vitals - MET  6. Ambulating independently - MET  7. Safe discharge plan in place - NOT MET, waiting for orders to transfer back to TCU    Problem: Patient Care Overview  Goal: Plan of Care/Patient Progress Review  Outcome: No Change  [id number]: A&O x 4; Afebrile, VSS sating 100% with trachdome; No BGs or significant labs; c/o facial pain, administered Oxy x 1; administered imodium x 1, per patient request; denies nausea and SOB; Inner cannula changed; Strict NPO, red ruby at bedside for suction; Isosource TF cycled from 1800 to 1000, via NJ at 85 mL/hr; R.PIV saline locked; Voiding; No BM overnight; Up SBA; Awaiting placement for TCU; Will continue to monitor and update Kidney service with any significant changes.

## 2018-08-21 NOTE — PLAN OF CARE
Patient is a 48 year old male  admitted to room 417 via wheelcahiar.  Patient is alert and oriented X 3. See Epic for VS and assessment.  Patient is able to transfer independently without any assistivedevice Patient was settled into their room, shown call light, tv, mealtimes etc. Oriented to unit. Will continue monitoring pain level and VS. Notifying MD with any concerns. Pt has a trache dome.  Pasymir valve in place while pt is awake and oxygen is applied when asleep at night. Independent with ambulation. NJ tube is in place, receives cycled tube feeding. Uses oral suctioning with red [place] at times. No complaints of discomfort at this time. Remains NPO. Speech is unclear at times, writes on paper on paper to make needs known. Follow MD orders for cares and medications.    Level of Schooling: some college  Ethnicity:  Marital Status:  Dentures: NO  Hearing Aid: NO  Smoker: NO  Glasses: NO  Occupation: Social Security  Falls 0-1 mo: No 2-6 mo: No   Advanced Care Directive Referral to Social Work YES/  NO
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
## 2018-08-22 LAB
ANION GAP SERPL CALCULATED.3IONS-SCNC: 10 MMOL/L (ref 3–14)
BUN SERPL-MCNC: 24 MG/DL (ref 7–30)
CALCIUM SERPL-MCNC: 9 MG/DL (ref 8.5–10.1)
CHLORIDE SERPL-SCNC: 110 MMOL/L (ref 94–109)
CO2 SERPL-SCNC: 22 MMOL/L (ref 20–32)
CREAT SERPL-MCNC: 1 MG/DL (ref 0.66–1.25)
GFR SERPL CREATININE-BSD FRML MDRD: 79 ML/MIN/1.7M2
GLUCOSE BLDC GLUCOMTR-MCNC: 100 MG/DL (ref 70–99)
GLUCOSE SERPL-MCNC: 116 MG/DL (ref 70–99)
POTASSIUM SERPL-SCNC: 5.2 MMOL/L (ref 3.4–5.3)
SODIUM SERPL-SCNC: 142 MMOL/L (ref 133–144)

## 2018-08-22 PROCEDURE — 97162 PT EVAL MOD COMPLEX 30 MIN: CPT | Mod: GP | Performed by: PHYSICAL THERAPIST

## 2018-08-22 PROCEDURE — 99304 1ST NF CARE SF/LOW MDM 25: CPT | Performed by: INTERNAL MEDICINE

## 2018-08-22 PROCEDURE — 36416 COLLJ CAPILLARY BLOOD SPEC: CPT | Performed by: NURSE PRACTITIONER

## 2018-08-22 PROCEDURE — 99207 ZZC APP CREDIT; MD BILLING SHARED VISIT: CPT | Performed by: NURSE PRACTITIONER

## 2018-08-22 PROCEDURE — 25000132 ZZH RX MED GY IP 250 OP 250 PS 637: Mod: GY | Performed by: NURSE PRACTITIONER

## 2018-08-22 PROCEDURE — 97110 THERAPEUTIC EXERCISES: CPT | Mod: GP | Performed by: PHYSICAL THERAPIST

## 2018-08-22 PROCEDURE — 80048 BASIC METABOLIC PNL TOTAL CA: CPT | Performed by: NURSE PRACTITIONER

## 2018-08-22 PROCEDURE — 40000193 ZZH STATISTIC PT WARD VISIT: Performed by: PHYSICAL THERAPIST

## 2018-08-22 PROCEDURE — 25000125 ZZHC RX 250: Performed by: NURSE PRACTITIONER

## 2018-08-22 PROCEDURE — 25000131 ZZH RX MED GY IP 250 OP 636 PS 637: Mod: GY | Performed by: NURSE PRACTITIONER

## 2018-08-22 PROCEDURE — 25000128 H RX IP 250 OP 636: Performed by: NURSE PRACTITIONER

## 2018-08-22 PROCEDURE — 25000131 ZZH RX MED GY IP 250 OP 636 PS 637: Mod: GY | Performed by: PHYSICIAN ASSISTANT

## 2018-08-22 PROCEDURE — A9270 NON-COVERED ITEM OR SERVICE: HCPCS | Mod: GY | Performed by: NURSE PRACTITIONER

## 2018-08-22 PROCEDURE — 99207 ZZC CDG-HISTORY COMP: MEETS EXP. PROBLEM FOCUSED-DOWN CODED LACK OF ROS: CPT | Performed by: INTERNAL MEDICINE

## 2018-08-22 PROCEDURE — 00000146 ZZHCL STATISTIC GLUCOSE BY METER IP

## 2018-08-22 PROCEDURE — 12000022 ZZH R&B SNF

## 2018-08-22 RX ORDER — OXYCODONE HCL 5 MG/5 ML
2.5-5 SOLUTION, ORAL ORAL EVERY 6 HOURS PRN
Status: DISCONTINUED | OUTPATIENT
Start: 2018-08-22 | End: 2018-08-27 | Stop reason: HOSPADM

## 2018-08-22 RX ADMIN — BACITRACIN ZINC: 500 OINTMENT TOPICAL at 22:35

## 2018-08-22 RX ADMIN — CHLORHEXIDINE GLUCONATE 15 ML: 1.2 RINSE ORAL at 13:46

## 2018-08-22 RX ADMIN — Medication 1 MG: at 08:18

## 2018-08-22 RX ADMIN — Medication 5 UNITS: at 08:27

## 2018-08-22 RX ADMIN — MYCOPHENOLATE MOFETIL 1000 MG: 200 POWDER, FOR SUSPENSION ORAL at 08:19

## 2018-08-22 RX ADMIN — SODIUM BICARBONATE 650 MG TABLET 650 MG: at 20:05

## 2018-08-22 RX ADMIN — Medication 1 PACKET: at 13:45

## 2018-08-22 RX ADMIN — Medication 0.05 MG: at 08:22

## 2018-08-22 RX ADMIN — OXYCODONE HYDROCHLORIDE 5 MG: 5 SOLUTION ORAL at 20:04

## 2018-08-22 RX ADMIN — QUETIAPINE FUMARATE 50 MG: 50 TABLET ORAL at 22:35

## 2018-08-22 RX ADMIN — MULTIVITAMIN 15 ML: LIQUID ORAL at 08:26

## 2018-08-22 RX ADMIN — LOPERAMIDE HYDROCHLORIDE 2 MG: 1 SOLUTION ORAL at 13:50

## 2018-08-22 RX ADMIN — BACITRACIN: 500 OINTMENT TOPICAL at 08:20

## 2018-08-22 RX ADMIN — BACITRACIN: 500 OINTMENT TOPICAL at 22:35

## 2018-08-22 RX ADMIN — SODIUM BICARBONATE 650 MG TABLET 650 MG: at 08:26

## 2018-08-22 RX ADMIN — CHLORHEXIDINE GLUCONATE 15 ML: 1.2 RINSE ORAL at 20:04

## 2018-08-22 RX ADMIN — MYCOPHENOLATE MOFETIL 1000 MG: 200 POWDER, FOR SUSPENSION ORAL at 20:07

## 2018-08-22 RX ADMIN — ESCITALOPRAM OXALATE 20 MG: 5 SOLUTION ORAL at 08:18

## 2018-08-22 RX ADMIN — Medication 1 PACKET: at 08:22

## 2018-08-22 RX ADMIN — VALGANCICLOVIR HYDROCHLORIDE 450 MG: 50 POWDER, FOR SOLUTION ORAL at 08:19

## 2018-08-22 RX ADMIN — BACITRACIN ZINC: 500 OINTMENT TOPICAL at 08:22

## 2018-08-22 RX ADMIN — ENOXAPARIN SODIUM 40 MG: 40 INJECTION SUBCUTANEOUS at 08:26

## 2018-08-22 RX ADMIN — SODIUM BICARBONATE 650 MG TABLET 650 MG: at 13:45

## 2018-08-22 RX ADMIN — Medication 20 MG: at 06:49

## 2018-08-22 RX ADMIN — Medication 2.5 MG: at 17:54

## 2018-08-22 RX ADMIN — CHLORHEXIDINE GLUCONATE 15 ML: 1.2 RINSE ORAL at 08:26

## 2018-08-22 RX ADMIN — ATORVASTATIN CALCIUM 40 MG: 20 TABLET, FILM COATED ORAL at 20:05

## 2018-08-22 RX ADMIN — OXYCODONE HYDROCHLORIDE 2.5 MG: 5 SOLUTION ORAL at 11:46

## 2018-08-22 RX ADMIN — PREDNISONE 5 MG: 5 TABLET ORAL at 08:26

## 2018-08-22 RX ADMIN — Medication 2.5 MG: at 08:20

## 2018-08-22 RX ADMIN — SULFAMETHOXAZOLE AND TRIMETHOPRIM 80 MG: 200; 40 SUSPENSION ORAL at 08:20

## 2018-08-22 RX ADMIN — ACETAMINOPHEN 975 MG: 160 LIQUID ORAL at 20:04
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
## 2018-08-22 NOTE — PLAN OF CARE
"08/22/18 1119     Aminah Martinez-Registered Nurse (Nursing)  8/22/18 PLC TEN very basic education was started on the [employer] 8/21/18.Today I saw the patient(pt)in the room for PLC trach and TEN education.Pt.correctly returned all trach and NG/Benjy pump(single and dual bag),water flushes,medication via NG per PLC written material using [facility] supplies on PLC model.Pt.was very \"hands on\" and motivated to learn.Pt.asked a few questions,able to answer and demonstrate all teach back,and verbalized understanding of content presented.Home care to follow.Pt stated his wife will be here on Sat and he wanted a PLC review appointment with his wife present.Sat.8/25/18 12:30 PLC appointment made.Pt.and staff are aware of this appointment.Pt.will continue to practice trach and TEN cares with nursing supervision.See education flow sheet.Above information discussed with 4 AR charge nurse and unit Care Coordinator(Chuck)  Written material given and reviewed today:caring For Your Trach,Benjy Pump,NG cares,Handwashing,Benjy Dual bag,PLC card.    "

## 2018-08-22 NOTE — PLAN OF CARE
Problem: Goal Outcome Summary  Goal: Goal Outcome Summary  Outcome: No Change  A&O x4. CMS and Neuros intact. Pt denied pain, nausea, SOB or any other new concerns. Pt is independent. Facial incisions clean and approximated. Pt voiding well. Passing flatus. Last BM: 8/21. Inner cannula trach changed at 0700. TF continued and well tolerated. Scheduled medication administered as ordered. Pt slept majority of shift. Call light within reach and pt able to make needs known.

## 2018-08-22 NOTE — PLAN OF CARE
Problem: Goal Outcome Summary  Goal: Goal Outcome Summary  Pt IND with bed mobility, transfers, gait without AD--can be IND walking on unit. Will establish IND with ex program and amb outdoors for safe discharge home. Discussed with pt will not need home PT then.

## 2018-08-22 NOTE — PLAN OF CARE
Problem: TCU Patient Goals  Goal: TCU Plan of Care  The patient is alert and oriented x 3. Able to make communicate his needs. Trach is intact with shiley #6. No suction needed this shift. Red aquino for oral suctioning post mouth rinse. Occasional drooling. Incision to jaw and face intact, well approximated, healing and open to air. Independent with self care. NJ tube is intact. Tolerating TF without difficulty. Medicated with oxycodone x 1 for pain control. Continue to monitor.
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
## 2018-08-22 NOTE — PLAN OF CARE
Problem: Goal Outcome Summary  Goal: Goal Outcome Summary  Outcome: No Change  TF off at 1000 per schedule. Remains NPO. See new order to feeding formula changed from Isosource 1.5 to Nutren. Change per RD due to loose stool. Water flushes provided per order.   Medicated with oxycodone per request.  Trach care including inner canula change at 1200. Trach kept capped all morning shift. Tolerated well.  Continue to monitor.

## 2018-08-22 NOTE — PROGRESS NOTES
08/22/18 1100   General Information   Patient Profile Review See Profile for full history and prior level of function   Daily Contact with Relatives or Friends Phone call;Visit   Pets Dog  (2 dogs)   Community Involvement   Community Involvement Disabled   Spiritual Practice Mandaeism   [facility] ? No   Hobbies/Interests   Cards (Rummy)   Word Puzzles Word Search;Crossword   Music   Music Preferences (Loves all kinds of music)   Media   Computer Will use patient computer   TV / Movies TV;Movie list   Reading Magazines;Books   Sports / Physical Activities   Outdoor Activities Lawn / yard care;Outdoor gardening;Hunting;Fishing   Sports/Exercise Walks  (likes hiking)   Sports Fan Baseball;Football;Basketball   Impression   Open to Socializing with Others Independent   Barriers to Leisure No barriers / independent   Patient, family and / or staff in agreement with Plan of Care Yes   Treatment Plan   Independent Activities Pt. is Indep on unit   Structured Groups Game groups;Social performances   Type of Intervention Independent with activity;1:1 intervention;Structured groups   One to One Therapeutic Intervention Hand massage;Volunteer   Equipment and Supplies While on Unit Movies   Assessment Pt. recently patient on this unit. Pt. will again be invited to group activities during stay on unit. Pt. is Indep with mobility on unit and will be encouraged to use leisure rooms during stay on unit. Pt. will be offered hand massage during stay.
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
## 2018-08-23 ENCOUNTER — TELEPHONE (OUTPATIENT)
Dept: TRANSPLANT | Facility: CLINIC | Age: 48
End: 2018-08-23

## 2018-08-23 LAB
ANION GAP SERPL CALCULATED.3IONS-SCNC: 8 MMOL/L (ref 3–14)
BUN SERPL-MCNC: 32 MG/DL (ref 7–30)
CALCIUM SERPL-MCNC: 9.4 MG/DL (ref 8.5–10.1)
CHLORIDE SERPL-SCNC: 108 MMOL/L (ref 94–109)
CO2 SERPL-SCNC: 26 MMOL/L (ref 20–32)
CREAT SERPL-MCNC: 1.26 MG/DL (ref 0.66–1.25)
ERYTHROCYTE [DISTWIDTH] IN BLOOD BY AUTOMATED COUNT: 15.6 % (ref 10–15)
GFR SERPL CREATININE-BSD FRML MDRD: 61 ML/MIN/1.7M2
GLUCOSE BLDC GLUCOMTR-MCNC: 125 MG/DL (ref 70–99)
GLUCOSE SERPL-MCNC: 97 MG/DL (ref 70–99)
HCT VFR BLD AUTO: 30.9 % (ref 40–53)
HGB BLD-MCNC: 9.9 G/DL (ref 13.3–17.7)
MAGNESIUM SERPL-MCNC: 1.7 MG/DL (ref 1.6–2.3)
MCH RBC QN AUTO: 31.1 PG (ref 26.5–33)
MCHC RBC AUTO-ENTMCNC: 32 G/DL (ref 31.5–36.5)
MCV RBC AUTO: 97 FL (ref 78–100)
PHOSPHATE SERPL-MCNC: 3.9 MG/DL (ref 2.5–4.5)
PLATELET # BLD AUTO: 283 10E9/L (ref 150–450)
POTASSIUM SERPL-SCNC: 5.1 MMOL/L (ref 3.4–5.3)
RBC # BLD AUTO: 3.18 10E12/L (ref 4.4–5.9)
SODIUM SERPL-SCNC: 142 MMOL/L (ref 133–144)
TACROLIMUS BLD-MCNC: 13.2 UG/L (ref 5–15)
TME LAST DOSE: NORMAL H
WBC # BLD AUTO: 4.7 10E9/L (ref 4–11)

## 2018-08-23 PROCEDURE — A9270 NON-COVERED ITEM OR SERVICE: HCPCS | Mod: GY | Performed by: INTERNAL MEDICINE

## 2018-08-23 PROCEDURE — 12000022 ZZH R&B SNF

## 2018-08-23 PROCEDURE — 40000225 ZZH STATISTIC SLP WARD VISIT: Performed by: SPEECH-LANGUAGE PATHOLOGIST

## 2018-08-23 PROCEDURE — 25000131 ZZH RX MED GY IP 250 OP 636 PS 637: Mod: GY | Performed by: INTERNAL MEDICINE

## 2018-08-23 PROCEDURE — 36415 COLL VENOUS BLD VENIPUNCTURE: CPT | Performed by: NURSE PRACTITIONER

## 2018-08-23 PROCEDURE — A9270 NON-COVERED ITEM OR SERVICE: HCPCS | Mod: GY | Performed by: NURSE PRACTITIONER

## 2018-08-23 PROCEDURE — 25000125 ZZHC RX 250: Performed by: NURSE PRACTITIONER

## 2018-08-23 PROCEDURE — 25000132 ZZH RX MED GY IP 250 OP 250 PS 637: Mod: GY | Performed by: INTERNAL MEDICINE

## 2018-08-23 PROCEDURE — 92522 EVALUATE SPEECH PRODUCTION: CPT | Mod: GN | Performed by: SPEECH-LANGUAGE PATHOLOGIST

## 2018-08-23 PROCEDURE — 25000132 ZZH RX MED GY IP 250 OP 250 PS 637: Mod: GY | Performed by: NURSE PRACTITIONER

## 2018-08-23 PROCEDURE — 25000131 ZZH RX MED GY IP 250 OP 636 PS 637: Mod: GY | Performed by: PHYSICIAN ASSISTANT

## 2018-08-23 PROCEDURE — 80048 BASIC METABOLIC PNL TOTAL CA: CPT | Performed by: NURSE PRACTITIONER

## 2018-08-23 PROCEDURE — 83735 ASSAY OF MAGNESIUM: CPT | Performed by: NURSE PRACTITIONER

## 2018-08-23 PROCEDURE — 97110 THERAPEUTIC EXERCISES: CPT | Mod: GP | Performed by: PHYSICAL THERAPIST

## 2018-08-23 PROCEDURE — 40000193 ZZH STATISTIC PT WARD VISIT: Performed by: PHYSICAL THERAPIST

## 2018-08-23 PROCEDURE — 84100 ASSAY OF PHOSPHORUS: CPT | Performed by: NURSE PRACTITIONER

## 2018-08-23 PROCEDURE — 80197 ASSAY OF TACROLIMUS: CPT | Performed by: NURSE PRACTITIONER

## 2018-08-23 PROCEDURE — 97116 GAIT TRAINING THERAPY: CPT | Mod: GP | Performed by: PHYSICAL THERAPIST

## 2018-08-23 PROCEDURE — 25000131 ZZH RX MED GY IP 250 OP 636 PS 637: Mod: GY | Performed by: NURSE PRACTITIONER

## 2018-08-23 PROCEDURE — 85027 COMPLETE CBC AUTOMATED: CPT | Performed by: NURSE PRACTITIONER

## 2018-08-23 PROCEDURE — 92507 TX SP LANG VOICE COMM INDIV: CPT | Mod: GN | Performed by: SPEECH-LANGUAGE PATHOLOGIST

## 2018-08-23 PROCEDURE — 25000128 H RX IP 250 OP 636: Performed by: NURSE PRACTITIONER

## 2018-08-23 RX ORDER — ESCITALOPRAM OXALATE 5 MG/5ML
20 SOLUTION ORAL DAILY
Status: DISCONTINUED | OUTPATIENT
Start: 2018-08-24 | End: 2018-08-27 | Stop reason: HOSPADM

## 2018-08-23 RX ORDER — CHLORHEXIDINE GLUCONATE ORAL RINSE 1.2 MG/ML
15 SOLUTION DENTAL 4 TIMES DAILY
Status: DISCONTINUED | OUTPATIENT
Start: 2018-08-23 | End: 2018-08-27 | Stop reason: HOSPADM

## 2018-08-23 RX ORDER — ATORVASTATIN CALCIUM 20 MG/1
40 TABLET, FILM COATED ORAL EVERY EVENING
Status: DISCONTINUED | OUTPATIENT
Start: 2018-08-23 | End: 2018-08-27 | Stop reason: HOSPADM

## 2018-08-23 RX ORDER — CHLORHEXIDINE GLUCONATE ORAL RINSE 1.2 MG/ML
15 SOLUTION DENTAL 4 TIMES DAILY
Status: DISCONTINUED | OUTPATIENT
Start: 2018-08-23 | End: 2018-08-23

## 2018-08-23 RX ORDER — MYCOPHENOLATE MOFETIL 200 MG/ML
1000 POWDER, FOR SUSPENSION ORAL 2 TIMES DAILY
Status: DISCONTINUED | OUTPATIENT
Start: 2018-08-23 | End: 2018-08-27 | Stop reason: HOSPADM

## 2018-08-23 RX ORDER — QUETIAPINE FUMARATE 50 MG/1
50 TABLET, FILM COATED ORAL AT BEDTIME
Status: DISCONTINUED | OUTPATIENT
Start: 2018-08-23 | End: 2018-08-27 | Stop reason: HOSPADM

## 2018-08-23 RX ADMIN — MULTIVITAMIN 15 ML: LIQUID ORAL at 08:28

## 2018-08-23 RX ADMIN — OXYCODONE HYDROCHLORIDE 5 MG: 5 SOLUTION ORAL at 12:39

## 2018-08-23 RX ADMIN — PREDNISONE 5 MG: 5 TABLET ORAL at 08:30

## 2018-08-23 RX ADMIN — Medication 20 MG: at 06:54

## 2018-08-23 RX ADMIN — MYCOPHENOLATE MOFETIL 1000 MG: 200 POWDER, FOR SUSPENSION ORAL at 21:48

## 2018-08-23 RX ADMIN — BACITRACIN: 500 OINTMENT TOPICAL at 08:26

## 2018-08-23 RX ADMIN — BACITRACIN ZINC: 500 OINTMENT TOPICAL at 21:47

## 2018-08-23 RX ADMIN — Medication 2.5 MG: at 18:42

## 2018-08-23 RX ADMIN — OXYCODONE HYDROCHLORIDE 5 MG: 5 SOLUTION ORAL at 20:23

## 2018-08-23 RX ADMIN — VALGANCICLOVIR HYDROCHLORIDE 450 MG: 50 POWDER, FOR SOLUTION ORAL at 08:28

## 2018-08-23 RX ADMIN — Medication 1 MG: at 08:28

## 2018-08-23 RX ADMIN — MYCOPHENOLATE MOFETIL 1000 MG: 200 POWDER, FOR SUSPENSION ORAL at 08:29

## 2018-08-23 RX ADMIN — ATORVASTATIN CALCIUM 40 MG: 20 TABLET, FILM COATED ORAL at 20:22

## 2018-08-23 RX ADMIN — BACITRACIN ZINC: 500 OINTMENT TOPICAL at 08:26

## 2018-08-23 RX ADMIN — ENOXAPARIN SODIUM 40 MG: 40 INJECTION SUBCUTANEOUS at 08:29

## 2018-08-23 RX ADMIN — QUETIAPINE FUMARATE 50 MG: 50 TABLET ORAL at 21:48

## 2018-08-23 RX ADMIN — CHLORHEXIDINE GLUCONATE 15 ML: 1.2 RINSE ORAL at 08:26

## 2018-08-23 RX ADMIN — SODIUM BICARBONATE 650 MG TABLET 650 MG: at 20:22

## 2018-08-23 RX ADMIN — ACETAMINOPHEN 975 MG: 160 LIQUID ORAL at 13:33

## 2018-08-23 RX ADMIN — CHLORHEXIDINE GLUCONATE 15 ML: 1.2 RINSE ORAL at 18:40

## 2018-08-23 RX ADMIN — SODIUM BICARBONATE 650 MG TABLET 650 MG: at 08:30

## 2018-08-23 RX ADMIN — CHLORHEXIDINE GLUCONATE 15 ML: 1.2 RINSE ORAL at 21:47

## 2018-08-23 RX ADMIN — SULFAMETHOXAZOLE AND TRIMETHOPRIM 80 MG: 200; 40 SUSPENSION ORAL at 08:27

## 2018-08-23 RX ADMIN — CHLORHEXIDINE GLUCONATE 15 ML: 1.2 RINSE ORAL at 13:33

## 2018-08-23 RX ADMIN — ESCITALOPRAM OXALATE 20 MG: 5 SOLUTION ORAL at 08:29

## 2018-08-23 RX ADMIN — Medication 2.5 MG: at 08:28

## 2018-08-23 RX ADMIN — Medication 0.05 MG: at 08:29

## 2018-08-23 RX ADMIN — ACETAMINOPHEN 975 MG: 160 LIQUID ORAL at 06:54

## 2018-08-23 RX ADMIN — ACETAMINOPHEN 975 MG: 160 LIQUID ORAL at 21:46

## 2018-08-23 RX ADMIN — BACITRACIN: 500 OINTMENT TOPICAL at 21:46

## 2018-08-23 RX ADMIN — SODIUM BICARBONATE 650 MG TABLET 650 MG: at 13:33

## 2018-08-23 NOTE — PLAN OF CARE
Problem: Goal Outcome Summary  Goal: Goal Outcome Summary  Outcome: No Change  No new issues or concerns. Appears to be sleeping well as noted during rounds. Has no c/o's or requests as of yet. Kady ponce in place. NPO - now on bolus TFs via NGT. Indep.transfers / ambulation and ADLs. Pt.has Dental appt.today - pickup @ 1100 via wheelchair.
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
## 2018-08-23 NOTE — PLAN OF CARE
Problem: Goal Outcome Summary  Goal: Goal Outcome Summary  Outcome: No Change  Left the unit for dental clinic and back now.   Pt gave him self TF x 2 including water flush @ 0800 and 1400 with 100% accuracy.   Trach care (dressing and inner canula change) was done by pt with this writer supervision. Trach kept capped all this shift. Tolerated well.   Medicated with oxycodone per request.   Independent with mobility and transfer.  Stool is getting formed a little bit per pt. No problem voiding.   Continue to monitor.

## 2018-08-23 NOTE — PLAN OF CARE
Problem: Goal Outcome Summary  Goal: Goal Outcome Summary  Completed RE-eval of communication using PMSV- Pt remains independent with donning and doffing PMSV and is able to tolerate wearing it continuously through out the day with maintaining O2 sats at 100% ( on room air; uses trach dome for humidity only)-- pt only removes the PMSV while sleeping. Pt is also able to pass air adequately around trach to achieve voicing even without the PMSV on.  Pt initally had a #8 shiley tracheostomy place on 7/28/18 and this was donwsized to a #6 Shiley ( cuffed but cuff deflated) on 8/11/18. Further completed a [facility] intellgibilty- dysarthria assessment today using a mofified version of the AIDS--- at sentence level- pt at 70% intelligiblity with reading out loud- pt was able to demosntrate slow rate of speaking and adequate loudness but articulatory precision is diminished overall and is most difficult for bilabial sounds: p,m, b, w, r-- pt has difficulty achieving full lip closure to produced these sounds. Pt is using strategies of pausising, slow rate, exaggerated articulation and spelling out words 1 letter at a time to aid the listener. REcommending that pt contnue with wearing the PMSV throughout the day as tolerated - removing while sleeping ; should also be removed if pt notes increae in SOB or O2 sats drop below 90%. Pt is further independent with donning and doffing and cleaning of the PMSV. Pt will also benefit from skilled SLP intevention to continue to work on improving speech intelligibility and oral motor excs. Currently pt remain NPO- has not yet been cleared from oral maxillo facial team for po-- previously was cleared to trial just thin water with speech- but pt decliniing 2/2 ongoing pain. Next steps also are for trach capping once trach is further downsized by team.

## 2018-08-23 NOTE — PROGRESS NOTES
Brief Internal Medicine Note, 8/23/18:    Spoke to pt's Transplant Coordinator afternoon 8/23 regarding supratherapeutic tacro level. Recent dose decrease from 3.5mg BID to 2.5mg BID on 8/21.  Tacro level 13.2 AM 8/23.  Will repeat tacro level in AM 8/24.  Transplant team to adjust tacro dosing.       Addendum:   Spoke to OMFS team, patient OK to trial CLD as tolerated. Recommended to continue Peridex swish and spit.  He should follow up in OMFS clinic in one month.         Becca Herrera, [facility]  Hospitalist Service   Pager: [phone]

## 2018-08-23 NOTE — PROGRESS NOTES
Met with pt to discuss DC needs. Called [facility] at [phone] to verify they are aware of referral.  HC confirmed they have him on the list and are ready for Mon 8/27 DC.  Stated they do not have SLP that can follow pt, order placed if home care cannot fill SLP need, to complete as outpatient when they DC.  Unable to find homecare that provides speech in that area.  Message left for [facility] [phone] to set up trach supplies and humidity. Pt went to PLC and feels like he will be able to manage at home.  PLC set up for Monday at 11:00 am on Monday with pt's wife.  Pt is not currently on O2.  No further needs identified. Pt's wife updated.     Faxed trach orders to [facility] [phone]

## 2018-08-23 NOTE — PLAN OF CARE
Patient alert and oriented. Complained of pain at HS which PRN oxycodone and scheduled tylenol given. Patient is independent on unit with ADLs, transfers, ambulation without assistive device and toileting needs. Continent of B&B. Writer demonstrated tube feed bolus set up and proper administration technique. Patient is able to understand and answer questions appropriately. Patient is able to independently flush own tube feeding without concerns after supervision with set up. Patient understands fully regarding new tube feedings as well as new water flush intakes. Patient tolerated 1/2 can without concerns and wanted the full can right away and tolerated it well around 1800. Since patient has been NPO since 1000, he requests to have another tube feeding prior to bed. Writer will have patient complete tube feeding set up and administration with supervision by writer at 2100. Patient requesting full 2 cans at HS, which will be given per toleration. Patient had PLC classes today regarding tube feeding and trach cares and continues to need encouragement by staff. Staff to continue to encourage independence with tube feedings, flushes, medication administration, and trach cares. Trach cares completed this shift without complaints at 2240. Redness noted under trach R/T pressure. Continue foam padding to prevent skin breakdown. See orders. Patient is pleasant and able to make needs known. Will continue to observe.    Temp: 95.7  F (35.4  C) Temp src: Axillary BP: 91/68 Pulse: 84   Resp: 16 SpO2: 100 % O2 Device: None (Room air)    Addendum 2245: Tolerated 2 cans of TF at HS without concerns. Plan to continue 2 cans TID tomorrow per his preference times.   Dental appt tomorrow with  at 1100. Patient aware.
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
## 2018-08-23 NOTE — PHARMACY
Pharmacy Tube Feeding Consult    Medication reviewed for administration by feeding tube and for potential food/drug interactions.     Recommendation: No changes are needed at this time.     Pharmacy will continue to follow as new medications are ordered.

## 2018-08-23 NOTE — PLAN OF CARE
Problem: Goal Outcome Summary  Goal: Goal Outcome Summary  Tolerated walking 1200 ft without seated rest or stop outdoors on sidewalk, grass. No LOB. Plan last day of PT tomorrow. No home PT needed

## 2018-08-23 NOTE — TELEPHONE ENCOUNTER
Rise in creatinine.     Drug level high. Current dose of 2.5mg BID was reduced only 3 doses prior to drug level. Will check drug level again tomorrow.    Free water was only 500mL daily with 60cc bolus prior and after TFs.  RNCC spoke with PA who will discuss increasing free water with RD.

## 2018-08-24 ENCOUNTER — TELEPHONE (OUTPATIENT)
Dept: TRANSPLANT | Facility: CLINIC | Age: 48
End: 2018-08-24

## 2018-08-24 LAB
ANION GAP SERPL CALCULATED.3IONS-SCNC: 8 MMOL/L (ref 3–14)
BUN SERPL-MCNC: 34 MG/DL (ref 7–30)
CALCIUM SERPL-MCNC: 9.5 MG/DL (ref 8.5–10.1)
CHLORIDE SERPL-SCNC: 105 MMOL/L (ref 94–109)
CO2 SERPL-SCNC: 27 MMOL/L (ref 20–32)
CREAT SERPL-MCNC: 1.31 MG/DL (ref 0.66–1.25)
GFR SERPL CREATININE-BSD FRML MDRD: 58 ML/MIN/1.7M2
GLUCOSE SERPL-MCNC: 93 MG/DL (ref 70–99)
POTASSIUM SERPL-SCNC: 4.8 MMOL/L (ref 3.4–5.3)
POTASSIUM SERPL-SCNC: 5.4 MMOL/L (ref 3.4–5.3)
SODIUM SERPL-SCNC: 140 MMOL/L (ref 133–144)
TACROLIMUS BLD-MCNC: 11.3 UG/L (ref 5–15)
TME LAST DOSE: NORMAL H

## 2018-08-24 PROCEDURE — 12000022 ZZH R&B SNF

## 2018-08-24 PROCEDURE — 25000128 H RX IP 250 OP 636: Performed by: NURSE PRACTITIONER

## 2018-08-24 PROCEDURE — 80048 BASIC METABOLIC PNL TOTAL CA: CPT | Performed by: NURSE PRACTITIONER

## 2018-08-24 PROCEDURE — 36415 COLL VENOUS BLD VENIPUNCTURE: CPT | Performed by: NURSE PRACTITIONER

## 2018-08-24 PROCEDURE — 92507 TX SP LANG VOICE COMM INDIV: CPT | Mod: GN | Performed by: SPEECH-LANGUAGE PATHOLOGIST

## 2018-08-24 PROCEDURE — 25000131 ZZH RX MED GY IP 250 OP 636 PS 637: Mod: GY | Performed by: PHYSICIAN ASSISTANT

## 2018-08-24 PROCEDURE — 99309 SBSQ NF CARE MODERATE MDM 30: CPT | Performed by: NURSE PRACTITIONER

## 2018-08-24 PROCEDURE — 80197 ASSAY OF TACROLIMUS: CPT | Performed by: NURSE PRACTITIONER

## 2018-08-24 PROCEDURE — A9270 NON-COVERED ITEM OR SERVICE: HCPCS | Mod: GY | Performed by: INTERNAL MEDICINE

## 2018-08-24 PROCEDURE — 84132 ASSAY OF SERUM POTASSIUM: CPT | Performed by: NURSE PRACTITIONER

## 2018-08-24 PROCEDURE — 25000125 ZZHC RX 250: Performed by: NURSE PRACTITIONER

## 2018-08-24 PROCEDURE — 25000132 ZZH RX MED GY IP 250 OP 250 PS 637: Mod: GY | Performed by: NURSE PRACTITIONER

## 2018-08-24 PROCEDURE — 40000225 ZZH STATISTIC SLP WARD VISIT: Performed by: SPEECH-LANGUAGE PATHOLOGIST

## 2018-08-24 PROCEDURE — 25000131 ZZH RX MED GY IP 250 OP 636 PS 637: Mod: GY | Performed by: INTERNAL MEDICINE

## 2018-08-24 PROCEDURE — 25000132 ZZH RX MED GY IP 250 OP 250 PS 637: Mod: GY | Performed by: INTERNAL MEDICINE

## 2018-08-24 PROCEDURE — 25000125 ZZHC RX 250: Performed by: INTERNAL MEDICINE

## 2018-08-24 PROCEDURE — A9270 NON-COVERED ITEM OR SERVICE: HCPCS | Mod: GY | Performed by: NURSE PRACTITIONER

## 2018-08-24 PROCEDURE — 40000275 ZZH STATISTIC RCP TIME EA 10 MIN

## 2018-08-24 RX ORDER — SODIUM POLYSTYRENE SULFONATE 15 G/60ML
15 SUSPENSION ORAL; RECTAL ONCE
Status: DISCONTINUED | OUTPATIENT
Start: 2018-08-24 | End: 2018-08-24

## 2018-08-24 RX ADMIN — SODIUM BICARBONATE 650 MG TABLET 650 MG: at 21:40

## 2018-08-24 RX ADMIN — OXYCODONE HYDROCHLORIDE 5 MG: 5 SOLUTION ORAL at 10:53

## 2018-08-24 RX ADMIN — CHLORHEXIDINE GLUCONATE 15 ML: 1.2 RINSE ORAL at 09:34

## 2018-08-24 RX ADMIN — PREDNISONE 5 MG: 5 TABLET ORAL at 09:35

## 2018-08-24 RX ADMIN — SODIUM BICARBONATE 650 MG TABLET 650 MG: at 14:46

## 2018-08-24 RX ADMIN — SULFAMETHOXAZOLE AND TRIMETHOPRIM 80 MG: 200; 40 SUSPENSION ORAL at 09:34

## 2018-08-24 RX ADMIN — SODIUM BICARBONATE 650 MG TABLET 650 MG: at 09:34

## 2018-08-24 RX ADMIN — CHLORHEXIDINE GLUCONATE 15 ML: 1.2 RINSE ORAL at 18:50

## 2018-08-24 RX ADMIN — MYCOPHENOLATE MOFETIL 1000 MG: 200 POWDER, FOR SUSPENSION ORAL at 09:33

## 2018-08-24 RX ADMIN — BACITRACIN: 500 OINTMENT TOPICAL at 22:50

## 2018-08-24 RX ADMIN — ESCITALOPRAM 20 MG: 5 SOLUTION ORAL at 09:33

## 2018-08-24 RX ADMIN — QUETIAPINE FUMARATE 50 MG: 50 TABLET ORAL at 21:40

## 2018-08-24 RX ADMIN — ATORVASTATIN CALCIUM 40 MG: 20 TABLET, FILM COATED ORAL at 21:41

## 2018-08-24 RX ADMIN — Medication 20 MG: at 07:20

## 2018-08-24 RX ADMIN — VALGANCICLOVIR HYDROCHLORIDE 450 MG: 50 POWDER, FOR SOLUTION ORAL at 09:33

## 2018-08-24 RX ADMIN — Medication 1 MG: at 09:33

## 2018-08-24 RX ADMIN — CHLORHEXIDINE GLUCONATE 15 ML: 1.2 RINSE ORAL at 21:40

## 2018-08-24 RX ADMIN — BACITRACIN ZINC: 500 OINTMENT TOPICAL at 09:49

## 2018-08-24 RX ADMIN — ACETAMINOPHEN 975 MG: 160 LIQUID ORAL at 07:17

## 2018-08-24 RX ADMIN — ACETAMINOPHEN 975 MG: 160 LIQUID ORAL at 14:45

## 2018-08-24 RX ADMIN — CHLORHEXIDINE GLUCONATE 15 ML: 1.2 RINSE ORAL at 14:45

## 2018-08-24 RX ADMIN — BACITRACIN: 500 OINTMENT TOPICAL at 22:49

## 2018-08-24 RX ADMIN — ACETAMINOPHEN 975 MG: 160 LIQUID ORAL at 22:50

## 2018-08-24 RX ADMIN — MULTIVITAMIN 15 ML: LIQUID ORAL at 09:34

## 2018-08-24 RX ADMIN — Medication 1.5 MG: at 18:39

## 2018-08-24 RX ADMIN — ENOXAPARIN SODIUM 40 MG: 40 INJECTION SUBCUTANEOUS at 09:34

## 2018-08-24 RX ADMIN — Medication 1.5 MG: at 10:53

## 2018-08-24 RX ADMIN — Medication 0.05 MG: at 09:35

## 2018-08-24 RX ADMIN — BACITRACIN: 500 OINTMENT TOPICAL at 09:49

## 2018-08-24 RX ADMIN — MYCOPHENOLATE MOFETIL 1000 MG: 200 POWDER, FOR SUSPENSION ORAL at 21:56

## 2018-08-24 RX ADMIN — OXYCODONE HYDROCHLORIDE 5 MG: 5 SOLUTION ORAL at 18:39

## 2018-08-24 NOTE — PLAN OF CARE
Problem: Goal Outcome Summary  Goal: Goal Outcome Summary  PT: attempted to see pt at 9:30, but pt getting meds. Checked on pt later, but pt experiencing dizziness with low BP in standing,nursing notified. Pt states he knows his exercises for home program.  PT will try to stop back later if schedule allows.
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
## 2018-08-24 NOTE — TELEPHONE ENCOUNTER
RNCC spoke with LAVELLE Villeda at [facility] TCU -   Patient has been 'okayed' for clear liquid diet.  Tac level elevated, dose reduced by Leilani Cosby to 1.5mg BID.  Per Dr. Donato's recommendation, will check MPA, BK, and DSA with next set of labs.

## 2018-08-24 NOTE — PHARMACY-TCU REVIEW OF H&P
I have reviewed this patient's TCU admission History & Physical for medication related changes/recommendations identified by the admitting provider.  I am confirming that there are no recommendations requiring changes to medication orders are indicated at this time based on the provider recommendations in the H&P.    Laura Fry, PharmD, BCPS

## 2018-08-24 NOTE — PLAN OF CARE
Problem: Goal Outcome Summary  Goal: Goal Outcome Summary  Physical Therapy Discharge Summary    Reason for therapy discharge:    Pt is I with HEP, met goals.     Progress towards therapy goal(s). See goals on Care Plan in [facility] electronic health record for goal details.  Goals met    Therapy recommendation(s):    Continue home exercise program.  Pt states he is I with standing HEP, had practiced these exercises on his last admission to TCU.  Pt is I with a walking program and states he will continue HEP.  Pt does not need any equipment.

## 2018-08-24 NOTE — PLAN OF CARE
Problem: Goal Outcome Summary  Goal: Goal Outcome Summary  Pt seen for communication tx- Pt placed PMSV on- continues to wear throughout the day ( O2 sats at 100% today with PMSV on)- removes when sleeping; independent with donning and doffing and cleaning of PMSV. Pt with adequate loudness- intellgiblity for single words today is at 60% with need for repetition and also gestures with spelling out the initial letter or final letter for each word ; with context however as in reading sentences out loud- intelligibility was at 80% today-- pt using strategies also of slow rate, exaggerated articulation and pausing. Plan to continue with communication tx- pt to continue to wear PMSV throughout the day as tolerated- remove if sleeping or increase in SOB; drop in O2 sats below 90%

## 2018-08-24 NOTE — PLAN OF CARE
Problem: Goal Outcome Summary  Goal: Goal Outcome Summary  Patient alert and oriented X4. Denies any pain and SOB. Independent in room. Trach dome in place. Appeared to be sleeping during rounds. Continue with POC
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
## 2018-08-24 NOTE — PLAN OF CARE
Problem: Goal Outcome Summary  Goal: Goal Outcome Summary  Able to administer TF and free water independently. Can change trach inner cannula independently. Needs some assistance with cleansing around trach site and applying new foam dressing. Able to apply bacitracin ointment to his face. Up independently in room and hallway. Very pleasant and talkative. Speech difficult to understand, kee. when he speaks fast. Needs to be reminded to slow down speech so he is easier to understand.

## 2018-08-24 NOTE — UTILIZATION REVIEW
Pain Interview  Admission, DC  1. Have you had pain or hurting at any time in the last 5 days?  Yes  2. How much of the time have you experienced pain or hurting over the last 5 days? Occasionally  3. Over the past 5 days, has pain made it hard for you to sleep at night?  No  4. Over the past 5 days, have you limited your day-to-day activities because of pain?Yes  5. Rate pain intensity: Numeric 7

## 2018-08-24 NOTE — PLAN OF CARE
Problem: Goal Outcome Summary  Goal: Goal Outcome Summary  RN: Pt A/O x4, VSS. Pt is independent with ADL's. Bolus TF given and pt tolerated well. Pt denies SOB, pt is well controlled with scheduled and medication. Continue with POC.

## 2018-08-24 NOTE — PROGRESS NOTES
Shiv from [facility] called to state that he cannot provide trach supplies for pt due to location. Pt in [place].  Suggested [facility]. Called [facility] at [phone]. Faxed over orders to [phone].

## 2018-08-25 ENCOUNTER — RESULTS ONLY (OUTPATIENT)
Dept: OTHER | Facility: CLINIC | Age: 48
End: 2018-08-25

## 2018-08-25 LAB
ANION GAP SERPL CALCULATED.3IONS-SCNC: 10 MMOL/L (ref 3–14)
BUN SERPL-MCNC: 38 MG/DL (ref 7–30)
CALCIUM SERPL-MCNC: 9.1 MG/DL (ref 8.5–10.1)
CHLORIDE SERPL-SCNC: 102 MMOL/L (ref 94–109)
CO2 SERPL-SCNC: 27 MMOL/L (ref 20–32)
CREAT SERPL-MCNC: 1.33 MG/DL (ref 0.66–1.25)
GFR SERPL CREATININE-BSD FRML MDRD: 57 ML/MIN/1.7M2
GLUCOSE SERPL-MCNC: 127 MG/DL (ref 70–99)
POTASSIUM SERPL-SCNC: 5 MMOL/L (ref 3.4–5.3)
SODIUM SERPL-SCNC: 139 MMOL/L (ref 133–144)

## 2018-08-25 PROCEDURE — 25000131 ZZH RX MED GY IP 250 OP 636 PS 637: Mod: GY | Performed by: INTERNAL MEDICINE

## 2018-08-25 PROCEDURE — 87799 DETECT AGENT NOS DNA QUANT: CPT | Performed by: NURSE PRACTITIONER

## 2018-08-25 PROCEDURE — 86833 HLA CLASS II HIGH DEFIN QUAL: CPT | Performed by: PHYSICIAN ASSISTANT

## 2018-08-25 PROCEDURE — A9270 NON-COVERED ITEM OR SERVICE: HCPCS | Mod: GY | Performed by: NURSE PRACTITIONER

## 2018-08-25 PROCEDURE — 25000132 ZZH RX MED GY IP 250 OP 250 PS 637: Mod: GY | Performed by: INTERNAL MEDICINE

## 2018-08-25 PROCEDURE — 80180 DRUG SCRN QUAN MYCOPHENOLATE: CPT | Performed by: NURSE PRACTITIONER

## 2018-08-25 PROCEDURE — 25000125 ZZHC RX 250: Performed by: INTERNAL MEDICINE

## 2018-08-25 PROCEDURE — 80048 BASIC METABOLIC PNL TOTAL CA: CPT | Performed by: NURSE PRACTITIONER

## 2018-08-25 PROCEDURE — A9270 NON-COVERED ITEM OR SERVICE: HCPCS | Mod: GY | Performed by: INTERNAL MEDICINE

## 2018-08-25 PROCEDURE — 36415 COLL VENOUS BLD VENIPUNCTURE: CPT | Performed by: NURSE PRACTITIONER

## 2018-08-25 PROCEDURE — 86832 HLA CLASS I HIGH DEFIN QUAL: CPT | Performed by: PHYSICIAN ASSISTANT

## 2018-08-25 PROCEDURE — 92526 ORAL FUNCTION THERAPY: CPT | Mod: GN | Performed by: SPEECH-LANGUAGE PATHOLOGIST

## 2018-08-25 PROCEDURE — 25000132 ZZH RX MED GY IP 250 OP 250 PS 637: Mod: GY | Performed by: NURSE PRACTITIONER

## 2018-08-25 PROCEDURE — 25000125 ZZHC RX 250: Performed by: NURSE PRACTITIONER

## 2018-08-25 PROCEDURE — 12000022 ZZH R&B SNF

## 2018-08-25 PROCEDURE — 40000225 ZZH STATISTIC SLP WARD VISIT: Performed by: SPEECH-LANGUAGE PATHOLOGIST

## 2018-08-25 PROCEDURE — 25000128 H RX IP 250 OP 636: Performed by: NURSE PRACTITIONER

## 2018-08-25 PROCEDURE — 92610 EVALUATE SWALLOWING FUNCTION: CPT | Mod: GN | Performed by: SPEECH-LANGUAGE PATHOLOGIST

## 2018-08-25 PROCEDURE — 25000131 ZZH RX MED GY IP 250 OP 636 PS 637: Mod: GY | Performed by: PHYSICIAN ASSISTANT

## 2018-08-25 RX ADMIN — BACITRACIN: 500 OINTMENT TOPICAL at 09:24

## 2018-08-25 RX ADMIN — SODIUM BICARBONATE 650 MG TABLET 650 MG: at 09:25

## 2018-08-25 RX ADMIN — ACETAMINOPHEN 975 MG: 160 LIQUID ORAL at 21:35

## 2018-08-25 RX ADMIN — MULTIVITAMIN 15 ML: LIQUID ORAL at 09:26

## 2018-08-25 RX ADMIN — BACITRACIN ZINC: 500 OINTMENT TOPICAL at 20:00

## 2018-08-25 RX ADMIN — Medication 0.05 MG: at 09:25

## 2018-08-25 RX ADMIN — Medication 1.5 MG: at 09:27

## 2018-08-25 RX ADMIN — PREDNISONE 5 MG: 5 TABLET ORAL at 09:25

## 2018-08-25 RX ADMIN — CHLORHEXIDINE GLUCONATE 15 ML: 1.2 RINSE ORAL at 14:10

## 2018-08-25 RX ADMIN — MYCOPHENOLATE MOFETIL 1000 MG: 200 POWDER, FOR SUSPENSION ORAL at 21:44

## 2018-08-25 RX ADMIN — BACITRACIN: 500 OINTMENT TOPICAL at 21:34

## 2018-08-25 RX ADMIN — ESCITALOPRAM 20 MG: 5 SOLUTION ORAL at 09:26

## 2018-08-25 RX ADMIN — SODIUM BICARBONATE 650 MG TABLET 650 MG: at 21:35

## 2018-08-25 RX ADMIN — ENOXAPARIN SODIUM 40 MG: 40 INJECTION SUBCUTANEOUS at 09:26

## 2018-08-25 RX ADMIN — ATORVASTATIN CALCIUM 40 MG: 20 TABLET, FILM COATED ORAL at 21:35

## 2018-08-25 RX ADMIN — SULFAMETHOXAZOLE AND TRIMETHOPRIM 80 MG: 200; 40 SUSPENSION ORAL at 09:26

## 2018-08-25 RX ADMIN — BACITRACIN: 500 OINTMENT TOPICAL at 22:03

## 2018-08-25 RX ADMIN — OXYCODONE HYDROCHLORIDE 5 MG: 5 SOLUTION ORAL at 18:26

## 2018-08-25 RX ADMIN — QUETIAPINE FUMARATE 50 MG: 50 TABLET ORAL at 21:35

## 2018-08-25 RX ADMIN — CHLORHEXIDINE GLUCONATE 15 ML: 1.2 RINSE ORAL at 18:26

## 2018-08-25 RX ADMIN — Medication 1.5 MG: at 18:26

## 2018-08-25 RX ADMIN — Medication 20 MG: at 05:59

## 2018-08-25 RX ADMIN — ACETAMINOPHEN 975 MG: 160 LIQUID ORAL at 14:10

## 2018-08-25 RX ADMIN — BACITRACIN: 500 OINTMENT TOPICAL at 21:54

## 2018-08-25 RX ADMIN — MYCOPHENOLATE MOFETIL 1000 MG: 200 POWDER, FOR SUSPENSION ORAL at 09:26

## 2018-08-25 RX ADMIN — Medication 1 MG: at 09:26

## 2018-08-25 RX ADMIN — CHLORHEXIDINE GLUCONATE 15 ML: 1.2 RINSE ORAL at 09:25

## 2018-08-25 RX ADMIN — ACETAMINOPHEN 975 MG: 160 LIQUID ORAL at 05:59

## 2018-08-25 RX ADMIN — CHLORHEXIDINE GLUCONATE 15 ML: 1.2 RINSE ORAL at 21:35

## 2018-08-25 RX ADMIN — BACITRACIN ZINC: 500 OINTMENT TOPICAL at 09:25

## 2018-08-25 RX ADMIN — VALGANCICLOVIR HYDROCHLORIDE 450 MG: 50 POWDER, FOR SOLUTION ORAL at 09:27

## 2018-08-25 RX ADMIN — SODIUM BICARBONATE 650 MG TABLET 650 MG: at 14:10

## 2018-08-25 NOTE — PLAN OF CARE
Problem: Goal Outcome Summary  Goal: Goal Outcome Summary  Outcome: No Change  Alert and oriented x 4. C/o pain gave oxycodone x 1 and it was effective. Pt gave himself bolus TF. Done trach care redness noted around the area. NG is intact and patent. Independent in room and hallway. Unable to do skin assessment. Pt able to talk using speaking valve.speach is unclear but able to communicate. Changed inner canula. Voided good and had a BM.

## 2018-08-25 NOTE — PLAN OF CARE
Problem: Goal Outcome Summary  Goal: Goal Outcome Summary  Patient alert and oriented x 4. Denies pain and SOB this shift. Independent in room. Patient able to administer TF and free water. Appeared to be sleeping during rounds. Continue with POC

## 2018-08-25 NOTE — PLAN OF CARE
Problem: Goal Outcome Summary  Goal: Goal Outcome Summary  Independent. Participates in tube feeding administration, some trach cares (replacing inner cannula) and applying bacitracin to incisions on face.Talkative. Pleasant. No complaints. States wife will be here tomorrow and will d/c home on 8/27/18.
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
## 2018-08-26 LAB
ANION GAP SERPL CALCULATED.3IONS-SCNC: 6 MMOL/L (ref 3–14)
BUN SERPL-MCNC: 32 MG/DL (ref 7–30)
CALCIUM SERPL-MCNC: 9.5 MG/DL (ref 8.5–10.1)
CHLORIDE SERPL-SCNC: 99 MMOL/L (ref 94–109)
CO2 SERPL-SCNC: 28 MMOL/L (ref 20–32)
CREAT SERPL-MCNC: 1.15 MG/DL (ref 0.66–1.25)
GFR SERPL CREATININE-BSD FRML MDRD: 67 ML/MIN/1.7M2
GLUCOSE SERPL-MCNC: 95 MG/DL (ref 70–99)
POTASSIUM SERPL-SCNC: 4.8 MMOL/L (ref 3.4–5.3)
SODIUM SERPL-SCNC: 133 MMOL/L (ref 133–144)

## 2018-08-26 PROCEDURE — 25000132 ZZH RX MED GY IP 250 OP 250 PS 637: Mod: GY | Performed by: INTERNAL MEDICINE

## 2018-08-26 PROCEDURE — 25000128 H RX IP 250 OP 636: Performed by: NURSE PRACTITIONER

## 2018-08-26 PROCEDURE — 25000125 ZZHC RX 250: Performed by: INTERNAL MEDICINE

## 2018-08-26 PROCEDURE — A9270 NON-COVERED ITEM OR SERVICE: HCPCS | Mod: GY | Performed by: NURSE PRACTITIONER

## 2018-08-26 PROCEDURE — 25000131 ZZH RX MED GY IP 250 OP 636 PS 637: Mod: GY | Performed by: INTERNAL MEDICINE

## 2018-08-26 PROCEDURE — 80048 BASIC METABOLIC PNL TOTAL CA: CPT | Performed by: PHYSICIAN ASSISTANT

## 2018-08-26 PROCEDURE — 25000125 ZZHC RX 250: Performed by: NURSE PRACTITIONER

## 2018-08-26 PROCEDURE — A9270 NON-COVERED ITEM OR SERVICE: HCPCS | Mod: GY | Performed by: INTERNAL MEDICINE

## 2018-08-26 PROCEDURE — 25000131 ZZH RX MED GY IP 250 OP 636 PS 637: Mod: GY | Performed by: PHYSICIAN ASSISTANT

## 2018-08-26 PROCEDURE — 12000022 ZZH R&B SNF

## 2018-08-26 PROCEDURE — 25000132 ZZH RX MED GY IP 250 OP 250 PS 637: Mod: GY | Performed by: NURSE PRACTITIONER

## 2018-08-26 PROCEDURE — 25000131 ZZH RX MED GY IP 250 OP 636 PS 637: Mod: GY | Performed by: NURSE PRACTITIONER

## 2018-08-26 PROCEDURE — 36415 COLL VENOUS BLD VENIPUNCTURE: CPT | Performed by: PHYSICIAN ASSISTANT

## 2018-08-26 RX ORDER — TACROLIMUS 0.5 MG/1
1.5 CAPSULE ORAL 2 TIMES DAILY
Qty: 178 CAPSULE | Refills: 2 | Status: SHIPPED | OUTPATIENT
Start: 2018-08-26 | End: 2018-09-04

## 2018-08-26 RX ORDER — GUAR GUM
1 PACKET (EA) ORAL 3 TIMES DAILY
Qty: 75 PACKET | Refills: 3 | Status: SHIPPED | OUTPATIENT
Start: 2018-08-26 | End: 2018-09-04

## 2018-08-26 RX ORDER — QUETIAPINE FUMARATE 50 MG/1
50 TABLET, FILM COATED ORAL AT BEDTIME
Qty: 30 TABLET | Refills: 2 | Status: SHIPPED | OUTPATIENT
Start: 2018-08-26 | End: 2018-09-17

## 2018-08-26 RX ORDER — FLUDROCORTISONE ACETATE 0.1 MG/1
0.05 TABLET ORAL DAILY
Qty: 15 TABLET | Refills: 2 | Status: SHIPPED | OUTPATIENT
Start: 2018-08-27 | End: 2018-09-14

## 2018-08-26 RX ORDER — MYCOPHENOLATE MOFETIL 200 MG/ML
1000 POWDER, FOR SUSPENSION ORAL 2 TIMES DAILY
Qty: 300 ML | Refills: 3 | Status: SHIPPED | OUTPATIENT
Start: 2018-08-26 | End: 2018-09-14

## 2018-08-26 RX ORDER — PREDNISONE 5 MG/1
5 TABLET ORAL DAILY
Qty: 30 TABLET | Refills: 2 | Status: SHIPPED | OUTPATIENT
Start: 2018-08-27 | End: 2018-09-17

## 2018-08-26 RX ORDER — ATORVASTATIN CALCIUM 40 MG/1
40 TABLET, FILM COATED ORAL EVERY EVENING
Qty: 30 TABLET | Refills: 2 | Status: SHIPPED | OUTPATIENT
Start: 2018-08-26 | End: 2018-09-17

## 2018-08-26 RX ORDER — SULFAMETHOXAZOLE AND TRIMETHOPRIM 200; 40 MG/5ML; MG/5ML
80 SUSPENSION ORAL DAILY
Qty: 473 ML | Refills: 2 | Status: SHIPPED | OUTPATIENT
Start: 2018-08-27 | End: 2018-09-17

## 2018-08-26 RX ORDER — POLYETHYLENE GLYCOL 3350 17 G/17G
17 POWDER, FOR SOLUTION ORAL 2 TIMES DAILY
Qty: 30 PACKET | Refills: 2 | Status: SHIPPED | OUTPATIENT
Start: 2018-08-26 | End: 2019-01-29

## 2018-08-26 RX ORDER — VALGANCICLOVIR HYDROCHLORIDE 50 MG/ML
450 POWDER, FOR SOLUTION ORAL DAILY
Qty: 3 BOTTLE | Refills: 3 | Status: SHIPPED | OUTPATIENT
Start: 2018-08-27 | End: 2018-09-17

## 2018-08-26 RX ORDER — CHLORHEXIDINE GLUCONATE ORAL RINSE 1.2 MG/ML
15 SOLUTION DENTAL 4 TIMES DAILY
Qty: 1893 ML | Refills: 2 | Status: SHIPPED | OUTPATIENT
Start: 2018-08-26 | End: 2019-01-29

## 2018-08-26 RX ORDER — ESCITALOPRAM OXALATE 5 MG/5ML
20 SOLUTION ORAL DAILY
Qty: 300 ML | Refills: 2 | Status: SHIPPED | OUTPATIENT
Start: 2018-08-27 | End: 2018-09-17

## 2018-08-26 RX ADMIN — SODIUM BICARBONATE 650 MG TABLET 650 MG: at 13:01

## 2018-08-26 RX ADMIN — OXYCODONE HYDROCHLORIDE 5 MG: 5 SOLUTION ORAL at 16:22

## 2018-08-26 RX ADMIN — CHLORHEXIDINE GLUCONATE 15 ML: 1.2 RINSE ORAL at 13:01

## 2018-08-26 RX ADMIN — MULTIVITAMIN 15 ML: LIQUID ORAL at 10:26

## 2018-08-26 RX ADMIN — PREDNISONE 5 MG: 5 TABLET ORAL at 10:29

## 2018-08-26 RX ADMIN — CHLORHEXIDINE GLUCONATE 15 ML: 1.2 RINSE ORAL at 18:27

## 2018-08-26 RX ADMIN — Medication 1 MG: at 10:27

## 2018-08-26 RX ADMIN — ACETAMINOPHEN 975 MG: 160 LIQUID ORAL at 13:01

## 2018-08-26 RX ADMIN — VALGANCICLOVIR HYDROCHLORIDE 450 MG: 50 POWDER, FOR SOLUTION ORAL at 10:27

## 2018-08-26 RX ADMIN — ENOXAPARIN SODIUM 40 MG: 40 INJECTION SUBCUTANEOUS at 10:27

## 2018-08-26 RX ADMIN — Medication 1.5 MG: at 10:27

## 2018-08-26 RX ADMIN — Medication 0.05 MG: at 10:28

## 2018-08-26 RX ADMIN — BACITRACIN ZINC: 500 OINTMENT TOPICAL at 10:26

## 2018-08-26 RX ADMIN — ATORVASTATIN CALCIUM 40 MG: 20 TABLET, FILM COATED ORAL at 22:09

## 2018-08-26 RX ADMIN — ACETAMINOPHEN 975 MG: 160 LIQUID ORAL at 22:10

## 2018-08-26 RX ADMIN — MYCOPHENOLATE MOFETIL 1000 MG: 200 POWDER, FOR SUSPENSION ORAL at 22:10

## 2018-08-26 RX ADMIN — Medication 20 MG: at 05:57

## 2018-08-26 RX ADMIN — SODIUM BICARBONATE 650 MG TABLET 650 MG: at 22:09

## 2018-08-26 RX ADMIN — BACITRACIN: 500 OINTMENT TOPICAL at 22:10

## 2018-08-26 RX ADMIN — MYCOPHENOLATE MOFETIL 1000 MG: 200 POWDER, FOR SUSPENSION ORAL at 10:27

## 2018-08-26 RX ADMIN — BACITRACIN ZINC: 500 OINTMENT TOPICAL at 22:22

## 2018-08-26 RX ADMIN — CHLORHEXIDINE GLUCONATE 15 ML: 1.2 RINSE ORAL at 22:10

## 2018-08-26 RX ADMIN — QUETIAPINE FUMARATE 50 MG: 50 TABLET ORAL at 22:09

## 2018-08-26 RX ADMIN — ESCITALOPRAM 20 MG: 5 SOLUTION ORAL at 10:27

## 2018-08-26 RX ADMIN — CHLORHEXIDINE GLUCONATE 15 ML: 1.2 RINSE ORAL at 10:26

## 2018-08-26 RX ADMIN — SODIUM BICARBONATE 650 MG TABLET 650 MG: at 10:28

## 2018-08-26 RX ADMIN — BACITRACIN: 500 OINTMENT TOPICAL at 10:25

## 2018-08-26 RX ADMIN — TACROLIMUS 1.5 MG: 1 CAPSULE ORAL at 18:28

## 2018-08-26 RX ADMIN — ACETAMINOPHEN 975 MG: 160 LIQUID ORAL at 05:57

## 2018-08-26 RX ADMIN — SULFAMETHOXAZOLE AND TRIMETHOPRIM 80 MG: 200; 40 SUSPENSION ORAL at 10:27

## 2018-08-26 NOTE — PLAN OF CARE
Problem: Goal Outcome Summary  Goal: Goal Outcome Summary  Outcome: No Change  Alert and oriented x 4. C/o pain around his face gave oxycodone x 1 and it was effective. Pt did TF bolus around 1800. Around 1600 pt B/P was 86/54 gave 400cc water per NG tube then B/P was 92/52 gave an extra 300cc water with TF. Around 1900 B/P was 102/63.at bedtime it was 86/55. Notified the charge nurse. Pt was asymptomatic. Independent on hallway and room. Voided good had BM. Encouraged pt to call a nurse after having BM. Done trach care blanchable redness noted bellow the trach. Skin intact. Changed inner cannula.

## 2018-08-26 NOTE — PROGRESS NOTES
The writer sent a web page to the moonlighter re patient's blood pressure. ntermittent low blood pressure. 86/55. Asymptomatic. NPO excepts sips of clear liquid only. Continue to monitor, bolus fluid?. Continue to monitor per the moonlighter.

## 2018-08-26 NOTE — PLAN OF CARE
Problem: Goal Outcome Summary  Goal: Goal Outcome Summary  Patient alert and oriented x 4. Denies pain and SOB this shift. Independent in room. Patient able to administer TF and free water.BP @ 0300 was 90/51. Appeared to be sleeping during rounds. Continue to monitor BP.

## 2018-08-26 NOTE — PROGRESS NOTES
Brief Internal Medicine Note, 8/26/18:    Per SLP, at this time, pt not yet cleared to take meds PO.  Should continue all meds through FT for now.  D/w PharmD today, will try Tacro capsules instead of suspension via tube, as suspension quite expensive (requires in-house compounding).  D/w pt and RN that the capsules should be opened and flushed through tube.  Pt agreeable to this.  Have ordered tacro capsules 1.5mg at discharge.  Please notify Hospitalist prior to discharge tomorrow 8/27 if difficulties with flushing or administering capsules through FT.          Bceca Herrera, [facility]  Hospitalist Service   Pager: [phone]
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
## 2018-08-26 NOTE — PLAN OF CARE
Problem: Goal Outcome Summary  Goal: Goal Outcome Summary  Independent with tube feeding, incision care on face, and trach inner cannula care. Needs assist with cleansing skin around trach and applying foam dressing. Wife here this afternoon and will stay overnight, attend [facility] tomorrow morning, and drive patient home tomorrow after class.

## 2018-08-27 ENCOUNTER — HOME INFUSION (PRE-WILLOW HOME INFUSION) (OUTPATIENT)
Dept: PHARMACY | Facility: CLINIC | Age: 48
End: 2018-08-27

## 2018-08-27 VITALS
SYSTOLIC BLOOD PRESSURE: 96 MMHG | BODY MASS INDEX: 16.55 KG/M2 | HEART RATE: 80 BPM | WEIGHT: 115.6 LBS | HEIGHT: 70 IN | RESPIRATION RATE: 18 BRPM | TEMPERATURE: 96.5 F | OXYGEN SATURATION: 100 % | DIASTOLIC BLOOD PRESSURE: 68 MMHG

## 2018-08-27 LAB
ANION GAP SERPL CALCULATED.3IONS-SCNC: 9 MMOL/L (ref 3–14)
BUN SERPL-MCNC: 33 MG/DL (ref 7–30)
CALCIUM SERPL-MCNC: 9.9 MG/DL (ref 8.5–10.1)
CHLORIDE SERPL-SCNC: 102 MMOL/L (ref 94–109)
CO2 SERPL-SCNC: 26 MMOL/L (ref 20–32)
CREAT SERPL-MCNC: 1.14 MG/DL (ref 0.66–1.25)
GFR SERPL CREATININE-BSD FRML MDRD: 68 ML/MIN/1.7M2
GLUCOSE SERPL-MCNC: 95 MG/DL (ref 70–99)
PLATELET # BLD AUTO: 218 10E9/L (ref 150–450)
POTASSIUM SERPL-SCNC: 4.8 MMOL/L (ref 3.4–5.3)
PRA DONOR SPECIFIC ABY: NORMAL
SODIUM SERPL-SCNC: 137 MMOL/L (ref 133–144)
TACROLIMUS BLD-MCNC: 7.4 UG/L (ref 5–15)
TME LAST DOSE: NORMAL H

## 2018-08-27 PROCEDURE — 80048 BASIC METABOLIC PNL TOTAL CA: CPT | Performed by: NURSE PRACTITIONER

## 2018-08-27 PROCEDURE — A9270 NON-COVERED ITEM OR SERVICE: HCPCS | Mod: GY | Performed by: INTERNAL MEDICINE

## 2018-08-27 PROCEDURE — 25000125 ZZHC RX 250: Performed by: NURSE PRACTITIONER

## 2018-08-27 PROCEDURE — 25000125 ZZHC RX 250: Performed by: INTERNAL MEDICINE

## 2018-08-27 PROCEDURE — 40000225 ZZH STATISTIC SLP WARD VISIT: Performed by: SPEECH-LANGUAGE PATHOLOGIST

## 2018-08-27 PROCEDURE — 92507 TX SP LANG VOICE COMM INDIV: CPT | Mod: GN | Performed by: SPEECH-LANGUAGE PATHOLOGIST

## 2018-08-27 PROCEDURE — 25000128 H RX IP 250 OP 636: Performed by: NURSE PRACTITIONER

## 2018-08-27 PROCEDURE — 85049 AUTOMATED PLATELET COUNT: CPT | Performed by: NURSE PRACTITIONER

## 2018-08-27 PROCEDURE — 25000132 ZZH RX MED GY IP 250 OP 250 PS 637: Mod: GY | Performed by: INTERNAL MEDICINE

## 2018-08-27 PROCEDURE — 92526 ORAL FUNCTION THERAPY: CPT | Mod: GN | Performed by: SPEECH-LANGUAGE PATHOLOGIST

## 2018-08-27 PROCEDURE — 00220000 ZZH SNF RUG CODE OPNP

## 2018-08-27 PROCEDURE — 36415 COLL VENOUS BLD VENIPUNCTURE: CPT | Performed by: NURSE PRACTITIONER

## 2018-08-27 PROCEDURE — 25000132 ZZH RX MED GY IP 250 OP 250 PS 637: Mod: GY | Performed by: NURSE PRACTITIONER

## 2018-08-27 PROCEDURE — 25000131 ZZH RX MED GY IP 250 OP 636 PS 637: Mod: GY | Performed by: INTERNAL MEDICINE

## 2018-08-27 PROCEDURE — A9270 NON-COVERED ITEM OR SERVICE: HCPCS | Mod: GY | Performed by: NURSE PRACTITIONER

## 2018-08-27 PROCEDURE — 25000131 ZZH RX MED GY IP 250 OP 636 PS 637: Mod: GY | Performed by: NURSE PRACTITIONER

## 2018-08-27 PROCEDURE — 80197 ASSAY OF TACROLIMUS: CPT | Performed by: NURSE PRACTITIONER

## 2018-08-27 RX ADMIN — SULFAMETHOXAZOLE AND TRIMETHOPRIM 80 MG: 200; 40 SUSPENSION ORAL at 09:29

## 2018-08-27 RX ADMIN — Medication 1 MG: at 09:29

## 2018-08-27 RX ADMIN — CHLORHEXIDINE GLUCONATE 15 ML: 1.2 RINSE ORAL at 09:28

## 2018-08-27 RX ADMIN — SODIUM BICARBONATE 650 MG TABLET 650 MG: at 09:28

## 2018-08-27 RX ADMIN — MULTIVITAMIN 15 ML: LIQUID ORAL at 09:29

## 2018-08-27 RX ADMIN — CHLORHEXIDINE GLUCONATE 15 ML: 1.2 RINSE ORAL at 13:37

## 2018-08-27 RX ADMIN — ACETAMINOPHEN 975 MG: 160 LIQUID ORAL at 06:48

## 2018-08-27 RX ADMIN — VALGANCICLOVIR HYDROCHLORIDE 450 MG: 50 POWDER, FOR SOLUTION ORAL at 09:31

## 2018-08-27 RX ADMIN — ENOXAPARIN SODIUM 40 MG: 40 INJECTION SUBCUTANEOUS at 09:28

## 2018-08-27 RX ADMIN — PREDNISONE 5 MG: 5 TABLET ORAL at 09:30

## 2018-08-27 RX ADMIN — ACETAMINOPHEN 975 MG: 160 LIQUID ORAL at 13:38

## 2018-08-27 RX ADMIN — ESCITALOPRAM 20 MG: 5 SOLUTION ORAL at 09:29

## 2018-08-27 RX ADMIN — Medication 0.05 MG: at 09:30

## 2018-08-27 RX ADMIN — BACITRACIN: 500 OINTMENT TOPICAL at 09:32

## 2018-08-27 RX ADMIN — MYCOPHENOLATE MOFETIL 1000 MG: 200 POWDER, FOR SUSPENSION ORAL at 09:30

## 2018-08-27 RX ADMIN — BACITRACIN ZINC: 500 OINTMENT TOPICAL at 09:32

## 2018-08-27 RX ADMIN — SODIUM BICARBONATE 650 MG TABLET 650 MG: at 13:37

## 2018-08-27 RX ADMIN — OXYCODONE HYDROCHLORIDE 5 MG: 5 SOLUTION ORAL at 13:37

## 2018-08-27 RX ADMIN — TACROLIMUS 1.5 MG: 1 CAPSULE ORAL at 09:31

## 2018-08-27 RX ADMIN — Medication 20 MG: at 06:49

## 2018-08-27 NOTE — PLAN OF CARE
Problem: Goal Outcome Summary  Goal: Goal Outcome Summary  Patient alert and oriented x 4. Denies pain and SOB this shift. Independent in room. Patient able to administer TF and free water.Wife is by the bedside.

## 2018-08-27 NOTE — PROGRESS NOTES
[facility] (I) Nurse Liaison-Enteral Review   Met with pt and spouse at bedside. Pt is expected to DC today.  They also had teaching in the [facility].    This RN provided Education on Enteral Therapy,  priming, feeding tube flushing and backpack setup. Educated to have HOB elevated during feeding. They verbalize understanding .  Explained Clog Zapper and reasons to contact Nursing Agency and \[facility]\"" for all Enteral Supplies.  Encouraged to listen to VM, phone for Office RN call. Demographics,allergie verification and  Delivery will be discussed.    FEEDING TUBE:  NG  Formula: Nutren 1.5    FEEDING SCHEDULE: TID Gravity feeds -6 cans per day with h20 flushes before and after feeds. * Pt to follow Dietician Instruction   FLUSHING: before and after feeds 90mls h20 and 500mls at 11am of H20  SNV: Not required today. [facility] to see pt tomorrow at home.  Pt is independent with Enteral Therapy. He has been performing feeds in hospital room. He agrees to contact \[facility]\"" for any further needs, questions or to request a SNV.  LOCATION:   Family resides [place], [place]   DELIVERY: to hosp pt room by noon. Then 2 additional cases to pt home on Tues 8.28.18, per Gem \[facility]\"" CORA, \[facility]\""  Educated that \[facility]\""  RN/RPH on call 24/7, phone number provided & encouraged to call \[facility]\"" for any questions, clarifications or problems. They verbalizes understanding     Rubia Thompson, \[facility]\""-Nurse Liaison  EMAIL to CELL  [email or web address]  [email or web address]  My Cell:  [phone]  \[facility]\"" OFFICE  24/7hrs  [phone]

## 2018-08-27 NOTE — PLAN OF CARE
Problem: Goal Outcome Summary  Goal: Goal Outcome Summary  Outcome: Adequate for Discharge Date Met: 08/27/18  Discharged home with wife here to pick him up. Escorted down stairs by staff and transferred to car safely.   Pt was able to administered TF and  did trach care independently and correctly.  Medicated with oxycodone for pain pre request before leaving.  All discharge medications and instruction were reviewed with pt and wife.  Home care nurse saw pt. Home care will follow up with pt tomorrow.   Trach care supply given to pt to take with. Trach cap on all this shift.

## 2018-08-27 NOTE — PROGRESS NOTES
Received call from [facility] ([facility]) stating needing official discharge prescription for trach supplies/order.  Prescription sent.  Also received notification that they [place] a branch in [place] and would forward his supply list to [facility] branch to supply his order.  Patient does not need suction supplies in car and can manage this at home when he arrives. [place] branch to obtain supplies for trach supplies.  Patient will go with small supply from here to discharge to homecare and homecare will also follow up on supplies/cares needed.  Discharge plan complete at this time in terms of supplies.  Patient had tube feeding supplies delivered to unit for home.

## 2018-08-27 NOTE — PLAN OF CARE
Problem: Goal Outcome Summary  Goal: Goal Outcome Summary  Outcome: No Change  Alert and oriented x 4. Able to make needs known. Pt able to give himself bolus TF and flush his tube. His wife is by the bedside. C/o pain gave oxycodone x 1 and it was effective. Independent in room and hallway. Done trach care and changed inner canula. No SOB or respiratory distress noted. No suction. At bedtime pt was using trach dome with humidifier.

## 2018-08-27 NOTE — CONSULTS
"Both Merline and Hector attended classes for trach and feeding administration. Hector has been doing his cares independently since class earlier in his hospitalization but Merline had no formal education yet so the class was set up for her. She wanted to focus more on the trach teaching which is what we did. She had a good understanding of the need to keep things clean and was able to demonstrate all aspects of trach care including site and trach care, changing out the trach ties, suctioning and lavaging. We talked about s/s of infections and general concerns of someone with a trach. She was admittedly more comfortable with the enteral feeds and had no issues with doing a gravity feed and also changing the site of the NG securing device. She admits that her and [place] have been through a great deal together \"and will get through this too\". Hector participated in the class with verbal cues but did none of the hands on training. They were given all the written material for the class.  "

## 2018-08-28 ENCOUNTER — TELEPHONE (OUTPATIENT)
Dept: PEDIATRICS | Facility: OTHER | Age: 48
End: 2018-08-28

## 2018-08-28 ENCOUNTER — NURSE TRIAGE (OUTPATIENT)
Dept: NURSING | Facility: CLINIC | Age: 48
End: 2018-08-28

## 2018-08-28 LAB
DONOR IDENTIFICATION: NORMAL
DSA COMMENTS: NORMAL
DSA PRESENT: NO
DSA TEST METHOD: NORMAL
MYCOPHENOLATE SERPL LC/MS/MS-MCNC: 2.76 MG/L (ref 1–3.5)
MYCOPHENOLATE-G SERPL LC/MS/MS-MCNC: 102.3 MG/L (ref 30–95)
ORGAN: NORMAL
SA1 CELL: NORMAL
SA1 COMMENTS: NORMAL
SA1 HI RISK ABY: NORMAL
SA1 MOD RISK ABY: NORMAL
SA1 TEST METHOD: NORMAL
SA2 CELL: NORMAL
SA2 COMMENTS: NORMAL
SA2 HI RISK ABY UA: NORMAL
SA2 MOD RISK ABY: NORMAL
SA2 TEST METHOD: NORMAL
TME LAST DOSE: ABNORMAL H

## 2018-08-28 NOTE — TELEPHONE ENCOUNTER
"Request for Home Care Physical Therapy orders as follows:    PT eval and treat date:  8/29/18    Continuation frequency =  2 x week x _3___ weeks              Effective date = 8/29/18    Interventions include:    Therapeutic Exercise  Gait Training  Gait/Balance/Dysfunction  Home Exercise Program       Acknowledging this order with an \"OK\" will suffice. Thank you for your time.  Please call if you have any questions or concerns.    Therapist: Zohreh Castellon PT      Request for Home Care Occupational Therapy orders as follows:    OT eval and treat date:  8/28/18    Continuation frequency =  2 x week x 3____ weeks               Effective date = 8/28/18    Interventions include:    Therapeutic Exercise  Caregiver training  ADL training  Energy conservation  Home exercise program  Home safety assessment                                     Acknowledging this order with an \"OK\" will suffice. Thank you for your time.  Please call if you have any questions or concerns.    For therapist: Audrey Vargas OT  "

## 2018-08-28 NOTE — TELEPHONE ENCOUNTER
URGENT: Response needed within 24 hours    This timeframe is established by CMS as  best practice  for the delivery of home health care. The home health clinician may need to contact you again if this timeframe is not met.      S:    Medication reconciliation discrepancies and/or drug interactions/contraindications have been identified.  Home Care s drug regime review has revealed significant medication issues.    B:    You are being contacted for orders related to medication issues.     A:    INTERACTIONS:    MODERATE:  Escitalopram Oxalate and Quetiapine Fumarate  Escitalopram Oxalate and Tacrolimus  Mycophenolate Mofetil and Omeprazole  Mycophenolate Mofetil and Multivitamin and Mineral  Mycophenolate Mofetil and Tacrolimus  Omeprazole and Multi Vitamin and Mineral  Omeprazole and Tacrolimus  Prednisone and Sodium Bicarbonate  Prednisone and Tacrolimus  Multi Vitamin and Mineral and Sodium Bicarbonate    MAJOR:  Quetiapine Fumarate and Tacrolimus  Atorvastatin Calcium and Tacrolimus    R:    Please evaluate this information and indicate below whether or not changes are required. A copy of the patient's drug interaction/contraindications report is available upon request.     Thank you for your time. Please call with questions or concerns.      OK TO CONTINUE THESE MEDICATIONS: YES OR NO?      Flor Benites RN on 8/28/2018 at 6:58 PM

## 2018-08-28 NOTE — PROGRESS NOTES
This is a recent snapshot of the patient's [facility] medical record.  For current drug dose and complete information and questions, call [phone] or In Basket pool, [facility] home infusion ([id number])  CSN Number:  [id number]

## 2018-08-28 NOTE — TELEPHONE ENCOUNTER
Request for Home Care Skilled Nursing orders as follows:    SN eval and treat date: 8-28-18    Continuation frequency =    3 X week X 1  week, then 2 x week x 1 week, and 4 PRN visits            Effective date=8-28-18    Interventions include:      Assessment  Medication management  O2 sat monitoring (all disciplines as needed)  Patient/caregiver education  TPN therapy  Fall risk: instruct on fall prevention strategies, home safety, assess environment Observe for signs and symptoms of depression  Instruct on pain relief measures and assess pain with each visit, if has pain. Assess effectiveness of medications.  Instruct on pressure relief methods to prevent pressure ulcers    Thank you for your time,     Flor Benites RN on 8/28/2018 at 4:42 PM

## 2018-08-29 ENCOUNTER — TELEPHONE (OUTPATIENT)
Dept: PEDIATRICS | Facility: OTHER | Age: 48
End: 2018-08-29

## 2018-08-29 DIAGNOSIS — Z43.0 TRACHEOSTOMY CARE (H): Primary | ICD-10-CM

## 2018-08-29 DIAGNOSIS — N05.9 NEPHRITIS AND NEPHROPATHY, WITH PATHOLOGICAL LESION IN KIDNEY: ICD-10-CM

## 2018-08-29 DIAGNOSIS — E87.20 ACIDOSIS: ICD-10-CM
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
## 2018-08-29 LAB
BKV DNA # SPEC NAA+PROBE: ABNORMAL COPIES/ML
BKV DNA SPEC NAA+PROBE-LOG#: 4.6 LOG COPIES/ML
SPECIMEN SOURCE: ABNORMAL

## 2018-08-29 RX ORDER — OXYCODONE HYDROCHLORIDE 5 MG/1
1 TABLET ORAL
Refills: 0 | COMMUNITY
Start: 2018-06-02 | End: 2018-08-29

## 2018-08-29 RX ORDER — SODIUM BICARBONATE 650 MG/1
650 TABLET ORAL 3 TIMES DAILY
Qty: 90 TABLET | Refills: 0 | Status: SHIPPED | OUTPATIENT
Start: 2018-08-29 | End: 2018-10-12

## 2018-08-29 RX ORDER — OXYCODONE HYDROCHLORIDE 5 MG/1
5 TABLET ORAL EVERY 6 HOURS PRN
Qty: 20 TABLET | Refills: 0 | Status: SHIPPED | OUTPATIENT
Start: 2018-08-29 | End: 2018-09-11

## 2018-08-29 NOTE — TELEPHONE ENCOUNTER
Contacted home care / hospice and she reported the patient would like to continue with the roxicodone 5mg tablet that is teed up below and they need a script for the sodium bicarbonate as well and that is teed up below.  Isidra Morelos LPN on 8/29/2018 at 3:59 PM

## 2018-08-29 NOTE — TELEPHONE ENCOUNTER
Spoke to Home Care and Hospice. They are hoping to get some pain relief for him rubén.  Ginna Rutledge CMA..............8/29/2018........3:04 PM

## 2018-08-29 NOTE — TELEPHONE ENCOUNTER
"Wife was transferred to us as her  was discharged from TCU yesterday and they had discontinued his oxycodone. She doesn't know why the would do that. He in pain now 8/10 and tylenol not helping. She knows pain management can't be done over the phone,  but followed through with speaking to us as was recommended to her by her local hospital. They are 4 hours away from [facility] ED, so will go to their local ED now.     Bernadette Krueger RN, [facility]    Reason for Disposition    Caller has URGENT medication question about med that PCP prescribed and triager unable to answer question    Additional Information    Negative: Drug overdose and nurse unable to answer question    Negative: Caller requesting information not related to medicine    Negative: Caller requesting a prescription for Strep throat and has a positive culture result    Negative: Rash while taking a medication or within 3 days of stopping it    Negative: Immunization reaction suspected    Negative: [1] Asthma and [2] having symptoms of asthma (cough, wheezing, etc)    Negative: MORE THAN A DOUBLE DOSE of a prescription or over-the-counter (OTC) drug    Negative: [1] DOUBLE DOSE (an extra dose or lesser amount) of over-the-counter (OTC) drug AND [2] any symptoms (e.g., dizziness, nausea, pain, sleepiness)    Negative: [1] DOUBLE DOSE (an extra dose or lesser amount) of prescription drug AND [2] any symptoms (e.g., dizziness, nausea, pain, sleepiness)    Negative: Took another person's prescription drug    Negative: Pharmacy calling with prescription questions and triager unable to answer question    Negative: [1] Prescription not at pharmacy AND [2] was prescribed today by PCP    Negative: [1] Request for URGENT new prescription or refill of \"essential\" medication (i.e., likelihood of harm to patient if not taken) AND [2] triager unable to fill per unit policy    Negative: Diabetes drug error or overdose (e.g., insulin or extra dose)    " Negative: [1] DOUBLE DOSE (an extra dose or lesser amount) of prescription drug AND [2] NO symptoms (Exception: a double dose of antibiotics)    Protocols used: MEDICATION QUESTION CALL-ADULT-

## 2018-08-29 NOTE — TELEPHONE ENCOUNTER
S:  Patient reports pain is not tolerable to jaw and mouth at 8/10 on 0/10 scale.     B:  He is currently taking 975mg acetaminophen via NG tube every 8 hours. He is requesting stronger pain medication.  He was getting Roxicodone 5mg tablet prior to discharge and would like to continue this if possible.  He has no current RX for this.      A: patients speaking ability is limited due to increased pain.            S: patient has Sodium Bicarbonate 650mg tablet on discharge medication list.  This was not filled with his discharge pharmacy order.  Patient is requesting RX for this if possible. His local pharmacy is [facility] in [place].    Dose 650mg per feeding tube route 3 times daily          Thank you for your time    Flor Benites RN on 8/29/2018 at 2:43 PM

## 2018-08-29 NOTE — TELEPHONE ENCOUNTER
S: Patient was discharged with Optifoam dressing in place under his trach to prevent skin breakdown.  No extra supplies were sent. He needs to change this daily. Regular trach sponge does not provide enough padding between appliance and skin.      B: [facility] is able to drop ship to patients home at no cost to the patient within 1-2 days.  He would like a prescription for this as he has many financial concerns and can not afford this over the counter.  Their ph# [phone].    Item information:    Optifoam from [employer].  [id number]    Thank you for your time    Flor Benites RN on 8/29/2018 at 3:39 PM

## 2018-08-30 ENCOUNTER — APPOINTMENT (OUTPATIENT)
Dept: LAB | Facility: OTHER | Age: 48
End: 2018-08-30
Attending: INTERNAL MEDICINE
Payer: MEDICARE

## 2018-08-30 ENCOUNTER — MEDICAL CORRESPONDENCE (OUTPATIENT)
Dept: HEALTH INFORMATION MANAGEMENT | Facility: OTHER | Age: 48
End: 2018-08-30

## 2018-08-30 ENCOUNTER — TELEPHONE (OUTPATIENT)
Dept: TRANSPLANT | Facility: CLINIC | Age: 48
End: 2018-08-30

## 2018-08-30 LAB
ANION GAP SERPL CALCULATED.3IONS-SCNC: 9 MMOL/L (ref 3–14)
BASOPHILS # BLD AUTO: 0.1 10E9/L (ref 0–0.2)
BASOPHILS NFR BLD AUTO: 0.7 %
BUN SERPL-MCNC: 27 MG/DL (ref 7–25)
CALCIUM SERPL-MCNC: 9.7 MG/DL (ref 8.6–10.3)
CHLORIDE SERPL-SCNC: 97 MMOL/L (ref 98–107)
CO2 SERPL-SCNC: 25 MMOL/L (ref 21–31)
CREAT SERPL-MCNC: 1.08 MG/DL (ref 0.7–1.3)
DIFFERENTIAL METHOD BLD: ABNORMAL
EOSINOPHIL # BLD AUTO: 0.1 10E9/L (ref 0–0.7)
EOSINOPHIL NFR BLD AUTO: 1 %
ERYTHROCYTE [DISTWIDTH] IN BLOOD BY AUTOMATED COUNT: 15.4 % (ref 10–15)
GFR SERPL CREATININE-BSD FRML MDRD: 73 ML/MIN/1.7M2
GLUCOSE SERPL-MCNC: 102 MG/DL (ref 70–105)
HCT VFR BLD AUTO: 32.4 % (ref 40–53)
HGB BLD-MCNC: 10.6 G/DL (ref 13.3–17.7)
IMM GRANULOCYTES # BLD: 0.1 10E9/L (ref 0–0.4)
IMM GRANULOCYTES NFR BLD: 1.2 %
LYMPHOCYTES # BLD AUTO: 0.3 10E9/L (ref 0.8–5.3)
LYMPHOCYTES NFR BLD AUTO: 3.8 %
MCH RBC QN AUTO: 32 PG (ref 26.5–33)
MCHC RBC AUTO-ENTMCNC: 32.7 G/DL (ref 31.5–36.5)
MCV RBC AUTO: 98 FL (ref 78–100)
MONOCYTES # BLD AUTO: 0.5 10E9/L (ref 0–1.3)
MONOCYTES NFR BLD AUTO: 6.7 %
NEUTROPHILS # BLD AUTO: 6 10E9/L (ref 1.6–8.3)
NEUTROPHILS NFR BLD AUTO: 86.6 %
PLATELET # BLD AUTO: 206 10E9/L (ref 150–450)
POTASSIUM SERPL-SCNC: 4.3 MMOL/L (ref 3.5–5.1)
RBC # BLD AUTO: 3.31 10E12/L (ref 4.4–5.9)
SODIUM SERPL-SCNC: 131 MMOL/L (ref 134–144)
WBC # BLD AUTO: 6.9 10E9/L (ref 4–11)

## 2018-08-30 PROCEDURE — 80048 BASIC METABOLIC PNL TOTAL CA: CPT | Performed by: INTERNAL MEDICINE

## 2018-08-30 PROCEDURE — 85025 COMPLETE CBC W/AUTO DIFF WBC: CPT | Performed by: INTERNAL MEDICINE

## 2018-08-30 RX ORDER — FOAM BANDAGE 4" X 4"
BANDAGE TOPICAL
Qty: 10 EACH | Refills: 99 | Status: SHIPPED | OUTPATIENT
Start: 2018-08-30 | End: 2019-01-29

## 2018-08-30 NOTE — TELEPHONE ENCOUNTER
RN[facility] spoke with Hector who feels well and reports good urine output.  Home care will draw labs this afternoon with drug level tomorrow AM.    Current hydration schedule with TF is as follows:  500mL free water bolus every day  180mL free water bolus QID  90mL before and after TF boluses (TID)  Sips of clears.

## 2018-08-30 NOTE — LETTER
OUTPATIENT LABORATORY TEST ORDER    Patient Name:Gilles Henning III   Transplant Date: 5/30/2018  YOB: 1970  Issue Date & Time:8/30/2018  2:13 PM  [facility] MR: [id number]  Expiration Date:  (1 year after date issued)      Diagnoses: Aftercare following organ transplant (ICD-10 Z48.288)   Kidney Transplant (ICD-10 Z94.0)   Long term use of medications (ICD-10  Z79.899)     ?Lab results to be available on the same day drawn.   ?Patient should release information to the [facility] [place] Transplant [facility].     ?Please fax to the Transplant Center at [phone].    2x/week, Months 2-3 post-transplant    7/1/2018 - 9/1/2018       1x/week, Months 4-6 post-transplant    9/2/2018 - 12/2/2018  Every 2 weeks, Months 7-9 post-transplant    12/3/2018 - 3/3/2019  Every 3 weeks, Months 10-12 post-transplant   3/5/2019 - 5/30/2019        ?Hemogram and Platelet   ?Basic Metabolic Panel (Sodium, Potassium,Chloride, CO2, Creatinine, Urea Nitrogen, Glucose, Calcium)   ?Prograf/Tacrolimus drug level     Every 2 weeks   ? BK PCR Quantitative (Polyoma virus - blood in purple top tube to Reference Lab)    At 6 & 12 months post-transplant        Due: 11/2018 & 5/2019   ? HBsurfaceAb, HBcoreAb, HCV at 6 months only -   ? Liver Function Tests(Bilirubin Direct/Total, AST, ALT, Alkaline Phosphatase)   ?Complete Lipid Panel fasting (Cholesterol, Triglycerides, HDL, LDL)   ? Random Urine for Protein/Creatinine ratio    At 7-12 months post-transplant - Monthly   ?EBV PCR QT    At 30, 60, 120, 180, 270, and 360 days, post transplant   ? PRA/DSA level (mailers provided by the patient)    If you have any questions, please call The Transplant Center at [phone] or [phone].    Please fax all results to [phone].          Mario Vallejo MD  Department of Surgery

## 2018-08-31 ENCOUNTER — HOME INFUSION (PRE-WILLOW HOME INFUSION) (OUTPATIENT)
Dept: PHARMACY | Facility: CLINIC | Age: 48
End: 2018-08-31

## 2018-08-31 ENCOUNTER — MEDICAL CORRESPONDENCE (OUTPATIENT)
Dept: HEALTH INFORMATION MANAGEMENT | Facility: OTHER | Age: 48
End: 2018-08-31

## 2018-08-31 ENCOUNTER — APPOINTMENT (OUTPATIENT)
Dept: LAB | Facility: OTHER | Age: 48
End: 2018-08-31
Attending: INTERNAL MEDICINE
Payer: MEDICARE

## 2018-08-31 PROCEDURE — 80197 ASSAY OF TACROLIMUS: CPT | Performed by: INTERNAL MEDICINE

## 2018-09-01 LAB
TACROLIMUS BLD-MCNC: 5.2 UG/L (ref 5–15)
TME LAST DOSE: NORMAL H

## 2018-09-04 ENCOUNTER — TELEPHONE (OUTPATIENT)
Dept: PHARMACY | Facility: CLINIC | Age: 48
End: 2018-09-04

## 2018-09-04 ENCOUNTER — OFFICE VISIT (OUTPATIENT)
Dept: INTERNAL MEDICINE | Facility: OTHER | Age: 48
End: 2018-09-04
Attending: NURSE PRACTITIONER
Payer: MEDICARE

## 2018-09-04 ENCOUNTER — HOME INFUSION (PRE-WILLOW HOME INFUSION) (OUTPATIENT)
Dept: PHARMACY | Facility: CLINIC | Age: 48
End: 2018-09-04

## 2018-09-04 VITALS
BODY MASS INDEX: 17.29 KG/M2 | TEMPERATURE: 96.3 F | DIASTOLIC BLOOD PRESSURE: 82 MMHG | SYSTOLIC BLOOD PRESSURE: 112 MMHG | HEIGHT: 70 IN | WEIGHT: 120.8 LBS

## 2018-09-04 DIAGNOSIS — F33.9 RECURRENT MAJOR DEPRESSIVE DISORDER, REMISSION STATUS UNSPECIFIED (H): ICD-10-CM

## 2018-09-04 DIAGNOSIS — Z94.0 KIDNEY REPLACED BY TRANSPLANT: ICD-10-CM

## 2018-09-04 DIAGNOSIS — Z48.298 AFTERCARE FOLLOWING ORGAN TRANSPLANT: ICD-10-CM

## 2018-09-04 DIAGNOSIS — Z94.0 KIDNEY TRANSPLANT RECIPIENT: Primary | ICD-10-CM
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
## 2018-09-04 DIAGNOSIS — D84.9 IMMUNOSUPPRESSED STATUS (H): ICD-10-CM

## 2018-09-04 DIAGNOSIS — Z94.0 KIDNEY TRANSPLANT RECIPIENT: ICD-10-CM

## 2018-09-04 DIAGNOSIS — Z46.59 ENCOUNTER FOR NASOJEJUNAL (NJ) TUBE PLACEMENT: ICD-10-CM

## 2018-09-04 DIAGNOSIS — E55.9 VITAMIN D DEFICIENCY: ICD-10-CM

## 2018-09-04 DIAGNOSIS — Z43.0 TRACHEOSTOMY CARE (H): ICD-10-CM

## 2018-09-04 LAB
ANION GAP SERPL CALCULATED.3IONS-SCNC: 5 MMOL/L (ref 3–14)
BUN SERPL-MCNC: 25 MG/DL (ref 7–25)
CALCIUM SERPL-MCNC: 10.2 MG/DL (ref 8.6–10.3)
CHLORIDE SERPL-SCNC: 104 MMOL/L (ref 98–107)
CO2 SERPL-SCNC: 31 MMOL/L (ref 21–31)
CREAT SERPL-MCNC: 1.19 MG/DL (ref 0.7–1.3)
ERYTHROCYTE [DISTWIDTH] IN BLOOD BY AUTOMATED COUNT: 15.1 % (ref 10–15)
GFR SERPL CREATININE-BSD FRML MDRD: 65 ML/MIN/1.7M2
GLUCOSE SERPL-MCNC: 96 MG/DL (ref 70–105)
HCT VFR BLD AUTO: 31.9 % (ref 40–53)
HGB BLD-MCNC: 10.4 G/DL (ref 13.3–17.7)
MCH RBC QN AUTO: 32.5 PG (ref 26.5–33)
MCHC RBC AUTO-ENTMCNC: 32.6 G/DL (ref 31.5–36.5)
MCV RBC AUTO: 100 FL (ref 78–100)
PLATELET # BLD AUTO: 168 10E9/L (ref 150–450)
POTASSIUM SERPL-SCNC: 4.5 MMOL/L (ref 3.5–5.1)
RBC # BLD AUTO: 3.2 10E12/L (ref 4.4–5.9)
SODIUM SERPL-SCNC: 140 MMOL/L (ref 134–144)
WBC # BLD AUTO: 4.2 10E9/L (ref 4–11)

## 2018-09-04 PROCEDURE — 85027 COMPLETE CBC AUTOMATED: CPT | Performed by: SURGERY

## 2018-09-04 PROCEDURE — 99215 OFFICE O/P EST HI 40 MIN: CPT | Performed by: NURSE PRACTITIONER

## 2018-09-04 PROCEDURE — 80197 ASSAY OF TACROLIMUS: CPT | Performed by: SURGERY

## 2018-09-04 PROCEDURE — 36415 COLL VENOUS BLD VENIPUNCTURE: CPT | Performed by: SURGERY

## 2018-09-04 PROCEDURE — G0463 HOSPITAL OUTPT CLINIC VISIT: HCPCS

## 2018-09-04 PROCEDURE — 80048 BASIC METABOLIC PNL TOTAL CA: CPT | Performed by: SURGERY

## 2018-09-04 RX ORDER — TACROLIMUS 0.5 MG/1
2 CAPSULE ORAL 2 TIMES DAILY
Qty: 240 CAPSULE | Refills: 11 | Status: SHIPPED | OUTPATIENT
Start: 2018-09-04 | End: 2018-09-11

## 2018-09-04 RX ORDER — CHOLECALCIFEROL (VITAMIN D3) 50 MCG
1 TABLET ORAL DAILY
Qty: 90 TABLET | Refills: 3 | Status: SHIPPED | OUTPATIENT
Start: 2018-09-04 | End: 2019-01-29

## 2018-09-04 ASSESSMENT — PAIN SCALES - GENERAL: PAINLEVEL: NO PAIN (0)
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
## 2018-09-04 NOTE — TELEPHONE ENCOUNTER
Spoke to patients wife regarding current Tac level below goal.  Patients wife confirms current tac dose and good 12 hour trough level.  Patient verbalizes understanding to increase tac dose to 2 mg BID.

## 2018-09-04 NOTE — PROGRESS NOTES
This is a recent snapshot of the patient's [facility] medical record.  For current drug dose and complete information and questions, call [phone] or In Basket pool, [facility] ([id number])  CSN Number:  [id number]

## 2018-09-04 NOTE — TELEPHONE ENCOUNTER
Tacrolimus level below goal. Current dose tac = 1.5mg BID.  Confirm current dose and 12 hour trough level. Ensure no recent illness, missed doses (clog in feeding tube, etc), or changes to other medications.  If accurate, INCREASE tacrolimus to 2mg BID and check level with next scheduled lab draw.
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
## 2018-09-05 ENCOUNTER — TELEPHONE (OUTPATIENT)
Dept: PEDIATRICS | Facility: OTHER | Age: 48
End: 2018-09-05

## 2018-09-05 ENCOUNTER — TELEPHONE (OUTPATIENT)
Dept: INTERNAL MEDICINE | Facility: OTHER | Age: 48
End: 2018-09-05
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
## 2018-09-05 LAB
TACROLIMUS BLD-MCNC: 6 UG/L (ref 5–15)
TME LAST DOSE: NORMAL H

## 2018-09-05 NOTE — TELEPHONE ENCOUNTER
4x4 Is the biggest size they carry for the Optifoam. Is this ok? If so, will need to change order to the 4x4. I can bring the form to change if this is ok with you  Tracy River LPN...................9/5/2018   8:38 AM

## 2018-09-05 NOTE — TELEPHONE ENCOUNTER
Wife told about message below. Thinks the 4x4 will be ok. States is out wondering if she can get some to take home today as they are now out?     Did tell her about Vit D being sent in.   Tracy River LPN...................9/5/2018   10:04 AM

## 2018-09-05 NOTE — PROGRESS NOTES
This is a recent snapshot of the patient's [facility] medical record.  For current drug dose and complete information and questions, call [phone] or In Basket pool, [facility] home infusion ([id number])  CSN Number:  [id number]

## 2018-09-05 NOTE — TELEPHONE ENCOUNTER
URGENT: Response needed within 24 hours    This timeframe is established by CMS as  best practice  for the delivery of home health care. The home health clinician may need to contact you again if this timeframe is not met.      S:    Patient is soon to be done with Home Care Services with goals met    B:    You are being contacted for orders related to Out patient Speech Therapy    A:     Plan for patient to be completed with Home Care services within approximately 1 week.  Need to Transition to Out Patient Therapy.  Please send Referral.     R:    Please evaluate this information and indicate below whether or not changes are required. A copy of the patient's drug interaction/contraindications report is available upon request.     Thank you for your time. Please call with questions or concerns.      Flor Benites RN on 9/5/2018 at 12:45 PM

## 2018-09-05 NOTE — TELEPHONE ENCOUNTER
I gave them a large sheet of foam yesterday.  I also sent over the vitamin D tablets yesterday which is what they requested so they can crush those because the liquid was too expensive.

## 2018-09-06 ASSESSMENT — PATIENT HEALTH QUESTIONNAIRE - PHQ9: SUM OF ALL RESPONSES TO PHQ QUESTIONS 1-9: 0

## 2018-09-07 DIAGNOSIS — S01.80XD UNSPECIFIED OPEN WOUND OF OTHER PART OF HEAD, SUBSEQUENT ENCOUNTER: Primary | ICD-10-CM

## 2018-09-07 NOTE — PROGRESS NOTES
Dr Dubose reviewed and completed the following home care or hospice form(s) for Gilles Henning.   This covers the certification period effective 8/28/18 to 10/26/18.  Mely Sanchez LPN on 9/7/2018 at 3:21 PM

## 2018-09-08 ENCOUNTER — HOSPITAL ENCOUNTER (EMERGENCY)
Facility: OTHER | Age: 48
Discharge: HOME | End: 2018-09-08
Attending: EMERGENCY MEDICINE | Admitting: PHYSICIAN ASSISTANT
Payer: MEDICARE

## 2018-09-08 VITALS
DIASTOLIC BLOOD PRESSURE: 64 MMHG | HEIGHT: 68 IN | SYSTOLIC BLOOD PRESSURE: 93 MMHG | TEMPERATURE: 98.7 F | HEART RATE: 82 BPM | OXYGEN SATURATION: 95 % | RESPIRATION RATE: 12 BRPM

## 2018-09-08 DIAGNOSIS — G89.18 POSTOPERATIVE PAIN: ICD-10-CM
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
## 2018-09-08 PROCEDURE — 99283 EMERGENCY DEPT VISIT LOW MDM: CPT | Mod: Z6 | Performed by: PHYSICIAN ASSISTANT

## 2018-09-08 PROCEDURE — 99283 EMERGENCY DEPT VISIT LOW MDM: CPT | Performed by: PHYSICIAN ASSISTANT

## 2018-09-08 PROCEDURE — 25000132 ZZH RX MED GY IP 250 OP 250 PS 637: Mod: GY | Performed by: PHYSICIAN ASSISTANT

## 2018-09-08 PROCEDURE — A9270 NON-COVERED ITEM OR SERVICE: HCPCS | Mod: GY | Performed by: PHYSICIAN ASSISTANT

## 2018-09-08 RX ORDER — OXYCODONE AND ACETAMINOPHEN 5; 325 MG/1; MG/1
1 TABLET ORAL ONCE
Status: COMPLETED | OUTPATIENT
Start: 2018-09-08 | End: 2018-09-08

## 2018-09-08 RX ADMIN — OXYCODONE HYDROCHLORIDE AND ACETAMINOPHEN 1 TABLET: 5; 325 TABLET ORAL at 20:09

## 2018-09-08 ASSESSMENT — ENCOUNTER SYMPTOMS
SHORTNESS OF BREATH: 0
NECK STIFFNESS: 0
DIFFICULTY URINATING: 0
COLOR CHANGE: 0
FEVER: 0
HEADACHES: 0
ABDOMINAL PAIN: 0
CONFUSION: 0
EYE REDNESS: 0
ARTHRALGIAS: 0

## 2018-09-08 NOTE — ED AVS SNAPSHOT
[facility]    [address]    [place] [address]    Phone:  [phone]    Fax:  [phone]                                       Gilles Henning III   MRN: [id number]    Department:  [facility]   Date of Visit:  9/8/2018           After Visit Summary Signature Page     I have received my discharge instructions, and my questions have been answered. I have discussed any challenges I see with this plan with the nurse or doctor.    ..........................................................................................................................................  Patient/Patient Representative Signature      ..........................................................................................................................................  Patient Representative Print Name and Relationship to Patient    ..................................................               ................................................  Date                                            Time    ..........................................................................................................................................  Reviewed by Signature/Title    ...................................................              ..............................................  Date                                                            Time          22EPIC Rev 08/18
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
## 2018-09-10 ENCOUNTER — TELEPHONE (OUTPATIENT)
Dept: INTERNAL MEDICINE | Facility: OTHER | Age: 48
End: 2018-09-10

## 2018-09-10 ENCOUNTER — MEDICAL CORRESPONDENCE (OUTPATIENT)
Dept: HEALTH INFORMATION MANAGEMENT | Facility: OTHER | Age: 48
End: 2018-09-10

## 2018-09-10 ENCOUNTER — HOME INFUSION (PRE-WILLOW HOME INFUSION) (OUTPATIENT)
Dept: PHARMACY | Facility: CLINIC | Age: 48
End: 2018-09-10

## 2018-09-10 ENCOUNTER — TELEPHONE (OUTPATIENT)
Dept: TRANSPLANT | Facility: CLINIC | Age: 48
End: 2018-09-10

## 2018-09-10 ENCOUNTER — APPOINTMENT (OUTPATIENT)
Dept: LAB | Facility: OTHER | Age: 48
End: 2018-09-10
Attending: INTERNAL MEDICINE
Payer: MEDICARE

## 2018-09-10 LAB
ANION GAP SERPL CALCULATED.3IONS-SCNC: 7 MMOL/L (ref 3–14)
BASOPHILS # BLD AUTO: 0 10E9/L (ref 0–0.2)
BASOPHILS NFR BLD AUTO: 0.6 %
BUN SERPL-MCNC: 27 MG/DL (ref 7–25)
CALCIUM SERPL-MCNC: 10.1 MG/DL (ref 8.6–10.3)
CHLORIDE SERPL-SCNC: 106 MMOL/L (ref 98–107)
CO2 SERPL-SCNC: 29 MMOL/L (ref 21–31)
CREAT SERPL-MCNC: 1.18 MG/DL (ref 0.7–1.3)
DIFFERENTIAL METHOD BLD: ABNORMAL
EOSINOPHIL # BLD AUTO: 0.1 10E9/L (ref 0–0.7)
EOSINOPHIL NFR BLD AUTO: 1.1 %
ERYTHROCYTE [DISTWIDTH] IN BLOOD BY AUTOMATED COUNT: 15.7 % (ref 10–15)
GFR SERPL CREATININE-BSD FRML MDRD: 66 ML/MIN/1.7M2
GLUCOSE SERPL-MCNC: 88 MG/DL (ref 70–105)
HCT VFR BLD AUTO: 32.8 % (ref 40–53)
HGB BLD-MCNC: 10.6 G/DL (ref 13.3–17.7)
IMM GRANULOCYTES # BLD: 0 10E9/L (ref 0–0.4)
IMM GRANULOCYTES NFR BLD: 0.6 %
LYMPHOCYTES # BLD AUTO: 0.3 10E9/L (ref 0.8–5.3)
LYMPHOCYTES NFR BLD AUTO: 5.3 %
MCH RBC QN AUTO: 33 PG (ref 26.5–33)
MCHC RBC AUTO-ENTMCNC: 32.3 G/DL (ref 31.5–36.5)
MCV RBC AUTO: 102 FL (ref 78–100)
MONOCYTES # BLD AUTO: 0.2 10E9/L (ref 0–1.3)
MONOCYTES NFR BLD AUTO: 4.1 %
NEUTROPHILS # BLD AUTO: 4.1 10E9/L (ref 1.6–8.3)
NEUTROPHILS NFR BLD AUTO: 88.3 %
PLATELET # BLD AUTO: 172 10E9/L (ref 150–450)
POTASSIUM SERPL-SCNC: 4.9 MMOL/L (ref 3.5–5.1)
RBC # BLD AUTO: 3.21 10E12/L (ref 4.4–5.9)
SODIUM SERPL-SCNC: 142 MMOL/L (ref 134–144)
WBC # BLD AUTO: 4.7 10E9/L (ref 4–11)

## 2018-09-10 PROCEDURE — 80048 BASIC METABOLIC PNL TOTAL CA: CPT | Performed by: INTERNAL MEDICINE

## 2018-09-10 PROCEDURE — 80197 ASSAY OF TACROLIMUS: CPT | Performed by: INTERNAL MEDICINE

## 2018-09-10 PROCEDURE — 87799 DETECT AGENT NOS DNA QUANT: CPT | Performed by: INTERNAL MEDICINE

## 2018-09-10 PROCEDURE — 85025 COMPLETE CBC W/AUTO DIFF WBC: CPT | Performed by: INTERNAL MEDICINE

## 2018-09-10 NOTE — TELEPHONE ENCOUNTER
Creatinine slightly elevated.  Any change in input or output (has free water from tube feeds or sips of clears increased or decreased)?    Any fevers?  Any pain over the kidney site?      Recently in the ED for pain - was this due to facial pain? If so, what treatment was provided?

## 2018-09-10 NOTE — TELEPHONE ENCOUNTER
Spoke with Merline appointment change from 3:15 to 10 tomorrow.  Gillian Lopez LPN 9/10/2018   11:50 AM

## 2018-09-11 ENCOUNTER — TELEPHONE (OUTPATIENT)
Dept: TRANSPLANT | Facility: CLINIC | Age: 48
End: 2018-09-11

## 2018-09-11 ENCOUNTER — OFFICE VISIT (OUTPATIENT)
Dept: PEDIATRICS | Facility: OTHER | Age: 48
End: 2018-09-11
Attending: INTERNAL MEDICINE
Payer: COMMERCIAL

## 2018-09-11 VITALS
DIASTOLIC BLOOD PRESSURE: 62 MMHG | RESPIRATION RATE: 16 BRPM | HEART RATE: 80 BPM | WEIGHT: 125.4 LBS | SYSTOLIC BLOOD PRESSURE: 100 MMHG | BODY MASS INDEX: 19.07 KG/M2

## 2018-09-11 DIAGNOSIS — Z94.0 KIDNEY TRANSPLANT RECIPIENT: ICD-10-CM

## 2018-09-11 DIAGNOSIS — T14.91XA SUICIDE ATTEMPT (H): ICD-10-CM

## 2018-09-11 DIAGNOSIS — Z09 HOSPITAL DISCHARGE FOLLOW-UP: ICD-10-CM

## 2018-09-11 DIAGNOSIS — F33.41 RECURRENT MAJOR DEPRESSIVE DISORDER, IN PARTIAL REMISSION (H): ICD-10-CM

## 2018-09-11 DIAGNOSIS — R51.9 FACIAL PAIN: ICD-10-CM

## 2018-09-11 DIAGNOSIS — Z93.0 TRACHEOSTOMY IN PLACE (H): Primary | ICD-10-CM

## 2018-09-11 DIAGNOSIS — Z94.0 KIDNEY REPLACED BY TRANSPLANT: Primary | ICD-10-CM
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
## 2018-09-11 LAB
TACROLIMUS BLD-MCNC: 5.7 UG/L (ref 5–15)
TME LAST DOSE: NORMAL H

## 2018-09-11 PROCEDURE — 99214 OFFICE O/P EST MOD 30 MIN: CPT | Performed by: INTERNAL MEDICINE

## 2018-09-11 PROCEDURE — G0463 HOSPITAL OUTPT CLINIC VISIT: HCPCS

## 2018-09-11 RX ORDER — TACROLIMUS 0.5 MG/1
3 CAPSULE ORAL 2 TIMES DAILY
Qty: 360 CAPSULE | Refills: 11 | Status: SHIPPED | OUTPATIENT
Start: 2018-09-11 | End: 2018-09-17

## 2018-09-11 RX ORDER — OXYCODONE HYDROCHLORIDE 5 MG/1
TABLET ORAL
Qty: 60 TABLET | Refills: 0 | Status: SHIPPED | OUTPATIENT
Start: 2018-09-11 | End: 2018-09-11

## 2018-09-11 RX ORDER — OXYCODONE HYDROCHLORIDE 5 MG/1
TABLET ORAL
Qty: 60 TABLET | Refills: 0 | Status: SHIPPED | OUTPATIENT
Start: 2018-09-11 | End: 2019-01-29

## 2018-09-11 ASSESSMENT — PAIN SCALES - GENERAL: PAINLEVEL: NO PAIN (0)

## 2018-09-11 NOTE — NURSING NOTE
"Chief Complaint   Patient presents with     RECHECK     Er Follow up pain management.    Also would like rx drain for his trach, states #6 canula is the size.     Initial /62 (BP Location: Right arm, Patient Position: Sitting, Cuff Size: Adult Regular)  Pulse 80  Resp 16  Wt 125 lb 6.4 oz (56.9 kg)  BMI 19.07 kg/m2 Estimated body mass index is 19.07 kg/(m^2) as calculated from the following:    Height as of 9/8/18: 5' 8\" (1.727 m).    Weight as of this encounter: 125 lb 6.4 oz (56.9 kg).  Medication Reconciliation: complete    Nicole Dobbs LPN    "
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
## 2018-09-11 NOTE — TELEPHONE ENCOUNTER
Tac level below goal. Current dose tac = 2mg BID.  Ensure no missed doses, true 12 h trough. If accurate, INCREASE to 3mg BID  and repeat Thursday.     Merline voiced understanding and confirmed all of the above. Will recheck level on Thursday with repeat BMP after patient re-hydrated.   Will check EBV as Merline reports that Hector has had night sweats that he feels dehydrates him (does endorse that this has been ongoing for >10 years).

## 2018-09-11 NOTE — TELEPHONE ENCOUNTER
Spoke to Merline, patients wife regarding elevated creatinine.  Merline states that patient was dehydrated and was working on hydration yesterday.  Patient denies any fevers or pain over incision site.

## 2018-09-11 NOTE — PROGRESS NOTES
This is a recent snapshot of the patient's [facility] medical record.  For current drug dose and complete information and questions, call [phone] or In Basket pool, [facility] ([id number])  CSN Number:  [id number]

## 2018-09-11 NOTE — TELEPHONE ENCOUNTER
Provider Call: General  Route to LPN    Reason for call: Received call from Oneida at [facility] notifying us that as of yesterday patient has been discharged from home care. Oneida states patient will now be having labs done locally and may need lab orders. Oneida states she faxed over results from most of his labs yesterday but they are still waiting on one to finish processing.     Call back needed? No
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
## 2018-09-12 LAB
BKV DNA # SPEC NAA+PROBE: ABNORMAL COPIES/ML
BKV DNA SPEC NAA+PROBE-LOG#: 4.9 LOG COPIES/ML
SPECIMEN SOURCE: ABNORMAL

## 2018-09-12 ASSESSMENT — PATIENT HEALTH QUESTIONNAIRE - PHQ9: SUM OF ALL RESPONSES TO PHQ QUESTIONS 1-9: 0

## 2018-09-13 ENCOUNTER — TELEPHONE (OUTPATIENT)
Dept: TRANSPLANT | Facility: CLINIC | Age: 48
End: 2018-09-13

## 2018-09-13 DIAGNOSIS — Z94.0 KIDNEY TRANSPLANT RECIPIENT: ICD-10-CM

## 2018-09-13 DIAGNOSIS — Z94.0 KIDNEY REPLACED BY TRANSPLANT: ICD-10-CM

## 2018-09-13 LAB
ANION GAP SERPL CALCULATED.3IONS-SCNC: 7 MMOL/L (ref 3–14)
BUN SERPL-MCNC: 32 MG/DL (ref 7–25)
CALCIUM SERPL-MCNC: 10.4 MG/DL (ref 8.6–10.3)
CHLORIDE SERPL-SCNC: 104 MMOL/L (ref 98–107)
CO2 SERPL-SCNC: 29 MMOL/L (ref 21–31)
CREAT SERPL-MCNC: 1.28 MG/DL (ref 0.7–1.3)
GFR SERPL CREATININE-BSD FRML MDRD: 60 ML/MIN/1.7M2
GLUCOSE SERPL-MCNC: 95 MG/DL (ref 70–105)
POTASSIUM SERPL-SCNC: 5.2 MMOL/L (ref 3.5–5.1)
SODIUM SERPL-SCNC: 140 MMOL/L (ref 134–144)

## 2018-09-13 PROCEDURE — 80048 BASIC METABOLIC PNL TOTAL CA: CPT | Performed by: INTERNAL MEDICINE

## 2018-09-13 PROCEDURE — 36415 COLL VENOUS BLD VENIPUNCTURE: CPT | Performed by: INTERNAL MEDICINE

## 2018-09-13 PROCEDURE — 87799 DETECT AGENT NOS DNA QUANT: CPT | Performed by: INTERNAL MEDICINE

## 2018-09-13 PROCEDURE — 80197 ASSAY OF TACROLIMUS: CPT | Performed by: INTERNAL MEDICINE

## 2018-09-13 NOTE — TELEPHONE ENCOUNTER
Post Kidney and Pancreas Transplant Team Conference  Date: 9/14/2018  Transplant Coordinator: Angeline Alonso     Attendees:  []  Dr. Macias [] Thao Barnard, RN  [] Blossom Montes LPN     []  Dr. Donato [] Martha Morris RN [] Noelle Wagner LPN   []  Dr. Telles [] Beth Evans RN    [x]  Dr. Cabrera [] Katelyn aGllegos RN    [] Dr. Valeljo [x] Maliha Alonso RN    [] Dr. Lorenzo [] Mahad Steele RN    [] Dr. Finley [] Mildred Barajas RN    [] Surgery Fellow [] Gia Romero RN    [] Rachel Resendez NP              Verbal Plan Read Back:   Due to elevated creatinine, start florinef, 0.1mg daily per feeding tube.  Due to BK, REDUCE cellcept to 750mg BID.    Routed to RN Coordinator   Angeline Rick voiced understanding.

## 2018-09-14 LAB
TACROLIMUS BLD-MCNC: 7.6 UG/L (ref 5–15)
TME LAST DOSE: NORMAL H

## 2018-09-14 RX ORDER — FLUDROCORTISONE ACETATE 0.1 MG/1
0.1 TABLET ORAL DAILY
Qty: 30 TABLET | Refills: 2 | Status: SHIPPED | OUTPATIENT
Start: 2018-09-14 | End: 2018-09-17

## 2018-09-14 RX ORDER — MYCOPHENOLATE MOFETIL 200 MG/ML
750 POWDER, FOR SUSPENSION ORAL 2 TIMES DAILY
Qty: 300 ML | Refills: 3 | Status: SHIPPED | OUTPATIENT
Start: 2018-09-14 | End: 2018-09-17

## 2018-09-17 ENCOUNTER — HOSPITAL ENCOUNTER (OUTPATIENT)
Dept: SPEECH THERAPY | Facility: OTHER | Age: 48
Setting detail: THERAPIES SERIES
End: 2018-09-17
Attending: INTERNAL MEDICINE
Payer: MEDICARE

## 2018-09-17 DIAGNOSIS — F31.9 BIPOLAR AFFECTIVE DISORDER, REMISSION STATUS UNSPECIFIED (H): ICD-10-CM

## 2018-09-17 DIAGNOSIS — Z94.0 KIDNEY TRANSPLANT RECIPIENT: Primary | ICD-10-CM

## 2018-09-17 DIAGNOSIS — Z46.59 ENCOUNTER FOR NASOJEJUNAL (NJ) TUBE PLACEMENT: ICD-10-CM

## 2018-09-17 DIAGNOSIS — K21.9 GASTROESOPHAGEAL REFLUX DISEASE WITHOUT ESOPHAGITIS: ICD-10-CM

## 2018-09-17 DIAGNOSIS — R13.10 DYSPHAGIA, UNSPECIFIED TYPE: ICD-10-CM

## 2018-09-17 DIAGNOSIS — E43 SEVERE PROTEIN-CALORIE MALNUTRITION (H): ICD-10-CM

## 2018-09-17 DIAGNOSIS — E78.5 HYPERLIPIDEMIA LDL GOAL <100: ICD-10-CM

## 2018-09-17 LAB
EBV DNA # SPEC NAA+PROBE: 9988 {COPIES}/ML
EBV DNA SPEC NAA+PROBE-LOG#: 4 {LOG_COPIES}/ML

## 2018-09-17 PROCEDURE — 92610 EVALUATE SWALLOWING FUNCTION: CPT | Mod: GN

## 2018-09-17 PROCEDURE — 40000211 ZZHC STATISTIC SLP  DEPARTMENT VISIT

## 2018-09-17 PROCEDURE — G8997 SWALLOW GOAL STATUS: HCPCS | Mod: GN,CL

## 2018-09-17 PROCEDURE — G8996 SWALLOW CURRENT STATUS: HCPCS | Mod: GN,CM

## 2018-09-18 ENCOUNTER — TELEPHONE (OUTPATIENT)
Dept: TRANSPLANT | Facility: CLINIC | Age: 48
End: 2018-09-18

## 2018-09-18 RX ORDER — TACROLIMUS 0.5 MG/1
3 CAPSULE ORAL 2 TIMES DAILY
Qty: 360 CAPSULE | Refills: 11 | Status: SHIPPED | OUTPATIENT
Start: 2018-09-18 | End: 2018-09-20

## 2018-09-18 RX ORDER — VALGANCICLOVIR HYDROCHLORIDE 50 MG/ML
450 POWDER, FOR SOLUTION ORAL DAILY
Qty: 3 BOTTLE | Refills: 3 | Status: SHIPPED | OUTPATIENT
Start: 2018-09-18 | End: 2018-09-25

## 2018-09-18 RX ORDER — ESCITALOPRAM OXALATE 5 MG/5ML
20 SOLUTION ORAL DAILY
Qty: 720 ML | Refills: 0 | Status: SHIPPED | OUTPATIENT
Start: 2018-09-18 | End: 2018-10-12

## 2018-09-18 RX ORDER — PREDNISONE 5 MG/1
5 TABLET ORAL DAILY
Qty: 30 TABLET | Refills: 0 | Status: SHIPPED | OUTPATIENT
Start: 2018-09-18 | End: 2018-11-10

## 2018-09-18 RX ORDER — MYCOPHENOLATE MOFETIL 200 MG/ML
750 POWDER, FOR SUSPENSION ORAL 2 TIMES DAILY
Qty: 260 ML | Refills: 11 | Status: SHIPPED | OUTPATIENT
Start: 2018-09-18 | End: 2018-09-19

## 2018-09-18 RX ORDER — ATORVASTATIN CALCIUM 40 MG/1
40 TABLET, FILM COATED ORAL EVERY EVENING
Qty: 30 TABLET | Refills: 0 | Status: SHIPPED | OUTPATIENT
Start: 2018-09-18 | End: 2018-10-25

## 2018-09-18 RX ORDER — SULFAMETHOXAZOLE AND TRIMETHOPRIM 200; 40 MG/5ML; MG/5ML
80 SUSPENSION ORAL DAILY
Qty: 300 ML | Refills: 11 | Status: SHIPPED | OUTPATIENT
Start: 2018-09-18 | End: 2019-01-29

## 2018-09-18 RX ORDER — QUETIAPINE FUMARATE 50 MG/1
50 TABLET, FILM COATED ORAL AT BEDTIME
Qty: 30 TABLET | Refills: 0 | Status: SHIPPED | OUTPATIENT
Start: 2018-09-18 | End: 2019-01-29

## 2018-09-18 RX ORDER — FLUDROCORTISONE ACETATE 0.1 MG/1
0.1 TABLET ORAL DAILY
Qty: 30 TABLET | Refills: 0 | Status: SHIPPED | OUTPATIENT
Start: 2018-09-18 | End: 2018-11-14

## 2018-09-18 NOTE — PROGRESS NOTES
[facility]          OUTPATIENT SWALLOW  EVALUATION  PLAN OF TREATMENT FOR OUTPATIENT REHABILITATION  (COMPLETE FOR INITIAL CLAIMS ONLY)  Patient's Last Name, First Name, M.I.  YOB: 1970  JuvencioGilles  DAVID     Provider's Name   [facility]   Medical Record No.  [id number]     Start of Care Date:  09/18/18   Onset Date:  07/26/18   Type:     ___PT   ____OT  ___X_SLP Medical Diagnosis:  Dysphagia      Treatment Diagnosis:  Dysphagia Visits from SOC:  1     _________________________________________________________________________________  Plan of Treatment/Functional Goals:                    Intervention Comments: Order for VFSS to ensure safety of current PO intake.   Due to upcoming surgeries patient is not ready to attempt to advance diet beyond current recommendations. After results of VFSS, patient will follow up with SLP when he is ready for diet advancement and outpatient therapy.                 Chel Murphy, SLP       I CERTIFY THE NEED FOR THESE SERVICES FURNISHED UNDER        THIS PLAN OF TREATMENT AND WHILE UNDER MY CARE     (Physician co-signature of this document indicates review and certification of the therapy plan).                  Certification date from: 09/17/18 Certification date to: 11/16/18          Referring Physician: Charlie Dubose     Initial Assessment        See Epic Evaluation Start Of Care Date: 09/18/18                none

## 2018-09-18 NOTE — TELEPHONE ENCOUNTER
Prior Authorization Specialty Medication Request    Medication/Dose:   omeprazole (PRILOSEC) 2 mg/mL SUSP 10 mLs (20 mg) by Per Feeding Tube route every morning (before breakfast)     ICD code (if different than what is on RX):  Z94.0  Previously Tried and Failed:      Important Lab Values:   Rationale:     Insurance Name:   Insurance ID:   Insurance Phone Number:     Pharmacy Information (if different than what is on RX)  Name:  [facility]  Phone:  [phone]

## 2018-09-18 NOTE — PROGRESS NOTES
09/18/18 0800       Present No   General Information   Type Of Visit Initial   Start Of Care Date 09/18/18   Referring Physician Charlie Dubose    Orders Evaluate And Treat   Medical Diagnosis Dysphagia    Onset Of Illness/injury Or Date Of Surgery 07/26/18   Precautions/limitations Swallowing Precautions   Hearing WFL   Respiratory Status Tracheostomy   Patient Role/employment History Disabled   Living Environment [place]   Patient/family Goals Assessment of swallow function, PO trials to supplement TF   General Information Comments Patient presents to clinic for assessment of PO trials. Patient is currently NPO, with 4-5 small sips of free water an hour. Patient has also been eating pudding with agressive oral cares after PO intake. Patient is very engaged in evaluation, asking many insightful questions and verbalized understanding of education provided by SLP   Pain Assessment   Pain Reported Yes   Pain Location Patient did not specify location of pain.    Pain Scale 2/10  (Patient reports this is a typical pain level )   Fall Risk Screen   Fall screen comments Patient independently walked to evaluation room with no difficulties.    Clinical Swallow Evaluation   Oral Musculature anomalies present   Structural Abnormalities present   Dentition other (see comments)  (Very few matching pairs. Pt has appointment with dental )   Secretion Management left corner drooling;right corner drooling   Mucosal Quality adequate   Mandibular Strength and Mobility intact   Oral Labial Strength and Mobility impaired pursing;impaired seal   Velar Elevation intact   Buccal Strength and Mobility impaired   Laryngeal Function Voicing initiated;Swallow   Additional Documentation Yes   Swallow Eval   Feeding Assistance no assistance needed   Clinical Swallow Eval: Thin Liquid Texture Trial   Mode of Presentation, Thin Liquids cup;self-fed   Oral Phase of Swallow WFL;Residue in oral cavity   Oral Residue  right lip drooling;left lip drooling   Pharyngeal Phase of Swallow reduction in laryngeal movement;intact  (Slight reduction in laryngeal movement )   Successful Strategies Trialed During Procedure hard swallow;other (see comments)  (Small sips)   Diagnostic Statement Patient tolerating small sips of thin liquid with no overt sigs/symptoms of aspiration.    Clinical Swallow Eval: Puree Solid Texture Trial   Mode of Presentation, Puree spoon;self-fed   Oral Phase, Puree WFL;Residue in oral cavity   Oral Residue, Puree left lip drooling;right lip drooling   Pharyngeal Phase, Puree intact;reduction in laryngeal movement  (Slight reductionin laryngeal movement)   Successful Strategies Trialed During Procedure, Puree hard swallow;other (see comments)  (Small bites )   Diagnostic Statement Patient tolerating    Swallow Compensations   Swallow Compensations Effortful swallow;Reduce amounts   Educational Assessment   Preferred Learning Style Listening;Demonstration   Esophageal Phase of Swallow   Patient reports or presents with symptoms of esophageal dysphagia No   General Therapy Interventions   Intervention Comments Order for VFSS to ensure safety of current PO intake    Swallow Eval: Clinical Impressions   Skilled Criteria for Therapy Intervention Other (see comments)  (Patient is not ready for advanced PO trials at this time )   Functional Assessment Scale (FAS) 2   Dysphagia Outcome Severity Scale (JEROD) Level 2 - JEROD   Treatment Diagnosis Dysphagia   Diet texture recommendations Thin liquids;Dysphagia diet level 1  (Continue TF )   Recommended Feeding/Eating Techniques hard swallow w/ each bite or sip;maintain upright posture during/after eating for 30 mins;small sips/bites   Anticipated Discharge Disposition home   Risks and Benefits of Treatment have been explained. Yes   Patient, family and/or staff in agreement with Plan of Care Yes   Clinical Impression Comments Due to many upcoming procedures and surgeries, pt  elected to hold off on therapy . Following results of VFSS pt will follow up with SLP when he feels ready to begin diet advancement.    Total Session Time   Total Session Time 35   Total Evaluation Time 20   Therapy Certification   Certification date from 09/17/18   Certification date to 11/16/18   Medical Diagnosis Dysphagia    SLP Medicare Only G-code   G-code Swallowing

## 2018-09-19 ENCOUNTER — TELEPHONE (OUTPATIENT)
Dept: TRANSPLANT | Facility: CLINIC | Age: 48
End: 2018-09-19

## 2018-09-19 DIAGNOSIS — Z94.0 KIDNEY REPLACED BY TRANSPLANT: ICD-10-CM

## 2018-09-19 DIAGNOSIS — Z48.298 AFTERCARE FOLLOWING ORGAN TRANSPLANT: ICD-10-CM

## 2018-09-19 DIAGNOSIS — Z94.0 KIDNEY TRANSPLANTED: Primary | ICD-10-CM

## 2018-09-19 DIAGNOSIS — Z94.0 KIDNEY TRANSPLANT RECIPIENT: ICD-10-CM

## 2018-09-19 DIAGNOSIS — E43 SEVERE PROTEIN-CALORIE MALNUTRITION (H): ICD-10-CM

## 2018-09-19 LAB
ANION GAP SERPL CALCULATED.3IONS-SCNC: 9 MMOL/L (ref 3–14)
BASOPHILS # BLD AUTO: 0.1 10E9/L (ref 0–0.2)
BASOPHILS NFR BLD AUTO: 1.7 %
BUN SERPL-MCNC: 21 MG/DL (ref 7–25)
CALCIUM SERPL-MCNC: 10.4 MG/DL (ref 8.6–10.3)
CHLORIDE SERPL-SCNC: 107 MMOL/L (ref 98–107)
CO2 SERPL-SCNC: 26 MMOL/L (ref 21–31)
CREAT SERPL-MCNC: 1.26 MG/DL (ref 0.7–1.3)
DIFFERENTIAL METHOD BLD: ABNORMAL
EOSINOPHIL # BLD AUTO: 0.1 10E9/L (ref 0–0.7)
EOSINOPHIL NFR BLD AUTO: 1.7 %
ERYTHROCYTE [DISTWIDTH] IN BLOOD BY AUTOMATED COUNT: 14.7 % (ref 10–15)
GFR SERPL CREATININE-BSD FRML MDRD: 61 ML/MIN/1.7M2
GLUCOSE SERPL-MCNC: 90 MG/DL (ref 70–105)
HCT VFR BLD AUTO: 33.3 % (ref 40–53)
HGB BLD-MCNC: 10.7 G/DL (ref 13.3–17.7)
IMM GRANULOCYTES # BLD: 0 10E9/L (ref 0–0.4)
IMM GRANULOCYTES NFR BLD: 0.3 %
LYMPHOCYTES # BLD AUTO: 0.3 10E9/L (ref 0.8–5.3)
LYMPHOCYTES NFR BLD AUTO: 8.4 %
MAGNESIUM SERPL-MCNC: 2.1 MG/DL (ref 1.9–2.7)
MCH RBC QN AUTO: 32.6 PG (ref 26.5–33)
MCHC RBC AUTO-ENTMCNC: 32.1 G/DL (ref 31.5–36.5)
MCV RBC AUTO: 102 FL (ref 78–100)
MONOCYTES # BLD AUTO: 0.2 10E9/L (ref 0–1.3)
MONOCYTES NFR BLD AUTO: 5.7 %
NEUTROPHILS # BLD AUTO: 2.5 10E9/L (ref 1.6–8.3)
NEUTROPHILS NFR BLD AUTO: 82.2 %
PHOSPHATE SERPL-MCNC: 3.1 MG/DL (ref 2.5–5)
PLATELET # BLD AUTO: 171 10E9/L (ref 150–450)
POTASSIUM SERPL-SCNC: 4.6 MMOL/L (ref 3.5–5.1)
RBC # BLD AUTO: 3.28 10E12/L (ref 4.4–5.9)
SODIUM SERPL-SCNC: 142 MMOL/L (ref 134–144)
WBC # BLD AUTO: 3 10E9/L (ref 4–11)

## 2018-09-19 PROCEDURE — 36415 COLL VENOUS BLD VENIPUNCTURE: CPT | Performed by: SURGERY

## 2018-09-19 PROCEDURE — 87799 DETECT AGENT NOS DNA QUANT: CPT | Performed by: SURGERY

## 2018-09-19 PROCEDURE — 83735 ASSAY OF MAGNESIUM: CPT | Performed by: SURGERY

## 2018-09-19 PROCEDURE — 80048 BASIC METABOLIC PNL TOTAL CA: CPT | Performed by: SURGERY

## 2018-09-19 PROCEDURE — 80180 DRUG SCRN QUAN MYCOPHENOLATE: CPT | Performed by: SURGERY

## 2018-09-19 PROCEDURE — 85027 COMPLETE CBC AUTOMATED: CPT | Performed by: SURGERY

## 2018-09-19 PROCEDURE — 80197 ASSAY OF TACROLIMUS: CPT | Performed by: SURGERY

## 2018-09-19 PROCEDURE — 84100 ASSAY OF PHOSPHORUS: CPT | Performed by: SURGERY

## 2018-09-19 PROCEDURE — 85004 AUTOMATED DIFF WBC COUNT: CPT | Performed by: SURGERY

## 2018-09-19 RX ORDER — FOLIC ACID 1 MG/1
1 TABLET ORAL DAILY
Qty: 30 TABLET | Refills: 11 | Status: SHIPPED | OUTPATIENT
Start: 2018-09-19 | End: 2019-01-29

## 2018-09-19 RX ORDER — MYCOPHENOLATE MOFETIL 200 MG/ML
500 POWDER, FOR SUSPENSION ORAL 2 TIMES DAILY
Qty: 260 ML | Refills: 11 | Status: SHIPPED | OUTPATIENT
Start: 2018-09-19 | End: 2018-10-11

## 2018-09-19 NOTE — TELEPHONE ENCOUNTER
Central Prior Authorization Team   Phone: [phone]      PA Initiation    Medication: omeprazole (PRILOSEC) 2 mg/mL SUSP  Insurance Company: [employer] - Phone [phone] Fax [phone]  Pharmacy Filling the Rx: [id number] - [place], [place] - [address]  Filling Pharmacy Phone: [phone]  Filling Pharmacy Fax: [phone]  Start Date: 9/19/2018

## 2018-09-19 NOTE — TELEPHONE ENCOUNTER
Post Kidney and Pancreas Transplant Team Conference  Date: 9/19/2018  Transplant Coordinator: Angeline Alonso     Attendees:  [x]  Dr. Macias [] Thao Barnard, RN  [x] Blossom Montes LPN     [x]  Dr. Donato [x] Martha Morris, DONALD [] Noelle Wagner LPN   []  Dr. Telles [] Beth Evans, DONALD    [x]  Dr. Cabrera [] Katelyn Gallegos RN    [] Dr. aVllejo [x] Maliha Alonso RN    [] Dr. Lorenzo [] Mahad Steele RN    [x] Dr. Finley [] Mildred Barajas RN    [] Surgery Fellow [] Gia Romero RN    [] Rachel Resendez NP              Verbal Plan Read Back:   Decrease Mycophenolate to 500 mg BID  Tac  Goal = 6-8    Routed to RN Coordinator   Blossom Montes          RNCC spoke with Merline who voiced understanding of med dose decrease. Discussed viruses and how we are managing (with lower IS). Will monitor with weekly labs. Due for labs NOW. Merline is bringing Poncho in for labs now.

## 2018-09-20 DIAGNOSIS — E43 SEVERE PROTEIN-CALORIE MALNUTRITION (H): ICD-10-CM

## 2018-09-20 DIAGNOSIS — Z94.0 KIDNEY TRANSPLANT RECIPIENT: Primary | ICD-10-CM

## 2018-09-20 LAB
TACROLIMUS BLD-MCNC: 9.4 UG/L (ref 5–15)
TME LAST DOSE: NORMAL H

## 2018-09-20 RX ORDER — TACROLIMUS 0.5 MG/1
2.5 CAPSULE ORAL 2 TIMES DAILY
Qty: 300 CAPSULE | Refills: 11 | Status: SHIPPED | OUTPATIENT
Start: 2018-09-20 | End: 2018-09-26

## 2018-09-20 NOTE — TELEPHONE ENCOUNTER
Tac level now above goal (goal recently reduced due to active viruses).  Current tac dose = 3mg BID.  Confirm dose (trough is accurate) and no recent illness (diarrhea).  If accurate, DECREASE tacrolimus to 2.5mg BID and repeat level with full set of scheduled labs on Monday.

## 2018-09-20 NOTE — TELEPHONE ENCOUNTER
Spoke to patients wife, Merline, regarding tac level above goal.  Merline confirms patients current dose and good 12 hour trough level, denies any diarrhea.  Merline verbalizes understanding to decrease Tacrolimus dose to 2.5 mg BID and will repeat txp labs on Monday, 9/24/2018.

## 2018-09-20 NOTE — TELEPHONE ENCOUNTER
Med has been denied. Pharmacy was processing it as a compound, and insurance won't cover one of the ingredients of the compound. I then asked pharmacy to try to process for the First Omeprazole Suspension. They also got a rejection on that too. I will now try to do another P/A for this product before pharmacy has to order it in.

## 2018-09-21 LAB
BKV DNA # SPEC NAA+PROBE: ABNORMAL COPIES/ML
BKV DNA SPEC NAA+PROBE-LOG#: 5.2 LOG COPIES/ML
MYCOPHENOLATE SERPL LC/MS/MS-MCNC: 1.72 MG/L (ref 1–3.5)
MYCOPHENOLATE-G SERPL LC/MS/MS-MCNC: 62.1 MG/L (ref 30–95)
SPECIMEN SOURCE: ABNORMAL
TME LAST DOSE: NORMAL H

## 2018-09-24 ENCOUNTER — TELEPHONE (OUTPATIENT)
Dept: TRANSPLANT | Facility: CLINIC | Age: 48
End: 2018-09-24

## 2018-09-24 DIAGNOSIS — B34.8 BK VIREMIA: Primary | ICD-10-CM

## 2018-09-24 NOTE — TELEPHONE ENCOUNTER
Post Kidney and Pancreas Transplant Team Conference  Date: 9/24/2018  Transplant Coordinator: Angeline Alonso     Attendees:  []  Dr. Macias [] Thao Barnard, RN  [] Blossom Montes LPN     [x]  Dr. Donato [] Martha Morris RN [] Noelle Wagner LPN   []  Dr. Telles [] Beth Evans RN    []  Dr. Cabrera [] Katelyn Gallegos RN    [] Dr. Vallejo [x] Maliha Alonso RN    [] Dr. Lorenzo [] Mahad Steele RN    [] Dr. Finley [] Mildred Barajas, RN    [] Surgery Fellow [] Gia Romero RN    [] Rachel Resendez NP              Verbal Plan Read Back:   Due to BK, repeat MPA level. If >1, reduce CellCept to 500mg AM, 250mg PM.  Give IVIG, sucrose free, 0.5g / kg x1.    Routed to RN Coordinator   Angeline Alonso    IVIG to be completed at [facility].  Merline voiced understanding.

## 2018-09-25 ENCOUNTER — TELEPHONE (OUTPATIENT)
Dept: OTOLARYNGOLOGY | Facility: CLINIC | Age: 48
End: 2018-09-25

## 2018-09-25 DIAGNOSIS — Z43.0 TRACHEOSTOMY CARE (H): Primary | ICD-10-CM

## 2018-09-25 DIAGNOSIS — Z48.298 AFTERCARE FOLLOWING ORGAN TRANSPLANT: ICD-10-CM

## 2018-09-25 DIAGNOSIS — Z94.0 KIDNEY REPLACED BY TRANSPLANT: ICD-10-CM

## 2018-09-25 LAB
ANION GAP SERPL CALCULATED.3IONS-SCNC: 12 MMOL/L (ref 3–14)
BUN SERPL-MCNC: 23 MG/DL (ref 7–25)
CALCIUM SERPL-MCNC: 10.6 MG/DL (ref 8.6–10.3)
CHLORIDE SERPL-SCNC: 104 MMOL/L (ref 98–107)
CO2 SERPL-SCNC: 22 MMOL/L (ref 21–31)
CREAT SERPL-MCNC: 1.15 MG/DL (ref 0.7–1.3)
ERYTHROCYTE [DISTWIDTH] IN BLOOD BY AUTOMATED COUNT: 14.8 % (ref 10–15)
GFR SERPL CREATININE-BSD FRML MDRD: 68 ML/MIN/1.7M2
GLUCOSE SERPL-MCNC: 100 MG/DL (ref 70–105)
HCT VFR BLD AUTO: 36.8 % (ref 40–53)
HGB BLD-MCNC: 12 G/DL (ref 13.3–17.7)
MAGNESIUM SERPL-MCNC: 1.8 MG/DL (ref 1.9–2.7)
MCH RBC QN AUTO: 32 PG (ref 26.5–33)
MCHC RBC AUTO-ENTMCNC: 32.6 G/DL (ref 31.5–36.5)
MCV RBC AUTO: 98 FL (ref 78–100)
PHOSPHATE SERPL-MCNC: 2.4 MG/DL (ref 2.5–5)
PLATELET # BLD AUTO: 229 10E9/L (ref 150–450)
POTASSIUM SERPL-SCNC: 4.3 MMOL/L (ref 3.5–5.1)
RBC # BLD AUTO: 3.75 10E12/L (ref 4.4–5.9)
SODIUM SERPL-SCNC: 138 MMOL/L (ref 134–144)
WBC # BLD AUTO: 4.1 10E9/L (ref 4–11)

## 2018-09-25 PROCEDURE — 84100 ASSAY OF PHOSPHORUS: CPT | Performed by: SURGERY

## 2018-09-25 PROCEDURE — 85027 COMPLETE CBC AUTOMATED: CPT | Performed by: SURGERY

## 2018-09-25 PROCEDURE — 80048 BASIC METABOLIC PNL TOTAL CA: CPT | Performed by: SURGERY

## 2018-09-25 PROCEDURE — 83735 ASSAY OF MAGNESIUM: CPT | Performed by: SURGERY

## 2018-09-25 PROCEDURE — 80180 DRUG SCRN QUAN MYCOPHENOLATE: CPT | Performed by: SURGERY

## 2018-09-25 PROCEDURE — 80197 ASSAY OF TACROLIMUS: CPT | Performed by: SURGERY

## 2018-09-25 RX ORDER — DIPHENHYDRAMINE HCL 25 MG
25 CAPSULE ORAL ONCE
Status: CANCELLED | OUTPATIENT
Start: 2018-09-25

## 2018-09-25 RX ORDER — ACETAMINOPHEN 325 MG/1
650 TABLET ORAL ONCE
Status: CANCELLED
Start: 2018-09-25

## 2018-09-25 NOTE — TELEPHONE ENCOUNTER
[facility] Call Center    Phone Message    May a detailed message be left on voicemail: yes    Reason for Call: Other: Maliha with [place] Team called in to schedule New Patinet for DX Trach Management // Order in epic // Please call patient to schedule.      Action Taken: Message routed to:  [facility] ([facility]): kinjal

## 2018-09-26 ENCOUNTER — TELEPHONE (OUTPATIENT)
Dept: TRANSPLANT | Facility: CLINIC | Age: 48
End: 2018-09-26

## 2018-09-26 DIAGNOSIS — Z94.0 KIDNEY TRANSPLANT RECIPIENT: ICD-10-CM

## 2018-09-26 DIAGNOSIS — E43 SEVERE PROTEIN-CALORIE MALNUTRITION (H): ICD-10-CM

## 2018-09-26 LAB
TACROLIMUS BLD-MCNC: 6.6 UG/L (ref 5–15)
TME LAST DOSE: NORMAL H

## 2018-09-26 RX ORDER — TACROLIMUS 0.5 MG/1
CAPSULE ORAL
Qty: 300 CAPSULE | Refills: 11 | COMMUNITY
Start: 2018-09-26 | End: 2018-11-02

## 2018-09-26 NOTE — TELEPHONE ENCOUNTER
Spoke with patient. Appointment made for Doctor Martínez on 11/27/2018 at 8:30 to establish trach care.  Carlito Carranza LPN

## 2018-09-26 NOTE — TELEPHONE ENCOUNTER
Post Kidney and Pancreas Transplant Team Conference  Date: 9/26/2018  Transplant Coordinator: Angeline Alonso     Attendees:  [x]  Dr. Macias [] Thao Barnard, RN  [x] Blossom Montes LPN     []  Dr. Donato [x] Martha Morris, DONALD [] Noelle Wagner LPN   []  Dr. Telles [] Beth Evans, RN    []  Dr. Cabrera [] Katelyn Gallegos RN    [] Dr. Vallejo [x] Maliha Alonso RN    [] Dr. Lorenzo [] Mahad Steele RN    [x] Dr. Finley [] Mildred Barajas, DONALD    [] Surgery Fellow [] Gia Romero RN    [] Rachel Resendez NP              Verbal Plan Read Back:   Tac goal = 8  Repeat DSA  Follow up to see if patient is seeing Twyla.    Routed to RN Coordinator   Blososm Montes          Tac level now 6.6 on 2.5mg BID, reduced from 3mg BID with a level of 9.x. Will split dose - 3mg tac AM, 2.5mg tac PM. Repeat level with next scheduled draw.  Hector voiced understanding.  Due for DSA with next lab draw - Hector voiced understanding to remind phlebotomist to draw with next set of labs.  Hector states that he is following with a psychologist out of [facility].

## 2018-09-27 ENCOUNTER — HOSPITAL ENCOUNTER (OUTPATIENT)
Dept: INFUSION THERAPY | Facility: OTHER | Age: 48
Discharge: HOME | End: 2018-09-27
Attending: INTERNAL MEDICINE | Admitting: FAMILY MEDICINE
Payer: MEDICARE

## 2018-09-27 VITALS
BODY MASS INDEX: 19.54 KG/M2 | HEART RATE: 80 BPM | SYSTOLIC BLOOD PRESSURE: 130 MMHG | TEMPERATURE: 98.2 F | DIASTOLIC BLOOD PRESSURE: 72 MMHG | RESPIRATION RATE: 16 BRPM | WEIGHT: 128.5 LBS

## 2018-09-27 DIAGNOSIS — E86.0 DEHYDRATION: ICD-10-CM

## 2018-09-27 DIAGNOSIS — Z94.0 KIDNEY TRANSPLANT RECIPIENT: ICD-10-CM

## 2018-09-27 DIAGNOSIS — B34.8 BK VIREMIA: ICD-10-CM

## 2018-09-27 DIAGNOSIS — Z94.0 KIDNEY TRANSPLANTED: ICD-10-CM

## 2018-09-27 PROCEDURE — 96365 THER/PROPH/DIAG IV INF INIT: CPT

## 2018-09-27 PROCEDURE — 25000132 ZZH RX MED GY IP 250 OP 250 PS 637: Mod: GY | Performed by: INTERNAL MEDICINE

## 2018-09-27 PROCEDURE — 96366 THER/PROPH/DIAG IV INF ADDON: CPT

## 2018-09-27 PROCEDURE — 25000128 H RX IP 250 OP 636: Performed by: INTERNAL MEDICINE

## 2018-09-27 RX ORDER — VALGANCICLOVIR HYDROCHLORIDE 50 MG/ML
POWDER, FOR SOLUTION ORAL
Refills: 0 | COMMUNITY
Start: 2018-08-26 | End: 2018-10-05

## 2018-09-27 RX ORDER — DIPHENHYDRAMINE HCL 25 MG
25 CAPSULE ORAL ONCE
Status: CANCELLED | OUTPATIENT
Start: 2018-09-27

## 2018-09-27 RX ORDER — ACETAMINOPHEN 325 MG/1
650 TABLET ORAL ONCE
Status: DISCONTINUED | OUTPATIENT
Start: 2018-09-27 | End: 2018-09-28 | Stop reason: HOSPADM

## 2018-09-27 RX ORDER — ACETAMINOPHEN 325 MG/1
650 TABLET ORAL ONCE
Status: CANCELLED
Start: 2018-09-27

## 2018-09-27 RX ORDER — DIPHENHYDRAMINE HCL 25 MG
25 CAPSULE ORAL ONCE
Status: COMPLETED | OUTPATIENT
Start: 2018-09-27 | End: 2018-09-27

## 2018-09-27 RX ADMIN — DIPHENHYDRAMINE HYDROCHLORIDE 25 MG: 25 CAPSULE ORAL at 10:23

## 2018-09-27 RX ADMIN — HUMAN IMMUNOGLOBULIN G 30 G: 20 LIQUID INTRAVENOUS at 10:46

## 2018-09-27 NOTE — PROGRESS NOTES
Infusion Nursing Note:  Gilles Henning III presents today for IVIG.    Patient seen by provider today: No   present during visit today: Not Applicable.    Note: patient arrived with NG feeding and tolerated the infusion with out concerns, has follow up with doctor next week awaiting new orders    Intravenous Access:  Peripheral IV placed.    Treatment Conditions:  Not Applicable.      Post Infusion Assessment:  Patient tolerated infusion without incident.  Site patent and intact, free from redness, edema or discomfort.  No evidence of extravasations.  Access discontinued per protocol.    Discharge Plan:   Patient and/or family verbalized understanding of discharge instructions and all questions answered.  Patient discharged in stable condition accompanied by: self.  Departure Mode: Ambulatory.    Jean S. Hammann, RN

## 2018-09-28 ENCOUNTER — TELEPHONE (OUTPATIENT)
Dept: TRANSPLANT | Facility: CLINIC | Age: 48
End: 2018-09-28

## 2018-09-28 LAB
MYCOPHENOLATE SERPL LC/MS/MS-MCNC: 1.18 MG/L (ref 1–3.5)
MYCOPHENOLATE-G SERPL LC/MS/MS-MCNC: 57.7 MG/L (ref 30–95)
TME LAST DOSE: NORMAL H

## 2018-10-02 ENCOUNTER — DOCUMENTATION ONLY (OUTPATIENT)
Dept: TRANSPLANT | Facility: CLINIC | Age: 48
End: 2018-10-02

## 2018-10-02 ENCOUNTER — RESULTS ONLY (OUTPATIENT)
Dept: OTHER | Facility: CLINIC | Age: 48
End: 2018-10-02

## 2018-10-02 DIAGNOSIS — Z94.0 KIDNEY REPLACED BY TRANSPLANT: ICD-10-CM

## 2018-10-02 DIAGNOSIS — Z48.298 AFTERCARE FOLLOWING ORGAN TRANSPLANT: ICD-10-CM

## 2018-10-02 LAB
ANION GAP SERPL CALCULATED.3IONS-SCNC: 10 MMOL/L (ref 3–14)
BASOPHILS # BLD AUTO: 0 10E9/L (ref 0–0.2)
BASOPHILS NFR BLD AUTO: 0.8 %
BUN SERPL-MCNC: 33 MG/DL (ref 7–25)
CALCIUM SERPL-MCNC: 10.4 MG/DL (ref 8.6–10.3)
CHLORIDE SERPL-SCNC: 104 MMOL/L (ref 98–107)
CO2 SERPL-SCNC: 22 MMOL/L (ref 21–31)
CREAT SERPL-MCNC: 0.97 MG/DL (ref 0.7–1.3)
DIFFERENTIAL METHOD BLD: ABNORMAL
EOSINOPHIL # BLD AUTO: 0 10E9/L (ref 0–0.7)
EOSINOPHIL NFR BLD AUTO: 0.4 %
ERYTHROCYTE [DISTWIDTH] IN BLOOD BY AUTOMATED COUNT: 14.5 % (ref 10–15)
GFR SERPL CREATININE-BSD FRML MDRD: 82 ML/MIN/1.7M2
GLUCOSE SERPL-MCNC: 115 MG/DL (ref 70–105)
HCT VFR BLD AUTO: 37.6 % (ref 40–53)
HGB BLD-MCNC: 12.4 G/DL (ref 13.3–17.7)
IMM GRANULOCYTES # BLD: 0.1 10E9/L (ref 0–0.4)
IMM GRANULOCYTES NFR BLD: 1.2 %
LYMPHOCYTES # BLD AUTO: 0.3 10E9/L (ref 0.8–5.3)
LYMPHOCYTES NFR BLD AUTO: 5.7 %
MAGNESIUM SERPL-MCNC: 1.7 MG/DL (ref 1.9–2.7)
MCH RBC QN AUTO: 32.4 PG (ref 26.5–33)
MCHC RBC AUTO-ENTMCNC: 33 G/DL (ref 31.5–36.5)
MCV RBC AUTO: 98 FL (ref 78–100)
MONOCYTES # BLD AUTO: 0.9 10E9/L (ref 0–1.3)
MONOCYTES NFR BLD AUTO: 19 %
NEUTROPHILS # BLD AUTO: 3.6 10E9/L (ref 1.6–8.3)
NEUTROPHILS NFR BLD AUTO: 72.9 %
PHOSPHATE SERPL-MCNC: 2.2 MG/DL (ref 2.5–5)
PLATELET # BLD AUTO: 214 10E9/L (ref 150–450)
POTASSIUM SERPL-SCNC: 4.1 MMOL/L (ref 3.5–5.1)
RBC # BLD AUTO: 3.83 10E12/L (ref 4.4–5.9)
SODIUM SERPL-SCNC: 136 MMOL/L (ref 134–144)
WBC # BLD AUTO: 5 10E9/L (ref 4–11)

## 2018-10-02 PROCEDURE — 83735 ASSAY OF MAGNESIUM: CPT | Performed by: SURGERY

## 2018-10-02 PROCEDURE — 84100 ASSAY OF PHOSPHORUS: CPT | Performed by: SURGERY

## 2018-10-02 PROCEDURE — 36415 COLL VENOUS BLD VENIPUNCTURE: CPT | Performed by: SURGERY

## 2018-10-02 PROCEDURE — 80197 ASSAY OF TACROLIMUS: CPT | Performed by: SURGERY

## 2018-10-02 PROCEDURE — 85004 AUTOMATED DIFF WBC COUNT: CPT | Performed by: SURGERY

## 2018-10-02 PROCEDURE — 80048 BASIC METABOLIC PNL TOTAL CA: CPT | Performed by: SURGERY

## 2018-10-02 PROCEDURE — 86832 HLA CLASS I HIGH DEFIN QUAL: CPT | Performed by: PHYSICIAN ASSISTANT

## 2018-10-02 PROCEDURE — 86833 HLA CLASS II HIGH DEFIN QUAL: CPT | Performed by: PHYSICIAN ASSISTANT

## 2018-10-02 PROCEDURE — 85027 COMPLETE CBC AUTOMATED: CPT | Performed by: SURGERY

## 2018-10-02 PROCEDURE — 80180 DRUG SCRN QUAN MYCOPHENOLATE: CPT | Performed by: SURGERY

## 2018-10-02 NOTE — PROGRESS NOTES
Chart Prep    Clinic Visit on: 10/11/18    Last lab completed:  9/25/18    Lab letter updated: 8/30/18    Lab orders up to date in Epic.

## 2018-10-03 LAB
PRA DONOR SPECIFIC ABY: NORMAL
TACROLIMUS BLD-MCNC: 8 UG/L (ref 5–15)
TME LAST DOSE: NORMAL H

## 2018-10-04 ENCOUNTER — TELEPHONE (OUTPATIENT)
Dept: PHARMACY | Facility: CLINIC | Age: 48
End: 2018-10-04

## 2018-10-04 DIAGNOSIS — Z94.0 KIDNEY REPLACED BY TRANSPLANT: ICD-10-CM

## 2018-10-04 DIAGNOSIS — B34.8 BK VIREMIA: ICD-10-CM

## 2018-10-04 DIAGNOSIS — B34.8 BK VIREMIA: Primary | ICD-10-CM

## 2018-10-04 DIAGNOSIS — Z94.0 KIDNEY REPLACED BY TRANSPLANT: Primary | ICD-10-CM

## 2018-10-04 LAB
DONOR IDENTIFICATION: NORMAL
DSA COMMENTS: NORMAL
DSA PRESENT: NO
DSA TEST METHOD: NORMAL
MYCOPHENOLATE SERPL LC/MS/MS-MCNC: 3.35 MG/L (ref 1–3.5)
MYCOPHENOLATE-G SERPL LC/MS/MS-MCNC: 63.5 MG/L (ref 30–95)
ORGAN: NORMAL
SA1 CELL: NORMAL
SA1 COMMENTS: NORMAL
SA1 HI RISK ABY: NORMAL
SA1 MOD RISK ABY: NORMAL
SA1 TEST METHOD: NORMAL
SA2 CELL: NORMAL
SA2 COMMENTS: NORMAL
SA2 HI RISK ABY UA: NORMAL
SA2 MOD RISK ABY: NORMAL
SA2 TEST METHOD: NORMAL
TME LAST DOSE: NORMAL H

## 2018-10-08 ENCOUNTER — TELEPHONE (OUTPATIENT)
Dept: PEDIATRICS | Facility: OTHER | Age: 48
End: 2018-10-08

## 2018-10-08 ENCOUNTER — TELEPHONE (OUTPATIENT)
Dept: TRANSPLANT | Facility: CLINIC | Age: 48
End: 2018-10-08

## 2018-10-08 DIAGNOSIS — B34.8 BK VIREMIA: ICD-10-CM

## 2018-10-08 DIAGNOSIS — Z48.298 AFTERCARE FOLLOWING ORGAN TRANSPLANT: ICD-10-CM

## 2018-10-08 DIAGNOSIS — Z94.0 KIDNEY REPLACED BY TRANSPLANT: ICD-10-CM

## 2018-10-08 DIAGNOSIS — Z43.0 TRACHEOSTOMY CARE (H): Primary | ICD-10-CM

## 2018-10-08 LAB
ANION GAP SERPL CALCULATED.3IONS-SCNC: 7 MMOL/L (ref 3–14)
BUN SERPL-MCNC: 30 MG/DL (ref 7–25)
CALCIUM SERPL-MCNC: 9.9 MG/DL (ref 8.6–10.3)
CHLORIDE SERPL-SCNC: 97 MMOL/L (ref 98–107)
CO2 SERPL-SCNC: 25 MMOL/L (ref 21–31)
CREAT SERPL-MCNC: 1.2 MG/DL (ref 0.7–1.3)
ERYTHROCYTE [DISTWIDTH] IN BLOOD BY AUTOMATED COUNT: 13.5 % (ref 10–15)
GFR SERPL CREATININE-BSD FRML MDRD: 64 ML/MIN/1.7M2
GLUCOSE SERPL-MCNC: 96 MG/DL (ref 70–105)
HCT VFR BLD AUTO: 33.4 % (ref 40–53)
HGB BLD-MCNC: 11.2 G/DL (ref 13.3–17.7)
MCH RBC QN AUTO: 32.6 PG (ref 26.5–33)
MCHC RBC AUTO-ENTMCNC: 33.5 G/DL (ref 31.5–36.5)
MCV RBC AUTO: 97 FL (ref 78–100)
PLATELET # BLD AUTO: 169 10E9/L (ref 150–450)
POTASSIUM SERPL-SCNC: 4 MMOL/L (ref 3.5–5.1)
RBC # BLD AUTO: 3.44 10E12/L (ref 4.4–5.9)
SODIUM SERPL-SCNC: 129 MMOL/L (ref 134–144)
WBC # BLD AUTO: 6.8 10E9/L (ref 4–11)

## 2018-10-08 PROCEDURE — 85027 COMPLETE CBC AUTOMATED: CPT | Performed by: SURGERY

## 2018-10-08 PROCEDURE — 80197 ASSAY OF TACROLIMUS: CPT | Performed by: SURGERY

## 2018-10-08 PROCEDURE — 80048 BASIC METABOLIC PNL TOTAL CA: CPT | Performed by: SURGERY

## 2018-10-08 PROCEDURE — 87799 DETECT AGENT NOS DNA QUANT: CPT | Performed by: INTERNAL MEDICINE

## 2018-10-08 PROCEDURE — 36415 COLL VENOUS BLD VENIPUNCTURE: CPT | Performed by: SURGERY

## 2018-10-08 NOTE — TELEPHONE ENCOUNTER
Creatinine up, all other labs look like hydration is adequate (sodium level low, hemoglobin low).  Any s/s of UTI?  BK pending from today.   DSA negitive.    Hector reports that he feels well. No s/s of UTI or other infection. Will monitor creatinine with next lab draw.  Hector will reduce free water to 2 L  (from 3L) to correct sodium level.

## 2018-10-08 NOTE — TELEPHONE ENCOUNTER
[facility] ([place])  needs orders for the foam dressing under his trach and passymyr tract valve size 6.     Fax number is   [phone]  Mely Sanchez LPN on 10/8/2018 at 3:06 PM

## 2018-10-09 LAB
TACROLIMUS BLD-MCNC: 8.9 UG/L (ref 5–15)
TME LAST DOSE: NORMAL H

## 2018-10-09 NOTE — TELEPHONE ENCOUNTER
Left message to call back....................  10/9/2018   8:29 AM  Mely Sanchez LPN on 10/9/2018 at 8:29 AM

## 2018-10-10 LAB
BKV DNA # SPEC NAA+PROBE: ABNORMAL COPIES/ML
BKV DNA SPEC NAA+PROBE-LOG#: 5.4 LOG COPIES/ML
SPECIMEN SOURCE: ABNORMAL

## 2018-10-10 NOTE — TELEPHONE ENCOUNTER
rx's faxed 10/9/18.  Isabel Burger CMA ([facility])......................10/10/2018  2:53 PM

## 2018-10-11 DIAGNOSIS — E43 SEVERE PROTEIN-CALORIE MALNUTRITION (H): ICD-10-CM

## 2018-10-11 DIAGNOSIS — Z94.0 KIDNEY TRANSPLANT RECIPIENT: Primary | ICD-10-CM

## 2018-10-11 RX ORDER — MYCOPHENOLATE MOFETIL 200 MG/ML
250 POWDER, FOR SUSPENSION ORAL 2 TIMES DAILY
Qty: 80 ML | Refills: 11 | Status: ON HOLD | OUTPATIENT
Start: 2018-10-11 | End: 2019-02-04

## 2018-10-11 NOTE — TELEPHONE ENCOUNTER
Spoke to patients wife, Merline, who verbalizes understanding of medication dose decrease of MMF to 250 mg po BID.

## 2018-10-11 NOTE — TELEPHONE ENCOUNTER
Edmond Donato MD Schindelholz, Alanna, DONALD                     Please decrease the MMF to 250 mg po bid       Due to BK without improvement.

## 2018-10-12 ENCOUNTER — OFFICE VISIT (OUTPATIENT)
Dept: PHARMACY | Facility: CLINIC | Age: 48
End: 2018-10-12
Payer: COMMERCIAL

## 2018-10-12 ENCOUNTER — OFFICE VISIT (OUTPATIENT)
Dept: NEPHROLOGY | Facility: CLINIC | Age: 48
End: 2018-10-12
Attending: INTERNAL MEDICINE
Payer: MEDICARE

## 2018-10-12 ENCOUNTER — TELEPHONE (OUTPATIENT)
Dept: TRANSPLANT | Facility: CLINIC | Age: 48
End: 2018-10-12

## 2018-10-12 VITALS
SYSTOLIC BLOOD PRESSURE: 98 MMHG | HEIGHT: 68 IN | RESPIRATION RATE: 18 BRPM | HEART RATE: 83 BPM | DIASTOLIC BLOOD PRESSURE: 62 MMHG | WEIGHT: 130.5 LBS | BODY MASS INDEX: 19.78 KG/M2

## 2018-10-12 DIAGNOSIS — E78.2 MIXED HYPERLIPIDEMIA: ICD-10-CM

## 2018-10-12 DIAGNOSIS — E55.9 VITAMIN D DEFICIENCY: ICD-10-CM

## 2018-10-12 DIAGNOSIS — R52 PAIN: ICD-10-CM

## 2018-10-12 DIAGNOSIS — Z94.0 KIDNEY TRANSPLANTED: Primary | ICD-10-CM

## 2018-10-12 DIAGNOSIS — Z29.89 NEED FOR PNEUMOCYSTIS PROPHYLAXIS: ICD-10-CM

## 2018-10-12 DIAGNOSIS — B34.8 BK VIREMIA: ICD-10-CM

## 2018-10-12 DIAGNOSIS — D84.9 IMMUNOSUPPRESSED STATUS (H): ICD-10-CM

## 2018-10-12 DIAGNOSIS — K59.00 CONSTIPATION, UNSPECIFIED CONSTIPATION TYPE: ICD-10-CM

## 2018-10-12 DIAGNOSIS — Z94.0 KIDNEY TRANSPLANT RECIPIENT: Primary | ICD-10-CM

## 2018-10-12 DIAGNOSIS — Z23 NEED FOR INFLUENZA VACCINATION: ICD-10-CM

## 2018-10-12 DIAGNOSIS — E87.20 ACIDOSIS: ICD-10-CM

## 2018-10-12 DIAGNOSIS — F32.A DEPRESSION, UNSPECIFIED DEPRESSION TYPE: ICD-10-CM

## 2018-10-12 DIAGNOSIS — D63.8 ANEMIA OF CHRONIC DISEASE: ICD-10-CM

## 2018-10-12 DIAGNOSIS — Z94.0 KIDNEY TRANSPLANT RECIPIENT: ICD-10-CM

## 2018-10-12 DIAGNOSIS — I95.9 HYPOTENSION, UNSPECIFIED HYPOTENSION TYPE: ICD-10-CM

## 2018-10-12 DIAGNOSIS — B07.9 VIRAL WARTS DUE TO HPV: ICD-10-CM

## 2018-10-12 PROCEDURE — 99207 ZZC NO CHARGE LOS: CPT | Performed by: PHARMACIST

## 2018-10-12 PROCEDURE — G0463 HOSPITAL OUTPT CLINIC VISIT: HCPCS | Mod: ZF

## 2018-10-12 RX ORDER — SODIUM BICARBONATE 650 MG/1
650 TABLET ORAL 2 TIMES DAILY
Qty: 90 TABLET | Refills: 0 | Status: SHIPPED | OUTPATIENT
Start: 2018-10-12 | End: 2019-01-29

## 2018-10-12 ASSESSMENT — PAIN SCALES - GENERAL: PAINLEVEL: NO PAIN (0)
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
## 2018-10-12 NOTE — TELEPHONE ENCOUNTER
Post Kidney and Pancreas Transplant Team Conference  Date: 10/12/2018  Transplant Coordinator: Angeline Alonso     Attendees:  [x]  Dr. Macias [] Thao Barnard RN  [] Blossom Montes LPN     []  Dr. Donato [] Martha Morris RN [] Noelle Wagner LPN   []  Dr. Telles [] Beth Evans RN    []  Dr. Cabrera [] Katelyn Gallegos RN    [] Dr. Vallejo [x] Maliha Alonso RN    [] Dr. Lorenzo [] Mahad Steele RN    [] Dr. Finley [] Mildred Barajas RN    [] Surgery Fellow [] Gia Romero RN    [] Rachel Resendez NP              Verbal Plan Read Back:   Sodium level low - monitor with use of lexapro and seroquel.  Ensure refills are secured for all medications including lexapro and seroquel - ensure no sudden discontinuation of any medications.  DECREASE sodium bicarbonate to 650mg BID.    Routed to RN Coordinator   Angeline Alonso    RNCC discussed with Christiano in person who stated that they have received all necessary refills either through transplant or through psychiatric RN in [place].   They voiced understanding to reduce bicarb dose.

## 2018-10-12 NOTE — PROGRESS NOTES
SUBJECTIVE/OBJECTIVE:                           Gilles Henning III is a 48 year old male coming in for an initial visit for Medication Therapy Management.  He was referred to me from txp teams.    Chief Complaint: 4 month post txp follow up.    Allergies/ADRs: Reviewed in Epic  Tobacco: No tobacco use  Alcohol: not currently using  Caffeine: 2-3 cups/day of coffee, rockstar rehydration (no carbonation)  Activity: lower engergy.   PMH: Reviewed in Epic    Medication Adherence/Access:  Patient uses pill box(es), but not all the time.   Patient takes medications 2 time(s) per day. 10am and 10pm  Per patient, misses medication 0 times per week. Crushes medication.   The patient fills medications at [facility]: YES.    Renal Transplant:  Current immunosuppressants include Tacrolimus 3 mg qAM, 2.5 mg qPM (0-6 months post tx, goal 8-10) and MMF 250mg BID suspension, Prednisone 5mg daily.  Pt reports Tremors.  Transplant date: 2009, 5/30/18  Estimated Creatinine Clearance: 61.4 mL/min (based on Cr of 1.2).  CMV prophylaxis:  Completed   PCP prophylaxis: bactrim 10 mL suspension  Antifungal Prophylaxis: none  Chlorhexidine needs  PPI use: Pt is taking Omeprazole 40mg caps daily. GERD symptoms rarely.   Current supplements for electrolyte replacement: Vitamin D 2000 units at bedtime  Vitamin D Deficiency Screening Results:  Lab Results   Component Value Date    VITDT 65 06/06/2018    VITDT 57 06/05/2018    .  Tx Coordinator: Jaspreet Jose MD: Dr. Vallejo, Using Med Card: Yes  Recent Hospitalizations: August 2018, suicide attempt.   Home BPs: 123/80 with 71 hr last check at home.   Date last skin check: sees dermatology, uses sunscreen  Immunizations: annual flu shot yearly, Pyhdprpdxt86:  UTD; Prevnar 13: UTD, TDaP:  UTD    Hyperlipidemia: Current therapy includes Atorvastatin 40mg once daily.  Pt reports no significant myalgias or other side effects.  The ASCVD Risk score ([place] SYDNEY Jr, et al., 2013) failed to calculate for the  following reasons:    The valid total cholesterol range is 130 to 320 mg/dL    Depression:  Current medications include: Escitalopram 20mg once daily, Quetiapine 50mg at bedtime for sleep mostly. Sees therapy once weekly Denies suicide ideatation, states he if feeling great. Pt reports that depression symptoms are improved.  PHQ-9 SCORE 12/28/2017 9/4/2018 9/11/2018   Total Score 7 0 0     Pain: Pt is taking Acetaminophen 325-650mg prn about weekly or monthly, Oxycodone 5mg prn, uses this for facial pain more frequently than APAP.     Hypotension: Pt is taking Fludrocortisone 0.1mg. No dizziness/ lightheadedness, last BP at home 123/80     HPV warts: Pt was applying Vanicream/ Salicyclic acid, but warts have resolved.     Constipation: Pt is taking Polyethylene glycol prn, has not needed recently. 1-3 Bms daily.     Today's Vitals: There were no vitals taken for this visit.      ASSESSMENT:                             Current medications were reviewed today.     Medication Adherence: excellent, no issues identified    Renal Transplant:  Stable.  TAC level at goal of 8-10, 3-6 months post txp.     Hyperlipidemia: Stable.     Depression:  Stable.     Pain: Stable. Discussed patient may take up to 3000mg of APAP if needed per day.    Hypotension: Stable.    HPV warts: Stable.      Constipation: Stable.     PLAN:                            Continue current therapies.    I spent 30 minutes with this patient today. I offer these suggestions for consideration by txp eam. A copy of the visit note was provided to the patient's txp team.    Will follow up as needed.    The patient was given a summary of these recommendations as an after visit summary.     Jacob López, PharmD  [facility] Pharmacist    Phone: [phone]

## 2018-10-12 NOTE — PATIENT INSTRUCTIONS
Recommendations from today's MTM visit:                                                        1. I will talk to Malin specialty    2. **    3. **    4. **    5. **    Next MTM visit: **    To schedule another MTM appointment, please call the clinic directly or you may call the MTM scheduling line at [phone] or toll-free at [phone].     My Clinical Pharmacist's contact information:                                                      It was a pleasure seeing you today!  Please feel free to contact me with any questions or concerns you have.      Jacob López, PharmD  MTM Pharmacist    Phone: [phone]  You may receive a survey about the MTM services you received.  I would appreciate your feedback to help me serve you better in the future. Please fill it out and return it when you can. Your comments will be anonymous

## 2018-10-12 NOTE — NURSING NOTE
"Chief Complaint   Patient presents with     RECHECK     Kidney tx follow up     Blood pressure 98/62, pulse 83, resp. rate 18, height 1.727 m (5' 8\"), weight 59.2 kg (130 lb 8 oz).    AUTUMN MOROCHO CMA    "
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
## 2018-10-16 ENCOUNTER — TELEPHONE (OUTPATIENT)
Dept: TRANSPLANT | Facility: CLINIC | Age: 48
End: 2018-10-16
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
## 2018-10-16 DIAGNOSIS — Z94.0 KIDNEY REPLACED BY TRANSPLANT: ICD-10-CM

## 2018-10-16 DIAGNOSIS — Z48.298 AFTERCARE FOLLOWING ORGAN TRANSPLANT: ICD-10-CM

## 2018-10-16 DIAGNOSIS — S02.92XA: ICD-10-CM

## 2018-10-16 LAB
ANION GAP SERPL CALCULATED.3IONS-SCNC: 8 MMOL/L (ref 3–14)
BUN SERPL-MCNC: 27 MG/DL (ref 7–25)
CALCIUM SERPL-MCNC: 9.7 MG/DL (ref 8.6–10.3)
CHLORIDE SERPL-SCNC: 103 MMOL/L (ref 98–107)
CO2 SERPL-SCNC: 27 MMOL/L (ref 21–31)
CREAT SERPL-MCNC: 1.2 MG/DL (ref 0.7–1.3)
ERYTHROCYTE [DISTWIDTH] IN BLOOD BY AUTOMATED COUNT: 13.2 % (ref 10–15)
GFR SERPL CREATININE-BSD FRML MDRD: 64 ML/MIN/1.7M2
GLUCOSE SERPL-MCNC: 93 MG/DL (ref 70–105)
HCT VFR BLD AUTO: 32.8 % (ref 40–53)
HGB BLD-MCNC: 10.7 G/DL (ref 13.3–17.7)
MCH RBC QN AUTO: 32.7 PG (ref 26.5–33)
MCHC RBC AUTO-ENTMCNC: 32.6 G/DL (ref 31.5–36.5)
MCV RBC AUTO: 100 FL (ref 78–100)
PLATELET # BLD AUTO: 136 10E9/L (ref 150–450)
POTASSIUM SERPL-SCNC: 3.5 MMOL/L (ref 3.5–5.1)
RBC # BLD AUTO: 3.27 10E12/L (ref 4.4–5.9)
SODIUM SERPL-SCNC: 138 MMOL/L (ref 134–144)
WBC # BLD AUTO: 4.2 10E9/L (ref 4–11)

## 2018-10-16 PROCEDURE — 85027 COMPLETE CBC AUTOMATED: CPT | Performed by: SURGERY

## 2018-10-16 PROCEDURE — 80048 BASIC METABOLIC PNL TOTAL CA: CPT | Performed by: SURGERY

## 2018-10-16 PROCEDURE — 36415 COLL VENOUS BLD VENIPUNCTURE: CPT | Performed by: SURGERY

## 2018-10-16 PROCEDURE — 80197 ASSAY OF TACROLIMUS: CPT | Performed by: SURGERY

## 2018-10-17 LAB
TACROLIMUS BLD-MCNC: 6.6 UG/L (ref 5–15)
TME LAST DOSE: NORMAL H

## 2018-10-19 ENCOUNTER — HOSPITAL ENCOUNTER (EMERGENCY)
Facility: OTHER | Age: 48
Discharge: ANOTHER HOSPITAL | End: 2018-10-20
Attending: FAMILY MEDICINE | Admitting: FAMILY MEDICINE
Payer: MEDICARE

## 2018-10-19 DIAGNOSIS — K94.20 COMPLICATION OF FEEDING TUBE (H): ICD-10-CM

## 2018-10-19 DIAGNOSIS — F10.920 ALCOHOLIC INTOXICATION WITHOUT COMPLICATION (H): ICD-10-CM

## 2018-10-19 LAB
ALBUMIN UR-MCNC: NEGATIVE MG/DL
APPEARANCE UR: CLEAR
BILIRUB UR QL STRIP: NEGATIVE
COLOR UR AUTO: YELLOW
GLUCOSE UR STRIP-MCNC: NEGATIVE MG/DL
HGB UR QL STRIP: ABNORMAL
KETONES UR STRIP-MCNC: NEGATIVE MG/DL
LEUKOCYTE ESTERASE UR QL STRIP: NEGATIVE
NITRATE UR QL: NEGATIVE
PH UR STRIP: 5.5 PH (ref 5–9)
RBC #/AREA URNS AUTO: NORMAL /HPF
SOURCE: ABNORMAL
SP GR UR STRIP: <1.005 (ref 1–1.03)
UROBILINOGEN UR STRIP-ACNC: 0.2 EU/DL (ref 0.2–1)
WBC #/AREA URNS AUTO: NORMAL /HPF

## 2018-10-19 PROCEDURE — 81001 URINALYSIS AUTO W/SCOPE: CPT | Mod: XU | Performed by: FAMILY MEDICINE

## 2018-10-19 PROCEDURE — 36415 COLL VENOUS BLD VENIPUNCTURE: CPT | Performed by: FAMILY MEDICINE

## 2018-10-19 PROCEDURE — 80329 ANALGESICS NON-OPIOID 1 OR 2: CPT | Performed by: FAMILY MEDICINE

## 2018-10-19 PROCEDURE — 99285 EMERGENCY DEPT VISIT HI MDM: CPT | Mod: 25 | Performed by: FAMILY MEDICINE

## 2018-10-19 PROCEDURE — 96360 HYDRATION IV INFUSION INIT: CPT | Performed by: FAMILY MEDICINE

## 2018-10-19 PROCEDURE — 93005 ELECTROCARDIOGRAM TRACING: CPT | Performed by: FAMILY MEDICINE

## 2018-10-19 PROCEDURE — 99285 EMERGENCY DEPT VISIT HI MDM: CPT | Mod: Z6 | Performed by: FAMILY MEDICINE

## 2018-10-19 PROCEDURE — 84484 ASSAY OF TROPONIN QUANT: CPT | Performed by: FAMILY MEDICINE

## 2018-10-19 PROCEDURE — 96372 THER/PROPH/DIAG INJ SC/IM: CPT | Mod: XU | Performed by: FAMILY MEDICINE

## 2018-10-19 PROCEDURE — 84443 ASSAY THYROID STIM HORMONE: CPT | Performed by: FAMILY MEDICINE

## 2018-10-19 PROCEDURE — 80320 DRUG SCREEN QUANTALCOHOLS: CPT | Performed by: FAMILY MEDICINE

## 2018-10-19 PROCEDURE — 85025 COMPLETE CBC W/AUTO DIFF WBC: CPT | Performed by: FAMILY MEDICINE

## 2018-10-19 PROCEDURE — 80307 DRUG TEST PRSMV CHEM ANLYZR: CPT | Performed by: FAMILY MEDICINE

## 2018-10-19 PROCEDURE — 80048 BASIC METABOLIC PNL TOTAL CA: CPT | Performed by: FAMILY MEDICINE

## 2018-10-19 PROCEDURE — 25000128 H RX IP 250 OP 636: Performed by: FAMILY MEDICINE

## 2018-10-19 RX ORDER — OLANZAPINE 10 MG/2ML
10 INJECTION, POWDER, FOR SOLUTION INTRAMUSCULAR ONCE
Status: COMPLETED | OUTPATIENT
Start: 2018-10-19 | End: 2018-10-19

## 2018-10-19 RX ORDER — DIPHENHYDRAMINE HYDROCHLORIDE 50 MG/ML
25 INJECTION INTRAMUSCULAR; INTRAVENOUS ONCE
Status: COMPLETED | OUTPATIENT
Start: 2018-10-19 | End: 2018-10-19

## 2018-10-19 RX ADMIN — DIPHENHYDRAMINE HYDROCHLORIDE 25 MG: 50 INJECTION INTRAMUSCULAR; INTRAVENOUS at 23:51

## 2018-10-19 RX ADMIN — OLANZAPINE 10 MG: 10 INJECTION, POWDER, FOR SOLUTION INTRAMUSCULAR at 23:51

## 2018-10-19 ASSESSMENT — ENCOUNTER SYMPTOMS
SHORTNESS OF BREATH: 0
WOUND: 0
AGITATION: 1
ABDOMINAL PAIN: 0
DYSPHORIC MOOD: 1
FEVER: 0
TROUBLE SWALLOWING: 0

## 2018-10-20 ENCOUNTER — APPOINTMENT (OUTPATIENT)
Dept: GENERAL RADIOLOGY | Facility: CLINIC | Age: 48
End: 2018-10-20
Attending: SURGERY
Payer: MEDICARE

## 2018-10-20 ENCOUNTER — HOSPITAL ENCOUNTER (OUTPATIENT)
Facility: CLINIC | Age: 48
Setting detail: OBSERVATION
Discharge: HOME | End: 2018-10-22
Attending: SURGERY | Admitting: SURGERY
Payer: MEDICARE

## 2018-10-20 VITALS
OXYGEN SATURATION: 98 % | RESPIRATION RATE: 16 BRPM | DIASTOLIC BLOOD PRESSURE: 64 MMHG | SYSTOLIC BLOOD PRESSURE: 100 MMHG

## 2018-10-20 DIAGNOSIS — E46 MALNUTRITION, UNSPECIFIED TYPE (H): ICD-10-CM

## 2018-10-20 DIAGNOSIS — Z94.0 KIDNEY TRANSPLANTED: ICD-10-CM

## 2018-10-20 DIAGNOSIS — Z46.59 ENCOUNTER FOR NASOJEJUNAL (NJ) TUBE PLACEMENT: ICD-10-CM

## 2018-10-20 DIAGNOSIS — T85.598D FEEDING TUBE DYSFUNCTION, SUBSEQUENT ENCOUNTER: Primary | ICD-10-CM

## 2018-10-20 DIAGNOSIS — D84.9 IMMUNOSUPPRESSED STATUS (H): ICD-10-CM

## 2018-10-20 PROBLEM — T85.598A FEEDING TUBE DYSFUNCTION: Status: ACTIVE | Noted: 2018-10-20

## 2018-10-20 LAB
AMPHETAMINES UR QL SCN: NOT DETECTED
ANION GAP SERPL CALCULATED.3IONS-SCNC: 10 MMOL/L (ref 3–14)
APAP SERPL-MCNC: <0.2 UG/ML (ref 0–30)
BARBITURATES UR QL: NOT DETECTED
BASOPHILS # BLD AUTO: 0.1 10E9/L (ref 0–0.2)
BASOPHILS NFR BLD AUTO: 0.6 %
BENZODIAZ UR QL: NOT DETECTED
BUN SERPL-MCNC: 25 MG/DL (ref 7–25)
BUPRENORPHINE UR QL: NOT DETECTED NG/ML
CALCIUM SERPL-MCNC: 10.1 MG/DL (ref 8.6–10.3)
CANNABINOIDS UR QL: NOT DETECTED NG/ML
CHLORIDE SERPL-SCNC: 103 MMOL/L (ref 98–107)
CO2 SERPL-SCNC: 22 MMOL/L (ref 21–31)
COCAINE UR QL: NOT DETECTED
CREAT SERPL-MCNC: 0.99 MG/DL (ref 0.7–1.3)
D-METHAMPHET UR QL: NOT DETECTED NG/ML
DIFFERENTIAL METHOD BLD: ABNORMAL
EOSINOPHIL # BLD AUTO: 0.1 10E9/L (ref 0–0.7)
EOSINOPHIL NFR BLD AUTO: 0.9 %
ERYTHROCYTE [DISTWIDTH] IN BLOOD BY AUTOMATED COUNT: 13.2 % (ref 10–15)
ETHANOL SERPL-MCNC: 0.15 G/DL
ETHANOL SERPL-MCNC: 0.38 %
GFR SERPL CREATININE-BSD FRML MDRD: 80 ML/MIN/1.7M2
GLUCOSE SERPL-MCNC: 129 MG/DL (ref 70–105)
HCT VFR BLD AUTO: 33.5 % (ref 40–53)
HGB BLD-MCNC: 11.3 G/DL (ref 13.3–17.7)
IMM GRANULOCYTES # BLD: 0.1 10E9/L (ref 0–0.4)
IMM GRANULOCYTES NFR BLD: 0.6 %
LACTATE SERPL-SCNC: 0.5 MMOL/L (ref 0.5–2.2)
LYMPHOCYTES # BLD AUTO: 1 10E9/L (ref 0.8–5.3)
LYMPHOCYTES NFR BLD AUTO: 11.1 %
MCH RBC QN AUTO: 32.8 PG (ref 26.5–33)
MCHC RBC AUTO-ENTMCNC: 33.7 G/DL (ref 31.5–36.5)
MCV RBC AUTO: 97 FL (ref 78–100)
METHADONE UR QL SCN: NOT DETECTED
MONOCYTES # BLD AUTO: 0.9 10E9/L (ref 0–1.3)
MONOCYTES NFR BLD AUTO: 10.5 %
NEUTROPHILS # BLD AUTO: 6.6 10E9/L (ref 1.6–8.3)
NEUTROPHILS NFR BLD AUTO: 76.3 %
OPIATES UR QL SCN: NOT DETECTED
OXYCODONE UR QL: NOT DETECTED NG/ML
PCP UR QL SCN: NOT DETECTED
PLATELET # BLD AUTO: 159 10E9/L (ref 150–450)
POTASSIUM SERPL-SCNC: 3.8 MMOL/L (ref 3.5–5.1)
PROPOXYPH UR QL: NOT DETECTED NG/ML
RBC # BLD AUTO: 3.44 10E12/L (ref 4.4–5.9)
SALICYLATES SERPL-MCNC: <0 MG/DL (ref 15–30)
SODIUM SERPL-SCNC: 135 MMOL/L (ref 134–144)
TRICYCLICS UR QL SCN: NOT DETECTED NG/ML
TROPONIN I SERPL-MCNC: <0.03 UG/L (ref 0–0.03)
TSH SERPL DL<=0.05 MIU/L-ACNC: 3.1 IU/ML (ref 0.34–5.6)
WBC # BLD AUTO: 8.6 10E9/L (ref 4–11)

## 2018-10-20 PROCEDURE — 25000128 H RX IP 250 OP 636: Performed by: STUDENT IN AN ORGANIZED HEALTH CARE EDUCATION/TRAINING PROGRAM

## 2018-10-20 PROCEDURE — 25000125 ZZHC RX 250: Performed by: STUDENT IN AN ORGANIZED HEALTH CARE EDUCATION/TRAINING PROGRAM

## 2018-10-20 PROCEDURE — 27210429 ZZH NUTRITION PRODUCT INTERMEDIATE LITER

## 2018-10-20 PROCEDURE — 36415 COLL VENOUS BLD VENIPUNCTURE: CPT | Performed by: STUDENT IN AN ORGANIZED HEALTH CARE EDUCATION/TRAINING PROGRAM

## 2018-10-20 PROCEDURE — 93010 ELECTROCARDIOGRAM REPORT: CPT | Performed by: INTERNAL MEDICINE

## 2018-10-20 PROCEDURE — G0378 HOSPITAL OBSERVATION PER HR: HCPCS

## 2018-10-20 PROCEDURE — 25000132 ZZH RX MED GY IP 250 OP 250 PS 637: Mod: GY | Performed by: STUDENT IN AN ORGANIZED HEALTH CARE EDUCATION/TRAINING PROGRAM

## 2018-10-20 PROCEDURE — 27210338 ZZH CIRCUIT HUMID FACE/TRACH MSK

## 2018-10-20 PROCEDURE — 25000131 ZZH RX MED GY IP 250 OP 636 PS 637: Performed by: PHYSICIAN ASSISTANT

## 2018-10-20 PROCEDURE — 40000275 ZZH STATISTIC RCP TIME EA 10 MIN

## 2018-10-20 PROCEDURE — A9270 NON-COVERED ITEM OR SERVICE: HCPCS | Mod: GY | Performed by: STUDENT IN AN ORGANIZED HEALTH CARE EDUCATION/TRAINING PROGRAM

## 2018-10-20 PROCEDURE — 25000128 H RX IP 250 OP 636: Performed by: FAMILY MEDICINE

## 2018-10-20 PROCEDURE — 25000131 ZZH RX MED GY IP 250 OP 636 PS 637: Mod: GY | Performed by: STUDENT IN AN ORGANIZED HEALTH CARE EDUCATION/TRAINING PROGRAM

## 2018-10-20 PROCEDURE — 44500 INTRO GASTROINTESTINAL TUBE: CPT

## 2018-10-20 PROCEDURE — 83605 ASSAY OF LACTIC ACID: CPT | Performed by: FAMILY MEDICINE

## 2018-10-20 PROCEDURE — 80320 DRUG SCREEN QUANTALCOHOLS: CPT | Performed by: STUDENT IN AN ORGANIZED HEALTH CARE EDUCATION/TRAINING PROGRAM

## 2018-10-20 PROCEDURE — 36415 COLL VENOUS BLD VENIPUNCTURE: CPT | Performed by: FAMILY MEDICINE

## 2018-10-20 RX ORDER — TACROLIMUS 1 MG/1
3 CAPSULE ORAL
Status: DISCONTINUED | OUTPATIENT
Start: 2018-10-20 | End: 2018-10-20

## 2018-10-20 RX ORDER — FLUDROCORTISONE ACETATE 0.1 MG/1
0.1 TABLET ORAL DAILY
Status: DISCONTINUED | OUTPATIENT
Start: 2018-10-20 | End: 2018-10-22 | Stop reason: HOSPADM

## 2018-10-20 RX ORDER — CHLORHEXIDINE GLUCONATE ORAL RINSE 1.2 MG/ML
15 SOLUTION DENTAL 4 TIMES DAILY
Status: DISCONTINUED | OUTPATIENT
Start: 2018-10-20 | End: 2018-10-22 | Stop reason: HOSPADM

## 2018-10-20 RX ORDER — POTASSIUM CL/LIDO/0.9 % NACL 10MEQ/0.1L
10 INTRAVENOUS SOLUTION, PIGGYBACK (ML) INTRAVENOUS
Status: DISCONTINUED | OUTPATIENT
Start: 2018-10-20 | End: 2018-10-22 | Stop reason: HOSPADM

## 2018-10-20 RX ORDER — SULFAMETHOXAZOLE AND TRIMETHOPRIM 200; 40 MG/5ML; MG/5ML
80 SUSPENSION ORAL DAILY
Status: DISCONTINUED | OUTPATIENT
Start: 2018-10-20 | End: 2018-10-22 | Stop reason: HOSPADM

## 2018-10-20 RX ORDER — SODIUM BICARBONATE 650 MG/1
650 TABLET ORAL 2 TIMES DAILY
Status: DISCONTINUED | OUTPATIENT
Start: 2018-10-20 | End: 2018-10-22 | Stop reason: HOSPADM

## 2018-10-20 RX ORDER — POTASSIUM CHLORIDE 29.8 MG/ML
20 INJECTION INTRAVENOUS
Status: DISCONTINUED | OUTPATIENT
Start: 2018-10-20 | End: 2018-10-20

## 2018-10-20 RX ORDER — NALOXONE HYDROCHLORIDE 0.4 MG/ML
.1-.4 INJECTION, SOLUTION INTRAMUSCULAR; INTRAVENOUS; SUBCUTANEOUS
Status: DISCONTINUED | OUTPATIENT
Start: 2018-10-20 | End: 2018-10-22 | Stop reason: HOSPADM

## 2018-10-20 RX ORDER — SODIUM CHLORIDE, SODIUM LACTATE, POTASSIUM CHLORIDE, CALCIUM CHLORIDE 600; 310; 30; 20 MG/100ML; MG/100ML; MG/100ML; MG/100ML
INJECTION, SOLUTION INTRAVENOUS CONTINUOUS
Status: DISCONTINUED | OUTPATIENT
Start: 2018-10-20 | End: 2018-10-22

## 2018-10-20 RX ORDER — ESCITALOPRAM OXALATE 10 MG/1
20 TABLET ORAL DAILY
Status: DISCONTINUED | OUTPATIENT
Start: 2018-10-20 | End: 2018-10-22 | Stop reason: HOSPADM

## 2018-10-20 RX ORDER — ACETAMINOPHEN 325 MG/1
325-650 TABLET ORAL EVERY 8 HOURS PRN
Status: DISCONTINUED | OUTPATIENT
Start: 2018-10-20 | End: 2018-10-22 | Stop reason: HOSPADM

## 2018-10-20 RX ORDER — POTASSIUM CHLORIDE 1.5 G/1.58G
20-40 POWDER, FOR SOLUTION ORAL
Status: DISCONTINUED | OUTPATIENT
Start: 2018-10-20 | End: 2018-10-22 | Stop reason: HOSPADM

## 2018-10-20 RX ORDER — TRAZODONE HYDROCHLORIDE 50 MG/1
50-100 TABLET, FILM COATED ORAL AT BEDTIME
Status: DISCONTINUED | OUTPATIENT
Start: 2018-10-20 | End: 2018-10-22 | Stop reason: HOSPADM

## 2018-10-20 RX ORDER — PREDNISONE 5 MG/1
5 TABLET ORAL DAILY
Status: DISCONTINUED | OUTPATIENT
Start: 2018-10-20 | End: 2018-10-22 | Stop reason: HOSPADM

## 2018-10-20 RX ORDER — ATORVASTATIN CALCIUM 40 MG/1
40 TABLET, FILM COATED ORAL EVERY EVENING
Status: DISCONTINUED | OUTPATIENT
Start: 2018-10-20 | End: 2018-10-22 | Stop reason: HOSPADM

## 2018-10-20 RX ORDER — POLYETHYLENE GLYCOL 3350 17 G/17G
17 POWDER, FOR SOLUTION ORAL 2 TIMES DAILY
Status: DISCONTINUED | OUTPATIENT
Start: 2018-10-20 | End: 2018-10-22 | Stop reason: HOSPADM

## 2018-10-20 RX ORDER — POTASSIUM CHLORIDE 750 MG/1
20-40 TABLET, EXTENDED RELEASE ORAL
Status: DISCONTINUED | OUTPATIENT
Start: 2018-10-20 | End: 2018-10-22 | Stop reason: HOSPADM

## 2018-10-20 RX ORDER — OXYCODONE HYDROCHLORIDE 5 MG/1
5 TABLET ORAL 2 TIMES DAILY PRN
Status: DISCONTINUED | OUTPATIENT
Start: 2018-10-20 | End: 2018-10-22 | Stop reason: HOSPADM

## 2018-10-20 RX ORDER — MYCOPHENOLATE MOFETIL 200 MG/ML
250 POWDER, FOR SUSPENSION ORAL 2 TIMES DAILY
Status: DISCONTINUED | OUTPATIENT
Start: 2018-10-20 | End: 2018-10-22 | Stop reason: HOSPADM

## 2018-10-20 RX ORDER — POTASSIUM CHLORIDE 7.45 MG/ML
10 INJECTION INTRAVENOUS
Status: DISCONTINUED | OUTPATIENT
Start: 2018-10-20 | End: 2018-10-22 | Stop reason: HOSPADM

## 2018-10-20 RX ORDER — QUETIAPINE FUMARATE 50 MG/1
50 TABLET, FILM COATED ORAL AT BEDTIME
Status: DISCONTINUED | OUTPATIENT
Start: 2018-10-20 | End: 2018-10-22 | Stop reason: HOSPADM

## 2018-10-20 RX ORDER — MAGNESIUM SULFATE HEPTAHYDRATE 40 MG/ML
4 INJECTION, SOLUTION INTRAVENOUS EVERY 4 HOURS PRN
Status: DISCONTINUED | OUTPATIENT
Start: 2018-10-20 | End: 2018-10-22 | Stop reason: HOSPADM

## 2018-10-20 RX ORDER — FOLIC ACID 1 MG/1
1 TABLET ORAL DAILY
Status: DISCONTINUED | OUTPATIENT
Start: 2018-10-20 | End: 2018-10-22 | Stop reason: HOSPADM

## 2018-10-20 RX ORDER — SODIUM CHLORIDE 9 MG/ML
1000 INJECTION, SOLUTION INTRAVENOUS CONTINUOUS
Status: DISCONTINUED | OUTPATIENT
Start: 2018-10-20 | End: 2018-10-20 | Stop reason: HOSPADM

## 2018-10-20 RX ORDER — LORAZEPAM 2 MG/ML
1-4 INJECTION INTRAMUSCULAR
Status: DISCONTINUED | OUTPATIENT
Start: 2018-10-20 | End: 2018-10-21

## 2018-10-20 RX ADMIN — FOLIC ACID 1 MG: 1 TABLET ORAL at 15:59

## 2018-10-20 RX ADMIN — MULTIVITAMIN 15 ML: LIQUID ORAL at 15:58

## 2018-10-20 RX ADMIN — ATORVASTATIN CALCIUM 40 MG: 40 TABLET, FILM COATED ORAL at 20:21

## 2018-10-20 RX ADMIN — FOLIC ACID: 5 INJECTION, SOLUTION INTRAMUSCULAR; INTRAVENOUS; SUBCUTANEOUS at 13:59

## 2018-10-20 RX ADMIN — CHLORHEXIDINE GLUCONATE 0.12% ORAL RINSE 15 ML: 1.2 LIQUID ORAL at 20:21

## 2018-10-20 RX ADMIN — OXYCODONE HYDROCHLORIDE 5 MG: 5 TABLET ORAL at 15:59

## 2018-10-20 RX ADMIN — OMEPRAZOLE 40 MG: 20 CAPSULE, DELAYED RELEASE ORAL at 15:58

## 2018-10-20 RX ADMIN — FLUDROCORTISONE ACETATE 0.1 MG: 0.1 TABLET ORAL at 15:58

## 2018-10-20 RX ADMIN — SODIUM CHLORIDE 1000 ML: 9 INJECTION, SOLUTION INTRAVENOUS at 01:28

## 2018-10-20 RX ADMIN — SODIUM BICARBONATE 650 MG TABLET 650 MG: at 20:21

## 2018-10-20 RX ADMIN — MYCOPHENOLATE MOFETIL 250 MG: 200 POWDER, FOR SUSPENSION ORAL at 15:58

## 2018-10-20 RX ADMIN — TRAZODONE HYDROCHLORIDE 100 MG: 50 TABLET ORAL at 20:21

## 2018-10-20 RX ADMIN — ESCITALOPRAM OXALATE 20 MG: 10 TABLET ORAL at 15:58

## 2018-10-20 RX ADMIN — PREDNISONE 5 MG: 5 TABLET ORAL at 15:59

## 2018-10-20 RX ADMIN — CHLORHEXIDINE GLUCONATE 0.12% ORAL RINSE 15 ML: 1.2 LIQUID ORAL at 14:10

## 2018-10-20 RX ADMIN — LIDOCAINE HYDROCHLORIDE 5 ML: 20 SOLUTION ORAL; TOPICAL at 15:38

## 2018-10-20 RX ADMIN — TACROLIMUS 2.5 MG: 5 CAPSULE ORAL at 18:42

## 2018-10-20 RX ADMIN — MYCOPHENOLATE MOFETIL 250 MG: 200 POWDER, FOR SUSPENSION ORAL at 20:21

## 2018-10-20 RX ADMIN — SULFAMETHOXAZOLE AND TRIMETHOPRIM 80 MG: 200; 40 SUSPENSION ORAL at 15:58

## 2018-10-20 RX ADMIN — VITAMIN D, TAB 1000IU (100/BT) 2000 UNITS: 25 TAB at 15:58

## 2018-10-20 RX ADMIN — QUETIAPINE 50 MG: 50 TABLET ORAL at 20:21

## 2018-10-20 ASSESSMENT — ACTIVITIES OF DAILY LIVING (ADL): ADLS_ACUITY_SCORE: 14
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
## 2018-10-20 NOTE — UTILIZATION REVIEW
"[facility] Utilization Review  Admission Status; Secondary Review Determination     Admission Date: 10/20/2018  7:57 AM      Under the authority of the Utilization Management Committee, the utilization review process indicated a secondary review on the above patient.  The review outcome is based on review of the medical records, discussions with staff, and applying clinical experience noted on the date of the review.        (X) Observation Status Appropriate - This patient does not meet hospital inpatient criteria and is placed in observation status. If this patient's primary payer is Medicare and was admitted as an inpatient, Condition Code 44 should be used and patient status changed to \"observation\".   () Observation Status concurrent Review           RATIONALE FOR DETERMINATION   Gilles Henning III is a 48 year old male with past medical history of IgA nephropathy, status post DDRT, who is admitted as inpatient for NJ tube replacement after it was dislodged.  No other complicating factors noted.  Plan is to replace the NJ tube and resume tube feeding and monitor overnight with possible discharge tomorrow.Based on severity of illness and intensity of service, patient does not meet criteria for inpatient admission.  I discussed plan of care with the primary team (Dr. Coleman), who agreed with the recommendation..  Observation cares are appropriate for cares noted above.        The information on this document is developed by the utilization review team in order for the business office to ensure compliance.  This only denotes the appropriateness of proper admission status and does not reflect the quality of care rendered.              Sincerely,       Malinda Waggoner MD, MS  Physician Advisor  Utilization Review-[place]    Phone: [phone]   "
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
## 2018-10-20 NOTE — ED NOTES
Pt's wife thinks he may have been drinking ETOH tonight, he's not supposed to be drinking ETOH.  She feels like he's slowly killing himself and she can't deal with it.  She can't have him come home tonight. Wife is tearful and seems frustrated with pt's behavior.
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
## 2018-10-20 NOTE — LETTER
"Transition Communication Hand-off for Care Transitions to Next Level of Care Provider    Name: Gilles Henning III  : 1970  MRN #: [id number]  Primary Care Provider: Charlie Dubose     Primary Clinic: [address]  [place] [address]     Reason for Hospitalization:  NG tube replacement  Feeding tube dysfunction  Encounter for nasojejunal (NJ) tube placement  Admit Date/Time: 10/20/2018  7:57 AM  Discharge Date: 10.22.18  Payor Source: Payor: BCBS / Plan: BCBS PLATINUM BLUE / Product Type: PPO /              Reason for Communication Hand-off Referral: Other NJT dysfuction--replaced and to restart enteral feeds    Discharge Plan: [facility] for enteral supplies with OP follow up       Concern for non-adherence with plan of care:   Y/N N  Discharge Needs Assessment:  Needs       Most Recent Value    Home Infusion Provider [facility] [phone], Fax: [phone]            Follow-up specialty is recommended: Yes    Follow-up plan:  Future Appointments  Date Time Provider Department Center   2018 8:30 AM Tino Soliz MD [facility]   2018 11:00 AM Charlie Dubose MD [facility]   2018 11:00 AM Transplant, [facility]       Any outstanding tests or procedures:        Referrals     Future Labs/Procedures    Home infusion referral     Comments:    Resume    Your provider has referred you to: [facility]: [facility] [phone]   [email or web address]    Local Address (if different from home address): Other (specify full address and phone number): home address    Anticipated Length of Therapy: 99 months    Home Infusion Pharmacist to adjust therapy based on labs and clinical assessments: Yes  Outpatient Care Coordinator: Angeline \"Maliha\" Mina  Ph: [phone]  Fax: [phone]    Agency Staff to assess nursing needs for Infusion Therapy.    Adult Formula Nutren 1.5 "   Route Nasogastric tube   Total Daily Volume  6 Can(s)   Medication - Tube Feeding Flush Frequency At least 15-30 mL water before and after medication administration and with tube clogging   Additional comments Bolus at ~0700, ~1300, ~1800 or per pt preference   Specify Advancement Schedules Infuse 0.5 cans (125 mL) and if tolerated, may infuse rest of 250 mL can. Advance by 0.5 cans q 3-4 hrs until goal of 2 cans TID is tolerated. Only bolus while pt is awake.            Key Recommendations:      Colleen Blanco    AVS/Discharge Summary is the source of truth; this is a helpful guide for improved communication of patient story

## 2018-10-20 NOTE — PROGRESS NOTES
Observation Brochure and Video    Patient informed of observation status based on provider's order.  Observation brochure was given. Patient/Family stated understanding. Questions answered or directed to CHELSEA.

## 2018-10-20 NOTE — IP AVS SNAPSHOT
[address] [address]    Phone:  [phone]                                       After Visit Summary   10/20/2018    Gilles Henning III    MRN: [id number]           After Visit Summary Signature Page     I have received my discharge instructions, and my questions have been answered. I have discussed any challenges I see with this plan with the nurse or doctor.    ..........................................................................................................................................  Patient/Patient Representative Signature      ..........................................................................................................................................  Patient Representative Print Name and Relationship to Patient    ..................................................               ................................................  Date                                   Time    ..........................................................................................................................................  Reviewed by Signature/Title    ...................................................              ..............................................  Date                                               Time          22EPIC Rev 08/18
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
## 2018-10-21 LAB
BASOPHILS # BLD AUTO: 0 10E9/L (ref 0–0.2)
BASOPHILS NFR BLD AUTO: 0.3 %
DIFFERENTIAL METHOD BLD: ABNORMAL
EOSINOPHIL # BLD AUTO: 0 10E9/L (ref 0–0.7)
EOSINOPHIL NFR BLD AUTO: 0.6 %
ERYTHROCYTE [DISTWIDTH] IN BLOOD BY AUTOMATED COUNT: 14.2 % (ref 10–15)
HCT VFR BLD AUTO: 34.3 % (ref 40–53)
HGB BLD-MCNC: 10.8 G/DL (ref 13.3–17.7)
IMM GRANULOCYTES # BLD: 0 10E9/L (ref 0–0.4)
IMM GRANULOCYTES NFR BLD: 0.2 %
LYMPHOCYTES # BLD AUTO: 1.1 10E9/L (ref 0.8–5.3)
LYMPHOCYTES NFR BLD AUTO: 17.5 %
MAGNESIUM SERPL-MCNC: 1.6 MG/DL (ref 1.6–2.3)
MCH RBC QN AUTO: 32.7 PG (ref 26.5–33)
MCHC RBC AUTO-ENTMCNC: 31.5 G/DL (ref 31.5–36.5)
MCV RBC AUTO: 104 FL (ref 78–100)
MONOCYTES # BLD AUTO: 0.3 10E9/L (ref 0–1.3)
MONOCYTES NFR BLD AUTO: 4.8 %
NEUTROPHILS # BLD AUTO: 4.9 10E9/L (ref 1.6–8.3)
NEUTROPHILS NFR BLD AUTO: 76.6 %
NRBC # BLD AUTO: 0 10*3/UL
NRBC BLD AUTO-RTO: 0 /100
PHOSPHATE SERPL-MCNC: 2.7 MG/DL (ref 2.5–4.5)
PLATELET # BLD AUTO: 165 10E9/L (ref 150–450)
RBC # BLD AUTO: 3.3 10E12/L (ref 4.4–5.9)
WBC # BLD AUTO: 6.4 10E9/L (ref 4–11)

## 2018-10-21 PROCEDURE — 83735 ASSAY OF MAGNESIUM: CPT | Performed by: SURGERY

## 2018-10-21 PROCEDURE — 25000128 H RX IP 250 OP 636: Performed by: STUDENT IN AN ORGANIZED HEALTH CARE EDUCATION/TRAINING PROGRAM

## 2018-10-21 PROCEDURE — A9270 NON-COVERED ITEM OR SERVICE: HCPCS | Mod: GY | Performed by: STUDENT IN AN ORGANIZED HEALTH CARE EDUCATION/TRAINING PROGRAM

## 2018-10-21 PROCEDURE — 25000131 ZZH RX MED GY IP 250 OP 636 PS 637: Performed by: PHYSICIAN ASSISTANT

## 2018-10-21 PROCEDURE — 25000131 ZZH RX MED GY IP 250 OP 636 PS 637: Mod: GY | Performed by: STUDENT IN AN ORGANIZED HEALTH CARE EDUCATION/TRAINING PROGRAM

## 2018-10-21 PROCEDURE — 40000275 ZZH STATISTIC RCP TIME EA 10 MIN

## 2018-10-21 PROCEDURE — 85025 COMPLETE CBC W/AUTO DIFF WBC: CPT | Performed by: SURGERY

## 2018-10-21 PROCEDURE — 25000125 ZZHC RX 250: Performed by: STUDENT IN AN ORGANIZED HEALTH CARE EDUCATION/TRAINING PROGRAM

## 2018-10-21 PROCEDURE — 25000132 ZZH RX MED GY IP 250 OP 250 PS 637: Mod: GY | Performed by: SURGERY

## 2018-10-21 PROCEDURE — 40000047 ZZH STATISTIC CTO2 CONT OXYGEN TECH TIME EA 90 MIN

## 2018-10-21 PROCEDURE — 84100 ASSAY OF PHOSPHORUS: CPT | Performed by: SURGERY

## 2018-10-21 PROCEDURE — G0378 HOSPITAL OBSERVATION PER HR: HCPCS

## 2018-10-21 PROCEDURE — 25000132 ZZH RX MED GY IP 250 OP 250 PS 637: Mod: GY | Performed by: STUDENT IN AN ORGANIZED HEALTH CARE EDUCATION/TRAINING PROGRAM

## 2018-10-21 PROCEDURE — 27210429 ZZH NUTRITION PRODUCT INTERMEDIATE LITER

## 2018-10-21 PROCEDURE — 99222 1ST HOSP IP/OBS MODERATE 55: CPT

## 2018-10-21 PROCEDURE — A9270 NON-COVERED ITEM OR SERVICE: HCPCS | Mod: GY | Performed by: SURGERY

## 2018-10-21 PROCEDURE — 36415 COLL VENOUS BLD VENIPUNCTURE: CPT | Performed by: SURGERY

## 2018-10-21 PROCEDURE — 96374 THER/PROPH/DIAG INJ IV PUSH: CPT

## 2018-10-21 RX ADMIN — QUETIAPINE 50 MG: 50 TABLET ORAL at 21:07

## 2018-10-21 RX ADMIN — ESCITALOPRAM OXALATE 20 MG: 10 TABLET ORAL at 09:12

## 2018-10-21 RX ADMIN — SULFAMETHOXAZOLE AND TRIMETHOPRIM 80 MG: 200; 40 SUSPENSION ORAL at 09:09

## 2018-10-21 RX ADMIN — TACROLIMUS 3 MG: 5 CAPSULE ORAL at 09:12

## 2018-10-21 RX ADMIN — CHLORHEXIDINE GLUCONATE 0.12% ORAL RINSE 15 ML: 1.2 LIQUID ORAL at 14:22

## 2018-10-21 RX ADMIN — SODIUM BICARBONATE 650 MG TABLET 650 MG: at 21:07

## 2018-10-21 RX ADMIN — Medication 40 MG: at 10:33

## 2018-10-21 RX ADMIN — FLUDROCORTISONE ACETATE 0.1 MG: 0.1 TABLET ORAL at 09:13

## 2018-10-21 RX ADMIN — SODIUM BICARBONATE 650 MG TABLET 650 MG: at 09:12

## 2018-10-21 RX ADMIN — CHLORHEXIDINE GLUCONATE 0.12% ORAL RINSE 15 ML: 1.2 LIQUID ORAL at 18:13

## 2018-10-21 RX ADMIN — OXYCODONE HYDROCHLORIDE 5 MG: 5 TABLET ORAL at 16:16

## 2018-10-21 RX ADMIN — CHLORHEXIDINE GLUCONATE 0.12% ORAL RINSE 15 ML: 1.2 LIQUID ORAL at 21:06

## 2018-10-21 RX ADMIN — TACROLIMUS 2.5 MG: 5 CAPSULE ORAL at 18:13

## 2018-10-21 RX ADMIN — MYCOPHENOLATE MOFETIL 250 MG: 200 POWDER, FOR SUSPENSION ORAL at 21:07

## 2018-10-21 RX ADMIN — ATORVASTATIN CALCIUM 40 MG: 40 TABLET, FILM COATED ORAL at 21:07

## 2018-10-21 RX ADMIN — VITAMIN D, TAB 1000IU (100/BT) 2000 UNITS: 25 TAB at 09:12

## 2018-10-21 RX ADMIN — FOLIC ACID: 5 INJECTION, SOLUTION INTRAMUSCULAR; INTRAVENOUS; SUBCUTANEOUS at 11:43

## 2018-10-21 RX ADMIN — MYCOPHENOLATE MOFETIL 250 MG: 200 POWDER, FOR SUSPENSION ORAL at 09:09

## 2018-10-21 RX ADMIN — TRAZODONE HYDROCHLORIDE 100 MG: 50 TABLET ORAL at 21:07

## 2018-10-21 RX ADMIN — SODIUM CHLORIDE, POTASSIUM CHLORIDE, SODIUM LACTATE AND CALCIUM CHLORIDE: 600; 310; 30; 20 INJECTION, SOLUTION INTRAVENOUS at 20:37

## 2018-10-21 RX ADMIN — PREDNISONE 5 MG: 5 TABLET ORAL at 09:13

## 2018-10-21 RX ADMIN — FOLIC ACID 1 MG: 1 TABLET ORAL at 09:13

## 2018-10-21 RX ADMIN — MULTIVITAMIN 15 ML: LIQUID ORAL at 09:12

## 2018-10-21 RX ADMIN — CHLORHEXIDINE GLUCONATE 0.12% ORAL RINSE 15 ML: 1.2 LIQUID ORAL at 09:10

## 2018-10-21 NOTE — CONSULTS
"Psychiatry/C-D    Chart reviewed, patient interviewed, discussed wit Maryanne West PA-C.    Pt states he drank in \"spontnaneous manner\". Denies any precipitants or stressors leading to him doing this. States he has not had alcohol in 6 yrs.  Denies other substances. Denies depression. Denies suicidal ideation.    A/P Alcohol intoxication. History of depression, PTSD, polysubstance abuse. Patient has services at [facility].  Has appointment with therapist this Thursday. Will discuss C-D issues with him at that time. Call with questions, dictation to follow.    Silas Mcguire MD  [phone]  "

## 2018-10-21 NOTE — PLAN OF CARE
Problem: Patient Care Overview  Goal: Plan of Care/Patient Progress Review  Outcome: No Change  Patient has been educated on potential risks of choosing to leave the unit and the responsibility for patient well-being will belong to the patient. Pt has been informed that admission to hospital is due to need for medical treatment. Education given to the patient on some of the potential risks included but is not limited to:            lack of access to nursing and medical intervention            possible missed appointments with MD, therapies, tests            possible missed medications, antibiotics, management of IV's    Patient Response:     Pt has sitter at bedside. Behavior is appropriate but pt can be impulsive. - Pt demanded that he be allowed to have sips of water even after education was given and no diet order is placed. Possible risk or ETOH withdrawal. Hx of self harm and impulsive behavior.

## 2018-10-21 NOTE — PLAN OF CARE
Problem: Patient Care Overview  Goal: Plan of Care/Patient Progress Review  Outcome: Improving  Observation goals PER UNIT ROUTINE     Comments: Patient may discharge once NJ tube is placed and placement is confirmed MET     GOALS MET BUT PT NEEDS SAFE discharge PLAN

## 2018-10-21 NOTE — PLAN OF CARE
Problem: Patient Care Overview  Goal: Plan of Care/Patient Progress Review  Outcome: No Change  Observation goals PER UNIT ROUTINE     Comments: Patient may discharge once NJ tube is placed and placement is confirmed MET    GOALS MET BUT PT NEEDS SAFE discharge PLAN
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
## 2018-10-21 NOTE — PLAN OF CARE
Problem: Patient Care Overview  Goal: Plan of Care/Patient Progress Review  Outcome: Improving  /79 (BP Location: Right arm)  Pulse 83  Temp 98.2  F (36.8  C) (Axillary)  Resp 20  Wt 55.7 kg (122 lb 12.8 oz)  SpO2 100%  BMI 18.67 kg/m2    2584-3837: A/Ox4. VSS on RA with trach dome. NPO. Up with SBA. Attendant at bedside. CIWA scores 0-2; no ativan needed. Pt slept comfortably throughout night; behavior appropriate to situation. Trach with small ulceration; WOC consult placed yesterday. NJ with TF @ 20mL/hr; to be advanced at 1000; goal rate is 40mL/hr. L PIV with LR @ 50mL/hr. Obs goal met, but waiting on a safe discharge plan before patient can leave the hospital.
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
## 2018-10-21 NOTE — PLAN OF CARE
OBS GOALS:  Patient may discharge once NJ tube is placed and placement is confirmed: NJ tube placed 10/20/18, placement was confirmed at tube feeds were started. Pt will be at goal tube feed at 1800 at 40ML/hr.
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
## 2018-10-21 NOTE — PROGRESS NOTES
Problem: Patient Care Overview  Goal: Plan of Care/Patient Progress Review  Outcome: Improving  Observation goals PER UNIT ROUTINE     Comments: Patient may discharge once NJ tube is placed and placement is confirmed MET.   TF rate at 30 ml/ hr. Goal 40 ml/ hr.  Pt denies suicide ideations. Writer asked pt per his history.        GOALS MET BUT PT NEEDS SAFE discharge PLAN. Spoke with CHELSEA Levy, who will be calling Merline, wife of 9 years per pt.  Writer also called Merline, but there was no answer. VML. No return call as of yet.
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
## 2018-10-22 ENCOUNTER — APPOINTMENT (OUTPATIENT)
Dept: GENERAL RADIOLOGY | Facility: CLINIC | Age: 48
End: 2018-10-22
Attending: NURSE PRACTITIONER
Payer: MEDICARE

## 2018-10-22 ENCOUNTER — HOME INFUSION (PRE-WILLOW HOME INFUSION) (OUTPATIENT)
Dept: PHARMACY | Facility: CLINIC | Age: 48
End: 2018-10-22

## 2018-10-22 VITALS
RESPIRATION RATE: 16 BRPM | BODY MASS INDEX: 18.67 KG/M2 | OXYGEN SATURATION: 100 % | HEART RATE: 73 BPM | WEIGHT: 122.8 LBS | SYSTOLIC BLOOD PRESSURE: 130 MMHG | DIASTOLIC BLOOD PRESSURE: 91 MMHG | TEMPERATURE: 98.1 F

## 2018-10-22 LAB
ALBUMIN SERPL-MCNC: 3.5 G/DL (ref 3.4–5)
ALP SERPL-CCNC: 103 U/L (ref 40–150)
ALT SERPL W P-5'-P-CCNC: 27 U/L (ref 0–70)
ANION GAP SERPL CALCULATED.3IONS-SCNC: 8 MMOL/L (ref 3–14)
AST SERPL W P-5'-P-CCNC: 24 U/L (ref 0–45)
BILIRUB DIRECT SERPL-MCNC: 0.1 MG/DL (ref 0–0.2)
BILIRUB SERPL-MCNC: 0.4 MG/DL (ref 0.2–1.3)
BUN SERPL-MCNC: 19 MG/DL (ref 7–30)
CALCIUM SERPL-MCNC: 9.2 MG/DL (ref 8.5–10.1)
CHLORIDE SERPL-SCNC: 114 MMOL/L (ref 94–109)
CO2 SERPL-SCNC: 22 MMOL/L (ref 20–32)
CREAT SERPL-MCNC: 0.88 MG/DL (ref 0.66–1.25)
GFR SERPL CREATININE-BSD FRML MDRD: >90 ML/MIN/1.7M2
GLUCOSE SERPL-MCNC: 99 MG/DL (ref 70–99)
POTASSIUM SERPL-SCNC: 3.9 MMOL/L (ref 3.4–5.3)
PROT SERPL-MCNC: 6.9 G/DL (ref 6.8–8.8)
SODIUM SERPL-SCNC: 144 MMOL/L (ref 133–144)

## 2018-10-22 PROCEDURE — A9270 NON-COVERED ITEM OR SERVICE: HCPCS | Mod: GY | Performed by: SURGERY

## 2018-10-22 PROCEDURE — A9270 NON-COVERED ITEM OR SERVICE: HCPCS | Mod: GY | Performed by: STUDENT IN AN ORGANIZED HEALTH CARE EDUCATION/TRAINING PROGRAM

## 2018-10-22 PROCEDURE — 25000125 ZZHC RX 250: Performed by: STUDENT IN AN ORGANIZED HEALTH CARE EDUCATION/TRAINING PROGRAM

## 2018-10-22 PROCEDURE — 25000131 ZZH RX MED GY IP 250 OP 636 PS 637: Performed by: PHYSICIAN ASSISTANT

## 2018-10-22 PROCEDURE — 40000986 XR ABDOMEN PORT 1 VW

## 2018-10-22 PROCEDURE — 80076 HEPATIC FUNCTION PANEL: CPT | Performed by: SURGERY

## 2018-10-22 PROCEDURE — 80048 BASIC METABOLIC PNL TOTAL CA: CPT | Performed by: SURGERY

## 2018-10-22 PROCEDURE — 36415 COLL VENOUS BLD VENIPUNCTURE: CPT | Performed by: SURGERY

## 2018-10-22 PROCEDURE — G0463 HOSPITAL OUTPT CLINIC VISIT: HCPCS

## 2018-10-22 PROCEDURE — 74018 RADEX ABDOMEN 1 VIEW: CPT

## 2018-10-22 PROCEDURE — 25000131 ZZH RX MED GY IP 250 OP 636 PS 637: Mod: GY | Performed by: STUDENT IN AN ORGANIZED HEALTH CARE EDUCATION/TRAINING PROGRAM

## 2018-10-22 PROCEDURE — G0378 HOSPITAL OBSERVATION PER HR: HCPCS

## 2018-10-22 PROCEDURE — 25000132 ZZH RX MED GY IP 250 OP 250 PS 637: Mod: GY | Performed by: SURGERY

## 2018-10-22 PROCEDURE — 27210429 ZZH NUTRITION PRODUCT INTERMEDIATE LITER

## 2018-10-22 PROCEDURE — 25000132 ZZH RX MED GY IP 250 OP 250 PS 637: Mod: GY | Performed by: STUDENT IN AN ORGANIZED HEALTH CARE EDUCATION/TRAINING PROGRAM

## 2018-10-22 RX ADMIN — FOLIC ACID 1 MG: 1 TABLET ORAL at 08:54

## 2018-10-22 RX ADMIN — CHLORHEXIDINE GLUCONATE 0.12% ORAL RINSE 15 ML: 1.2 LIQUID ORAL at 13:28

## 2018-10-22 RX ADMIN — MULTIVITAMIN 15 ML: LIQUID ORAL at 08:55

## 2018-10-22 RX ADMIN — TACROLIMUS 3 MG: 5 CAPSULE ORAL at 08:54

## 2018-10-22 RX ADMIN — FLUDROCORTISONE ACETATE 0.1 MG: 0.1 TABLET ORAL at 08:54

## 2018-10-22 RX ADMIN — Medication 40 MG: at 08:55

## 2018-10-22 RX ADMIN — VITAMIN D, TAB 1000IU (100/BT) 2000 UNITS: 25 TAB at 08:54

## 2018-10-22 RX ADMIN — SULFAMETHOXAZOLE AND TRIMETHOPRIM 80 MG: 200; 40 SUSPENSION ORAL at 08:54

## 2018-10-22 RX ADMIN — PREDNISONE 5 MG: 5 TABLET ORAL at 08:54

## 2018-10-22 RX ADMIN — OXYCODONE HYDROCHLORIDE 5 MG: 5 TABLET ORAL at 12:49

## 2018-10-22 RX ADMIN — CHLORHEXIDINE GLUCONATE 0.12% ORAL RINSE 15 ML: 1.2 LIQUID ORAL at 08:54

## 2018-10-22 RX ADMIN — MYCOPHENOLATE MOFETIL 250 MG: 200 POWDER, FOR SUSPENSION ORAL at 08:54

## 2018-10-22 RX ADMIN — SODIUM BICARBONATE 650 MG TABLET 650 MG: at 08:54

## 2018-10-22 RX ADMIN — ESCITALOPRAM OXALATE 20 MG: 10 TABLET ORAL at 08:54

## 2018-10-22 NOTE — PLAN OF CARE
Problem: Patient Care Overview  Goal: Plan of Care/Patient Progress Review  Outcome: No Change  /77 (BP Location: Right arm)  Pulse 73  Temp 97.8  F (36.6  C) (Axillary)  Resp 16  Wt 55.7 kg (122 lb 12.8 oz)  SpO2 100%  BMI 18.67 kg/m2    8099-7104: A/Ox4. VSS on trach dome on RA. Dysphagia 1 with thin liquids. Up independently. Denies pain and nausea. L PIV with LR @ 50mL/hr. NJ with TF @ 40mL/hr (goal rate). Tolerating well. Urine output 700mL; no BM overnight. Plan for discharge @ 1200 today with wife.

## 2018-10-22 NOTE — PROGRESS NOTES
OBS GOALS:  Patient may discharge once NJ tube is placed and placement is confirmed --NJ tube placed 10/20/18, placement was confirmed and tube feeds were started. TF are running at goal rate of 40mL/hr and patient is tolerating well. Plan for discharge today at noon; wife providing ride.

## 2018-10-22 NOTE — PLAN OF CARE
Problem: Patient Care Overview  Goal: Plan of Care/Patient Progress Review  Patient has met Observation goals and is getting prepared for discharge. Patient's tube feedings stopped around 1100 because the Isosource 1.5 was causing him to have diarrhea; he had been switched to Nutren 1.5 outpatient. Order changed to Nutren 1.5 but never restarted because of repositioning of tube. Patient wants an NG tube not an NJ tube so he can do bolus feedings at home; NJ tube pulled out 24 cm; waiting for final X-ray to see if it is in the stomach; patient will then continue bolus feedings at home. Patient's wife is coming to pick him up around 1530.

## 2018-10-22 NOTE — PLAN OF CARE
Problem: Patient Care Overview  Goal: Plan of Care/Patient Progress Review  Outcome: Improving  4571-6809: Shift Trach safety check done, CIWA score every 2 hour, 0 and 0    /84 (BP Location: Right arm)  Pulse 73  Temp 97.5  F (36.4  C) (Axillary)  Resp 16  Wt 55.7 kg (122 lb 12.8 oz)  SpO2 100%  BMI 18.67 kg/m2   VS: AVSS and afebrile. BG: none. LABS: WDL. Pain: oxy 5mg x1 for facial pain, decrease in pain. Nausea: denies. Diet: DD1 and tube feed advanced to goal of 40ML/hr at 1800, tolerating well. LDA: PIV . GTT: LR at 50ML/hr. GI/: voiding adequate amounts, no BM this shift. Skin: intact. Mobility: SBA with VPM for reminder. Education: on calling when needing to get up. Tests/Procedures: none. Plan: to discharge tomorrow to home now that OBS goals have been met. All care explained and questions answered. Will continue to monitor and notify team of changes.
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
## 2018-10-22 NOTE — DISCHARGE INSTRUCTIONS
________________________________________________________  Discharge RN please fax discharge orders to home care agency: [facility]  --they need signed discharge orders by 12 noon on the day of discharge  ---page flor [place] @ [phone] Monday-Friday  ---weekends call office [phone]  ________________________________________________________    Trach site wound: Daily    Cleanse wound bed with NS and pat dry.  Cut a piece of Hydrofera blue and split in the middle. Wet it with normal saline and ring the excess  Change dressing daily.  Use this dressing for 2 weeks (until Nove 5th) and discontinue, then continue with optifoam dressing
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
## 2018-10-22 NOTE — PROGRESS NOTES
DISCHARGE    D: Patient with orders to discharge to home.  I: Discharge instructions, medications, & follow ups reviewed with pt. Copy of discharge summary given to pt. PIV removed. All belongings packed & sent with patient.Paperwork faxed.   A: Patient in stable condition. AVSS. Pt had no further questions regarding discharge instructions and medications. Patient transferred out on foot & left with wife.  P: Plan for follow up in early November.

## 2018-10-22 NOTE — PROGRESS NOTES
Brief ENT Note    Patient popped up on our consult list over the weekend. Consult appears to have been for NG tube placement under endoscopic vision. However, no call or page was ever placed to on-call Otolaryngology resident and there was never any communication with our service. We have attempted multiple times to contact the primary team without any response. However, chart review reveals that this patient currently has an NG.  Therefore, we can not find a reason for our consultant services at this time and after attempting to contact the primary team multiple times, we are discontinuing this consult order.    Please re-consult our service or page our team with any questions or concerns as we are happy to be of assistance should our services be needed.    Laura Monk MD PGY-4  Otolaryngology-Head & Neck Surgery
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
## 2018-10-22 NOTE — PROGRESS NOTES
CLINICAL NUTRITION SERVICES - BRIEF NOTE     Nutrition Prescription      Recommendations already ordered by Registered Dietitian (RD):  Changed to Nutren 1.5 @ 60 ml/hr - Nutren 1.5 @ 60 mL/hr to provide 2160 kcals (39 kcal/kg/day), 98 g PRO (1.8 g/kg/day), 1094 mL H2O, 253 g CHO and no fiber daily.    Future/Additional Recommendations:  Bolus feeding recommendations (for discharge):  Nutren 1.5 - 7 cans daily (gravity bolus feedings of 2 cans 3 times/day + 1 additional can daily, per patient's preferred schedule).   For transitioning back to this:  Start 1st gravity feeding with 1 can (250 ml) x 2 feedings.  If well tolerated, increase to goal volume of  2 cans per feeding three times/day + 1 additional can once/day.      For hydration maintenance:   Recommend 60 ml free water before/after each bolus feeding + an additional 600 ml daily (ex: 150 ml, 4 times per day).     EVALUATION OF THE PROGRESS TOWARD GOALS   Nutrition Support: Isosource 1.5 @ 40 ml/hr (ordered by team)     NEW FINDINGS   Patient had NGT prior to dislodgement. Current FT placed 10/20 is a NDT. Patient requesting NGT to allow for continued bolus feedings at home (he desires the sensation of fullness and has several boxes of supplies for gravity bolus feedings).  Last had his TF per usual schedule on 10/19/18.  Patient also reports the need to be on Nutren 1.5 and current formula causes diarrhea (was switched from this in the past d/t diarrhea).     Interventions:  Discussed tube feeding regimen/route with patient/team. Team will pull back FT to allow for tip to be in the stomach.      Monitoring/Evaluation  Progress toward goals will be monitored and evaluated per protocol.    Danni Marley MS, RD, LD  Pager [phone]

## 2018-10-22 NOTE — PLAN OF CARE
Problem: Patient Care Overview  Goal: Plan of Care/Patient Progress Review  NJ tube placed; waiting for discharge orders

## 2018-10-23 ENCOUNTER — TELEPHONE (OUTPATIENT)
Dept: TRANSPLANT | Facility: CLINIC | Age: 48
End: 2018-10-23

## 2018-10-23 NOTE — PROGRESS NOTES
This is a recent snapshot of the patient's [facility] medical record.  For current drug dose and complete information and questions, call [phone] or In Basket pool, [facility] ([id number])  CSN Number:  [id number]

## 2018-10-23 NOTE — TELEPHONE ENCOUNTER
How is Hector doing after recent visit to ED / [facility]?  Any alcohol use?    RNCC left detailed VM for Hector to call back for check in after hospitalization and to discuss due for lab draws.

## 2018-10-25 ENCOUNTER — TELEPHONE (OUTPATIENT)
Dept: TRANSPLANT | Facility: CLINIC | Age: 48
End: 2018-10-25

## 2018-10-25 DIAGNOSIS — Z46.59 ENCOUNTER FOR NASOJEJUNAL (NJ) TUBE PLACEMENT: ICD-10-CM

## 2018-10-25 DIAGNOSIS — K21.9 GASTROESOPHAGEAL REFLUX DISEASE WITHOUT ESOPHAGITIS: ICD-10-CM

## 2018-10-25 DIAGNOSIS — Z94.0 KIDNEY TRANSPLANT RECIPIENT: ICD-10-CM

## 2018-10-25 DIAGNOSIS — E78.5 HYPERLIPIDEMIA LDL GOAL <100: ICD-10-CM

## 2018-10-25 DIAGNOSIS — F31.9 BIPOLAR AFFECTIVE DISORDER, REMISSION STATUS UNSPECIFIED (H): ICD-10-CM

## 2018-10-25 RX ORDER — ATORVASTATIN CALCIUM 40 MG/1
TABLET, FILM COATED ORAL
Qty: 30 TABLET | Refills: 11 | Status: SHIPPED | OUTPATIENT
Start: 2018-10-25 | End: 2019-01-25

## 2018-10-25 NOTE — TELEPHONE ENCOUNTER
RNCC discussed with Hector the reasons to stay away from alcohol post-transplant - dehydration that can lead to GOKUL.  Hector voiced understanding.   He has recently seen his psychiatrist who has adjusted his meds as he was having night terrors.

## 2018-10-29 ENCOUNTER — HOME INFUSION (PRE-WILLOW HOME INFUSION) (OUTPATIENT)
Dept: PHARMACY | Facility: CLINIC | Age: 48
End: 2018-10-29

## 2018-10-30 NOTE — PROGRESS NOTES
This is a recent snapshot of the patient's [facility] medical record.  For current drug dose and complete information and questions, call [phone] or In [facility] pool, fv home infusion ([id number])  CSN Number:  [id number]

## 2018-10-31 ENCOUNTER — TELEPHONE (OUTPATIENT)
Dept: TRANSPLANT | Facility: CLINIC | Age: 48
End: 2018-10-31

## 2018-10-31 DIAGNOSIS — Z48.298 AFTERCARE FOLLOWING ORGAN TRANSPLANT: ICD-10-CM

## 2018-10-31 DIAGNOSIS — Z94.0 KIDNEY REPLACED BY TRANSPLANT: ICD-10-CM

## 2018-10-31 DIAGNOSIS — B34.8 BK VIREMIA: ICD-10-CM

## 2018-10-31 LAB
ANION GAP SERPL CALCULATED.3IONS-SCNC: 8 MMOL/L (ref 3–14)
BUN SERPL-MCNC: 32 MG/DL (ref 7–25)
CALCIUM SERPL-MCNC: 10.2 MG/DL (ref 8.6–10.3)
CHLORIDE SERPL-SCNC: 107 MMOL/L (ref 98–107)
CO2 SERPL-SCNC: 26 MMOL/L (ref 21–31)
CREAT SERPL-MCNC: 1.24 MG/DL (ref 0.7–1.3)
ERYTHROCYTE [DISTWIDTH] IN BLOOD BY AUTOMATED COUNT: 13.1 % (ref 10–15)
GFR SERPL CREATININE-BSD FRML MDRD: 62 ML/MIN/1.7M2
GLUCOSE SERPL-MCNC: 80 MG/DL (ref 70–105)
HCT VFR BLD AUTO: 38.2 % (ref 40–53)
HGB BLD-MCNC: 12.5 G/DL (ref 13.3–17.7)
MCH RBC QN AUTO: 32.8 PG (ref 26.5–33)
MCHC RBC AUTO-ENTMCNC: 32.7 G/DL (ref 31.5–36.5)
MCV RBC AUTO: 100 FL (ref 78–100)
PLATELET # BLD AUTO: 155 10E9/L (ref 150–450)
POTASSIUM SERPL-SCNC: 4.4 MMOL/L (ref 3.5–5.1)
RBC # BLD AUTO: 3.81 10E12/L (ref 4.4–5.9)
SODIUM SERPL-SCNC: 141 MMOL/L (ref 134–144)
WBC # BLD AUTO: 5.3 10E9/L (ref 4–11)

## 2018-10-31 PROCEDURE — 87799 DETECT AGENT NOS DNA QUANT: CPT | Performed by: INTERNAL MEDICINE

## 2018-10-31 PROCEDURE — 36415 COLL VENOUS BLD VENIPUNCTURE: CPT | Performed by: SURGERY

## 2018-10-31 PROCEDURE — 80197 ASSAY OF TACROLIMUS: CPT | Performed by: SURGERY

## 2018-10-31 PROCEDURE — 80048 BASIC METABOLIC PNL TOTAL CA: CPT | Performed by: SURGERY

## 2018-10-31 PROCEDURE — 85027 COMPLETE CBC AUTOMATED: CPT | Performed by: SURGERY

## 2018-11-01 DIAGNOSIS — E43 SEVERE PROTEIN-CALORIE MALNUTRITION (H): ICD-10-CM

## 2018-11-01 DIAGNOSIS — Z94.0 KIDNEY TRANSPLANT RECIPIENT: Primary | ICD-10-CM

## 2018-11-01 LAB
TACROLIMUS BLD-MCNC: 8.5 UG/L (ref 5–15)
TME LAST DOSE: 2200 H

## 2018-11-01 NOTE — TELEPHONE ENCOUNTER
Tac level above goal. Current dose = 3mg AM, 2.5mg PM.  Ensure 12h trough and doses. Ensure no recent illness (diarrhea) or changes to other medications.   If accurate, DECREASE tacrolimus to 2.5mg BID and repeat level as scheduled.

## 2018-11-01 NOTE — TELEPHONE ENCOUNTER
Left message for patient regarding tac level above goal.  Instructed patient return call and confirm current dose and good 12 hour trough level.  If both are accurate, patient should decrease Tacrolimus dose to 2.5 mg BID.

## 2018-11-02 LAB
BKV DNA # SPEC NAA+PROBE: ABNORMAL COPIES/ML
BKV DNA SPEC NAA+PROBE-LOG#: 5.6 LOG COPIES/ML
SPECIMEN SOURCE: ABNORMAL

## 2018-11-02 RX ORDER — TACROLIMUS 0.5 MG/1
CAPSULE ORAL
Qty: 270 CAPSULE | Refills: 11 | Status: SHIPPED | OUTPATIENT
Start: 2018-11-02 | End: 2018-11-07

## 2018-11-02 NOTE — TELEPHONE ENCOUNTER
Spoke to patient regarding tac level above goal.  Patient states that he is currently taking 2.5 mg BID and confirms good 12 hour trough level.  Patient denies any recent illness or med changes.  Patient verbalizes understanding to decrease dose to 2.5 mg in the AM and 2 mg in the PM.

## 2018-11-07 DIAGNOSIS — Z94.0 KIDNEY TRANSPLANT RECIPIENT: Primary | ICD-10-CM

## 2018-11-07 DIAGNOSIS — E43 SEVERE PROTEIN-CALORIE MALNUTRITION (H): ICD-10-CM

## 2018-11-07 RX ORDER — TACROLIMUS 0.5 MG/1
2 CAPSULE ORAL 2 TIMES DAILY
Qty: 240 CAPSULE | Refills: 11 | Status: SHIPPED | OUTPATIENT
Start: 2018-11-07 | End: 2018-11-09

## 2018-11-07 NOTE — TELEPHONE ENCOUNTER
Post Kidney and Pancreas Transplant Team Conference  Date: 11/7/2018  Transplant Coordinator: Angeline Alonso     Attendees:  []  Dr. Macias [] Thao Barnard, DONALD  [x] Blossom Montes LPN     []  Dr. Donato [x] Martha Morris RN [] Noelle Wagner LPN   [x]  Dr. Telles [] Beth Evans RN    [x]  Dr. Cabrera [] Katelyn Gallegos RN    [] Dr. Vallejo [x] Maliha Alonso RN    [] Dr. Lorenzo [] Mahad Steele RN    [x] Dr. Finley [] Mildred Barajas RN    [] Surgery Fellow [] Gia Romero RN    [] Rachel Resendez NP              Verbal Plan Read Back:   Tacrolimus goal = 4-6    Routed to RN Coordinator   Blossom Montes          Last drug level = 8.5 on 10/31. Dose reduced on 11/2 by a total of 1mg daily.   Further reduce tac to meet new goal -    New dose tac = 2mg BID.  Repeat level on Monday as scheduled.

## 2018-11-07 NOTE — TELEPHONE ENCOUNTER
Left message for patient regarding:  Last drug level = 8.5 on 10/31. Dose reduced on 11/2 by a total of 1mg daily.   Further reduce tac to meet new goal -     New dose tac = 2mg BID.  Repeat level on Monday as scheduled

## 2018-11-08 ENCOUNTER — TELEPHONE (OUTPATIENT)
Dept: TRANSPLANT | Facility: CLINIC | Age: 48
End: 2018-11-08

## 2018-11-08 DIAGNOSIS — Z48.298 AFTERCARE FOLLOWING ORGAN TRANSPLANT: ICD-10-CM

## 2018-11-08 DIAGNOSIS — Z94.0 KIDNEY REPLACED BY TRANSPLANT: ICD-10-CM

## 2018-11-08 DIAGNOSIS — R19.7 DIARRHEA: Primary | ICD-10-CM

## 2018-11-08 DIAGNOSIS — E86.0 DEHYDRATION: ICD-10-CM

## 2018-11-08 LAB
ANION GAP SERPL CALCULATED.3IONS-SCNC: 7 MMOL/L (ref 3–14)
BUN SERPL-MCNC: 16 MG/DL (ref 7–25)
CALCIUM SERPL-MCNC: 10.1 MG/DL (ref 8.6–10.3)
CHLORIDE SERPL-SCNC: 108 MMOL/L (ref 98–107)
CO2 SERPL-SCNC: 29 MMOL/L (ref 21–31)
CREAT SERPL-MCNC: 1.45 MG/DL (ref 0.7–1.3)
ERYTHROCYTE [DISTWIDTH] IN BLOOD BY AUTOMATED COUNT: 12.9 % (ref 10–15)
GFR SERPL CREATININE-BSD FRML MDRD: 52 ML/MIN/1.7M2
GLUCOSE SERPL-MCNC: 110 MG/DL (ref 70–105)
HCT VFR BLD AUTO: 37.7 % (ref 40–53)
HGB BLD-MCNC: 12.1 G/DL (ref 13.3–17.7)
MCH RBC QN AUTO: 32.4 PG (ref 26.5–33)
MCHC RBC AUTO-ENTMCNC: 32.1 G/DL (ref 31.5–36.5)
MCV RBC AUTO: 101 FL (ref 78–100)
PLATELET # BLD AUTO: 161 10E9/L (ref 150–450)
POTASSIUM SERPL-SCNC: 3.8 MMOL/L (ref 3.5–5.1)
RBC # BLD AUTO: 3.73 10E12/L (ref 4.4–5.9)
SODIUM SERPL-SCNC: 144 MMOL/L (ref 134–144)
WBC # BLD AUTO: 4.8 10E9/L (ref 4–11)

## 2018-11-08 PROCEDURE — 80048 BASIC METABOLIC PNL TOTAL CA: CPT | Performed by: SURGERY

## 2018-11-08 PROCEDURE — 80197 ASSAY OF TACROLIMUS: CPT | Performed by: SURGERY

## 2018-11-08 PROCEDURE — 85027 COMPLETE CBC AUTOMATED: CPT | Performed by: SURGERY

## 2018-11-08 PROCEDURE — 36415 COLL VENOUS BLD VENIPUNCTURE: CPT | Performed by: SURGERY

## 2018-11-08 NOTE — TELEPHONE ENCOUNTER
Spoke to patient who states that he has had diarrhea the past 24 hours.  Patient reports urine output volume has stayed the same, still pale/yellow in color.  Patient reports current weight = 140.2 lbs.  Patient states that he is hydrating 2-3L/daily.  Patient reports BP's running 120's/80's.

## 2018-11-08 NOTE — TELEPHONE ENCOUNTER
Prior Authorization Specialty Medication Request    Medication/Dose:   tacrolimus (GENERIC EQUIVALENT) 0.5 MG capsule Take 4 capsules (2 mg) by mouth 2 times daily     ICD code (if different than what is on RX):  Z94.0  Previously Tried and Failed:      Important Lab Values:   Rationale:     Insurance Name:   Insurance ID:   Insurance Phone Number:     Pharmacy Information (if different than what is on RX)  Name:  [facility]  Phone:  [phone]

## 2018-11-08 NOTE — TELEPHONE ENCOUNTER
Post Kidney and Pancreas Transplant Team Conference  Date: 11/8/2018  Transplant Coordinator: Angeline Alonso     Attendees:  []  Dr. Macias [] Thao Barnard, RN  [] Blossom Montes LPN     []  Dr. Donato [] Martha Morris, DONALD [] Noelle Wagner LPN   []  Dr. Telles [] Beth Evans RN    [x]  Dr. Cabrera [] Katelyn Gallegos RN    [] Dr. Lorenzo [x] Maliha Alonso RN    [] Dr. Finley [] Mahad Steele RN    [] Surgery Fellow [] Mildred Barajas RN    [] Rachel Resendez, KHARI [] Gia Romero RN    [] Jacob López, PharmD [] Marianna Fortune, RN     [] Arash Agarwal, RN     [] Thao Villarreal RN        Verbal Plan Read Back:   Hydrate with 1L NS and repeat tx labs on Monday.   Send stool studies.     Routed to RN Coordinator   Angeline Alonso    Scheduled at [facility] [place] [facility] outpatient infusion at 11am tomorrow. Repeat labs on Monday as scheduled.   Hector and Merline voiced understanding.   Patient denies fever.

## 2018-11-08 NOTE — TELEPHONE ENCOUNTER
Creatinine elevated, looks dry on labs, but BK is also rising.     Please collect the following info:  Is urine output still pale / yellow?  Any change to volume of urine output?  Any decrease in weight?  How much fluid is Poncho taking in total daily?  Any diarrhea or additional output?  Please record most recent BPs:

## 2018-11-08 NOTE — TELEPHONE ENCOUNTER
Left message for patient regarding:  Creatinine elevated, looks dry on labs, but BK is also rising.      Please collect the following info:  Is urine output still pale / yellow?  Any change to volume of urine output?  Any decrease in weight?  How much fluid is Poncho taking in total daily?  Any diarrhea or additional output?  Please record most recent BPs:
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
## 2018-11-09 ENCOUNTER — HOSPITAL ENCOUNTER (OUTPATIENT)
Dept: INFUSION THERAPY | Facility: OTHER | Age: 48
Discharge: HOME | End: 2018-11-09
Attending: INTERNAL MEDICINE | Admitting: FAMILY MEDICINE
Payer: MEDICARE

## 2018-11-09 VITALS — RESPIRATION RATE: 18 BRPM | DIASTOLIC BLOOD PRESSURE: 70 MMHG | SYSTOLIC BLOOD PRESSURE: 122 MMHG | TEMPERATURE: 98 F

## 2018-11-09 DIAGNOSIS — Z94.0 KIDNEY TRANSPLANT RECIPIENT: ICD-10-CM

## 2018-11-09 DIAGNOSIS — Z94.0 KIDNEY TRANSPLANTED: ICD-10-CM

## 2018-11-09 DIAGNOSIS — B34.8 BK VIREMIA: ICD-10-CM

## 2018-11-09 DIAGNOSIS — E86.0 DEHYDRATION: ICD-10-CM

## 2018-11-09 DIAGNOSIS — E43 SEVERE PROTEIN-CALORIE MALNUTRITION (H): ICD-10-CM

## 2018-11-09 LAB
TACROLIMUS BLD-MCNC: 7.1 UG/L (ref 5–15)
TME LAST DOSE: 955 H

## 2018-11-09 PROCEDURE — 96360 HYDRATION IV INFUSION INIT: CPT

## 2018-11-09 PROCEDURE — 25000128 H RX IP 250 OP 636: Performed by: INTERNAL MEDICINE

## 2018-11-09 RX ORDER — TACROLIMUS 0.5 MG/1
2.5 CAPSULE ORAL 2 TIMES DAILY
Qty: 300 CAPSULE | Refills: 11 | Status: SHIPPED | OUTPATIENT
Start: 2018-11-09 | End: 2018-11-15

## 2018-11-09 RX ADMIN — SODIUM CHLORIDE 1000 ML: 900 INJECTION, SOLUTION INTRAVENOUS at 11:16

## 2018-11-09 NOTE — TELEPHONE ENCOUNTER
[facility] Prior Authorization Team   Phone: [phone]  Fax: [phone]    Prior Authorization Not Needed per Insurance    Medication: TACROLIMUS 0.5MG - PA NOT NEEDED / PART B COVERED  Insurance Company:    Expected CoPay:      Pharmacy Filling the Rx:    Pharmacy Notified: No  Patient Notified: No      Prior authorization is not required due to coverage is available under [facility] Medicare Part-B benefits. ID#[id number] [facility]#[id number] PCN#[id number] BIN#[id number]    Test claim = $18.35

## 2018-11-09 NOTE — PROGRESS NOTES
Infusion Nursing Note:  Gilles Henning III presents today for Normal Saline Bolus.    Patient seen by provider today: No   present during visit today: Not Applicable.    Note: N/A.    Intravenous Access:  Peripheral IV placed.    Treatment Conditions:  Not Applicable.    Post Infusion Assessment:  Patient tolerated infusion without incident.  Blood return noted pre and post infusion.  Site patent and intact, free from redness, edema or discomfort.  No evidence of extravasations.  Access discontinued per protocol.    Discharge Plan:   Patient discharged in stable condition accompanied by: friend.  Departure Mode: Ambulatory.    Brenda J. Goodell, RN

## 2018-11-09 NOTE — TELEPHONE ENCOUNTER
Issue tacrolimus  Level 7.1  Please read the previous note  Regarding concern of ETOH   Please confirm Gilles Young  Current living situation     Please confirm current dose of tacrolimus  2.5  Twice per day   Confirm 12 hour trough level   If the above is accurate increase 3.0 mg twice per day

## 2018-11-09 NOTE — TELEPHONE ENCOUNTER
Call placed to patient. Spoke with patient wife she confirms that patient is currently living at home. Spoke with patient. He notes tacrolimus dose 2 mg BID and accurate trough level. Patient v\u to increase tacrolimus dose to 2.5 mg BID and continue with current lab schedule.

## 2018-11-10 DIAGNOSIS — K21.9 GASTROESOPHAGEAL REFLUX DISEASE WITHOUT ESOPHAGITIS: ICD-10-CM

## 2018-11-10 DIAGNOSIS — Z46.59 ENCOUNTER FOR NASOJEJUNAL (NJ) TUBE PLACEMENT: ICD-10-CM

## 2018-11-10 DIAGNOSIS — F31.9 BIPOLAR AFFECTIVE DISORDER, REMISSION STATUS UNSPECIFIED (H): ICD-10-CM

## 2018-11-10 DIAGNOSIS — E78.5 HYPERLIPIDEMIA LDL GOAL <100: ICD-10-CM

## 2018-11-10 DIAGNOSIS — Z94.0 KIDNEY TRANSPLANT RECIPIENT: Primary | ICD-10-CM

## 2018-11-12 RX ORDER — PREDNISONE 5 MG/1
5 TABLET ORAL DAILY
Qty: 30 TABLET | Refills: 11 | Status: SHIPPED | OUTPATIENT
Start: 2018-11-12 | End: 2019-03-06

## 2018-11-14 ENCOUNTER — RESULTS ONLY (OUTPATIENT)
Dept: OTHER | Facility: CLINIC | Age: 48
End: 2018-11-14

## 2018-11-14 DIAGNOSIS — B34.8 BK VIREMIA: ICD-10-CM

## 2018-11-14 DIAGNOSIS — Z94.0 KIDNEY REPLACED BY TRANSPLANT: ICD-10-CM

## 2018-11-14 DIAGNOSIS — B27.00 EBV (EPSTEIN-BARR VIRUS) VIREMIA: ICD-10-CM

## 2018-11-14 DIAGNOSIS — K21.9 GASTROESOPHAGEAL REFLUX DISEASE WITHOUT ESOPHAGITIS: ICD-10-CM

## 2018-11-14 DIAGNOSIS — B27.00 EBV (EPSTEIN-BARR VIRUS) VIREMIA: Primary | ICD-10-CM

## 2018-11-14 DIAGNOSIS — F31.9 BIPOLAR AFFECTIVE DISORDER, REMISSION STATUS UNSPECIFIED (H): ICD-10-CM

## 2018-11-14 DIAGNOSIS — Z46.59 ENCOUNTER FOR NASOJEJUNAL (NJ) TUBE PLACEMENT: ICD-10-CM

## 2018-11-14 DIAGNOSIS — Z94.0 KIDNEY TRANSPLANT RECIPIENT: ICD-10-CM

## 2018-11-14 DIAGNOSIS — E78.5 HYPERLIPIDEMIA LDL GOAL <100: ICD-10-CM

## 2018-11-14 DIAGNOSIS — Z48.298 AFTERCARE FOLLOWING ORGAN TRANSPLANT: ICD-10-CM

## 2018-11-14 LAB
ANION GAP SERPL CALCULATED.3IONS-SCNC: 7 MMOL/L (ref 3–14)
BUN SERPL-MCNC: 19 MG/DL (ref 7–25)
CALCIUM SERPL-MCNC: 10.3 MG/DL (ref 8.6–10.3)
CHLORIDE SERPL-SCNC: 108 MMOL/L (ref 98–107)
CO2 SERPL-SCNC: 26 MMOL/L (ref 21–31)
CREAT SERPL-MCNC: 1.39 MG/DL (ref 0.7–1.3)
ERYTHROCYTE [DISTWIDTH] IN BLOOD BY AUTOMATED COUNT: 12.3 % (ref 10–15)
GFR SERPL CREATININE-BSD FRML MDRD: 54 ML/MIN/1.7M2
GLUCOSE SERPL-MCNC: 73 MG/DL (ref 70–105)
HCT VFR BLD AUTO: 36.4 % (ref 40–53)
HGB BLD-MCNC: 11.8 G/DL (ref 13.3–17.7)
MCH RBC QN AUTO: 32.4 PG (ref 26.5–33)
MCHC RBC AUTO-ENTMCNC: 32.4 G/DL (ref 31.5–36.5)
MCV RBC AUTO: 100 FL (ref 78–100)
PLATELET # BLD AUTO: 144 10E9/L (ref 150–450)
POTASSIUM SERPL-SCNC: 4.2 MMOL/L (ref 3.5–5.1)
RBC # BLD AUTO: 3.64 10E12/L (ref 4.4–5.9)
SODIUM SERPL-SCNC: 141 MMOL/L (ref 134–144)
WBC # BLD AUTO: 3.3 10E9/L (ref 4–11)

## 2018-11-14 PROCEDURE — 87799 DETECT AGENT NOS DNA QUANT: CPT | Performed by: INTERNAL MEDICINE

## 2018-11-14 PROCEDURE — 36415 COLL VENOUS BLD VENIPUNCTURE: CPT | Performed by: SURGERY

## 2018-11-14 PROCEDURE — 86833 HLA CLASS II HIGH DEFIN QUAL: CPT | Performed by: INTERNAL MEDICINE

## 2018-11-14 PROCEDURE — 86832 HLA CLASS I HIGH DEFIN QUAL: CPT | Performed by: INTERNAL MEDICINE

## 2018-11-14 PROCEDURE — 80048 BASIC METABOLIC PNL TOTAL CA: CPT | Performed by: SURGERY

## 2018-11-14 PROCEDURE — 85027 COMPLETE CBC AUTOMATED: CPT | Performed by: SURGERY

## 2018-11-14 PROCEDURE — 80197 ASSAY OF TACROLIMUS: CPT | Performed by: SURGERY

## 2018-11-14 RX ORDER — FLUDROCORTISONE ACETATE 0.1 MG/1
TABLET ORAL
Qty: 30 TABLET | Refills: 3 | Status: SHIPPED | OUTPATIENT
Start: 2018-11-14 | End: 2019-01-29

## 2018-11-14 NOTE — TELEPHONE ENCOUNTER
FUTURE VISIT INFORMATION      FUTURE VISIT INFORMATION:    Date: 11/27/2018    Time: 8:30    [place] [facility] [place]FORMATION:    Referring provider:  SELF    Referring providers clinic:  N/A    Reason for visit/diagnosis  ESTABLISH TRACH CARE    RECORDS REQUESTED FROM:       Clinic name Comments Records Status Imaging Status   [facility] CLINIC AND HOSPITAL OFFICE VISIT: 09/11/2018, 09/04/2018  IMAGE: XR FEEDING TUBE PLACEMENT 10/20/2018, XR ABDOMEN 10/20/2018 INTERNAL YES

## 2018-11-15 DIAGNOSIS — Z94.0 KIDNEY TRANSPLANT RECIPIENT: Primary | ICD-10-CM

## 2018-11-15 DIAGNOSIS — E43 SEVERE PROTEIN-CALORIE MALNUTRITION (H): ICD-10-CM

## 2018-11-15 LAB
EBV DNA # SPEC NAA+PROBE: ABNORMAL {COPIES}/ML
EBV DNA SPEC NAA+PROBE-LOG#: 4.7 {LOG_COPIES}/ML
TACROLIMUS BLD-MCNC: 10.4 UG/L (ref 5–15)
TME LAST DOSE: 2110 H

## 2018-11-15 RX ORDER — TACROLIMUS 0.5 MG/1
1.5 CAPSULE ORAL 2 TIMES DAILY
Qty: 180 CAPSULE | Refills: 11 | Status: SHIPPED | OUTPATIENT
Start: 2018-11-15 | End: 2018-12-27 | Stop reason: ALTCHOICE

## 2018-11-15 NOTE — TELEPHONE ENCOUNTER
ISSUE:   Tacrolimus level 10.2 on 11/14/18, goal 4-6 (due to BK), dose 2.5 mg BID    PLAN:   Please call pt and confirm this was a good 12-hour trough. Verify dose 2.5 mg BID. Confirm no new medications or illness (kee. Diarrhea). If good trough, decrease dose to 1.5 mg BID and recheck level with next scheduled lab draw.      Please confirm with patient if he has had any of the following:  Night sweats  Sore throat  Fatigue  Painful / enlarged lymph nodes

## 2018-11-15 NOTE — TELEPHONE ENCOUNTER
Spoke to patient who verbalizes understanding to decrease tacrolimus to 1.5 mg BID.  Patient confirms current tac dose and good 12 hour trough level.  Patient denies any recent illness or new medications.

## 2018-11-16 DIAGNOSIS — B27.90 EBV INFECTION: Primary | ICD-10-CM

## 2018-11-16 LAB
BKV DNA # SPEC NAA+PROBE: ABNORMAL COPIES/ML
BKV DNA SPEC NAA+PROBE-LOG#: 5.3 LOG COPIES/ML
SPECIMEN SOURCE: ABNORMAL

## 2018-11-21 LAB
DONOR IDENTIFICATION: NORMAL
DSA COMMENTS: NORMAL
DSA PRESENT: NO
DSA TEST METHOD: NORMAL
ORGAN: NORMAL
SA1 CELL: NORMAL
SA1 COMMENTS: NORMAL
SA1 HI RISK ABY: NORMAL
SA1 MOD RISK ABY: NORMAL
SA1 TEST METHOD: NORMAL
SA2 CELL: NORMAL
SA2 COMMENTS: NORMAL
SA2 HI RISK ABY UA: NORMAL
SA2 MOD RISK ABY: NORMAL
SA2 TEST METHOD: NORMAL
UNACCEPTABLE ANTIGEN: NORMAL
UNOS CPRA: 100

## 2018-11-27 ENCOUNTER — PRE VISIT (OUTPATIENT)
Dept: OTOLARYNGOLOGY | Facility: CLINIC | Age: 48
End: 2018-11-27

## 2018-11-27 ENCOUNTER — OFFICE VISIT (OUTPATIENT)
Dept: OTOLARYNGOLOGY | Facility: CLINIC | Age: 48
End: 2018-11-27
Payer: COMMERCIAL

## 2018-11-27 DIAGNOSIS — S09.93XA FACIAL INJURY, INITIAL ENCOUNTER: ICD-10-CM

## 2018-11-27 DIAGNOSIS — Z93.0 TRACHEOSTOMY IN PLACE (H): Primary | ICD-10-CM

## 2018-11-27 NOTE — PATIENT INSTRUCTIONS
1.  You were seen in the ENT Clinic today by Dr. Soliz.  If you have any questions or concerns after your appointment, please call [phone].  Press option #1 for scheduling related needs.  Press option #3 for Nurse advice.  2.  Plan is to return to clinic in 3 months    Ramona [facility]   Head & Neck Surgery
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
## 2018-11-27 NOTE — PROGRESS NOTES
HISTORY OF PRESENT ILLNESS: Gilles Henning III is a 48 year old male with a history of a suicide attempt with resultant facial trauma on 7/26/2018.  He had a tracheotomy soon thereafter.he  is being followed by his plastic surgeon, Dr. Lora Duffy at [facility].  He has not had any tracheal cares since placement in July according to him.  He is here for routine trach evaluation.  He has been able to plug the trach during the day pretty much since discharge from his original procedure and injury.  He does leave it open at night.  He has multiple procedures planned for his reconstruction and therefore it is likely that the tracheotomy tube will be useful for him.  He is here for evaluation and recommendation.    Last 2 Scores for Patient-Answered VHI Questionnaire  VHI Total Score 11/27/2018   VHI Total Score 16       Last 2 Scores for Patient-Answered CSI Questionnaire  CSI Total Score 11/27/2018   CSI Total Score 2         Last 2 Scores for Patient-Answered EAT Questionnaire  EAT Total Score 11/27/2018   EAT Total Score 10           PAST MEDICAL HISTORY:   Past Medical History:   Diagnosis Date     Anemia in chronic kidney disease      Autoimmune disease (H)      Bipolar affective disorder (H)      BK viremia 9/25/2018     Bone disease      Chemical dependency (H)      Chronic rejection of kidney transplant 2015    Chronic rejection of 2009 kidney, failed 2015. Graft nephrectomy 2017.     Developmental delay      ESRD needing dialysis (H) 2015     Head injury      History of alcoholism (H)      History of peritoneal dialysis     7 years     Hyperlipidemia      IgA nephropathy      Kidney problem      MDD (major depressive disorder)     Hx multiple suicide attempts     Migraine      Migraines      Osteopenia      Peritonitis (H)      Polysubstance abuse (H)     in remission     Problem, psychiatric      PTSD (post-traumatic stress disorder)      Renal cell carcinoma (H) 2015    Left native kidney, s/p  nephrectomy     Secondary hyperparathyroidism (H)      Tobacco abuse     Chewing tobacco     Transplant        PAST SURGICAL HISTORY:   Past Surgical History:   Procedure Laterality Date     EXPLANT TRANSPLANTED KIDNEY N/A 12/15/2017    Procedure: EXPLANT TRANSPLANTED KIDNEY;  Transplanted Nephrectomy;  Surgeon: Mario Vallejo MD;  Location: [facility] OR     HC DIALYSIS AVF OR AVG, CENTRAL INTERVENTION ONLY Left      LAPAROSCOPIC INSERTION CATHETER PERITONEAL DIALYSIS Left      NEPHRECTOMY BILATERAL  2015     OPEN REDUCTION INTERNAL FIXATION MANDIBLE N/A 2018    Procedure: OPEN REDUCTION INTERNAL FIXATION MANDIBLE;  Open Reduction Interral Fixation of Bilateral Mandible, Maxilla, Naso Orbitbial Ethmoidal Fractures Nasal-gastric feeding tube placement;  Surgeon: Denzel Hernández DDS;  Location: [facility] OR     OPEN REDUCTION INTERNAL FIXATION MAXILLA N/A 2018    Procedure: OPEN REDUCTION INTERNAL FIXATION MAXILLA;;  Surgeon: Denzel Hernández DDS;  Location: [facility] OR     PERCUTANEOUS BIOPSY KIDNEY Right 2018    Procedure: PERCUTANEOUS BIOPSY KIDNEY;  Right Kidney Biopsy;  Surgeon: Star Macias MD;  [facility]     REMOVE CATHETER PERITONEAL       TRANSPLANT KIDNEY RECIPIENT  DONOR Left 2009     TRANSPLANT KIDNEY RECIPIENT  DONOR N/A 2018    Procedure: TRANSPLANT KIDNEY RECIPIENT  DONOR;  TRANSPLANT KIDNEY RECIPIENT  DONOR and ureteral stent placement;  Surgeon: Mario Vallejo MD;  Location: [facility] OR       FAMILY HISTORY:   Family History   Problem Relation Age of Onset     Substance Abuse Mother      Mental Illness Mother      Depression Mother      Substance Abuse Father      Diabetes Father      HEART DISEASE Father      Substance Abuse Maternal Grandfather      Cancer Maternal Grandfather      Substance Abuse Brother      Mental Illness Brother      Depression Brother      Cerebrovascular Disease Brother        SOCIAL HISTORY:   Social History   Substance Use  "Topics     Smoking status: Passive Smoke Exposure - Never Smoker     Types: Dip, chew, snus or snuff     Smokeless tobacco: Current User     Types: Chew      Comment: Wife smoked years ago.  Weaning off chew now     Alcohol use Yes      Comment: none now, did treatment at age 21, relapsed with divorce (a couple months)  then now  sober 3 years.        REVIEW OF SYSTEMS: Ten point review of systems was performed and is negative except for:   [facility] ROS 11/27/2018   Psychology Frequently feeling depressed or sad, Frequently feeling anxious   Ears, Nose, Throat Nasal congestion or drainage, Sore throat, Trouble swallowing   Gastrointestinal/Genitourinary Heartburn/indigestion, Unexplained nausea/vomiting   Musculoskeletal Sore or stiff joints, Back pain   Endocrine Frequent urination        ALLERGIES: Gabapentin    MEDICATIONS:   Current Outpatient Prescriptions   Medication Sig Dispense Refill     ACETAMINOPHEN PO Take 325-650 mg by mouth every 8 hours as needed for pain       atorvastatin (LIPITOR) 40 MG tablet TAKE 1 TABLET PER FEEDING TUBE ROUTE EVERY EVENING 30 tablet 11     CELLCEPT (BRAND) 200 MG/ML SUSPENSION 1.25 mLs (250 mg) by Per Feeding Tube route 2 times daily 80 mL 11     chlorhexidine (PERIDEX) 0.12 % solution Swish and spit 15 mLs in mouth 4 times daily 1893 mL 2     Cholecalciferol (VITAMIN D) 2000 units tablet Take 1 tablet by mouth daily 90 tablet 3     ESCITALOPRAM OXALATE PO Take 20 mg by mouth daily       fludrocortisone (FLORINEF) 0.1 MG tablet TAKE 1 TABLET PER FEEDING TUBE ROUTE DAILY 30 tablet 3     folic acid (FOLVITE) 1 MG tablet Take 1 tablet (1 mg) by mouth daily 30 tablet 11     Gauze Pads & Dressings (OPTIFOAM) 4\"X4\" PADS Apply under trach to prevent skin breakdown. 10 each 99     multivitamins with minerals (CERTAVITE/CEROVITE) LIQD liquid 15 mLs by Per Feeding Tube route daily 2 Bottle 11     NONFORMULARY Salicylic acid 3% / 5-FU 4% in vanicream : Apply a thin layer to affected area " once daily       OMEPRAZOLE PO Take 40 mg by mouth every morning Crushed and dissolved       order for DME Equipment being ordered: DME. Trach: 6 DCT Shiley 1 Box 3     order for DME Foam dressing for under trach 1 each 11     order for DME Passy delgado valve size 6 1 each 11     order for DME Speaker cap for #6 cannula. 1 each 11     oxyCODONE IR (ROXICODONE) 5 MG tablet 5 mg GT twice daily as needed for facial pain. Refill on/after 11/09/2018 60 tablet 0     polyethylene glycol (MIRALAX/GLYCOLAX) Packet 17 g by Oral or Feeding Tube route 2 times daily 30 packet 2     predniSONE (DELTASONE) 5 MG tablet Take 1 tablet (5 mg) by mouth daily 30 tablet 11     QUEtiapine (SEROQUEL) 50 MG tablet 1 tablet (50 mg) by Per Feeding Tube route At Bedtime 30 tablet 0     sodium bicarbonate 650 MG tablet 1 tablet (650 mg) by Per Feeding Tube route 2 times daily 90 tablet 0     sulfamethoxazole-trimethoprim (BACTRIM/SEPTRA) suspension 10 mLs (80 mg) by Per Feeding Tube route daily Dose based on TMP component. 300 mL 11     tacrolimus (GENERIC EQUIVALENT) 0.5 MG capsule Take 3 capsules (1.5 mg) by mouth 2 times daily 180 capsule 11     TRAZODONE HCL PO Take  mg by mouth At Bedtime           PHYSICAL EXAMINATION:  He  is awake, alert and in no apparent distress.    His tympanic membranes are clear and intact bilaterally. External auditory canals are clear.  Nasal exam shows him to be status post trauma he has a collapsed nose there is a feeding tube through with a stenosed left nostril.  No right nostril is present.  The maxilla is depressed part of the mandible is missing and is unable to extend his jaw.  His neck is supple without significant adenopathy.  There is a 6 DC T Shiley tracheotomy tube in place.  It is capped and he is breathing comfortably.      Nasal exam shows a mild septal deviation without obstruction.  Examination of the oral cavity shows no suspicious lesions.  There is symmetric movement of the tongue and  soft palate.    The oropharynx is clear.  His neck is supple without significant adenopathy.  Pulse is regular.  Upper airway is clear.  Cranial nerves II-XII are grossly intact.       PROCEDURE: A flexible laryngoscopy  was performed.  Informed consent was obtained and a time out was performed.  The scope was unable to be passed through the nose therefore the scope was passed transorally.  A minimal amount of 3% lidocaine and 0.25% phenylephrine was sprayed into the oral cavity and allowed 3 to 5 minutes for effect.  Examination showed the vocal folds to be mobile and meet in the midline.  No nodules, polyps or ulcerations are seen.  There is mild inflammation or erythema of the supraglottic or glottic larynx.  With phonation there is mild contraction of the supraglottic larynx.  The hypopharynx is otherwise clear as is the subglottis.    Phonation    Respiration             A tracheostomy change was performed.  This was a Shiley 6 DCT tube, lot #[id number], expiration date is 8/24/2021.  Following exchange of the tube  an endoscopy through the tracheostomy tube was performed which showed the tube  to be in excellent position and a normal  trachea trachea was present.            IMPRESSION/PLAN: Normal laryngeal exam.  He is tolerating the tracheostomy tube well.  They were considering downsizing the tube but as it will likely be useful for upcoming surgeries I will have him leave the same tube in place.     My plan is to have him follow-up in approximately 3 months.  At that time we can consider having his wife performed the trach tube change and if she becomes comfortable with this they could perform this at home.  It is likely that after completion of most of his reconstructive surgery he could be decannulated.

## 2018-11-27 NOTE — LETTER
11/27/2018       RE: Gilles Henning III  [address]  [place] [address]     Dear Colleague,    Thank you for referring your patient, Gilles Henning III, to the [facility] at [facility]. Please see a copy of my visit note below.      HISTORY OF PRESENT ILLNESS: Gilles Henning III is a 48 year old male with a history of a suicide attempt with resultant facial trauma on 7/26/2018.  He had a tracheotomy soon thereafter.he  is being followed by his plastic surgeon, Dr. Lora Duffy at [facility].  He has not had any tracheal cares since placement in July according to him.  He is here for routine trach evaluation.  He has been able to plug the trach during the day pretty much since discharge from his original procedure and injury.  He does leave it open at night.  He has multiple procedures planned for his reconstruction and therefore it is likely that the tracheotomy tube will be useful for him.  He is here for evaluation and recommendation.    Last 2 Scores for Patient-Answered VHI Questionnaire  VHI Total Score 11/27/2018   VHI Total Score 16       Last 2 Scores for Patient-Answered CSI Questionnaire  CSI Total Score 11/27/2018   CSI Total Score 2         Last 2 Scores for Patient-Answered EAT Questionnaire  EAT Total Score 11/27/2018   EAT Total Score 10           PAST MEDICAL HISTORY:   Past Medical History:   Diagnosis Date     Anemia in chronic kidney disease      Autoimmune disease (H)      Bipolar affective disorder (H)      BK viremia 9/25/2018     Bone disease      Chemical dependency (H)      Chronic rejection of kidney transplant 2015    Chronic rejection of 2009 kidney, failed 2015. Graft nephrectomy 2017.     Developmental delay      ESRD needing dialysis (H) 2015     Head injury      History of alcoholism (H)      History of peritoneal dialysis     7 years     Hyperlipidemia      IgA nephropathy      Kidney problem      MDD  (major depressive disorder)     Hx multiple suicide attempts     Migraine      Migraines      Osteopenia      Peritonitis (H)      Polysubstance abuse (H)     in remission     Problem, psychiatric      PTSD (post-traumatic stress disorder)      Renal cell carcinoma (H)     Left native kidney, s/p nephrectomy     Secondary hyperparathyroidism (H)      Tobacco abuse     Chewing tobacco     Transplant        PAST SURGICAL HISTORY:   Past Surgical History:   Procedure Laterality Date     EXPLANT TRANSPLANTED KIDNEY N/A 12/15/2017    Procedure: EXPLANT TRANSPLANTED KIDNEY;  Transplanted Nephrectomy;  Surgeon: Mario Vallejo MD;  Location: [facility] OR     HC DIALYSIS AVF OR AVG, CENTRAL INTERVENTION ONLY Left      LAPAROSCOPIC INSERTION CATHETER PERITONEAL DIALYSIS Left      NEPHRECTOMY BILATERAL  2015     OPEN REDUCTION INTERNAL FIXATION MANDIBLE N/A 2018    Procedure: OPEN REDUCTION INTERNAL FIXATION MANDIBLE;  Open Reduction Interral Fixation of Bilateral Mandible, Maxilla, Naso Orbitbial Ethmoidal Fractures Nasal-gastric feeding tube placement;  Surgeon: Denzel Hernández DDS;  Location: [facility] OR     OPEN REDUCTION INTERNAL FIXATION MAXILLA N/A 2018    Procedure: OPEN REDUCTION INTERNAL FIXATION MAXILLA;;  Surgeon: Denzel Hernández DDS;  Location: [facility] OR     PERCUTANEOUS BIOPSY KIDNEY Right 2018    Procedure: PERCUTANEOUS BIOPSY KIDNEY;  Right Kidney Biopsy;  Surgeon: Star Macias MD;  [facility]     REMOVE CATHETER PERITONEAL       TRANSPLANT KIDNEY RECIPIENT  DONOR Left 2009     TRANSPLANT KIDNEY RECIPIENT  DONOR N/A 2018    Procedure: TRANSPLANT KIDNEY RECIPIENT  DONOR;  TRANSPLANT KIDNEY RECIPIENT  DONOR and ureteral stent placement;  Surgeon: Mario Vallejo MD;  Location: [facility] OR       FAMILY HISTORY:   Family History   Problem Relation Age of Onset     Substance Abuse Mother      Mental Illness Mother      Depression Mother      Substance Abuse Father       Diabetes Father      HEART DISEASE Father      Substance Abuse Maternal Grandfather      Cancer Maternal Grandfather      Substance Abuse Brother      Mental Illness Brother      Depression Brother      Cerebrovascular Disease Brother        SOCIAL HISTORY:   Social History   Substance Use Topics     Smoking status: Passive Smoke Exposure - Never Smoker     Types: Dip, chew, snus or snuff     Smokeless tobacco: Current User     Types: Chew      Comment: Wife smoked years ago.  Weaning off chew now     Alcohol use Yes      Comment: none now, did treatment at age 21, relapsed with divorce (a couple months)  then now  sober 3 years.        REVIEW OF SYSTEMS: Ten point review of systems was performed and is negative except for:   [facility] E[facility] ROS 11/27/2018   Psychology Frequently feeling depressed or sad, Frequently feeling anxious   Ears, Nose, Throat Nasal congestion or drainage, Sore throat, Trouble swallowing   Gastrointestinal/Genitourinary Heartburn/indigestion, Unexplained nausea/vomiting   Musculoskeletal Sore or stiff joints, Back pain   Endocrine Frequent urination        ALLERGIES: Gabapentin    MEDICATIONS:   Current Outpatient Prescriptions   Medication Sig Dispense Refill     ACETAMINOPHEN PO Take 325-650 mg by mouth every 8 hours as needed for pain       atorvastatin (LIPITOR) 40 MG tablet TAKE 1 TABLET PER FEEDING TUBE ROUTE EVERY EVENING 30 tablet 11     CELLCEPT (BRAND) 200 MG/ML SUSPENSION 1.25 mLs (250 mg) by Per Feeding Tube route 2 times daily 80 mL 11     chlorhexidine (PERIDEX) 0.12 % solution Swish and spit 15 mLs in mouth 4 times daily 1893 mL 2     Cholecalciferol (VITAMIN D) 2000 units tablet Take 1 tablet by mouth daily 90 tablet 3     ESCITALOPRAM OXALATE PO Take 20 mg by mouth daily       fludrocortisone (FLORINEF) 0.1 MG tablet TAKE 1 TABLET PER FEEDING TUBE ROUTE DAILY 30 tablet 3     folic acid (FOLVITE) 1 MG tablet Take 1 tablet (1 mg) by mouth daily 30 tablet 11     Gauze Pads &  "Dressings (OPTIFOAM) 4\"X4\" PADS Apply under trach to prevent skin breakdown. 10 each 99     multivitamins with minerals (CERTAVITE/CEROVITE) LIQD liquid 15 mLs by Per Feeding Tube route daily 2 Bottle 11     NONFORMULARY Salicylic acid 3% / 5-FU 4% in vanicream : Apply a thin layer to affected area once daily       OMEPRAZOLE PO Take 40 mg by mouth every morning Crushed and dissolved       order for DME Equipment being ordered: DME. Trach: 6 DCT Shiley 1 Box 3     order for DME Foam dressing for under trach 1 each 11     order for DME Passy delgado valve size 6 1 each 11     order for DME Speaker cap for #6 cannula. 1 each 11     oxyCODONE IR (ROXICODONE) 5 MG tablet 5 mg GT twice daily as needed for facial pain. Refill on/after 11/09/2018 60 tablet 0     polyethylene glycol (MIRALAX/GLYCOLAX) Packet 17 g by Oral or Feeding Tube route 2 times daily 30 packet 2     predniSONE (DELTASONE) 5 MG tablet Take 1 tablet (5 mg) by mouth daily 30 tablet 11     QUEtiapine (SEROQUEL) 50 MG tablet 1 tablet (50 mg) by Per Feeding Tube route At Bedtime 30 tablet 0     sodium bicarbonate 650 MG tablet 1 tablet (650 mg) by Per Feeding Tube route 2 times daily 90 tablet 0     sulfamethoxazole-trimethoprim (BACTRIM/SEPTRA) suspension 10 mLs (80 mg) by Per Feeding Tube route daily Dose based on TMP component. 300 mL 11     tacrolimus (GENERIC EQUIVALENT) 0.5 MG capsule Take 3 capsules (1.5 mg) by mouth 2 times daily 180 capsule 11     TRAZODONE HCL PO Take  mg by mouth At Bedtime           PHYSICAL EXAMINATION:  He  is awake, alert and in no apparent distress.    His tympanic membranes are clear and intact bilaterally. External auditory canals are clear.  Nasal exam shows him to be status post trauma he has a collapsed nose there is a feeding tube through with a stenosed left nostril.  No right nostril is present.  The maxilla is depressed part of the mandible is missing and is unable to extend his jaw.  His neck is supple without " significant adenopathy.  There is a 6 DC T Shiley tracheotomy tube in place.  It is capped and he is breathing comfortably.      Nasal exam shows a mild septal deviation without obstruction.  Examination of the oral cavity shows no suspicious lesions.  There is symmetric movement of the tongue and soft palate.    The oropharynx is clear.  His neck is supple without significant adenopathy.  Pulse is regular.  Upper airway is clear.  Cranial nerves II-XII are grossly intact.       PROCEDURE: A flexible laryngoscopy  was performed.  Informed consent was obtained and a time out was performed.  The scope was unable to be passed through the nose therefore the scope was passed transorally.  A minimal amount of 3% lidocaine and 0.25% phenylephrine was sprayed into the oral cavity and allowed 3 to 5 minutes for effect.  Examination showed the vocal folds to be mobile and meet in the midline.  No nodules, polyps or ulcerations are seen.  There is mild inflammation or erythema of the supraglottic or glottic larynx.  With phonation there is mild contraction of the supraglottic larynx.  The hypopharynx is otherwise clear as is the subglottis.    Phonation    Respiration             A tracheostomy change was performed.  This was a Shiley 6 DCT tube, lot #[id number], expiration date is 8/24/2021.  Following exchange of the tube  an endoscopy through the tracheostomy tube was performed which showed the tube  to be in excellent position and a normal  trachea trachea was present.            IMPRESSION/PLAN: Normal laryngeal exam.  He is tolerating the tracheostomy tube well.  They were considering downsizing the tube but as it will likely be useful for upcoming surgeries I will have him leave the same tube in place.     My plan is to have him follow-up in approximately 3 months.  At that time we can consider having his wife performed the trach tube change and if she becomes comfortable with this they could perform this at home.  It is  likely that after completion of most of his reconstructive surgery he could be decannulated.    Again, thank you for allowing me to participate in the care of your patient.      Sincerely,    Tino Soliz MD

## 2018-11-28 ENCOUNTER — TELEPHONE (OUTPATIENT)
Dept: TRANSPLANT | Facility: CLINIC | Age: 48
End: 2018-11-28

## 2018-11-28 DIAGNOSIS — Z94.0 KIDNEY REPLACED BY TRANSPLANT: ICD-10-CM

## 2018-11-28 DIAGNOSIS — B27.90 EBV INFECTION: ICD-10-CM

## 2018-11-28 DIAGNOSIS — Z11.59 ENCOUNTER FOR SCREENING FOR OTHER VIRAL DISEASES: ICD-10-CM

## 2018-11-28 DIAGNOSIS — B34.8 BK VIREMIA: ICD-10-CM

## 2018-11-28 DIAGNOSIS — R19.7 DIARRHEA: Primary | ICD-10-CM

## 2018-11-28 DIAGNOSIS — Z48.298 AFTERCARE FOLLOWING ORGAN TRANSPLANT: ICD-10-CM

## 2018-11-28 DIAGNOSIS — R79.89 ELEVATED SERUM CREATININE: ICD-10-CM

## 2018-11-28 LAB
ANION GAP SERPL CALCULATED.3IONS-SCNC: 8 MMOL/L (ref 3–14)
BUN SERPL-MCNC: 20 MG/DL (ref 7–25)
CALCIUM SERPL-MCNC: 10.2 MG/DL (ref 8.6–10.3)
CHLORIDE SERPL-SCNC: 103 MMOL/L (ref 98–107)
CO2 SERPL-SCNC: 24 MMOL/L (ref 21–31)
CREAT SERPL-MCNC: 1.66 MG/DL (ref 0.7–1.3)
ERYTHROCYTE [DISTWIDTH] IN BLOOD BY AUTOMATED COUNT: 12.3 % (ref 10–15)
GFR SERPL CREATININE-BSD FRML MDRD: 44 ML/MIN/1.7M2
GLUCOSE SERPL-MCNC: 229 MG/DL (ref 70–105)
HCT VFR BLD AUTO: 38.4 % (ref 40–53)
HGB BLD-MCNC: 12.7 G/DL (ref 13.3–17.7)
MCH RBC QN AUTO: 32.5 PG (ref 26.5–33)
MCHC RBC AUTO-ENTMCNC: 33.1 G/DL (ref 31.5–36.5)
MCV RBC AUTO: 98 FL (ref 78–100)
PLATELET # BLD AUTO: 185 10E9/L (ref 150–450)
POTASSIUM SERPL-SCNC: 3.5 MMOL/L (ref 3.5–5.1)
RBC # BLD AUTO: 3.91 10E12/L (ref 4.4–5.9)
SODIUM SERPL-SCNC: 135 MMOL/L (ref 134–144)
TACROLIMUS BLD-MCNC: 6.2 UG/L (ref 5–15)
TME LAST DOSE: NORMAL H
WBC # BLD AUTO: 4 10E9/L (ref 4–11)

## 2018-11-28 PROCEDURE — 86803 HEPATITIS C AB TEST: CPT | Performed by: SURGERY

## 2018-11-28 PROCEDURE — 80048 BASIC METABOLIC PNL TOTAL CA: CPT | Performed by: SURGERY

## 2018-11-28 PROCEDURE — 80197 ASSAY OF TACROLIMUS: CPT | Performed by: SURGERY

## 2018-11-28 PROCEDURE — 87799 DETECT AGENT NOS DNA QUANT: CPT | Performed by: SURGERY

## 2018-11-28 PROCEDURE — 86704 HEP B CORE ANTIBODY TOTAL: CPT | Performed by: SURGERY

## 2018-11-28 PROCEDURE — 86706 HEP B SURFACE ANTIBODY: CPT | Performed by: SURGERY

## 2018-11-28 PROCEDURE — 36415 COLL VENOUS BLD VENIPUNCTURE: CPT | Performed by: SURGERY

## 2018-11-28 PROCEDURE — 87799 DETECT AGENT NOS DNA QUANT: CPT | Performed by: INTERNAL MEDICINE

## 2018-11-28 PROCEDURE — 85027 COMPLETE CBC AUTOMATED: CPT | Performed by: SURGERY

## 2018-11-28 NOTE — TELEPHONE ENCOUNTER
Post Kidney and Pancreas Transplant Team Conference  Date: 11/28/2018  Transplant Coordinator: Angeline Alonso  Attendees:  [x]  Dr. Macias [] Thao Barnard RN  [] Blossom Montes LPN     []  Dr. Donato [x] Martha Morris RN [] Noelle Wagner LPN   []  Dr. Telles [] Beth Evans RN    []  Dr. Cabrera [] Katelyn Gallegos RN    [] Dr. Lorenzo [x] Maliha Alonso RN    [] Dr. Finley [] Mahad Steele RN    [] Surgery Fellow [] Mildred Barajas RN    [] Rachel Resendez, KHARI [] Gia Romero RN    [] Jacob López, PharmD [] DONALD Contreras Dr attend   [] Arash Agarwal RN     [] Thao Villarreal RN           Verbal Plan Read Back:  Discussed support system at home  - Concern for compliance   Plan Return to clinic -   Obtain EBV PCR QT        Routed to RN Coordinator      EBV in process. Patient demonstrated compliance with lab draw 11/28/18.  Patient scheudled for 6 month follow up on 12/12/18.  Patient saw plastic surgery - will wait for 6 months but was given exercises to improve facial movement currently limited by scarring.  Patient reports he is in the process of obtaining financial assistance to move into an AL facility which has exercise equipment (he stated he would like to start exercising) and help with medical concerns.

## 2018-11-28 NOTE — TELEPHONE ENCOUNTER
Creatinine and glucose elevated - Hector reports he may be dehydrated due to diarrhea that was caused by an increased consumption of pudding.  RNCC encouraged good oral hydration and repeat labs on Monday. Orders placed. Hector voiced understanding.

## 2018-11-29 ENCOUNTER — TELEPHONE (OUTPATIENT)
Dept: PEDIATRICS | Facility: OTHER | Age: 48
End: 2018-11-29

## 2018-11-29 LAB
EBV DNA # SPEC NAA+PROBE: ABNORMAL {COPIES}/ML
EBV DNA SPEC NAA+PROBE-LOG#: 5.2 {LOG_COPIES}/ML
HBV CORE AB SERPL QL IA: NONREACTIVE
HBV SURFACE AB SERPL IA-ACNC: 68.58 M[IU]/ML
HCV AB SERPL QL IA: NONREACTIVE

## 2018-11-29 NOTE — TELEPHONE ENCOUNTER
Patient states that he does not need any appt for pain medication.  Wanted to have his appt canceled for tomorrow.   Moon Cardona LPN ....................  11/29/2018   8:34 AM

## 2018-11-29 NOTE — TELEPHONE ENCOUNTER
Pt states does not need med at this time since surgery has been pushed out, wants to discuss appt.

## 2018-11-30 LAB
BKV DNA # SPEC NAA+PROBE: ABNORMAL COPIES/ML
BKV DNA SPEC NAA+PROBE-LOG#: 5.2 LOG COPIES/ML
SPECIMEN SOURCE: ABNORMAL

## 2018-11-30 NOTE — PROGRESS NOTES
[facility]      OUTPATIENT PHYSICAL THERAPY EVALUATION  PLAN OF TREATMENT FOR OUTPATIENT REHABILITATION  (COMPLETE FOR INITIAL CLAIMS ONLY)  Patient's Last Name, First Name, M.I.  YOB: 1970  Gilles Henning                        Provider's Name  [facility] Medical Record No.  [id number]                               Onset Date:  08/18/18   Start of Care Date:  08/20/18      Type:     _X_PT   ___OT   ___SLP Medical Diagnosis:  ORIF mandibular fracture                        PT Diagnosis:  impaired functional mobility   Visits from SOC:  1   _________________________________________________________________________________  Plan of Treatment/Functional Goals    Planned Interventions:  ,    gait training, transfer training, progressive activity/exercise, home program guidelines, neuromuscular re-education, bed mobility training, balance training,       Goals: See Physical Therapy Goals on Care Plan in [facility] electronic health record.    Therapy Frequency: other (see comments) (1x eval and tx)  Predicted Duration of Therapy Intervention: 1x eval and tx  _________________________________________________________________________________    I CERTIFY THE NEED FOR THESE SERVICES FURNISHED UNDER        THIS PLAN OF TREATMENT AND WHILE UNDER MY CARE     (Physician co-signature of this document indicates review and certification of the therapy plan).                Certification date from: 08/20/18, Certification date to: 08/20/18    Referring Physician: Deep Coates PA-C            Initial Assessment        See Physical Therapy evaluation dated 08/20/18 in Epic electronic health record.

## 2018-12-03 ENCOUNTER — TELEPHONE (OUTPATIENT)
Dept: TRANSPLANT | Facility: CLINIC | Age: 48
End: 2018-12-03

## 2018-12-03 DIAGNOSIS — B27.00 EBV (EPSTEIN-BARR VIRUS) VIREMIA: ICD-10-CM

## 2018-12-03 DIAGNOSIS — Z94.0 KIDNEY REPLACED BY TRANSPLANT: Primary | ICD-10-CM

## 2018-12-03 NOTE — TELEPHONE ENCOUNTER
Transplant Coordinator Renal Biopsy Communication    Call placed to Gilles Henning III to discuss indication for kidney transplant biopsy per Dr. Macias.     Indication for transplant renal biopsy: elevated creatinine   Laterality: right  Date of biopsy: 12/5/18    Patient location within 70 miles of [facility]: No.  If no, must stay overnight locally. Pt verbalizes staying TBD.     Gilles Henning III's medication list was reviewed.   Anticoagulant: none   Ibuprofen: No.  Fish Oil:  No.  Medications held: none    Recent blood pressure readings are WNL or not applicable. Instructed to take medication, especially blood pressure medications, before [facility] at 0600 day of procedure.     Procedure expectations and duration of stay discussed. Expressed pt can expect a phone call from LPN/MA to confirm biopsy date/time/location/directions/review of medications. Pt has no additional questions at this time. Transplant Office phone number given to pt for future questions.      Angeline Alonso  Kidney/Pancreas Transplant Coordinator  [phone] option 5

## 2018-12-03 NOTE — TELEPHONE ENCOUNTER
Post Kidney and Pancreas Transplant Team Conference  Date: 12/3/2018  Transplant Coordinator: Angeline Alonso     Attendees:  [x]  Dr. Macias [] Thao Barnard, RN  [] Blossom Montes LPN     []  Dr. Donato [] Martha Morris RN [] Noelle Wagner LPN   []  Dr. Telles [] Beth Evans RN    []  Dr. Cabrera [] Katelyn Gallegos RN    [] Dr. Lorenzo [x] Maliha Alonso RN    [] Dr. Finley [] Mahad Steele RN    [] Surgery Fellow [] Mildred Barajas RN    [] Rachel Resendez, KHARI [] Gia Romero RN    [] Jacob López, PharmD [] Marianna Fortune RN     [] Arash Agarwal, RN     [] Thao Villarreal RN        Verbal Plan Read Back:   For elevated creatinine, biopsy.  For elevated EBV, CT of chest, abdomen, pelvis without IV contrast.   Tac goal of 4.    Routed to RN Coordinator   Angeline Young does endorse urgency. Will check UA tomorrow.   Hector voiced understanding of the above.

## 2018-12-04 ENCOUNTER — TELEPHONE (OUTPATIENT)
Dept: TRANSPLANT | Facility: CLINIC | Age: 48
End: 2018-12-04

## 2018-12-04 ENCOUNTER — TELEPHONE (OUTPATIENT)
Dept: CARE COORDINATION | Facility: CLINIC | Age: 48
End: 2018-12-04

## 2018-12-04 DIAGNOSIS — Z94.0 KIDNEY REPLACED BY TRANSPLANT: Primary | ICD-10-CM

## 2018-12-04 NOTE — TELEPHONE ENCOUNTER
LPN/MA  Renal Biopsy Communication    Call to pt to confirm renal biopsy procedure. Biopsy orders entered and patient aware of [place] 12-5-2018 @ 8[place] [facility] [place] 6:00[place].     No need to be NPO.      Discussed anticoagulants (i.e. fish oil, ASA, Plavix, Coumadin, Ibuprofen). Pt denies use of anticoagulants.      Take all medicine before arrival for biopsy and bring all medicine bottles with.      Report to 1st floor lab, then 5th floor for biopsy.      Use [employer] services and bring form of entertainment.     Discussed with patient need to stay overnight locally evening of procedure if live more than 70 miles away.    Call placed to scheduling at [phone] to schedule and confirm biopsy date/time    Lab appointment scheduled for morning of biopsy.      Call placed to patient. Patient v\u of instructions listed above. Orders placed

## 2018-12-04 NOTE — TELEPHONE ENCOUNTER
Transplant Social Work Services Phone Call      Data: Received request from pt's RN coordinator, Maliha Enriquez, to reach out to pt's wife re: financial assistance for them to get to pt's biopsy appt tmrw. Explained that we are unable to cover medical costs with transplant funds. Reviewed pt's chart and noted that CHELSEA Low authorized a one-time use of transplant funds for hotel in July 2018 for a scheduled biopsy. Called pt's wife, Merline, to follow up. Introduced self and reason for call. Pt's wife requested funds to cover hotel costs for pt's appt. Explained that we are unable to authorize transplant funds for lodging, as they have exhausted this resource. Pt's wife expressed understanding. Discussed one-time offer of a gas card to help cover the cost of gas to get to/from appt. Pt's wife stated that they have just enough money for gas and that's it. Noted that they can use the gas card to help offset other costs. Pt's wife thanked  for the assistance. Provided CHELSEA contact info to call CHELSEA tmrw during appt to obtain gas card.   Intervention: Phone call, one-time use of a gas card   Assessment: Pt's wife was pleasant and receptive of SW on the phone. Per chart review, finances continue to be an issue for pt.   Education provided by CHELSEA: NA  Plan: Pt/wife to contact CHELSEA tmrw while in clinic to obtain gas card.     Christina Bello, MSW, [facility]  - Coverage for Maranda Watts   Outpatient Kidney/Pancreas/Auto Islet Transplant   Ph. [phone]  [email or web address]     NO LETTER
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
## 2018-12-05 ENCOUNTER — HOSPITAL ENCOUNTER (OUTPATIENT)
Dept: CT IMAGING | Facility: CLINIC | Age: 48
Discharge: HOME | End: 2018-12-05
Attending: INTERNAL MEDICINE | Admitting: INTERNAL MEDICINE
Payer: MEDICARE

## 2018-12-05 ENCOUNTER — TELEPHONE (OUTPATIENT)
Dept: TRANSPLANT | Facility: CLINIC | Age: 48
End: 2018-12-05

## 2018-12-05 ENCOUNTER — SURGERY (OUTPATIENT)
Age: 48
End: 2018-12-05

## 2018-12-05 ENCOUNTER — DOCUMENTATION ONLY (OUTPATIENT)
Dept: CARE COORDINATION | Facility: CLINIC | Age: 48
End: 2018-12-05

## 2018-12-05 ENCOUNTER — HOSPITAL ENCOUNTER (OUTPATIENT)
Facility: AMBULATORY SURGERY CENTER | Age: 48
End: 2018-12-05
Attending: INTERNAL MEDICINE
Payer: COMMERCIAL

## 2018-12-05 VITALS
TEMPERATURE: 98.6 F | OXYGEN SATURATION: 98 % | SYSTOLIC BLOOD PRESSURE: 120 MMHG | DIASTOLIC BLOOD PRESSURE: 76 MMHG | RESPIRATION RATE: 16 BRPM

## 2018-12-05 DIAGNOSIS — B27.00 EBV (EPSTEIN-BARR VIRUS) VIREMIA: ICD-10-CM

## 2018-12-05 DIAGNOSIS — Z94.0 KIDNEY REPLACED BY TRANSPLANT: ICD-10-CM

## 2018-12-05 DIAGNOSIS — Z48.298 AFTERCARE FOLLOWING ORGAN TRANSPLANT: ICD-10-CM

## 2018-12-05 DIAGNOSIS — R19.7 DIARRHEA: ICD-10-CM

## 2018-12-05 DIAGNOSIS — R79.89 ELEVATED SERUM CREATININE: ICD-10-CM

## 2018-12-05 LAB
ABO + RH BLD: NORMAL
ABO + RH BLD: NORMAL
ALBUMIN SERPL-MCNC: 3.8 G/DL (ref 3.4–5)
ALBUMIN UR-MCNC: NEGATIVE MG/DL
ALP SERPL-CCNC: 104 U/L (ref 40–150)
ALT SERPL W P-5'-P-CCNC: 43 U/L (ref 0–70)
ANION GAP SERPL CALCULATED.3IONS-SCNC: 10 MMOL/L (ref 3–14)
ANION GAP SERPL CALCULATED.3IONS-SCNC: 13 MMOL/L (ref 3–14)
APPEARANCE UR: CLEAR
AST SERPL W P-5'-P-CCNC: 31 U/L (ref 0–45)
BASOPHILS # BLD AUTO: 0 10E9/L (ref 0–0.2)
BASOPHILS NFR BLD AUTO: 0.7 %
BILIRUB DIRECT SERPL-MCNC: 0.1 MG/DL (ref 0–0.2)
BILIRUB SERPL-MCNC: 0.4 MG/DL (ref 0.2–1.3)
BILIRUB UR QL STRIP: NEGATIVE
BLD GP AB SCN SERPL QL: NORMAL
BLOOD BANK CMNT PATIENT-IMP: NORMAL
BUN SERPL-MCNC: 15 MG/DL (ref 7–30)
BUN SERPL-MCNC: 15 MG/DL (ref 7–30)
CALCIUM SERPL-MCNC: 9 MG/DL (ref 8.5–10.1)
CALCIUM SERPL-MCNC: 9.1 MG/DL (ref 8.5–10.1)
CHLORIDE SERPL-SCNC: 113 MMOL/L (ref 94–109)
CHLORIDE SERPL-SCNC: 114 MMOL/L (ref 94–109)
CHOLEST SERPL-MCNC: 127 MG/DL
CO2 SERPL-SCNC: 23 MMOL/L (ref 20–32)
CO2 SERPL-SCNC: 23 MMOL/L (ref 20–32)
COLOR UR AUTO: YELLOW
CREAT SERPL-MCNC: 0.97 MG/DL (ref 0.66–1.25)
CREAT SERPL-MCNC: 0.99 MG/DL (ref 0.66–1.25)
CREAT UR-MCNC: 45 MG/DL
DIFFERENTIAL METHOD BLD: ABNORMAL
EOSINOPHIL # BLD AUTO: 0.1 10E9/L (ref 0–0.7)
EOSINOPHIL NFR BLD AUTO: 2.7 %
ERYTHROCYTE [DISTWIDTH] IN BLOOD BY AUTOMATED COUNT: 12.6 % (ref 10–15)
GFR SERPL CREATININE-BSD FRML MDRD: 80 ML/MIN/1.7M2
GFR SERPL CREATININE-BSD FRML MDRD: 82 ML/MIN/1.7M2
GLUCOSE SERPL-MCNC: 86 MG/DL (ref 70–99)
GLUCOSE SERPL-MCNC: 90 MG/DL (ref 70–99)
GLUCOSE UR STRIP-MCNC: NEGATIVE MG/DL
HCT VFR BLD AUTO: 37.1 % (ref 40–53)
HDLC SERPL-MCNC: 54 MG/DL
HGB BLD-MCNC: 12 G/DL (ref 13.3–17.7)
HGB UR QL STRIP: NEGATIVE
IMM GRANULOCYTES # BLD: 0 10E9/L (ref 0–0.4)
IMM GRANULOCYTES NFR BLD: 0.2 %
INR PPP: 0.96 (ref 0.86–1.14)
KETONES UR STRIP-MCNC: NEGATIVE MG/DL
LDLC SERPL CALC-MCNC: 25 MG/DL
LEUKOCYTE ESTERASE UR QL STRIP: NEGATIVE
LYMPHOCYTES # BLD AUTO: 0.5 10E9/L (ref 0.8–5.3)
LYMPHOCYTES NFR BLD AUTO: 11.8 %
MCH RBC QN AUTO: 32.3 PG (ref 26.5–33)
MCHC RBC AUTO-ENTMCNC: 32.3 G/DL (ref 31.5–36.5)
MCV RBC AUTO: 100 FL (ref 78–100)
MICROALBUMIN UR-MCNC: 9 MG/L
MICROALBUMIN/CREAT UR: 19.31 MG/G CR (ref 0–17)
MONOCYTES # BLD AUTO: 0.5 10E9/L (ref 0–1.3)
MONOCYTES NFR BLD AUTO: 11.6 %
NEUTROPHILS # BLD AUTO: 3.3 10E9/L (ref 1.6–8.3)
NEUTROPHILS NFR BLD AUTO: 73 %
NITRATE UR QL: NEGATIVE
NONHDLC SERPL-MCNC: 73 MG/DL
NRBC # BLD AUTO: 0 10*3/UL
NRBC BLD AUTO-RTO: 0 /100
PH UR STRIP: 7 PH (ref 5–7)
PLATELET # BLD AUTO: 159 10E9/L (ref 150–450)
POTASSIUM SERPL-SCNC: 3.3 MMOL/L (ref 3.4–5.3)
POTASSIUM SERPL-SCNC: 3.4 MMOL/L (ref 3.4–5.3)
PROT SERPL-MCNC: 7.4 G/DL (ref 6.8–8.8)
PROT UR-MCNC: 0.13 G/L
PROT/CREAT 24H UR: 0.28 G/G CR (ref 0–0.2)
RBC # BLD AUTO: 3.72 10E12/L (ref 4.4–5.9)
RBC #/AREA URNS AUTO: 1 /HPF (ref 0–2)
SODIUM SERPL-SCNC: 145 MMOL/L (ref 133–144)
SODIUM SERPL-SCNC: 150 MMOL/L (ref 133–144)
SOURCE: NORMAL
SP GR UR STRIP: 1.01 (ref 1–1.03)
SPECIMEN EXP DATE BLD: NORMAL
TACROLIMUS BLD-MCNC: 4.7 UG/L (ref 5–15)
TME LAST DOSE: 2130 H
TRIGL SERPL-MCNC: 241 MG/DL
UROBILINOGEN UR STRIP-MCNC: 0 MG/DL (ref 0–2)
WBC # BLD AUTO: 4.5 10E9/L (ref 4–11)
WBC #/AREA URNS AUTO: 1 /HPF (ref 0–5)

## 2018-12-05 PROCEDURE — 74176 CT ABD & PELVIS W/O CONTRAST: CPT

## 2018-12-05 NOTE — TELEPHONE ENCOUNTER
Provider Call: Transplant Provider  Route to RN  [place] [facility]  Cr., 0.97 12/05/18.  [place] [facility] [place] scan      Callback needed? No

## 2018-12-05 NOTE — PROGRESS NOTES
"Transplant Social Work Services    Data: Received call from pt this morning stating he was in the building, requesting gas cards, as discussed yesterday. Met pt in the lobby and provided with $50 worth of gas cards. Pt reported that his biopsy was canceled this morning so it was \"a wasted trip.\" He still had a CT scheduled at the hospital and wondered if he could go to this earlier. Encouraged him to go to the hospital and see if they could get him in earlier if there were cancellations. Pt planned to do so and thanked SW for the gas cards.   Intervention: Provided $50 in gas cards   Assessment: Pt was pleasant and receptive to SW. Pt was understandably frustrated that he made a 4-hour trip only to have his biopsy canceled. Given the financial hardship involved with pt traveling to/from [place], it would be beneficial to minimize trips as much as possible, have things done locally whenever possible, etc.   Education provided by SW: NA   Plan: SW available to support and assist with ongoing social service needs.    Christina Bello, MSW, [facility]  - Coverage for Maranda Watts   Outpatient Kidney/Pancreas/Auto Islet Transplant   Ph. [phone]  [email or web address]     NO LETTER        "

## 2018-12-06 ENCOUNTER — TELEPHONE (OUTPATIENT)
Dept: TRANSPLANT | Facility: CLINIC | Age: 48
End: 2018-12-06

## 2018-12-06 DIAGNOSIS — Z94.0 KIDNEY REPLACED BY TRANSPLANT: ICD-10-CM

## 2018-12-06 DIAGNOSIS — B27.00 EBV (EPSTEIN-BARR VIRUS) VIREMIA: Primary | ICD-10-CM

## 2018-12-06 NOTE — TELEPHONE ENCOUNTER
Post Kidney and Pancreas Transplant Team Conference  Date: 12/6/2018  Transplant Coordinator: Angeline Alonso     Attendees:  [x]  Dr. Macias [x] Thao Barnard, RN  [x] Blossom Montes LPN     [x]  Dr. Donato [x] Martha Morris, DONALD [] Noelle Wagner LPN   [x]  Dr. Telles [x] Beth Evans, DONALD    [x]  Dr. Cabrera [x] Katelyn Gallegos RN    [] Dr. Lorenzo [x] Maliha Alonso, DONALD    [] Dr. Finley [x] Mahad Steele RN    [] Surgery Fellow [x] Mildred Barajas RN    [] Rachel Resendez, KHARI [x] Gia Romero RN    [] Jacob López, PharmD [x] Marianna Fortune RN     [] Arash Agarwal RN     [] Thao Villarreal RN        Verbal Plan Read Back:   Collaborate with Dr. Tobin for continued care or see Dr. Donato in [place] in March.     Nikhil Telles MD [place], Angeline, DONALD                     Repeat EBV monthly. If > 100,000 will refer to transplant ID for rituxan consideration given EBV naive.             Routed to RN Coordinator   Angeline Alonso

## 2018-12-12 ENCOUNTER — HOME INFUSION (PRE-WILLOW HOME INFUSION) (OUTPATIENT)
Dept: PHARMACY | Facility: CLINIC | Age: 48
End: 2018-12-12

## 2018-12-13 NOTE — PROGRESS NOTES
This is a recent snapshot of the patient's [facility] medical record.  For current drug dose and complete information and questions, call [phone] or In Basket pool, [facility]v home infusion ([id number])  CSN Number:  [id number]

## 2018-12-19 ENCOUNTER — RESULTS ONLY (OUTPATIENT)
Dept: OTHER | Facility: CLINIC | Age: 48
End: 2018-12-19

## 2018-12-19 ENCOUNTER — TELEPHONE (OUTPATIENT)
Dept: TRANSPLANT | Facility: CLINIC | Age: 48
End: 2018-12-19

## 2018-12-19 DIAGNOSIS — Z94.0 KIDNEY REPLACED BY TRANSPLANT: ICD-10-CM

## 2018-12-19 DIAGNOSIS — Z48.298 AFTERCARE FOLLOWING ORGAN TRANSPLANT: ICD-10-CM

## 2018-12-19 DIAGNOSIS — B34.8 BK VIREMIA: ICD-10-CM

## 2018-12-19 DIAGNOSIS — B27.00 EBV (EPSTEIN-BARR VIRUS) VIREMIA: ICD-10-CM

## 2018-12-19 LAB
ANION GAP SERPL CALCULATED.3IONS-SCNC: 6 MMOL/L (ref 3–14)
BUN SERPL-MCNC: 20 MG/DL (ref 7–25)
CALCIUM SERPL-MCNC: 9.9 MG/DL (ref 8.6–10.3)
CHLORIDE SERPL-SCNC: 105 MMOL/L (ref 98–107)
CO2 SERPL-SCNC: 27 MMOL/L (ref 21–31)
CREAT SERPL-MCNC: 1.31 MG/DL (ref 0.7–1.3)
ERYTHROCYTE [DISTWIDTH] IN BLOOD BY AUTOMATED COUNT: 12.3 % (ref 10–15)
GFR SERPL CREATININE-BSD FRML MDRD: 58 ML/MIN/{1.73_M2}
GLUCOSE SERPL-MCNC: 81 MG/DL (ref 70–105)
HCT VFR BLD AUTO: 37.1 % (ref 40–53)
HGB BLD-MCNC: 11.8 G/DL (ref 13.3–17.7)
MCH RBC QN AUTO: 31.6 PG (ref 26.5–33)
MCHC RBC AUTO-ENTMCNC: 31.8 G/DL (ref 31.5–36.5)
MCV RBC AUTO: 100 FL (ref 78–100)
PLATELET # BLD AUTO: 154 10E9/L (ref 150–450)
POTASSIUM SERPL-SCNC: 4.1 MMOL/L (ref 3.5–5.1)
RBC # BLD AUTO: 3.73 10E12/L (ref 4.4–5.9)
SODIUM SERPL-SCNC: 138 MMOL/L (ref 134–144)
WBC # BLD AUTO: 3.8 10E9/L (ref 4–11)

## 2018-12-19 PROCEDURE — 87799 DETECT AGENT NOS DNA QUANT: CPT | Performed by: SURGERY

## 2018-12-19 PROCEDURE — 80048 BASIC METABOLIC PNL TOTAL CA: CPT | Performed by: SURGERY

## 2018-12-19 PROCEDURE — 80197 ASSAY OF TACROLIMUS: CPT | Performed by: SURGERY

## 2018-12-19 PROCEDURE — 86832 HLA CLASS I HIGH DEFIN QUAL: CPT | Performed by: INTERNAL MEDICINE

## 2018-12-19 PROCEDURE — 36415 COLL VENOUS BLD VENIPUNCTURE: CPT | Performed by: SURGERY

## 2018-12-19 PROCEDURE — 87799 DETECT AGENT NOS DNA QUANT: CPT | Performed by: INTERNAL MEDICINE

## 2018-12-19 PROCEDURE — 86833 HLA CLASS II HIGH DEFIN QUAL: CPT | Performed by: INTERNAL MEDICINE

## 2018-12-19 PROCEDURE — 85027 COMPLETE CBC AUTOMATED: CPT | Performed by: SURGERY

## 2018-12-19 NOTE — TELEPHONE ENCOUNTER
Creatinine elevated.  Please confirm current amount of hydrating fluid intake:  Please confirm that the patient has not been drinking alcohol.    BPs:  UO (more concentrated, less frequency, etc):  abd pain or fevers:    Is the patient moving in to assisted living?

## 2018-12-19 NOTE — TELEPHONE ENCOUNTER
Left message for patient regarding:  Creatinine elevated.  Please confirm current amount of hydrating fluid intake:  Please confirm that the patient has not been drinking alcohol.     BPs:  UO (more concentrated, less frequency, etc):  abd pain or fevers:     Is the patient moving in to assisted living?

## 2018-12-20 LAB
TACROLIMUS BLD-MCNC: 5.3 UG/L (ref 5–15)
TME LAST DOSE: NORMAL H

## 2018-12-20 NOTE — TELEPHONE ENCOUNTER
Left message and sent MyChart message regarding:  Creatinine elevated.  Please confirm current amount of hydrating fluid intake:  Please confirm that the patient has not been drinking alcohol.     BPs:  UO (more concentrated, less frequency, etc):  abd pain or fevers:     Is the patient moving in to assisted living?

## 2018-12-24 LAB
BKV DNA # SPEC NAA+PROBE: ABNORMAL COPIES/ML
BKV DNA SPEC NAA+PROBE-LOG#: 5.6 LOG COPIES/ML
EBV DNA # SPEC NAA+PROBE: ABNORMAL {COPIES}/ML
EBV DNA SPEC NAA+PROBE-LOG#: 5.3 {LOG_COPIES}/ML
SPECIMEN SOURCE: ABNORMAL

## 2018-12-26 ENCOUNTER — TELEPHONE (OUTPATIENT)
Dept: TRANSPLANT | Facility: CLINIC | Age: 48
End: 2018-12-26

## 2018-12-26 DIAGNOSIS — Z94.0 KIDNEY REPLACED BY TRANSPLANT: Primary | ICD-10-CM

## 2018-12-26 NOTE — TELEPHONE ENCOUNTER
Post Kidney and Pancreas Transplant Team Conference  Date: 12/26/2018  Transplant Coordinator: Angeline Alonso     Attendees:  []  Dr. Macias [] Thao Barnard, DONALD  [x] Blossom Montes LPN     []  Dr. Donato [x] Martha Morris RN [] Noelle Wagner LPN   [x]  Dr. Telles [] Beth Evans RN    [x]  Dr. Cabrera [] Katelyn Gallegos RN    [] Dr. Vallejo [x] Maliha Alonso RN    [] Dr. Lorenzo [] Mahad Steele RN    [x] Dr. Finley [] Mildred Barajas RN    [] Surgery Fellow [x] Gia Romero RN    [] Rachel Resendez NP              Verbal Plan Read Back:   Kidney biopsy needed  Switch to CYclosporine 150 mg BID, goal = 100-125     Routed to RN Coordinator   Blossom Young voiced understanding. Will  at [facility] and notify RNCC when he obtains the med.   Unable to provide transplant funds (per  - allotted funds have been exhausted) and patient is unable to afford trip to  [facility] for biopsy.   Will avoid all alcohol and caffeine through Wednesday and will repeat labs. If still elevated, will attempt to set up bx with Dr. Tobin in [place].

## 2018-12-27 RX ORDER — CYCLOSPORINE 100 MG/1
100 CAPSULE, LIQUID FILLED ORAL 2 TIMES DAILY
Qty: 180 CAPSULE | Refills: 3 | Status: SHIPPED | OUTPATIENT
Start: 2018-12-27 | End: 2019-01-07

## 2018-12-27 RX ORDER — CYCLOSPORINE 25 MG/1
50 CAPSULE, LIQUID FILLED ORAL 2 TIMES DAILY
Qty: 360 CAPSULE | Refills: 3 | Status: SHIPPED | OUTPATIENT
Start: 2018-12-27 | End: 2019-01-07

## 2018-12-28 ENCOUNTER — TELEPHONE (OUTPATIENT)
Dept: TRANSPLANT | Facility: CLINIC | Age: 48
End: 2018-12-28

## 2018-12-28 DIAGNOSIS — Z48.298 AFTERCARE FOLLOWING ORGAN TRANSPLANT: ICD-10-CM

## 2018-12-28 DIAGNOSIS — Z94.0 KIDNEY REPLACED BY TRANSPLANT: Primary | ICD-10-CM

## 2018-12-28 NOTE — TELEPHONE ENCOUNTER
Provider Call: Insurance  Route to LPN  Facility Name: [employer] [facility]    Reason for Call: Prior Authorization needed for cycloSPORINE modified (GENERIC EQUIVALENT) 100 MG capsule      Callback needed? Yes    Return Call Needed  Same as documented in contacts section  When to return call?: Same day: Route High Priority

## 2019-01-04 DIAGNOSIS — Z48.298 AFTERCARE FOLLOWING ORGAN TRANSPLANT: ICD-10-CM

## 2019-01-04 DIAGNOSIS — Z94.0 KIDNEY REPLACED BY TRANSPLANT: ICD-10-CM

## 2019-01-04 DIAGNOSIS — B34.8 BK VIREMIA: ICD-10-CM

## 2019-01-04 LAB
ANION GAP SERPL CALCULATED.3IONS-SCNC: 6 MMOL/L (ref 3–14)
BUN SERPL-MCNC: 19 MG/DL (ref 7–25)
CALCIUM SERPL-MCNC: 9.5 MG/DL (ref 8.6–10.3)
CHLORIDE SERPL-SCNC: 108 MMOL/L (ref 98–107)
CO2 SERPL-SCNC: 27 MMOL/L (ref 21–31)
CREAT SERPL-MCNC: 1.17 MG/DL (ref 0.7–1.3)
ERYTHROCYTE [DISTWIDTH] IN BLOOD BY AUTOMATED COUNT: 12.5 % (ref 10–15)
GFR SERPL CREATININE-BSD FRML MDRD: 66 ML/MIN/{1.73_M2}
GLUCOSE SERPL-MCNC: 92 MG/DL (ref 70–105)
HCT VFR BLD AUTO: 36 % (ref 40–53)
HGB BLD-MCNC: 11.5 G/DL (ref 13.3–17.7)
MCH RBC QN AUTO: 31.4 PG (ref 26.5–33)
MCHC RBC AUTO-ENTMCNC: 31.9 G/DL (ref 31.5–36.5)
MCV RBC AUTO: 98 FL (ref 78–100)
PLATELET # BLD AUTO: 140 10E9/L (ref 150–450)
POTASSIUM SERPL-SCNC: 4.3 MMOL/L (ref 3.5–5.1)
RBC # BLD AUTO: 3.66 10E12/L (ref 4.4–5.9)
SODIUM SERPL-SCNC: 141 MMOL/L (ref 134–144)
WBC # BLD AUTO: 4.6 10E9/L (ref 4–11)

## 2019-01-04 PROCEDURE — 80048 BASIC METABOLIC PNL TOTAL CA: CPT | Performed by: SURGERY

## 2019-01-04 PROCEDURE — 87799 DETECT AGENT NOS DNA QUANT: CPT | Performed by: INTERNAL MEDICINE

## 2019-01-04 PROCEDURE — 85027 COMPLETE CBC AUTOMATED: CPT | Performed by: SURGERY

## 2019-01-04 PROCEDURE — 36415 COLL VENOUS BLD VENIPUNCTURE: CPT | Performed by: SURGERY

## 2019-01-04 PROCEDURE — 80158 DRUG ASSAY CYCLOSPORINE: CPT | Performed by: INTERNAL MEDICINE

## 2019-01-05 LAB
CYCLOSPORINE BLD LC/MS/MS-MCNC: 203 UG/L (ref 50–400)
TME LAST DOSE: NORMAL H

## 2019-01-06 ENCOUNTER — HOSPITAL ENCOUNTER (EMERGENCY)
Facility: OTHER | Age: 49
Discharge: HOME | End: 2019-01-06
Attending: EMERGENCY MEDICINE | Admitting: EMERGENCY MEDICINE
Payer: MEDICARE

## 2019-01-06 VITALS
WEIGHT: 130 LBS | DIASTOLIC BLOOD PRESSURE: 103 MMHG | RESPIRATION RATE: 16 BRPM | HEART RATE: 90 BPM | OXYGEN SATURATION: 98 % | BODY MASS INDEX: 19.77 KG/M2 | SYSTOLIC BLOOD PRESSURE: 154 MMHG | TEMPERATURE: 100.3 F

## 2019-01-06 DIAGNOSIS — F12.10 CANNABIS ABUSE: ICD-10-CM

## 2019-01-06 DIAGNOSIS — F10.920 ALCOHOLIC INTOXICATION WITHOUT COMPLICATION (H): Primary | ICD-10-CM

## 2019-01-06 LAB
ALBUMIN SERPL-MCNC: 3.9 G/DL (ref 3.5–5.7)
ALP SERPL-CCNC: 67 U/L (ref 34–104)
ALT SERPL W P-5'-P-CCNC: 15 U/L (ref 7–52)
AMPHETAMINES UR QL SCN: NOT DETECTED
ANION GAP SERPL CALCULATED.3IONS-SCNC: 8 MMOL/L (ref 3–14)
AST SERPL W P-5'-P-CCNC: 27 U/L (ref 13–39)
BARBITURATES UR QL: NOT DETECTED
BENZODIAZ UR QL: NOT DETECTED
BILIRUB SERPL-MCNC: 0.5 MG/DL (ref 0.3–1)
BUN SERPL-MCNC: 12 MG/DL (ref 7–25)
BUPRENORPHINE UR QL: NOT DETECTED NG/ML
CALCIUM SERPL-MCNC: 8.9 MG/DL (ref 8.6–10.3)
CANNABINOIDS UR QL: ABNORMAL NG/ML
CHLORIDE SERPL-SCNC: 109 MMOL/L (ref 98–107)
CO2 SERPL-SCNC: 21 MMOL/L (ref 21–31)
COCAINE UR QL: NOT DETECTED
CREAT SERPL-MCNC: 1.21 MG/DL (ref 0.7–1.3)
D-METHAMPHET UR QL: NOT DETECTED NG/ML
ETHANOL SERPL-MCNC: 0.16 %
ETHANOL SERPL-MCNC: 0.42 %
GFR SERPL CREATININE-BSD FRML MDRD: 64 ML/MIN/{1.73_M2}
GLUCOSE SERPL-MCNC: 114 MG/DL (ref 70–105)
METHADONE UR QL SCN: NOT DETECTED
OPIATES UR QL SCN: NOT DETECTED
OXYCODONE UR QL: NOT DETECTED NG/ML
PCP UR QL SCN: NOT DETECTED
POTASSIUM SERPL-SCNC: 4.3 MMOL/L (ref 3.5–5.1)
PROPOXYPH UR QL: NOT DETECTED NG/ML
PROT SERPL-MCNC: 6.9 G/DL (ref 6.4–8.9)
SODIUM SERPL-SCNC: 138 MMOL/L (ref 134–144)
TRICYCLICS UR QL SCN: NOT DETECTED NG/ML

## 2019-01-06 PROCEDURE — 80320 DRUG SCREEN QUANTALCOHOLS: CPT | Performed by: STUDENT IN AN ORGANIZED HEALTH CARE EDUCATION/TRAINING PROGRAM

## 2019-01-06 PROCEDURE — 99285 EMERGENCY DEPT VISIT HI MDM: CPT | Mod: 25 | Performed by: EMERGENCY MEDICINE

## 2019-01-06 PROCEDURE — 36415 COLL VENOUS BLD VENIPUNCTURE: CPT | Performed by: STUDENT IN AN ORGANIZED HEALTH CARE EDUCATION/TRAINING PROGRAM

## 2019-01-06 PROCEDURE — 80307 DRUG TEST PRSMV CHEM ANLYZR: CPT | Mod: GZ | Performed by: EMERGENCY MEDICINE

## 2019-01-06 PROCEDURE — 36415 COLL VENOUS BLD VENIPUNCTURE: CPT | Performed by: EMERGENCY MEDICINE

## 2019-01-06 PROCEDURE — 82784 ASSAY IGA/IGD/IGG/IGM EACH: CPT | Performed by: INTERNAL MEDICINE

## 2019-01-06 PROCEDURE — 51798 US URINE CAPACITY MEASURE: CPT | Performed by: EMERGENCY MEDICINE

## 2019-01-06 PROCEDURE — 96372 THER/PROPH/DIAG INJ SC/IM: CPT | Performed by: EMERGENCY MEDICINE

## 2019-01-06 PROCEDURE — 80053 COMPREHEN METABOLIC PANEL: CPT | Performed by: EMERGENCY MEDICINE

## 2019-01-06 PROCEDURE — 80320 DRUG SCREEN QUANTALCOHOLS: CPT | Performed by: EMERGENCY MEDICINE

## 2019-01-06 PROCEDURE — 25000128 H RX IP 250 OP 636: Performed by: EMERGENCY MEDICINE

## 2019-01-06 PROCEDURE — 99285 EMERGENCY DEPT VISIT HI MDM: CPT | Mod: Z6 | Performed by: EMERGENCY MEDICINE

## 2019-01-06 RX ORDER — DIPHENHYDRAMINE HYDROCHLORIDE 50 MG/ML
50 INJECTION INTRAMUSCULAR; INTRAVENOUS ONCE
Status: COMPLETED | OUTPATIENT
Start: 2019-01-06 | End: 2019-01-06

## 2019-01-06 RX ORDER — LORAZEPAM 2 MG/ML
2 INJECTION INTRAMUSCULAR ONCE
Status: COMPLETED | OUTPATIENT
Start: 2019-01-06 | End: 2019-01-06

## 2019-01-06 RX ORDER — HALOPERIDOL 5 MG/ML
5 INJECTION INTRAMUSCULAR ONCE
Status: COMPLETED | OUTPATIENT
Start: 2019-01-06 | End: 2019-01-06

## 2019-01-06 RX ADMIN — HALOPERIDOL LACTATE 5 MG: 5 INJECTION, SOLUTION INTRAMUSCULAR at 06:11

## 2019-01-06 RX ADMIN — LORAZEPAM 2 MG: 2 INJECTION, SOLUTION INTRAMUSCULAR; INTRAVENOUS at 05:27

## 2019-01-06 RX ADMIN — DIPHENHYDRAMINE HYDROCHLORIDE 50 MG: 50 INJECTION INTRAMUSCULAR; INTRAVENOUS at 05:26

## 2019-01-06 NOTE — DISCHARGE INSTRUCTIONS
He was seen today for alcohol intoxication and had a blood alcohol level of 0.42 and you were Agitated and combative.  A repeat alcohol blood level is now at 0.16 and patient is clinically sober.  Denied any intent of self-harm and will be discharged home.

## 2019-01-06 NOTE — ED NOTES
Restraint Initiation Note      Events leading to restraint      List specific examples of behaviors    Explain why there is concern for harm to self or others    Describe how the patient is acting agitated   Pt kicking and striking at staff, attempting to get out of bed,  and 3 staff in room unable to manage pt   De-escalation attempts      Less restrictive alternatives tried    Patient's response to these attempts   Explanation that pt is intoxicated and could harm himself, not cooperating, using inappropriate language, cont to strike at staff and security     Patient's response to starting restraints   Pulling against restraints, yelling, swearing

## 2019-01-06 NOTE — ED AVS SNAPSHOT
[facility]  [address]  [place] [address]  Phone:  [phone]  Fax:  [phone]                                    Gilles Henning III   MRN: [id number]    Department:  [facility]   Date of Visit:  1/6/2019           After Visit Summary Signature Page    I have received my discharge instructions, and my questions have been answered. I have discussed any challenges I see with this plan with the nurse or doctor.    ..........................................................................................................................................  Patient/Patient Representative Signature      ..........................................................................................................................................  Patient Representative Print Name and Relationship to Patient    ..................................................               ................................................  Date                                   Time    ..........................................................................................................................................  Reviewed by Signature/Title    ...................................................              ..............................................  Date                                               Time          22EPIC Rev 08/18
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
## 2019-01-06 NOTE — ED NOTES
[facility] declined transport of patient. House supervisor notified. Patient denies having money to pay for taxi. [employer] reporting $6-10 bill. Hospital will pay.

## 2019-01-06 NOTE — ED NOTES
Restraint Discontinuation Note      Describe circumstances for discontinuing restraints      List specific examples of behavior    How is the patient acting and speaking   Patient acting appropriate and sleeping comfortably. When up to stand at side of bed to urinate, patient was very appropriate with staff. Patient stating that he understands and remembers why he was in the restraints, and understands that if he acts that same way there might be a possibility of being placed back in restraints.      Patient response to removing restraints   Very appreciative of staff removing restraints. Patient curled into ball position to sleep more comfortably.

## 2019-01-06 NOTE — ED NOTES
Pt taken out of restraints and stood at bedside was able to urinate 350mL.  He then lay back down.
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
## 2019-01-07 ENCOUNTER — TELEPHONE (OUTPATIENT)
Dept: TRANSPLANT | Facility: CLINIC | Age: 49
End: 2019-01-07

## 2019-01-07 DIAGNOSIS — B27.90 EBV INFECTION: ICD-10-CM

## 2019-01-07 DIAGNOSIS — B34.8 BK VIREMIA: ICD-10-CM

## 2019-01-07 DIAGNOSIS — Z94.0 KIDNEY REPLACED BY TRANSPLANT: ICD-10-CM

## 2019-01-07 DIAGNOSIS — Z94.0 KIDNEY REPLACED BY TRANSPLANT: Primary | ICD-10-CM

## 2019-01-07 RX ORDER — CYCLOSPORINE 25 MG/1
CAPSULE, LIQUID FILLED ORAL
Qty: 360 CAPSULE | Refills: 3 | Status: SHIPPED | OUTPATIENT
Start: 2019-01-07 | End: 2019-01-10

## 2019-01-07 RX ORDER — CYCLOSPORINE 100 MG/1
100 CAPSULE, LIQUID FILLED ORAL 2 TIMES DAILY
Qty: 180 CAPSULE | Refills: 3 | Status: SHIPPED | OUTPATIENT
Start: 2019-01-07 | End: 2019-01-10

## 2019-01-07 NOTE — TELEPHONE ENCOUNTER
ISSUE:   Cyclosporine level 203 on 1/4/19, goal 100-125, dose 150 mg BID    PLAN:   Please call pt and confirm this was a good 12-hour trough. Verify dose 150 mg BID. Confirm no new medications or illness (kee. Diarrhea). If good trough, decrease dose to 100 mg BID and recheck level on Wednesday.    OUTCOME: Hector confirmed the above and voiced understanding of the dose change. Will repeat labs on Thursday.    RNCC encouraged abstinence from alcohol as this will lead to kidney dysfunction. Hector voiced understanding.

## 2019-01-08 LAB — IGG SERPL-MCNC: 954 MG/DL (ref 695–1620)

## 2019-01-09 ENCOUNTER — TELEPHONE (OUTPATIENT)
Dept: TRANSPLANT | Facility: CLINIC | Age: 49
End: 2019-01-09

## 2019-01-09 DIAGNOSIS — B27.00 EBV (EPSTEIN-BARR VIRUS) VIREMIA: Primary | ICD-10-CM

## 2019-01-09 NOTE — TELEPHONE ENCOUNTER
Post Kidney and Pancreas Transplant Team Conference  Date: 1/9/2019  Transplant Coordinator: Angeline Alonso     Attendees:  [x]  Dr. Macias [] Thao Barnard, RN  [x] Blossom Montes LPN     []  Dr. Donato [] Martha Morris RN [] Noelle Wagner LPN   [x]  Dr. Telles [] Beth Evans, RN    []  Dr. Cabrera [] Katelyn Gallegos RN    [] Dr. Vallejo [x] Maliha Alonso RN    [] Dr. Lorenzo [] Mahad Steele RN    [x] Dr. Finley [] Mildred Barajas, RN    [] Surgery Fellow [] Gia Romero RN    [] Rachel Resendez NP              Verbal Plan Read Back:   Start process to have LAVELLE reis and call Dr. Tobin      Routed to RN Coordinator   Blossom Montes   repeat EBV PCR QT    EBV order entered.  Phone call placed to [facility].

## 2019-01-10 DIAGNOSIS — Z94.0 KIDNEY REPLACED BY TRANSPLANT: Primary | ICD-10-CM

## 2019-01-10 RX ORDER — CYCLOSPORINE 25 MG/1
CAPSULE, LIQUID FILLED ORAL
Qty: 360 CAPSULE | Refills: 3
Start: 2019-01-10 | End: 2019-01-24

## 2019-01-10 RX ORDER — CYCLOSPORINE 100 MG/1
100 CAPSULE, LIQUID FILLED ORAL 2 TIMES DAILY
Qty: 180 CAPSULE | Refills: 3 | Status: SHIPPED | OUTPATIENT
Start: 2019-01-10 | End: 2019-01-24

## 2019-01-11 DIAGNOSIS — Z94.0 KIDNEY REPLACED BY TRANSPLANT: ICD-10-CM

## 2019-01-11 DIAGNOSIS — B27.00 EBV (EPSTEIN-BARR VIRUS) VIREMIA: ICD-10-CM

## 2019-01-11 DIAGNOSIS — Z48.298 AFTERCARE FOLLOWING ORGAN TRANSPLANT: ICD-10-CM

## 2019-01-11 LAB
ANION GAP SERPL CALCULATED.3IONS-SCNC: 8 MMOL/L (ref 3–14)
BUN SERPL-MCNC: 14 MG/DL (ref 7–25)
CALCIUM SERPL-MCNC: 9.8 MG/DL (ref 8.6–10.3)
CHLORIDE SERPL-SCNC: 105 MMOL/L (ref 98–107)
CO2 SERPL-SCNC: 26 MMOL/L (ref 21–31)
CREAT SERPL-MCNC: 1.17 MG/DL (ref 0.7–1.3)
ERYTHROCYTE [DISTWIDTH] IN BLOOD BY AUTOMATED COUNT: 12.7 % (ref 10–15)
GFR SERPL CREATININE-BSD FRML MDRD: 66 ML/MIN/{1.73_M2}
GLUCOSE SERPL-MCNC: 123 MG/DL (ref 70–105)
HCT VFR BLD AUTO: 36.3 % (ref 40–53)
HGB BLD-MCNC: 11.7 G/DL (ref 13.3–17.7)
MCH RBC QN AUTO: 31.5 PG (ref 26.5–33)
MCHC RBC AUTO-ENTMCNC: 32.2 G/DL (ref 31.5–36.5)
MCV RBC AUTO: 98 FL (ref 78–100)
PLATELET # BLD AUTO: 151 10E9/L (ref 150–450)
POTASSIUM SERPL-SCNC: 3.5 MMOL/L (ref 3.5–5.1)
RBC # BLD AUTO: 3.71 10E12/L (ref 4.4–5.9)
SODIUM SERPL-SCNC: 139 MMOL/L (ref 134–144)
WBC # BLD AUTO: 5 10E9/L (ref 4–11)

## 2019-01-11 PROCEDURE — 85027 COMPLETE CBC AUTOMATED: CPT | Performed by: INTERNAL MEDICINE

## 2019-01-11 PROCEDURE — 80158 DRUG ASSAY CYCLOSPORINE: CPT | Performed by: INTERNAL MEDICINE

## 2019-01-11 PROCEDURE — 36415 COLL VENOUS BLD VENIPUNCTURE: CPT | Performed by: INTERNAL MEDICINE

## 2019-01-11 PROCEDURE — 87799 DETECT AGENT NOS DNA QUANT: CPT | Performed by: INTERNAL MEDICINE

## 2019-01-11 PROCEDURE — 80048 BASIC METABOLIC PNL TOTAL CA: CPT | Performed by: INTERNAL MEDICINE

## 2019-01-12 LAB
CYCLOSPORINE BLD LC/MS/MS-MCNC: 108 UG/L (ref 50–400)
TME LAST DOSE: 2100 H

## 2019-01-14 LAB
EBV DNA # SPEC NAA+PROBE: ABNORMAL {COPIES}/ML
EBV DNA SPEC NAA+PROBE-LOG#: 5.3 {LOG_COPIES}/ML

## 2019-01-15 ENCOUNTER — TELEPHONE (OUTPATIENT)
Dept: TRANSPLANT | Facility: CLINIC | Age: 49
End: 2019-01-15

## 2019-01-15 DIAGNOSIS — B27.00 EBV (EPSTEIN-BARR VIRUS) VIREMIA: Primary | ICD-10-CM

## 2019-01-15 NOTE — TELEPHONE ENCOUNTER
Referral with documentation sent to Dr. Tobin - Dr. Tobin to follow Mr. Henning for rituxan therapy for EBV viremia.    Hector voiced understanding and voiced that he will be following up with Dr. Tobin on 1/28/19.

## 2019-01-15 NOTE — LETTER
Physician Recommendation    DATE & TIME ISSUED: January 15, 2019 2:18 PM  PATIENT NAME: Gilles Henning III   : 1970     [facility] MR# [if applicable]: [id number]     DIAGNOSIS:  Kidney replaced by transplant, EBV viremia  ICD-10 CODE: Z94.0, B27.0     Pre - Medications, give 30 minutes prior to rituximab for infusion reaction prevention :  Acetaminophen, 650mg , oral, one time.  Diphenhydramine, 50mg, oral, one time.  Methylprednisolone sodium succinate, 125mg, intravenous, one time.  Infusion:  Rituximab (Rituxan) 600mg in sodium chloride 0.9% 60mL   600mg (rounded from 630mg = 375mg/m2 x1.68m2), intravenous, one time.      Any questions please call: Maliha Alonso RN, BSN                                             Transplant Care Coordinator                                             [phone]

## 2019-01-15 NOTE — LETTER
Physician Referral       DATE & TIME ISSUED: January 15, 2019 2:13 PM  PATIENT NAME: Gilles Henning III   : 1970     [facility] MR# [if applicable]: [id number]     DIAGNOSIS:  Kidney replaced by transplant, BK viremia, EBV viremia  ICD-10 CODE: Z94.0, B34.9, B27.0     Please review the attached documentation regarding post-transplant viral courses.  The most recent transplant recommendations are as follows:  Rituxan for EBV virema (see attached recommended orders).  Reduced Cyclosporine drug levels of 100 - 125 for BK viremia.    Any questions please call: Maliha Alonso, RN, BSN                                             Transplant Care Coordinator                                             [phone]

## 2019-01-23 ENCOUNTER — TELEPHONE (OUTPATIENT)
Dept: TRANSPLANT | Facility: CLINIC | Age: 49
End: 2019-01-23

## 2019-01-23 ENCOUNTER — MEDICAL CORRESPONDENCE (OUTPATIENT)
Dept: HEALTH INFORMATION MANAGEMENT | Facility: OTHER | Age: 49
End: 2019-01-23

## 2019-01-23 DIAGNOSIS — E78.2 MIXED HYPERLIPIDEMIA: ICD-10-CM

## 2019-01-23 DIAGNOSIS — Z94.0 KIDNEY REPLACED BY TRANSPLANT: ICD-10-CM

## 2019-01-23 DIAGNOSIS — B27.00 EBV (EPSTEIN-BARR VIRUS) VIREMIA: ICD-10-CM

## 2019-01-23 DIAGNOSIS — D84.9 IMMUNOSUPPRESSED STATUS (H): ICD-10-CM

## 2019-01-23 DIAGNOSIS — N18.6 END STAGE RENAL DISEASE (H): Primary | ICD-10-CM

## 2019-01-23 DIAGNOSIS — Z48.298 AFTERCARE FOLLOWING ORGAN TRANSPLANT: ICD-10-CM

## 2019-01-23 DIAGNOSIS — B34.8 BK VIREMIA: Primary | ICD-10-CM

## 2019-01-23 LAB
ALBUMIN SERPL-MCNC: 4.4 G/DL (ref 3.5–5.7)
ANION GAP SERPL CALCULATED.3IONS-SCNC: 10 MMOL/L (ref 3–14)
ANION GAP SERPL CALCULATED.3IONS-SCNC: 8 MMOL/L (ref 3–14)
BUN SERPL-MCNC: 22 MG/DL (ref 7–25)
BUN SERPL-MCNC: 22 MG/DL (ref 7–25)
CALCIUM SERPL-MCNC: 10.1 MG/DL (ref 8.6–10.3)
CALCIUM SERPL-MCNC: 10.2 MG/DL (ref 8.6–10.3)
CHLORIDE SERPL-SCNC: 106 MMOL/L (ref 98–107)
CHLORIDE SERPL-SCNC: 107 MMOL/L (ref 98–107)
CHOLEST SERPL-MCNC: 185 MG/DL
CO2 SERPL-SCNC: 24 MMOL/L (ref 21–31)
CO2 SERPL-SCNC: 24 MMOL/L (ref 21–31)
CREAT SERPL-MCNC: 1.34 MG/DL (ref 0.7–1.3)
CREAT SERPL-MCNC: 1.36 MG/DL (ref 0.7–1.3)
ERYTHROCYTE [DISTWIDTH] IN BLOOD BY AUTOMATED COUNT: 12.7 % (ref 10–15)
GFR SERPL CREATININE-BSD FRML MDRD: 55 ML/MIN/{1.73_M2}
GFR SERPL CREATININE-BSD FRML MDRD: 56 ML/MIN/{1.73_M2}
GLUCOSE SERPL-MCNC: 68 MG/DL (ref 70–105)
GLUCOSE SERPL-MCNC: 68 MG/DL (ref 70–105)
HCT VFR BLD AUTO: 38.7 % (ref 40–53)
HDLC SERPL-MCNC: 56 MG/DL (ref 23–92)
HGB BLD-MCNC: 12.7 G/DL (ref 13.3–17.7)
LDLC SERPL CALC-MCNC: 103 MG/DL
MCH RBC QN AUTO: 31.4 PG (ref 26.5–33)
MCHC RBC AUTO-ENTMCNC: 32.8 G/DL (ref 31.5–36.5)
MCV RBC AUTO: 96 FL (ref 78–100)
NONHDLC SERPL-MCNC: 129 MG/DL
PHOSPHATE SERPL-MCNC: 2.5 MG/DL (ref 2.5–5)
PLATELET # BLD AUTO: 188 10E9/L (ref 150–450)
POTASSIUM SERPL-SCNC: 4.4 MMOL/L (ref 3.5–5.1)
POTASSIUM SERPL-SCNC: 4.4 MMOL/L (ref 3.5–5.1)
RBC # BLD AUTO: 4.04 10E12/L (ref 4.4–5.9)
SODIUM SERPL-SCNC: 139 MMOL/L (ref 134–144)
SODIUM SERPL-SCNC: 140 MMOL/L (ref 134–144)
TRIGL SERPL-MCNC: 129 MG/DL
WBC # BLD AUTO: 4.9 10E9/L (ref 4–11)

## 2019-01-23 PROCEDURE — 85027 COMPLETE CBC AUTOMATED: CPT | Performed by: INTERNAL MEDICINE

## 2019-01-23 PROCEDURE — 87799 DETECT AGENT NOS DNA QUANT: CPT

## 2019-01-23 PROCEDURE — 36415 COLL VENOUS BLD VENIPUNCTURE: CPT | Performed by: INTERNAL MEDICINE

## 2019-01-23 PROCEDURE — 86665 EPSTEIN-BARR CAPSID VCA: CPT | Mod: 91

## 2019-01-23 PROCEDURE — 80069 RENAL FUNCTION PANEL: CPT

## 2019-01-23 PROCEDURE — 87799 DETECT AGENT NOS DNA QUANT: CPT | Performed by: INTERNAL MEDICINE

## 2019-01-23 PROCEDURE — 80061 LIPID PANEL: CPT

## 2019-01-23 PROCEDURE — 80048 BASIC METABOLIC PNL TOTAL CA: CPT | Performed by: INTERNAL MEDICINE

## 2019-01-23 PROCEDURE — 86665 EPSTEIN-BARR CAPSID VCA: CPT

## 2019-01-23 PROCEDURE — 80158 DRUG ASSAY CYCLOSPORINE: CPT | Performed by: INTERNAL MEDICINE

## 2019-01-23 NOTE — TELEPHONE ENCOUNTER
Post Kidney and Pancreas Transplant Team Conference  Date: 1/23/2019  Transplant Coordinator: Angeline Alonso     Attendees:  [x]  Dr. Macias [] Thao Barnard, RN  [x] Blossom Montes LPN     []  Dr. Donato [x] Martha Morris RN [] Noelle Wagner LPN   [x]  Dr. Telles [] Beth Evans, RN    []  Dr. Cabrera [] Katelyn Gallegos RN    [] Dr. Vallejo [x] Maliha Alonso RN    [] Dr. Lorenzo [] Mahad Steele RN    [x] Dr. Finley [] Mildred Barajas, DONALD    [] Surgery Fellow [] Gia Romero RN    [] Rachel Resendez NP              Verbal Plan Read Back:   General health check with patient  Check to see if he has seen Dr. Tobin     Routed to RN Coordinator   Blossom Montes     Creatinine slightly elevated - Hector states he's drinking 1000mL hydration and has been drinking caffeine. He denies any alcohol.    Hector will see Dr. Tobin on 1/28 - he thinks he has rituxan scheduled for that day, but is not positive. He is symptomatic with fatigue.     Hector also reports intermittent muscle pain. Will discuss with tx team.    Hector was financially approved for AL - he will be moving in in the next two weeks. He will now have ride service as needed.     BK & EBV pending.

## 2019-01-24 ENCOUNTER — TELEPHONE (OUTPATIENT)
Dept: TRANSPLANT | Facility: CLINIC | Age: 49
End: 2019-01-24

## 2019-01-24 DIAGNOSIS — Z46.59 ENCOUNTER FOR NASOJEJUNAL (NJ) TUBE PLACEMENT: ICD-10-CM

## 2019-01-24 DIAGNOSIS — Z94.0 KIDNEY REPLACED BY TRANSPLANT: Primary | ICD-10-CM

## 2019-01-24 DIAGNOSIS — K21.9 GASTROESOPHAGEAL REFLUX DISEASE WITHOUT ESOPHAGITIS: ICD-10-CM

## 2019-01-24 DIAGNOSIS — F31.9 BIPOLAR AFFECTIVE DISORDER, REMISSION STATUS UNSPECIFIED (H): ICD-10-CM

## 2019-01-24 DIAGNOSIS — Z94.0 KIDNEY TRANSPLANT RECIPIENT: ICD-10-CM

## 2019-01-24 DIAGNOSIS — E78.5 HYPERLIPIDEMIA LDL GOAL <100: ICD-10-CM

## 2019-01-24 LAB
CYCLOSPORINE BLD LC/MS/MS-MCNC: 87 UG/L (ref 50–400)
EBV VCA IGG SER QL IA: 1.2 AI (ref 0–0.8)
EBV VCA IGM SER QL IA: <0.2 AI (ref 0–0.8)
TME LAST DOSE: 2140 H

## 2019-01-24 RX ORDER — CYCLOSPORINE 25 MG/1
25 CAPSULE, LIQUID FILLED ORAL 2 TIMES DAILY
Qty: 180 CAPSULE | Refills: 3 | Status: SHIPPED | OUTPATIENT
Start: 2019-01-24 | End: 2019-03-12

## 2019-01-24 RX ORDER — CYCLOSPORINE 100 MG/1
100 CAPSULE, LIQUID FILLED ORAL 2 TIMES DAILY
Qty: 180 CAPSULE | Refills: 3 | Status: SHIPPED | OUTPATIENT
Start: 2019-01-24 | End: 2019-03-12

## 2019-01-24 NOTE — TELEPHONE ENCOUNTER
ISSUE:   Cyclosporine level 87 on 1/22/19, goal 100 - 125, dose 100 mg BID    PLAN:   Please call pt and confirm this was a good 12-hour trough. Verify dose 100 mg BID. Confirm no new medications or illness (kee. Diarrhea). If good trough, increase dose to 125 mg BID and recheck level in 1 week (please enter orders - BMP and CSA level).

## 2019-01-24 NOTE — TELEPHONE ENCOUNTER
Spoke to patient who confirms current Cyclosporine dose and good 12 hour trough level.  Patient denies any recent illness or new medications.  Patient verbalizes understanding to increase Cyclosporine dose to 125 mg BID and repeat drug level in 1 week.

## 2019-01-24 NOTE — TELEPHONE ENCOUNTER
Post Kidney and Pancreas Transplant Team Conference  Date: 1/24/2019  Transplant Coordinator: Angeline Alonso     Attendees:  []  Dr. Macias [] Thao Barnard, RN  [x] Blossom Montes LPN     [x]  Dr. Donato [] Martha Morris, RN [] Noelle Wagner LPN   []  Dr. Telles [] Beth Evans RN    []  Dr. Cabrera [] Katelyn Gallegos RN    [] Dr. Lorenzo [x] Maliha Alonso RN    [] Dr. Finley [] Mahad Steele RN    [] Surgery Fellow [] Mildred Barajas RN    [] Rachel Resendez, NP [] Gia Romero RN    [] Jacob López, PharmD [x] Marianna Fortune RN     [] Arash Agarwal RN     [] Thao Villarreal RN        Verbal Plan Read Back:   Due to myalgias, patient to hold Lipitor x1 month and repeat lipids.   Patient to call SOT office to let us know if myalgia improves.     Routed to RN Coordinator   Angeline Young voiced understanding.

## 2019-01-24 NOTE — LETTER
PHYSICIAN ORDERS      DATE & TIME ISSUED: 2019 3:07 PM  PATIENT NAME: Gilles Henning III   : 1970     [facility] MR# [if applicable]: [id number]     DIAGNOSIS:  Kidney Transplant  ICD-10 CODE: Z94.0     Please repeat the following labs in 1 week:  Cyclosporine drug level  BMP      Any questions please call: [phone]  Please fax lab results to [phone].

## 2019-01-25 LAB
BKV DNA # SPEC NAA+PROBE: ABNORMAL COPIES/ML
BKV DNA SPEC NAA+PROBE-LOG#: 5 LOG COPIES/ML
EBV DNA # SPEC NAA+PROBE: ABNORMAL {COPIES}/ML
EBV DNA SPEC NAA+PROBE-LOG#: 5.3 {LOG_COPIES}/ML
SPECIMEN SOURCE: ABNORMAL

## 2019-01-25 RX ORDER — ATORVASTATIN CALCIUM 40 MG/1
TABLET, FILM COATED ORAL
Qty: 30 TABLET | Refills: 11 | COMMUNITY
Start: 2019-01-25 | End: 2019-01-29

## 2019-01-28 ENCOUNTER — TRANSFERRED RECORDS (OUTPATIENT)
Dept: HEALTH INFORMATION MANAGEMENT | Facility: OTHER | Age: 49
End: 2019-01-28

## 2019-01-28 LAB — HEP C HIM: NORMAL

## 2019-01-28 NOTE — TELEPHONE ENCOUNTER
Prior Authorization Infusion/Clinic Administered Request    Location:   Diagnosis and ICD:Z94.0 Kidney transplant   Drug/Therapy: Rituximab infusion    Previously Tried and Failed Therapies:     Date of provider note with supporting information: 1-    Urgency (When is the patient scheduled?):     Would you like to include any research articles?         If yes please call [phone] for further instructions about sending that information

## 2019-01-28 NOTE — TELEPHONE ENCOUNTER
Dr Tobin call in regards to referral and rituxan therapy. Prior Auth is needed for medication. Please send required documentation as soon as possible

## 2019-01-29 ENCOUNTER — TELEPHONE (OUTPATIENT)
Dept: PEDIATRICS | Facility: OTHER | Age: 49
End: 2019-01-29

## 2019-01-29 ENCOUNTER — TELEPHONE (OUTPATIENT)
Dept: TRANSPLANT | Facility: CLINIC | Age: 49
End: 2019-01-29

## 2019-01-29 DIAGNOSIS — F51.05 INSOMNIA DUE TO OTHER MENTAL DISORDER: ICD-10-CM

## 2019-01-29 DIAGNOSIS — Z46.59 ENCOUNTER FOR NASOJEJUNAL (NJ) TUBE PLACEMENT: ICD-10-CM
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
## 2019-01-29 DIAGNOSIS — K21.9 GASTROESOPHAGEAL REFLUX DISEASE WITHOUT ESOPHAGITIS: ICD-10-CM

## 2019-01-29 DIAGNOSIS — E78.5 HYPERLIPIDEMIA LDL GOAL <100: ICD-10-CM

## 2019-01-29 DIAGNOSIS — Z29.9 PROPHYLACTIC MEASURE: ICD-10-CM

## 2019-01-29 DIAGNOSIS — K21.9 GASTROESOPHAGEAL REFLUX DISEASE, ESOPHAGITIS PRESENCE NOT SPECIFIED: ICD-10-CM

## 2019-01-29 DIAGNOSIS — F39 MOOD DISORDER (H): Primary | ICD-10-CM

## 2019-01-29 DIAGNOSIS — Z94.0 KIDNEY TRANSPLANTED: ICD-10-CM

## 2019-01-29 DIAGNOSIS — E55.9 VITAMIN D DEFICIENCY: ICD-10-CM

## 2019-01-29 DIAGNOSIS — K59.09 OTHER CONSTIPATION: ICD-10-CM

## 2019-01-29 DIAGNOSIS — F99 INSOMNIA DUE TO OTHER MENTAL DISORDER: ICD-10-CM

## 2019-01-29 DIAGNOSIS — F31.9 BIPOLAR AFFECTIVE DISORDER, REMISSION STATUS UNSPECIFIED (H): ICD-10-CM

## 2019-01-29 DIAGNOSIS — Z94.0 KIDNEY TRANSPLANT RECIPIENT: ICD-10-CM

## 2019-01-29 DIAGNOSIS — Z43.0 TRACHEOSTOMY CARE (H): ICD-10-CM

## 2019-01-29 DIAGNOSIS — E43 SEVERE PROTEIN-CALORIE MALNUTRITION (H): ICD-10-CM

## 2019-01-29 DIAGNOSIS — E87.20 ACIDOSIS: ICD-10-CM

## 2019-01-29 RX ORDER — ATORVASTATIN CALCIUM 40 MG/1
40 TABLET, FILM COATED ORAL DAILY
Qty: 90 TABLET | Refills: 3 | Status: ON HOLD | OUTPATIENT
Start: 2019-01-29 | End: 2019-05-09

## 2019-01-29 RX ORDER — FLUDROCORTISONE ACETATE 0.1 MG/1
0.1 TABLET ORAL DAILY
Qty: 90 TABLET | Refills: 3 | Status: SHIPPED | OUTPATIENT
Start: 2019-01-29 | End: 2019-03-07

## 2019-01-29 RX ORDER — NICOTINE POLACRILEX 4 MG/1
40 GUM, CHEWING ORAL EVERY MORNING
Qty: 180 TABLET | Refills: 3 | Status: ON HOLD | OUTPATIENT
Start: 2019-01-29 | End: 2019-02-04

## 2019-01-29 RX ORDER — TRAZODONE HYDROCHLORIDE 100 MG/1
200 TABLET ORAL AT BEDTIME
COMMUNITY
End: 2019-01-29

## 2019-01-29 RX ORDER — QUETIAPINE FUMARATE 50 MG/1
50 TABLET, FILM COATED ORAL AT BEDTIME
Qty: 30 TABLET | Refills: 0 | Status: SHIPPED | OUTPATIENT
Start: 2019-01-29 | End: 2019-03-29

## 2019-01-29 RX ORDER — TRAZODONE HYDROCHLORIDE 100 MG/1
200 TABLET ORAL AT BEDTIME
Qty: 180 TABLET | Refills: 3 | Status: ON HOLD | OUTPATIENT
Start: 2019-01-29 | End: 2019-05-09

## 2019-01-29 RX ORDER — ACETAMINOPHEN 325 MG/1
650 TABLET ORAL ONCE
Status: CANCELLED
Start: 2019-01-29

## 2019-01-29 RX ORDER — CHOLECALCIFEROL (VITAMIN D3) 50 MCG
1 TABLET ORAL DAILY
Qty: 90 TABLET | Refills: 3 | Status: SHIPPED | OUTPATIENT
Start: 2019-01-29 | End: 2019-10-17

## 2019-01-29 RX ORDER — SULFAMETHOXAZOLE AND TRIMETHOPRIM 200; 40 MG/5ML; MG/5ML
80 SUSPENSION ORAL DAILY
Qty: 300 ML | Refills: 11 | Status: SHIPPED | OUTPATIENT
Start: 2019-01-29 | End: 2019-02-04

## 2019-01-29 RX ORDER — ESCITALOPRAM OXALATE 5 MG/5ML
20 SOLUTION ORAL DAILY
Qty: 1800 ML | Refills: 3 | Status: ON HOLD | OUTPATIENT
Start: 2019-01-29 | End: 2019-02-04

## 2019-01-29 RX ORDER — FOLIC ACID 1 MG/1
1 TABLET ORAL DAILY
Qty: 30 TABLET | Refills: 11 | Status: SHIPPED | OUTPATIENT
Start: 2019-01-29 | End: 2019-10-17

## 2019-01-29 RX ORDER — CHLORHEXIDINE GLUCONATE ORAL RINSE 1.2 MG/ML
15 SOLUTION DENTAL 4 TIMES DAILY
Qty: 1893 ML | Refills: 2 | Status: SHIPPED | OUTPATIENT
Start: 2019-01-29 | End: 2019-06-27

## 2019-01-29 RX ORDER — FOAM BANDAGE 4" X 4"
BANDAGE TOPICAL
Qty: 10 EACH | Refills: 99 | Status: SHIPPED | OUTPATIENT
Start: 2019-01-29

## 2019-01-29 RX ORDER — POLYETHYLENE GLYCOL 3350 17 G/17G
17 POWDER, FOR SOLUTION ORAL 2 TIMES DAILY
Qty: 30 PACKET | Refills: 2 | Status: ON HOLD | OUTPATIENT
Start: 2019-01-29 | End: 2019-02-04 | Stop reason: DRUGHIGH

## 2019-01-29 RX ORDER — DIPHENHYDRAMINE HCL 25 MG
50 CAPSULE ORAL ONCE
Status: CANCELLED
Start: 2019-01-29

## 2019-01-29 RX ORDER — METHYLPREDNISOLONE SODIUM SUCCINATE 125 MG/2ML
125 INJECTION, POWDER, LYOPHILIZED, FOR SOLUTION INTRAMUSCULAR; INTRAVENOUS ONCE
Status: CANCELLED | OUTPATIENT
Start: 2019-01-29

## 2019-01-29 RX ORDER — SODIUM BICARBONATE 650 MG/1
650 TABLET ORAL 2 TIMES DAILY
Qty: 90 TABLET | Refills: 0 | Status: SHIPPED | OUTPATIENT
Start: 2019-01-29 | End: 2019-03-04

## 2019-01-29 NOTE — TELEPHONE ENCOUNTER
Request for PA sent to department of infusion finance (Rodney) for Rituximab.    If letter of medical necessity required, will obtain and send to Dr. Tobin.
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
## 2019-01-29 NOTE — TELEPHONE ENCOUNTER
Needs order to Admit to Assisted Living and have mechanical soft diet. Will be faxing orders over to have this signed.  Moon Cardona LPN ....................  1/29/2019   11:04 AM

## 2019-01-30 ENCOUNTER — TELEPHONE (OUTPATIENT)
Dept: TRANSPLANT | Facility: CLINIC | Age: 49
End: 2019-01-30

## 2019-01-30 ENCOUNTER — TELEPHONE (OUTPATIENT)
Dept: PEDIATRICS | Facility: OTHER | Age: 49
End: 2019-01-30

## 2019-01-30 DIAGNOSIS — Z43.0 TRACHEOSTOMY CARE (H): ICD-10-CM

## 2019-01-30 NOTE — TELEPHONE ENCOUNTER
Pharmacy is questioning the rx for gauze pads.  They state that normally for trach patients the order would be an danay gauze 4x4.  This would be a gauze that doesn't contain fibers that can be sucked through the trach.  The pharmacy states that the current dressing order is for Optifoaom gauze which has an adhesive border.  Also did you want a slit in the gauze for it to go around the trach?  CORA Llamas........................1/30/2019  2:02 PM

## 2019-01-30 NOTE — TELEPHONE ENCOUNTER
Post Kidney and Pancreas Transplant Team Conference  Date: 1/30/2019  Transplant Coordinator: Angeline Alonso     Attendees:  [x]  Dr. Macias [] Thao Barnard, RN  [x] Blossom Montes LPN     []  Dr. Donato [x] Martha Morris RN [] Noelle Wagner LPN   [x]  Dr. Telles [] Beth Evans, DONALD    []  Dr. Cabrera [] Katelyn Gallegos RN    [] Dr. Vallejo [x] Maliha Alonso RN    [] Dr. Lorenzo [] Mahad Steele RN    [] Dr. Finley [] Mildred Barajas, RN    [] Surgery Fellow [] Gia Romero RN    [] Rachel Resendez NP              Verbal Plan Read Back:   No changes at this time.    Routed to RN Coordinator   Blossom VALDERRAMA approved for ritux - information given to Dr. Tobin.

## 2019-01-31 ENCOUNTER — TELEPHONE (OUTPATIENT)
Dept: PHARMACY | Facility: CLINIC | Age: 49
End: 2019-01-31

## 2019-02-04 ENCOUNTER — APPOINTMENT (OUTPATIENT)
Dept: GENERAL RADIOLOGY | Facility: OTHER | Age: 49
DRG: 194 | End: 2019-02-04
Payer: MEDICARE

## 2019-02-04 ENCOUNTER — HOSPITAL ENCOUNTER (INPATIENT)
Facility: OTHER | Age: 49
LOS: 2 days | Discharge: HOME | DRG: 194 | End: 2019-02-06
Attending: PHYSICIAN ASSISTANT | Admitting: INTERNAL MEDICINE
Payer: MEDICARE

## 2019-02-04 ENCOUNTER — TELEPHONE (OUTPATIENT)
Dept: TRANSPLANT | Facility: CLINIC | Age: 49
End: 2019-02-04

## 2019-02-04 DIAGNOSIS — J18.9 COMMUNITY ACQUIRED PNEUMONIA OF LEFT LOWER LOBE OF LUNG: ICD-10-CM

## 2019-02-04 DIAGNOSIS — I10 ESSENTIAL HYPERTENSION, MALIGNANT: ICD-10-CM

## 2019-02-04 DIAGNOSIS — Z77.22 EXPOSURE TO SECONDHAND SMOKE: ICD-10-CM

## 2019-02-04 DIAGNOSIS — R50.9 HYPERTHERMIA-INDUCED DEFECT: ICD-10-CM

## 2019-02-04 DIAGNOSIS — Z94.0 STATUS POST KIDNEY TRANSPLANT: ICD-10-CM

## 2019-02-04 DIAGNOSIS — R50.9 FEVER: ICD-10-CM

## 2019-02-04 DIAGNOSIS — R52 GENERALIZED BODY ACHES: ICD-10-CM

## 2019-02-04 DIAGNOSIS — Z94.0 KIDNEY REPLACED BY TRANSPLANT: ICD-10-CM

## 2019-02-04 PROBLEM — B34.8 BK VIREMIA: Status: ACTIVE | Noted: 2018-09-25

## 2019-02-04 PROBLEM — F31.9 BIPOLAR AFFECTIVE DISORDER (H): Status: ACTIVE | Noted: 2017-10-13

## 2019-02-04 LAB
ALBUMIN SERPL-MCNC: 3.9 G/DL (ref 3.5–5.7)
ALBUMIN UR-MCNC: ABNORMAL MG/DL
ALP SERPL-CCNC: 73 U/L (ref 34–104)
ALT SERPL W P-5'-P-CCNC: 11 U/L (ref 7–52)
ANION GAP SERPL CALCULATED.3IONS-SCNC: 8 MMOL/L (ref 3–14)
APPEARANCE UR: CLEAR
AST SERPL W P-5'-P-CCNC: 23 U/L (ref 13–39)
BASOPHILS # BLD AUTO: 0 10E9/L (ref 0–0.2)
BASOPHILS NFR BLD AUTO: 0.2 %
BILIRUB SERPL-MCNC: 1.8 MG/DL (ref 0.3–1)
BILIRUB UR QL STRIP: NEGATIVE
BUN SERPL-MCNC: 15 MG/DL (ref 7–25)
CALCIUM SERPL-MCNC: 9.4 MG/DL (ref 8.6–10.3)
CHLORIDE SERPL-SCNC: 107 MMOL/L (ref 98–107)
CO2 SERPL-SCNC: 19 MMOL/L (ref 21–31)
COLOR UR AUTO: YELLOW
CREAT SERPL-MCNC: 1.32 MG/DL (ref 0.7–1.3)
DIFFERENTIAL METHOD BLD: ABNORMAL
EOSINOPHIL # BLD AUTO: 0 10E9/L (ref 0–0.7)
EOSINOPHIL NFR BLD AUTO: 0.1 %
ERYTHROCYTE [DISTWIDTH] IN BLOOD BY AUTOMATED COUNT: 13.1 % (ref 10–15)
FLUAV+FLUBV RNA SPEC QL NAA+PROBE: NEGATIVE
FLUAV+FLUBV RNA SPEC QL NAA+PROBE: NEGATIVE
GFR SERPL CREATININE-BSD FRML MDRD: 57 ML/MIN/{1.73_M2}
GLUCOSE SERPL-MCNC: 107 MG/DL (ref 70–105)
GLUCOSE UR STRIP-MCNC: NEGATIVE MG/DL
HCT VFR BLD AUTO: 30.1 % (ref 40–53)
HGB BLD-MCNC: 10 G/DL (ref 13.3–17.7)
HGB UR QL STRIP: NEGATIVE
IMM GRANULOCYTES # BLD: 0.1 10E9/L (ref 0–0.4)
IMM GRANULOCYTES NFR BLD: 0.7 %
KETONES UR STRIP-MCNC: NEGATIVE MG/DL
LACTATE SERPL-SCNC: 1.1 MMOL/L (ref 0.5–2.2)
LEUKOCYTE ESTERASE UR QL STRIP: NEGATIVE
LYMPHOCYTES # BLD AUTO: 0.6 10E9/L (ref 0.8–5.3)
LYMPHOCYTES NFR BLD AUTO: 3.8 %
MCH RBC QN AUTO: 31.5 PG (ref 26.5–33)
MCHC RBC AUTO-ENTMCNC: 33.2 G/DL (ref 31.5–36.5)
MCV RBC AUTO: 95 FL (ref 78–100)
MONOCYTES # BLD AUTO: 0.8 10E9/L (ref 0–1.3)
MONOCYTES NFR BLD AUTO: 4.9 %
NEUTROPHILS # BLD AUTO: 13.9 10E9/L (ref 1.6–8.3)
NEUTROPHILS NFR BLD AUTO: 90.3 %
NITRATE UR QL: NEGATIVE
PH UR STRIP: 6.5 PH (ref 5–9)
PLATELET # BLD AUTO: 140 10E9/L (ref 150–450)
POTASSIUM SERPL-SCNC: 3.6 MMOL/L (ref 3.5–5.1)
PROT SERPL-MCNC: 6.7 G/DL (ref 6.4–8.9)
RBC # BLD AUTO: 3.17 10E12/L (ref 4.4–5.9)
RBC #/AREA URNS AUTO: NORMAL /HPF
RSV RNA SPEC NAA+PROBE: NEGATIVE
SODIUM SERPL-SCNC: 134 MMOL/L (ref 134–144)
SOURCE: ABNORMAL
SP GR UR STRIP: <1.005 (ref 1–1.03)
SPECIMEN SOURCE: NORMAL
TROPONIN I SERPL-MCNC: <0.03 UG/L (ref 0–0.03)
UROBILINOGEN UR STRIP-ACNC: 2 EU/DL (ref 0.2–1)
WBC # BLD AUTO: 15.4 10E9/L (ref 4–11)
WBC #/AREA URNS AUTO: NORMAL /HPF

## 2019-02-04 PROCEDURE — 87040 BLOOD CULTURE FOR BACTERIA: CPT | Mod: 91 | Performed by: PHYSICIAN ASSISTANT

## 2019-02-04 PROCEDURE — 25000125 ZZHC RX 250: Performed by: PHYSICIAN ASSISTANT

## 2019-02-04 PROCEDURE — 36415 COLL VENOUS BLD VENIPUNCTURE: CPT | Performed by: PHYSICIAN ASSISTANT

## 2019-02-04 PROCEDURE — 83605 ASSAY OF LACTIC ACID: CPT | Performed by: PHYSICIAN ASSISTANT

## 2019-02-04 PROCEDURE — 96375 TX/PRO/DX INJ NEW DRUG ADDON: CPT | Performed by: PHYSICIAN ASSISTANT

## 2019-02-04 PROCEDURE — 96366 THER/PROPH/DIAG IV INF ADDON: CPT | Performed by: PHYSICIAN ASSISTANT

## 2019-02-04 PROCEDURE — 71046 X-RAY EXAM CHEST 2 VIEWS: CPT

## 2019-02-04 PROCEDURE — 84484 ASSAY OF TROPONIN QUANT: CPT | Performed by: PHYSICIAN ASSISTANT

## 2019-02-04 PROCEDURE — 94640 AIRWAY INHALATION TREATMENT: CPT

## 2019-02-04 PROCEDURE — 25000128 H RX IP 250 OP 636: Performed by: INTERNAL MEDICINE

## 2019-02-04 PROCEDURE — 87040 BLOOD CULTURE FOR BACTERIA: CPT | Performed by: PHYSICIAN ASSISTANT

## 2019-02-04 PROCEDURE — A9270 NON-COVERED ITEM OR SERVICE: HCPCS | Mod: GY | Performed by: INTERNAL MEDICINE

## 2019-02-04 PROCEDURE — 87631 RESP VIRUS 3-5 TARGETS: CPT | Performed by: FAMILY MEDICINE

## 2019-02-04 PROCEDURE — 93010 ELECTROCARDIOGRAM REPORT: CPT | Performed by: INTERNAL MEDICINE

## 2019-02-04 PROCEDURE — 25000132 ZZH RX MED GY IP 250 OP 250 PS 637: Mod: GY | Performed by: INTERNAL MEDICINE

## 2019-02-04 PROCEDURE — 25000128 H RX IP 250 OP 636: Performed by: PHYSICIAN ASSISTANT

## 2019-02-04 PROCEDURE — 93005 ELECTROCARDIOGRAM TRACING: CPT | Performed by: PHYSICIAN ASSISTANT

## 2019-02-04 PROCEDURE — 99285 EMERGENCY DEPT VISIT HI MDM: CPT | Mod: 25 | Performed by: PHYSICIAN ASSISTANT

## 2019-02-04 PROCEDURE — 25000132 ZZH RX MED GY IP 250 OP 250 PS 637: Mod: GY | Performed by: PHYSICIAN ASSISTANT

## 2019-02-04 PROCEDURE — 25000125 ZZHC RX 250: Performed by: INTERNAL MEDICINE

## 2019-02-04 PROCEDURE — 80053 COMPREHEN METABOLIC PANEL: CPT | Performed by: PHYSICIAN ASSISTANT

## 2019-02-04 PROCEDURE — 96365 THER/PROPH/DIAG IV INF INIT: CPT | Performed by: PHYSICIAN ASSISTANT

## 2019-02-04 PROCEDURE — 85025 COMPLETE CBC W/AUTO DIFF WBC: CPT | Performed by: PHYSICIAN ASSISTANT

## 2019-02-04 PROCEDURE — 81001 URINALYSIS AUTO W/SCOPE: CPT | Performed by: PHYSICIAN ASSISTANT

## 2019-02-04 PROCEDURE — 99285 EMERGENCY DEPT VISIT HI MDM: CPT | Mod: Z6 | Performed by: PHYSICIAN ASSISTANT

## 2019-02-04 PROCEDURE — 12000000 ZZH R&B MED SURG/OB

## 2019-02-04 PROCEDURE — A9270 NON-COVERED ITEM OR SERVICE: HCPCS | Mod: GY | Performed by: PHYSICIAN ASSISTANT

## 2019-02-04 PROCEDURE — 99223 1ST HOSP IP/OBS HIGH 75: CPT | Mod: AI | Performed by: INTERNAL MEDICINE

## 2019-02-04 PROCEDURE — 40000275 ZZH STATISTIC RCP TIME EA 10 MIN

## 2019-02-04 RX ORDER — ONDANSETRON 4 MG/1
4 TABLET, ORALLY DISINTEGRATING ORAL EVERY 6 HOURS PRN
Status: DISCONTINUED | OUTPATIENT
Start: 2019-02-04 | End: 2019-02-05

## 2019-02-04 RX ORDER — FOLIC ACID 1 MG/1
1 TABLET ORAL DAILY
Status: DISCONTINUED | OUTPATIENT
Start: 2019-02-05 | End: 2019-02-05

## 2019-02-04 RX ORDER — KETOROLAC TROMETHAMINE 30 MG/ML
30 INJECTION, SOLUTION INTRAMUSCULAR; INTRAVENOUS ONCE
Status: DISCONTINUED | OUTPATIENT
Start: 2019-02-04 | End: 2019-02-04 | Stop reason: ALTCHOICE

## 2019-02-04 RX ORDER — POLYETHYLENE GLYCOL 3350 17 G/17G
17 POWDER, FOR SOLUTION ORAL 2 TIMES DAILY PRN
Status: DISCONTINUED | OUTPATIENT
Start: 2019-02-04 | End: 2019-02-06 | Stop reason: HOSPADM

## 2019-02-04 RX ORDER — CEFUROXIME AXETIL 500 MG/1
500 TABLET ORAL 2 TIMES DAILY
Qty: 20 TABLET | Refills: 0 | Status: SHIPPED | OUTPATIENT
Start: 2019-02-04 | End: 2019-02-06

## 2019-02-04 RX ORDER — NALOXONE HYDROCHLORIDE 0.4 MG/ML
.1-.4 INJECTION, SOLUTION INTRAMUSCULAR; INTRAVENOUS; SUBCUTANEOUS
Status: DISCONTINUED | OUTPATIENT
Start: 2019-02-04 | End: 2019-02-06 | Stop reason: HOSPADM

## 2019-02-04 RX ORDER — IPRATROPIUM BROMIDE AND ALBUTEROL SULFATE 2.5; .5 MG/3ML; MG/3ML
1 SOLUTION RESPIRATORY (INHALATION) 4 TIMES DAILY PRN
Qty: 90 VIAL | Refills: 3 | Status: SHIPPED | OUTPATIENT
Start: 2019-02-04 | End: 2019-03-07

## 2019-02-04 RX ORDER — LIDOCAINE 40 MG/G
CREAM TOPICAL
Status: DISCONTINUED | OUTPATIENT
Start: 2019-02-04 | End: 2019-02-06 | Stop reason: HOSPADM

## 2019-02-04 RX ORDER — BUDESONIDE 0.5 MG/2ML
0.5 INHALANT ORAL ONCE
Status: COMPLETED | OUTPATIENT
Start: 2019-02-04 | End: 2019-02-04

## 2019-02-04 RX ORDER — ACETAMINOPHEN 500 MG
500 TABLET ORAL ONCE
Status: COMPLETED | OUTPATIENT
Start: 2019-02-04 | End: 2019-02-04

## 2019-02-04 RX ORDER — AZITHROMYCIN 250 MG/1
TABLET, FILM COATED ORAL
Qty: 6 TABLET | Refills: 0 | Status: SHIPPED | OUTPATIENT
Start: 2019-02-04 | End: 2019-02-06

## 2019-02-04 RX ORDER — MAGNESIUM CARB/ALUMINUM HYDROX 105-160MG
148 TABLET,CHEWABLE ORAL
Status: DISCONTINUED | OUTPATIENT
Start: 2019-02-04 | End: 2019-02-05

## 2019-02-04 RX ORDER — AMOXICILLIN 250 MG
1 CAPSULE ORAL 2 TIMES DAILY PRN
Status: DISCONTINUED | OUTPATIENT
Start: 2019-02-04 | End: 2019-02-05

## 2019-02-04 RX ORDER — CYCLOSPORINE 25 MG/1
25 CAPSULE, LIQUID FILLED ORAL 2 TIMES DAILY
Status: DISCONTINUED | OUTPATIENT
Start: 2019-02-04 | End: 2019-02-06 | Stop reason: HOSPADM

## 2019-02-04 RX ORDER — ESCITALOPRAM OXALATE 20 MG/1
20 TABLET ORAL DAILY
COMMUNITY
End: 2019-04-29

## 2019-02-04 RX ORDER — QUETIAPINE FUMARATE 25 MG/1
50 TABLET, FILM COATED ORAL AT BEDTIME
Status: DISCONTINUED | OUTPATIENT
Start: 2019-02-04 | End: 2019-02-06 | Stop reason: HOSPADM

## 2019-02-04 RX ORDER — IPRATROPIUM BROMIDE AND ALBUTEROL SULFATE 2.5; .5 MG/3ML; MG/3ML
3 SOLUTION RESPIRATORY (INHALATION) ONCE
Status: COMPLETED | OUTPATIENT
Start: 2019-02-04 | End: 2019-02-04

## 2019-02-04 RX ORDER — SODIUM BICARBONATE 650 MG/1
650 TABLET ORAL 2 TIMES DAILY
Status: DISCONTINUED | OUTPATIENT
Start: 2019-02-04 | End: 2019-02-04 | Stop reason: CLARIF

## 2019-02-04 RX ORDER — ESCITALOPRAM OXALATE 10 MG/1
20 TABLET ORAL DAILY
Status: DISCONTINUED | OUTPATIENT
Start: 2019-02-04 | End: 2019-02-06 | Stop reason: HOSPADM

## 2019-02-04 RX ORDER — AMOXICILLIN 250 MG
2 CAPSULE ORAL 2 TIMES DAILY PRN
Status: DISCONTINUED | OUTPATIENT
Start: 2019-02-04 | End: 2019-02-05

## 2019-02-04 RX ORDER — POLYETHYLENE GLYCOL 3350 17 G/17G
17 POWDER, FOR SOLUTION ORAL DAILY PRN
Status: DISCONTINUED | OUTPATIENT
Start: 2019-02-04 | End: 2019-02-05

## 2019-02-04 RX ORDER — METHYLPREDNISOLONE SODIUM SUCCINATE 125 MG/2ML
125 INJECTION, POWDER, LYOPHILIZED, FOR SOLUTION INTRAMUSCULAR; INTRAVENOUS ONCE
Status: COMPLETED | OUTPATIENT
Start: 2019-02-04 | End: 2019-02-04

## 2019-02-04 RX ORDER — POLYETHYLENE GLYCOL 3350 17 G/17G
1 POWDER, FOR SOLUTION ORAL 2 TIMES DAILY PRN
COMMUNITY
End: 2019-03-07

## 2019-02-04 RX ORDER — OMEPRAZOLE 40 MG/1
40 CAPSULE, DELAYED RELEASE ORAL DAILY
COMMUNITY
End: 2019-03-07

## 2019-02-04 RX ORDER — FLUDROCORTISONE ACETATE 0.1 MG/1
0.1 TABLET ORAL DAILY
Status: DISCONTINUED | OUTPATIENT
Start: 2019-02-05 | End: 2019-02-06 | Stop reason: HOSPADM

## 2019-02-04 RX ORDER — TRAZODONE HYDROCHLORIDE 50 MG/1
200 TABLET, FILM COATED ORAL AT BEDTIME
Status: DISCONTINUED | OUTPATIENT
Start: 2019-02-04 | End: 2019-02-06 | Stop reason: HOSPADM

## 2019-02-04 RX ORDER — CEFTRIAXONE SODIUM 1 G/50ML
1 INJECTION, SOLUTION INTRAVENOUS ONCE
Status: COMPLETED | OUTPATIENT
Start: 2019-02-04 | End: 2019-02-04

## 2019-02-04 RX ORDER — CYCLOSPORINE 100 MG/1
100 CAPSULE, LIQUID FILLED ORAL 2 TIMES DAILY
Status: DISCONTINUED | OUTPATIENT
Start: 2019-02-04 | End: 2019-02-06 | Stop reason: HOSPADM

## 2019-02-04 RX ORDER — PREDNISONE 5 MG/1
5 TABLET ORAL DAILY
Status: DISCONTINUED | OUTPATIENT
Start: 2019-02-05 | End: 2019-02-05

## 2019-02-04 RX ORDER — CHLORHEXIDINE GLUCONATE ORAL RINSE 1.2 MG/ML
15 SOLUTION DENTAL 4 TIMES DAILY
Status: DISCONTINUED | OUTPATIENT
Start: 2019-02-04 | End: 2019-02-06 | Stop reason: HOSPADM

## 2019-02-04 RX ORDER — ESCITALOPRAM OXALATE 5 MG/5ML
20 SOLUTION ORAL DAILY
Status: DISCONTINUED | OUTPATIENT
Start: 2019-02-04 | End: 2019-02-04 | Stop reason: CLARIF

## 2019-02-04 RX ORDER — DOXYCYCLINE 100 MG/10ML
100 INJECTION, POWDER, LYOPHILIZED, FOR SOLUTION INTRAVENOUS EVERY 12 HOURS
Status: DISCONTINUED | OUTPATIENT
Start: 2019-02-04 | End: 2019-02-06 | Stop reason: HOSPADM

## 2019-02-04 RX ORDER — SODIUM CHLORIDE 9 MG/ML
INJECTION, SOLUTION INTRAVENOUS CONTINUOUS
Status: DISCONTINUED | OUTPATIENT
Start: 2019-02-04 | End: 2019-02-06 | Stop reason: HOSPADM

## 2019-02-04 RX ORDER — MYCOPHENOLATE MOFETIL 200 MG/ML
250 POWDER, FOR SUSPENSION ORAL 2 TIMES DAILY
Status: DISCONTINUED | OUTPATIENT
Start: 2019-02-04 | End: 2019-02-04 | Stop reason: CLARIF

## 2019-02-04 RX ORDER — ACETAMINOPHEN 325 MG/1
325-650 TABLET ORAL EVERY 8 HOURS PRN
Status: DISCONTINUED | OUTPATIENT
Start: 2019-02-04 | End: 2019-02-06 | Stop reason: HOSPADM

## 2019-02-04 RX ORDER — ACETAMINOPHEN 325 MG/1
325-650 TABLET ORAL EVERY 6 HOURS PRN
COMMUNITY
End: 2019-04-03

## 2019-02-04 RX ORDER — MYCOPHENOLATE MOFETIL 200 MG/ML
250 POWDER, FOR SUSPENSION ORAL 2 TIMES DAILY
COMMUNITY
End: 2019-05-01

## 2019-02-04 RX ORDER — MYCOPHENOLATE MOFETIL 200 MG/ML
250 POWDER, FOR SUSPENSION ORAL 2 TIMES DAILY
Status: DISCONTINUED | OUTPATIENT
Start: 2019-02-04 | End: 2019-02-06 | Stop reason: HOSPADM

## 2019-02-04 RX ORDER — ONDANSETRON 2 MG/ML
4 INJECTION INTRAMUSCULAR; INTRAVENOUS EVERY 6 HOURS PRN
Status: DISCONTINUED | OUTPATIENT
Start: 2019-02-04 | End: 2019-02-05

## 2019-02-04 RX ORDER — NICOTINE POLACRILEX 4 MG/1
40 GUM, CHEWING ORAL EVERY MORNING
Status: DISCONTINUED | OUTPATIENT
Start: 2019-02-05 | End: 2019-02-04 | Stop reason: CLARIF

## 2019-02-04 RX ORDER — ATORVASTATIN CALCIUM 40 MG/1
40 TABLET, FILM COATED ORAL DAILY
Status: DISCONTINUED | OUTPATIENT
Start: 2019-02-04 | End: 2019-02-04

## 2019-02-04 RX ORDER — POLYETHYLENE GLYCOL 3350 17 G/17G
17 POWDER, FOR SOLUTION ORAL 2 TIMES DAILY
Status: DISCONTINUED | OUTPATIENT
Start: 2019-02-04 | End: 2019-02-04 | Stop reason: DRUGHIGH

## 2019-02-04 RX ORDER — CEFTRIAXONE SODIUM 1 G/50ML
1 INJECTION, SOLUTION INTRAVENOUS EVERY 24 HOURS
Status: DISCONTINUED | OUTPATIENT
Start: 2019-02-05 | End: 2019-02-06 | Stop reason: HOSPADM

## 2019-02-04 RX ORDER — SULFAMETHOXAZOLE AND TRIMETHOPRIM 200; 40 MG/5ML; MG/5ML
10 SUSPENSION ORAL DAILY
Status: DISCONTINUED | OUTPATIENT
Start: 2019-02-05 | End: 2019-02-06 | Stop reason: HOSPADM

## 2019-02-04 RX ADMIN — Medication: at 17:09

## 2019-02-04 RX ADMIN — MYCOPHENOLATE MOFETIL 250 MG: 200 POWDER, FOR SUSPENSION ORAL at 22:29

## 2019-02-04 RX ADMIN — Medication: at 17:06

## 2019-02-04 RX ADMIN — ACETAMINOPHEN 500 MG: 500 TABLET, FILM COATED ORAL at 13:19

## 2019-02-04 RX ADMIN — DOXYCYCLINE 100 MG: 100 INJECTION, POWDER, LYOPHILIZED, FOR SOLUTION INTRAVENOUS at 17:07

## 2019-02-04 RX ADMIN — METHYLPREDNISOLONE SODIUM SUCCINATE 125 MG: 125 INJECTION, POWDER, FOR SOLUTION INTRAMUSCULAR; INTRAVENOUS at 12:05

## 2019-02-04 RX ADMIN — CYCLOSPORINE 100 MG: 100 CAPSULE, LIQUID FILLED ORAL at 22:27

## 2019-02-04 RX ADMIN — ACETAMINOPHEN 500 MG: 500 TABLET, FILM COATED ORAL at 15:12

## 2019-02-04 RX ADMIN — TRAZODONE HYDROCHLORIDE 200 MG: 50 TABLET ORAL at 22:26

## 2019-02-04 RX ADMIN — BUDESONIDE 0.5 MG: 0.5 SUSPENSION RESPIRATORY (INHALATION) at 12:13

## 2019-02-04 RX ADMIN — CHLORHEXIDINE GLUCONATE 15 ML: 1.2 RINSE ORAL at 22:28

## 2019-02-04 RX ADMIN — IPRATROPIUM BROMIDE AND ALBUTEROL SULFATE 3 ML: .5; 3 SOLUTION RESPIRATORY (INHALATION) at 12:12

## 2019-02-04 RX ADMIN — QUETIAPINE 50 MG: 25 TABLET ORAL at 22:28

## 2019-02-04 RX ADMIN — CHLORHEXIDINE GLUCONATE 15 ML: 1.2 RINSE ORAL at 18:33

## 2019-02-04 RX ADMIN — CEFTRIAXONE SODIUM 1 G: 1 INJECTION, SOLUTION INTRAVENOUS at 13:19

## 2019-02-04 RX ADMIN — CYCLOSPORINE 25 MG: 25 CAPSULE, LIQUID FILLED ORAL at 22:27

## 2019-02-04 ASSESSMENT — MIFFLIN-ST. JEOR
SCORE: 1537.57
SCORE: 1542.25

## 2019-02-04 ASSESSMENT — ENCOUNTER SYMPTOMS
CONSTIPATION: 0
ABDOMINAL PAIN: 0
CHEST TIGHTNESS: 0
FATIGUE: 1
FEVER: 1
NAUSEA: 0
MYALGIAS: 1
CHILLS: 1
DIARRHEA: 0
WEAKNESS: 1
COUGH: 1
HEMATURIA: 0
CONFUSION: 0
BACK PAIN: 0
SHORTNESS OF BREATH: 0
NUMBNESS: 0
SEIZURES: 0
ADENOPATHY: 0
BRUISES/BLEEDS EASILY: 0
VOMITING: 0
WOUND: 0

## 2019-02-04 ASSESSMENT — ACTIVITIES OF DAILY LIVING (ADL)
ADLS_ACUITY_SCORE: 11
DRESS: 0-->INDEPENDENT
TOILETING: 0-->INDEPENDENT
TRANSFERRING: 0-->INDEPENDENT
RETIRED_COMMUNICATION: 0-->UNDERSTANDS/COMMUNICATES WITHOUT DIFFICULTY
FALL_HISTORY_WITHIN_LAST_SIX_MONTHS: NO
SWALLOWING: 0-->SWALLOWS FOODS/LIQUIDS WITHOUT DIFFICULTY
AMBULATION: 0-->INDEPENDENT
BATHING: 0-->INDEPENDENT
COGNITION: 0 - NO COGNITION ISSUES REPORTED
RETIRED_EATING: 1-->ASSISTIVE EQUIPMENT
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
## 2019-02-04 NOTE — PROGRESS NOTES
Nurse to nurse from DONALD Manzo in ER. All questions answered. Jillian Mariscal RN on 2/4/2019 at 3:52 PM

## 2019-02-04 NOTE — TELEPHONE ENCOUNTER
LAVONNE will call back when the person dealing with the dressing changes arrives.  Moon Cardona LPN ....................  2/4/2019   9:16 AM

## 2019-02-04 NOTE — PROGRESS NOTES
Chart accessed pending admission to [facility]. Chelsey Oseguera RN on 2/4/2019 at 3:11 PM

## 2019-02-04 NOTE — PROGRESS NOTES
Patient is admitted to [facility] from ER. VSS. Call light within reach. Independent in room. Requesting something to eat. Jillian Mariscal RN on 2/4/2019 at 4:12 PM

## 2019-02-04 NOTE — TELEPHONE ENCOUNTER
Hector is currently in the ED at [facility] - Dr. Dixon called for clarification.    RNCC gave P to P line - as plan for outpatient antibiotics may be appropriate, but given current status (high EBV and BK levels), may need to be admitted to [facility] or [facility].

## 2019-02-04 NOTE — ED NOTES
"Pt and ex-wife in room, does have a fever and ex-wife states \"he cannot go home with a fever.\"  PA aware and will speak with them.  "

## 2019-02-04 NOTE — TELEPHONE ENCOUNTER
I want him to use whatever is recommended by his Pulmonologist at the .    Signed, Charlie Dubose MD  Internal Medicine & Pediatrics
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
## 2019-02-04 NOTE — PROGRESS NOTES
Tolerated clear and full liquids. Advanced to regular diet Jillian Mariscal RN on 2/4/2019 at 4:56 PM

## 2019-02-05 ENCOUNTER — HOME INFUSION (PRE-WILLOW HOME INFUSION) (OUTPATIENT)
Dept: PHARMACY | Facility: CLINIC | Age: 49
End: 2019-02-05

## 2019-02-05 LAB
ALBUMIN SERPL-MCNC: 3.8 G/DL (ref 3.5–5.7)
ALP SERPL-CCNC: 67 U/L (ref 34–104)
ALT SERPL W P-5'-P-CCNC: 12 U/L (ref 7–52)
ANION GAP SERPL CALCULATED.3IONS-SCNC: 7 MMOL/L (ref 3–14)
AST SERPL W P-5'-P-CCNC: 20 U/L (ref 13–39)
BILIRUB SERPL-MCNC: 1.1 MG/DL (ref 0.3–1)
BUN SERPL-MCNC: 17 MG/DL (ref 7–25)
CALCIUM SERPL-MCNC: 9.5 MG/DL (ref 8.6–10.3)
CHLORIDE SERPL-SCNC: 110 MMOL/L (ref 98–107)
CO2 SERPL-SCNC: 22 MMOL/L (ref 21–31)
CREAT SERPL-MCNC: 1.21 MG/DL (ref 0.7–1.3)
ERYTHROCYTE [DISTWIDTH] IN BLOOD BY AUTOMATED COUNT: 13.1 % (ref 10–15)
GFR SERPL CREATININE-BSD FRML MDRD: 63 ML/MIN/{1.73_M2}
GLUCOSE SERPL-MCNC: 197 MG/DL (ref 70–105)
HCT VFR BLD AUTO: 31.1 % (ref 40–53)
HGB BLD-MCNC: 10 G/DL (ref 13.3–17.7)
MCH RBC QN AUTO: 31 PG (ref 26.5–33)
MCHC RBC AUTO-ENTMCNC: 32.2 G/DL (ref 31.5–36.5)
MCV RBC AUTO: 96 FL (ref 78–100)
PLATELET # BLD AUTO: 138 10E9/L (ref 150–450)
POTASSIUM SERPL-SCNC: 4.1 MMOL/L (ref 3.5–5.1)
PROT SERPL-MCNC: 6.8 G/DL (ref 6.4–8.9)
RBC # BLD AUTO: 3.23 10E12/L (ref 4.4–5.9)
SODIUM SERPL-SCNC: 139 MMOL/L (ref 134–144)
WBC # BLD AUTO: 9.7 10E9/L (ref 4–11)

## 2019-02-05 PROCEDURE — 99232 SBSQ HOSP IP/OBS MODERATE 35: CPT | Performed by: INTERNAL MEDICINE

## 2019-02-05 PROCEDURE — 40000275 ZZH STATISTIC RCP TIME EA 10 MIN

## 2019-02-05 PROCEDURE — A9270 NON-COVERED ITEM OR SERVICE: HCPCS | Mod: GY | Performed by: INTERNAL MEDICINE

## 2019-02-05 PROCEDURE — 36415 COLL VENOUS BLD VENIPUNCTURE: CPT | Performed by: INTERNAL MEDICINE

## 2019-02-05 PROCEDURE — 85027 COMPLETE CBC AUTOMATED: CPT | Performed by: INTERNAL MEDICINE

## 2019-02-05 PROCEDURE — 80053 COMPREHEN METABOLIC PANEL: CPT | Performed by: INTERNAL MEDICINE

## 2019-02-05 PROCEDURE — 25000128 H RX IP 250 OP 636: Performed by: INTERNAL MEDICINE

## 2019-02-05 PROCEDURE — 25000132 ZZH RX MED GY IP 250 OP 250 PS 637: Mod: GY | Performed by: INTERNAL MEDICINE

## 2019-02-05 PROCEDURE — 25000125 ZZHC RX 250: Performed by: INTERNAL MEDICINE

## 2019-02-05 PROCEDURE — 12000000 ZZH R&B MED SURG/OB

## 2019-02-05 RX ORDER — AMOXICILLIN 250 MG
1 CAPSULE ORAL 2 TIMES DAILY PRN
Status: DISCONTINUED | OUTPATIENT
Start: 2019-02-05 | End: 2019-02-06 | Stop reason: HOSPADM

## 2019-02-05 RX ORDER — ONDANSETRON 2 MG/ML
4 INJECTION INTRAMUSCULAR; INTRAVENOUS EVERY 6 HOURS PRN
Status: DISCONTINUED | OUTPATIENT
Start: 2019-02-05 | End: 2019-02-06 | Stop reason: HOSPADM

## 2019-02-05 RX ORDER — MAGNESIUM CARB/ALUMINUM HYDROX 105-160MG
148 TABLET,CHEWABLE ORAL
Status: DISCONTINUED | OUTPATIENT
Start: 2019-02-05 | End: 2019-02-06 | Stop reason: HOSPADM

## 2019-02-05 RX ORDER — FOLIC ACID 1 MG/1
1 TABLET ORAL DAILY
Status: DISCONTINUED | OUTPATIENT
Start: 2019-02-06 | End: 2019-02-06 | Stop reason: HOSPADM

## 2019-02-05 RX ORDER — PREDNISONE 5 MG/1
5 TABLET ORAL DAILY
Status: DISCONTINUED | OUTPATIENT
Start: 2019-02-06 | End: 2019-02-06 | Stop reason: HOSPADM

## 2019-02-05 RX ORDER — SULFAMETHOXAZOLE AND TRIMETHOPRIM 200; 40 MG/5ML; MG/5ML
10 SUSPENSION ORAL DAILY
Status: ON HOLD | COMMUNITY
End: 2019-05-09

## 2019-02-05 RX ORDER — AMOXICILLIN 250 MG
2 CAPSULE ORAL 2 TIMES DAILY PRN
Status: DISCONTINUED | OUTPATIENT
Start: 2019-02-05 | End: 2019-02-06 | Stop reason: HOSPADM

## 2019-02-05 RX ORDER — ONDANSETRON 4 MG/1
4 TABLET, ORALLY DISINTEGRATING ORAL EVERY 6 HOURS PRN
Status: DISCONTINUED | OUTPATIENT
Start: 2019-02-05 | End: 2019-02-06 | Stop reason: HOSPADM

## 2019-02-05 RX ADMIN — FLUDROCORTISONE ACETATE 0.1 MG: 0.1 TABLET ORAL at 09:28

## 2019-02-05 RX ADMIN — CYCLOSPORINE 25 MG: 25 CAPSULE, LIQUID FILLED ORAL at 21:48

## 2019-02-05 RX ADMIN — MYCOPHENOLATE MOFETIL 250 MG: 200 POWDER, FOR SUSPENSION ORAL at 09:32

## 2019-02-05 RX ADMIN — HYDROMORPHONE HYDROCHLORIDE 1 MG: 1 INJECTION, SOLUTION INTRAMUSCULAR; INTRAVENOUS; SUBCUTANEOUS at 13:08

## 2019-02-05 RX ADMIN — CHLORHEXIDINE GLUCONATE 15 ML: 1.2 RINSE ORAL at 16:57

## 2019-02-05 RX ADMIN — LANSOPRAZOLE 30 MG: 15 TABLET, ORALLY DISINTEGRATING, DELAYED RELEASE ORAL at 07:38

## 2019-02-05 RX ADMIN — CYCLOSPORINE 25 MG: 25 CAPSULE, LIQUID FILLED ORAL at 09:27

## 2019-02-05 RX ADMIN — SULFAMETHOXAZOLE AND TRIMETHOPRIM 80 MG: 200; 40 SUSPENSION ORAL at 09:30

## 2019-02-05 RX ADMIN — ESCITALOPRAM OXALATE 20 MG: 10 TABLET ORAL at 09:28

## 2019-02-05 RX ADMIN — FOLIC ACID 1 MG: 1 TABLET ORAL at 09:29

## 2019-02-05 RX ADMIN — CEFTRIAXONE SODIUM 1 G: 1 INJECTION, SOLUTION INTRAVENOUS at 09:24

## 2019-02-05 RX ADMIN — QUETIAPINE 50 MG: 25 TABLET ORAL at 21:48

## 2019-02-05 RX ADMIN — CHLORHEXIDINE GLUCONATE 15 ML: 1.2 RINSE ORAL at 21:47

## 2019-02-05 RX ADMIN — POLYETHYLENE GLYCOL 3350 17 G: 17 POWDER, FOR SOLUTION ORAL at 21:47

## 2019-02-05 RX ADMIN — MYCOPHENOLATE MOFETIL 250 MG: 200 POWDER, FOR SUSPENSION ORAL at 21:48

## 2019-02-05 RX ADMIN — Medication: at 03:42

## 2019-02-05 RX ADMIN — PREDNISONE 5 MG: 5 TABLET ORAL at 09:29

## 2019-02-05 RX ADMIN — CYCLOSPORINE 100 MG: 100 CAPSULE, LIQUID FILLED ORAL at 09:26

## 2019-02-05 RX ADMIN — VITAMIN D 2000 UNITS: 25 TAB ORAL at 09:29

## 2019-02-05 RX ADMIN — Medication: at 16:58

## 2019-02-05 RX ADMIN — CYCLOSPORINE 100 MG: 100 CAPSULE, LIQUID FILLED ORAL at 21:49

## 2019-02-05 RX ADMIN — DOXYCYCLINE 100 MG: 100 INJECTION, POWDER, LYOPHILIZED, FOR SOLUTION INTRAVENOUS at 21:47

## 2019-02-05 RX ADMIN — TRAZODONE HYDROCHLORIDE 200 MG: 50 TABLET ORAL at 21:48

## 2019-02-05 RX ADMIN — CHLORHEXIDINE GLUCONATE 15 ML: 1.2 RINSE ORAL at 11:36

## 2019-02-05 RX ADMIN — DOXYCYCLINE 100 MG: 100 INJECTION, POWDER, LYOPHILIZED, FOR SOLUTION INTRAVENOUS at 10:27

## 2019-02-05 ASSESSMENT — ACTIVITIES OF DAILY LIVING (ADL)
ADLS_ACUITY_SCORE: 11
ADLS_ACUITY_SCORE: 13
ADLS_ACUITY_SCORE: 13
ADLS_ACUITY_SCORE: 11

## 2019-02-05 ASSESSMENT — MIFFLIN-ST. JEOR: SCORE: 1523.06
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
## 2019-02-05 NOTE — PLAN OF CARE
"Patient is resting in room. His lung sounds are clear and equal bilaterally. Infrequent cough that is non-productive. Noted that patient has NG tube placed prior to admission and is receiving medications via NG. Also, noted that patient has a trach placed with clean, dry and intact dressing. Denies pain, N/V, numbness and tingling. Patient afebrile, vitals stable: /59   Pulse 75   Temp 98  F (36.7  C) (Tympanic)   Resp 16   Ht 1.778 m (5' 10\")   Wt 67.6 kg (149 lb 0.5 oz)   SpO2 99%   BMI 21.38 kg/m      David Jones RN 02/04/19 11:09 PM    Patient sleeping in room. Lung sounds clear. Bowel sounds active.  Denies pain at this time. Ambulated to bathroom independently without difficulty. Afebrile, vitals stable: /62   Pulse 75   Temp 96.9  F (36.1  C) (Tympanic)   Resp 16   Ht 1.778 m (5' 10\")   Wt 67.6 kg (149 lb 0.5 oz)   SpO2 100%   BMI 21.38 kg/m      David Jones RN 02/05/19 4:03 AM        "

## 2019-02-05 NOTE — PHARMACY
"Pharmacy: Patient Own Medication Identification    The requirements of Policy 13-03 from the Pharmacy Policy Manual have been met and the patient will be allowed to use their own supply of: cyclosporine 25 mg capsule, cyclosporine 100 mg capsule, and mycophenolate suspension 200 mg/ml.    Selena Martinez [facility] has verified the name, dose, route, and directions for each medication. The integrity has been assessed and deemed safe.     The product(s) have been labeled as being inspected by a pharmacist and the medication has been placed in the patient-specific bin in the medication room.    Nursing will remove medication at the appropriately scheduled times and document the administration on the MAR with the designation of \"patient own\".    Nursing to return medication back to patient at time of discharge.     Selena Martinez RPH ....................  2/4/2019   7:08 PM    "

## 2019-02-05 NOTE — PHARMACY-ADMISSION MEDICATION HISTORY
Pharmacy -- Admission Medication Reconciliation    Prior to admission (PTA) medications were reviewed and the patient's PTA medication list was updated.    Sources Consulted: [facility], [facility], [facility] staff, [facility], bag of prescription medications, patient    Faxed [facility] (no response at time of note)  Will continue to review transplant medication notes in AM    The reliability of this Medication Reconciliation is: Reliability: Reliable    The following significant changes were made:  Changed acetaminophen to tablets  Changed cellcept to match patient's product  Changed escitalopram to tablets  Removed salicylic acid/5FU/vanicream  Changed dose of omeprazole and product  Changed miralax to prn  Added bactrim susp (2/5/19)    In addition, the patient's allergies were reviewed with the patient and updated as follows:   Allergies: Gabapentin    The pharmacist has reviewed with the patient that all personal medications should be removed from the building or locked in the belongings safe.  Patient shall only take medications ordered by the physician and administered by the nursing staff.  NOTE: nursing will be administering 3 of patient's own transplant medications.       Medication barriers identified: polypharmacy, difficult regimen   Medication adherence concerns: patient appears very aware   Understanding of emergency medications: n/a    Selena Martinez [facility], 2/4/2019,  8:06 PM

## 2019-02-05 NOTE — PROGRESS NOTES
Incentive Spirometry education completed.  Pt goal 2400 mls.  Pt achieved 2000 mls.  Pt instructed to perform 10/hr while awake with at least one deep breath and cough per hour until able to perform baseline activity.  RT will follow and re-assess as need.      Gillian Jernigan, RT on 2/5/2019 at 3:00 PM

## 2019-02-05 NOTE — PHARMACY-ADMISSION MEDICATION HISTORY
Pharmacy -- Admission Medication Reconciliation [facility]ATE     Prior to admission (PTA) medications were reviewed and the patient's PTA medication list was updated.    Sources Consulted: patient    The reliability of this Medication Reconciliation is: Reliability: Borderline reliable    The following significant changes were made:  All medications except cyclosporin are into the tube per patient    Selena Martinez [facility], 2/5/2019,  11:46 AM

## 2019-02-05 NOTE — PROGRESS NOTES
:     Met with patient to discuss any potential discharge needs. He reports that he was actually supposed to move into [facility] today. Patient is planning on returning home at discharge and will then be moving to [facility] in the next few days. He reports that his room there is already ready. Patient gave verbal permission for [facility] to be updated on his hospitalization. Patient reports that he has a ride home tomorrow.     Called Keturah at [facility] and provided update. Keturah stated that she did not need further updates, as they have not yet admitted him.     No further needs.     LAUREN Conway on 2/5/2019 at 2:05 PM

## 2019-02-05 NOTE — PHARMACY-ADMISSION MEDICATION HISTORY
Pharmacy -- Admission Medication Reconciliation UPDATE transplant information    Prior to admission (PTA) medications were reviewed and the patient's PTA medication list was updated.    Sources Consulted: Transplant pharmacist Teja [phone]    Added 7 cans of Nutren from different speciality pharmacy    Confirmed tacrolimus and valganciclovir completed    Transplant coordinator sent message to pharmacist confirming it is okay to use our prevacid in place of the omeprazole    Transplant coordinator recommends checking CSA levels 2 times per week while hospitalized and continue checking levels every other week as outpatient.    Selena Martinez [facility], 2/5/2019,  11:28 AM

## 2019-02-05 NOTE — TELEPHONE ENCOUNTER
Spoke to Estrella at [facility] and pt needs to new rx sent over SILVIO 4 x 4's sent over with quanity of 200 with refills was only sent over for 10 gauze pads in the first rx. Moon Cardona LPN ....................  2/5/2019   11:52 AM

## 2019-02-06 ENCOUNTER — HOME INFUSION (PRE-WILLOW HOME INFUSION) (OUTPATIENT)
Dept: PHARMACY | Facility: CLINIC | Age: 49
End: 2019-02-06

## 2019-02-06 VITALS
RESPIRATION RATE: 13 BRPM | BODY MASS INDEX: 21.49 KG/M2 | OXYGEN SATURATION: 98 % | TEMPERATURE: 98.6 F | DIASTOLIC BLOOD PRESSURE: 80 MMHG | WEIGHT: 150.13 LBS | HEART RATE: 60 BPM | HEIGHT: 70 IN | SYSTOLIC BLOOD PRESSURE: 132 MMHG

## 2019-02-06 LAB
ALBUMIN SERPL-MCNC: 3.5 G/DL (ref 3.5–5.7)
ALP SERPL-CCNC: 60 U/L (ref 34–104)
ALT SERPL W P-5'-P-CCNC: 16 U/L (ref 7–52)
ANION GAP SERPL CALCULATED.3IONS-SCNC: 6 MMOL/L (ref 3–14)
AST SERPL W P-5'-P-CCNC: 25 U/L (ref 13–39)
BILIRUB SERPL-MCNC: 0.7 MG/DL (ref 0.3–1)
BUN SERPL-MCNC: 21 MG/DL (ref 7–25)
CALCIUM SERPL-MCNC: 9.2 MG/DL (ref 8.6–10.3)
CHLORIDE SERPL-SCNC: 112 MMOL/L (ref 98–107)
CO2 SERPL-SCNC: 23 MMOL/L (ref 21–31)
CREAT SERPL-MCNC: 1.2 MG/DL (ref 0.7–1.3)
ERYTHROCYTE [DISTWIDTH] IN BLOOD BY AUTOMATED COUNT: 13.4 % (ref 10–15)
GFR SERPL CREATININE-BSD FRML MDRD: 64 ML/MIN/{1.73_M2}
GLUCOSE SERPL-MCNC: 128 MG/DL (ref 70–105)
HCT VFR BLD AUTO: 28 % (ref 40–53)
HGB BLD-MCNC: 9 G/DL (ref 13.3–17.7)
MCH RBC QN AUTO: 30.8 PG (ref 26.5–33)
MCHC RBC AUTO-ENTMCNC: 32.1 G/DL (ref 31.5–36.5)
MCV RBC AUTO: 96 FL (ref 78–100)
PLATELET # BLD AUTO: 132 10E9/L (ref 150–450)
POTASSIUM SERPL-SCNC: 4 MMOL/L (ref 3.5–5.1)
PROT SERPL-MCNC: 5.9 G/DL (ref 6.4–8.9)
RBC # BLD AUTO: 2.92 10E12/L (ref 4.4–5.9)
SODIUM SERPL-SCNC: 141 MMOL/L (ref 134–144)
WBC # BLD AUTO: 12.4 10E9/L (ref 4–11)

## 2019-02-06 PROCEDURE — A9270 NON-COVERED ITEM OR SERVICE: HCPCS | Mod: GY | Performed by: INTERNAL MEDICINE

## 2019-02-06 PROCEDURE — 99239 HOSP IP/OBS DSCHRG MGMT >30: CPT | Performed by: INTERNAL MEDICINE

## 2019-02-06 PROCEDURE — 25000128 H RX IP 250 OP 636: Performed by: INTERNAL MEDICINE

## 2019-02-06 PROCEDURE — 80053 COMPREHEN METABOLIC PANEL: CPT | Performed by: INTERNAL MEDICINE

## 2019-02-06 PROCEDURE — 36415 COLL VENOUS BLD VENIPUNCTURE: CPT | Performed by: INTERNAL MEDICINE

## 2019-02-06 PROCEDURE — 80158 DRUG ASSAY CYCLOSPORINE: CPT | Performed by: INTERNAL MEDICINE

## 2019-02-06 PROCEDURE — 25000132 ZZH RX MED GY IP 250 OP 250 PS 637: Mod: GY | Performed by: INTERNAL MEDICINE

## 2019-02-06 PROCEDURE — 85027 COMPLETE CBC AUTOMATED: CPT | Performed by: INTERNAL MEDICINE

## 2019-02-06 PROCEDURE — 25000125 ZZHC RX 250: Performed by: INTERNAL MEDICINE

## 2019-02-06 RX ORDER — DOXYCYCLINE HYCLATE 100 MG
100 TABLET ORAL 2 TIMES DAILY
Qty: 14 TABLET | Refills: 0 | Status: SHIPPED | OUTPATIENT
Start: 2019-02-06 | End: 2019-02-06

## 2019-02-06 RX ORDER — CEFUROXIME AXETIL 500 MG/1
500 TABLET ORAL 2 TIMES DAILY
Qty: 14 TABLET | Refills: 0 | Status: SHIPPED | OUTPATIENT
Start: 2019-02-06 | End: 2019-03-06

## 2019-02-06 RX ORDER — DOXYCYCLINE 25 MG/5ML
100 POWDER, FOR SUSPENSION ORAL 2 TIMES DAILY
Qty: 280 ML | Refills: 0 | Status: SHIPPED | OUTPATIENT
Start: 2019-02-06 | End: 2019-03-06

## 2019-02-06 RX ADMIN — HYDROMORPHONE HYDROCHLORIDE 1 MG: 1 INJECTION, SOLUTION INTRAMUSCULAR; INTRAVENOUS; SUBCUTANEOUS at 09:22

## 2019-02-06 RX ADMIN — VITAMIN D 2000 UNITS: 25 TAB ORAL at 10:14

## 2019-02-06 RX ADMIN — CYCLOSPORINE 25 MG: 25 CAPSULE, LIQUID FILLED ORAL at 10:16

## 2019-02-06 RX ADMIN — MYCOPHENOLATE MOFETIL 250 MG: 200 POWDER, FOR SUSPENSION ORAL at 10:11

## 2019-02-06 RX ADMIN — PREDNISONE 5 MG: 5 TABLET ORAL at 10:16

## 2019-02-06 RX ADMIN — FOLIC ACID 1 MG: 1 TABLET ORAL at 10:14

## 2019-02-06 RX ADMIN — CHLORHEXIDINE GLUCONATE 15 ML: 1.2 RINSE ORAL at 11:49

## 2019-02-06 RX ADMIN — ESCITALOPRAM OXALATE 20 MG: 10 TABLET ORAL at 10:12

## 2019-02-06 RX ADMIN — DOXYCYCLINE 100 MG: 100 INJECTION, POWDER, LYOPHILIZED, FOR SOLUTION INTRAVENOUS at 11:02

## 2019-02-06 RX ADMIN — LANSOPRAZOLE 30 MG: 15 TABLET, ORALLY DISINTEGRATING, DELAYED RELEASE ORAL at 10:10

## 2019-02-06 RX ADMIN — POLYETHYLENE GLYCOL 3350 17 G: 17 POWDER, FOR SOLUTION ORAL at 10:11

## 2019-02-06 RX ADMIN — SULFAMETHOXAZOLE AND TRIMETHOPRIM 80 MG: 200; 40 SUSPENSION ORAL at 10:11

## 2019-02-06 RX ADMIN — Medication: at 04:47

## 2019-02-06 RX ADMIN — FLUDROCORTISONE ACETATE 0.1 MG: 0.1 TABLET ORAL at 10:11

## 2019-02-06 RX ADMIN — CEFTRIAXONE SODIUM 1 G: 1 INJECTION, SOLUTION INTRAVENOUS at 09:31

## 2019-02-06 RX ADMIN — CYCLOSPORINE 100 MG: 100 CAPSULE, LIQUID FILLED ORAL at 10:12

## 2019-02-06 ASSESSMENT — ACTIVITIES OF DAILY LIVING (ADL)
ADLS_ACUITY_SCORE: 13

## 2019-02-06 ASSESSMENT — MIFFLIN-ST. JEOR: SCORE: 1547.25

## 2019-02-06 NOTE — PLAN OF CARE
"Patient resting in his room. Lung sounds clear, noted frequent cough that is non-productive. Tolerating fluids fine and a regular diet. Denies pain, N/V, numbness and tingling at this time. Administered Miralax at 2147 per patients request related to bloated stomach, see MAR. Patient is independent in his room. Noted patient ambulating in sandra without difficulty. Afebrile, vitals stable: /71   Pulse 75   Temp 98.7  F (37.1  C) (Tympanic)   Resp 18   Ht 1.778 m (5' 10\")   Wt 65.7 kg (144 lb 12.8 oz)   SpO2 99%   BMI 20.78 kg/m      David Jones RN 02/06/19 12:54 AM          "
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
## 2019-02-06 NOTE — PHARMACY - DISCHARGE MEDICATION RECONCILIATION AND EDUCATION
Pharmacy:  Discharge Counseling and Medication Reconciliation    Gilles Henning III  [address] [facility] [address]  [phone] (home)   49 year old male  PCP: Charlie Dubose    Allergies: Gabapentin    Discharge Counseling:    Pharmacist met with patient (and/or family) today to review the medication portion of the After Visit Summary (with an emphasis on NEW medications) and to address patient's questions/concerns.    Summary of Education: Provided education on cefuroxime, doxycyline, and Duonebs.  Dicussed spacing antibiotics at least 2 hours from Nutren tube feedings.     Materials Provided:  MedCounselor sheets printed from Clinical Pharmacology on: cefuroxime, doxycyline, and Duonebs.    Discharge Medication Reconciliation:    Jamel Winter [facility] has reviewed the patient's discharge medication orders and has compared them to the inpatient medication administration record and to what the patient was taking prior to admission - any discrepancies have been resolved.    It has been determined that the patient has an adequate supply of medications available or which can be obtained from the patient's preferred pharmacy, which HE/SHE has confirmed as: [facility] [An updated medication list will be faxed to the patient's pharmacy.]    Also spoke with transplant pharmacist, Teja ([phone]) at [facility]. He would like a discharge medication list sent to him ([phone]) and the transplant team ([phone]) for continuity of care.    Thank you for the consult.    Jamel Winter RP........February 6, 2019 1:14 PM

## 2019-02-06 NOTE — PROGRESS NOTES
Discharge Note      Data:  Gilles Henning III discharged to home at 1315 via wheel chair. Accompanied by spouse and staff.    Action:  Written discharge/follow-up instructions were provided to patient and spouse. Prescriptions sent with patient to fill and also to pharmacy of choice. All belongings sent with patient.    Response:  Patient and wife verbalized understanding of discharge instructions, reason for discharge, and necessary follow-up appointments.

## 2019-02-06 NOTE — PLAN OF CARE
"Alert and oriented, pleasant and cooperative, aware and knowledgeable of current condition and preventative measures to reduce risk of infection in future, LS clear, VSS, does have intermittent and frequent productive cough with clear to white phlegm, denies any chest pain or shortness of breath at this time    /82   Pulse 64   Temp 98.5  F (36.9  C) (Tympanic)   Resp 18   Ht 1.778 m (5' 10\")   Wt 68.1 kg (150 lb 2.1 oz)   SpO2 95%   BMI 21.54 kg/m      Deanne Yuen RN on 2/6/2019 at 10:58 AM      "
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
## 2019-02-06 NOTE — PLAN OF CARE
"Patients IV in right lower forearm infiltrated. Replaced with new IV that is patent and infusing in right mid forearm. Noted that patient has mild pain, but denies any medication at this time. Patient resting in room now. Afebrile, vitals stable: /67   Pulse 75   Temp 97.5  F (36.4  C) (Tympanic)   Resp 18   Ht 1.778 m (5' 10\")   Wt 68.1 kg (150 lb 2.1 oz)   SpO2 98%   BMI 21.54 kg/m      David Jones RN 02/06/19 5:36 AM        "

## 2019-02-07 LAB
CYCLOSPORINE BLD LC/MS/MS-MCNC: 265 UG/L (ref 50–400)
TME LAST DOSE: NORMAL H

## 2019-02-10 LAB
BACTERIA SPEC CULT: NORMAL
BACTERIA SPEC CULT: NORMAL
SPECIMEN SOURCE: NORMAL
SPECIMEN SOURCE: NORMAL

## 2019-02-11 NOTE — PROGRESS NOTES
This is a recent snapshot of the patient's [facility] medical record.  For current drug dose and complete information and questions, call [phone] or In Basket pool, fv home infusion ([id number])  CSN Number:  [id number]

## 2019-02-11 NOTE — PROGRESS NOTES
This is a recent snapshot of the patient's [facility] medical record.  For current drug dose and complete information and questions, call [phone] or In Basket pool, fv home infusion ([id number])  CSN Number:  [id number]

## 2019-02-12 DIAGNOSIS — K21.9 GASTROESOPHAGEAL REFLUX DISEASE, ESOPHAGITIS PRESENCE NOT SPECIFIED: ICD-10-CM

## 2019-02-13 ENCOUNTER — TELEPHONE (OUTPATIENT)
Dept: TRANSPLANT | Facility: CLINIC | Age: 49
End: 2019-02-13

## 2019-02-13 NOTE — TELEPHONE ENCOUNTER
Called [facility] and spoke with Keturah and she states that patient just came to them at 10 am this morning and stated that he was ready to live there.    Keturah states that patient states he is able to swallow much better.    Patient was noted to have appointment scheduled tomorrow with Dr. Dubose but is noted as canceled.    Keturah will have patient schedule appointment .    Demi Mathis RN on 2/13/2019 at 3:18 PM

## 2019-02-13 NOTE — TELEPHONE ENCOUNTER
Routing refill request to provider for review/approval because:  Medication is reported/historical    LOV: 9/11/18  Demi Mathis RN on 2/13/2019 at 1:40 PM

## 2019-02-13 NOTE — TELEPHONE ENCOUNTER
Is he able to swallow these or does he need it converted to a G-tube friendly format?    Signed, Charlie Dubose MD  Internal Medicine & Pediatrics

## 2019-02-13 NOTE — TELEPHONE ENCOUNTER
Post Kidney and Pancreas Transplant Team Conference  Date: 2/13/2019  Transplant Coordinator: Angeline Alonso     Attendees:  [x]  Dr. Macias [x] Thao Barnard, RN  [x] Blossom Montes LPN     [x]  Dr. Donato [x] Martha Morris RN [] Noelle Wagner LPN   [x]  Dr. Telles [x] Beth Evans, DONALD    [x]  Dr. Cabrera [x] Katelyn Gallegos RN    [x] Dr. Vallejo [x] Maliha Alonso, DONALD    [] Dr. Lorenzo [x] Mahad Steele RN    [] Dr. Finley [x] Mildred Barajas RN    [] Surgery Fellow [x] Gia Romero RN    [] Rachel Resendez NP              Verbal Plan Read Back:   No changes at this time.    Routed to RN Coordinator   Blossom Montes

## 2019-02-14 ENCOUNTER — TELEPHONE (OUTPATIENT)
Dept: TRANSPLANT | Facility: CLINIC | Age: 49
End: 2019-02-14

## 2019-02-14 RX ORDER — OMEPRAZOLE 40 MG/1
CAPSULE, DELAYED RELEASE ORAL
Qty: 90 CAPSULE | Refills: 3 | Status: ON HOLD | OUTPATIENT
Start: 2019-02-14 | End: 2019-05-09

## 2019-02-14 NOTE — LETTER
PHYSICIAN ORDERS      DATE & TIME ISSUED: 2019 9:43 AM  PATIENT NAME: Gilles Henning III   : 1970     [facility] MR# [if applicable]: [id number]     DIAGNOSIS:  Kidney transplant   ICD-10 CODE: Z94.0     Patient is taking Cyclosporine 125 mg by mouth twice daily     Any questions please call: [phone] option #5    please fax results to [phone].      Edmond Donato

## 2019-02-14 NOTE — TELEPHONE ENCOUNTER
Patient Call: General  Route to LPN    Reason for call: Patient called and requested we send his transplant med orders and a script for all meds we manage to his care facility. He stated the facility will no dispense his meds until they have the order. Fax number is: [phone]    Call back needed? No

## 2019-02-20 ENCOUNTER — APPOINTMENT (OUTPATIENT)
Dept: GENERAL RADIOLOGY | Facility: OTHER | Age: 49
End: 2019-02-20
Attending: PHYSICIAN ASSISTANT
Payer: MEDICARE

## 2019-02-20 ENCOUNTER — HOSPITAL ENCOUNTER (EMERGENCY)
Facility: OTHER | Age: 49
Discharge: HOME | End: 2019-02-20
Attending: PHYSICIAN ASSISTANT | Admitting: PHYSICIAN ASSISTANT
Payer: MEDICARE

## 2019-02-20 ENCOUNTER — TELEPHONE (OUTPATIENT)
Dept: TRANSPLANT | Facility: CLINIC | Age: 49
End: 2019-02-20

## 2019-02-20 VITALS
TEMPERATURE: 97.4 F | HEIGHT: 70 IN | OXYGEN SATURATION: 98 % | RESPIRATION RATE: 18 BRPM | BODY MASS INDEX: 21.24 KG/M2 | DIASTOLIC BLOOD PRESSURE: 87 MMHG | SYSTOLIC BLOOD PRESSURE: 131 MMHG

## 2019-02-20 VITALS
OXYGEN SATURATION: 99 % | RESPIRATION RATE: 16 BRPM | HEIGHT: 70 IN | TEMPERATURE: 97.2 F | DIASTOLIC BLOOD PRESSURE: 89 MMHG | WEIGHT: 148 LBS | SYSTOLIC BLOOD PRESSURE: 142 MMHG | BODY MASS INDEX: 21.19 KG/M2 | HEART RATE: 77 BPM

## 2019-02-20 DIAGNOSIS — T85.598A: ICD-10-CM

## 2019-02-20 DIAGNOSIS — T85.598D: ICD-10-CM

## 2019-02-20 PROCEDURE — 74018 RADEX ABDOMEN 1 VIEW: CPT | Mod: XU

## 2019-02-20 PROCEDURE — 99283 EMERGENCY DEPT VISIT LOW MDM: CPT | Performed by: PHYSICIAN ASSISTANT

## 2019-02-20 PROCEDURE — 99285 EMERGENCY DEPT VISIT HI MDM: CPT | Mod: 25,27 | Performed by: PHYSICIAN ASSISTANT

## 2019-02-20 PROCEDURE — 99282 EMERGENCY DEPT VISIT SF MDM: CPT | Mod: Z6 | Performed by: PHYSICIAN ASSISTANT

## 2019-02-20 PROCEDURE — 99283 EMERGENCY DEPT VISIT LOW MDM: CPT | Mod: 25 | Performed by: PHYSICIAN ASSISTANT

## 2019-02-20 PROCEDURE — 44500 INTRO GASTROINTESTINAL TUBE: CPT

## 2019-02-20 SDOH — HEALTH STABILITY: MENTAL HEALTH: HOW OFTEN DO YOU HAVE A DRINK CONTAINING ALCOHOL?: NEVER

## 2019-02-20 ASSESSMENT — ENCOUNTER SYMPTOMS
ABDOMINAL PAIN: 0
BACK PAIN: 0
BRUISES/BLEEDS EASILY: 0
CONFUSION: 0
CHILLS: 0
ADENOPATHY: 0
HEMATURIA: 0
CHEST TIGHTNESS: 0
WOUND: 0
SHORTNESS OF BREATH: 0
FEVER: 0

## 2019-02-20 ASSESSMENT — MIFFLIN-ST. JEOR: SCORE: 1537.57

## 2019-02-20 NOTE — DISCHARGE INSTRUCTIONS
Get plenty of fluids and rest.  Return to the ED if your symptoms worsen, otherwise follow-up with your PCP.

## 2019-02-20 NOTE — ED AVS SNAPSHOT
[facility]  [address]  [place] [address]  Phone:  [phone]  Fax:  [phone]                                    Gilles Henning III   MRN: [id number]    Department:  [facility]   Date of Visit:  2/20/2019           After Visit Summary Signature Page    I have received my discharge instructions, and my questions have been answered. I have discussed any challenges I see with this plan with the nurse or doctor.    ..........................................................................................................................................  Patient/Patient Representative Signature      ..........................................................................................................................................  Patient Representative Print Name and Relationship to Patient    ..................................................               ................................................  Date                                   Time    ..........................................................................................................................................  Reviewed by Signature/Title    ...................................................              ..............................................  Date                                               Time          22EPIC Rev 08/18

## 2019-02-20 NOTE — ED AVS SNAPSHOT
[facility]  [address]  [place] [address]  Phone:  [phone]  Fax:  [phone]                                    Gilles Henning III   MRN: [id number]    Department:  [facility]   Date of Visit:  2/20/2019           After Visit Summary Signature Page    I have received my discharge instructions, and my questions have been answered. I have discussed any challenges I see with this plan with the nurse or doctor.    ..........................................................................................................................................  Patient/Patient Representative Signature      ..........................................................................................................................................  Patient Representative Print Name and Relationship to Patient    ..................................................               ................................................  Date                                   Time    ..........................................................................................................................................  Reviewed by Signature/Title    ...................................................              ..............................................  Date                                               Time          22EPIC Rev 08/18
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
## 2019-02-20 NOTE — TELEPHONE ENCOUNTER
Patient left message stating that upon discharge from [facility] he was discharged from tube feeding.  Patient states that he is still unable to drink and needs assistance with tube feedings.  Patient also reports that he is living at [facility] now.  No phone number for facility given.

## 2019-02-20 NOTE — ED TRIAGE NOTES
"Pt to ER d/t clog in his NG tube since last night.  He attempted to flush and use a \"declogger\", no results.  Pt takes his medications and fluid though tube, has had none since last night. Is taking transplant medications. Pt lives at [facility]. Pt took bus to ER.   "

## 2019-02-21 ENCOUNTER — APPOINTMENT (OUTPATIENT)
Dept: GENERAL RADIOLOGY | Facility: OTHER | Age: 49
End: 2019-02-21
Attending: PHYSICIAN ASSISTANT
Payer: MEDICARE

## 2019-02-21 ENCOUNTER — HOSPITAL ENCOUNTER (EMERGENCY)
Facility: OTHER | Age: 49
Discharge: HOME | End: 2019-02-21
Attending: PHYSICIAN ASSISTANT | Admitting: PHYSICIAN ASSISTANT
Payer: MEDICARE

## 2019-02-21 VITALS
HEART RATE: 92 BPM | BODY MASS INDEX: 21.19 KG/M2 | WEIGHT: 148 LBS | DIASTOLIC BLOOD PRESSURE: 86 MMHG | OXYGEN SATURATION: 98 % | RESPIRATION RATE: 16 BRPM | HEIGHT: 70 IN | TEMPERATURE: 97.1 F | SYSTOLIC BLOOD PRESSURE: 132 MMHG

## 2019-02-21 DIAGNOSIS — T85.598D: ICD-10-CM

## 2019-02-21 DIAGNOSIS — Z46.59 VISIT FOR FEEDING TUBE PLACEMENT: ICD-10-CM

## 2019-02-21 PROCEDURE — 99283 EMERGENCY DEPT VISIT LOW MDM: CPT | Mod: Z6 | Performed by: PHYSICIAN ASSISTANT

## 2019-02-21 PROCEDURE — 44500 INTRO GASTROINTESTINAL TUBE: CPT

## 2019-02-21 PROCEDURE — 74018 RADEX ABDOMEN 1 VIEW: CPT | Mod: XU

## 2019-02-21 PROCEDURE — 99284 EMERGENCY DEPT VISIT MOD MDM: CPT | Mod: 25 | Performed by: PHYSICIAN ASSISTANT

## 2019-02-21 PROCEDURE — 70210 X-RAY EXAM OF SINUSES: CPT

## 2019-02-21 PROCEDURE — 70360 X-RAY EXAM OF NECK: CPT

## 2019-02-21 RX ORDER — SODIUM BICARBONATE TAB 650 MG 650 MG
325 TAB ORAL ONCE
Status: DISCONTINUED | OUTPATIENT
Start: 2019-02-21 | End: 2019-02-21

## 2019-02-21 RX ORDER — SODIUM BICARBONATE 650 MG/1
650 TABLET ORAL ONCE
Status: CANCELLED | OUTPATIENT
Start: 2019-02-21

## 2019-02-21 RX ORDER — SODIUM BICARBONATE TAB 650 MG 650 MG
325 TAB ORAL ONCE
Status: DISCONTINUED | OUTPATIENT
Start: 2019-02-21 | End: 2019-02-21 | Stop reason: HOSPADM

## 2019-02-21 ASSESSMENT — MIFFLIN-ST. JEOR: SCORE: 1537.57

## 2019-02-21 NOTE — DISCHARGE INSTRUCTIONS
FOLLOW UP WITH YOUR DOCTOR IN THE NEXT 24 HOURS FOR A RECHECK  RETURN TO THE ER IF SYMPTOMS WORSEN OR IF YOU HAVE FURTHER CONCERNS   TAKE ALL PREVIOUS AND ANY NEW MEDS AS PRESCRIBED       THE DISCHARGE INSTRUCTIONS ARE INTENDED AS A COMPLEMENT TO AND NOT A REPLACEMENT FOR THE VERBAL INSTRUCTIONS THAT I HAVE PROVIDED YOU TONIGHT. AFTER GOING OVER THE PLAN OF CARE TONIGHT, INCLUDING SIDE EFFECTS, ADVERSE REACTIONS OF ALL MEDICATIONS PRESCRIBED (THIS WILL BE PROVIDED TO YOU AT THE PHARMACY ALSO) AND PROVIDING YOU WITH THE VERBAL INSTRUCTIONS AT DISCHARGE YOU HAVE HAD THE OPPORTUNITY TO ASK FURTHER QUESTIONS AND TO CLARIFY UNCERTAINTIES. SINCE YOU HAVE NO FURTHER QUESTIONS PLEASE HAVE A WONDERFUL SAFE EVENING THANK YOU.

## 2019-02-21 NOTE — ED AVS SNAPSHOT
[facility]  [address]  [place] [address]  Phone:  [phone]  Fax:  [phone]                                    Gilles Henning III   MRN: [id number]    Department:  [facility]   Date of Visit:  2/21/2019           After Visit Summary Signature Page    I have received my discharge instructions, and my questions have been answered. I have discussed any challenges I see with this plan with the nurse or doctor.    ..........................................................................................................................................  Patient/Patient Representative Signature      ..........................................................................................................................................  Patient Representative Print Name and Relationship to Patient    ..................................................               ................................................  Date                                   Time    ..........................................................................................................................................  Reviewed by Signature/Title    ...................................................              ..............................................  Date                                               Time          22EPIC Rev 08/18

## 2019-02-21 NOTE — ED TRIAGE NOTES
"Patient presents with concerns that his NG is kinked. States he used it for a feeding this morning and it worked good. Tried the \"clog buster\" he has which didn't work. Can not pull anything back or put anything in. Had it replaced in radiology yesterday. Mili Daley RN on 2/21/2019 at 12:13 PM    "
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
## 2019-02-22 ENCOUNTER — TELEPHONE (OUTPATIENT)
Dept: PEDIATRICS | Facility: OTHER | Age: 49
End: 2019-02-22

## 2019-02-22 NOTE — TELEPHONE ENCOUNTER
Pt was seen in the ED yesterday for NG tube issues.  Call placed to [facility] to see how pt was doing and to get a F/U appt with Charlie Dubose MD.  RN will call back when she gets into facility.  Moon Cardona LPN ....................  2/22/2019   8:40 AM
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
## 2019-02-26 NOTE — TELEPHONE ENCOUNTER
Spoke to Yasmin SHEPHERD with [facility] and ER F/U was scheduled for the 7th.  Moon Cardona LPN ....................  2/26/2019   1:16 PM

## 2019-02-27 ENCOUNTER — TELEPHONE (OUTPATIENT)
Dept: TRANSPLANT | Facility: CLINIC | Age: 49
End: 2019-02-27

## 2019-02-27 NOTE — TELEPHONE ENCOUNTER
Hector does all fluids through feeding tube, he puts 2L water in daily. Cellcept via feeding tube, Cyclosporine orally.    Rituxan on Tuesday with Dr. Tobin.    Will have labs drawn tomorrow of Friday.     Hector is doing well now that he is in assisted living and has ride service.

## 2019-03-01 DIAGNOSIS — E87.20 ACIDOSIS: Primary | ICD-10-CM

## 2019-03-01 NOTE — TELEPHONE ENCOUNTER
Routing refill request to provider for review/approval because:  Drug not on the [facility] refill protocol     Last filled 1-29-19 for #90 X 0 refills.  Michelle San RN on 3/1/2019 at 1:58 PM

## 2019-03-04 RX ORDER — SODIUM BICARBONATE 650 MG/1
650 TABLET ORAL 2 TIMES DAILY
Qty: 90 TABLET | Refills: 0 | Status: ON HOLD | OUTPATIENT
Start: 2019-03-04 | End: 2019-05-09

## 2019-03-06 ENCOUNTER — APPOINTMENT (OUTPATIENT)
Dept: GENERAL RADIOLOGY | Facility: OTHER | Age: 49
End: 2019-03-06
Attending: FAMILY MEDICINE
Payer: MEDICARE

## 2019-03-06 ENCOUNTER — APPOINTMENT (OUTPATIENT)
Dept: CT IMAGING | Facility: OTHER | Age: 49
End: 2019-03-06
Attending: FAMILY MEDICINE
Payer: MEDICARE

## 2019-03-06 ENCOUNTER — HOSPITAL ENCOUNTER (EMERGENCY)
Facility: OTHER | Age: 49
Discharge: OTHER INSTITUTION | End: 2019-03-06
Attending: FAMILY MEDICINE | Admitting: FAMILY MEDICINE
Payer: MEDICARE

## 2019-03-06 VITALS
HEIGHT: 70 IN | DIASTOLIC BLOOD PRESSURE: 85 MMHG | RESPIRATION RATE: 14 BRPM | SYSTOLIC BLOOD PRESSURE: 133 MMHG | OXYGEN SATURATION: 95 % | WEIGHT: 144 LBS | BODY MASS INDEX: 20.62 KG/M2 | TEMPERATURE: 97 F | HEART RATE: 82 BPM

## 2019-03-06 DIAGNOSIS — M62.81 GENERALIZED MUSCLE WEAKNESS: ICD-10-CM

## 2019-03-06 DIAGNOSIS — R79.89 ELEVATED SERUM CREATININE: ICD-10-CM

## 2019-03-06 LAB
ALBUMIN SERPL-MCNC: 3.7 G/DL (ref 3.5–5.7)
ALBUMIN UR-MCNC: NEGATIVE MG/DL
ALP SERPL-CCNC: 69 U/L (ref 34–104)
ALT SERPL W P-5'-P-CCNC: 16 U/L (ref 7–52)
ANION GAP SERPL CALCULATED.3IONS-SCNC: 8 MMOL/L (ref 3–14)
APPEARANCE UR: CLEAR
AST SERPL W P-5'-P-CCNC: 38 U/L (ref 13–39)
BASOPHILS # BLD AUTO: 0.1 10E9/L (ref 0–0.2)
BASOPHILS NFR BLD AUTO: 0.9 %
BILIRUB SERPL-MCNC: 0.9 MG/DL (ref 0.3–1)
BILIRUB UR QL STRIP: NEGATIVE
BUN SERPL-MCNC: 19 MG/DL (ref 7–25)
CALCIUM SERPL-MCNC: 9.5 MG/DL (ref 8.6–10.3)
CHLORIDE SERPL-SCNC: 100 MMOL/L (ref 98–107)
CO2 SERPL-SCNC: 22 MMOL/L (ref 21–31)
COLOR UR AUTO: YELLOW
CREAT SERPL-MCNC: 1.73 MG/DL (ref 0.7–1.3)
DIFFERENTIAL METHOD BLD: ABNORMAL
EOSINOPHIL # BLD AUTO: 0.2 10E9/L (ref 0–0.7)
EOSINOPHIL NFR BLD AUTO: 3.9 %
ERYTHROCYTE [DISTWIDTH] IN BLOOD BY AUTOMATED COUNT: 13.7 % (ref 10–15)
GFR SERPL CREATININE-BSD FRML MDRD: 42 ML/MIN/{1.73_M2}
GLUCOSE SERPL-MCNC: 134 MG/DL (ref 70–105)
GLUCOSE UR STRIP-MCNC: NEGATIVE MG/DL
HCT VFR BLD AUTO: 30.9 % (ref 40–53)
HGB BLD-MCNC: 10.2 G/DL (ref 13.3–17.7)
HGB UR QL STRIP: NEGATIVE
IMM GRANULOCYTES # BLD: 0 10E9/L (ref 0–0.4)
IMM GRANULOCYTES NFR BLD: 0.6 %
KETONES UR STRIP-MCNC: NEGATIVE MG/DL
LEUKOCYTE ESTERASE UR QL STRIP: NEGATIVE
LYMPHOCYTES # BLD AUTO: 0.8 10E9/L (ref 0.8–5.3)
LYMPHOCYTES NFR BLD AUTO: 14.8 %
MCH RBC QN AUTO: 30.6 PG (ref 26.5–33)
MCHC RBC AUTO-ENTMCNC: 33 G/DL (ref 31.5–36.5)
MCV RBC AUTO: 93 FL (ref 78–100)
MONOCYTES # BLD AUTO: 0.8 10E9/L (ref 0–1.3)
MONOCYTES NFR BLD AUTO: 15 %
NEUTROPHILS # BLD AUTO: 3.5 10E9/L (ref 1.6–8.3)
NEUTROPHILS NFR BLD AUTO: 64.8 %
NITRATE UR QL: NEGATIVE
PH UR STRIP: 7 PH (ref 5–9)
PLATELET # BLD AUTO: 152 10E9/L (ref 150–450)
POTASSIUM SERPL-SCNC: 4.1 MMOL/L (ref 3.5–5.1)
PROT SERPL-MCNC: 6.8 G/DL (ref 6.4–8.9)
RBC # BLD AUTO: 3.33 10E12/L (ref 4.4–5.9)
SODIUM SERPL-SCNC: 130 MMOL/L (ref 134–144)
SOURCE: NORMAL
SP GR UR STRIP: <1.005 (ref 1–1.03)
UROBILINOGEN UR STRIP-ACNC: 1 EU/DL (ref 0.2–1)
WBC # BLD AUTO: 5.3 10E9/L (ref 4–11)

## 2019-03-06 PROCEDURE — 80053 COMPREHEN METABOLIC PANEL: CPT | Performed by: FAMILY MEDICINE

## 2019-03-06 PROCEDURE — 80180 DRUG SCRN QUAN MYCOPHENOLATE: CPT | Performed by: FAMILY MEDICINE

## 2019-03-06 PROCEDURE — 36415 COLL VENOUS BLD VENIPUNCTURE: CPT | Performed by: FAMILY MEDICINE

## 2019-03-06 PROCEDURE — 96360 HYDRATION IV INFUSION INIT: CPT | Mod: XU | Performed by: FAMILY MEDICINE

## 2019-03-06 PROCEDURE — 73630 X-RAY EXAM OF FOOT: CPT | Mod: TC,RT

## 2019-03-06 PROCEDURE — 25500064 ZZH RX 255 OP 636: Performed by: FAMILY MEDICINE

## 2019-03-06 PROCEDURE — 80158 DRUG ASSAY CYCLOSPORINE: CPT | Performed by: FAMILY MEDICINE

## 2019-03-06 PROCEDURE — A9270 NON-COVERED ITEM OR SERVICE: HCPCS | Mod: GY | Performed by: FAMILY MEDICINE

## 2019-03-06 PROCEDURE — 70491 CT SOFT TISSUE NECK W/DYE: CPT | Mod: TC

## 2019-03-06 PROCEDURE — 71046 X-RAY EXAM CHEST 2 VIEWS: CPT | Mod: TC

## 2019-03-06 PROCEDURE — 25000128 H RX IP 250 OP 636: Performed by: FAMILY MEDICINE

## 2019-03-06 PROCEDURE — 85025 COMPLETE CBC W/AUTO DIFF WBC: CPT | Performed by: FAMILY MEDICINE

## 2019-03-06 PROCEDURE — 81003 URINALYSIS AUTO W/O SCOPE: CPT | Performed by: FAMILY MEDICINE

## 2019-03-06 PROCEDURE — 99285 EMERGENCY DEPT VISIT HI MDM: CPT | Mod: 25 | Performed by: FAMILY MEDICINE

## 2019-03-06 PROCEDURE — 99284 EMERGENCY DEPT VISIT MOD MDM: CPT | Mod: Z6 | Performed by: FAMILY MEDICINE

## 2019-03-06 PROCEDURE — 25000132 ZZH RX MED GY IP 250 OP 250 PS 637: Mod: GY | Performed by: FAMILY MEDICINE

## 2019-03-06 RX ORDER — IODIXANOL 320 MG/ML
100 INJECTION, SOLUTION INTRAVASCULAR ONCE
Status: COMPLETED | OUTPATIENT
Start: 2019-03-06 | End: 2019-03-06

## 2019-03-06 RX ORDER — MAGNESIUM HYDROXIDE/ALUMINUM HYDROXICE/SIMETHICONE 120; 1200; 1200 MG/30ML; MG/30ML; MG/30ML
30 SUSPENSION ORAL EVERY 4 HOURS PRN
COMMUNITY
End: 2019-03-07

## 2019-03-06 RX ORDER — LOPERAMIDE HYDROCHLORIDE 2 MG/1
2 TABLET ORAL 4 TIMES DAILY PRN
COMMUNITY
End: 2019-03-22

## 2019-03-06 RX ORDER — OXYCODONE HCL 5 MG/5 ML
5 SOLUTION, ORAL ORAL ONCE
Status: COMPLETED | OUTPATIENT
Start: 2019-03-06 | End: 2019-03-06

## 2019-03-06 RX ORDER — EMOLLIENT BASE
CREAM (GRAM) TOPICAL EVERY MORNING
COMMUNITY
End: 2019-06-06

## 2019-03-06 RX ADMIN — OXYCODONE HYDROCHLORIDE 5 MG: 5 SOLUTION ORAL at 20:45

## 2019-03-06 RX ADMIN — SODIUM CHLORIDE 1000 ML: 9 INJECTION, SOLUTION INTRAVENOUS at 21:34

## 2019-03-06 RX ADMIN — IODIXANOL 100 ML: 320 INJECTION, SOLUTION INTRAVASCULAR at 19:51

## 2019-03-06 ASSESSMENT — ENCOUNTER SYMPTOMS
GASTROINTESTINAL NEGATIVE: 1
SPEECH DIFFICULTY: 0
FEVER: 1
CARDIOVASCULAR NEGATIVE: 1
WEAKNESS: 1
SHORTNESS OF BREATH: 1
CHOKING: 0
APPETITE CHANGE: 1
MUSCULOSKELETAL NEGATIVE: 1
CHILLS: 1
EYES NEGATIVE: 1
LIGHT-HEADEDNESS: 1
COUGH: 0
ACTIVITY CHANGE: 1

## 2019-03-06 ASSESSMENT — MIFFLIN-ST. JEOR: SCORE: 1519.43

## 2019-03-06 NOTE — ED AVS SNAPSHOT
[facility]  [address]  [place] [address]  Phone:  [phone]  Fax:  [phone]                                    Gilles Henning III   MRN: [id number]    Department:  [facility]   Date of Visit:  3/6/2019           After Visit Summary Signature Page    I have received my discharge instructions, and my questions have been answered. I have discussed any challenges I see with this plan with the nurse or doctor.    ..........................................................................................................................................  Patient/Patient Representative Signature      ..........................................................................................................................................  Patient Representative Print Name and Relationship to Patient    ..................................................               ................................................  Date                                   Time    ..........................................................................................................................................  Reviewed by Signature/Title    ...................................................              ..............................................  Date                                               Time          22EPIC Rev 08/18

## 2019-03-06 NOTE — ED TRIAGE NOTES
EMS Arrival Note  ________________________________  Gilles Henning III is a 49 year old Male [facility]  Pre hospital clinical presentation per patient  includes has been feeling weak and tired recently, did have pneumonia in the last month, pt states he has been instilling a large amount of water into G-tube due to feeling weak, also, has a nodule in left lower jaw that is painful, and also dropped a box on his right foot and is having difficulty walking on it, it has bruising over toes and outer aspect of upper foot.  Pre hospital personnel report vital signs of:  B/P 137/87;SpO2 1005  GCS 15  Airway abnormal has a trach that is plugged   Breathing Assessment Abnormal.    Circulation Assessment Normal.    Placed in room 904, gowned, warm blanket provided, side rails up,  ID verified and band placed, and call light within reach.       Previous living situation Group Home

## 2019-03-07 ENCOUNTER — OFFICE VISIT (OUTPATIENT)
Dept: PEDIATRICS | Facility: OTHER | Age: 49
End: 2019-03-07
Attending: INTERNAL MEDICINE
Payer: MEDICARE

## 2019-03-07 ENCOUNTER — TELEPHONE (OUTPATIENT)
Dept: TRANSPLANT | Facility: CLINIC | Age: 49
End: 2019-03-07

## 2019-03-07 VITALS
SYSTOLIC BLOOD PRESSURE: 134 MMHG | TEMPERATURE: 98 F | BODY MASS INDEX: 20.7 KG/M2 | HEART RATE: 86 BPM | RESPIRATION RATE: 14 BRPM | DIASTOLIC BLOOD PRESSURE: 82 MMHG | WEIGHT: 144.6 LBS | HEIGHT: 70 IN

## 2019-03-07 DIAGNOSIS — Z94.0 KIDNEY REPLACED BY TRANSPLANT: Primary | ICD-10-CM
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
## 2019-03-07 DIAGNOSIS — S09.93XD FACIAL INJURY, SUBSEQUENT ENCOUNTER: Primary | ICD-10-CM

## 2019-03-07 DIAGNOSIS — F10.11 ALCOHOL ABUSE, IN REMISSION: ICD-10-CM

## 2019-03-07 PROCEDURE — 80307 DRUG TEST PRSMV CHEM ANLYZR: CPT | Performed by: INTERNAL MEDICINE

## 2019-03-07 PROCEDURE — G0463 HOSPITAL OUTPT CLINIC VISIT: HCPCS

## 2019-03-07 PROCEDURE — 99214 OFFICE O/P EST MOD 30 MIN: CPT | Performed by: INTERNAL MEDICINE

## 2019-03-07 RX ORDER — OXYCODONE HYDROCHLORIDE 5 MG/1
5 TABLET ORAL
Qty: 30 TABLET | Refills: 0 | Status: ON HOLD | OUTPATIENT
Start: 2019-03-07 | End: 2019-05-09

## 2019-03-07 RX ORDER — POLYETHYLENE GLYCOL 3350 17 G/17G
1 POWDER, FOR SOLUTION ORAL 2 TIMES DAILY PRN
COMMUNITY
Start: 2019-03-07

## 2019-03-07 RX ORDER — OXYCODONE HYDROCHLORIDE 5 MG/1
5 TABLET ORAL
Qty: 30 TABLET | Refills: 0 | Status: SHIPPED | OUTPATIENT
Start: 2019-03-07 | End: 2019-03-07

## 2019-03-07 ASSESSMENT — ANXIETY QUESTIONNAIRES
2. NOT BEING ABLE TO STOP OR CONTROL WORRYING: NEARLY EVERY DAY
6. BECOMING EASILY ANNOYED OR IRRITABLE: NEARLY EVERY DAY
1. FEELING NERVOUS, ANXIOUS, OR ON EDGE: NEARLY EVERY DAY
7. FEELING AFRAID AS IF SOMETHING AWFUL MIGHT HAPPEN: NOT AT ALL
IF YOU CHECKED OFF ANY PROBLEMS ON THIS QUESTIONNAIRE, HOW DIFFICULT HAVE THESE PROBLEMS MADE IT FOR YOU TO DO YOUR WORK, TAKE CARE OF THINGS AT HOME, OR GET ALONG WITH OTHER PEOPLE: SOMEWHAT DIFFICULT
GAD7 TOTAL SCORE: 18
3. WORRYING TOO MUCH ABOUT DIFFERENT THINGS: NEARLY EVERY DAY
5. BEING SO RESTLESS THAT IT IS HARD TO SIT STILL: NEARLY EVERY DAY

## 2019-03-07 ASSESSMENT — MIFFLIN-ST. JEOR: SCORE: 1522.15

## 2019-03-07 ASSESSMENT — PAIN SCALES - GENERAL: PAINLEVEL: SEVERE PAIN (6)

## 2019-03-07 ASSESSMENT — PATIENT HEALTH QUESTIONNAIRE - PHQ9
5. POOR APPETITE OR OVEREATING: NEARLY EVERY DAY
SUM OF ALL RESPONSES TO PHQ QUESTIONS 1-9: 15
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
## 2019-03-07 NOTE — PATIENT INSTRUCTIONS
-- Local Speech therapy consult   -- Schedule follow-up with OMFS   -- Schedule follow-up with Plastic surgeon   -- Consider PEG tube if surgeries will be delayed further   -- Establish with a local dentist or [facility] dentist   -- Per ENT plan for remove tracheostomy after surgeries are completed, follow-up with Dr. Soliz as planned   -- Mental health care per [facility] therapy and med manager   -- Daily exercise     -- Resume oxycodone 5 mg at bedtime as needed for severe pain   -- Toxassure today

## 2019-03-07 NOTE — TELEPHONE ENCOUNTER
Star Macias MD Riad, Samy Magdy, MD; Angeline Alonso RN             He is getting it for EBV viremia and I don't think I would be worried about BK.     Gianni    Previous Messages      ----- Message -----   From: Edmond Donato MD   Sent: 3/6/2019   9:15 PM   To: Star Macias MD, *   Subject: what is the plan with rituximab                   Hector was in the ED tonight feeling week.   Looks like he is scheduled for rituximab but his BK has not checked and his CSA has not checked recently.   I am worried about using rituximab and his BK viral.   He will be getting fluids tonight and may be going home after             RNCC confirmed with Hector - ritux to be given 3/8/19. Okay to proceed.

## 2019-03-07 NOTE — LETTER
March 14, 2019      Gilles HERNANDEZ Henning [facility]  [address]  [facility] [address]        Dear ,    We are writing to inform you of your test results.    Your drug screen is positive for marijuana, which is a violation of the contract.  Consider this your warning and if a second abnormal drug screen occurs, your narcotic contract with be terminated.    Signed, Charlie Dubose MD  Internal Medicine & Pediatrics      Resulted Orders   Pain Drug Scr UR W Rptd Meds   Result Value Ref Range    Pain Drug SCR UR W RPTD Meds FINAL       Comment:      (Note)  ====================================================================  [facility] COMP DRUG ANALYSIS,UR  ====================================================================  Specimen Alert  Note:  Urinary creatinine is low; ability to detect some  drugs may be compromised.  Interpret results  with caution.  ====================================================================  Test                             Result       Flag       Units        Drug Present   Carboxy-THC                    168                     ng/mg creat    Carboxy-THC is a metabolite of tetrahydrocannabinol  (THC).    Source of THC is most commonly illicit, but THC is also present    in a scheduled prescription medication.   Citalopram                     PRESENT                               Desmethylcitalopram            PRESENT                                Desmethylcitalopram is an expected metabolite of citalopram or    the enantiomeric form, escitalopram.   Trazodone                      PRESENT                                 1,3 chlorophenyl piperazine    PRESENT                                1,3-chlorophenyl piperazine is an expected metabolite of    trazodone.  ====================================================================  Test                      Result    Flag   Units      Ref Range        Creatinine              19        L      mg/dL      >=20             ====================================================================  Declared Medications:  Medication list was not provided.  ====================================================================  For clinical consultation, please call [phone].  ====================================================================  Analysis performed by [facility] [place] [address]         If you have any questions or concerns, please call the clinic at the number listed above.       Sincerely,        Charlie Dubose MD

## 2019-03-07 NOTE — NURSING NOTE
Patient presents to clinic to F/U on multiple ER visits over the last few weeks.  Moon Cardona LPN ....................  3/7/2019   12:50 PM    No LMP for male patient.  Medication Reconciliation: complete    Moon Cardona LPN  3/7/2019 12:50 PM

## 2019-03-07 NOTE — TELEPHONE ENCOUNTER
RNCC reviewed the following regarding increase in creatinine:    Hector self-stopped florinef about 4 days ago due to hypertension of 150s / 100s.  BPs have now been running 120s / 70s.     Denies any change in UO, denies fevers, denies abdominal pain.    Hector administers 2.5 - 3L daily via [email or web address].     He will be having labs drawn tomorrow with Ritux (including CSA) at [facility].    Hector will also set up ride to [facility] for labs on Tuesday, 3/12.

## 2019-03-07 NOTE — PROGRESS NOTES
1.  Has the patient had a previous reaction to IV contrast? no    2.  Does the patient have kidney disease? no    3.  Is the patient on dialysis? no    If YES to any of these questions, exam will be reviewed with a Radiologist before administering contrast.

## 2019-03-07 NOTE — TELEPHONE ENCOUNTER
Blossom Montes, LPN, left message at [facility] for Hector to call back when available.     Will need to discuss Rituxan and elevated creatinine, most recent ED visit.

## 2019-03-08 LAB
CYCLOSPORINE BLD LC/MS/MS-MCNC: 189 UG/L (ref 50–400)
TME LAST DOSE: NORMAL H

## 2019-03-08 ASSESSMENT — ANXIETY QUESTIONNAIRES: GAD7 TOTAL SCORE: 18

## 2019-03-09 LAB
MYCOPHENOLATE SERPL LC/MS/MS-MCNC: 0.39 MG/L (ref 1–3.5)
MYCOPHENOLATE-G SERPL LC/MS/MS-MCNC: 33.1 MG/L (ref 30–95)
TME LAST DOSE: ABNORMAL H

## 2019-03-11 ENCOUNTER — TELEPHONE (OUTPATIENT)
Dept: TRANSPLANT | Facility: CLINIC | Age: 49
End: 2019-03-11

## 2019-03-11 DIAGNOSIS — Z94.0 KIDNEY REPLACED BY TRANSPLANT: ICD-10-CM

## 2019-03-11 NOTE — LETTER
PHYSICIAN ORDERS      DATE & TIME ISSUED: 2019 9:36 AM  PATIENT NAME: Gilles Henning III   : 1970     [facility] MR# [if applicable]: [id number]     DIAGNOSIS:  Kidney replaced by transplant  ICD-10 CODE: Z94.0     Due to elevated cyclosporine drug level,  REDUCE cyclosporine dose to 75mg PO, BID.  Repeat transplant labs on 3/14/19 at 0800 (orders sent to [facility]).    Any questions please call: Maliha Alonso, RN, BSN                                             Transplant Care Coordinator                                             [phone]      Edmond Donato MD

## 2019-03-11 NOTE — TELEPHONE ENCOUNTER
ISSUE:   Cyclosporinet level 250 on 3/8/19, goal 75, dose 125 mg BID    PLAN:   Decrease dose to 75 mg BID and recheck level Thursday.  Orders faxed to nursing at [facility].

## 2019-03-12 DIAGNOSIS — B27.00 EBV (EPSTEIN-BARR VIRUS) VIREMIA: ICD-10-CM

## 2019-03-12 DIAGNOSIS — B34.8 BK VIREMIA: ICD-10-CM

## 2019-03-12 DIAGNOSIS — Z94.0 KIDNEY REPLACED BY TRANSPLANT: Primary | ICD-10-CM

## 2019-03-12 RX ORDER — CYCLOSPORINE 100 MG/1
CAPSULE, LIQUID FILLED ORAL
Qty: 180 CAPSULE | Refills: 3 | Status: SHIPPED | OUTPATIENT
Start: 2019-03-12 | End: 2019-04-15

## 2019-03-12 RX ORDER — CYCLOSPORINE 25 MG/1
75 CAPSULE, LIQUID FILLED ORAL 2 TIMES DAILY
Qty: 540 CAPSULE | Refills: 3 | Status: SHIPPED | OUTPATIENT
Start: 2019-03-12 | End: 2019-03-22

## 2019-03-12 NOTE — TELEPHONE ENCOUNTER
RNCC spoke with nursing staff at [facility] who confirmed that medication dose chagnes must be faxed, signed MD orders.    Nursing confirmed dosing times of 8am and 8pm, confirmed that they can set up rides - will have labs rechecked at 8am on 3/14 at [facility]. RNCC asked for them to have Hector call me and let him know about lab redraw.

## 2019-03-12 NOTE — TELEPHONE ENCOUNTER
Post Kidney and Pancreas Transplant Team Conference  Date: 3/12/2019  Transplant Coordinator: Angeline Alonso     Attendees:  []  Dr. Macias [] Thao Barnard, DONALD  [x] Blossom Montes LPN     [x]  Dr. Donato [] Martha Morris, DONALD [] Noelle Wagner LPN   []  Dr. Telles [] Beth Evans RN    []  Dr. Cabrera [] Katelyn Gallegos RN    [] Dr. Lorenzo [x] Maliha Alonso RN    [] Dr. Finley [] Mahad Steele RN    [] Dr. Parsons [] Mildred Barajas RN    [] Surgery Fellow [] Gia Romero RN    [] Rachel Resendez, KHARI [] Marianna Fortune RN    [] Jacob López, PharmD [] Arash Agarwal RN     [] Thao Villarreal RN        Verbal Plan Read Back:   Due to BK, no additional ritux (1 dose only - given 3/8/19) for EBV viremia.  CSA goal of 75 - 100.    Routed to RN Coordinator   Angeline Alonso

## 2019-03-13 ENCOUNTER — TELEPHONE (OUTPATIENT)
Dept: PEDIATRICS | Facility: OTHER | Age: 49
End: 2019-03-13

## 2019-03-13 ENCOUNTER — RESULTS ONLY (OUTPATIENT)
Dept: OTHER | Facility: CLINIC | Age: 49
End: 2019-03-13

## 2019-03-13 DIAGNOSIS — E43 SEVERE PROTEIN-CALORIE MALNUTRITION (H): ICD-10-CM

## 2019-03-13 DIAGNOSIS — B34.8 BK VIREMIA: ICD-10-CM

## 2019-03-13 DIAGNOSIS — Z94.0 KIDNEY TRANSPLANT RECIPIENT: ICD-10-CM

## 2019-03-13 DIAGNOSIS — Z48.298 AFTERCARE FOLLOWING ORGAN TRANSPLANT: ICD-10-CM

## 2019-03-13 DIAGNOSIS — B27.00 EBV (EPSTEIN-BARR VIRUS) VIREMIA: ICD-10-CM

## 2019-03-13 DIAGNOSIS — Z94.0 KIDNEY REPLACED BY TRANSPLANT: Primary | ICD-10-CM

## 2019-03-13 DIAGNOSIS — Z94.0 KIDNEY REPLACED BY TRANSPLANT: ICD-10-CM

## 2019-03-13 LAB
ANION GAP SERPL CALCULATED.3IONS-SCNC: 10 MMOL/L (ref 3–14)
BASOPHILS # BLD AUTO: 0 10E9/L (ref 0–0.2)
BASOPHILS NFR BLD AUTO: 0.9 %
BUN SERPL-MCNC: 19 MG/DL (ref 7–25)
CALCIUM SERPL-MCNC: 10 MG/DL (ref 8.6–10.3)
CHLORIDE SERPL-SCNC: 102 MMOL/L (ref 98–107)
CO2 SERPL-SCNC: 23 MMOL/L (ref 21–31)
CREAT SERPL-MCNC: 1.58 MG/DL (ref 0.7–1.3)
DIFFERENTIAL METHOD BLD: ABNORMAL
EOSINOPHIL # BLD AUTO: 0.1 10E9/L (ref 0–0.7)
EOSINOPHIL NFR BLD AUTO: 2.4 %
ERYTHROCYTE [DISTWIDTH] IN BLOOD BY AUTOMATED COUNT: 13.6 % (ref 10–15)
GFR SERPL CREATININE-BSD FRML MDRD: 47 ML/MIN/{1.73_M2}
GLUCOSE SERPL-MCNC: 101 MG/DL (ref 70–105)
HCT VFR BLD AUTO: 38.2 % (ref 40–53)
HGB BLD-MCNC: 12.2 G/DL (ref 13.3–17.7)
IMM GRANULOCYTES # BLD: 0.1 10E9/L (ref 0–0.4)
IMM GRANULOCYTES NFR BLD: 1.1 %
LYMPHOCYTES # BLD AUTO: 0.5 10E9/L (ref 0.8–5.3)
LYMPHOCYTES NFR BLD AUTO: 11.9 %
MCH RBC QN AUTO: 30.9 PG (ref 26.5–33)
MCHC RBC AUTO-ENTMCNC: 31.9 G/DL (ref 31.5–36.5)
MCV RBC AUTO: 97 FL (ref 78–100)
MONOCYTES # BLD AUTO: 0.6 10E9/L (ref 0–1.3)
MONOCYTES NFR BLD AUTO: 13.7 %
NEUTROPHILS # BLD AUTO: 3.2 10E9/L (ref 1.6–8.3)
NEUTROPHILS NFR BLD AUTO: 70 %
PLATELET # BLD AUTO: 199 10E9/L (ref 150–450)
POTASSIUM SERPL-SCNC: 4.2 MMOL/L (ref 3.5–5.1)
RBC # BLD AUTO: 3.95 10E12/L (ref 4.4–5.9)
SODIUM SERPL-SCNC: 135 MMOL/L (ref 134–144)
WBC # BLD AUTO: 4.5 10E9/L (ref 4–11)

## 2019-03-13 PROCEDURE — 36415 COLL VENOUS BLD VENIPUNCTURE: CPT | Performed by: INTERNAL MEDICINE

## 2019-03-13 PROCEDURE — 87799 DETECT AGENT NOS DNA QUANT: CPT | Performed by: INTERNAL MEDICINE

## 2019-03-13 PROCEDURE — 80048 BASIC METABOLIC PNL TOTAL CA: CPT | Performed by: INTERNAL MEDICINE

## 2019-03-13 PROCEDURE — 86833 HLA CLASS II HIGH DEFIN QUAL: CPT | Performed by: INTERNAL MEDICINE

## 2019-03-13 PROCEDURE — 86832 HLA CLASS I HIGH DEFIN QUAL: CPT | Performed by: INTERNAL MEDICINE

## 2019-03-13 PROCEDURE — 80158 DRUG ASSAY CYCLOSPORINE: CPT | Performed by: INTERNAL MEDICINE

## 2019-03-13 PROCEDURE — 85025 COMPLETE CBC W/AUTO DIFF WBC: CPT | Performed by: INTERNAL MEDICINE

## 2019-03-13 NOTE — TELEPHONE ENCOUNTER
Spoke to Yasmin.  She states that patient's script for his Oxy is 1 tab daily at bedtime, but Yasmin states he mostly has problems eating from his mouth pain.  She states patient would like to know if he can have it throughout the day instead of just at night.  Gillian Lopez LPN 3/13/2019   4:26 PM

## 2019-03-13 NOTE — TELEPHONE ENCOUNTER
Cyclosporine dose recently reduced to 75mg BID. Hector reports that this dose change has been completed.    Please encourage good oral hydration (acute rise in hemoglobin) and repeat labs next week Tuesday (orders entered). Hector reported that he will call [employer] to set up ride for Tuesday.     Hector reports that he has recently seen a new psychiatrist and he has been placed on a new med (antihypertensive) that is supposed to help with night terrors. He has also started Xanax.

## 2019-03-14 LAB
CYCLOSPORINE BLD LC/MS/MS-MCNC: 156 UG/L (ref 50–400)
EBV DNA # SPEC NAA+PROBE: ABNORMAL {COPIES}/ML
EBV DNA SPEC NAA+PROBE-LOG#: 4.4 {LOG_COPIES}/ML
PAIN DRUG SCR UR W RPTD MEDS: NORMAL
TME LAST DOSE: NORMAL H

## 2019-03-14 NOTE — TELEPHONE ENCOUNTER
Faxed to [facility] per Yasmin's [phone].  Moon Cardona LPN ....................  3/14/2019   8:15 AM

## 2019-03-14 NOTE — TELEPHONE ENCOUNTER
Spoke with Yasmin at [facility]. They are fine with him having it once a day. They do not want to increase. Pt is just wanting it anytime during the day instead of at bedtime.  He has trouble eating and would like to have this when his mouth hurts when he tries eating.   Are you ok with him having this at anytime during the day instead at bedtime?  Moon Cardona LPN ....................  3/14/2019   8:03 AM

## 2019-03-15 LAB
BKV DNA # SPEC NAA+PROBE: ABNORMAL COPIES/ML
BKV DNA SPEC NAA+PROBE-LOG#: 4.3 LOG COPIES/ML
DONOR IDENTIFICATION: NORMAL
DSA COMMENTS: NORMAL
DSA PRESENT: NO
DSA TEST METHOD: NORMAL
ORGAN: NORMAL
SA1 CELL: NORMAL
SA1 COMMENTS: NORMAL
SA1 HI RISK ABY: NORMAL
SA1 MOD RISK ABY: NORMAL
SA1 TEST METHOD: NORMAL
SA2 CELL: NORMAL
SA2 COMMENTS: NORMAL
SA2 HI RISK ABY UA: NORMAL
SA2 MOD RISK ABY: NORMAL
SA2 TEST METHOD: NORMAL
SPECIMEN SOURCE: ABNORMAL
UNACCEPTABLE ANTIGEN: NORMAL
UNOS CPRA: 100

## 2019-03-22 ENCOUNTER — OFFICE VISIT (OUTPATIENT)
Dept: NEPHROLOGY | Facility: CLINIC | Age: 49
End: 2019-03-22
Payer: COMMERCIAL

## 2019-03-22 VITALS
HEART RATE: 72 BPM | SYSTOLIC BLOOD PRESSURE: 118 MMHG | BODY MASS INDEX: 20.83 KG/M2 | OXYGEN SATURATION: 98 % | WEIGHT: 145.2 LBS | DIASTOLIC BLOOD PRESSURE: 82 MMHG

## 2019-03-22 DIAGNOSIS — Z94.0 KIDNEY REPLACED BY TRANSPLANT: ICD-10-CM

## 2019-03-22 RX ORDER — AMOXICILLIN 500 MG/1
500 CAPSULE ORAL 4 TIMES DAILY
COMMUNITY
End: 2019-04-24

## 2019-03-22 RX ORDER — FLUDROCORTISONE ACETATE 0.1 MG/1
0.1 TABLET ORAL DAILY
Qty: 30 TABLET | Refills: 0 | COMMUNITY
Start: 2019-03-21 | End: 2019-04-10

## 2019-03-22 RX ORDER — CYCLOSPORINE 25 MG/1
75 CAPSULE, LIQUID FILLED ORAL 2 TIMES DAILY
Qty: 540 CAPSULE | Refills: 3 | COMMUNITY
Start: 2019-03-22 | End: 2019-04-15
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
## 2019-03-26 ENCOUNTER — HOSPITAL ENCOUNTER (OUTPATIENT)
Dept: SPEECH THERAPY | Facility: OTHER | Age: 49
Setting detail: THERAPIES SERIES
End: 2019-03-26
Attending: INTERNAL MEDICINE
Payer: MEDICARE

## 2019-03-26 ENCOUNTER — TELEPHONE (OUTPATIENT)
Dept: PEDIATRICS | Facility: OTHER | Age: 49
End: 2019-03-26

## 2019-03-26 DIAGNOSIS — S09.93XD FACIAL INJURY, SUBSEQUENT ENCOUNTER: ICD-10-CM
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
## 2019-03-26 PROCEDURE — 92610 EVALUATE SWALLOWING FUNCTION: CPT | Mod: GN

## 2019-03-26 NOTE — TELEPHONE ENCOUNTER
Left message to call back....................  3/26/2019   9:21 AM  Mely Sanchez LPN on 3/26/2019 at 9:21 AM    Problem: Patient Care Overview (Adult)  Goal: Plan of Care Review  Outcome: Ongoing (interventions implemented as appropriate)    10/19/17 1310 10/21/17 1934   Coping/Psychosocial Response Interventions   Plan Of Care Reviewed With --  patient   Patient Care Overview   Progress progress towards functional goals is fair --        Goal: Adult Individualization and Mutuality  Outcome: Ongoing (interventions implemented as appropriate)    Problem: Pancreatitis, Acute/Chronic (Adult)  Goal: Signs and Symptoms of Listed Potential Problems Will be Absent or Manageable (Pancreatitis, Acute/Chronic)  Outcome: Ongoing (interventions implemented as appropriate)    Problem: Diabetes, Type 2 (Adult)  Goal: Signs and Symptoms of Listed Potential Problems Will be Absent or Manageable (Diabetes, Type 2)  Outcome: Ongoing (interventions implemented as appropriate)    Problem: Pain, Acute (Adult)  Goal: Identify Related Risk Factors and Signs and Symptoms  Outcome: Ongoing (interventions implemented as appropriate)  Goal: Acceptable Pain Control/Comfort Level  Outcome: Ongoing (interventions implemented as appropriate)    10/22/17 0105   Pain, Acute (Adult)   Acceptable Pain Control/Comfort Level making progress toward outcome

## 2019-03-26 NOTE — TELEPHONE ENCOUNTER
Spoke with Jimbo Young.  Patient's appointment was moved from Dr. Ahn to Dr. Rico @ 2:00pm on 3.27.2019.  Jacinta Torres on 3/26/2019 at 3:44 PM

## 2019-03-26 NOTE — TELEPHONE ENCOUNTER
Messages have been left to see what kind of problem patient is having.  Patient was discussed with Dr. Rico and he is willing to see patient tomorrow.  Minimum of 20mins.  Jacinta Torres on 3/26/2019 at 12:21 PM

## 2019-03-27 ENCOUNTER — TELEPHONE (OUTPATIENT)
Dept: TRANSPLANT | Facility: CLINIC | Age: 49
End: 2019-03-27

## 2019-03-27 NOTE — TELEPHONE ENCOUNTER
Post Kidney and Pancreas Transplant Team Conference  Date: 3/27/2019  Transplant Coordinator: Angeline Alonso     Attendees:  [x]  Dr. Macias [] Thao Barnard, RN  [x] Blossom Montes LPN     []  Dr. Donato [x] Martha Morris RN [] Noelle Wagner LPN   [x]  Dr. Telles [] Beth Evans, DONALD    []  Dr. Cabrera [] Katelyn Gallegos RN    [] Dr. Vallejo [x] Maliha Alonso RN    [] Dr. Lorenzo [] Mahad Steele RN    [] Dr. Finley [] Mildred Barajas, RN    [] Surgery Fellow [] Gia Romero RN    [] Rachel Resendez NP              Verbal Plan Read Back:   txp labs needed  Creatinine greater than 1.4 biopsy recommended        Routed to RN Coordinator   Blossom Montes
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
## 2019-03-27 NOTE — LETTER
PHYSICIAN ORDERS      DATE & TIME ISSUED: 2019 11:54 AM  PATIENT NAME: Gilles Henning III   : 1970     [facility] MR# [if applicable]: [id number]     DIAGNOSIS:  Kidney Transplant  ICD-10 CODE: Z94.0     Please complete the following labs this week:  Cyclosporine drug level  BMP  CBC    Any questions please call: [phone]  Please fax lab results to [phone].      Edmond Donato M.D.

## 2019-03-27 NOTE — LETTER
PHYSICIAN ORDERS      DATE & TIME ISSUED: 2019 11:54 AM  PATIENT NAME: Gilles Henning III   : 1970     [facility] MR# [if applicable]: [id number]     DIAGNOSIS:  Kidney Transplant  ICD-10 CODE: Z94.0     Please complete the following labs this week:  Cyclosporine drug level  BMP  CBC    Any questions please call: [phone]  Please fax lab results to [phone].      Edmond Donato M.D.

## 2019-03-27 NOTE — TELEPHONE ENCOUNTER
Left message for patient at assisted living facility to have txp labs completed.  Faxed current lab orders to assisted living and Dr. Tobin's office, mailed copy to patient.

## 2019-03-27 NOTE — PROGRESS NOTES
[facility]          OUTPATIENT SWALLOW  EVALUATION  PLAN OF TREATMENT FOR OUTPATIENT REHABILITATION  (COMPLETE FOR INITIAL CLAIMS ONLY)  Patient's Last Name, First Name, M.I.  YOB: 1970  Gilles Henning     Provider's Name   [facility]   Medical Record No.  [id number]     Start of Care Date:  09/18/18   Onset Date:  07/26/18   Type:     ___PT   ____OT  ___X_SLP Medical Diagnosis:  Dysphagia      Treatment Diagnosis:  Dysphagia Visits from SOC:  1     _________________________________________________________________________________  Plan of Treatment/Functional Goals:  Planned Therapy Interventions: Dysphagia Treatment  Dysphagia treatment: Modified diet education, Instruction of safe swallow strategies, Compensatory strategies for swallowing    Intervention Comments: Order for VFSS to ensure safety of current PO intake       Goals   1. Goal Identifier: PO Intake        Goal Description: Client will utilize compensatory strategies with optimum safety and efficiency of swallowing function on P.O. intake without overt signs and symptoms of aspiration for the highest appropriate diet level.        Target Date: 05/26/19           2. Goal Identifier: Compensatory       Goal Description: The client will demonstrate the ability to adequately self-monitor swallowing skills and perform appropriate compensatory techniques with 90% accuracy to safely consume least restrictive diet with minimum verbal, visual and tactile cues.        Target Date: 05/26/19           3. Goal Identifier: VFSS       Goal Description: Patient will complete a video fluoroscopic swallow study  to fully assess physiology and anatomy of the swallow and to determine the appropriate diet and/or rehabilitation exercises       Target Date: 04/26/19      Therapy Frequency: other (see comments)(1-2 times per week )  Predicted  Duration of Therapy Intervention (days/wks): 60 days     Chel Murphy, SLP       I CERTIFY THE NEED FOR THESE SERVICES FURNISHED UNDER        THIS PLAN OF TREATMENT AND WHILE UNDER MY CARE     (Physician co-signature of this document indicates review and certification of the therapy plan).                  Certification date from: 03/27/19 Certification date to: 05/26/19          Referring Physician: Charlie Dubose     Initial Assessment        See Epic Evaluation Start Of Care Date: 09/18/18

## 2019-03-29 DIAGNOSIS — E78.5 HYPERLIPIDEMIA LDL GOAL <100: ICD-10-CM

## 2019-03-29 DIAGNOSIS — Z46.59 ENCOUNTER FOR NASOJEJUNAL (NJ) TUBE PLACEMENT: ICD-10-CM

## 2019-03-29 DIAGNOSIS — K21.9 GASTROESOPHAGEAL REFLUX DISEASE WITHOUT ESOPHAGITIS: ICD-10-CM

## 2019-03-29 DIAGNOSIS — F31.9 BIPOLAR AFFECTIVE DISORDER, REMISSION STATUS UNSPECIFIED (H): ICD-10-CM

## 2019-03-29 DIAGNOSIS — Z94.0 KIDNEY TRANSPLANT RECIPIENT: ICD-10-CM

## 2019-04-01 ENCOUNTER — TELEPHONE (OUTPATIENT)
Dept: PEDIATRICS | Facility: OTHER | Age: 49
End: 2019-04-01

## 2019-04-01 NOTE — TELEPHONE ENCOUNTER
Pt pulled out his peg tube last night. Has been having some mental struggles lately and depression and drinking sometimes.  Has a appt tomorrow by therapist. Is refusing to come to be evaluated by a physician.  He has been taking his medications orally.  They are wondering what Charlie Dubose MD thinks they should do.   Moon Cardona LPN ....................  4/1/2019   2:31 PM

## 2019-04-01 NOTE — TELEPHONE ENCOUNTER
Patient's Caregiver from [facility] notified of the information below after verification of name and date of birth.   Moon Cardona LPN ....................  4/1/2019   3:27 PM

## 2019-04-02 RX ORDER — QUETIAPINE FUMARATE 50 MG/1
TABLET, FILM COATED ORAL
Qty: 30 TABLET | Refills: 11 | Status: ON HOLD | OUTPATIENT
Start: 2019-04-02 | End: 2019-05-09

## 2019-04-02 NOTE — TELEPHONE ENCOUNTER
Prescription approved per [facility] Refill Protocol.]  Michelle San RN on 4/2/2019 at 1:45 PM

## 2019-04-03 DIAGNOSIS — M51.16 LUMBAR DISC DISEASE WITH RADICULOPATHY: Primary | ICD-10-CM

## 2019-04-05 RX ORDER — ACETAMINOPHEN 325 MG/1
325-650 TABLET ORAL EVERY 6 HOURS PRN
Qty: 100 TABLET | Refills: 1 | Status: ON HOLD | OUTPATIENT
Start: 2019-04-05 | End: 2019-05-09

## 2019-04-05 NOTE — TELEPHONE ENCOUNTER
Routing refill request to provider for review/approval because:  Medication is reported/historical  acetaminophen (TYLENOL) 325 MG tablet     --   Sig - Route: 325-650 mg by Oral or NG Tube route every 6 hours as needed for fever or pain - Oral or NG Tube   Class: Historical   Order: [id number]       Pharmacy needing correct dosing.    LOV: 3/7/19    Dr. Dubose out of office will route to covering team let for review and consideration     Demi Mathis RN on 4/5/2019 at 2:41 PM

## 2019-04-09 ENCOUNTER — TELEPHONE (OUTPATIENT)
Dept: TRANSPLANT | Facility: CLINIC | Age: 49
End: 2019-04-09

## 2019-04-09 DIAGNOSIS — Z94.0 KIDNEY REPLACED BY TRANSPLANT: ICD-10-CM

## 2019-04-09 DIAGNOSIS — Z79.60 LONG-TERM USE OF IMMUNOSUPPRESSANT MEDICATION: ICD-10-CM

## 2019-04-09 DIAGNOSIS — R79.89 ELEVATED SERUM CREATININE: Primary | ICD-10-CM

## 2019-04-09 DIAGNOSIS — Z94.0 KIDNEY TRANSPLANTED: Primary | ICD-10-CM

## 2019-04-09 DIAGNOSIS — N18.6 END STAGE RENAL DISEASE (H): ICD-10-CM

## 2019-04-09 NOTE — TELEPHONE ENCOUNTER
Overdue for transplant labs.  Please ensure patient has standard transplant labs drawn Thursday at [facility] with BK & EBV.

## 2019-04-10 ENCOUNTER — TEAM CONFERENCE (OUTPATIENT)
Dept: NEPHROLOGY | Facility: CLINIC | Age: 49
End: 2019-04-10

## 2019-04-10 DIAGNOSIS — Z79.60 LONG-TERM USE OF IMMUNOSUPPRESSANT MEDICATION: ICD-10-CM

## 2019-04-10 DIAGNOSIS — R79.89 ELEVATED SERUM CREATININE: Primary | ICD-10-CM

## 2019-04-10 DIAGNOSIS — Z94.0 KIDNEY TRANSPLANTED: ICD-10-CM

## 2019-04-10 LAB
ANION GAP SERPL CALCULATED.3IONS-SCNC: 6 MMOL/L (ref 3–14)
BUN SERPL-MCNC: 20 MG/DL (ref 7–25)
CALCIUM SERPL-MCNC: 9.9 MG/DL (ref 8.6–10.3)
CHLORIDE SERPL-SCNC: 109 MMOL/L (ref 98–107)
CO2 SERPL-SCNC: 24 MMOL/L (ref 21–31)
CREAT SERPL-MCNC: 1.79 MG/DL (ref 0.7–1.3)
ERYTHROCYTE [DISTWIDTH] IN BLOOD BY AUTOMATED COUNT: 14.1 % (ref 10–15)
GFR SERPL CREATININE-BSD FRML MDRD: 40 ML/MIN/{1.73_M2}
GLUCOSE SERPL-MCNC: 99 MG/DL (ref 70–105)
HCT VFR BLD AUTO: 36.1 % (ref 40–53)
HGB BLD-MCNC: 11.7 G/DL (ref 13.3–17.7)
MCH RBC QN AUTO: 31.5 PG (ref 26.5–33)
MCHC RBC AUTO-ENTMCNC: 32.4 G/DL (ref 31.5–36.5)
MCV RBC AUTO: 97 FL (ref 78–100)
PLATELET # BLD AUTO: 183 10E9/L (ref 150–450)
POTASSIUM SERPL-SCNC: 4.6 MMOL/L (ref 3.5–5.1)
RBC # BLD AUTO: 3.71 10E12/L (ref 4.4–5.9)
SODIUM SERPL-SCNC: 139 MMOL/L (ref 134–144)
WBC # BLD AUTO: 3.9 10E9/L (ref 4–11)

## 2019-04-10 PROCEDURE — 80158 DRUG ASSAY CYCLOSPORINE: CPT | Performed by: INTERNAL MEDICINE

## 2019-04-10 PROCEDURE — 36415 COLL VENOUS BLD VENIPUNCTURE: CPT | Performed by: INTERNAL MEDICINE

## 2019-04-10 PROCEDURE — 85027 COMPLETE CBC AUTOMATED: CPT | Performed by: INTERNAL MEDICINE

## 2019-04-10 PROCEDURE — 87496 CYTOMEG DNA AMP PROBE: CPT | Performed by: INTERNAL MEDICINE

## 2019-04-10 PROCEDURE — 87799 DETECT AGENT NOS DNA QUANT: CPT | Performed by: INTERNAL MEDICINE

## 2019-04-10 PROCEDURE — 80048 BASIC METABOLIC PNL TOTAL CA: CPT | Performed by: INTERNAL MEDICINE

## 2019-04-10 NOTE — TELEPHONE ENCOUNTER
Post Kidney and Pancreas Transplant Team Conference  Date: 4/10/2019  Transplant Coordinator: Angeline Alonso     Attendees:  [x]  Dr. Macias [] Thao Barnard, RN  [] Blossom Montes LPN     [x]  Dr. Donato [] Martha Morris, DONALD [x] Noelle Wagner LPN   [x]  Dr. Telles [] Beth Evans, RN    [x]  Dr. Cabrera [x] Katelyn Gallegos RN    [x] Dr. Lorenzo [x] Maliha Alonso, DONALD    [] Dr. Finley [] Mahad Steele RN    [] Dr. Parsons [x] Mildred Barajas, RN    [x] Surgery Fellow [x] Gia Romero RN    [] Rachel Resendez, KHARI [x] Marianna Fortune, DONALD    [] Jacob López, PharmD [] Arash Agarwal RN     [] Thao Villarreal RN        Verbal Plan Read Back:   1 liter IV fluid x 2 days then repeat level  Creatinine > 1.6 plan for biopsy  Kidney biopsy r\t creatinine  Ok'd for Dr. Singh to biopsy locally if needed     Routed to RN Coordinator   Noelle Wagner

## 2019-04-10 NOTE — TELEPHONE ENCOUNTER
Hector called to report that he has gained 5lbs, but he feels that this is due increased food consumption.    He notes no swelling.    He does get up at night to urinate 4-5times. He drinks about 70oz of water daily.   BPs are running 154/90 in the PM, gets down to 110 / 80 in the AM.     Hector reports that he was started on an anti-hypertensive med by his psychiatrist (for night terrors), but was unable to identify the med. He takes this nightly. He has follow up with his psychiatrist next week.    Hector's NG is now out - he has swallow / speech eval tomorrow.  Trach is still a 6.    Denies fevers - Tmax of 99. He was recently ill with sore throat and cough, some nausea. Now resolved.   Denies any s/s of UTI.    Creatinine is up, but no other s/s of dehydration. Will await pending BK and CSA levels. Will obtain Ua/UC tomorrow.    Hector does feel like he fully empties his bladder except in the early AM when he voids about double voids (about 15min separation between voids).

## 2019-04-11 ENCOUNTER — HOSPITAL ENCOUNTER (OUTPATIENT)
Dept: GENERAL RADIOLOGY | Facility: OTHER | Age: 49
Discharge: HOME | End: 2019-04-11
Attending: INTERNAL MEDICINE | Admitting: INTERNAL MEDICINE
Payer: MEDICARE

## 2019-04-11 ENCOUNTER — HOSPITAL ENCOUNTER (OUTPATIENT)
Dept: SPEECH THERAPY | Facility: OTHER | Age: 49
Setting detail: THERAPIES SERIES
End: 2019-04-11
Attending: INTERNAL MEDICINE
Payer: MEDICARE

## 2019-04-11 DIAGNOSIS — R79.89 ELEVATED SERUM CREATININE: ICD-10-CM

## 2019-04-11 DIAGNOSIS — Z79.60 LONG-TERM USE OF IMMUNOSUPPRESSANT MEDICATION: ICD-10-CM

## 2019-04-11 DIAGNOSIS — Z94.0 KIDNEY REPLACED BY TRANSPLANT: ICD-10-CM

## 2019-04-11 DIAGNOSIS — Z94.0 KIDNEY TRANSPLANTED: ICD-10-CM

## 2019-04-11 LAB
ALBUMIN MFR UR ELPH: 4 MG/DL (ref 1–14)
ALBUMIN UR-MCNC: NEGATIVE MG/DL
APPEARANCE UR: CLEAR
BILIRUB UR QL STRIP: NEGATIVE
COLOR UR AUTO: YELLOW
CREAT UR-MCNC: 51 MG/DL
CYCLOSPORINE BLD LC/MS/MS-MCNC: 79 UG/L (ref 50–400)
EBV DNA # SPEC NAA+PROBE: 5659 {COPIES}/ML
EBV DNA SPEC NAA+PROBE-LOG#: 3.8 {LOG_COPIES}/ML
GLUCOSE UR STRIP-MCNC: NEGATIVE MG/DL
HGB UR QL STRIP: NEGATIVE
KETONES UR STRIP-MCNC: NEGATIVE MG/DL
LEUKOCYTE ESTERASE UR QL STRIP: NEGATIVE
NITRATE UR QL: NEGATIVE
PH UR STRIP: 5.5 PH (ref 5–9)
PROT/CREAT 24H UR: 0.08 MG/G{CREAT}
SOURCE: NORMAL
SP GR UR STRIP: <1.005 (ref 1–1.03)
TME LAST DOSE: NORMAL H
UROBILINOGEN UR STRIP-ACNC: 0.2 EU/DL (ref 0.2–1)

## 2019-04-11 PROCEDURE — 74230 X-RAY XM SWLNG FUNCJ C+: CPT

## 2019-04-11 PROCEDURE — 84156 ASSAY OF PROTEIN URINE: CPT | Performed by: INTERNAL MEDICINE

## 2019-04-11 PROCEDURE — 81003 URINALYSIS AUTO W/O SCOPE: CPT | Performed by: INTERNAL MEDICINE

## 2019-04-11 PROCEDURE — 92611 MOTION FLUOROSCOPY/SWALLOW: CPT | Mod: GN

## 2019-04-12 DIAGNOSIS — Z94.0 KIDNEY REPLACED BY TRANSPLANT: Primary | ICD-10-CM

## 2019-04-12 LAB
BKV DNA # SPEC NAA+PROBE: ABNORMAL COPIES/ML
BKV DNA SPEC NAA+PROBE-LOG#: 4.1 LOG COPIES/ML
SPECIMEN SOURCE: ABNORMAL

## 2019-04-12 NOTE — TELEPHONE ENCOUNTER
ISSUE:   Cyclosporine level 79 on 4/10/19, goal 100 - 125, dose 75 mg BID    PLAN:   Please call pt and confirm this was a good 12-hour trough. Verify dose 75 mg BID. Confirm no new medications or illness (kee. Diarrhea). If good trough, increase dose to 100 mg BID and recheck level in 1 week (pelase enter order for full set tx labs).

## 2019-04-12 NOTE — LETTER
PHYSICIAN ORDERS      DATE & TIME ISSUED: April 15, 2019 11:06 AM  PATIENT NAME: Gilles Henning III   : 1970     [facility] MR# [if applicable]: [id number]     DIAGNOSIS:  Kidney Transplant  ICD-10 CODE: Z94.0     Please repeat the following labsL  Cyclosporine drug level  BMP  CBC    Any questions please call: [phone]  Please fax lab results to [phone].    .

## 2019-04-12 NOTE — TELEPHONE ENCOUNTER
Left message for patients assisted living nursing office regarding:  ISSUE:   Cyclosporine level 79 on 4/10/19, goal 100 - 125, dose 75 mg BID     PLAN:   Please call pt and confirm this was a good 12-hour trough. Verify dose 75 mg BID. Confirm no new medications or illness (kee. Diarrhea). If good trough, increase dose to 100 mg BID and recheck level in 1 week (pelase enter order for full set tx labs).

## 2019-04-12 NOTE — LETTER
PHYSICIAN ORDERS      DATE & TIME ISSUED: April 15, 2019 11:04 AM  PATIENT NAME: Gilles Henning III   : 1970     [facility] MR# [if applicable]: [id number]     DIAGNOSIS:  Kidney Transplant  ICD-10 CODE: Z94.0     Please complete the following change:  Cyclosporine 100 mg BID    Please repeat the following labs:  Cyclosporine drug level  BMP  CBC    Any questions please call: [phone]  Please fax lab results to [phone].    .

## 2019-04-13 LAB
CMV DNA SPEC QL NAA+PROBE: NOT DETECTED
SPECIMEN SOURCE: NORMAL

## 2019-04-15 RX ORDER — CYCLOSPORINE 100 MG/1
100 CAPSULE, LIQUID FILLED ORAL 2 TIMES DAILY
Qty: 180 CAPSULE | Refills: 3 | Status: ON HOLD | OUTPATIENT
Start: 2019-04-15 | End: 2019-05-09

## 2019-04-15 RX ORDER — CYCLOSPORINE 25 MG/1
CAPSULE, LIQUID FILLED ORAL
Qty: 540 CAPSULE | Refills: 3
Start: 2019-04-15 | End: 2019-05-03

## 2019-04-15 NOTE — ADDENDUM NOTE
Encounter addended by: Chel Murphy, SLP on: 4/15/2019 1:15 PM   Actions taken: Flowsheet data copied forward, Pend clinical note, Flowsheet accepted, Sign clinical note, Charge Capture section accepted, Episode resolved

## 2019-04-15 NOTE — PROGRESS NOTES
04/14/19 1000       Present No   General Information   Start Of Care Date 09/18/18   Referring Physician Charlie Dubose    Orders Evaluate And Treat   Medical Diagnosis Dysphagia    Onset Of Illness/injury Or Date Of Surgery 07/26/18   Precautions/limitations Swallowing Precautions   Hearing WFL   Respiratory Status Tracheostomy   Patient Role/employment History Disabled   Living Environment Apartment/condo   Patient/family Goals Assessment of Swallow Function/ Evaluation of Safety of PO intake    General Information Comments Patient presents to VFSS following inital clinical assessment. Patient has been tolerating thin liquids and self selecting soft solids. VFSS to confirm safety of PO intake    Pain Assessment   Pain Reported Yes   Pain Location Tooth pain due to infection    Fall Risk Screen   Fall screen comments Patient independently walked to evaluation room with no difficulties.    Abuse Screen (yes response referral indicated)   Feels Unsafe at Home or Work/School no   Feels Threatened by Someone no   Does Anyone Try to Keep You From Having Contact with Others or Doing Things Outside Your Home? no   Physical Signs of Abuse Present no   Clinical Swallow Evaluation   Oral Musculature anomalies present   Structural Abnormalities present   Dentition other (see comments)  (Few Matching Pairs, Infection )   Secretion Management left corner drooling;right corner drooling   Mucosal Quality adequate   Mandibular Strength and Mobility impaired   Oral Labial Strength and Mobility impaired pursing;impaired seal   Lingual Strength and Mobility impaired protrusion;impaired left lateral movement;impaired right lateral movement   Velar Elevation intact   Buccal Strength and Mobility impaired   Laryngeal Function Voicing initiated;Swallow   Additional Documentation Yes   VFSS Evaluation   VFSS Additional Documentation Yes   VFSS Eval: Radiology   Radiologist ETHAN DE LA ROSA MD   Views Taken left  lateral   VFSS Eval: Thin Liquid Texture Trial   Mode of Presentation, Thin Liquid cup;self-fed   Order of Presentation 1   Preparatory Phase WFL   Oral Phase, Thin Liquid WFL   Pharyngeal Phase, Thin Liquid WFL;Residue in valleculae;Residue in pyriform sinus   Rosenbek's Penetration Aspiration Scale: Thin Liquid Trial Results 1 - no aspiration, contrast does not enter airway   Successful Strategies Trialed During Procedure, Thin Liquid hard swallow   Diagnostic Statement Patient tolerating thin liquid via cup with no penetration/aspiration. Mild residue noted, partially cleared with hard swallow.     VFSS Eval: Puree Solid Texture Trial   Mode of Presentation, Puree spoon;self-fed   Order of Presentation 2   Preparatory Phase WFL   Oral Phase, Puree WFL   Pharyngeal Phase, Puree WFL;Residue in valleculae  (Mild residue )   Rosenbek's Penetration Aspiration Scale: Puree Food Trial Results 1 - no aspiration, contrast does not enter airway   Diagnostic Statement Patient tolerating puree tetures via spoon with no penetration/aspiration. MIld residue in vallaeculae, cleared with second swallow    VFSS Eval: Semisolid Texture Trial   Mode of Presentation, Semisolid spoon;self-fed   Order of Presentation 3   Preparatory Phase WFL  (Increased mastication time )   Oral Phase, Semisolid WFL   Pharyngeal Phase, Semisolid WFL   Rosenbek's Penetration Aspiration Scale: Semisolid Food Trial Results 1 - no aspiration, contrast does not enter airway   Diagnostic Statement Patient tolerating semi-solids with no penetration/aspiration.    Swallow Compensations   Swallow Compensations Effortful swallow;Pacing;Reduce amounts   Results Oral difficulties only   Educational Assessment   Preferred Learning Style Listening;Demonstration   Esophageal Phase of Swallow   Patient reports or presents with symptoms of esophageal dysphagia No   Swallow Eval: Clinical Impressions   Skilled Criteria for Therapy Intervention No significant expected   improvement in functional status  (Upcoming facial/oral surgeries )   Functional Assessment Scale (FAS) 5   Dysphagia Outcome Severity Scale (JEROD) Level 5 - JEROD   Treatment Diagnosis Dysphagia   Diet texture recommendations Dysphagia diet level 3;Thin liquids  (Patient self-selects soft foods due to oral difficulites )   Recommended Feeding/Eating Techniques check mouth frequently for oral residue/pocketing;hard swallow w/ each bite or sip;small sips/bites   Anticipated Discharge Disposition home   Risks and Benefits of Treatment have been explained. Yes   Patient, family and/or staff in agreement with Plan of Care Yes   Clinical Impression Comments Patient tolerating NDD III and thin liquids with no aspiration. Strategies used include pacing, small bites/sips, upright for PO intake, and effortful swallow. Patient self-selects soft food due to oral difficulties. At this time, no further therapy is needed. Patient will seek SLP referral following upcoming surgeries if oral dysphagia persists. Patient verbalized and demonstrated understanding of risks of aspiration, safe swallow strategies and diet recommendations.    Swallow Goal 1   Goal Description Client will utilize compensatory strategies with optimum safety and efficiency of swallowing function on P.O. intake without overt signs and symptoms of aspiration for the highest appropriate diet level.    Target Date 05/26/19   Goal Identifier PO Intake    Date Met 04/11/19   Swallow Goal 2   Goal Description The client will demonstrate the ability to adequately self-monitor swallowing skills and perform appropriate compensatory techniques with 90% accuracy to safely consume least restrictive diet with minimum verbal, visual and tactile cues.    Target Date 05/26/19   Goal Identifier Compensatory   Date Met 04/11/19   Swallow Goal 3   Goal Description Patient will complete a video fluoroscopic swallow study  to fully assess physiology and anatomy of the swallow and  to determine the appropriate diet and/or rehabilitation exercises   Target Date 04/26/19   Goal Identifier VFSS   Date Met 04/11/19   Total Session Time   SLP Eval: VideoFluoroscopic Swallow function Minutes (80246) 30   Total Evaluation Time 30   Therapy Certification   Certification date from 03/27/19   Certification date to 05/26/19   Medical Diagnosis Dysphagia

## 2019-04-15 NOTE — PROGRESS NOTES
Outpatient Speech Language Pathology Discharge Note     Patient: Gilles Henning III  : 1970    Beginning/End Dates of Reporting Period:  3/26/2018 to 4/15/2019    Referring Provider: Dr. Charlie Dubose     Therapy Diagnosis: Dysphagia     Client Self Report:  Patient attending VFSS. Tolerating thin liquids and NDD III.     Goals:  Goal Identifier PO Intake    Goal Description Client will utilize compensatory strategies with optimum safety and efficiency of swallowing function on P.O. intake without overt signs and symptoms of aspiration for the highest appropriate diet level.    Target Date 19   Date Met  19   Progress:     Goal Identifier Compensatory   Goal Description The client will demonstrate the ability to adequately self-monitor swallowing skills and perform appropriate compensatory techniques with 90% accuracy to safely consume least restrictive diet with minimum verbal, visual and tactile cues.    Target Date 19   Date Met  19   Progress:     Goal Identifier VFSS   Goal Description Patient will complete a video fluoroscopic swallow study  to fully assess physiology and anatomy of the swallow and to determine the appropriate diet and/or rehabilitation exercises   Target Date 19   Date Met  19   Progress:   Progress Toward Goals:   Progress this reporting period: Patient has met all goals this reporting period.  Results of VFSS indicate no penetration/aspiration with thin liquids or NDD III. Patient verbalized understanding of all recommendations, results and education provided.      Plan:  Discharge from therapy.    Discharge:    Reason for Discharge: Patient has met all goals.        Discharge Plan: Patient to continue home program.

## 2019-04-16 ENCOUNTER — TELEPHONE (OUTPATIENT)
Dept: TRANSPLANT | Facility: CLINIC | Age: 49
End: 2019-04-16

## 2019-04-16 NOTE — TELEPHONE ENCOUNTER
Pt went to [facility] for his infusion on Fri- they did not have orders so he went home  Needs another Infusion scheduled and orders sent to them  Needs call back

## 2019-04-17 DIAGNOSIS — Z94.0 KIDNEY REPLACED BY TRANSPLANT: Primary | ICD-10-CM

## 2019-04-17 PROBLEM — R79.89 ELEVATED SERUM CREATININE: Status: ACTIVE | Noted: 2019-04-17

## 2019-04-17 NOTE — TELEPHONE ENCOUNTER
[facility] LVM for [facility] out patient infusion - will need them to scheule with gracie galvez for 1L NS and recheck labs the following day.

## 2019-04-17 NOTE — TELEPHONE ENCOUNTER
Multiple VMs left at [facility] for both nursing staff and for the patient.    Will need IV hydration and repeat labs. Orders were not placed prior to communication with the patient and or nursing staff. Will await communication.

## 2019-04-18 ENCOUNTER — HOSPITAL ENCOUNTER (OUTPATIENT)
Dept: INFUSION THERAPY | Facility: OTHER | Age: 49
Discharge: HOME | End: 2019-04-18
Attending: INTERNAL MEDICINE | Admitting: INTERNAL MEDICINE
Payer: MEDICARE

## 2019-04-18 DIAGNOSIS — E86.0 DEHYDRATION: ICD-10-CM

## 2019-04-18 DIAGNOSIS — Z94.0 KIDNEY TRANSPLANTED: ICD-10-CM

## 2019-04-18 DIAGNOSIS — Z94.0 KIDNEY TRANSPLANT RECIPIENT: ICD-10-CM

## 2019-04-18 DIAGNOSIS — R79.89 ELEVATED SERUM CREATININE: Primary | ICD-10-CM

## 2019-04-18 PROCEDURE — 96360 HYDRATION IV INFUSION INIT: CPT

## 2019-04-18 PROCEDURE — 25000128 H RX IP 250 OP 636: Performed by: INTERNAL MEDICINE

## 2019-04-18 RX ADMIN — SODIUM CHLORIDE 1000 ML: 9 INJECTION, SOLUTION INTRAVENOUS at 09:07

## 2019-04-18 NOTE — PROGRESS NOTES
Infusion Nursing Note:  Gilles Henning III presents today for fluids.    Patient seen by provider today: No   present during visit today: Not Applicable.    Note: N/A.    Intravenous Access:  Peripheral IV placed.    Treatment Conditions:  Not Applicable.      Post Infusion Assessment:  Patient tolerated infusion without incident.  Blood return noted pre and post infusion.  Site patent and intact, free from redness, edema or discomfort.  No evidence of extravasations.  Access discontinued per protocol.       Discharge Plan:   Discharge instructions reviewed with: Patient.  Copy of AVS reviewed with patient and/or family.  Patient will return tomorrow  for lab appointment.  Patient discharged in stable condition accompanied by: self.  Departure Mode: Ambulatory.    Emmy Mohr RN

## 2019-04-19 ENCOUNTER — APPOINTMENT (OUTPATIENT)
Dept: CT IMAGING | Facility: OTHER | Age: 49
End: 2019-04-19
Attending: FAMILY MEDICINE
Payer: MEDICARE

## 2019-04-19 ENCOUNTER — TELEPHONE (OUTPATIENT)
Dept: TRANSPLANT | Facility: CLINIC | Age: 49
End: 2019-04-19

## 2019-04-19 ENCOUNTER — HOSPITAL ENCOUNTER (EMERGENCY)
Facility: OTHER | Age: 49
Discharge: HOME | End: 2019-04-19
Attending: FAMILY MEDICINE | Admitting: FAMILY MEDICINE
Payer: MEDICARE

## 2019-04-19 ENCOUNTER — APPOINTMENT (OUTPATIENT)
Dept: GENERAL RADIOLOGY | Facility: OTHER | Age: 49
End: 2019-04-19
Attending: FAMILY MEDICINE
Payer: MEDICARE

## 2019-04-19 VITALS
SYSTOLIC BLOOD PRESSURE: 124 MMHG | DIASTOLIC BLOOD PRESSURE: 72 MMHG | RESPIRATION RATE: 22 BRPM | OXYGEN SATURATION: 98 % | HEIGHT: 70 IN | HEART RATE: 60 BPM | TEMPERATURE: 98.5 F | BODY MASS INDEX: 20.83 KG/M2

## 2019-04-19 DIAGNOSIS — F10.920 ALCOHOLIC INTOXICATION WITHOUT COMPLICATION (H): ICD-10-CM

## 2019-04-19 LAB
ALBUMIN SERPL-MCNC: 4 G/DL (ref 3.5–5.7)
ALP SERPL-CCNC: 92 U/L (ref 34–104)
ALT SERPL W P-5'-P-CCNC: 13 U/L (ref 7–52)
ANION GAP SERPL CALCULATED.3IONS-SCNC: 9 MMOL/L (ref 3–14)
AST SERPL W P-5'-P-CCNC: 26 U/L (ref 13–39)
BASOPHILS # BLD AUTO: 0.1 10E9/L (ref 0–0.2)
BASOPHILS NFR BLD AUTO: 0.9 %
BILIRUB SERPL-MCNC: 0.5 MG/DL (ref 0.3–1)
BUN SERPL-MCNC: 16 MG/DL (ref 7–25)
CALCIUM SERPL-MCNC: 8.6 MG/DL (ref 8.6–10.3)
CHLORIDE SERPL-SCNC: 105 MMOL/L (ref 98–107)
CO2 SERPL-SCNC: 20 MMOL/L (ref 21–31)
CREAT SERPL-MCNC: 1.5 MG/DL (ref 0.7–1.3)
DIFFERENTIAL METHOD BLD: ABNORMAL
EOSINOPHIL # BLD AUTO: 0.1 10E9/L (ref 0–0.7)
EOSINOPHIL NFR BLD AUTO: 1.6 %
ERYTHROCYTE [DISTWIDTH] IN BLOOD BY AUTOMATED COUNT: 13.7 % (ref 10–15)
ETHANOL SERPL-MCNC: 0.35 %
GFR SERPL CREATININE-BSD FRML MDRD: ABNORMAL ML/MIN/{1.73_M2}
GLUCOSE SERPL-MCNC: 102 MG/DL (ref 70–105)
HCT VFR BLD AUTO: 31 % (ref 40–53)
HGB BLD-MCNC: 10.2 G/DL (ref 13.3–17.7)
IMM GRANULOCYTES # BLD: 0.1 10E9/L (ref 0–0.4)
IMM GRANULOCYTES NFR BLD: 0.8 %
LYMPHOCYTES # BLD AUTO: 2 10E9/L (ref 0.8–5.3)
LYMPHOCYTES NFR BLD AUTO: 30.3 %
MCH RBC QN AUTO: 31.9 PG (ref 26.5–33)
MCHC RBC AUTO-ENTMCNC: 32.9 G/DL (ref 31.5–36.5)
MCV RBC AUTO: 97 FL (ref 78–100)
MONOCYTES # BLD AUTO: 0.7 10E9/L (ref 0–1.3)
MONOCYTES NFR BLD AUTO: 11.5 %
NEUTROPHILS # BLD AUTO: 3.5 10E9/L (ref 1.6–8.3)
NEUTROPHILS NFR BLD AUTO: 54.9 %
PLATELET # BLD AUTO: 154 10E9/L (ref 150–450)
POTASSIUM SERPL-SCNC: 4.1 MMOL/L (ref 3.5–5.1)
PROT SERPL-MCNC: 6.5 G/DL (ref 6.4–8.9)
RBC # BLD AUTO: 3.2 10E12/L (ref 4.4–5.9)
SODIUM SERPL-SCNC: 134 MMOL/L (ref 134–144)
WBC # BLD AUTO: 6.4 10E9/L (ref 4–11)

## 2019-04-19 PROCEDURE — 72125 CT NECK SPINE W/O DYE: CPT

## 2019-04-19 PROCEDURE — 25000128 H RX IP 250 OP 636: Performed by: FAMILY MEDICINE

## 2019-04-19 PROCEDURE — 70450 CT HEAD/BRAIN W/O DYE: CPT

## 2019-04-19 PROCEDURE — 96361 HYDRATE IV INFUSION ADD-ON: CPT | Performed by: FAMILY MEDICINE

## 2019-04-19 PROCEDURE — 80053 COMPREHEN METABOLIC PANEL: CPT | Performed by: FAMILY MEDICINE

## 2019-04-19 PROCEDURE — 99283 EMERGENCY DEPT VISIT LOW MDM: CPT | Mod: Z6 | Performed by: FAMILY MEDICINE

## 2019-04-19 PROCEDURE — 99285 EMERGENCY DEPT VISIT HI MDM: CPT | Mod: 25 | Performed by: FAMILY MEDICINE

## 2019-04-19 PROCEDURE — 36415 COLL VENOUS BLD VENIPUNCTURE: CPT | Performed by: FAMILY MEDICINE

## 2019-04-19 PROCEDURE — 71045 X-RAY EXAM CHEST 1 VIEW: CPT

## 2019-04-19 PROCEDURE — 85025 COMPLETE CBC W/AUTO DIFF WBC: CPT | Performed by: FAMILY MEDICINE

## 2019-04-19 PROCEDURE — 80320 DRUG SCREEN QUANTALCOHOLS: CPT | Mod: GZ | Performed by: FAMILY MEDICINE

## 2019-04-19 PROCEDURE — 96360 HYDRATION IV INFUSION INIT: CPT | Performed by: FAMILY MEDICINE

## 2019-04-19 RX ORDER — SODIUM CHLORIDE 9 MG/ML
1000 INJECTION, SOLUTION INTRAVENOUS CONTINUOUS
Status: DISCONTINUED | OUTPATIENT
Start: 2019-04-19 | End: 2019-04-19

## 2019-04-19 RX ORDER — SODIUM CHLORIDE 9 MG/ML
1000 INJECTION, SOLUTION INTRAVENOUS CONTINUOUS
Status: DISCONTINUED | OUTPATIENT
Start: 2019-04-19 | End: 2019-04-19 | Stop reason: HOSPADM

## 2019-04-19 RX ADMIN — SODIUM CHLORIDE 500 ML: 9 INJECTION, SOLUTION INTRAVENOUS at 19:20

## 2019-04-19 NOTE — ED TRIAGE NOTES
"Pt comes to the ER via ambulance. Pt was found laying on the ground by the [employer]. Someone \"heard' him fall. Pt comes into the ER slurring his speech, moving all extremities.     "
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
## 2019-04-19 NOTE — ED AVS SNAPSHOT
[facility]  [address]  [place] [address]  Phone:  [phone]  Fax:  [phone]                                    Gilles Henning III   MRN: [id number]    Department:  [facility]   Date of Visit:  4/19/2019           After Visit Summary Signature Page    I have received my discharge instructions, and my questions have been answered. I have discussed any challenges I see with this plan with the nurse or doctor.    ..........................................................................................................................................  Patient/Patient Representative Signature      ..........................................................................................................................................  Patient Representative Print Name and Relationship to Patient    ..................................................               ................................................  Date                                   Time    ..........................................................................................................................................  Reviewed by Signature/Title    ...................................................              ..............................................  Date                                               Time          22EPIC Rev 08/18

## 2019-04-19 NOTE — TELEPHONE ENCOUNTER
INCOMING CALL  Called to report that he did get IV fluids yesterday, but is unable to get transportation to the lab today (Friday) for follow up blood tests. He can call to set up a ride on Monday for labs on Tuesday.    PLAN  Continue to push oral liquids through the weekend  Check labs on Tuesday

## 2019-04-19 NOTE — ED NOTES
DATE:  4/19/2019   TIME OF RECEIPT FROM LAB:  1847  LAB TEST:  BA  LAB VALUE:  .346  RESULTS GIVEN WITH READ-BACK TO (PROVIDER):  Dr. Cunningham given verbal report on results  TIME LAB VALUE REPORTED TO PROVIDER:   0444

## 2019-04-20 NOTE — ED NOTES
Pt states he has no money.  Called [facility], staff states they were unaware that he was not in facility, spoke with CORA Hoffman, report given.  She states no ride available from facility.

## 2019-04-20 NOTE — ED NOTES
Pt states he cant eat because he doesn't have his teeth.  Offered liquids and soft foods, refused. Resting quietly and comfortably.

## 2019-04-22 ENCOUNTER — TELEPHONE (OUTPATIENT)
Dept: TRANSPLANT | Facility: CLINIC | Age: 49
End: 2019-04-22

## 2019-04-22 NOTE — TELEPHONE ENCOUNTER
Due for transplant labs tomorrow, RNCC called to confirm that these will be completed, especially in light of most recent ED visit.    RNCC left detailed VM.

## 2019-04-23 ENCOUNTER — TELEPHONE (OUTPATIENT)
Dept: TRANSPLANT | Facility: CLINIC | Age: 49
End: 2019-04-23

## 2019-04-23 NOTE — TELEPHONE ENCOUNTER
"Today BP is low 109/83.  Lightheaded and dizzy upon standing.   He has had no changes to his BP regimen.    Weight has not changed.    Hector was feeling well yesterday - he stated he was \"running around dancing - having a good day\"    Hector was seen over the weekend in the ED with intoxication. RNCC advised NO drinking due to dehydrating affects (among other issues) and Hector voiced that he will not be going back to the bar as he was court ordered not to be there.    Will give 1L NS tomorrow, lab draw is set for Thursday, 4/25.            "

## 2019-04-24 ENCOUNTER — HOSPITAL ENCOUNTER (OUTPATIENT)
Dept: INFUSION THERAPY | Facility: OTHER | Age: 49
Discharge: HOME | End: 2019-04-24
Attending: INTERNAL MEDICINE | Admitting: INTERNAL MEDICINE
Payer: MEDICARE

## 2019-04-24 VITALS — TEMPERATURE: 96.5 F | RESPIRATION RATE: 20 BRPM | SYSTOLIC BLOOD PRESSURE: 104 MMHG | DIASTOLIC BLOOD PRESSURE: 61 MMHG

## 2019-04-24 DIAGNOSIS — R79.89 ELEVATED SERUM CREATININE: Primary | ICD-10-CM

## 2019-04-24 DIAGNOSIS — E86.0 DEHYDRATION: ICD-10-CM

## 2019-04-24 DIAGNOSIS — Z94.0 KIDNEY TRANSPLANTED: ICD-10-CM

## 2019-04-24 DIAGNOSIS — Z94.0 KIDNEY TRANSPLANT RECIPIENT: ICD-10-CM

## 2019-04-24 PROCEDURE — 96360 HYDRATION IV INFUSION INIT: CPT

## 2019-04-24 PROCEDURE — 25000128 H RX IP 250 OP 636: Performed by: INTERNAL MEDICINE

## 2019-04-24 RX ORDER — ALPRAZOLAM 0.5 MG
0.5 TABLET ORAL DAILY PRN
Status: ON HOLD | COMMUNITY
End: 2019-05-09

## 2019-04-24 RX ORDER — CLINDAMYCIN HCL 150 MG
150 CAPSULE ORAL 4 TIMES DAILY
COMMUNITY
End: 2019-05-03

## 2019-04-24 RX ORDER — FLUDROCORTISONE ACETATE 0.1 MG/1
0.1 TABLET ORAL DAILY
Status: ON HOLD | COMMUNITY
End: 2019-05-09

## 2019-04-24 RX ADMIN — SODIUM CHLORIDE 1000 ML: 9 INJECTION, SOLUTION INTRAVENOUS at 13:32

## 2019-04-24 NOTE — PROGRESS NOTES
Infusion Nursing Note:  Gilles Henning III presents today for Normal Saline bolus.    Patient seen by provider today: No   present during visit today: Not Applicable.    Note: N/A.    Intravenous Access:  Peripheral IV placed.    Treatment Conditions:  Not Applicable.    Post Infusion Assessment:  Patient tolerated infusion without incident.  Blood return noted pre and post infusion.  Site patent and intact, free from redness, edema or discomfort.  No evidence of extravasations.  Access discontinued per protocol.     Discharge Plan:   Copy of AVS reviewed with patient and/or family.  Patient will return 4/25 for lab draw.  Patient discharged in stable condition accompanied by: attendant.  Departure Mode: Ambulatory.    Brenda J. Goodell, RN

## 2019-04-25 ENCOUNTER — TELEPHONE (OUTPATIENT)
Dept: TRANSPLANT | Facility: CLINIC | Age: 49
End: 2019-04-25

## 2019-04-25 DIAGNOSIS — Z79.60 LONG-TERM USE OF IMMUNOSUPPRESSANT MEDICATION: ICD-10-CM

## 2019-04-25 DIAGNOSIS — Z94.0 KIDNEY TRANSPLANTED: ICD-10-CM

## 2019-04-25 DIAGNOSIS — Z94.0 KIDNEY REPLACED BY TRANSPLANT: ICD-10-CM

## 2019-04-25 DIAGNOSIS — Z00.00 HEALTHCARE MAINTENANCE: Primary | ICD-10-CM

## 2019-04-25 LAB
ANION GAP SERPL CALCULATED.3IONS-SCNC: 7 MMOL/L (ref 3–14)
BUN SERPL-MCNC: 17 MG/DL (ref 7–25)
CALCIUM SERPL-MCNC: 10 MG/DL (ref 8.6–10.3)
CHLORIDE SERPL-SCNC: 106 MMOL/L (ref 98–107)
CO2 SERPL-SCNC: 22 MMOL/L (ref 21–31)
CREAT SERPL-MCNC: 1.65 MG/DL (ref 0.7–1.3)
ERYTHROCYTE [DISTWIDTH] IN BLOOD BY AUTOMATED COUNT: 13.9 % (ref 10–15)
GFR SERPL CREATININE-BSD FRML MDRD: 44 ML/MIN/{1.73_M2}
GLUCOSE SERPL-MCNC: 118 MG/DL (ref 70–105)
HCT VFR BLD AUTO: 35.9 % (ref 40–53)
HGB BLD-MCNC: 11.7 G/DL (ref 13.3–17.7)
MCH RBC QN AUTO: 31.5 PG (ref 26.5–33)
MCHC RBC AUTO-ENTMCNC: 32.6 G/DL (ref 31.5–36.5)
MCV RBC AUTO: 97 FL (ref 78–100)
PLATELET # BLD AUTO: 169 10E9/L (ref 150–450)
POTASSIUM SERPL-SCNC: 4.9 MMOL/L (ref 3.5–5.1)
RBC # BLD AUTO: 3.71 10E12/L (ref 4.4–5.9)
SODIUM SERPL-SCNC: 135 MMOL/L (ref 134–144)
WBC # BLD AUTO: 3.4 10E9/L (ref 4–11)

## 2019-04-25 PROCEDURE — 80048 BASIC METABOLIC PNL TOTAL CA: CPT | Performed by: INTERNAL MEDICINE

## 2019-04-25 PROCEDURE — 80158 DRUG ASSAY CYCLOSPORINE: CPT | Performed by: INTERNAL MEDICINE

## 2019-04-25 PROCEDURE — 36415 COLL VENOUS BLD VENIPUNCTURE: CPT | Performed by: INTERNAL MEDICINE

## 2019-04-25 PROCEDURE — 87799 DETECT AGENT NOS DNA QUANT: CPT | Performed by: INTERNAL MEDICINE

## 2019-04-25 PROCEDURE — 85027 COMPLETE CBC AUTOMATED: CPT | Performed by: INTERNAL MEDICINE

## 2019-04-25 NOTE — TELEPHONE ENCOUNTER
Hector called with creatinine value -  1.63. He did have 1L NS the day prior to lab draw.  BPs are running 120s / 80s with HR in the 80s.    Will discuss need for biopsy with tx team.

## 2019-04-25 NOTE — TELEPHONE ENCOUNTER
I left a voicemail for the nurse manager at assisted living to call back to discuss patient's labs.     Nikhil Telles MD Schindelholz, Alanna, RN             I would recommend a biopsy for Pon[place] due to elevated creatinine.     d

## 2019-04-26 ENCOUNTER — TELEPHONE (OUTPATIENT)
Dept: TRANSPLANT | Facility: CLINIC | Age: 49
End: 2019-04-26

## 2019-04-26 DIAGNOSIS — F33.9 RECURRENT MAJOR DEPRESSIVE DISORDER, REMISSION STATUS UNSPECIFIED (H): Primary | ICD-10-CM

## 2019-04-26 DIAGNOSIS — T86.10 COMPLICATIONS, KIDNEY TRANSPLANT: Primary | ICD-10-CM

## 2019-04-26 DIAGNOSIS — Z94.0 KIDNEY REPLACED BY TRANSPLANT: ICD-10-CM

## 2019-04-26 LAB
BKV DNA # SPEC NAA+PROBE: 4806 COPIES/ML
BKV DNA SPEC NAA+PROBE-LOG#: 3.7 LOG COPIES/ML
CYCLOSPORINE BLD LC/MS/MS-MCNC: 114 UG/L (ref 50–400)
SPECIMEN SOURCE: ABNORMAL
TME LAST DOSE: 900 H

## 2019-04-26 NOTE — TELEPHONE ENCOUNTER
[facility]    Will plan for bx on 5/3 and discuss overnight stay with Hector.

## 2019-04-26 NOTE — TELEPHONE ENCOUNTER
Transplant Coordinator Renal Biopsy Communication    Call placed to Gilles Henning III to discuss indication for kidney transplant biopsy per Dr. Telles and Deborah.     Indication for transplant renal biopsy: elevated serum creatinine   Laterality: right  Date of biopsy: 5/3/19    Patient location within 70 miles of [facility]: No.  If no, must stay overnight locally. Pt cannot afford to stay locally and refuses.     RNCC left detailed VM for AL Staff that if he does not stay locally, [facility] must be able to get ahold of him promptly over the weekend if need be.    Gilles Henning III's medication list was reviewed.   Anticoagulant: none   Ibuprofen: No.  Fish Oil:  No.  Medications held: none    Recent blood pressure readings are WNL or not applicable. Instructed to take medication, especially blood pressure medications, before [facility] at 0600 day of procedure.     Procedure expectations and duration of stay discussed. Expressed pt can expect a phone call from LPN/MA to confirm biopsy date/time/location/directions/review of medications. Pt has no additional questions at this time. Transplant Office phone number given to pt for future questions.      Angeline Alonso  Kidney/Pancreas Transplant Coordinator  [phone] option 5

## 2019-04-26 NOTE — LETTER
PHYSICIAN ORDERS      DATE & TIME ISSUED: May 1, 2019 8:20 AM  PATIENT NAME: Gilles Henning III   : 1970     [facility] MR# [if applicable]: [id number]     DIAGNOSIS:  Kidney Transplant  ICD-10 CODE: Z94.0     To whom it may concern:  Patient is scheduled for a renal biopsy procedure on 5/3/2019, patient should plan to  arrive at 6:00AM.   No need to be NPO.    Patient should bring all medications with to biopsy procedure.  Report to 1st floor lab, then 5th floor for biopsy.  Use [employer] services and bring form of entertainment.   Lab appointment scheduled for morning of biopsy.      Any questions please call: [phone]    .

## 2019-04-26 NOTE — TELEPHONE ENCOUNTER
Post Kidney and Pancreas Transplant Team Conference  Date: 4/25/2019  Transplant Coordinator: Angeline Alonso     Attendees:  []  Dr. Macias [] Thao Barnard, RN  [] Blossom Montes LPN     []  Dr. Donato [] Martha Morris RN [] Noelle Wagner LPN   []  Dr. Telles [] Beth Evans RN    [x]  Dr. Cabrera [] Katelyn Gallegos RN    [] Dr. Lorenzo [x] Maliha Alonso RN    [] Dr. Finley [] Mahad Steele RN    [] Dr. Parsons [] Mildred Barajas RN    [] Surgery Fellow [] Gia Romero RN    [] Rachel Resendez NP [] Marianna Fortune RN    [] Jacob López, PharmD [] Arash Agarwal RN     [] Thao Villarreal RN        Verbal Plan Read Back:   For elevated creatinine not responsive to IV hydration,  Repeat kidney transplant biopsy.     Routed to RN Coordinator   Angeline Alonso

## 2019-04-30 RX ORDER — MULTIVITAMIN WITH FOLIC ACID 400 MCG
TABLET ORAL
Qty: 100 TABLET | Refills: 11 | Status: ON HOLD | OUTPATIENT
Start: 2019-04-30 | End: 2019-05-03

## 2019-04-30 RX ORDER — ESCITALOPRAM OXALATE 20 MG/1
20 TABLET ORAL DAILY
Qty: 90 TABLET | Refills: 1 | Status: ON HOLD | OUTPATIENT
Start: 2019-04-30 | End: 2019-05-09

## 2019-04-30 NOTE — TELEPHONE ENCOUNTER
"Requested Prescriptions   Pending Prescriptions Disp Refills     escitalopram (LEXAPRO) 20 MG tablet       Si tablet (20 mg) by Oral or NG Tube route daily       SSRIs Protocol Passed - 2019 11:48 AM        Passed - Recent (12 mo) or future (30 days) visit within the authorizing provider's specialty     Patient had office visit in the last 12 months or has a visit in the next 30 days with authorizing provider or within the authorizing provider's specialty.  See \"Patient Info\" tab in inbasket, or \"Choose Columns\" in Meds & Orders section of the refill encounter.              Passed - Medication is active on med list        Passed - Patient is age 18 or older        We do not have this as a current medication on the med list for him. Review chart shows pt has mental health follow up set up and question if we are doing this medication for him? Past history of suicide attempt and also kidney transplant .   Can we fill or is psychiatric care doing for him?  "

## 2019-04-30 NOTE — TELEPHONE ENCOUNTER
Routing refill request to provider for review/approval because:  Drug not active on patient's medication list    Patient is requesting a change from liquid vitamin to oral. Michelle San RN on 4/30/2019 at 8:58 AM

## 2019-05-01 ENCOUNTER — TELEPHONE (OUTPATIENT)
Dept: TRANSPLANT | Facility: CLINIC | Age: 49
End: 2019-05-01

## 2019-05-01 ENCOUNTER — HOSPITAL ENCOUNTER (EMERGENCY)
Facility: OTHER | Age: 49
Discharge: HOME | End: 2019-05-01
Attending: FAMILY MEDICINE | Admitting: FAMILY MEDICINE
Payer: MEDICARE

## 2019-05-01 VITALS
DIASTOLIC BLOOD PRESSURE: 90 MMHG | OXYGEN SATURATION: 100 % | RESPIRATION RATE: 24 BRPM | HEIGHT: 70 IN | WEIGHT: 149 LBS | TEMPERATURE: 97.6 F | SYSTOLIC BLOOD PRESSURE: 129 MMHG | BODY MASS INDEX: 21.33 KG/M2 | HEART RATE: 69 BPM

## 2019-05-01 DIAGNOSIS — Z94.0 KIDNEY TRANSPLANTED: Primary | ICD-10-CM

## 2019-05-01 DIAGNOSIS — T86.10 COMPLICATIONS, KIDNEY TRANSPLANT: Primary | ICD-10-CM

## 2019-05-01 DIAGNOSIS — M54.41 ACUTE MIDLINE LOW BACK PAIN WITH RIGHT-SIDED SCIATICA: ICD-10-CM

## 2019-05-01 LAB
ALBUMIN UR-MCNC: NEGATIVE MG/DL
APPEARANCE UR: CLEAR
BILIRUB UR QL STRIP: NEGATIVE
COLOR UR AUTO: YELLOW
GLUCOSE UR STRIP-MCNC: NEGATIVE MG/DL
HGB UR QL STRIP: NEGATIVE
KETONES UR STRIP-MCNC: NEGATIVE MG/DL
LEUKOCYTE ESTERASE UR QL STRIP: NEGATIVE
NITRATE UR QL: NEGATIVE
PH UR STRIP: 8 PH (ref 5–9)
SOURCE: NORMAL
SP GR UR STRIP: 1.01 (ref 1–1.03)
UROBILINOGEN UR STRIP-ACNC: 1 EU/DL (ref 0.2–1)

## 2019-05-01 PROCEDURE — 99283 EMERGENCY DEPT VISIT LOW MDM: CPT | Mod: Z6 | Performed by: FAMILY MEDICINE

## 2019-05-01 PROCEDURE — A9270 NON-COVERED ITEM OR SERVICE: HCPCS | Performed by: FAMILY MEDICINE

## 2019-05-01 PROCEDURE — 25000132 ZZH RX MED GY IP 250 OP 250 PS 637: Performed by: FAMILY MEDICINE

## 2019-05-01 PROCEDURE — 81003 URINALYSIS AUTO W/O SCOPE: CPT | Performed by: FAMILY MEDICINE

## 2019-05-01 PROCEDURE — 99283 EMERGENCY DEPT VISIT LOW MDM: CPT | Performed by: FAMILY MEDICINE

## 2019-05-01 RX ORDER — PRAZOSIN HYDROCHLORIDE 1 MG/1
2 CAPSULE ORAL AT BEDTIME
Status: ON HOLD | COMMUNITY
End: 2019-05-09

## 2019-05-01 RX ORDER — OXYCODONE AND ACETAMINOPHEN 5; 325 MG/1; MG/1
2 TABLET ORAL ONCE
Status: COMPLETED | OUTPATIENT
Start: 2019-05-01 | End: 2019-05-01

## 2019-05-01 RX ORDER — MYCOPHENOLATE MOFETIL 200 MG/ML
POWDER, FOR SUSPENSION ORAL
Qty: 1760 ML | Refills: 11 | Status: ON HOLD | OUTPATIENT
Start: 2019-05-01 | End: 2019-05-09

## 2019-05-01 RX ORDER — CYCLOBENZAPRINE HCL 10 MG
10 TABLET ORAL 3 TIMES DAILY PRN
Qty: 20 TABLET | Refills: 0 | Status: ON HOLD | OUTPATIENT
Start: 2019-05-01 | End: 2019-05-09

## 2019-05-01 RX ORDER — CLONIDINE HYDROCHLORIDE 0.1 MG/1
0.1 TABLET ORAL 2 TIMES DAILY PRN
COMMUNITY
End: 2019-10-08

## 2019-05-01 RX ADMIN — OXYCODONE AND ACETAMINOPHEN 2 TABLET: 5; 325 TABLET ORAL at 16:34

## 2019-05-01 ASSESSMENT — ENCOUNTER SYMPTOMS
DIFFICULTY URINATING: 0
SHORTNESS OF BREATH: 0
CONFUSION: 0
EYE REDNESS: 0
NECK STIFFNESS: 0
FEVER: 0
ABDOMINAL PAIN: 0
COLOR CHANGE: 0
ARTHRALGIAS: 0
HEADACHES: 0

## 2019-05-01 ASSESSMENT — MIFFLIN-ST. JEOR: SCORE: 1542.11

## 2019-05-01 NOTE — ED TRIAGE NOTES
Pt presents to the ED from [facility]. Pt reports having back pain start on Sunday and it continues to get worse, now pain is spreading down his leg.    Viktoriya Neumann RN on 5/1/2019 at 3:58 PM
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
## 2019-05-01 NOTE — ED AVS SNAPSHOT
[facility]  [address]  [place] [address]  Phone:  [phone]  Fax:  [phone]                                    Gilles Henning III   MRN: [id number]    Department:  [facility]   Date of Visit:  5/1/2019           After Visit Summary Signature Page    I have received my discharge instructions, and my questions have been answered. I have discussed any challenges I see with this plan with the nurse or doctor.    ..........................................................................................................................................  Patient/Patient Representative Signature      ..........................................................................................................................................  Patient Representative Print Name and Relationship to Patient    ..................................................               ................................................  Date                                   Time    ..........................................................................................................................................  Reviewed by Signature/Title    ...................................................              ..............................................  Date                                               Time          22EPIC Rev 08/18

## 2019-05-01 NOTE — TELEPHONE ENCOUNTER
Post Kidney and Pancreas Transplant Team Conference  Date: 5/1/2019  Transplant Coordinator: Angeline Alonso     Attendees:  [x]  Dr. Macias [] Thao Barnard, RN  [x] Blossom Montes LPN     []  Dr. Donato [x] Martha Morris RN [] Noelle Wagenr LPN   [x]  Dr. Telles [] Beth Evans RN    []  Dr. Cabrera [] Katelyn Gallegos RN    [] Dr. Vallejo [x] Maliha Alonso RN    [] Dr. Lorenzo [] Mahad Steele RN    [x] Dr. Finley [] Mildred Barajas RN    [] Surgery Fellow [] Gia Romero RN    [] Rachel Resendez NP              Verbal Plan Read Back:   Continue with biopsy this week    Routed to RN Coordinator   Blossom Montes          MDs were made aware at acute review meeting that the patient refuses to stay overnight in the area post biopsy.

## 2019-05-01 NOTE — TELEPHONE ENCOUNTER
LPN/MA  Renal Biopsy Communication    Call to pt to confirm renal biopsy procedure. Biopsy orders entered and patient aware of date 5/3/2019, in [facility] and to arrive at 6:00AM.     No need to be NPO.      Discussed anticoagulants (i.e. fish oil, ASA, Plavix, Coumadin, Ibuprofen). Pt denies use of anticoagulants.     Take all medicine before arrival for biopsy and bring all medicine bottles with.      Report to 1st floor lab, then 5th floor for biopsy.      Use [employer] services and bring form of entertainment.     Discussed with patient need to stay overnight locally evening of procedure if live more than 70 miles away.    Call placed to scheduling at [phone] to schedule and confirm biopsy date/time    Lab appointment scheduled for morning of biopsy.        Left message and faxed instructions for biopsy to [facility]A [facility] at patients assisted living.

## 2019-05-02 NOTE — TELEPHONE ENCOUNTER
RNCC was able to speak with CORA Cruz at [facility]. Ride service is in place and fax was received with instructions for biopsy tomorrow.    Nancy will touch base with Hector this afternoon to ensure he is prepared.

## 2019-05-03 ENCOUNTER — HOSPITAL ENCOUNTER (OUTPATIENT)
Facility: CLINIC | Age: 49
Discharge: HOME | End: 2019-05-03
Attending: INTERNAL MEDICINE | Admitting: INTERNAL MEDICINE
Payer: MEDICARE

## 2019-05-03 ENCOUNTER — TELEPHONE (OUTPATIENT)
Dept: TRANSPLANT | Facility: CLINIC | Age: 49
End: 2019-05-03

## 2019-05-03 ENCOUNTER — RESULTS ONLY (OUTPATIENT)
Dept: OTHER | Facility: CLINIC | Age: 49
End: 2019-05-03

## 2019-05-03 ENCOUNTER — APPOINTMENT (OUTPATIENT)
Dept: MEDSURG UNIT | Facility: CLINIC | Age: 49
End: 2019-05-03
Attending: INTERNAL MEDICINE
Payer: MEDICARE

## 2019-05-03 ENCOUNTER — ANCILLARY PROCEDURE (OUTPATIENT)
Dept: RADIOLOGY | Facility: AMBULATORY SURGERY CENTER | Age: 49
End: 2019-05-03
Attending: INTERNAL MEDICINE
Payer: COMMERCIAL

## 2019-05-03 ENCOUNTER — APPOINTMENT (OUTPATIENT)
Dept: INTERVENTIONAL RADIOLOGY/VASCULAR | Facility: CLINIC | Age: 49
End: 2019-05-03
Attending: INTERNAL MEDICINE
Payer: MEDICARE

## 2019-05-03 ENCOUNTER — HOSPITAL ENCOUNTER (OUTPATIENT)
Facility: AMBULATORY SURGERY CENTER | Age: 49
End: 2019-05-03
Attending: INTERNAL MEDICINE
Payer: COMMERCIAL

## 2019-05-03 VITALS
BODY MASS INDEX: 21.47 KG/M2 | HEIGHT: 70 IN | DIASTOLIC BLOOD PRESSURE: 73 MMHG | SYSTOLIC BLOOD PRESSURE: 122 MMHG | RESPIRATION RATE: 14 BRPM | OXYGEN SATURATION: 100 % | WEIGHT: 150 LBS | TEMPERATURE: 98.1 F

## 2019-05-03 VITALS
RESPIRATION RATE: 18 BRPM | SYSTOLIC BLOOD PRESSURE: 132 MMHG | DIASTOLIC BLOOD PRESSURE: 76 MMHG | OXYGEN SATURATION: 99 %

## 2019-05-03 DIAGNOSIS — Z94.0 KIDNEY REPLACED BY TRANSPLANT: ICD-10-CM

## 2019-05-03 DIAGNOSIS — T86.10 COMPLICATIONS, KIDNEY TRANSPLANT: ICD-10-CM

## 2019-05-03 DIAGNOSIS — R79.89 ELEVATED SERUM CREATININE: ICD-10-CM

## 2019-05-03 DIAGNOSIS — R79.89 ELEVATED SERUM CREATININE: Primary | ICD-10-CM

## 2019-05-03 DIAGNOSIS — Z94.83 PANCREAS REPLACED BY TRANSPLANT (H): ICD-10-CM

## 2019-05-03 LAB
ABO + RH BLD: NORMAL
ABO + RH BLD: NORMAL
ALBUMIN UR-MCNC: NEGATIVE MG/DL
ANION GAP SERPL CALCULATED.3IONS-SCNC: 4 MMOL/L (ref 3–14)
APPEARANCE UR: CLEAR
BASOPHILS # BLD AUTO: 0.1 10E9/L (ref 0–0.2)
BASOPHILS NFR BLD AUTO: 1.6 %
BILIRUB UR QL STRIP: NEGATIVE
BLD GP AB SCN SERPL QL: NORMAL
BLOOD BANK CMNT PATIENT-IMP: NORMAL
BUN SERPL-MCNC: 16 MG/DL (ref 7–30)
CALCIUM SERPL-MCNC: 10.2 MG/DL (ref 8.5–10.1)
CHLORIDE SERPL-SCNC: 111 MMOL/L (ref 94–109)
CO2 SERPL-SCNC: 26 MMOL/L (ref 20–32)
COLOR UR AUTO: YELLOW
CREAT SERPL-MCNC: 1.83 MG/DL (ref 0.66–1.25)
CREAT UR-MCNC: 63 MG/DL
CYCLOSPORINE BLD LC/MS/MS-MCNC: 139 UG/L (ref 50–400)
DIFFERENTIAL METHOD BLD: ABNORMAL
DONOR IDENTIFICATION: NORMAL
DSA COMMENTS: NORMAL
DSA PRESENT: NO
DSA TEST METHOD: NORMAL
EOSINOPHIL # BLD AUTO: 0.1 10E9/L (ref 0–0.7)
EOSINOPHIL NFR BLD AUTO: 1.6 %
ERYTHROCYTE [DISTWIDTH] IN BLOOD BY AUTOMATED COUNT: 13.8 % (ref 10–15)
GFR SERPL CREATININE-BSD FRML MDRD: 42 ML/MIN/{1.73_M2}
GLUCOSE SERPL-MCNC: 101 MG/DL (ref 70–99)
GLUCOSE UR STRIP-MCNC: NEGATIVE MG/DL
HCT VFR BLD AUTO: 36.1 % (ref 40–53)
HGB BLD-MCNC: 11.7 G/DL (ref 13.3–17.7)
HGB UR QL STRIP: NEGATIVE
IMM GRANULOCYTES # BLD: 0 10E9/L (ref 0–0.4)
IMM GRANULOCYTES NFR BLD: 0.6 %
INR PPP: 1.06 (ref 0.86–1.14)
KETONES UR STRIP-MCNC: NEGATIVE MG/DL
LEUKOCYTE ESTERASE UR QL STRIP: NEGATIVE
LYMPHOCYTES # BLD AUTO: 0.6 10E9/L (ref 0.8–5.3)
LYMPHOCYTES NFR BLD AUTO: 20.1 %
MCH RBC QN AUTO: 31.9 PG (ref 26.5–33)
MCHC RBC AUTO-ENTMCNC: 32.4 G/DL (ref 31.5–36.5)
MCV RBC AUTO: 98 FL (ref 78–100)
MICROALBUMIN UR-MCNC: 6 MG/L
MICROALBUMIN/CREAT UR: 10.03 MG/G CR (ref 0–17)
MONOCYTES # BLD AUTO: 0.5 10E9/L (ref 0–1.3)
MONOCYTES NFR BLD AUTO: 16.3 %
NEUTROPHILS # BLD AUTO: 1.9 10E9/L (ref 1.6–8.3)
NEUTROPHILS NFR BLD AUTO: 59.8 %
NITRATE UR QL: NEGATIVE
NRBC # BLD AUTO: 0 10*3/UL
NRBC BLD AUTO-RTO: 0 /100
ORGAN: NORMAL
PH UR STRIP: 7 PH (ref 5–7)
PLATELET # BLD AUTO: 159 10E9/L (ref 150–450)
POTASSIUM SERPL-SCNC: 5.2 MMOL/L (ref 3.4–5.3)
PROT UR-MCNC: 0.09 G/L
PROT/CREAT 24H UR: 0.14 G/G CR (ref 0–0.2)
RBC # BLD AUTO: 3.67 10E12/L (ref 4.4–5.9)
RBC #/AREA URNS AUTO: <1 /HPF (ref 0–2)
SA1 CELL: NORMAL
SA1 COMMENTS: NORMAL
SA1 HI RISK ABY: NORMAL
SA1 MOD RISK ABY: NORMAL
SA1 TEST METHOD: NORMAL
SA2 CELL: NORMAL
SA2 COMMENTS: NORMAL
SA2 HI RISK ABY UA: NORMAL
SA2 MOD RISK ABY: NORMAL
SA2 TEST METHOD: NORMAL
SODIUM SERPL-SCNC: 141 MMOL/L (ref 133–144)
SOURCE: NORMAL
SP GR UR STRIP: 1.01 (ref 1–1.03)
SPECIMEN EXP DATE BLD: NORMAL
TME LAST DOSE: NORMAL H
UNACCEPTABLE ANTIGEN: NORMAL
UNOS CPRA: 100
UROBILINOGEN UR STRIP-MCNC: 2 MG/DL (ref 0–2)
WBC # BLD AUTO: 3.2 10E9/L (ref 4–11)
WBC #/AREA URNS AUTO: 1 /HPF (ref 0–5)

## 2019-05-03 PROCEDURE — 99152 MOD SED SAME PHYS/QHP 5/>YRS: CPT

## 2019-05-03 PROCEDURE — 88305 TISSUE EXAM BY PATHOLOGIST: CPT | Performed by: INTERNAL MEDICINE

## 2019-05-03 PROCEDURE — 88313 SPECIAL STAINS GROUP 2: CPT | Performed by: INTERNAL MEDICINE

## 2019-05-03 PROCEDURE — 99153 MOD SED SAME PHYS/QHP EA: CPT

## 2019-05-03 PROCEDURE — 25800030 ZZH RX IP 258 OP 636: Performed by: NURSE PRACTITIONER

## 2019-05-03 PROCEDURE — 88346 IMFLUOR 1ST 1ANTB STAIN PX: CPT | Performed by: INTERNAL MEDICINE

## 2019-05-03 PROCEDURE — 25000128 H RX IP 250 OP 636

## 2019-05-03 PROCEDURE — 25000125 ZZHC RX 250

## 2019-05-03 PROCEDURE — 76942 ECHO GUIDE FOR BIOPSY: CPT

## 2019-05-03 PROCEDURE — 88348 ELECTRON MICROSCOPY DX: CPT | Performed by: INTERNAL MEDICINE

## 2019-05-03 PROCEDURE — 88350 IMFLUOR EA ADDL 1ANTB STN PX: CPT | Performed by: INTERNAL MEDICINE

## 2019-05-03 PROCEDURE — 40000168 ZZH STATISTIC PP CARE STAGE 3

## 2019-05-03 RX ORDER — DEXTROSE MONOHYDRATE 25 G/50ML
25-50 INJECTION, SOLUTION INTRAVENOUS
Status: DISCONTINUED | OUTPATIENT
Start: 2019-05-03 | End: 2019-05-03 | Stop reason: HOSPADM

## 2019-05-03 RX ORDER — NICOTINE POLACRILEX 4 MG
15-30 LOZENGE BUCCAL
Status: DISCONTINUED | OUTPATIENT
Start: 2019-05-03 | End: 2019-05-03 | Stop reason: HOSPADM

## 2019-05-03 RX ORDER — SODIUM CHLORIDE 9 MG/ML
INJECTION, SOLUTION INTRAVENOUS CONTINUOUS
Status: DISCONTINUED | OUTPATIENT
Start: 2019-05-03 | End: 2019-05-03 | Stop reason: HOSPADM

## 2019-05-03 RX ORDER — FENTANYL CITRATE 50 UG/ML
25-50 INJECTION, SOLUTION INTRAMUSCULAR; INTRAVENOUS EVERY 5 MIN PRN
Status: DISCONTINUED | OUTPATIENT
Start: 2019-05-03 | End: 2019-05-03 | Stop reason: HOSPADM

## 2019-05-03 RX ORDER — NALOXONE HYDROCHLORIDE 0.4 MG/ML
.1-.4 INJECTION, SOLUTION INTRAMUSCULAR; INTRAVENOUS; SUBCUTANEOUS
Status: DISCONTINUED | OUTPATIENT
Start: 2019-05-03 | End: 2019-05-03 | Stop reason: HOSPADM

## 2019-05-03 RX ORDER — FLUMAZENIL 0.1 MG/ML
0.2 INJECTION, SOLUTION INTRAVENOUS
Status: DISCONTINUED | OUTPATIENT
Start: 2019-05-03 | End: 2019-05-03 | Stop reason: HOSPADM

## 2019-05-03 RX ORDER — LIDOCAINE 40 MG/G
CREAM TOPICAL
Status: DISCONTINUED | OUTPATIENT
Start: 2019-05-03 | End: 2019-05-03 | Stop reason: HOSPADM

## 2019-05-03 RX ADMIN — SODIUM CHLORIDE: 9 INJECTION, SOLUTION INTRAVENOUS at 10:18

## 2019-05-03 RX ADMIN — LIDOCAINE HYDROCHLORIDE 5 ML: 10 INJECTION, SOLUTION EPIDURAL; INFILTRATION; INTRACAUDAL; PERINEURAL at 11:47

## 2019-05-03 RX ADMIN — FENTANYL CITRATE 50 MCG: 50 INJECTION INTRAMUSCULAR; INTRAVENOUS at 11:46

## 2019-05-03 RX ADMIN — MIDAZOLAM HYDROCHLORIDE 1 MG: 1 INJECTION, SOLUTION INTRAMUSCULAR; INTRAVENOUS at 11:46

## 2019-05-03 RX ADMIN — Medication 1 ML: at 08:18

## 2019-05-03 ASSESSMENT — MIFFLIN-ST. JEOR: SCORE: 1546.65

## 2019-05-03 NOTE — OR NURSING
Pt send to [place] IR for procedure, attempted kidney biopsy x4 unable to get kidney samples. Help pressure for 15 minutes and then sent to [place] per .

## 2019-05-03 NOTE — PROGRESS NOTES
Pt here post transplanted kidney biopsy; site on right abdomen is dry and intact. Pt awake and alert, denies pain. Taking liquids. Will continue to monitor.

## 2019-05-03 NOTE — PROGRESS NOTES
1540  Up, ambulated in sandra & to BR.  Stable on feet.  Voided clear yellow urine without difficulty, 600 ccs.  Denies any pain.  1545  Discharge instructions have already been reviewed with patient.  IV discontinued intact and pressure dressing applied to site.   1550  Discharged to care of  per ambulation accompanied by staff.  CTRN

## 2019-05-03 NOTE — PROCEDURES
Interventional Radiology Brief Post Procedure Note    Procedure: IR RENAL BIOPSY RIGHT    Proceduralist: PATRICIA Sepulveda PA-C    Assistant: None    Time Out: Prior to the start of the procedure and with procedural staff participation, I verbally confirmed the patient s identity using two indicators, relevant allergies, that the procedure was appropriate and matched the consent or emergent situation, and that the correct equipment/implants were available. Immediately prior to starting the procedure I conducted the Time Out with the procedural staff and re-confirmed the patient s name, procedure, and site/side. (The Joint Commission universal protocol was followed.)  Yes    Medications   Medication Event Details Admin User Admin Time   midazolam (VERSED) injection 0.5-2 mg Medication Given Dose: 1 mg; Route: Intravenous Maria Luisa Benitez RN 5/3/2019 11:46 AM   fentaNYL (PF) (SUBLIMAZE) injection 25-50 mcg Medication Given Dose: 50 mcg; Route: Intravenous Maria Luisa Benitez RN 5/3/2019 11:46 AM   lidocaine 1 % 1-30 mL Medication Given by Other Dose: 5 mL; Route: Intradermal Maria Luisa Benitez RN 5/3/2019 11:47 AM       Sedation: IR Nurse Monitored Care   Post Procedure Summary:  Prior to the start of the procedure and with procedural staff participation, I verbally confirmed the patient s identity using two indicators, relevant allergies, that the procedure was appropriate and matched the consent or emergent situation, and that the correct equipment/implants were available. Immediately prior to starting the procedure I conducted the Time Out with the procedural staff and re-confirmed the patient s name, procedure, and site/side. (The Joint Commission universal protocol was followed.)  Yes       Sedatives: Fentanyl and Midazolam (Versed)    Vital signs, airway and pulse oximetry were monitored and remained stable throughout the procedure and sedation was maintained until the procedure was complete.  The patient  was monitored by staff until sedation discharge criteria were met.    Patient tolerance: Patient tolerated the procedure well with no immediate complications.    Time of sedation in minutes: 30 Minutes minutes from beginning to end of physician one to one monitoring.          Findings: Completed ultrasound guided transplant kidney biopsy.  A total of five passes yielded kidney tissue cores collected for further pathological evaluation.  No immediate complications.  Dx: Elevated serum creatinine; Kidney replaced by transplant.  Quinn. 30 1 50    Estimated Blood Loss: Less than 10 ml    Fluoroscopy Time:  minute(s)    SPECIMENS: Core needle biopsy specimens sent for pathological analysis    Complications: 1. None     Condition: Stable    Plan:     Comments: See dictated procedure note for full details.    Melvin Ball PA-C

## 2019-05-03 NOTE — DISCHARGE INSTRUCTIONS
[facility]    Interventional Radiology  Patient Instructions Following Transplanted Kidney Biopsy    AFTER YOU GO HOME  ? If you were given sedation DO NOT drive or operate machinery at home or at work for at least 24 hours  ? DO relax and take it easy for 48 hours, no strenuous activity for 24 hours  ? DO drink plenty of fluids  ? DO resume your regular diet, unless otherwise instructed by your Primary Physician  ? Keep the dressing dry and in place for 24 hours.  ? DO NOT SMOKE FOR AT LEAST 24 HOURS, if you have been given any medications that were to help you relax or sedate you during your procedure  ? DO NOT drink alcoholic beverages the day of your procedure  ? DO NOT do any strenuous exercise or lifting (> 10 lbs) for at least 7 days following your procedure  ? DO NOT take a bath or shower for at least 24 hours following your procedure  ? Remove dressing after shower the next day. Replace with Band aid for 2 days.  Never leave a wet dressing in place.  ? DO NOT make any important or legal decisions for 24 hours following your procedure  ? There should be minimum drainage from the biopsy site    CALL THE PHYSICIAN IF:  ? You start bleeding from the procedure site.  If you do start to bleed from that site, lie down flat and hold pressure on the site for a minimum of 10 minutes.  Your physician will tell you if you need to return to the hospital  ? You develop nausea or vomiting  ? You have excessive swelling, redness, or tenderness at the site  ? You have drainage that looks like it is infected.  ? You experience severe pain  ? You develop hives or a rash or unexplained itching  ? You develop a temperature of 101 degrees F or greater  ? You develop bloody clots or red urine after you are discharged          [facility] INTERVENTIONAL RADIOLOGY DEPARTMENT  Procedure Physician:     Hamzah Ball PA-C                              Date of procedure: May 3, 2019  Telephone  Numbers: [phone] Monday-Friday 8:00 am to 4:30 pm  [phone] After 4:30 pm Monday-Friday, Weekends & Holidays.   Ask for the Interventional Radiologist on call.  Someone is on call 24 hrs/day  [facility] toll free number: [phone] Monday-Friday 8:00 am to 4:30 pm  [facility] Emergency Dept: [phone]

## 2019-05-03 NOTE — PROGRESS NOTES
Patient Name: Gilles Henning III  Medical Record Number: [id number]  Today's Date: 5/3/2019    Procedure: Right kidney biopsy.  Proceduralist: LINH Ball.    Sedation start time: 1129   Sedation end time: 1145  Sedation medications administered: Fentanyl:50 mcg Versed:1mg      Procedure start time: 1126  Puncture time: 1130  Procedure end time: 1145    Report given to: DONALD Alexander  : n /a.    Other Notes: Pt arrived to IR room 6 from . Consent reviewed. Pt denies any questions or concerns regarding procedure. Pt positioned supine and monitored per protocol.  Pathology present and 5 specimens sent to lab.Pt tolerated procedure without any noted complications. Pt transferred back to .    Maria Luisa Benitez RN

## 2019-05-03 NOTE — TELEPHONE ENCOUNTER
Dr. Macias called - unable to obtain biopsy in clinic. He has scheduled biopsy to be performed in IR at 10am 5/3/2019.    Orders placed.   left for IR scheduling that orders have been placed.

## 2019-05-03 NOTE — IR NOTE
Interventional Radiology Pre-Procedure Sedation Assessment   Time of Assessment: 9:59 AM    Expected Level: Moderate Sedation    Indication: Sedation is required for the following type of Procedure: Biopsy    Sedation and procedural consent: Risks, benefits and alternatives were discussed with Patient    PO Intake: Appropriately NPO for procedure    ASA Class: Class 2 - MILD SYSTEMIC DISEASE, NO ACUTE PROBLEMS, NO FUNCTIONAL LIMITATIONS.    Mallampati: Pt trached.     Lungs: Lungs Clear with good breath sounds bilaterally    Heart: Normal heart sounds and rate    History and physical reviewed and no updates needed. I have reviewed the lab findings, diagnostic data, medications, and the plan for sedation. I have determined this patient to be an appropriate candidate for the planned sedation and procedure and have reassessed the patient IMMEDIATELY PRIOR to sedation and procedure.    Teofilo Culver MD
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
## 2019-05-03 NOTE — IP AVS SNAPSHOT
[address] [address]                                    After Visit Summary   5/3/2019    Gilles Henning III    MRN: [id number]           After Visit Summary Signature Page    I have received my discharge instructions, and my questions have been answered. I have discussed any challenges I see with this plan with the nurse or doctor.    ..........................................................................................................................................  Patient/Patient Representative Signature      ..........................................................................................................................................  Patient Representative Print Name and Relationship to Patient    ..................................................               ................................................  Date                                   Time    ..........................................................................................................................................  Reviewed by Signature/Title    ...................................................              ..............................................  Date                                               Time          22EPIC Rev 08/18

## 2019-05-03 NOTE — PROCEDURES
Kidney Transplant Biopsy Procedure Note    Indication/Diagnosis:  Kidney transplant with elevated creatinine    Procedure:  The indications and risks of the kidney biopsy, including bleeding severe enough to require hospitalization, transfusion, surgery, or even loss of the kidney or death, were explained, understood and agreed.  Consent was signed.  The kidney, located in the right lower quadrant, was visualized under ultrasound and biopsy site marked.  Patient was prepped and draped in the usual sterile manner.  Biopsy site was anesthetized with 1% lidocaine.  Biopsy consisted of 4 passes with a 18 ga needle under direct ultrasound guidance were made 2 passes by me and 2 passes by Dr Macias that shows no kidney tissue.  We aborted the procedure and called IR for kidney biopsy.     Pressure was held over biopsy site and patient will be observed for signs of bleeding or other complications.  -- no tissue obtained will consult IR for biopsy today.     Patient seen and discussed  with Dr. spike Barrientos  Nephrology Fellow  Pager: [phone]     Procedure Attestation:  This patient has been seen by me, Star Macias MD.  I was present for the key portions of the procedure done by the fellow.

## 2019-05-06 ENCOUNTER — HOSPITAL ENCOUNTER (OUTPATIENT)
Dept: INFUSION THERAPY | Facility: OTHER | Age: 49
Discharge: HOME | DRG: 917 | End: 2019-05-06
Attending: INTERNAL MEDICINE | Admitting: FAMILY MEDICINE
Payer: MEDICARE

## 2019-05-06 ENCOUNTER — TELEPHONE (OUTPATIENT)
Dept: TRANSPLANT | Facility: CLINIC | Age: 49
End: 2019-05-06

## 2019-05-06 VITALS
RESPIRATION RATE: 16 BRPM | HEART RATE: 74 BPM | TEMPERATURE: 97.6 F | DIASTOLIC BLOOD PRESSURE: 64 MMHG | SYSTOLIC BLOOD PRESSURE: 113 MMHG

## 2019-05-06 DIAGNOSIS — T86.11 KIDNEY TRANSPLANT REJECTION: Primary | ICD-10-CM

## 2019-05-06 DIAGNOSIS — Z94.0 KIDNEY TRANSPLANTED: ICD-10-CM

## 2019-05-06 DIAGNOSIS — R79.89 ELEVATED SERUM CREATININE: ICD-10-CM

## 2019-05-06 LAB
ANION GAP SERPL CALCULATED.3IONS-SCNC: 8 MMOL/L (ref 3–14)
BASOPHILS # BLD AUTO: 0 10E9/L (ref 0–0.2)
BASOPHILS NFR BLD AUTO: 0.9 %
BKV DNA # SPEC NAA+PROBE: 3397 COPIES/ML
BKV DNA SPEC NAA+PROBE-LOG#: 3.5 LOG COPIES/ML
BUN SERPL-MCNC: 14 MG/DL (ref 7–25)
CALCIUM SERPL-MCNC: 9.7 MG/DL (ref 8.6–10.3)
CHLORIDE SERPL-SCNC: 106 MMOL/L (ref 98–107)
CO2 SERPL-SCNC: 23 MMOL/L (ref 21–31)
CREAT SERPL-MCNC: 1.81 MG/DL (ref 0.7–1.3)
DIFFERENTIAL METHOD BLD: ABNORMAL
EOSINOPHIL # BLD AUTO: 0 10E9/L (ref 0–0.7)
EOSINOPHIL NFR BLD AUTO: 1.3 %
ERYTHROCYTE [DISTWIDTH] IN BLOOD BY AUTOMATED COUNT: 13.3 % (ref 10–15)
GFR SERPL CREATININE-BSD FRML MDRD: 40 ML/MIN/{1.73_M2}
GLUCOSE SERPL-MCNC: 121 MG/DL (ref 70–105)
HCT VFR BLD AUTO: 35.3 % (ref 40–53)
HGB BLD-MCNC: 11.4 G/DL (ref 13.3–17.7)
IMM GRANULOCYTES # BLD: 0 10E9/L (ref 0–0.4)
IMM GRANULOCYTES NFR BLD: 0.9 %
LYMPHOCYTES # BLD AUTO: 0.5 10E9/L (ref 0.8–5.3)
LYMPHOCYTES NFR BLD AUTO: 16.9 %
MCH RBC QN AUTO: 31.3 PG (ref 26.5–33)
MCHC RBC AUTO-ENTMCNC: 32.3 G/DL (ref 31.5–36.5)
MCV RBC AUTO: 97 FL (ref 78–100)
MONOCYTES # BLD AUTO: 0.5 10E9/L (ref 0–1.3)
MONOCYTES NFR BLD AUTO: 16.3 %
NEUTROPHILS # BLD AUTO: 2 10E9/L (ref 1.6–8.3)
NEUTROPHILS NFR BLD AUTO: 63.7 %
PLATELET # BLD AUTO: 150 10E9/L (ref 150–450)
POTASSIUM SERPL-SCNC: 4.2 MMOL/L (ref 3.5–5.1)
RBC # BLD AUTO: 3.64 10E12/L (ref 4.4–5.9)
SODIUM SERPL-SCNC: 137 MMOL/L (ref 134–144)
SPECIMEN SOURCE: ABNORMAL
WBC # BLD AUTO: 3.2 10E9/L (ref 4–11)

## 2019-05-06 PROCEDURE — 80180 DRUG SCRN QUAN MYCOPHENOLATE: CPT | Performed by: INTERNAL MEDICINE

## 2019-05-06 PROCEDURE — 25800030 ZZH RX IP 258 OP 636: Performed by: INTERNAL MEDICINE

## 2019-05-06 PROCEDURE — 80048 BASIC METABOLIC PNL TOTAL CA: CPT | Performed by: INTERNAL MEDICINE

## 2019-05-06 PROCEDURE — 96365 THER/PROPH/DIAG IV INF INIT: CPT

## 2019-05-06 PROCEDURE — 36415 COLL VENOUS BLD VENIPUNCTURE: CPT | Performed by: INTERNAL MEDICINE

## 2019-05-06 PROCEDURE — 25000128 H RX IP 250 OP 636: Performed by: INTERNAL MEDICINE

## 2019-05-06 PROCEDURE — 85025 COMPLETE CBC W/AUTO DIFF WBC: CPT | Performed by: INTERNAL MEDICINE

## 2019-05-06 PROCEDURE — 80158 DRUG ASSAY CYCLOSPORINE: CPT | Performed by: INTERNAL MEDICINE

## 2019-05-06 RX ADMIN — SODIUM CHLORIDE 500 MG: 9 INJECTION, SOLUTION INTRAVENOUS at 09:59

## 2019-05-06 RX ADMIN — SODIUM CHLORIDE 250 ML: 9 INJECTION, SOLUTION INTRAVENOUS at 09:57

## 2019-05-06 NOTE — TELEPHONE ENCOUNTER
RNCC did receive a phone call from nursing at [facility] - they are able to infuse daily per therapy plan - hopeful start at 10:30 this AM.    RNCC then was able to get a hold of AL staff and advised that this is URGENT - the woman I spoke with was going to reach their nurse (who was not yet on site) who, she stated, would then reach out to me in about 10 minutes.    AL staff noted that the [facility] bus goes to the [place] at 9, 10, and 11. RNCC advised that Hector be on the 9am bus to [facility], however, AL staff let me know that Hector has a court hearing this AM at 0930.    RNCC spoke with Mental Health counselor, Gen. A letter of medical necessity was sent so that a court date could be rescheduled.    Gen let RNCC know that Hector has been transported to [facility] for his infusion.

## 2019-05-06 NOTE — TELEPHONE ENCOUNTER
RNCC LVM on two different lines at [facility] - need for treatment of kidney transplant rejection.

## 2019-05-06 NOTE — LETTER
PHYSICIAN ORDERS      DATE & TIME ISSUED: May 6, 2019 8:54 AM  PATIENT NAME: Gilles Henning III   : 1970       To whom it may concern,  Please be advised that Mr. Gilles Henning III underwent a kidney transplant biopsy due to elevated creatinine. 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 I advised immediate treatment, to be completed at [facility], of this rejection.   Treatment will consist of 3 consecutive days of intravenous infusions of steroids to suppress his immune system from progressing this rejection and thus maintain kidney function.    Any questions please call: Maliha Alonso RN, BSN                                             Transplant Care Coordinator                                             [phone]    .

## 2019-05-06 NOTE — LETTER
PHYSICIAN ORDERS      DATE & TIME ISSUED: May 6, 2019 8:54 AM  PATIENT NAME: Gilles Henning III   : 1970     [facility] MR# [if applicable]: [id number]     DIAGNOSIS:  Kidney replaced by transplant, kidney transplant rejection.  ICD-10 CODE: Z94.0, T86.11     To whom it may concern,  Please be advised that Mr. Gilles Henning III underwent a kidney transplant biopsy due to elevated creatinine. 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 I advised immediate treatment, to be completed at [facility], of this rejection.   Treatment will consist of 3 consecutive days of intravenous infusions of steroids to suppress his immune system from progressing this rejection and thus maintain kidney function.    Any questions please call: Maliha Alonso RN, BSN                                             Transplant Care Coordinator                                             [phone]    .

## 2019-05-06 NOTE — PROGRESS NOTES
Infusion Nursing Note:  Gilles Henning III presents today for solumedrol.    Patient seen by provider today: No   present during visit today: Not Applicable.    Note: N/A.    Intravenous Access:  Peripheral IV placed.    Treatment Conditions:  Not Applicable.      Post Infusion Assessment:  Patient tolerated infusion without incident.  Site patent and intact, free from redness, edema or discomfort.  No evidence of extravasations.  Access discontinued per protocol.       Discharge Plan:   Copy of AVS reviewed with patient and/or family.  Patient will return tomorrow for next appointment.  Patient discharged in stable condition accompanied by: self.  Departure Mode: Ambulatory.    Emmy Mohr RN

## 2019-05-07 ENCOUNTER — HOSPITAL ENCOUNTER (OUTPATIENT)
Dept: INFUSION THERAPY | Facility: OTHER | Age: 49
Discharge: HOME | DRG: 917 | End: 2019-05-07
Attending: INTERNAL MEDICINE | Admitting: FAMILY MEDICINE
Payer: MEDICARE

## 2019-05-07 VITALS
TEMPERATURE: 96 F | RESPIRATION RATE: 16 BRPM | OXYGEN SATURATION: 99 % | SYSTOLIC BLOOD PRESSURE: 126 MMHG | DIASTOLIC BLOOD PRESSURE: 73 MMHG

## 2019-05-07 DIAGNOSIS — T86.11 KIDNEY TRANSPLANT REJECTION: Primary | ICD-10-CM

## 2019-05-07 DIAGNOSIS — Z94.0 KIDNEY TRANSPLANTED: ICD-10-CM

## 2019-05-07 DIAGNOSIS — R79.89 ELEVATED SERUM CREATININE: ICD-10-CM

## 2019-05-07 LAB
COPATH REPORT: NORMAL
CYCLOSPORINE BLD LC/MS/MS-MCNC: 595 UG/L (ref 50–400)
TME LAST DOSE: ABNORMAL H

## 2019-05-07 PROCEDURE — 96365 THER/PROPH/DIAG IV INF INIT: CPT

## 2019-05-07 PROCEDURE — 25000128 H RX IP 250 OP 636: Performed by: INTERNAL MEDICINE

## 2019-05-07 PROCEDURE — 25800030 ZZH RX IP 258 OP 636: Performed by: INTERNAL MEDICINE

## 2019-05-07 RX ADMIN — SODIUM CHLORIDE 500 MG: 9 INJECTION, SOLUTION INTRAVENOUS at 10:53

## 2019-05-07 RX ADMIN — SODIUM CHLORIDE 250 ML: 9 INJECTION, SOLUTION INTRAVENOUS at 10:52

## 2019-05-07 NOTE — PROGRESS NOTES
Infusion Nursing Note:  Gilles Henning III presents today for solumedrol.    Patient seen by provider today: No   present during visit today: Not Applicable.    Note: N/A.    Intravenous Access:  Peripheral IV placed.    Treatment Conditions:  Not Applicable.      Post Infusion Assessment:  Patient tolerated infusion without incident.  Blood return noted pre and post infusion.  Site patent and intact, free from redness, edema or discomfort.  No evidence of extravasations.  Access discontinued per protocol.       Discharge Plan:   Copy of AVS declined by patient  Patient will return tomorrow for next appointment.  Patient discharged in stable condition accompanied by: self.  Departure Mode: Ambulatory.    Emmy Mohr RN

## 2019-05-08 ENCOUNTER — HOSPITAL ENCOUNTER (INPATIENT)
Facility: OTHER | Age: 49
LOS: 1 days | Discharge: HOME | DRG: 917 | End: 2019-05-09
Attending: FAMILY MEDICINE | Admitting: FAMILY MEDICINE
Payer: MEDICARE

## 2019-05-08 ENCOUNTER — TELEPHONE (OUTPATIENT)
Dept: TRANSPLANT | Facility: CLINIC | Age: 49
End: 2019-05-08

## 2019-05-08 ENCOUNTER — HOSPITAL ENCOUNTER (OUTPATIENT)
Dept: INFUSION THERAPY | Facility: OTHER | Age: 49
Discharge: HOME | DRG: 917 | End: 2019-05-08
Attending: INTERNAL MEDICINE | Admitting: FAMILY MEDICINE
Payer: MEDICARE

## 2019-05-08 VITALS
RESPIRATION RATE: 18 BRPM | DIASTOLIC BLOOD PRESSURE: 90 MMHG | HEART RATE: 54 BPM | SYSTOLIC BLOOD PRESSURE: 159 MMHG | TEMPERATURE: 98.5 F

## 2019-05-08 DIAGNOSIS — J69.0 PNEUMONITIS DUE TO INHALATION OF FOOD OR VOMITUS (H): ICD-10-CM

## 2019-05-08 DIAGNOSIS — Z94.0 KIDNEY TRANSPLANTED: ICD-10-CM

## 2019-05-08 DIAGNOSIS — Z93.0 TRACHEOSTOMY STATUS (H): ICD-10-CM

## 2019-05-08 DIAGNOSIS — F43.21 GRIEF REACTION: ICD-10-CM

## 2019-05-08 DIAGNOSIS — R79.89 ELEVATED SERUM CREATININE: ICD-10-CM

## 2019-05-08 DIAGNOSIS — Z94.0 KIDNEY REPLACED BY TRANSPLANT: ICD-10-CM

## 2019-05-08 DIAGNOSIS — K92.0 HEMATEMESIS, PRESENCE OF NAUSEA NOT SPECIFIED: ICD-10-CM
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
## 2019-05-08 DIAGNOSIS — T86.11 KIDNEY TRANSPLANT REJECTION: ICD-10-CM

## 2019-05-08 DIAGNOSIS — D84.9 IMMUNOSUPPRESSED STATUS (H): ICD-10-CM

## 2019-05-08 DIAGNOSIS — J69.0 ASPIRATION PNEUMONIA DUE TO VOMIT, UNSPECIFIED LATERALITY, UNSPECIFIED PART OF LUNG (H): ICD-10-CM

## 2019-05-08 DIAGNOSIS — F33.2 SEVERE RECURRENT MAJOR DEPRESSION WITHOUT PSYCHOTIC FEATURES (H): ICD-10-CM

## 2019-05-08 DIAGNOSIS — F33.2 SEVERE EPISODE OF RECURRENT MAJOR DEPRESSIVE DISORDER, WITHOUT PSYCHOTIC FEATURES (H): ICD-10-CM

## 2019-05-08 DIAGNOSIS — Z94.0 KIDNEY TRANSPLANTED: Primary | ICD-10-CM

## 2019-05-08 DIAGNOSIS — T51.92XA: Primary | ICD-10-CM

## 2019-05-08 DIAGNOSIS — T86.11 KIDNEY TRANSPLANT REJECTION: Primary | ICD-10-CM

## 2019-05-08 DIAGNOSIS — F43.20 ADJUSTMENT DISORDER, UNSPECIFIED TYPE: ICD-10-CM

## 2019-05-08 DIAGNOSIS — Z87.891 PERSONAL HISTORY OF TOBACCO USE, PRESENTING HAZARDS TO HEALTH: ICD-10-CM

## 2019-05-08 DIAGNOSIS — E87.20 ACIDOSIS: ICD-10-CM

## 2019-05-08 DIAGNOSIS — T51.92XA: ICD-10-CM

## 2019-05-08 DIAGNOSIS — F33.41 RECURRENT MAJOR DEPRESSIVE DISORDER, IN PARTIAL REMISSION (H): ICD-10-CM

## 2019-05-08 DIAGNOSIS — K92.0 HEMATEMESIS WITH NAUSEA: ICD-10-CM

## 2019-05-08 DIAGNOSIS — Z87.828 HX OF INJURY: ICD-10-CM

## 2019-05-08 DIAGNOSIS — J69.0 ASPIRATION PNEUMONITIS (H): ICD-10-CM

## 2019-05-08 LAB
ALBUMIN SERPL-MCNC: 4.4 G/DL (ref 3.5–5.7)
ALBUMIN UR-MCNC: NEGATIVE MG/DL
ALP SERPL-CCNC: 80 U/L (ref 34–104)
ALT SERPL W P-5'-P-CCNC: 16 U/L (ref 7–52)
AMPHETAMINES UR QL SCN: NOT DETECTED
ANION GAP SERPL CALCULATED.3IONS-SCNC: 10 MMOL/L (ref 3–14)
ANION GAP SERPL CALCULATED.3IONS-SCNC: 8 MMOL/L (ref 3–14)
APAP SERPL-MCNC: <0.2 UG/ML (ref 0–30)
APPEARANCE UR: CLEAR
AST SERPL W P-5'-P-CCNC: 24 U/L (ref 13–39)
BACTERIA #/AREA URNS HPF: ABNORMAL /HPF
BARBITURATES UR QL: NOT DETECTED
BASOPHILS # BLD AUTO: 0 10E9/L (ref 0–0.2)
BASOPHILS # BLD AUTO: 0 10E9/L (ref 0–0.2)
BASOPHILS NFR BLD AUTO: 0 %
BASOPHILS NFR BLD AUTO: 0 %
BENZODIAZ UR QL: NOT DETECTED
BILIRUB SERPL-MCNC: 0.5 MG/DL (ref 0.3–1)
BILIRUB UR QL STRIP: NEGATIVE
BUN SERPL-MCNC: 24 MG/DL (ref 7–25)
BUN SERPL-MCNC: 26 MG/DL (ref 7–25)
BUPRENORPHINE UR QL: NOT DETECTED NG/ML
CALCIUM SERPL-MCNC: 9.4 MG/DL (ref 8.6–10.3)
CALCIUM SERPL-MCNC: 9.8 MG/DL (ref 8.6–10.3)
CANNABINOIDS UR QL: NOT DETECTED NG/ML
CHLORIDE SERPL-SCNC: 102 MMOL/L (ref 98–107)
CHLORIDE SERPL-SCNC: 104 MMOL/L (ref 98–107)
CO2 SERPL-SCNC: 21 MMOL/L (ref 21–31)
CO2 SERPL-SCNC: 24 MMOL/L (ref 21–31)
COCAINE UR QL: NOT DETECTED
COLOR UR AUTO: YELLOW
CREAT SERPL-MCNC: 1.5 MG/DL (ref 0.7–1.3)
CREAT SERPL-MCNC: 1.57 MG/DL (ref 0.7–1.3)
D-METHAMPHET UR QL: NOT DETECTED NG/ML
DIFFERENTIAL METHOD BLD: ABNORMAL
DIFFERENTIAL METHOD BLD: ABNORMAL
EOSINOPHIL # BLD AUTO: 0 10E9/L (ref 0–0.7)
EOSINOPHIL # BLD AUTO: 0 10E9/L (ref 0–0.7)
EOSINOPHIL NFR BLD AUTO: 0 %
EOSINOPHIL NFR BLD AUTO: 0 %
ERYTHROCYTE [DISTWIDTH] IN BLOOD BY AUTOMATED COUNT: 13.3 % (ref 10–15)
ERYTHROCYTE [DISTWIDTH] IN BLOOD BY AUTOMATED COUNT: 13.3 % (ref 10–15)
ETHANOL SERPL-MCNC: 0.4 %
GFR SERPL CREATININE-BSD FRML MDRD: 47 ML/MIN/{1.73_M2}
GFR SERPL CREATININE-BSD FRML MDRD: 50 ML/MIN/{1.73_M2}
GLUCOSE SERPL-MCNC: 118 MG/DL (ref 70–105)
GLUCOSE SERPL-MCNC: 178 MG/DL (ref 70–105)
GLUCOSE UR STRIP-MCNC: NEGATIVE MG/DL
HCT VFR BLD AUTO: 31.5 % (ref 40–53)
HCT VFR BLD AUTO: 31.7 % (ref 40–53)
HGB BLD-MCNC: 10.5 G/DL (ref 13.3–17.7)
HGB BLD-MCNC: 10.6 G/DL (ref 13.3–17.7)
HGB UR QL STRIP: ABNORMAL
IMM GRANULOCYTES # BLD: 0.2 10E9/L (ref 0–0.4)
IMM GRANULOCYTES # BLD: 0.2 10E9/L (ref 0–0.4)
IMM GRANULOCYTES NFR BLD: 2 %
IMM GRANULOCYTES NFR BLD: 2.2 %
INR PPP: 0.96 (ref 0–1.3)
KETONES UR STRIP-MCNC: NEGATIVE MG/DL
LEUKOCYTE ESTERASE UR QL STRIP: NEGATIVE
LIPASE SERPL-CCNC: 31 U/L (ref 11–82)
LYMPHOCYTES # BLD AUTO: 0.2 10E9/L (ref 0.8–5.3)
LYMPHOCYTES # BLD AUTO: 0.4 10E9/L (ref 0.8–5.3)
LYMPHOCYTES NFR BLD AUTO: 2.2 %
LYMPHOCYTES NFR BLD AUTO: 4 %
MCH RBC QN AUTO: 31.9 PG (ref 26.5–33)
MCH RBC QN AUTO: 32.3 PG (ref 26.5–33)
MCHC RBC AUTO-ENTMCNC: 33.3 G/DL (ref 31.5–36.5)
MCHC RBC AUTO-ENTMCNC: 33.4 G/DL (ref 31.5–36.5)
MCV RBC AUTO: 96 FL (ref 78–100)
MCV RBC AUTO: 97 FL (ref 78–100)
METHADONE UR QL SCN: NOT DETECTED
MONOCYTES # BLD AUTO: 0.1 10E9/L (ref 0–1.3)
MONOCYTES # BLD AUTO: 0.6 10E9/L (ref 0–1.3)
MONOCYTES NFR BLD AUTO: 0.7 %
MONOCYTES NFR BLD AUTO: 6.2 %
MYCOPHENOLATE SERPL LC/MS/MS-MCNC: 4.29 MG/L (ref 1–3.5)
MYCOPHENOLATE-G SERPL LC/MS/MS-MCNC: 44.8 MG/L (ref 30–95)
NEUTROPHILS # BLD AUTO: 8 10E9/L (ref 1.6–8.3)
NEUTROPHILS # BLD AUTO: 8.2 10E9/L (ref 1.6–8.3)
NEUTROPHILS NFR BLD AUTO: 87.8 %
NEUTROPHILS NFR BLD AUTO: 94.9 %
NITRATE UR QL: NEGATIVE
NON-SQ EPI CELLS #/AREA URNS LPF: ABNORMAL /LPF
OPIATES UR QL SCN: NOT DETECTED
OXYCODONE UR QL: ABNORMAL NG/ML
PCP UR QL SCN: NOT DETECTED
PH UR STRIP: 5.5 PH (ref 5–9)
PLATELET # BLD AUTO: 135 10E9/L (ref 150–450)
PLATELET # BLD AUTO: 149 10E9/L (ref 150–450)
POTASSIUM SERPL-SCNC: 3.7 MMOL/L (ref 3.5–5.1)
POTASSIUM SERPL-SCNC: 4.3 MMOL/L (ref 3.5–5.1)
PROPOXYPH UR QL: NOT DETECTED NG/ML
PROT SERPL-MCNC: 7.1 G/DL (ref 6.4–8.9)
RBC # BLD AUTO: 3.25 10E12/L (ref 4.4–5.9)
RBC # BLD AUTO: 3.32 10E12/L (ref 4.4–5.9)
RBC #/AREA URNS AUTO: ABNORMAL /HPF
SALICYLATES SERPL-MCNC: <0 MG/DL (ref 15–30)
SODIUM SERPL-SCNC: 134 MMOL/L (ref 134–144)
SODIUM SERPL-SCNC: 135 MMOL/L (ref 134–144)
SOURCE: ABNORMAL
SP GR UR STRIP: <1.005 (ref 1–1.03)
TME LAST DOSE: ABNORMAL H
TRICYCLICS UR QL SCN: NOT DETECTED NG/ML
UROBILINOGEN UR STRIP-ACNC: 0.2 EU/DL (ref 0.2–1)
WBC # BLD AUTO: 8.7 10E9/L (ref 4–11)
WBC # BLD AUTO: 9.1 10E9/L (ref 4–11)
WBC #/AREA URNS AUTO: ABNORMAL /HPF

## 2019-05-08 PROCEDURE — 25000128 H RX IP 250 OP 636: Performed by: INTERNAL MEDICINE

## 2019-05-08 PROCEDURE — 80053 COMPREHEN METABOLIC PANEL: CPT | Performed by: FAMILY MEDICINE

## 2019-05-08 PROCEDURE — 80320 DRUG SCREEN QUANTALCOHOLS: CPT | Performed by: FAMILY MEDICINE

## 2019-05-08 PROCEDURE — 83690 ASSAY OF LIPASE: CPT | Performed by: FAMILY MEDICINE

## 2019-05-08 PROCEDURE — 80329 ANALGESICS NON-OPIOID 1 OR 2: CPT | Performed by: FAMILY MEDICINE

## 2019-05-08 PROCEDURE — 85610 PROTHROMBIN TIME: CPT | Performed by: FAMILY MEDICINE

## 2019-05-08 PROCEDURE — 96365 THER/PROPH/DIAG IV INF INIT: CPT

## 2019-05-08 PROCEDURE — 99285 EMERGENCY DEPT VISIT HI MDM: CPT | Mod: Z6 | Performed by: FAMILY MEDICINE

## 2019-05-08 PROCEDURE — 80158 DRUG ASSAY CYCLOSPORINE: CPT | Performed by: INTERNAL MEDICINE

## 2019-05-08 PROCEDURE — 85025 COMPLETE CBC W/AUTO DIFF WBC: CPT | Performed by: INTERNAL MEDICINE

## 2019-05-08 PROCEDURE — 25000128 H RX IP 250 OP 636: Performed by: FAMILY MEDICINE

## 2019-05-08 PROCEDURE — 99285 EMERGENCY DEPT VISIT HI MDM: CPT | Mod: 25 | Performed by: FAMILY MEDICINE

## 2019-05-08 PROCEDURE — 25800030 ZZH RX IP 258 OP 636: Performed by: INTERNAL MEDICINE

## 2019-05-08 PROCEDURE — 80048 BASIC METABOLIC PNL TOTAL CA: CPT | Performed by: INTERNAL MEDICINE

## 2019-05-08 PROCEDURE — 80307 DRUG TEST PRSMV CHEM ANLYZR: CPT | Performed by: FAMILY MEDICINE

## 2019-05-08 PROCEDURE — 25800030 ZZH RX IP 258 OP 636: Performed by: FAMILY MEDICINE

## 2019-05-08 PROCEDURE — 85025 COMPLETE CBC W/AUTO DIFF WBC: CPT | Performed by: FAMILY MEDICINE

## 2019-05-08 PROCEDURE — 81001 URINALYSIS AUTO W/SCOPE: CPT | Performed by: FAMILY MEDICINE

## 2019-05-08 PROCEDURE — 36415 COLL VENOUS BLD VENIPUNCTURE: CPT | Performed by: FAMILY MEDICINE

## 2019-05-08 PROCEDURE — 36415 COLL VENOUS BLD VENIPUNCTURE: CPT | Performed by: INTERNAL MEDICINE

## 2019-05-08 PROCEDURE — 96375 TX/PRO/DX INJ NEW DRUG ADDON: CPT | Performed by: FAMILY MEDICINE

## 2019-05-08 PROCEDURE — 96365 THER/PROPH/DIAG IV INF INIT: CPT | Performed by: FAMILY MEDICINE

## 2019-05-08 RX ORDER — SODIUM CHLORIDE 9 MG/ML
1000 INJECTION, SOLUTION INTRAVENOUS CONTINUOUS
Status: DISCONTINUED | OUTPATIENT
Start: 2019-05-08 | End: 2019-05-09

## 2019-05-08 RX ORDER — PREDNISONE 10 MG/1
TABLET ORAL
Qty: 30 TABLET | Refills: 0 | Status: ON HOLD | OUTPATIENT
Start: 2019-05-08 | End: 2019-05-09

## 2019-05-08 RX ORDER — PREDNISONE 5 MG/1
5 TABLET ORAL DAILY
Qty: 30 TABLET | Refills: 11 | Status: ON HOLD | OUTPATIENT
Start: 2019-05-08 | End: 2019-05-09

## 2019-05-08 RX ORDER — THIAMINE HYDROCHLORIDE 100 MG/ML
100 INJECTION, SOLUTION INTRAMUSCULAR; INTRAVENOUS ONCE
Status: COMPLETED | OUTPATIENT
Start: 2019-05-08 | End: 2019-05-08

## 2019-05-08 RX ADMIN — SODIUM CHLORIDE 500 MG: 9 INJECTION, SOLUTION INTRAVENOUS at 11:08

## 2019-05-08 RX ADMIN — SODIUM CHLORIDE 1000 ML: 9 INJECTION, SOLUTION INTRAVENOUS at 21:49

## 2019-05-08 RX ADMIN — THIAMINE HYDROCHLORIDE 100 MG: 100 INJECTION, SOLUTION INTRAMUSCULAR; INTRAVENOUS at 23:05

## 2019-05-08 RX ADMIN — SODIUM CHLORIDE 1000 ML: 9 INJECTION, SOLUTION INTRAVENOUS at 23:05

## 2019-05-08 ASSESSMENT — MIFFLIN-ST. JEOR: SCORE: 1546.65

## 2019-05-08 ASSESSMENT — PAIN SCALES - GENERAL: PAINLEVEL: NO PAIN (0)

## 2019-05-08 NOTE — NURSING NOTE
Infusion Nursing Note:  Gilles Henning III presents today for Solumedrol Infusion - Treatment #3.    Patient seen by provider today: No   present during visit today: Not Applicable.    Note: N/A.    Intravenous Access:  Peripheral IV placed.    Treatment Conditions:  Not Applicable.      Post Infusion Assessment:  Patient tolerated infusion without incident.  Site patent and intact, free from redness, edema or discomfort.  No evidence of extravasations.  Access discontinued per protocol.       Discharge Plan:   Copy of AVS reviewed with patient and/or family.   Patient discharged in stable condition accompanied by: self.  Departure Mode: Ambulatory.      Gillian Arroyo RN

## 2019-05-08 NOTE — TELEPHONE ENCOUNTER
Provider Call: Medication Refill  Route to N  Pharmacy Name: [facility]  Pharmacy Location: [address] [place] [address] [address]  Name of Medication: mycophenolate (GENERIC EQUIVALENT) 200 MG/ML suspension  When will the patient be out of this medication?: Less than 3 days (Route high priority)  Callback needed? No     Due to the increase in medication a new script will need to be sent over with new dosage.

## 2019-05-08 NOTE — LETTER
PHYSICIAN ORDERS      DATE & TIME ISSUED: May 8, 2019 9:01 AM  PATIENT NAME: Gilles Henning III   : 1970     [facility] MR# [if applicable]: [id number]     DIAGNOSIS:  Kidney transplant, kidney transplant rejection  ICD-10 CODE: Z94.0, T86.11     For transplant kidney rejection, please complete the following steroid taper:  Prednisone, oral :   40mg, two times daily for 2 days ( and 5/10)  40mg, daily for two days ( and )  20mg, daily for two days ( and )  10mg, daily for two days (5/15 and )  5mg daily, indefinitely, starting 19.    Any questions please call: [facility] SOT                                             [phone] ext [phone]        .

## 2019-05-08 NOTE — LETTER
PHYSICIAN ORDERS      DATE & TIME ISSUED: May 8, 2019 9:01 AM  PATIENT NAME: Gilles Henning III   : 1970     [facility] MR# [if applicable]: [id number]     DIAGNOSIS:  Kidney transplant, kidney transplant rejection  ICD-10 CODE: Z94.0, T86.11     For transplant kidney rejection, please complete the following steroid taper:  Prednisone, oral :   40mg, two times daily for 2 days ( and 5/10)  40mg, daily for two days ( and )  20mg, daily for two days ( and )  10mg, daily for two days (5/15 and )  5mg daily, indefinitely, starting 19.    Please INCREASE mycophenolate to 500mg, oral, twice daily.    Any questions please call: [facility] SOT                                             [phone] ext [phone]        .

## 2019-05-08 NOTE — TELEPHONE ENCOUNTER
Post Kidney and Pancreas Transplant Team Conference  Date: 5/8/2019  Transplant Coordinator: Angeline Alonso     Attendees:  [x]  Dr. Macias [] Thao Barnard RN  [x] Blossom Montes LPN     [x]  Dr. Donato [x] Martha Morris RN [] Noelle Wagner LPN   []  Dr. Telles [] Beth Evans RN    []  Dr. Cabrera [] Katelyn Gallegos RN    [] Dr. Vallejo [x] Maliha Alonso RN    [] Dr. Lorenzo [] Mhaad Steele RN    [] Dr. Finley [] Mildred Barajas RN    [] Surgery Fellow [] Gia Romero RN    [] Rachel Resendez NP              Verbal Plan Read Back:   Prednisone taper needed, then remain on 5 mg daily  CSA goal = 100-125  MMF dose increase to 500 mg BID    Labs weekly x4 weeks, then monthly after 1 year post transplant.    Routed to RN Coordinator   Blossom Montes

## 2019-05-09 ENCOUNTER — APPOINTMENT (OUTPATIENT)
Dept: OCCUPATIONAL THERAPY | Facility: OTHER | Age: 49
DRG: 917 | End: 2019-05-09
Attending: FAMILY MEDICINE
Payer: MEDICARE

## 2019-05-09 ENCOUNTER — TELEPHONE (OUTPATIENT)
Dept: TRANSPLANT | Facility: CLINIC | Age: 49
End: 2019-05-09

## 2019-05-09 ENCOUNTER — APPOINTMENT (OUTPATIENT)
Dept: GENERAL RADIOLOGY | Facility: OTHER | Age: 49
DRG: 917 | End: 2019-05-09
Attending: FAMILY MEDICINE
Payer: MEDICARE

## 2019-05-09 ENCOUNTER — APPOINTMENT (OUTPATIENT)
Dept: PHYSICAL THERAPY | Facility: OTHER | Age: 49
DRG: 917 | End: 2019-05-09
Attending: FAMILY MEDICINE
Payer: MEDICARE

## 2019-05-09 VITALS
RESPIRATION RATE: 22 BRPM | DIASTOLIC BLOOD PRESSURE: 85 MMHG | BODY MASS INDEX: 21.47 KG/M2 | OXYGEN SATURATION: 99 % | HEART RATE: 82 BPM | HEIGHT: 70 IN | SYSTOLIC BLOOD PRESSURE: 149 MMHG | WEIGHT: 150 LBS | TEMPERATURE: 97.5 F

## 2019-05-09 PROBLEM — F10.929 ALCOHOL INTOXICATION (H): Status: ACTIVE | Noted: 2019-05-09

## 2019-05-09 PROBLEM — D62 ANEMIA DUE TO BLOOD LOSS, ACUTE: Status: ACTIVE | Noted: 2019-05-09

## 2019-05-09 PROBLEM — J69.0 ASPIRATION PNEUMONIA (H): Status: ACTIVE | Noted: 2019-05-09

## 2019-05-09 PROBLEM — N02.B9 IGA NEPHROPATHY: Status: ACTIVE | Noted: 2019-05-09

## 2019-05-09 PROBLEM — T51.92XA: Status: ACTIVE | Noted: 2019-05-09

## 2019-05-09 PROBLEM — K92.0 HEMATEMESIS: Status: ACTIVE | Noted: 2019-05-09

## 2019-05-09 LAB
ABO + RH BLD: NORMAL
ABO + RH BLD: NORMAL
ALBUMIN SERPL-MCNC: 4.3 G/DL (ref 3.5–5.7)
ALP SERPL-CCNC: 81 U/L (ref 34–104)
ALT SERPL W P-5'-P-CCNC: 17 U/L (ref 7–52)
ANION GAP SERPL CALCULATED.3IONS-SCNC: 9 MMOL/L (ref 3–14)
AST SERPL W P-5'-P-CCNC: 24 U/L (ref 13–39)
BASOPHILS # BLD AUTO: 0 10E9/L (ref 0–0.2)
BASOPHILS NFR BLD AUTO: 0 %
BILIRUB DIRECT SERPL-MCNC: 0.1 MG/DL (ref 0–0.2)
BILIRUB SERPL-MCNC: 0.4 MG/DL (ref 0.3–1)
BLD GP AB SCN SERPL QL: NORMAL
BLOOD BANK CMNT PATIENT-IMP: NORMAL
BUN SERPL-MCNC: 23 MG/DL (ref 7–25)
CALCIUM SERPL-MCNC: 9.3 MG/DL (ref 8.6–10.3)
CHLORIDE SERPL-SCNC: 113 MMOL/L (ref 98–107)
CO2 SERPL-SCNC: 22 MMOL/L (ref 21–31)
CREAT SERPL-MCNC: 1.44 MG/DL (ref 0.7–1.3)
CYCLOSPORINE BLD LC/MS/MS-MCNC: 91 UG/L (ref 50–400)
DIFFERENTIAL METHOD BLD: ABNORMAL
EOSINOPHIL # BLD AUTO: 0 10E9/L (ref 0–0.7)
EOSINOPHIL NFR BLD AUTO: 0 %
ERYTHROCYTE [DISTWIDTH] IN BLOOD BY AUTOMATED COUNT: 13.2 % (ref 10–15)
GFR SERPL CREATININE-BSD FRML MDRD: 52 ML/MIN/{1.73_M2}
GLUCOSE SERPL-MCNC: 142 MG/DL (ref 70–105)
HCT VFR BLD AUTO: 31.7 % (ref 40–53)
HGB BLD-MCNC: 10.4 G/DL (ref 13.3–17.7)
HGB BLD-MCNC: 10.8 G/DL (ref 13.3–17.7)
IMM GRANULOCYTES # BLD: 0.2 10E9/L (ref 0–0.4)
IMM GRANULOCYTES NFR BLD: 2.3 %
INR PPP: 0.92 (ref 0–1.3)
LYMPHOCYTES # BLD AUTO: 0.1 10E9/L (ref 0.8–5.3)
LYMPHOCYTES NFR BLD AUTO: 1.4 %
MAGNESIUM SERPL-MCNC: 2.1 MG/DL (ref 1.9–2.7)
MCH RBC QN AUTO: 31.8 PG (ref 26.5–33)
MCHC RBC AUTO-ENTMCNC: 32.8 G/DL (ref 31.5–36.5)
MCV RBC AUTO: 97 FL (ref 78–100)
MONOCYTES # BLD AUTO: 0 10E9/L (ref 0–1.3)
MONOCYTES NFR BLD AUTO: 0.3 %
NEUTROPHILS # BLD AUTO: 7.4 10E9/L (ref 1.6–8.3)
NEUTROPHILS NFR BLD AUTO: 96 %
PHOSPHATE SERPL-MCNC: 2.8 MG/DL (ref 2.5–5)
PLATELET # BLD AUTO: 137 10E9/L (ref 150–450)
POTASSIUM SERPL-SCNC: 3.5 MMOL/L (ref 3.5–5.1)
PROT SERPL-MCNC: 7.1 G/DL (ref 6.4–8.9)
RBC # BLD AUTO: 3.27 10E12/L (ref 4.4–5.9)
SODIUM SERPL-SCNC: 144 MMOL/L (ref 134–144)
SPECIMEN EXP DATE BLD: NORMAL
TME LAST DOSE: NORMAL H
WBC # BLD AUTO: 7.7 10E9/L (ref 4–11)

## 2019-05-09 PROCEDURE — 80053 COMPREHEN METABOLIC PANEL: CPT | Performed by: FAMILY MEDICINE

## 2019-05-09 PROCEDURE — 86850 RBC ANTIBODY SCREEN: CPT | Performed by: FAMILY MEDICINE

## 2019-05-09 PROCEDURE — 82248 BILIRUBIN DIRECT: CPT | Performed by: FAMILY MEDICINE

## 2019-05-09 PROCEDURE — 36415 COLL VENOUS BLD VENIPUNCTURE: CPT | Performed by: FAMILY MEDICINE

## 2019-05-09 PROCEDURE — 97161 PT EVAL LOW COMPLEX 20 MIN: CPT | Mod: GP

## 2019-05-09 PROCEDURE — 97530 THERAPEUTIC ACTIVITIES: CPT | Mod: GO | Performed by: OCCUPATIONAL THERAPIST

## 2019-05-09 PROCEDURE — 25000125 ZZHC RX 250: Performed by: FAMILY MEDICINE

## 2019-05-09 PROCEDURE — 99234 HOSP IP/OBS SM DT SF/LOW 45: CPT | Performed by: FAMILY MEDICINE

## 2019-05-09 PROCEDURE — 85025 COMPLETE CBC W/AUTO DIFF WBC: CPT | Performed by: FAMILY MEDICINE

## 2019-05-09 PROCEDURE — 97116 GAIT TRAINING THERAPY: CPT | Mod: GP

## 2019-05-09 PROCEDURE — 71045 X-RAY EXAM CHEST 1 VIEW: CPT

## 2019-05-09 PROCEDURE — 25000132 ZZH RX MED GY IP 250 OP 250 PS 637: Performed by: FAMILY MEDICINE

## 2019-05-09 PROCEDURE — 93005 ELECTROCARDIOGRAM TRACING: CPT

## 2019-05-09 PROCEDURE — 25000128 H RX IP 250 OP 636: Performed by: FAMILY MEDICINE

## 2019-05-09 PROCEDURE — C9113 INJ PANTOPRAZOLE SODIUM, VIA: HCPCS | Performed by: FAMILY MEDICINE

## 2019-05-09 PROCEDURE — 84100 ASSAY OF PHOSPHORUS: CPT | Performed by: FAMILY MEDICINE

## 2019-05-09 PROCEDURE — 85610 PROTHROMBIN TIME: CPT | Performed by: FAMILY MEDICINE

## 2019-05-09 PROCEDURE — 25800030 ZZH RX IP 258 OP 636: Performed by: FAMILY MEDICINE

## 2019-05-09 PROCEDURE — 97530 THERAPEUTIC ACTIVITIES: CPT | Mod: GP

## 2019-05-09 PROCEDURE — 40000275 ZZH STATISTIC RCP TIME EA 10 MIN

## 2019-05-09 PROCEDURE — A9270 NON-COVERED ITEM OR SERVICE: HCPCS | Performed by: FAMILY MEDICINE

## 2019-05-09 PROCEDURE — 85018 HEMOGLOBIN: CPT | Performed by: FAMILY MEDICINE

## 2019-05-09 PROCEDURE — 20000006 ZZH R&B ICU - GICH

## 2019-05-09 PROCEDURE — 25000131 ZZH RX MED GY IP 250 OP 636 PS 637: Performed by: FAMILY MEDICINE

## 2019-05-09 PROCEDURE — 86900 BLOOD TYPING SEROLOGIC ABO: CPT | Performed by: FAMILY MEDICINE

## 2019-05-09 PROCEDURE — 97165 OT EVAL LOW COMPLEX 30 MIN: CPT | Mod: GO | Performed by: OCCUPATIONAL THERAPIST

## 2019-05-09 PROCEDURE — 93010 ELECTROCARDIOGRAM REPORT: CPT | Performed by: INTERNAL MEDICINE

## 2019-05-09 PROCEDURE — 86901 BLOOD TYPING SEROLOGIC RH(D): CPT | Performed by: FAMILY MEDICINE

## 2019-05-09 PROCEDURE — 83735 ASSAY OF MAGNESIUM: CPT | Performed by: FAMILY MEDICINE

## 2019-05-09 RX ORDER — PROCHLORPERAZINE 25 MG
25 SUPPOSITORY, RECTAL RECTAL EVERY 12 HOURS PRN
Status: DISCONTINUED | OUTPATIENT
Start: 2019-05-09 | End: 2019-05-09

## 2019-05-09 RX ORDER — MYCOPHENOLATE MOFETIL 500 MG/1
500 TABLET, FILM COATED ORAL 2 TIMES DAILY
Status: DISCONTINUED | OUTPATIENT
Start: 2019-05-09 | End: 2019-05-09 | Stop reason: HOSPADM

## 2019-05-09 RX ORDER — PROCHLORPERAZINE 25 MG
25 SUPPOSITORY, RECTAL RECTAL EVERY 12 HOURS PRN
Status: DISCONTINUED | OUTPATIENT
Start: 2019-05-09 | End: 2019-05-09 | Stop reason: HOSPADM

## 2019-05-09 RX ORDER — MYCOPHENOLATE MOFETIL 200 MG/ML
500 POWDER, FOR SUSPENSION ORAL 2 TIMES DAILY
Status: DISCONTINUED | OUTPATIENT
Start: 2019-05-09 | End: 2019-05-09

## 2019-05-09 RX ORDER — MYCOPHENOLATE MOFETIL 200 MG/ML
500 POWDER, FOR SUSPENSION ORAL 2 TIMES DAILY
Status: ON HOLD | COMMUNITY
End: 2019-05-09

## 2019-05-09 RX ORDER — QUETIAPINE FUMARATE 100 MG/1
100 TABLET, FILM COATED ORAL AT BEDTIME
Qty: 30 TABLET | Refills: 0 | Status: SHIPPED | OUTPATIENT
Start: 2019-05-09 | End: 2019-06-14

## 2019-05-09 RX ORDER — ONDANSETRON 2 MG/ML
4 INJECTION INTRAMUSCULAR; INTRAVENOUS EVERY 6 HOURS PRN
Status: DISCONTINUED | OUTPATIENT
Start: 2019-05-09 | End: 2019-05-09

## 2019-05-09 RX ORDER — PREDNISONE 20 MG/1
40 TABLET ORAL DAILY
Status: DISCONTINUED | OUTPATIENT
Start: 2019-05-11 | End: 2019-05-09 | Stop reason: HOSPADM

## 2019-05-09 RX ORDER — PREDNISOLONE 5 MG/1
5 TABLET ORAL DAILY
Status: ON HOLD | COMMUNITY
End: 2019-05-09

## 2019-05-09 RX ORDER — ESCITALOPRAM OXALATE 10 MG/1
10 TABLET ORAL EVERY MORNING
Status: ON HOLD | COMMUNITY
End: 2019-05-09

## 2019-05-09 RX ORDER — PREDNISONE 20 MG/1
40 TABLET ORAL 2 TIMES DAILY WITH MEALS
Status: DISCONTINUED | OUTPATIENT
Start: 2019-05-09 | End: 2019-05-09 | Stop reason: HOSPADM

## 2019-05-09 RX ORDER — PREDNISONE 5 MG/1
5 TABLET ORAL DAILY
Status: DISCONTINUED | OUTPATIENT
Start: 2019-05-17 | End: 2019-05-09 | Stop reason: HOSPADM

## 2019-05-09 RX ORDER — QUETIAPINE FUMARATE 100 MG/1
100 TABLET, FILM COATED ORAL AT BEDTIME
Status: ON HOLD | COMMUNITY
End: 2019-05-09

## 2019-05-09 RX ORDER — ATORVASTATIN CALCIUM 40 MG/1
40 TABLET, FILM COATED ORAL EVERY EVENING
COMMUNITY
End: 2019-10-16

## 2019-05-09 RX ORDER — ALPRAZOLAM 0.5 MG
TABLET ORAL
COMMUNITY
End: 2019-06-27

## 2019-05-09 RX ORDER — PREDNISONE 10 MG/1
TABLET ORAL
Status: ON HOLD | COMMUNITY
End: 2019-05-22

## 2019-05-09 RX ORDER — METOCLOPRAMIDE 10 MG/1
10 TABLET ORAL EVERY 6 HOURS PRN
Status: DISCONTINUED | OUTPATIENT
Start: 2019-05-09 | End: 2019-05-09 | Stop reason: HOSPADM

## 2019-05-09 RX ORDER — AMPICILLIN AND SULBACTAM 2; 1 G/1; G/1
3 INJECTION, POWDER, FOR SOLUTION INTRAMUSCULAR; INTRAVENOUS EVERY 8 HOURS
Status: DISCONTINUED | OUTPATIENT
Start: 2019-05-09 | End: 2019-05-09 | Stop reason: HOSPADM

## 2019-05-09 RX ORDER — PREDNISONE 20 MG/1
20 TABLET ORAL DAILY
Status: DISCONTINUED | OUTPATIENT
Start: 2019-05-13 | End: 2019-05-09 | Stop reason: HOSPADM

## 2019-05-09 RX ORDER — AMOXICILLIN AND CLAVULANATE POTASSIUM 500; 125 MG/1; MG/1
1 TABLET, FILM COATED ORAL 2 TIMES DAILY
Qty: 10 TABLET | Refills: 0 | Status: ON HOLD | OUTPATIENT
Start: 2019-05-09 | End: 2019-05-22

## 2019-05-09 RX ORDER — CYCLOSPORINE 100 MG/1
100 CAPSULE, LIQUID FILLED ORAL AT BEDTIME
Qty: 30 CAPSULE | Refills: 0 | Status: ON HOLD | OUTPATIENT
Start: 2019-05-09 | End: 2019-05-22

## 2019-05-09 RX ORDER — ONDANSETRON 4 MG/1
4 TABLET, ORALLY DISINTEGRATING ORAL EVERY 6 HOURS PRN
Status: DISCONTINUED | OUTPATIENT
Start: 2019-05-09 | End: 2019-05-09 | Stop reason: HOSPADM

## 2019-05-09 RX ORDER — PROCHLORPERAZINE MALEATE 5 MG
10 TABLET ORAL EVERY 6 HOURS PRN
Status: DISCONTINUED | OUTPATIENT
Start: 2019-05-09 | End: 2019-05-09 | Stop reason: HOSPADM

## 2019-05-09 RX ORDER — METOCLOPRAMIDE HYDROCHLORIDE 5 MG/ML
10 INJECTION INTRAMUSCULAR; INTRAVENOUS EVERY 6 HOURS PRN
Status: DISCONTINUED | OUTPATIENT
Start: 2019-05-09 | End: 2019-05-09

## 2019-05-09 RX ORDER — ESCITALOPRAM OXALATE 10 MG/1
10 TABLET ORAL DAILY
Qty: 30 TABLET | Refills: 0 | Status: SHIPPED | OUTPATIENT
Start: 2019-05-10 | End: 2019-05-09

## 2019-05-09 RX ORDER — PROCHLORPERAZINE MALEATE 10 MG
10 TABLET ORAL EVERY 6 HOURS PRN
Status: DISCONTINUED | OUTPATIENT
Start: 2019-05-09 | End: 2019-05-09

## 2019-05-09 RX ORDER — ONDANSETRON 2 MG/ML
4 INJECTION INTRAMUSCULAR; INTRAVENOUS EVERY 8 HOURS PRN
Status: DISCONTINUED | OUTPATIENT
Start: 2019-05-09 | End: 2019-05-09

## 2019-05-09 RX ORDER — SODIUM BICARBONATE 650 MG/1
650 TABLET ORAL 2 TIMES DAILY
COMMUNITY
End: 2019-10-14

## 2019-05-09 RX ORDER — TRAZODONE HYDROCHLORIDE 100 MG/1
200 TABLET ORAL AT BEDTIME
Status: ON HOLD | COMMUNITY
End: 2019-05-09

## 2019-05-09 RX ORDER — LORAZEPAM 0.5 MG/1
0.5 TABLET ORAL EVERY 4 HOURS PRN
Status: DISCONTINUED | OUTPATIENT
Start: 2019-05-09 | End: 2019-05-09 | Stop reason: HOSPADM

## 2019-05-09 RX ORDER — FLUMAZENIL 0.1 MG/ML
0.2 INJECTION, SOLUTION INTRAVENOUS
Status: DISCONTINUED | OUTPATIENT
Start: 2019-05-09 | End: 2019-05-09 | Stop reason: HOSPADM

## 2019-05-09 RX ORDER — PRAZOSIN HYDROCHLORIDE 2 MG/1
4 CAPSULE ORAL AT BEDTIME
Status: ON HOLD | COMMUNITY
End: 2019-05-09

## 2019-05-09 RX ORDER — MYCOPHENOLATE MOFETIL 200 MG/ML
250 POWDER, FOR SUSPENSION ORAL 2 TIMES DAILY
Qty: 160 ML | Refills: 3 | OUTPATIENT
Start: 2019-05-09

## 2019-05-09 RX ORDER — ACETAMINOPHEN 325 MG/1
325-650 TABLET ORAL EVERY 6 HOURS PRN
Status: DISCONTINUED | OUTPATIENT
Start: 2019-05-09 | End: 2019-05-09 | Stop reason: HOSPADM

## 2019-05-09 RX ORDER — CYCLOSPORINE 25 MG/1
125 CAPSULE, LIQUID FILLED ORAL DAILY
Status: DISCONTINUED | OUTPATIENT
Start: 2019-05-09 | End: 2019-05-09 | Stop reason: HOSPADM

## 2019-05-09 RX ORDER — PANTOPRAZOLE SODIUM 40 MG/1
40 TABLET, DELAYED RELEASE ORAL
Qty: 60 TABLET | Refills: 0 | Status: SHIPPED | OUTPATIENT
Start: 2019-05-10 | End: 2019-05-09

## 2019-05-09 RX ORDER — NALOXONE HYDROCHLORIDE 0.4 MG/ML
.1-.4 INJECTION, SOLUTION INTRAMUSCULAR; INTRAVENOUS; SUBCUTANEOUS
Status: DISCONTINUED | OUTPATIENT
Start: 2019-05-09 | End: 2019-05-09 | Stop reason: HOSPADM

## 2019-05-09 RX ORDER — POLYETHYLENE GLYCOL 3350 17 G/17G
17 POWDER, FOR SOLUTION ORAL 2 TIMES DAILY PRN
Status: DISCONTINUED | OUTPATIENT
Start: 2019-05-09 | End: 2019-05-09 | Stop reason: HOSPADM

## 2019-05-09 RX ORDER — LORAZEPAM 2 MG/ML
1-4 INJECTION INTRAMUSCULAR
Status: DISCONTINUED | OUTPATIENT
Start: 2019-05-09 | End: 2019-05-09 | Stop reason: HOSPADM

## 2019-05-09 RX ORDER — SULFAMETHOXAZOLE AND TRIMETHOPRIM 200; 40 MG/5ML; MG/5ML
10 SUSPENSION ORAL AT BEDTIME
Status: ON HOLD | COMMUNITY
End: 2019-05-09

## 2019-05-09 RX ORDER — ONDANSETRON 4 MG/1
4 TABLET, ORALLY DISINTEGRATING ORAL EVERY 6 HOURS PRN
Status: DISCONTINUED | OUTPATIENT
Start: 2019-05-09 | End: 2019-05-09

## 2019-05-09 RX ORDER — ESCITALOPRAM OXALATE 10 MG/1
20 TABLET ORAL DAILY
Status: DISCONTINUED | OUTPATIENT
Start: 2019-05-09 | End: 2019-05-09

## 2019-05-09 RX ORDER — PRAZOSIN HYDROCHLORIDE 1 MG/1
4 CAPSULE ORAL AT BEDTIME
Status: DISCONTINUED | OUTPATIENT
Start: 2019-05-09 | End: 2019-05-09 | Stop reason: HOSPADM

## 2019-05-09 RX ORDER — SULFAMETHOXAZOLE AND TRIMETHOPRIM 400; 80 MG/1; MG/1
1 TABLET ORAL AT BEDTIME
Qty: 30 TABLET | Refills: 0 | Status: SHIPPED | OUTPATIENT
Start: 2019-05-09 | End: 2019-06-26

## 2019-05-09 RX ORDER — QUETIAPINE FUMARATE 25 MG/1
100 TABLET, FILM COATED ORAL AT BEDTIME
Status: DISCONTINUED | OUTPATIENT
Start: 2019-05-09 | End: 2019-05-09 | Stop reason: HOSPADM

## 2019-05-09 RX ORDER — MAGNESIUM SULFATE HEPTAHYDRATE 40 MG/ML
2 INJECTION, SOLUTION INTRAVENOUS DAILY PRN
Status: DISCONTINUED | OUTPATIENT
Start: 2019-05-09 | End: 2019-05-09 | Stop reason: HOSPADM

## 2019-05-09 RX ORDER — OXYCODONE HYDROCHLORIDE 5 MG/1
5 TABLET ORAL EVERY 6 HOURS PRN
Status: DISCONTINUED | OUTPATIENT
Start: 2019-05-09 | End: 2019-05-09 | Stop reason: HOSPADM

## 2019-05-09 RX ORDER — PANTOPRAZOLE SODIUM 40 MG/1
40 TABLET, DELAYED RELEASE ORAL
Status: DISCONTINUED | OUTPATIENT
Start: 2019-05-09 | End: 2019-05-09 | Stop reason: HOSPADM

## 2019-05-09 RX ORDER — SODIUM CHLORIDE 9 MG/ML
INJECTION, SOLUTION INTRAVENOUS CONTINUOUS
Status: DISCONTINUED | OUTPATIENT
Start: 2019-05-09 | End: 2019-05-09 | Stop reason: HOSPADM

## 2019-05-09 RX ORDER — SULFAMETHOXAZOLE AND TRIMETHOPRIM 400; 80 MG/1; MG/1
1 TABLET ORAL AT BEDTIME
Status: DISCONTINUED | OUTPATIENT
Start: 2019-05-09 | End: 2019-05-09 | Stop reason: HOSPADM

## 2019-05-09 RX ORDER — AMPICILLIN AND SULBACTAM 2; 1 G/1; G/1
3 INJECTION, POWDER, FOR SOLUTION INTRAMUSCULAR; INTRAVENOUS ONCE
Status: COMPLETED | OUTPATIENT
Start: 2019-05-09 | End: 2019-05-09

## 2019-05-09 RX ORDER — PRAZOSIN HYDROCHLORIDE 1 MG/1
2 CAPSULE ORAL AT BEDTIME
Status: DISCONTINUED | OUTPATIENT
Start: 2019-05-09 | End: 2019-05-09 | Stop reason: DRUGHIGH

## 2019-05-09 RX ORDER — MAGNESIUM SULFATE HEPTAHYDRATE 40 MG/ML
4 INJECTION, SOLUTION INTRAVENOUS EVERY 4 HOURS PRN
Status: DISCONTINUED | OUTPATIENT
Start: 2019-05-09 | End: 2019-05-09 | Stop reason: HOSPADM

## 2019-05-09 RX ORDER — CYCLOSPORINE 25 MG/1
125 CAPSULE, LIQUID FILLED ORAL DAILY
Qty: 30 CAPSULE | Refills: 0 | Status: SHIPPED | OUTPATIENT
Start: 2019-05-10 | End: 2019-06-03

## 2019-05-09 RX ORDER — MYCOPHENOLATE MOFETIL 500 MG/1
500 TABLET, FILM COATED ORAL 2 TIMES DAILY
Qty: 30 TABLET | Refills: 0 | Status: ON HOLD | OUTPATIENT
Start: 2019-05-09 | End: 2019-05-22

## 2019-05-09 RX ORDER — FLUDROCORTISONE ACETATE 0.1 MG/1
0.1 TABLET ORAL DAILY
Status: DISCONTINUED | OUTPATIENT
Start: 2019-05-09 | End: 2019-05-09

## 2019-05-09 RX ORDER — POTASSIUM CHLORIDE 7.45 MG/ML
10 INJECTION INTRAVENOUS
Status: DISCONTINUED | OUTPATIENT
Start: 2019-05-09 | End: 2019-05-09 | Stop reason: HOSPADM

## 2019-05-09 RX ORDER — AMOXICILLIN 250 MG
2 CAPSULE ORAL 2 TIMES DAILY PRN
Status: DISCONTINUED | OUTPATIENT
Start: 2019-05-09 | End: 2019-05-09 | Stop reason: HOSPADM

## 2019-05-09 RX ORDER — MULTIVIT WITH IRON,MINERALS
15 LIQUID (ML) ORAL AT BEDTIME
Refills: 11 | COMMUNITY
Start: 2019-01-29 | End: 2019-09-21

## 2019-05-09 RX ORDER — ESCITALOPRAM OXALATE 10 MG/1
10 TABLET ORAL DAILY
Status: DISCONTINUED | OUTPATIENT
Start: 2019-05-10 | End: 2019-05-09 | Stop reason: HOSPADM

## 2019-05-09 RX ORDER — AMOXICILLIN 250 MG
1 CAPSULE ORAL 2 TIMES DAILY PRN
Status: DISCONTINUED | OUTPATIENT
Start: 2019-05-09 | End: 2019-05-09 | Stop reason: HOSPADM

## 2019-05-09 RX ORDER — OMEPRAZOLE 40 MG/1
40 CAPSULE, DELAYED RELEASE ORAL DAILY
Status: DISCONTINUED | OUTPATIENT
Start: 2019-05-09 | End: 2019-05-09 | Stop reason: CLARIF

## 2019-05-09 RX ORDER — ESCITALOPRAM OXALATE 20 MG/1
20 TABLET ORAL DAILY
COMMUNITY

## 2019-05-09 RX ORDER — PRAZOSIN HYDROCHLORIDE 2 MG/1
4 CAPSULE ORAL AT BEDTIME
Qty: 30 CAPSULE | Refills: 0 | Status: SHIPPED | OUTPATIENT
Start: 2019-05-09 | End: 2019-06-14

## 2019-05-09 RX ORDER — METOCLOPRAMIDE HYDROCHLORIDE 5 MG/ML
10 INJECTION INTRAMUSCULAR; INTRAVENOUS EVERY 6 HOURS PRN
Status: DISCONTINUED | OUTPATIENT
Start: 2019-05-09 | End: 2019-05-09 | Stop reason: HOSPADM

## 2019-05-09 RX ORDER — TRAZODONE HYDROCHLORIDE 50 MG/1
200 TABLET, FILM COATED ORAL
Status: DISCONTINUED | OUTPATIENT
Start: 2019-05-09 | End: 2019-05-09 | Stop reason: HOSPADM

## 2019-05-09 RX ORDER — CLONIDINE HYDROCHLORIDE 0.1 MG/1
0.1 TABLET ORAL 2 TIMES DAILY PRN
Status: DISCONTINUED | OUTPATIENT
Start: 2019-05-09 | End: 2019-05-09 | Stop reason: HOSPADM

## 2019-05-09 RX ORDER — ONDANSETRON 2 MG/ML
4 INJECTION INTRAMUSCULAR; INTRAVENOUS EVERY 6 HOURS PRN
Status: DISCONTINUED | OUTPATIENT
Start: 2019-05-09 | End: 2019-05-09 | Stop reason: HOSPADM

## 2019-05-09 RX ORDER — METOCLOPRAMIDE 5 MG/1
10 TABLET ORAL EVERY 6 HOURS PRN
Status: DISCONTINUED | OUTPATIENT
Start: 2019-05-09 | End: 2019-05-09

## 2019-05-09 RX ORDER — CYCLOSPORINE 25 MG/1
100 CAPSULE, LIQUID FILLED ORAL 2 TIMES DAILY
Status: DISCONTINUED | OUTPATIENT
Start: 2019-05-09 | End: 2019-05-09 | Stop reason: DRUGHIGH

## 2019-05-09 RX ORDER — SULFAMETHOXAZOLE/TRIMETHOPRIM 800-160 MG
1 TABLET ORAL
Status: DISCONTINUED | OUTPATIENT
Start: 2019-05-10 | End: 2019-05-09

## 2019-05-09 RX ORDER — ACETAMINOPHEN 325 MG/1
650 TABLET ORAL EVERY 4 HOURS PRN
COMMUNITY

## 2019-05-09 RX ORDER — PREDNISONE 10 MG/1
10 TABLET ORAL DAILY
Status: DISCONTINUED | OUTPATIENT
Start: 2019-05-15 | End: 2019-05-09 | Stop reason: HOSPADM

## 2019-05-09 RX ORDER — CLONIDINE HYDROCHLORIDE 0.1 MG/1
0.1 TABLET ORAL 2 TIMES DAILY
Status: DISCONTINUED | OUTPATIENT
Start: 2019-05-09 | End: 2019-05-09 | Stop reason: DRUGHIGH

## 2019-05-09 RX ORDER — QUETIAPINE FUMARATE 25 MG/1
50 TABLET, FILM COATED ORAL AT BEDTIME
Status: DISCONTINUED | OUTPATIENT
Start: 2019-05-09 | End: 2019-05-09 | Stop reason: DRUGHIGH

## 2019-05-09 RX ORDER — PREDNISONE 5 MG/1
5 TABLET ORAL DAILY
Status: ON HOLD | COMMUNITY
End: 2019-05-09

## 2019-05-09 RX ORDER — LORAZEPAM 2 MG/ML
0.5 INJECTION INTRAMUSCULAR EVERY 4 HOURS PRN
Status: DISCONTINUED | OUTPATIENT
Start: 2019-05-09 | End: 2019-05-09 | Stop reason: HOSPADM

## 2019-05-09 RX ORDER — TRAZODONE HYDROCHLORIDE 100 MG/1
200 TABLET ORAL
Qty: 30 TABLET | Refills: 0 | Status: ON HOLD | OUTPATIENT
Start: 2019-05-09 | End: 2019-05-22

## 2019-05-09 RX ORDER — CYCLOSPORINE 25 MG/1
100 CAPSULE, LIQUID FILLED ORAL AT BEDTIME
Status: DISCONTINUED | OUTPATIENT
Start: 2019-05-09 | End: 2019-05-09 | Stop reason: HOSPADM

## 2019-05-09 RX ORDER — POTASSIUM CHLORIDE 1500 MG/1
20-40 TABLET, EXTENDED RELEASE ORAL
Status: DISCONTINUED | OUTPATIENT
Start: 2019-05-09 | End: 2019-05-09 | Stop reason: HOSPADM

## 2019-05-09 RX ADMIN — ESCITALOPRAM OXALATE 20 MG: 10 TABLET ORAL at 11:06

## 2019-05-09 RX ADMIN — MYCOPHENOLATE MOFETIL 500 MG: 500 TABLET, FILM COATED ORAL at 12:34

## 2019-05-09 RX ADMIN — AMPICILLIN SODIUM AND SULBACTAM SODIUM 3 G: 2; 1 INJECTION, POWDER, FOR SOLUTION INTRAMUSCULAR; INTRAVENOUS at 14:25

## 2019-05-09 RX ADMIN — PANTOPRAZOLE SODIUM 40 MG: 40 TABLET, DELAYED RELEASE ORAL at 08:56

## 2019-05-09 RX ADMIN — POLYETHYLENE GLYCOL 3350 17 G: 17 POWDER, FOR SOLUTION ORAL at 14:29

## 2019-05-09 RX ADMIN — FAMOTIDINE 20 MG: 10 INJECTION, SOLUTION INTRAVENOUS at 11:06

## 2019-05-09 RX ADMIN — SENNOSIDES,DOCUSATE SODIUM 1 TABLET: 8.6; 5 TABLET, FILM COATED ORAL at 14:29

## 2019-05-09 RX ADMIN — OXYCODONE HYDROCHLORIDE 5 MG: 5 TABLET ORAL at 14:29

## 2019-05-09 RX ADMIN — FAMOTIDINE 20 MG: 10 INJECTION, SOLUTION INTRAVENOUS at 02:38

## 2019-05-09 RX ADMIN — PANTOPRAZOLE SODIUM 40 MG: 40 INJECTION, POWDER, FOR SOLUTION INTRAVENOUS at 01:06

## 2019-05-09 RX ADMIN — FLUDROCORTISONE ACETATE 0.1 MG: 0.1 TABLET ORAL at 12:06

## 2019-05-09 RX ADMIN — AMPICILLIN SODIUM AND SULBACTAM SODIUM 3 G: 2; 1 INJECTION, POWDER, FOR SOLUTION INTRAMUSCULAR; INTRAVENOUS at 01:49

## 2019-05-09 RX ADMIN — SODIUM CHLORIDE: 9 INJECTION, SOLUTION INTRAVENOUS at 07:58

## 2019-05-09 RX ADMIN — FOLIC ACID: 5 INJECTION, SOLUTION INTRAMUSCULAR; INTRAVENOUS; SUBCUTANEOUS at 08:52

## 2019-05-09 RX ADMIN — CYCLOSPORINE 125 MG: 25 CAPSULE, LIQUID FILLED ORAL at 13:20

## 2019-05-09 RX ADMIN — HYDROCORTISONE SODIUM SUCCINATE 50 MG: 100 INJECTION, POWDER, FOR SOLUTION INTRAMUSCULAR; INTRAVENOUS at 07:50

## 2019-05-09 ASSESSMENT — ACTIVITIES OF DAILY LIVING (ADL)
ADLS_ACUITY_SCORE: 19
ADLS_ACUITY_SCORE: 18
ADLS_ACUITY_SCORE: 12
ADLS_ACUITY_SCORE: 19

## 2019-05-09 ASSESSMENT — ENCOUNTER SYMPTOMS
VOMITING: 1
DYSPHORIC MOOD: 1
SHORTNESS OF BREATH: 0
FEVER: 0
ABDOMINAL PAIN: 0

## 2019-05-09 NOTE — PROGRESS NOTES
05/09/19 1500   Quick Adds   Type of Visit Initial Occupational Therapy Evaluation   Living Environment   Lives With facility resident   Living Arrangements assisted living   Home Accessibility no concerns   Self-Care   Usual Activity Tolerance good   Current Activity Tolerance good   Functional Level   Ambulation 0-->independent   Transferring 0-->independent   Toileting 0-->independent   Bathing 0-->independent   Dressing 0-->independent   Eating 0-->independent   Communication 0-->understands/communicates without difficulty   Swallowing 0-->swallows foods/liquids without difficulty   Cognition 0 - no cognition issues reported   Cognitive Status Examination   Orientation orientation to person, place and time   Level of Consciousness alert   Follows Commands (Cognition) WNL   Memory intact   Attention No deficits were identified   Pain Assessment   Patient Currently in Pain No   Mobility   Bed Mobility Bed mobility skill: Supine to sit   Bed Mobility Skill: Supine to Sit   Level of [place]: Supine/Sit stand-by assist   Physical Assist/Nonphysical Assist: Supine/Sit supervision   Transfer Skill: Bed to Chair/Chair to Bed   Level of [place]: Bed to Chair stand-by assist   Physical Assist/Nonphysical Assist: Bed to Chair supervision   Transfer Skill: Sit to Stand   Level of [place]: Sit/Stand stand-by assist   Physical Assist/Nonphysical Assist: Sit/Stand supervision   Bathing   Level of [place]   (pt refused )   Lower Body Dressing   Level of [place]: Dress Lower Body stand-by assist   Physical Assist/Nonphysical Assist: Dress Lower Body supervision   Clinical Impression   Criteria for Skilled Therapeutic Interventions Met yes, treatment indicated   OT Diagnosis functional weakness    Influenced by the following impairments decreased endurance for function    Assessment of Occupational Performance 1-3 Performance Deficits   Identified Performance Deficits mobility and self care    Clinical  Decision Making (Complexity) Low complexity   Therapy Frequency daily   Predicted Duration of Therapy Intervention (days/wks) 1-2 days    Anticipated Equipment Needs at Discharge   (none)   Anticipated Discharge Disposition Home with Assist   Risks and Benefits of Treatment have been explained. Yes   Patient, Family & other staff in agreement with plan of care Yes   Total Evaluation Time   Total Evaluation Time (Minutes) 15
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
## 2019-05-09 NOTE — PROGRESS NOTES
05/09/19 1600   Quick Adds   Type of Visit Initial PT Evaluation   Living Environment   Lives With facility resident   Living Arrangements assisted living   Home Accessibility no concerns   Self-Care   Usual Activity Tolerance good   Current Activity Tolerance good   Equipment Currently Used at Home none   Functional Level Prior   Ambulation 0-->independent   Transferring 0-->independent   Toileting 0-->independent   Bathing 0-->independent   Communication 0-->understands/communicates without difficulty   Swallowing 0-->swallows foods/liquids without difficulty   Cognition 0 - no cognition issues reported   Fall history within last six months yes   Number of times patient has fallen within last six months 1   Which of the above functional risks had a recent onset or change? none   General Information   Referring Physician Jonna   Patient/Family Goals Statement return to [facility] environment   Precautions/Limitations fall precautions   Weight-Bearing Status - LLE full weight-bearing   Weight-Bearing Status - RLE full weight-bearing   Cognitive Status Examination   Orientation orientation to person, place and time   Level of Consciousness alert   Follows Commands and Answers Questions 100% of the time   Personal Safety and Judgment impulsive   Memory intact   Pain Assessment   Patient Currently in Pain No   Posture    Posture Not impaired   Range of Motion (ROM)   ROM Comment WFL LEs   Strength   Strength Comments WFL LEs   Bed Mobility   Bed Mobility Comments SBA   Transfer Skills   Transfer Comments CGA for safety   Gait   Gait Comments CGA for safety   Balance   Balance Comments fair dynamic stability during gait   Sensory Examination   Sensory Perception Comments appears intact to light touch in LEs   Coordination   Coordination no deficits were identified   General Therapy Interventions   Planned Therapy Interventions gait training   Clinical Impression   Criteria for Skilled Therapeutic Intervention yes, treatment  indicated   PT Diagnosis impaired mobility   Influenced by the following impairments fatigue   Functional limitations due to impairments activity tolerance   Clinical Presentation Stable/Uncomplicated   Clinical Decision Making (Complexity) Low complexity   Therapy Frequency` daily   Predicted Duration of Therapy Intervention (days/wks) 1-2 days   Anticipated Discharge Disposition Home   Risk & Benefits of therapy have been explained Yes   Patient, Family & other staff in agreement with plan of care Yes   Total Evaluation Time   Total Evaluation Time (Minutes) 15

## 2019-05-09 NOTE — PLAN OF CARE
Discharge Planner OT   Patient plan for discharge: to return home to [facility] as previous   Current status: Pt very pleasant, cooperative, agreeable to get OOB, ambulated in hallway without right ear with SBA only, tolerated very well. Pt declined any bathing/washing, will continue to assess.   Barriers to return to prior living situation: none   Recommendations for discharge: back to [facility] as previous   Rationale for recommendations: see above        Entered by: Michelle Coburn 05/09/2019 3:26 PM

## 2019-05-09 NOTE — PROGRESS NOTES
VSS. Denies pain/discomfort. All belongings accounted for. Escorted out with [facility] from protective transport and hospital security card to go to [facility].

## 2019-05-09 NOTE — PLAN OF CARE
VSS. Neuro assessment intact. Lung sound clear. HRR. Has chronic pain to joints. Relieved with oxycodone. Voiding well. Tolerating regular diet. Patient to transfer to [facility] this afternoon. Report given to Stephanie BENNETT and Maria BENNETT.

## 2019-05-09 NOTE — ED TRIAGE NOTES
"Pt comes into the ER via ambulance from [facility]. Per EMS, pt had left [facility] around 1830 tonight to get \"movies\" and came back around 1930 after he drank a pint of vodka. Pt had vomited a couple times and was making suicidal statements. Staff was not comfortable keeping him over night.  Pt is alert, slurring is speech, rambling. Provider to bedside upon arrival. HX suicide attempt and depression.   Reported that the pt recently learned his brother is dying and is upset.  "

## 2019-05-09 NOTE — PHARMACY-ADMISSION MEDICATION HISTORY
Pharmacy -- Admission Medication Reconciliation    Prior to admission (PTA) medications were reviewed and the patient's PTA medication list was updated.    Sources Consulted: , Recent admission notes (2/2019), [facility] list, [facility] nurse    The reliability of this Medication Reconciliation is: Reliability: Reliable    The following significant changes were made:  -Alprazolam updated to 0.5 mg up to four times per week as needed  -Clonidine updated to BID prn per [facility]-however [place] directions state scheduled BID  -Vanicream updated to once daily  -Mycophenolate updated to 500 mg BID; this was just increased from 250 mg BID on 5/8/19  -Multivitamin added  -Omeprazole updated to 20 mg capsule  -Prazosin updated to 4 mg QHS  -Seroquel updated to 100 mg QHS  -Bactrim updated to 10 mL PO QHS    FYI:  -All medications are now being given orally (none via feeding tube); updated all directions  -Recent antibiotics: Doxycycline (2/6/19), Cefuroxime (2/6/19), Amoxicillin (3/21/19), Clindamycin (4/17/19)  -Received a one time Rituxan dose for EBV 3/8/19 per chart notes  -Solumedrol 500 mg IV given 5/6/19, 5/7/19, and 5/8/19  -Fludrocortisone & Prednisone not given per [facility]; awaiting call back to confirm dosing plan  -Awaiting call back from [facility] to determine if home cyclosporine and mycophenolate can be brought in for inpatient use    In addition, the patient's allergies were reviewed with the patient and updated as follows:   Allergies: Gabapentin    The pharmacist has reviewed with the patient that all personal medications should be removed from the building or locked in the belongings safe.  Patient shall only take medications ordered by the physician and administered by the nursing staff.      Medication barriers identified: alcohol abuse; otherwise [facility] administers medications    Medication adherence concerns: n/a    Understanding of emergency medications:  n/a    Jamel Winter, [facility], 5/9/2019,  10:42 AM      Spoke with [facility] Transplant Coordinator ([phone]), and recommendations are listed below:    1) Prednisone plan as follows:  40mg, two times daily for 2 days (5/9 and 5/10)  40mg, daily for two days (5/11 and 5/12)  20mg, daily for two days (5/13 and 5/14)  10mg, daily for two days (5/15 and 5/16)  5mg daily, indefinitely, starting 5/17/19.    2) Fludrocortisone was stopped recently; patient had been taking this to retain fluids and increase blood pressure, but no longer requires this.    3) Cyclosporine trough level was low at 91 recently, and goal level is [100-125]. Cyclosporine dose is to increase to 125 mg in the morning, and 100 mg in the evening to start today (5/9/19).     4) Recheck a cyclosporine trough in 2-3 days.    5) Pharmacist to call transplant coordinator when patient will discharge    6) Hospitalist to call transplant nephrologist with any questions at [phone]    Jamel Winter [facility] on 5/9/2019 at 12:57 PM

## 2019-05-09 NOTE — PLAN OF CARE
VSS. Patient alert and up to toilet this am. Napping at this time. Received call from Vy BENNETT from [facility]. Patient had become intoxicated on 4/19/19 and was having suicidal ideation then. Vy reported that the facility would prefer not to have patient back until he has had a mental health exam. Informed them that [facility] would come and evaluate patient as soon as he as been medically cleared by the doctor. [facility] rep then stated that she prefer him to have a stay in a mental health facility before he comes back.

## 2019-05-09 NOTE — PROGRESS NOTES
RESPIRATORY THERAPY ASSESSMENT    Assessment:     Reason for Assessment: (Aspiration Pneumonitis) (05/09/19 1500)    Pulmonary History:    Pulmonary Status: Smoking history, greater than or equal to 1 PPD (05/09/19 1500)    Smoking cessation information given: No     Surgical:  Surgical Status: No surgery or greater than/equal 4 weeks post-op (05/09/19 1500)    CXR:   Chest X-ray: Clear or not indicated (05/09/19 1500)    Respiratory Pattern:    Respiratory Pattern: Regular pattern 12-20 (05/09/19 1500)    Breath Sounds:    Breath Sounds: Clear to auscultation (05/09/19 1500)    Effectiveness of Cough:     Cough Effectiveness: Strong, non-productive (05/09/19 1500)    Mental Status:    Mental Status: Alert, oriented, cooperative (05/09/19 1500)    Level of Activity:     Acuity Level : Acuity 5 (0-5 points) (05/09/19 1500)    Current O2 Requirement:   O2 Required for SpO2>=92%: No oxygen (05/09/19 1500)    Patient uses home oxygen:   No    Does the patient have a diagnosis of COPD? Rashmi Sellers on 5/9/2019 at 3:18 PM
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
## 2019-05-09 NOTE — PHARMACY - DISCHARGE MEDICATION RECONCILIATION
Pharmacy: Discharge Counseling and Medication Reconciliation    Gilles HERNANDEZ Henning III  [address] [facility]    [facility] [id number] [phone] (home)   49 year old male  PCP:Charlie Dubose    Allergies   Allergen Reactions     Gabapentin Other (See Comments)     myoclonus     Patient was not educated on changes in medications as patient was admitted to [facility]. Medication reconciliation completed at time of discharge and clarifications including omeprazole only (vs ordered duplicate PPIs at time of discharge) as well as pre-hospital Lexapro clarified as 20 mg daily.      Discharge Medication Reconciliation:    Nereida Xavier has reviewed the patient's discharge medication orders and has compared them to the inpatient medication administration record and to what the patient was taking prior to admission- any discrepancies have been resolved.     It has been determined that the patient has an adequate supply of medications available or which can be obtained from the patient's preferred pharmacy, which has been confirmed as: [facility]. [An updated medication list will be faxed to the patient's pharmacy.]     Thank you for the consult.     Nereida Xavier ....................  5/9/2019   2:56 PM

## 2019-05-09 NOTE — PROGRESS NOTES
I met with  and Lety WILL.  Reviewed patient's chart.  He has had at least 3 admissions since January for ongoing alcohol use with significantly elevated blood alcohol levels greater than 0.3.  On 5/8/2019 an alcohol level 0.4 on 4/19/2019 he had a alcohol level of 0.34 on 1/6/2019 he had a alcohol level 0.42.  He has prior history of substance abuse.  It has been noted in the past that there is been concern for medication misuse specifically that he may be taking his wife's opiate medications.  He has history of bipolar disorder depression.  Had a suicide attempt approximately 10 months ago in July.  Despite intensive involvement with community resources he continues to have recurrent admission to the hospital for intoxication.  Plan is for detox and transferred to either mental health or chemical dependency inpatient treatment pending review of chart by the court.  Patient require 72-hour hold at this time which I will update.

## 2019-05-09 NOTE — ED NOTES
Pt denies being suicidal at this time, discussed with charge nurse and supervisor, will do q 15 minute checks and bed alarm is on

## 2019-05-09 NOTE — ED NOTES
"Pt is alert, speech is still slurred but more clear. Pt is following commands, states he is \"doing ok\". Attempting to use the urinal. Assisted to his back for the xray and is assisting staff with repositioning. Pt remains awake during tasks. Ekg being completed now.  "

## 2019-05-09 NOTE — PROGRESS NOTES
:    Patient is from [facility] and they wanted mental health clearance done on patient prior to accepting him back.  I placed call to [facility] to see If they would come and eval patient. Informed ICU nurse.  MD is also aware.      GERRY Lambert on 5/9/2019 at 11:00 AM    :    [facility] called and they are working on placement for patient. 72 hr. hold and committment paperwork were filled out by MD and  brought  to ICU per SUMAN Davidson.     GERYR Lambert on 5/9/2019 at 3:09 PM
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
## 2019-05-09 NOTE — TELEPHONE ENCOUNTER
RNCC received phone call from Tarsha Padron at [facility] -   Hector is discharging to [facility]. He will be on a 72h hold starting this afternoon.    RNCC then spoke with RN at [facility] - 72h only counts on week days. Hector will be there through Tuesday afternoon.  They are a sub-acute facility. If any labs need to be drawn, patients are transported to [facility] [place].    RNCC requested that Hector be transported to [facility] on Monday, 5/13 AM at 0800 for lab draw, as patient has had recent transplant rejection and labs are necessary. RNCC advised that this be a 12h trough level in accordance with CSA dosing.  Nursing voiced understanding.  RNCC gave contact info.

## 2019-05-09 NOTE — PROGRESS NOTES
Patient educated on transfer to [facility] and was amenable and appropriate. Requested staff retrieve tracheostomy supplies from his room at [facility]. Protective transport to transfer patient to [facility] at 1600.

## 2019-05-09 NOTE — TELEPHONE ENCOUNTER
RNCC spoke with MAYUR Mccullough at [facility] -  Please increase CSA to 125mg AM, 100mg PM due to level of 91 on 5/8/19 (appeared to be a true trough).  Please recheck level in 2-3 days.    RNCC communicated pred taper as ordered outpt due to recent rejection. RNCC also discussed goal level of CSA = 100 - 125.    Also - [place] had previously informed RNCC that he had stopped florienf. RNCC expalined this was previously used to maintain adequate BPs while fluid intake was low.    Okay to stay off of florinef if BPs are WNL.

## 2019-05-09 NOTE — ED NOTES
Small amount of emesis noted on bed sheets, no respiratory distress noted, MD into evaluate pt, VSS

## 2019-05-09 NOTE — PLAN OF CARE
Discharge Planner PT   Patient plan for discharge: return to [facility]  Current status: SBA for bed mobilities; CGA for safety with transfers and ambulation  Barriers to return to prior living situation: fatigue  Recommendations for discharge: return to prior living environment  Rationale for recommendations: anticipate patient to be near/at baseline mobility       Entered by: Sterling Eugene 05/09/2019 4:28 PM

## 2019-05-10 RX ORDER — SODIUM BICARBONATE 650 MG/1
TABLET ORAL
Qty: 1000 TABLET | Refills: 0 | Status: ON HOLD | OUTPATIENT
Start: 2019-05-10 | End: 2019-05-22

## 2019-05-10 NOTE — TELEPHONE ENCOUNTER
Routing refill request to provider for review/approval because:  Drug not on the [facility] refill protocol   Reported as historical on med list    Pharmacy requesting quantity of 1000, stating only comes in that size package and can not be opened.  Michelle San RN on 5/10/2019 at 11:40 AM

## 2019-05-11 ENCOUNTER — APPOINTMENT (OUTPATIENT)
Dept: GENERAL RADIOLOGY | Facility: OTHER | Age: 49
End: 2019-05-11
Attending: FAMILY MEDICINE
Payer: MEDICARE

## 2019-05-11 ENCOUNTER — HOSPITAL ENCOUNTER (EMERGENCY)
Facility: OTHER | Age: 49
Discharge: OTHER INSTITUTION | End: 2019-05-11
Attending: FAMILY MEDICINE | Admitting: FAMILY MEDICINE
Payer: MEDICARE

## 2019-05-11 VITALS
RESPIRATION RATE: 18 BRPM | TEMPERATURE: 97 F | SYSTOLIC BLOOD PRESSURE: 106 MMHG | BODY MASS INDEX: 21.47 KG/M2 | OXYGEN SATURATION: 99 % | WEIGHT: 150 LBS | HEIGHT: 70 IN | HEART RATE: 90 BPM | DIASTOLIC BLOOD PRESSURE: 67 MMHG

## 2019-05-11 DIAGNOSIS — K59.00 CONSTIPATION, UNSPECIFIED CONSTIPATION TYPE: ICD-10-CM

## 2019-05-11 LAB
ALBUMIN UR-MCNC: NEGATIVE MG/DL
AMPHETAMINES UR QL SCN: NOT DETECTED
ANION GAP SERPL CALCULATED.3IONS-SCNC: 8 MMOL/L (ref 3–14)
APPEARANCE UR: CLEAR
BACTERIA #/AREA URNS HPF: ABNORMAL /HPF
BARBITURATES UR QL: NOT DETECTED
BASOPHILS # BLD AUTO: 0 10E9/L (ref 0–0.2)
BASOPHILS NFR BLD AUTO: 0 %
BENZODIAZ UR QL: NOT DETECTED
BILIRUB UR QL STRIP: NEGATIVE
BUN SERPL-MCNC: 30 MG/DL (ref 7–25)
BUPRENORPHINE UR QL: NOT DETECTED NG/ML
CALCIUM SERPL-MCNC: 9.6 MG/DL (ref 8.6–10.3)
CANNABINOIDS UR QL: NOT DETECTED NG/ML
CHLORIDE SERPL-SCNC: 101 MMOL/L (ref 98–107)
CO2 SERPL-SCNC: 26 MMOL/L (ref 21–31)
COCAINE UR QL: NOT DETECTED
COLOR UR AUTO: YELLOW
CREAT SERPL-MCNC: 1.44 MG/DL (ref 0.7–1.3)
D-METHAMPHET UR QL: NOT DETECTED NG/ML
DIFFERENTIAL METHOD BLD: ABNORMAL
EOSINOPHIL # BLD AUTO: 0 10E9/L (ref 0–0.7)
EOSINOPHIL NFR BLD AUTO: 0 %
ERYTHROCYTE [DISTWIDTH] IN BLOOD BY AUTOMATED COUNT: 13.1 % (ref 10–15)
GFR SERPL CREATININE-BSD FRML MDRD: 52 ML/MIN/{1.73_M2}
GLUCOSE SERPL-MCNC: 125 MG/DL (ref 70–105)
GLUCOSE UR STRIP-MCNC: NEGATIVE MG/DL
HCT VFR BLD AUTO: 35.6 % (ref 40–53)
HGB BLD-MCNC: 12.1 G/DL (ref 13.3–17.7)
HGB UR QL STRIP: ABNORMAL
KETONES UR STRIP-MCNC: NEGATIVE MG/DL
LEUKOCYTE ESTERASE UR QL STRIP: NEGATIVE
LYMPHOCYTES # BLD AUTO: 0.2 10E9/L (ref 0.8–5.3)
LYMPHOCYTES NFR BLD AUTO: 3 %
MCH RBC QN AUTO: 31.8 PG (ref 26.5–33)
MCHC RBC AUTO-ENTMCNC: 34 G/DL (ref 31.5–36.5)
MCV RBC AUTO: 93 FL (ref 78–100)
METHADONE UR QL SCN: NOT DETECTED
MONOCYTES # BLD AUTO: 0.5 10E9/L (ref 0–1.3)
MONOCYTES NFR BLD AUTO: 6 %
NEUTROPHILS # BLD AUTO: 6.7 10E9/L (ref 1.6–8.3)
NEUTROPHILS NFR BLD AUTO: 89 %
NITRATE UR QL: NEGATIVE
OPIATES UR QL SCN: NOT DETECTED
OXYCODONE UR QL: NOT DETECTED NG/ML
PCP UR QL SCN: NOT DETECTED
PH UR STRIP: 6 PH (ref 5–9)
PLATELET # BLD AUTO: 162 10E9/L (ref 150–450)
POTASSIUM SERPL-SCNC: 4.6 MMOL/L (ref 3.5–5.1)
PROMYELOCYTES # BLD MANUAL: 0.2 10E9/L
PROMYELOCYTES NFR BLD MANUAL: 2 %
PROPOXYPH UR QL: NOT DETECTED NG/ML
RBC # BLD AUTO: 3.81 10E12/L (ref 4.4–5.9)
RBC #/AREA URNS AUTO: ABNORMAL /HPF
SODIUM SERPL-SCNC: 135 MMOL/L (ref 134–144)
SOURCE: ABNORMAL
SP GR UR STRIP: <1.005 (ref 1–1.03)
TRICYCLICS UR QL SCN: ABNORMAL NG/ML
UROBILINOGEN UR STRIP-ACNC: 0.2 EU/DL (ref 0.2–1)
WBC # BLD AUTO: 7.5 10E9/L (ref 4–11)
WBC #/AREA URNS AUTO: ABNORMAL /HPF

## 2019-05-11 PROCEDURE — 99284 EMERGENCY DEPT VISIT MOD MDM: CPT | Mod: 25 | Performed by: FAMILY MEDICINE

## 2019-05-11 PROCEDURE — 74019 RADEX ABDOMEN 2 VIEWS: CPT

## 2019-05-11 PROCEDURE — 80048 BASIC METABOLIC PNL TOTAL CA: CPT | Performed by: FAMILY MEDICINE

## 2019-05-11 PROCEDURE — 81001 URINALYSIS AUTO W/SCOPE: CPT | Performed by: PHYSICIAN ASSISTANT

## 2019-05-11 PROCEDURE — 25000132 ZZH RX MED GY IP 250 OP 250 PS 637: Performed by: FAMILY MEDICINE

## 2019-05-11 PROCEDURE — 99283 EMERGENCY DEPT VISIT LOW MDM: CPT | Mod: Z6 | Performed by: FAMILY MEDICINE

## 2019-05-11 PROCEDURE — 36415 COLL VENOUS BLD VENIPUNCTURE: CPT | Performed by: FAMILY MEDICINE

## 2019-05-11 PROCEDURE — 71046 X-RAY EXAM CHEST 2 VIEWS: CPT

## 2019-05-11 PROCEDURE — 80307 DRUG TEST PRSMV CHEM ANLYZR: CPT | Performed by: PHYSICIAN ASSISTANT

## 2019-05-11 PROCEDURE — 85025 COMPLETE CBC W/AUTO DIFF WBC: CPT | Performed by: FAMILY MEDICINE

## 2019-05-11 PROCEDURE — A9270 NON-COVERED ITEM OR SERVICE: HCPCS | Performed by: FAMILY MEDICINE

## 2019-05-11 RX ORDER — POLYETHYLENE GLYCOL 3350 17 G/17G
1 POWDER, FOR SOLUTION ORAL DAILY
Qty: 510 G | Refills: 0 | Status: ON HOLD | OUTPATIENT
Start: 2019-05-11 | End: 2019-05-22

## 2019-05-11 RX ORDER — POLYETHYLENE GLYCOL 3350 17 G/17G
17 POWDER, FOR SOLUTION ORAL DAILY
Status: DISCONTINUED | OUTPATIENT
Start: 2019-05-11 | End: 2019-05-11 | Stop reason: HOSPADM

## 2019-05-11 RX ADMIN — POLYETHYLENE GLYCOL 3350 17 G: 17 POWDER, FOR SOLUTION ORAL at 20:10

## 2019-05-11 ASSESSMENT — MIFFLIN-ST. JEOR: SCORE: 1546.65

## 2019-05-11 NOTE — ED AVS SNAPSHOT
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 result   Ordering provider:  Sadaf Angelo MD  05/11/19 1514 Resulting lab:  [facility]    Specimen Information    Type Source Collected On   Urine   05/11/19 1608          Components    Component Value Reference Range Flag Lab   Amphetamine Qual Urine Not Detected NDET^Not Detected   [facility]   Benzodiazepine Qual Urine Not Detected NDET^Not Detected   [facility]   Cocaine Qual Urine Not Detected NDET^Not Detected   [facility]   Methadone Qual Urine Not Detected NDET^Not Detected   [facility]   PCP Qual Urine Not Detected NDET^Not Detected   [facility]   Opiates Qualitative Urine  Not Detected NDET^Not Detected   [facility]   Oxycodone Qualitative Urine Not Detected NDET^Not Detected ng/mL   [facility]   Propoxyphene Qualitative Urine Not Detected NDET^Not Detected ng/mL   [facility]   Tricyclic Antidepressants Qual Urine Presumptive positive-Unconfirmed result NDET^Not Detected ng/mL A [facility]   Methamphetamine Qualitative Urine Not Detected NDET^Not Detected ng/mL   [facility]   Barbiturates Qual Urine Not Detected NDET^Not Detected   [facility]   Cannabinoids Qualitative Urine Not Detected NDET^Not Detected ng/mL   [facility]   Buprenorphine Qualitative Urine Not Detected NDET^Not Detected ng/mL   [facility]            Urine Microscopic [[id number]] (Abnormal)  Resulted: 05/11/19 1625, Result status: Final result   Ordering provider:  Henry Delgado PA  05/11/19 1606 Resulting lab:  [facility]    Specimen Information    Type Source Collected On       05/11/19 1606          Components    Component Value Reference Range Flag Lab   WBC Urine 0 - 5 OTO5^0 - 5 /HPF   [facility]   RBC Urine 2-5 OTO2^O - 2 /HPF A [facility]   Bacteria Urine Few NEG^Negative /HPF A [facility]            *UA reflex to Microscopic [[id number]] (Abnormal)  Resulted: 05/11/19 1614, Result status: Final result   Ordering provider:  Sadaf Angelo MD  05/11/19 1514 Resulting lab:  [facility]    Specimen Information    Type Source Collected On   Midstream Urine   05/11/19 1606          Components    Component Value Reference Range Flag Lab   Color Urine Yellow     [facility]   Appearance Urine Clear     [facility]   Glucose Urine Negative NEG^Negative mg/dL   [facility]   Bilirubin Urine Negative NEG^Negative   [facility]   Ketones Urine Negative NEG^Negative mg/dL   [facility]   Specific Gravity Urine <1.005 1.000 - 1.030   [facility]   Blood Urine Trace NEG^Negative A [facility]   pH Urine 6.0 5.0 - 9.0 pH   [facility]   Protein Albumin Urine Negative NEG^Negative  mg/dL   [facility]   Urobilinogen Urine 0.2 0.2 - 1.0 EU/dL   [facility]   Nitrite Urine Negative NEG^Negative   [facility]   Leukocyte Esterase Urine Negative NEG^Negative   [facility]   Source Midstream Urine     [facility]            Testing Performed By     Lab - Abbreviation Name Director Address Valid Date Range    1743 - [facility] Unknown [address]  [place] [address] 08/07/15 0948 - Present               Imaging Results - 3 Days      XR Chest 2 Views [[id number]]  Resulted: 05/11/19 1813, Result status: In process   Ordering provider:  Sadaf Angelo MD  05/11/19 1809 Performed:  05/11/19 1813 - 05/11/19 1836   Accession number:  [id number] Resulting lab:  [facility]      XR Abdomen 2 Views [[id number]]  Resulted: 05/11/19 1813, Result status: In process   Ordering provider:  Sadaf Angelo MD  05/11/19 1809 Performed:  05/11/19 1812 - 05/11/19 1836   Accession number:  [id number] Resulting lab:  [facility]      Testing Performed By     Lab - Abbreviation Name Director Address Valid Date Range    178 - [facility] Unknown Unknown 07/20/12 1135 - Present            Encounter-Level Documents:    There are no encounter-level documents.     Order-Level Documents:    There are no order-level documents.

## 2019-05-11 NOTE — ED TRIAGE NOTES
Patient is brought from detox for complaint of back pain and abdominal pain.  Has chronic back pain, abdominal pain started this afternoon and feels like cramping.  No nausea or vomiting.  States has kidney pain, denies burning or blood with urination.  Patient is on a 72 hour hold at detox and staff is with him.

## 2019-05-11 NOTE — ED AVS SNAPSHOT
[facility]  [address]  [place] [address]  Phone:  [phone]  Fax:  [phone]                                    Gilles Henning III   MRN: [id number]    Department:  [facility]   Date of Visit:  5/11/2019           After Visit Summary Signature Page    I have received my discharge instructions, and my questions have been answered. I have discussed any challenges I see with this plan with the nurse or doctor.    ..........................................................................................................................................  Patient/Patient Representative Signature      ..........................................................................................................................................  Patient Representative Print Name and Relationship to Patient    ..................................................               ................................................  Date                                   Time    ..........................................................................................................................................  Reviewed by Signature/Title    ...................................................              ..............................................  Date                                               Time          22EPIC Rev 08/18

## 2019-05-12 ASSESSMENT — ENCOUNTER SYMPTOMS
FATIGUE: 0
VOMITING: 1
NAUSEA: 0
ACTIVITY CHANGE: 0
DIARRHEA: 0
FEVER: 0
SHORTNESS OF BREATH: 0
MUSCULOSKELETAL NEGATIVE: 1
CHEST TIGHTNESS: 0
EYES NEGATIVE: 1
CONSTIPATION: 1
CARDIOVASCULAR NEGATIVE: 1
CHILLS: 0
ABDOMINAL PAIN: 1
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
## 2019-05-13 ENCOUNTER — HOSPITAL ENCOUNTER (OUTPATIENT)
Dept: INFUSION THERAPY | Facility: OTHER | Age: 49
Discharge: HOME | End: 2019-05-13
Admitting: FAMILY MEDICINE
Payer: MEDICARE

## 2019-05-13 ENCOUNTER — TELEPHONE (OUTPATIENT)
Dept: TRANSPLANT | Facility: CLINIC | Age: 49
End: 2019-05-13

## 2019-05-13 ENCOUNTER — MEDICAL CORRESPONDENCE (OUTPATIENT)
Dept: LAB | Facility: OTHER | Age: 49
End: 2019-05-13

## 2019-05-13 VITALS
SYSTOLIC BLOOD PRESSURE: 102 MMHG | HEART RATE: 93 BPM | DIASTOLIC BLOOD PRESSURE: 66 MMHG | TEMPERATURE: 97.1 F | RESPIRATION RATE: 18 BRPM

## 2019-05-13 DIAGNOSIS — R79.89 ELEVATED SERUM CREATININE: ICD-10-CM

## 2019-05-13 DIAGNOSIS — Z94.0 KIDNEY TRANSPLANTED: ICD-10-CM

## 2019-05-13 DIAGNOSIS — T86.11 KIDNEY TRANSPLANT REJECTION: Primary | ICD-10-CM

## 2019-05-13 DIAGNOSIS — Z79.60 LONG-TERM USE OF IMMUNOSUPPRESSANT MEDICATION: ICD-10-CM

## 2019-05-13 DIAGNOSIS — Z94.0 KIDNEY TRANSPLANTED: Primary | ICD-10-CM

## 2019-05-13 DIAGNOSIS — Z94.0 KIDNEY REPLACED BY TRANSPLANT: ICD-10-CM

## 2019-05-13 DIAGNOSIS — R79.89 ELEVATED SERUM CREATININE: Primary | ICD-10-CM

## 2019-05-13 DIAGNOSIS — B34.8 BK VIREMIA: ICD-10-CM

## 2019-05-13 LAB
ANION GAP SERPL CALCULATED.3IONS-SCNC: 8 MMOL/L (ref 3–14)
BUN SERPL-MCNC: 35 MG/DL (ref 7–25)
CALCIUM SERPL-MCNC: 10.8 MG/DL (ref 8.6–10.3)
CHLORIDE SERPL-SCNC: 102 MMOL/L (ref 98–107)
CO2 SERPL-SCNC: 28 MMOL/L (ref 21–31)
CREAT SERPL-MCNC: 1.89 MG/DL (ref 0.7–1.3)
ERYTHROCYTE [DISTWIDTH] IN BLOOD BY AUTOMATED COUNT: 13.7 % (ref 10–15)
GFR SERPL CREATININE-BSD FRML MDRD: ABNORMAL ML/MIN/{1.73_M2}
GLUCOSE SERPL-MCNC: 93 MG/DL (ref 70–105)
HCT VFR BLD AUTO: 43.2 % (ref 40–53)
HGB BLD-MCNC: 14.4 G/DL (ref 13.3–17.7)
MCH RBC QN AUTO: 31.5 PG (ref 26.5–33)
MCHC RBC AUTO-ENTMCNC: 33.3 G/DL (ref 31.5–36.5)
MCV RBC AUTO: 95 FL (ref 78–100)
PLATELET # BLD AUTO: 177 10E9/L (ref 150–450)
POTASSIUM SERPL-SCNC: 4.8 MMOL/L (ref 3.5–5.1)
RBC # BLD AUTO: 4.57 10E12/L (ref 4.4–5.9)
SODIUM SERPL-SCNC: 138 MMOL/L (ref 134–144)
WBC # BLD AUTO: 7.6 10E9/L (ref 4–11)

## 2019-05-13 PROCEDURE — 36415 COLL VENOUS BLD VENIPUNCTURE: CPT | Performed by: INTERNAL MEDICINE

## 2019-05-13 PROCEDURE — 87799 DETECT AGENT NOS DNA QUANT: CPT | Performed by: INTERNAL MEDICINE

## 2019-05-13 PROCEDURE — 80158 DRUG ASSAY CYCLOSPORINE: CPT | Performed by: INTERNAL MEDICINE

## 2019-05-13 PROCEDURE — 96360 HYDRATION IV INFUSION INIT: CPT

## 2019-05-13 PROCEDURE — 80048 BASIC METABOLIC PNL TOTAL CA: CPT | Performed by: INTERNAL MEDICINE

## 2019-05-13 PROCEDURE — 85027 COMPLETE CBC AUTOMATED: CPT | Performed by: INTERNAL MEDICINE

## 2019-05-13 PROCEDURE — 25000128 H RX IP 250 OP 636: Performed by: INTERNAL MEDICINE

## 2019-05-13 RX ADMIN — SODIUM CHLORIDE 1000 ML: 9 INJECTION, SOLUTION INTRAVENOUS at 14:00

## 2019-05-13 NOTE — TELEPHONE ENCOUNTER
Post Kidney and Pancreas Transplant Team Conference  Date: 5/13/2019  Transplant Coordinator: Angeline Alonso     Attendees:  [x]  Dr. Macias [] Thao Barnard, RN  [] Blossom Montes LPN     []  Dr. Donato [x] Martha Morris, DONALD [] Noelle Wagner LPN   []  Dr. Telles [] Beth Evans RN    []  Dr. Cabrera [] Katelyn Gallegos RN    [] Dr. Lorenzo [x] Maliha Alonso RN    [] Dr. Finley [] Mahad Steele RN    [] Dr. Parsons [] Mildred Barajas, RN    [] Surgery Fellow [] Gia Romero RN    [] Rachel Resendez NP [] Marianna Fortune RN    [] Jacob López, PharmD [] Arash Agarwal RN     [] Thao Villarreal RN        Verbal Plan Read Back:   Okay for 1L NS d/t dehydration - improved creatinine after NS. Continue to give 1L for creatinine >1.6.    Closure biopsy due the week of 5/20/19.    Continue labs weekly.    Routed to RN Coordinator   Angeline Alonso

## 2019-05-13 NOTE — PROGRESS NOTES
Infusion Nursing Note:  Gilles Henning III presents today for ordered fluids.    Patient seen by provider today: No   present during visit today: Not Applicable.    Note: N/A.    Intravenous Access:  Peripheral IV placed to right forearm with brisk blood return    Treatment Conditions:  Fluids ordered due to lab results.      Post Infusion Assessment:  Patient tolerated infusion without incident.  Site patent and intact, free from redness, edema or discomfort.  No evidence of extravasations.  Access discontinued per protocol.       Discharge Plan:   Patient discharged in stable condition accompanied by: self and attendant.    Amy Melchor RN

## 2019-05-14 DIAGNOSIS — Z94.0 KIDNEY TRANSPLANTED: ICD-10-CM

## 2019-05-14 DIAGNOSIS — Z79.60 LONG-TERM USE OF IMMUNOSUPPRESSANT MEDICATION: ICD-10-CM

## 2019-05-14 LAB
ANION GAP SERPL CALCULATED.3IONS-SCNC: 8 MMOL/L (ref 3–14)
BUN SERPL-MCNC: 34 MG/DL (ref 7–25)
CALCIUM SERPL-MCNC: 9.8 MG/DL (ref 8.6–10.3)
CHLORIDE SERPL-SCNC: 104 MMOL/L (ref 98–107)
CO2 SERPL-SCNC: 25 MMOL/L (ref 21–31)
CREAT SERPL-MCNC: 1.54 MG/DL (ref 0.7–1.3)
CYCLOSPORINE BLD LC/MS/MS-MCNC: 134 UG/L (ref 50–400)
EBV DNA # SPEC NAA+PROBE: 4693 {COPIES}/ML
EBV DNA SPEC NAA+PROBE-LOG#: 3.7 {LOG_COPIES}/ML
ERYTHROCYTE [DISTWIDTH] IN BLOOD BY AUTOMATED COUNT: 13.9 % (ref 10–15)
GFR SERPL CREATININE-BSD FRML MDRD: 48 ML/MIN/{1.73_M2}
GLUCOSE SERPL-MCNC: 95 MG/DL (ref 70–105)
HCT VFR BLD AUTO: 39.9 % (ref 40–53)
HGB BLD-MCNC: 13.3 G/DL (ref 13.3–17.7)
MCH RBC QN AUTO: 31.8 PG (ref 26.5–33)
MCHC RBC AUTO-ENTMCNC: 33.3 G/DL (ref 31.5–36.5)
MCV RBC AUTO: 96 FL (ref 78–100)
PLATELET # BLD AUTO: 167 10E9/L (ref 150–450)
POTASSIUM SERPL-SCNC: 4.5 MMOL/L (ref 3.5–5.1)
RBC # BLD AUTO: 4.18 10E12/L (ref 4.4–5.9)
SODIUM SERPL-SCNC: 137 MMOL/L (ref 134–144)
TME LAST DOSE: 2000 H
WBC # BLD AUTO: 7.3 10E9/L (ref 4–11)

## 2019-05-14 PROCEDURE — 36415 COLL VENOUS BLD VENIPUNCTURE: CPT | Performed by: INTERNAL MEDICINE

## 2019-05-14 PROCEDURE — 80048 BASIC METABOLIC PNL TOTAL CA: CPT | Performed by: INTERNAL MEDICINE

## 2019-05-14 PROCEDURE — 85027 COMPLETE CBC AUTOMATED: CPT | Performed by: INTERNAL MEDICINE

## 2019-05-15 LAB
BKV DNA # SPEC NAA+PROBE: ABNORMAL COPIES/ML
BKV DNA SPEC NAA+PROBE-LOG#: 4.2 LOG COPIES/ML
SPECIMEN SOURCE: ABNORMAL

## 2019-05-16 NOTE — TELEPHONE ENCOUNTER
Hector was agreeable to closure biopsy. He requested that I call Nancy to coordinate his biopsy.     Hector reports he has been drinking an abundance of fluids and now denies any lightheadednss / dizziness or signs of dehydration.  BPs has been running 120s.   No change in weight.

## 2019-05-17 RX ORDER — DIPHENHYDRAMINE HCL 25 MG
25 CAPSULE ORAL ONCE
Status: CANCELLED | OUTPATIENT
Start: 2019-05-17

## 2019-05-17 RX ORDER — ACETAMINOPHEN 325 MG/1
650 TABLET ORAL ONCE
Status: CANCELLED
Start: 2019-05-17

## 2019-05-17 NOTE — TELEPHONE ENCOUNTER
RNCC spoke with the on-call nurse covering for gracie leonor. She was agreeable to get biopsy set for next week Wednesday and will notify SOT if this will not work out.

## 2019-05-17 NOTE — TELEPHONE ENCOUNTER
Post Kidney and Pancreas Transplant Team Conference  Date: 5/16/2019  Transplant Coordinator: Angeline Alonso     Attendees:  [x]  Dr. Macias [] Thao Barnard, RN  [] Blossom Montes LPN     []  Dr. Donato [] Martha Morris RN [] Noelle Wagner LPN   []  Dr. Telles [] Beth Evans RN    []  Dr. Cabrera [] Katelyn Gallegos RN    [] Dr. Lorenzo [x] Maliha Alonso RN    [] Dr. Finley [] Mahad Steele RN    [] Dr. Parsons [] Mildred Barajas RN    [] Surgery Fellow [] Gia Romero RN    [] Rachel Resendez, KHARI [] Marianna Fortune RN    [] Jacob López, PharmD [] Arash Agarwal RN     [] Thao Villarreal RN        Verbal Plan Read Back:   Due to rise in BK with recent treatment of rejection,  Give IVIG 0.5g/kg once.    Routed to RN Coordinator   Angeline Alonso    Orders placed and call to [facility] - they will call Hector to schedule infusion next week.

## 2019-05-17 NOTE — TELEPHONE ENCOUNTER
Transplant Coordinator Renal Biopsy Communication    Call placed to Gilles Henning III to discuss indication for kidney transplant biopsy per Dr. Macias.     Indication for transplant renal biopsy: closure biopsy   Laterality: right  Date of biopsy: 5/22/19    Patient location within 70 miles of [facility]: No.  If no, must stay overnight locally. Pt DECLINES OVERNIGHT STAY. WILL be transported back to his AL facility same day.    Gilles Henning III's medication list was reviewed.   Anticoagulant: none   Ibuprofen: No.  Fish Oil:  No.  Medications held: none    Recent blood pressure readings are WNL or not applicable. Instructed to take medication, especially blood pressure medications, before [facility] to [facility] at 0600 day of procedure.     Procedure expectations and duration of stay discussed. Expressed pt can expect a phone call from LPN/MA to confirm biopsy date/time/location/directions/review of medications. Pt has no additional questions at this time. Transplant Office phone number given to pt for future questions.      Angeline Alonso  Kidney/Pancreas Transplant Coordinator  [phone] option [phone]

## 2019-05-20 ENCOUNTER — HOSPITAL ENCOUNTER (OUTPATIENT)
Facility: AMBULATORY SURGERY CENTER | Age: 49
End: 2019-05-20
Attending: INTERNAL MEDICINE
Payer: COMMERCIAL

## 2019-05-20 ENCOUNTER — HOSPITAL ENCOUNTER (OUTPATIENT)
Dept: INFUSION THERAPY | Facility: OTHER | Age: 49
Discharge: HOME | DRG: 897 | End: 2019-05-20
Attending: INTERNAL MEDICINE | Admitting: FAMILY MEDICINE
Payer: MEDICARE

## 2019-05-20 VITALS
DIASTOLIC BLOOD PRESSURE: 58 MMHG | TEMPERATURE: 97.6 F | RESPIRATION RATE: 16 BRPM | HEART RATE: 84 BPM | SYSTOLIC BLOOD PRESSURE: 99 MMHG

## 2019-05-20 DIAGNOSIS — B34.8 BK VIREMIA: ICD-10-CM

## 2019-05-20 DIAGNOSIS — T86.10 COMPLICATIONS, KIDNEY TRANSPLANT: Primary | ICD-10-CM

## 2019-05-20 DIAGNOSIS — T86.11 KIDNEY TRANSPLANT REJECTION: Primary | ICD-10-CM

## 2019-05-20 DIAGNOSIS — R79.89 ELEVATED SERUM CREATININE: ICD-10-CM

## 2019-05-20 DIAGNOSIS — Z94.0 KIDNEY TRANSPLANTED: ICD-10-CM

## 2019-05-20 LAB
ANION GAP SERPL CALCULATED.3IONS-SCNC: 10 MMOL/L (ref 3–14)
BASOPHILS # BLD AUTO: 0 10E9/L (ref 0–0.2)
BASOPHILS NFR BLD AUTO: 0.3 %
BUN SERPL-MCNC: 17 MG/DL (ref 7–25)
CALCIUM SERPL-MCNC: 9.3 MG/DL (ref 8.6–10.3)
CHLORIDE SERPL-SCNC: 105 MMOL/L (ref 98–107)
CO2 SERPL-SCNC: 22 MMOL/L (ref 21–31)
CREAT SERPL-MCNC: 1.56 MG/DL (ref 0.7–1.3)
DIFFERENTIAL METHOD BLD: ABNORMAL
EOSINOPHIL # BLD AUTO: 0 10E9/L (ref 0–0.7)
EOSINOPHIL NFR BLD AUTO: 0.1 %
ERYTHROCYTE [DISTWIDTH] IN BLOOD BY AUTOMATED COUNT: 13.9 % (ref 10–15)
GFR SERPL CREATININE-BSD FRML MDRD: 47 ML/MIN/{1.73_M2}
GLUCOSE SERPL-MCNC: 104 MG/DL (ref 70–105)
HCT VFR BLD AUTO: 34.8 % (ref 40–53)
HGB BLD-MCNC: 11.7 G/DL (ref 13.3–17.7)
IMM GRANULOCYTES # BLD: 0.6 10E9/L (ref 0–0.4)
IMM GRANULOCYTES NFR BLD: 5.9 %
LYMPHOCYTES # BLD AUTO: 0.3 10E9/L (ref 0.8–5.3)
LYMPHOCYTES NFR BLD AUTO: 2.6 %
MCH RBC QN AUTO: 32.2 PG (ref 26.5–33)
MCHC RBC AUTO-ENTMCNC: 33.6 G/DL (ref 31.5–36.5)
MCV RBC AUTO: 96 FL (ref 78–100)
MONOCYTES # BLD AUTO: 0.5 10E9/L (ref 0–1.3)
MONOCYTES NFR BLD AUTO: 5 %
NEUTROPHILS # BLD AUTO: 8.8 10E9/L (ref 1.6–8.3)
NEUTROPHILS NFR BLD AUTO: 86.1 %
PLATELET # BLD AUTO: 133 10E9/L (ref 150–450)
POTASSIUM SERPL-SCNC: 4.4 MMOL/L (ref 3.5–5.1)
RBC # BLD AUTO: 3.63 10E12/L (ref 4.4–5.9)
SODIUM SERPL-SCNC: 137 MMOL/L (ref 134–144)
WBC # BLD AUTO: 10.3 10E9/L (ref 4–11)

## 2019-05-20 PROCEDURE — 25000128 H RX IP 250 OP 636: Performed by: INTERNAL MEDICINE

## 2019-05-20 PROCEDURE — A9270 NON-COVERED ITEM OR SERVICE: HCPCS | Mod: GY | Performed by: INTERNAL MEDICINE

## 2019-05-20 PROCEDURE — 80158 DRUG ASSAY CYCLOSPORINE: CPT | Performed by: INTERNAL MEDICINE

## 2019-05-20 PROCEDURE — 96365 THER/PROPH/DIAG IV INF INIT: CPT

## 2019-05-20 PROCEDURE — 80048 BASIC METABOLIC PNL TOTAL CA: CPT | Performed by: INTERNAL MEDICINE

## 2019-05-20 PROCEDURE — 25000132 ZZH RX MED GY IP 250 OP 250 PS 637: Performed by: INTERNAL MEDICINE

## 2019-05-20 PROCEDURE — 96366 THER/PROPH/DIAG IV INF ADDON: CPT

## 2019-05-20 PROCEDURE — 85025 COMPLETE CBC W/AUTO DIFF WBC: CPT | Performed by: INTERNAL MEDICINE

## 2019-05-20 RX ORDER — DIPHENHYDRAMINE HCL 25 MG
25 CAPSULE ORAL ONCE
Status: CANCELLED | OUTPATIENT
Start: 2019-06-03

## 2019-05-20 RX ORDER — DIPHENHYDRAMINE HCL 25 MG
25 CAPSULE ORAL ONCE
Status: COMPLETED | OUTPATIENT
Start: 2019-05-20 | End: 2019-05-20

## 2019-05-20 RX ORDER — ACETAMINOPHEN 325 MG/1
650 TABLET ORAL ONCE
Status: CANCELLED
Start: 2019-06-03

## 2019-05-20 RX ORDER — ACETAMINOPHEN 325 MG/1
650 TABLET ORAL ONCE
Status: COMPLETED | OUTPATIENT
Start: 2019-05-20 | End: 2019-05-20

## 2019-05-20 RX ADMIN — ACETAMINOPHEN 650 MG: 325 TABLET, FILM COATED ORAL at 11:47

## 2019-05-20 RX ADMIN — HUMAN IMMUNOGLOBULIN G 35 G: 20 LIQUID INTRAVENOUS at 12:10

## 2019-05-20 RX ADMIN — SODIUM CHLORIDE 250 ML: 9 INJECTION, SOLUTION INTRAVENOUS at 11:47

## 2019-05-20 RX ADMIN — DIPHENHYDRAMINE HYDROCHLORIDE 25 MG: 25 CAPSULE ORAL at 11:47

## 2019-05-20 NOTE — PROGRESS NOTES
Infusion Nursing Note:  Gilles Henning III presents today for IVIG.    Patient seen by provider today: No   present during visit today: Not Applicable.    Note: N/A.    Intravenous Access:  Labs drawn without difficulty.  Peripheral IV placed.    Treatment Conditions:  Lab Results   Component Value Date    HGB 13.3 05/14/2019     Lab Results   Component Value Date    WBC 7.3 05/14/2019      Lab Results   Component Value Date    ANEU 6.7 05/11/2019     Lab Results   Component Value Date     05/14/2019      Lab Results   Component Value Date     05/14/2019                   Lab Results   Component Value Date    POTASSIUM 4.5 05/14/2019           Lab Results   Component Value Date    MAG 2.1 05/09/2019            Lab Results   Component Value Date    CR 1.54 05/14/2019                   Lab Results   Component Value Date    SHIRAZ 9.8 05/14/2019                Lab Results   Component Value Date    BILITOTAL 0.4 05/09/2019           Lab Results   Component Value Date    ALBUMIN 4.3 05/09/2019                    Lab Results   Component Value Date    ALT 17 05/09/2019           Lab Results   Component Value Date    AST 24 05/09/2019           Post Infusion Assessment:  Patient tolerated infusion without incident.  Blood return noted pre and post infusion.  Site patent and intact, free from redness, edema or discomfort.  No evidence of extravasations.  Access discontinued per protocol.       Discharge Plan:   Patient and/or family verbalized understanding of discharge instructions and all questions answered.  Copy of AVS reviewed with patient and/or family.   Patient discharged in stable condition accompanied by: self.  Departure Mode: Ambulatory.    Emmy Mohr RN

## 2019-05-20 NOTE — TELEPHONE ENCOUNTER
LPN/MA  Renal Biopsy Communication    Call to pt to confirm renal biopsy procedure. Biopsy orders entered and patient aware of date 5/22/2019, in [facility] and to arrive at 6:00AM.     No need to be NPO.      Discussed anticoagulants (i.e. fish oil, ASA, Plavix, Coumadin, Ibuprofen). Pt denies use of anticoagulants.     Take all medicine before arrival for biopsy and bring all medicine bottles with.      Report to 1st floor lab, then 5th floor for biopsy.      Use [employer] services and bring form of entertainment.     Discussed with patient need to stay overnight locally evening of procedure if live more than 70 miles away.    Call placed to scheduling at [phone] to schedule and confirm biopsy date/time    Lab appointment scheduled for morning of biopsy.

## 2019-05-21 ENCOUNTER — HOSPITAL ENCOUNTER (INPATIENT)
Facility: OTHER | Age: 49
LOS: 2 days | Discharge: HOME | DRG: 897 | End: 2019-05-23
Attending: FAMILY MEDICINE | Admitting: FAMILY MEDICINE
Payer: MEDICARE

## 2019-05-21 ENCOUNTER — HOSPITAL ENCOUNTER (EMERGENCY)
Facility: OTHER | Age: 49
Discharge: HOME | DRG: 897 | End: 2019-05-21
Attending: FAMILY MEDICINE | Admitting: FAMILY MEDICINE
Payer: MEDICARE

## 2019-05-21 ENCOUNTER — TELEPHONE (OUTPATIENT)
Dept: TRANSPLANT | Facility: CLINIC | Age: 49
End: 2019-05-21

## 2019-05-21 VITALS
SYSTOLIC BLOOD PRESSURE: 107 MMHG | HEIGHT: 70 IN | DIASTOLIC BLOOD PRESSURE: 70 MMHG | BODY MASS INDEX: 21.47 KG/M2 | WEIGHT: 150 LBS | TEMPERATURE: 96.7 F | OXYGEN SATURATION: 97 % | HEART RATE: 83 BPM | RESPIRATION RATE: 16 BRPM

## 2019-05-21 DIAGNOSIS — R40.4 ALTERED LEVEL OF CONSCIOUSNESS: ICD-10-CM

## 2019-05-21 DIAGNOSIS — I10 ESSENTIAL HYPERTENSION, BENIGN: ICD-10-CM

## 2019-05-21 DIAGNOSIS — Z94.0 KIDNEY REPLACED BY TRANSPLANT: ICD-10-CM

## 2019-05-21 DIAGNOSIS — F10.220 ACUTE ALCOHOLIC INTOXICATION IN ALCOHOLISM WITHOUT COMPLICATION (H): ICD-10-CM
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
## 2019-05-21 PROBLEM — F10.929 ACUTE ALCOHOLIC INTOXICATION (H): Status: ACTIVE | Noted: 2019-05-21

## 2019-05-21 LAB
ALBUMIN SERPL-MCNC: 3.9 G/DL (ref 3.5–5.7)
ALP SERPL-CCNC: 76 U/L (ref 34–104)
ALT SERPL W P-5'-P-CCNC: 33 U/L (ref 7–52)
ANION GAP SERPL CALCULATED.3IONS-SCNC: 12 MMOL/L (ref 3–14)
AST SERPL W P-5'-P-CCNC: 29 U/L (ref 13–39)
BASOPHILS # BLD AUTO: 0 10E9/L (ref 0–0.2)
BASOPHILS NFR BLD AUTO: 0.3 %
BILIRUB SERPL-MCNC: 0.6 MG/DL (ref 0.3–1)
BUN SERPL-MCNC: 21 MG/DL (ref 7–25)
CALCIUM SERPL-MCNC: 8.9 MG/DL (ref 8.6–10.3)
CHLORIDE SERPL-SCNC: 106 MMOL/L (ref 98–107)
CO2 SERPL-SCNC: 19 MMOL/L (ref 21–31)
CREAT SERPL-MCNC: 1.4 MG/DL (ref 0.7–1.3)
CYCLOSPORINE BLD LC/MS/MS-MCNC: 325 UG/L (ref 50–400)
DIFFERENTIAL METHOD BLD: ABNORMAL
EOSINOPHIL # BLD AUTO: 0 10E9/L (ref 0–0.7)
EOSINOPHIL NFR BLD AUTO: 0.3 %
ERYTHROCYTE [DISTWIDTH] IN BLOOD BY AUTOMATED COUNT: 13.9 % (ref 10–15)
ETHANOL SERPL-MCNC: 0.39 %
ETHANOL SERPL-MCNC: 0.45 %
ETHANOL SERPL-MCNC: 0.51 %
FOLATE SERPL-MCNC: >24.8 NG/ML
GFR SERPL CREATININE-BSD FRML MDRD: 54 ML/MIN/{1.73_M2}
GLUCOSE SERPL-MCNC: 102 MG/DL (ref 70–105)
HCT VFR BLD AUTO: 36.1 % (ref 40–53)
HGB BLD-MCNC: 11.9 G/DL (ref 13.3–17.7)
IMM GRANULOCYTES # BLD: 0.5 10E9/L (ref 0–0.4)
IMM GRANULOCYTES NFR BLD: 6.9 %
LYMPHOCYTES # BLD AUTO: 0.5 10E9/L (ref 0.8–5.3)
LYMPHOCYTES NFR BLD AUTO: 7.1 %
MAGNESIUM SERPL-MCNC: 2 MG/DL (ref 1.9–2.7)
MCH RBC QN AUTO: 31.9 PG (ref 26.5–33)
MCHC RBC AUTO-ENTMCNC: 33 G/DL (ref 31.5–36.5)
MCV RBC AUTO: 97 FL (ref 78–100)
MONOCYTES # BLD AUTO: 0.4 10E9/L (ref 0–1.3)
MONOCYTES NFR BLD AUTO: 4.8 %
NEUTROPHILS # BLD AUTO: 6.2 10E9/L (ref 1.6–8.3)
NEUTROPHILS NFR BLD AUTO: 80.6 %
PHOSPHATE SERPL-MCNC: 4.6 MG/DL (ref 2.5–5)
PLATELET # BLD AUTO: 129 10E9/L (ref 150–450)
POTASSIUM SERPL-SCNC: 4.4 MMOL/L (ref 3.5–5.1)
PROT SERPL-MCNC: 7.4 G/DL (ref 6.4–8.9)
RBC # BLD AUTO: 3.73 10E12/L (ref 4.4–5.9)
SODIUM SERPL-SCNC: 137 MMOL/L (ref 134–144)
TME LAST DOSE: NORMAL H
VIT B12 SERPL-MCNC: 968 PG/ML (ref 180–914)
WBC # BLD AUTO: 7.6 10E9/L (ref 4–11)

## 2019-05-21 PROCEDURE — 93010 ELECTROCARDIOGRAM REPORT: CPT | Performed by: INTERNAL MEDICINE

## 2019-05-21 PROCEDURE — 80320 DRUG SCREEN QUANTALCOHOLS: CPT | Performed by: FAMILY MEDICINE

## 2019-05-21 PROCEDURE — 83735 ASSAY OF MAGNESIUM: CPT | Performed by: FAMILY MEDICINE

## 2019-05-21 PROCEDURE — 36415 COLL VENOUS BLD VENIPUNCTURE: CPT | Performed by: FAMILY MEDICINE

## 2019-05-21 PROCEDURE — 25000128 H RX IP 250 OP 636: Performed by: FAMILY MEDICINE

## 2019-05-21 PROCEDURE — 25000132 ZZH RX MED GY IP 250 OP 250 PS 637: Performed by: FAMILY MEDICINE

## 2019-05-21 PROCEDURE — 25000131 ZZH RX MED GY IP 250 OP 636 PS 637: Performed by: FAMILY MEDICINE

## 2019-05-21 PROCEDURE — 80053 COMPREHEN METABOLIC PANEL: CPT | Performed by: FAMILY MEDICINE

## 2019-05-21 PROCEDURE — 25800030 ZZH RX IP 258 OP 636: Performed by: FAMILY MEDICINE

## 2019-05-21 PROCEDURE — 20000006 ZZH R&B ICU - GICH

## 2019-05-21 PROCEDURE — 96374 THER/PROPH/DIAG INJ IV PUSH: CPT | Performed by: FAMILY MEDICINE

## 2019-05-21 PROCEDURE — 85025 COMPLETE CBC W/AUTO DIFF WBC: CPT | Performed by: FAMILY MEDICINE

## 2019-05-21 PROCEDURE — 99284 EMERGENCY DEPT VISIT MOD MDM: CPT | Mod: Z6 | Performed by: FAMILY MEDICINE

## 2019-05-21 PROCEDURE — A9270 NON-COVERED ITEM OR SERVICE: HCPCS | Performed by: FAMILY MEDICINE

## 2019-05-21 PROCEDURE — 84100 ASSAY OF PHOSPHORUS: CPT | Performed by: FAMILY MEDICINE

## 2019-05-21 PROCEDURE — 99283 EMERGENCY DEPT VISIT LOW MDM: CPT | Mod: Z6 | Performed by: FAMILY MEDICINE

## 2019-05-21 PROCEDURE — 99222 1ST HOSP IP/OBS MODERATE 55: CPT | Mod: AI | Performed by: FAMILY MEDICINE

## 2019-05-21 PROCEDURE — 99283 EMERGENCY DEPT VISIT LOW MDM: CPT | Performed by: FAMILY MEDICINE

## 2019-05-21 PROCEDURE — 82607 VITAMIN B-12: CPT | Performed by: FAMILY MEDICINE

## 2019-05-21 PROCEDURE — 82746 ASSAY OF FOLIC ACID SERUM: CPT | Performed by: FAMILY MEDICINE

## 2019-05-21 PROCEDURE — 99285 EMERGENCY DEPT VISIT HI MDM: CPT | Mod: 25 | Performed by: FAMILY MEDICINE

## 2019-05-21 RX ORDER — PREDNISONE 5 MG/1
5 TABLET ORAL DAILY
Status: DISCONTINUED | OUTPATIENT
Start: 2019-05-21 | End: 2019-05-23 | Stop reason: HOSPADM

## 2019-05-21 RX ORDER — ONDANSETRON 4 MG/1
4 TABLET, ORALLY DISINTEGRATING ORAL EVERY 6 HOURS PRN
Status: DISCONTINUED | OUTPATIENT
Start: 2019-05-21 | End: 2019-05-23 | Stop reason: HOSPADM

## 2019-05-21 RX ORDER — FOLIC ACID 1 MG/1
1 TABLET ORAL DAILY
Status: DISCONTINUED | OUTPATIENT
Start: 2019-05-22 | End: 2019-05-23 | Stop reason: HOSPADM

## 2019-05-21 RX ORDER — POTASSIUM CHLORIDE 1500 MG/1
20-40 TABLET, EXTENDED RELEASE ORAL
Status: DISCONTINUED | OUTPATIENT
Start: 2019-05-21 | End: 2019-05-23 | Stop reason: HOSPADM

## 2019-05-21 RX ORDER — LORAZEPAM 1 MG/1
1-2 TABLET ORAL EVERY 30 MIN PRN
Status: DISCONTINUED | OUTPATIENT
Start: 2019-05-21 | End: 2019-05-23 | Stop reason: HOSPADM

## 2019-05-21 RX ORDER — MAGNESIUM SULFATE HEPTAHYDRATE 40 MG/ML
4 INJECTION, SOLUTION INTRAVENOUS EVERY 4 HOURS PRN
Status: DISCONTINUED | OUTPATIENT
Start: 2019-05-21 | End: 2019-05-23 | Stop reason: HOSPADM

## 2019-05-21 RX ORDER — ONDANSETRON 2 MG/ML
4 INJECTION INTRAMUSCULAR; INTRAVENOUS EVERY 6 HOURS PRN
Status: DISCONTINUED | OUTPATIENT
Start: 2019-05-21 | End: 2019-05-23 | Stop reason: HOSPADM

## 2019-05-21 RX ORDER — QUETIAPINE FUMARATE 25 MG/1
100 TABLET, FILM COATED ORAL AT BEDTIME
Status: DISCONTINUED | OUTPATIENT
Start: 2019-05-21 | End: 2019-05-23 | Stop reason: HOSPADM

## 2019-05-21 RX ORDER — ALPRAZOLAM 0.25 MG
0.25 TABLET ORAL DAILY PRN
Status: DISCONTINUED | OUTPATIENT
Start: 2019-05-21 | End: 2019-05-23 | Stop reason: HOSPADM

## 2019-05-21 RX ORDER — SULFAMETHOXAZOLE AND TRIMETHOPRIM 400; 80 MG/1; MG/1
1 TABLET ORAL AT BEDTIME
Status: DISCONTINUED | OUTPATIENT
Start: 2019-05-21 | End: 2019-05-23 | Stop reason: HOSPADM

## 2019-05-21 RX ORDER — QUETIAPINE FUMARATE 25 MG/1
25 TABLET, FILM COATED ORAL EVERY 6 HOURS PRN
Status: DISCONTINUED | OUTPATIENT
Start: 2019-05-21 | End: 2019-05-23 | Stop reason: HOSPADM

## 2019-05-21 RX ORDER — NALOXONE HYDROCHLORIDE 0.4 MG/ML
.1-.4 INJECTION, SOLUTION INTRAMUSCULAR; INTRAVENOUS; SUBCUTANEOUS
Status: DISCONTINUED | OUTPATIENT
Start: 2019-05-21 | End: 2019-05-23 | Stop reason: HOSPADM

## 2019-05-21 RX ORDER — PRAZOSIN HYDROCHLORIDE 1 MG/1
4 CAPSULE ORAL AT BEDTIME
Status: DISCONTINUED | OUTPATIENT
Start: 2019-05-21 | End: 2019-05-23 | Stop reason: HOSPADM

## 2019-05-21 RX ORDER — SODIUM BICARBONATE 650 MG/1
650 TABLET ORAL 2 TIMES DAILY
Status: DISCONTINUED | OUTPATIENT
Start: 2019-05-21 | End: 2019-05-23 | Stop reason: HOSPADM

## 2019-05-21 RX ORDER — LANOLIN ALCOHOL/MO/W.PET/CERES
100 CREAM (GRAM) TOPICAL DAILY
Status: DISCONTINUED | OUTPATIENT
Start: 2019-05-22 | End: 2019-05-23 | Stop reason: HOSPADM

## 2019-05-21 RX ORDER — ATORVASTATIN CALCIUM 40 MG/1
40 TABLET, FILM COATED ORAL EVERY MORNING
Status: DISCONTINUED | OUTPATIENT
Start: 2019-05-22 | End: 2019-05-23 | Stop reason: HOSPADM

## 2019-05-21 RX ORDER — POTASSIUM CHLORIDE 7.45 MG/ML
10 INJECTION INTRAVENOUS
Status: DISCONTINUED | OUTPATIENT
Start: 2019-05-21 | End: 2019-05-23 | Stop reason: HOSPADM

## 2019-05-21 RX ORDER — CHLORHEXIDINE GLUCONATE ORAL RINSE 1.2 MG/ML
15 SOLUTION DENTAL 4 TIMES DAILY
Status: DISCONTINUED | OUTPATIENT
Start: 2019-05-21 | End: 2019-05-23 | Stop reason: HOSPADM

## 2019-05-21 RX ORDER — MYCOPHENOLATE MOFETIL 500 MG/1
500 TABLET, FILM COATED ORAL 2 TIMES DAILY
Status: DISCONTINUED | OUTPATIENT
Start: 2019-05-21 | End: 2019-05-22

## 2019-05-21 RX ORDER — SODIUM CHLORIDE 9 MG/ML
1000 INJECTION, SOLUTION INTRAVENOUS CONTINUOUS
Status: DISCONTINUED | OUTPATIENT
Start: 2019-05-21 | End: 2019-05-23

## 2019-05-21 RX ORDER — MULTIPLE VITAMINS W/ MINERALS TAB 9MG-400MCG
1 TAB ORAL DAILY
Status: DISCONTINUED | OUTPATIENT
Start: 2019-05-22 | End: 2019-05-23 | Stop reason: HOSPADM

## 2019-05-21 RX ORDER — CYCLOSPORINE 25 MG/1
100 CAPSULE, LIQUID FILLED ORAL AT BEDTIME
Status: DISCONTINUED | OUTPATIENT
Start: 2019-05-21 | End: 2019-05-23 | Stop reason: HOSPADM

## 2019-05-21 RX ORDER — CLONIDINE HYDROCHLORIDE 0.1 MG/1
0.1 TABLET ORAL 2 TIMES DAILY PRN
Status: DISCONTINUED | OUTPATIENT
Start: 2019-05-21 | End: 2019-05-23 | Stop reason: HOSPADM

## 2019-05-21 RX ORDER — ESCITALOPRAM OXALATE 10 MG/1
20 TABLET ORAL DAILY
Status: DISCONTINUED | OUTPATIENT
Start: 2019-05-21 | End: 2019-05-23 | Stop reason: HOSPADM

## 2019-05-21 RX ORDER — LORAZEPAM 2 MG/ML
1-2 INJECTION INTRAMUSCULAR EVERY 30 MIN PRN
Status: DISCONTINUED | OUTPATIENT
Start: 2019-05-21 | End: 2019-05-23 | Stop reason: HOSPADM

## 2019-05-21 RX ORDER — OLANZAPINE 5 MG/1
5 TABLET, ORALLY DISINTEGRATING ORAL EVERY 6 HOURS PRN
Status: DISCONTINUED | OUTPATIENT
Start: 2019-05-21 | End: 2019-05-23 | Stop reason: HOSPADM

## 2019-05-21 RX ORDER — THIAMINE HYDROCHLORIDE 100 MG/ML
100 INJECTION, SOLUTION INTRAMUSCULAR; INTRAVENOUS ONCE
Status: COMPLETED | OUTPATIENT
Start: 2019-05-21 | End: 2019-05-21

## 2019-05-21 RX ADMIN — SODIUM CHLORIDE 1000 ML: 9 INJECTION, SOLUTION INTRAVENOUS at 21:33

## 2019-05-21 RX ADMIN — THIAMINE HYDROCHLORIDE 100 MG: 100 INJECTION, SOLUTION INTRAMUSCULAR; INTRAVENOUS at 19:41

## 2019-05-21 RX ADMIN — SODIUM BICARBONATE 650 MG TABLET 650 MG: at 22:12

## 2019-05-21 RX ADMIN — PREDNISONE 5 MG: 5 TABLET ORAL at 21:45

## 2019-05-21 RX ADMIN — SULFAMETHOXAZOLE AND TRIMETHOPRIM 1 TABLET: 400; 80 TABLET ORAL at 22:15

## 2019-05-21 RX ADMIN — ESCITALOPRAM OXALATE 20 MG: 10 TABLET ORAL at 21:33

## 2019-05-21 RX ADMIN — PHENYTOIN 4 MG: 125 SUSPENSION ORAL at 21:38

## 2019-05-21 RX ADMIN — QUETIAPINE FUMARATE 100 MG: 25 TABLET ORAL at 21:37

## 2019-05-21 RX ADMIN — SODIUM CHLORIDE 1000 ML: 9 INJECTION, SOLUTION INTRAVENOUS at 19:41

## 2019-05-21 RX ADMIN — MYCOPHENOLATE MOFETIL 500 MG: 500 TABLET, FILM COATED ORAL at 21:48

## 2019-05-21 ASSESSMENT — ENCOUNTER SYMPTOMS
DIZZINESS: 0
CONFUSION: 0
VOMITING: 0
AGITATION: 0
ABDOMINAL PAIN: 0
NERVOUS/ANXIOUS: 0
AGITATION: 0
ABDOMINAL PAIN: 0
NAUSEA: 0
HALLUCINATIONS: 0
LIGHT-HEADEDNESS: 0
SPEECH DIFFICULTY: 0

## 2019-05-21 ASSESSMENT — MIFFLIN-ST. JEOR
SCORE: 1546.65
SCORE: 1546.65
SCORE: 1528.25

## 2019-05-21 NOTE — ED TRIAGE NOTES
Pt slurring his words noticeably more than the last time he was here. Pt was unable to tell the nurse the month. Pt was able to say he was in [place].    Viktoriya Neumann RN on 5/21/2019 at 5:44 PM

## 2019-05-21 NOTE — ED TRIAGE NOTES
Pt arrives to ED for the second time today. Pt was unresponsive at [facility] and staff was unable to arouse him. EMS was called. Staff believes he consumed more alcohol. EMS reports pt aroused to strong sternal rub. Pt is alert and slurring his words.    Viktoriya Neumann RN on 5/21/2019 at 5:37 PM

## 2019-05-21 NOTE — TELEPHONE ENCOUNTER
Cyclosporine = 325  Goal     PLAN:  Gilles Henning III might have accidentally taken his IS prior to lab draw.  Call patient to confirm. No dose change. Discuss importance of good trough.

## 2019-05-21 NOTE — ED TRIAGE NOTES
Pt arrives to ED via EMS from [facility]. Pt consumed 1 pint of vodka since 1100. Pt is on anti rejection medications. Pt denies falling. EMS picked him up off the floor where he was sitting. Pt is slurring words.    Viktoriya Neumann RN on 5/21/2019 at 12:09 PM

## 2019-05-21 NOTE — ED AVS SNAPSHOT
[facility]  [address]  [place] [address]  Phone:  [phone]  Fax:  [phone]                                    Gilles Henning III   MRN: [id number]    Department:  [facility]   Date of Visit:  5/21/2019           After Visit Summary Signature Page    I have received my discharge instructions, and my questions have been answered. I have discussed any challenges I see with this plan with the nurse or doctor.    ..........................................................................................................................................  Patient/Patient Representative Signature      ..........................................................................................................................................  Patient Representative Print Name and Relationship to Patient    ..................................................               ................................................  Date                                   Time    ..........................................................................................................................................  Reviewed by Signature/Title    ...................................................              ..............................................  Date                                               Time          22EPIC Rev 08/18
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
## 2019-05-22 LAB
ALBUMIN SERPL-MCNC: 3.5 G/DL (ref 3.5–5.7)
ALP SERPL-CCNC: 72 U/L (ref 34–104)
ALT SERPL W P-5'-P-CCNC: 29 U/L (ref 7–52)
ANION GAP SERPL CALCULATED.3IONS-SCNC: 12 MMOL/L (ref 3–14)
AST SERPL W P-5'-P-CCNC: 29 U/L (ref 13–39)
BILIRUB SERPL-MCNC: 0.4 MG/DL (ref 0.3–1)
BUN SERPL-MCNC: 24 MG/DL (ref 7–25)
CALCIUM SERPL-MCNC: 7.8 MG/DL (ref 8.6–10.3)
CHLORIDE SERPL-SCNC: 111 MMOL/L (ref 98–107)
CO2 SERPL-SCNC: 21 MMOL/L (ref 21–31)
CREAT SERPL-MCNC: 1.52 MG/DL (ref 0.7–1.3)
ERYTHROCYTE [DISTWIDTH] IN BLOOD BY AUTOMATED COUNT: 14.2 % (ref 10–15)
ETHANOL SERPL-MCNC: 0.06 %
GFR SERPL CREATININE-BSD FRML MDRD: 49 ML/MIN/{1.73_M2}
GLUCOSE SERPL-MCNC: 93 MG/DL (ref 70–105)
HCT VFR BLD AUTO: 34.5 % (ref 40–53)
HGB BLD-MCNC: 11.2 G/DL (ref 13.3–17.7)
MAGNESIUM SERPL-MCNC: 2.1 MG/DL (ref 1.9–2.7)
MCH RBC QN AUTO: 31.9 PG (ref 26.5–33)
MCHC RBC AUTO-ENTMCNC: 32.5 G/DL (ref 31.5–36.5)
MCV RBC AUTO: 98 FL (ref 78–100)
PHOSPHATE SERPL-MCNC: 4.6 MG/DL (ref 2.5–5)
PLATELET # BLD AUTO: 132 10E9/L (ref 150–450)
POTASSIUM SERPL-SCNC: 5 MMOL/L (ref 3.5–5.1)
PROT SERPL-MCNC: 6.4 G/DL (ref 6.4–8.9)
RBC # BLD AUTO: 3.51 10E12/L (ref 4.4–5.9)
SODIUM SERPL-SCNC: 144 MMOL/L (ref 134–144)
WBC # BLD AUTO: 8.2 10E9/L (ref 4–11)

## 2019-05-22 PROCEDURE — 36415 COLL VENOUS BLD VENIPUNCTURE: CPT | Performed by: FAMILY MEDICINE

## 2019-05-22 PROCEDURE — 84100 ASSAY OF PHOSPHORUS: CPT | Performed by: FAMILY MEDICINE

## 2019-05-22 PROCEDURE — 25000131 ZZH RX MED GY IP 250 OP 636 PS 637: Performed by: FAMILY MEDICINE

## 2019-05-22 PROCEDURE — 80320 DRUG SCREEN QUANTALCOHOLS: CPT | Performed by: FAMILY MEDICINE

## 2019-05-22 PROCEDURE — A9270 NON-COVERED ITEM OR SERVICE: HCPCS | Performed by: FAMILY MEDICINE

## 2019-05-22 PROCEDURE — 20000006 ZZH R&B ICU - GICH

## 2019-05-22 PROCEDURE — 80053 COMPREHEN METABOLIC PANEL: CPT | Performed by: FAMILY MEDICINE

## 2019-05-22 PROCEDURE — 85027 COMPLETE CBC AUTOMATED: CPT | Performed by: FAMILY MEDICINE

## 2019-05-22 PROCEDURE — 25000125 ZZHC RX 250: Performed by: FAMILY MEDICINE

## 2019-05-22 PROCEDURE — 25800030 ZZH RX IP 258 OP 636: Performed by: FAMILY MEDICINE

## 2019-05-22 PROCEDURE — 40000275 ZZH STATISTIC RCP TIME EA 10 MIN

## 2019-05-22 PROCEDURE — 83735 ASSAY OF MAGNESIUM: CPT | Performed by: FAMILY MEDICINE

## 2019-05-22 PROCEDURE — 25000132 ZZH RX MED GY IP 250 OP 250 PS 637: Performed by: FAMILY MEDICINE

## 2019-05-22 PROCEDURE — 93005 ELECTROCARDIOGRAM TRACING: CPT

## 2019-05-22 PROCEDURE — 25000128 H RX IP 250 OP 636: Performed by: FAMILY MEDICINE

## 2019-05-22 PROCEDURE — 99239 HOSP IP/OBS DSCHRG MGMT >30: CPT | Performed by: FAMILY MEDICINE

## 2019-05-22 RX ORDER — CYCLOSPORINE 25 MG/1
125 CAPSULE, LIQUID FILLED ORAL DAILY
Status: DISCONTINUED | OUTPATIENT
Start: 2019-05-22 | End: 2019-05-23 | Stop reason: HOSPADM

## 2019-05-22 RX ORDER — PREDNISONE 5 MG/1
5 TABLET ORAL DAILY
COMMUNITY
End: 2019-10-16

## 2019-05-22 RX ORDER — MYCOPHENOLATE MOFETIL 500 MG/1
500 TABLET ORAL 2 TIMES DAILY
COMMUNITY
End: 2019-05-28

## 2019-05-22 RX ORDER — CYCLOSPORINE 100 MG/1
100 CAPSULE, LIQUID FILLED ORAL AT BEDTIME
COMMUNITY
End: 2019-06-03

## 2019-05-22 RX ORDER — MYCOPHENOLATE MOFETIL 500 MG/1
500 TABLET ORAL 2 TIMES DAILY
Status: DISCONTINUED | OUTPATIENT
Start: 2019-05-22 | End: 2019-05-23 | Stop reason: HOSPADM

## 2019-05-22 RX ORDER — TRAZODONE HYDROCHLORIDE 100 MG/1
200 TABLET ORAL AT BEDTIME
COMMUNITY
End: 2019-06-13

## 2019-05-22 RX ORDER — MYCOPHENOLATE MOFETIL 500 MG/1
500 TABLET ORAL ONCE
Status: COMPLETED | OUTPATIENT
Start: 2019-05-22 | End: 2019-05-22

## 2019-05-22 RX ADMIN — PREDNISONE 5 MG: 5 TABLET ORAL at 10:05

## 2019-05-22 RX ADMIN — QUETIAPINE FUMARATE 100 MG: 25 TABLET ORAL at 21:40

## 2019-05-22 RX ADMIN — SODIUM BICARBONATE 650 MG TABLET 650 MG: at 21:42

## 2019-05-22 RX ADMIN — ESCITALOPRAM OXALATE 20 MG: 10 TABLET ORAL at 10:24

## 2019-05-22 RX ADMIN — CHLORHEXIDINE GLUCONATE 15 ML: 1.2 RINSE ORAL at 17:08

## 2019-05-22 RX ADMIN — MYCOPHENOLATE MOFETIL 500 MG: 500 TABLET ORAL at 11:06

## 2019-05-22 RX ADMIN — LORAZEPAM 1 MG: 2 INJECTION INTRAMUSCULAR; INTRAVENOUS at 04:37

## 2019-05-22 RX ADMIN — CYCLOSPORINE 125 MG: 25 CAPSULE, LIQUID FILLED ORAL at 10:24

## 2019-05-22 RX ADMIN — SULFAMETHOXAZOLE AND TRIMETHOPRIM 1 TABLET: 400; 80 TABLET ORAL at 21:43

## 2019-05-22 RX ADMIN — SODIUM CHLORIDE 1000 ML: 9 INJECTION, SOLUTION INTRAVENOUS at 20:33

## 2019-05-22 RX ADMIN — PHENYTOIN 4 MG: 125 SUSPENSION ORAL at 21:41

## 2019-05-22 RX ADMIN — CYCLOSPORINE 100 MG: 25 CAPSULE, LIQUID FILLED ORAL at 21:43

## 2019-05-22 RX ADMIN — CHLORHEXIDINE GLUCONATE 15 ML: 1.2 RINSE ORAL at 13:11

## 2019-05-22 RX ADMIN — CHLORHEXIDINE GLUCONATE 15 ML: 1.2 RINSE ORAL at 09:08

## 2019-05-22 RX ADMIN — SODIUM BICARBONATE 650 MG TABLET 650 MG: at 10:05

## 2019-05-22 RX ADMIN — SODIUM CHLORIDE 1000 ML: 9 INJECTION, SOLUTION INTRAVENOUS at 05:05

## 2019-05-22 RX ADMIN — MYCOPHENOLATE MOFETIL 500 MG: 500 TABLET ORAL at 21:42

## 2019-05-22 RX ADMIN — CHLORHEXIDINE GLUCONATE 15 ML: 1.2 RINSE ORAL at 21:46

## 2019-05-22 RX ADMIN — FOLIC ACID: 5 INJECTION, SOLUTION INTRAMUSCULAR; INTRAVENOUS; SUBCUTANEOUS at 09:58

## 2019-05-22 RX ADMIN — ATORVASTATIN CALCIUM 40 MG: 40 TABLET, FILM COATED ORAL at 08:34

## 2019-05-22 ASSESSMENT — ACTIVITIES OF DAILY LIVING (ADL)
ADLS_ACUITY_SCORE: 19
ADLS_ACUITY_SCORE: 19
ADLS_ACUITY_SCORE: 18
ADLS_ACUITY_SCORE: 16
ADLS_ACUITY_SCORE: 18
ADLS_ACUITY_SCORE: 19

## 2019-05-22 ASSESSMENT — MIFFLIN-ST. JEOR: SCORE: 1530.25

## 2019-05-22 NOTE — PROGRESS NOTES
Spare trach at bedside, brought in by Jimbo. It is an unopened, Shiley 4DCT cuffed with obturator present in box as well. Spare trach on the counter by the sink in pt's room. Patient's trach is secure, in place, cuff deflated, PMV in place as well. No problems at this time. RT will continue to assess and treat as needed.    Fiona Logan  RRT

## 2019-05-22 NOTE — PROGRESS NOTES
Spoke with Julia at [facility] for CRT eval. They will call back after noon to get an update on patient

## 2019-05-22 NOTE — PROGRESS NOTES
Patient placed on 72 hour hold. This writer read him his rights under 72 hr hold. Patient expresses willingness to go to treatment. Copy of rights given to patient

## 2019-05-22 NOTE — PHARMACY - DISCHARGE MEDICATION RECONCILIATION AND EDUCATION
Pharmacy:  Discharge Counseling and Medication Reconciliation    Gilles Henning III  [address]    [facility] [id number] [phone] (home)   49 year old male  PCP: Charlie Dubose    Allergies: Gabapentin    Discharge Counseling:    No counseling provided as patient being discharged to SNF or Assisted Living    Discharge Medication Reconciliation:    Jamel Winter [facility] has reviewed the patient's discharge medication orders and has compared them to the inpatient medication administration record and to what the patient was taking prior to admission - any discrepancies have been resolved.    It has been determined that the patient has an adequate supply of medications available or which can be obtained from the patient's preferred pharmacy, which HE/SHE has confirmed as: [facility] [An updated medication list will be faxed to the patient's pharmacy.]  Also faxed discharge medication list directly to Jimbo Young ([phone]). Also left a voicemail for Maliha, transplant coordinator to let her know cyclosporine dose change did not occur earlier this month (see med rec note for details).    Thank you for the consult.    Jamel Winter [facility]........May 22, 2019 4:37 PM

## 2019-05-22 NOTE — PROGRESS NOTES
:    Left a message for Federico Au at [facility].  Inquired on status of 72 hr hold/civil commitment paperwork that was sent May 9, 2019.  Requested a call back.    Telephone call to [facility].  Left a message for Nancy requested a call back to discuss discharge planning.    CRT will evaluate patient approximately 1300.  Awaiting evaluation for discharge planning.    GERRY Walden on 5/22/2019 at 12:07 PM

## 2019-05-22 NOTE — PROGRESS NOTES
:    Received a voicemail message from Nancy at [facility].  Voicemail indicates they are concerned about patient returning as he is independent at their facility.  They are concerned for his safety given his recent alcohol usage resulting in hospitalizations.   She would like to further discharge planning once patient is evaluated by CRT.    Called ICU to see if patient has been seen by CRT.  CRT has not arrived to evaluate patient.  Requested they have CRT contact  to discuss discharge planning for patient.     GERRY Walden on 5/22/2019 at 1:47 PM

## 2019-05-22 NOTE — PHARMACY-ADMISSION MEDICATION HISTORY
Pharmacy -- Admission Medication Reconciliation    Prior to admission (PTA) medications were reviewed and the patient's PTA medication list was updated.    Sources Consulted: , Jeff Javed), [facility] list + staff, Recent admission notes    The reliability of this Medication Reconciliation is: Reliability: Reliable    The following significant changes were made:  -Mycophenolate confirmed as generic- 500 mg BID; this was just increased from 250 mg BID on 5/8/19  -Cyclosporine updated to 100 mg BID. This was increased during last admission on 5/9/19 per the transplant team recommendations. However, when patient went back to [facility] from [facility], they did not have updated orders and patient was resumed on 100 mg BID  -Prednisone updated to 5 mg daily (indefinite)  -Trazodone updated to 200 mg QHS    FYI:  -Patient gets clonidine almost daily at [facility]  -Per [facility] staff, patient independently used several enemas this past week  -Patient was seen in the emergency room on 5/11/19 and it was recommended he take Miralax daily. This did not get translated to [facility]    In addition, the patient's allergies were reviewed with the patient and updated as follows:   Allergies: Gabapentin    The pharmacist has reviewed with the patient that all personal medications should be removed from the building or locked in the belongings safe.  Patient shall only take medications ordered by the physician and administered by the nursing staff.      Medication barriers identified: Multiple transitions of care recently. [facility]-->[facility]-->[facility]-->[facility]-->[facility] in the past 8 weeks    Medication adherence concerns: [facility] administers    Understanding of emergency medications: n/a    Other: Pharmacist to call transplant coordinator (Maliha), when patient will discharges at ([phone]). Writer did leave message today at ~0830 notifying of patient's admission to hospital.    Jamel GOLDSTEIN  [place], [facility], 5/22/2019,  12:20 PM

## 2019-05-22 NOTE — PROGRESS NOTES
"[facility] ADMISSION NOTE    Patient admitted to room 915 at approximately 2230 via wheel chair from emergency room. Patient was accompanied by nurse.     Verbal SBAR report received from DONALD Doherty prior to patient arrival. IV fluid bolus infusing. Patient does have cuffed tracheostomy, respiratory staff notified.     Patient ambulated to bed with one assist. Patient alert and oriented X 3. The patient is not having any pain. 0-10 Pain Scale: 0. Admission vital signs: Blood pressure 114/74, pulse 80, temperature 95.6  F (35.3  C), temperature source Tympanic, resp. rate 12, height 1.778 m (5' 10\"), weight 66.2 kg (145 lb 15.1 oz), SpO2 99 %. Patient was oriented to plan of care, call light, bed controls, tv, telephone, bathroom and visiting hours.     Risk Assessment    The following safety risks were identified during admission: fall and seizure due to ETOH. Seizure pads placed on bed. Yellow risk band applied: YES.     Skin Initial Assessment    This writer admitted this patient and completed a full skin assessment and Tj score in the Adult PCS flowsheet. Bruising to knees bilaterally.     Tj Risk Assessment  Sensory Perception: 3-->slightly limited  Moisture: 4-->rarely moist  Activity: 4-->walks frequently  Mobility: 3-->slightly limited  Nutrition: 3-->adequate  Friction and Shear: 3-->no apparent problem  Tj Score: 20    Natasha Jackson    "

## 2019-05-22 NOTE — PROGRESS NOTES
Patient is doing well. Scoring 0 on his ETOH scale. VSS. Temp 99.2 skin  Able to sit up in bed and eat independently. No tremors. Denies headache. Denies nausea. Tolerating regular diet. Takes meds without difficulty. Voiding clear yellow urine.
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
## 2019-05-22 NOTE — PROGRESS NOTES
Unable to find accepting rehab facility per Lety from [facility].  Will try [facility] in AM when possible bed to open up.  Dedra OCLORADO Supervisor notified.  Pt aware.  No c/o at this time.
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
## 2019-05-22 NOTE — PROGRESS NOTES
Patient sleepy but wakens easily to voice. Calm and cooperative when awake. Has not required any further Ativan. VSS. Neuros intact
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
## 2019-05-22 NOTE — PLAN OF CARE
Patients noted to have restlessness and impulsive activity during the shift. CIWA scores ranged from 5-10 with total Ativan 1 mg given along with standard bedtime medications. Seizure pads in place r/t ETOH withdrawal & seizure prevention. Continues to deny pain, VSS  Natasha Jackson RN on 5/22/2019 at 6:26 AM

## 2019-05-23 ENCOUNTER — TELEPHONE (OUTPATIENT)
Dept: TRANSPLANT | Facility: CLINIC | Age: 49
End: 2019-05-23

## 2019-05-23 VITALS
SYSTOLIC BLOOD PRESSURE: 130 MMHG | OXYGEN SATURATION: 99 % | TEMPERATURE: 98.9 F | RESPIRATION RATE: 12 BRPM | HEIGHT: 70 IN | HEART RATE: 62 BPM | DIASTOLIC BLOOD PRESSURE: 83 MMHG | WEIGHT: 151.01 LBS | BODY MASS INDEX: 21.62 KG/M2

## 2019-05-23 PROCEDURE — 25000131 ZZH RX MED GY IP 250 OP 636 PS 637: Performed by: FAMILY MEDICINE

## 2019-05-23 PROCEDURE — 25800030 ZZH RX IP 258 OP 636: Performed by: FAMILY MEDICINE

## 2019-05-23 PROCEDURE — 25000132 ZZH RX MED GY IP 250 OP 250 PS 637: Performed by: FAMILY MEDICINE

## 2019-05-23 PROCEDURE — A9270 NON-COVERED ITEM OR SERVICE: HCPCS | Performed by: FAMILY MEDICINE

## 2019-05-23 RX ADMIN — SODIUM CHLORIDE 1000 ML: 9 INJECTION, SOLUTION INTRAVENOUS at 04:29

## 2019-05-23 RX ADMIN — PREDNISONE 5 MG: 5 TABLET ORAL at 09:54

## 2019-05-23 RX ADMIN — ESCITALOPRAM OXALATE 20 MG: 10 TABLET ORAL at 09:54

## 2019-05-23 RX ADMIN — THIAMINE HCL TAB 100 MG 100 MG: 100 TAB at 09:54

## 2019-05-23 RX ADMIN — ATORVASTATIN CALCIUM 40 MG: 40 TABLET, FILM COATED ORAL at 07:31

## 2019-05-23 RX ADMIN — FOLIC ACID 1 MG: 1 TABLET ORAL at 09:54

## 2019-05-23 RX ADMIN — SODIUM BICARBONATE 650 MG TABLET 650 MG: at 09:54

## 2019-05-23 RX ADMIN — MYCOPHENOLATE MOFETIL 500 MG: 500 TABLET ORAL at 09:55

## 2019-05-23 RX ADMIN — MULTIPLE VITAMINS W/ MINERALS TAB 1 TABLET: TAB at 09:54

## 2019-05-23 RX ADMIN — CYCLOSPORINE 125 MG: 25 CAPSULE, LIQUID FILLED ORAL at 09:54

## 2019-05-23 RX ADMIN — CHLORHEXIDINE GLUCONATE 15 ML: 1.2 RINSE ORAL at 10:37

## 2019-05-23 ASSESSMENT — ACTIVITIES OF DAILY LIVING (ADL)
ADLS_ACUITY_SCORE: 16

## 2019-05-23 ASSESSMENT — MIFFLIN-ST. JEOR: SCORE: 1551.25

## 2019-05-23 NOTE — TELEPHONE ENCOUNTER
RNCC ensured correct dose of CSA with [facility], as pharmacist at [facility] notified RNCC that after last discharge from the hospital, his AL facility had been under-dosing at 100mg BID.    CELE is working with staff at [facility] to set up follow up biopsy and ensure ongoing lab work.

## 2019-05-23 NOTE — PROGRESS NOTES
Report given to Blossom from [facility].  Awaiting call from [facility] for transport plans.

## 2019-05-23 NOTE — PROGRESS NOTES
Patient alert and orientated. Able to communicate all needs.Minimal complaint of pain. Did resolve with icing area.

## 2019-05-23 NOTE — PROGRESS NOTES
:    Spoke with Lety from [facility].  She is awaiting bed availability at [facility].  Anticipating bed availability today.      Spoke with Nancy at [facility] and updated on discharge planning.    GERRY Walden on 5/23/2019 at 12:15 PM

## 2019-05-23 NOTE — PROGRESS NOTES
:    Telephone call to [facility].  Left a message for Nancy SHEPHERD with a discharge update.      GERRY Walden on 5/23/2019 at 4:37 PM

## 2019-05-23 NOTE — PLAN OF CARE
Uneventful night with patient, up to void multiple times without difficulty. CIWA scoring ranging from 3-6 with tremor present. Patient reported tenderness on left lateral ribcage causing discomfort. Declines any intervention for pain medication. Patient reports agreement in discharge to inpatient treatment facility for ETOH abuse. Additional personal belongings brought by staff in preparation for discharge.  Natasha Jackson RN on 5/23/2019 at 6:31 AM

## 2019-05-23 NOTE — PROGRESS NOTES
Pt discharged to custody of [facility] for transport to [facility] under 72 hr hold. All Pts belongings accompanied including clothing, cell phone, chargers, bluetooth speaker, and food.  Discharge instructions given with instructions given to handover to detox staff once arrives.  Verbalizes agreement with plan.

## 2019-05-24 ENCOUNTER — TELEPHONE (OUTPATIENT)
Dept: FAMILY MEDICINE | Facility: OTHER | Age: 49
End: 2019-05-24

## 2019-05-24 DIAGNOSIS — D84.9 IMMUNOSUPPRESSED STATUS (H): ICD-10-CM

## 2019-05-24 NOTE — TELEPHONE ENCOUNTER
"Per 05/23/19 Hospital Discharge Summary- Follow up with PCP, Dr. Dubose or Renal Physician. Record review indicates no follow up appointments scheduled. Contacted [facility] and informed by Nancy that patient is currently in Detox. on a 72 hour hold.\". Lety Swan RN on 5/24/2019 at 8:40 AM    "

## 2019-05-28 ENCOUNTER — TELEPHONE (OUTPATIENT)
Dept: NEPHROLOGY | Facility: CLINIC | Age: 49
End: 2019-05-28

## 2019-05-28 ENCOUNTER — TELEPHONE (OUTPATIENT)
Dept: TRANSPLANT | Facility: CLINIC | Age: 49
End: 2019-05-28

## 2019-05-28 DIAGNOSIS — D84.9 IMMUNOSUPPRESSED STATUS (H): Primary | ICD-10-CM

## 2019-05-28 RX ORDER — MYCOPHENOLATE MOFETIL 500 MG/1
500 TABLET ORAL 2 TIMES DAILY
Qty: 60 TABLET | Refills: 11 | Status: SHIPPED | OUTPATIENT
Start: 2019-05-28 | End: 2019-08-20

## 2019-05-28 RX ORDER — CYCLOSPORINE 25 MG/1
125 CAPSULE, LIQUID FILLED ORAL DAILY
Qty: 150 CAPSULE | OUTPATIENT
Start: 2019-05-28

## 2019-05-28 NOTE — TELEPHONE ENCOUNTER
Routing refill request to provider for review/approval because:  Drug not on the [facility] refill protocol   Noted as originally prescribed by Dr. Dorothea Coyle  LOV: 3/7/19  Demi Mathis RN on 5/28/2019 at 9:09 AM

## 2019-05-28 NOTE — TELEPHONE ENCOUNTER
This should come from his transplant doctor.    Signed, Charlie Dubose MD  Internal Medicine & Pediatrics

## 2019-05-28 NOTE — TELEPHONE ENCOUNTER
Pt has missed 2 doses ( 5/27 pm, 5/28 am dose) pt is requesting to connect with the care team regarding his missed dose and getting his Rx as soon as possible

## 2019-05-28 NOTE — TELEPHONE ENCOUNTER
Patient Call: Transplant Illness  If the patient reports CHEST PAIN, SEVERE SHORTNESS OF BREATH, ONE SIDED WEAKNESS, or DIFFICULTY SPEAKING: CONTACT RN FACE-TO-FACE IMMEDIATELY    Duration of illness: 2-3 weeks  Transplanted organ? kidney  Illness: High Blood Pressure

## 2019-05-28 NOTE — TELEPHONE ENCOUNTER
Spoke to patient who has concerns about his BP, reports recent BP = 157/101 & 148/99.  Patient also states that he has been out of his Mycophenolate since last night.  Spoke to [facility] who will deliver Mycophenolate to patient at [facility] @ 5:30 PM today.  Updated patients chart with current location @ detox.  Patient had court today and states that he has a commitment hearing on 6/4/2019.  Patient states that [facility] has a spot open for inpatient that he currently wants to do but is unsure if he will be able to take that spot.  Patient asking if there is anything we can do here to help him get that inpatient open spot.

## 2019-05-29 ENCOUNTER — HOSPITAL ENCOUNTER (EMERGENCY)
Facility: OTHER | Age: 49
Discharge: OTHER INSTITUTION | End: 2019-05-29
Attending: FAMILY MEDICINE | Admitting: FAMILY MEDICINE
Payer: MEDICARE

## 2019-05-29 VITALS
TEMPERATURE: 98.2 F | DIASTOLIC BLOOD PRESSURE: 96 MMHG | SYSTOLIC BLOOD PRESSURE: 135 MMHG | RESPIRATION RATE: 99 BRPM | BODY MASS INDEX: 21.47 KG/M2 | HEIGHT: 70 IN | WEIGHT: 150 LBS

## 2019-05-29 DIAGNOSIS — K08.89 TOOTH PAIN: ICD-10-CM

## 2019-05-29 LAB
ALBUMIN UR-MCNC: NEGATIVE MG/DL
APPEARANCE UR: CLEAR
BILIRUB UR QL STRIP: NEGATIVE
COLOR UR AUTO: YELLOW
GLUCOSE UR STRIP-MCNC: NEGATIVE MG/DL
HGB UR QL STRIP: NEGATIVE
KETONES UR STRIP-MCNC: NEGATIVE MG/DL
LEUKOCYTE ESTERASE UR QL STRIP: NEGATIVE
NITRATE UR QL: NEGATIVE
PH UR STRIP: 6 PH (ref 5–9)
SOURCE: NORMAL
SP GR UR STRIP: <1.005 (ref 1–1.03)
UROBILINOGEN UR STRIP-ACNC: 0.2 EU/DL (ref 0.2–1)

## 2019-05-29 PROCEDURE — 99283 EMERGENCY DEPT VISIT LOW MDM: CPT | Mod: Z6 | Performed by: FAMILY MEDICINE

## 2019-05-29 PROCEDURE — 99283 EMERGENCY DEPT VISIT LOW MDM: CPT | Mod: 25 | Performed by: FAMILY MEDICINE

## 2019-05-29 PROCEDURE — 25000125 ZZHC RX 250: Performed by: FAMILY MEDICINE

## 2019-05-29 PROCEDURE — 81003 URINALYSIS AUTO W/O SCOPE: CPT | Performed by: FAMILY MEDICINE

## 2019-05-29 PROCEDURE — 99283 EMERGENCY DEPT VISIT LOW MDM: CPT | Performed by: FAMILY MEDICINE

## 2019-05-29 PROCEDURE — 64400 NJX AA&/STRD TRIGEMINAL NRV: CPT | Mod: Z6 | Performed by: FAMILY MEDICINE

## 2019-05-29 PROCEDURE — 64400 NJX AA&/STRD TRIGEMINAL NRV: CPT | Performed by: FAMILY MEDICINE

## 2019-05-29 RX ORDER — BUPIVACAINE HYDROCHLORIDE AND EPINEPHRINE 5; 5 MG/ML; UG/ML
1.8 INJECTION, SOLUTION EPIDURAL; INTRACAUDAL; PERINEURAL ONCE
Status: COMPLETED | OUTPATIENT
Start: 2019-05-29 | End: 2019-05-29

## 2019-05-29 RX ADMIN — BUPIVACAINE HYDROCHLORIDE AND EPINEPHRINE BITARTRATE 1.8 ML: 5; .005 INJECTION, SOLUTION EPIDURAL; INTRACAUDAL; PERINEURAL at 19:34

## 2019-05-29 ASSESSMENT — ENCOUNTER SYMPTOMS
DIARRHEA: 0
DIFFICULTY URINATING: 0
NAUSEA: 0
ACTIVITY CHANGE: 0
VOMITING: 0
EYE DISCHARGE: 0
DYSURIA: 0
CONFUSION: 0
FREQUENCY: 1
ABDOMINAL DISTENTION: 0
SHORTNESS OF BREATH: 0
AGITATION: 0
SINUS PAIN: 0

## 2019-05-29 ASSESSMENT — MIFFLIN-ST. JEOR: SCORE: 1546.65

## 2019-05-29 NOTE — ED AVS SNAPSHOT
[facility]  [address]  [place] [address]  Phone:  [phone]  Fax:  [phone]                                    Gilles Henning III   MRN: [id number]    Department:  [facility]   Date of Visit:  5/29/2019           After Visit Summary Signature Page    I have received my discharge instructions, and my questions have been answered. I have discussed any challenges I see with this plan with the nurse or doctor.    ..........................................................................................................................................  Patient/Patient Representative Signature      ..........................................................................................................................................  Patient Representative Print Name and Relationship to Patient    ..................................................               ................................................  Date                                   Time    ..........................................................................................................................................  Reviewed by Signature/Title    ...................................................              ..............................................  Date                                               Time          22EPIC Rev 08/18

## 2019-05-29 NOTE — ED TRIAGE NOTES
"Pt reports some of his tooth fell out 2 days ago. He kept it in a bag which he brought with him. It has been painful since, rating mouth pain 7/10. Pain radiates into his face and ear on the left side. \"I think I'm dehydrated because I've been light headed.\"  "

## 2019-05-30 ENCOUNTER — TELEPHONE (OUTPATIENT)
Dept: TRANSPLANT | Facility: CLINIC | Age: 49
End: 2019-05-30

## 2019-05-30 NOTE — TELEPHONE ENCOUNTER
RNCC spoke with RN at [facility] rehab -  Patient is currently on a hold and must have staff with him when he leaves the facility.  He reappears in court on 6/4 to determine where his placement will be.  Nursing staff at [facility] think that a medical based rehab would be best with ongoing concerns for physical as well as mental health.    Hector missed 5/28 lab appt due to court appointment, but will have labs drawn on 5/31.

## 2019-05-30 NOTE — TELEPHONE ENCOUNTER
RNCC spoke with Hector who reports that his BP has been running high -    Right now BP is 111 / 75. HR running 83 (normally in the 60s - 70).  Had been 140s to 150s / 90 - 100.    Reports he is lightheaded / dizzy upon standing.    More fatigued than normal.  EBV is   L lymph node is hard / swollen under his chin.  Denies any night sweats.
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
## 2019-05-30 NOTE — TELEPHONE ENCOUNTER
Patient Call: General  Route to LPN    Reason for call: Pt stated he feels like he needs IV fluids- lightheaded- weak  Tired feels like drinking and voiding OK     Call back needed? Yes    Return Call Needed  Same as documented in contacts section  When to return call?: Same day: Route High Priority

## 2019-05-31 ENCOUNTER — TELEPHONE (OUTPATIENT)
Dept: TRANSPLANT | Facility: CLINIC | Age: 49
End: 2019-05-31

## 2019-05-31 ENCOUNTER — RESULTS ONLY (OUTPATIENT)
Dept: OTHER | Facility: CLINIC | Age: 49
End: 2019-05-31

## 2019-05-31 DIAGNOSIS — D72.819 LEUKOPENIA: Primary | ICD-10-CM

## 2019-05-31 DIAGNOSIS — Z94.0 KIDNEY TRANSPLANTED: ICD-10-CM

## 2019-05-31 DIAGNOSIS — Z94.0 KIDNEY REPLACED BY TRANSPLANT: ICD-10-CM

## 2019-05-31 DIAGNOSIS — Z79.60 LONG-TERM USE OF IMMUNOSUPPRESSANT MEDICATION: ICD-10-CM

## 2019-05-31 DIAGNOSIS — Z48.298 AFTERCARE FOLLOWING ORGAN TRANSPLANT: Primary | ICD-10-CM

## 2019-05-31 DIAGNOSIS — B34.8 BK VIREMIA: ICD-10-CM

## 2019-05-31 DIAGNOSIS — D72.819 LEUKOPENIA: ICD-10-CM

## 2019-05-31 LAB
ABO + RH BLD: NORMAL
ABO + RH BLD: NORMAL
ALBUMIN UR-MCNC: NEGATIVE MG/DL
ANION GAP SERPL CALCULATED.3IONS-SCNC: 7 MMOL/L (ref 3–14)
APPEARANCE UR: CLEAR
BASOPHILS # BLD AUTO: 0 10E9/L (ref 0–0.2)
BASOPHILS NFR BLD AUTO: 1 %
BILIRUB UR QL STRIP: NEGATIVE
BLD GP AB SCN SERPL QL: NORMAL
BLOOD BANK CMNT PATIENT-IMP: NORMAL
BUN SERPL-MCNC: 21 MG/DL (ref 7–25)
CALCIUM SERPL-MCNC: 9.7 MG/DL (ref 8.6–10.3)
CHLORIDE SERPL-SCNC: 108 MMOL/L (ref 98–107)
CO2 SERPL-SCNC: 23 MMOL/L (ref 21–31)
COLOR UR AUTO: YELLOW
CREAT SERPL-MCNC: 1.34 MG/DL (ref 0.7–1.3)
CREAT UR-MCNC: 66 MG/DL
CYCLOSPORINE BLD LC/MS/MS-MCNC: 113 UG/L (ref 50–400)
DIFFERENTIAL METHOD BLD: ABNORMAL
EOSINOPHIL # BLD AUTO: 0.1 10E9/L (ref 0–0.7)
EOSINOPHIL NFR BLD AUTO: 3 %
ERYTHROCYTE [DISTWIDTH] IN BLOOD BY AUTOMATED COUNT: 14.3 % (ref 10–15)
GFR SERPL CREATININE-BSD FRML MDRD: 56 ML/MIN/{1.73_M2}
GLUCOSE SERPL-MCNC: 95 MG/DL (ref 70–105)
GLUCOSE UR STRIP-MCNC: NEGATIVE MG/DL
HCT VFR BLD AUTO: 35.6 % (ref 40–53)
HGB BLD-MCNC: 11.5 G/DL (ref 13.3–17.7)
HGB UR QL STRIP: NEGATIVE
INR PPP: 0.89 (ref 0–1.3)
KETONES UR STRIP-MCNC: NEGATIVE MG/DL
LEUKOCYTE ESTERASE UR QL STRIP: NEGATIVE
LYMPHOCYTES # BLD AUTO: 0.6 10E9/L (ref 0.8–5.3)
LYMPHOCYTES NFR BLD AUTO: 22 %
MCH RBC QN AUTO: 32 PG (ref 26.5–33)
MCHC RBC AUTO-ENTMCNC: 32.3 G/DL (ref 31.5–36.5)
MCV RBC AUTO: 99 FL (ref 78–100)
MICROALBUMIN UR-MCNC: 9 MG/L
MICROALBUMIN/CREAT UR: 14.29 MG/G CR (ref 0–17)
MONOCYTES # BLD AUTO: 0.6 10E9/L (ref 0–1.3)
MONOCYTES NFR BLD AUTO: 19 %
NEUTROPHILS # BLD AUTO: 1.5 10E9/L (ref 1.6–8.3)
NEUTROPHILS NFR BLD AUTO: 52 %
NITRATE UR QL: NEGATIVE
PH UR STRIP: 5 PH (ref 5–9)
PLATELET # BLD AUTO: 135 10E9/L (ref 150–450)
POTASSIUM SERPL-SCNC: 3.8 MMOL/L (ref 3.5–5.1)
PROMYELOCYTES # BLD MANUAL: 0.1 10E9/L
PROMYELOCYTES NFR BLD MANUAL: 3 %
RBC # BLD AUTO: 3.59 10E12/L (ref 4.4–5.9)
SODIUM SERPL-SCNC: 138 MMOL/L (ref 134–144)
SOURCE: NORMAL
SP GR UR STRIP: 1.01 (ref 1–1.03)
SPECIMEN EXP DATE BLD: NORMAL
TME LAST DOSE: NORMAL H
UROBILINOGEN UR STRIP-ACNC: 0.2 EU/DL (ref 0.2–1)
WBC # BLD AUTO: 2.9 10E9/L (ref 4–11)

## 2019-05-31 PROCEDURE — 86900 BLOOD TYPING SEROLOGIC ABO: CPT | Performed by: INTERNAL MEDICINE

## 2019-05-31 PROCEDURE — 82043 UR ALBUMIN QUANTITATIVE: CPT | Performed by: INTERNAL MEDICINE

## 2019-05-31 PROCEDURE — 86850 RBC ANTIBODY SCREEN: CPT | Performed by: INTERNAL MEDICINE

## 2019-05-31 PROCEDURE — 36415 COLL VENOUS BLD VENIPUNCTURE: CPT | Performed by: INTERNAL MEDICINE

## 2019-05-31 PROCEDURE — 81003 URINALYSIS AUTO W/O SCOPE: CPT | Performed by: INTERNAL MEDICINE

## 2019-05-31 PROCEDURE — 80158 DRUG ASSAY CYCLOSPORINE: CPT | Performed by: INTERNAL MEDICINE

## 2019-05-31 PROCEDURE — 86833 HLA CLASS II HIGH DEFIN QUAL: CPT | Performed by: INTERNAL MEDICINE

## 2019-05-31 PROCEDURE — 80048 BASIC METABOLIC PNL TOTAL CA: CPT | Performed by: INTERNAL MEDICINE

## 2019-05-31 PROCEDURE — 85610 PROTHROMBIN TIME: CPT | Performed by: INTERNAL MEDICINE

## 2019-05-31 PROCEDURE — 86901 BLOOD TYPING SEROLOGIC RH(D): CPT | Performed by: INTERNAL MEDICINE

## 2019-05-31 PROCEDURE — 86832 HLA CLASS I HIGH DEFIN QUAL: CPT | Performed by: INTERNAL MEDICINE

## 2019-05-31 PROCEDURE — 87799 DETECT AGENT NOS DNA QUANT: CPT | Performed by: INTERNAL MEDICINE

## 2019-05-31 PROCEDURE — 85025 COMPLETE CBC W/AUTO DIFF WBC: CPT | Performed by: INTERNAL MEDICINE

## 2019-05-31 NOTE — TELEPHONE ENCOUNTER
"Post Kidney and Pancreas Transplant Team Conference  Date: 5/31/2019  Transplant Coordinator: Angeline Alonso     Attendees:  []  Dr. Macias [] Thao Barnard, RN  [] Blossom Montes LPN     []  Dr. Donato [] Martha Morris RN [] Noelle Wagner LPN   [x]  Dr. Telles [] Beth Evans RN    []  Dr. Cabrera [] Katelyn Gallegos RN [] Jacob López, PharmD   [] Dr. Lorenzo [x] Maliha Alonso, DONALD    [] Dr. Finley [] Mahad Steele RN    [] Dr. Maynard [] Mildred Barajas RN    [] Dr. Parsons [] Gia Romero RN    [] Dr. Royal [] Marianna Fortune RN    [] Surgery Fellow [] Arash Agarwal RN    [] Rachel Resendez, NP [] Taho Villarreal RN        Verbal Plan Read Back:   Okay for 1L NS - BPs reviewed (running slightly high up until today) and patient \"feels\" dehydrated    Routed to RN Coordinator   Angeline Alonso    Orders entered and [facility] was called.  "

## 2019-05-31 NOTE — TELEPHONE ENCOUNTER
Pt is scheduled to have an infusion today 5/31/19 at 10:30a. Pt BP is high (154/104 self). Pt thinks it might be dangerous to have an infusion with his high BP. Pt will be cancelling his appt. Please connect to advise

## 2019-05-31 NOTE — TELEPHONE ENCOUNTER
Okay for patient to deny IV hydration with  / 101.  Hector reports using his clonidine (prescribed for anxiety) as an antihypertensive - down to 134 / 98 post administration.    Continue to monitor and discuss with transplant MD.

## 2019-06-02 LAB
CMV DNA SPEC NAA+PROBE-ACNC: NORMAL [IU]/ML
CMV DNA SPEC NAA+PROBE-LOG#: NORMAL {LOG_IU}/ML
SPECIMEN SOURCE: NORMAL

## 2019-06-03 ENCOUNTER — TELEPHONE (OUTPATIENT)
Dept: TRANSPLANT | Facility: CLINIC | Age: 49
End: 2019-06-03

## 2019-06-03 DIAGNOSIS — Z79.60 LONG-TERM USE OF IMMUNOSUPPRESSANT MEDICATION: ICD-10-CM

## 2019-06-03 DIAGNOSIS — Z94.0 KIDNEY TRANSPLANTED: ICD-10-CM

## 2019-06-03 DIAGNOSIS — D84.9 IMMUNOSUPPRESSED STATUS (H): Primary | ICD-10-CM

## 2019-06-03 LAB
ANION GAP SERPL CALCULATED.3IONS-SCNC: 10 MMOL/L (ref 3–14)
BKV DNA # SPEC NAA+PROBE: ABNORMAL COPIES/ML
BKV DNA SPEC NAA+PROBE-LOG#: 5 LOG COPIES/ML
BUN SERPL-MCNC: 14 MG/DL (ref 7–25)
CALCIUM SERPL-MCNC: 9.6 MG/DL (ref 8.6–10.3)
CHLORIDE SERPL-SCNC: 108 MMOL/L (ref 98–107)
CO2 SERPL-SCNC: 23 MMOL/L (ref 21–31)
CREAT SERPL-MCNC: 1.42 MG/DL (ref 0.7–1.3)
ERYTHROCYTE [DISTWIDTH] IN BLOOD BY AUTOMATED COUNT: 14.2 % (ref 10–15)
GFR SERPL CREATININE-BSD FRML MDRD: 53 ML/MIN/{1.73_M2}
GLUCOSE SERPL-MCNC: 93 MG/DL (ref 70–105)
HCT VFR BLD AUTO: 36.6 % (ref 40–53)
HGB BLD-MCNC: 11.8 G/DL (ref 13.3–17.7)
MCH RBC QN AUTO: 31.8 PG (ref 26.5–33)
MCHC RBC AUTO-ENTMCNC: 32.2 G/DL (ref 31.5–36.5)
MCV RBC AUTO: 99 FL (ref 78–100)
PLATELET # BLD AUTO: 140 10E9/L (ref 150–450)
POTASSIUM SERPL-SCNC: 3.9 MMOL/L (ref 3.5–5.1)
RBC # BLD AUTO: 3.71 10E12/L (ref 4.4–5.9)
SODIUM SERPL-SCNC: 141 MMOL/L (ref 134–144)
SPECIMEN SOURCE: ABNORMAL
WBC # BLD AUTO: 2.6 10E9/L (ref 4–11)

## 2019-06-03 PROCEDURE — 80048 BASIC METABOLIC PNL TOTAL CA: CPT | Performed by: INTERNAL MEDICINE

## 2019-06-03 PROCEDURE — 87799 DETECT AGENT NOS DNA QUANT: CPT | Performed by: INTERNAL MEDICINE

## 2019-06-03 PROCEDURE — 36415 COLL VENOUS BLD VENIPUNCTURE: CPT | Performed by: INTERNAL MEDICINE

## 2019-06-03 PROCEDURE — 85027 COMPLETE CBC AUTOMATED: CPT | Performed by: INTERNAL MEDICINE

## 2019-06-03 NOTE — TELEPHONE ENCOUNTER
ISSUE:   Cyclosporine level 113 on 5/31, goal 125 - 150, dose 125mg AM, 100 mg PM    PLAN:   Please call pt and confirm this was a good 12-hour trough. Verify dose as above mg BID. Confirm no new medications or illness (kee. Diarrhea). If good trough, increase dose to 125 mg BID and recheck level in 1 week (please ensure orders are in place AND patient is aware of lab appointment).

## 2019-06-03 NOTE — TELEPHONE ENCOUNTER
1 year Kidney and Pancreas Transplant Patient Phone Call    Congratulations on your 1 year post-transplant anniversary!    Please re-review with the patient how to contact the Transplant Center, as their coordination team will now change:  Best phone contact is [phone] option [phone], as if the need is urgent, as you will have a new transplant coordination team to assist you after your first year of transplant.    When the patient should contact the Transplant Center:    If you run out of your immunosuppression medications.     Prolonged illness that is not resolved after recommendations of the PCP, such as frequent UTI.    Viral infections such as : Shingles (herpes-zoster), CMV, EBV, and Influenza    Cancer    Increased creatinine or pain over your graft site.   Please review the patient's baseline creatinine with them: 1.2 - 1.3    Hospitalizations at facilities other than [facility].    When the patient should visit their local ED or Urgent Care:    Severe dehydration (uncontrolled nausea, vomiting, diarrhea).    Unable to urinate.    Fevers >101    Other medical emergencies.      Medications:  Please complete medication reconciliation and ensure the patient has an up to date medication card at home.   *ALWAYS BRING YOUR MED CARD TO APPOINTMENTS.*  Please confirm if the patient is independent with medication set up and administration: No.  If no, who helps the patient with their medications?  Facility staff.  Please review if the patient has had any incidents of missed medications: Yes:    If yes, in the last 1 month: yes  If yes, in the last 3 months: no  Does the patient have any strategies in place to avoid missed immunosuppression doses: no  IF yes, please explain: NA - this was due to the outside facility not obtaining the medication    Labs:  Please review current lab frequency with the patient after 1 year post transplant:  Labs will now be drawn monthly up to 3 years post-transplant.   Contact the Transplant  Center if additional lab orders are needed.  Please update and mail lab letter and orders.    Your coordinator is currently monitoring your most recent transplant complications: Recent rejection.     General Transplant Recommendations:    Yearly nephrology visits with our MDs.    yearly follow up with your primary care provider (PCP)     Follow up with specialty providers as directed.    Frequent reviews of the transplant handbook and / or [facility].    Lab review on MyChart.   If the patient does not utilize MyChart, please record how the patient monitors their lab values: MyChart    Angeline Alonso RN, BSN

## 2019-06-04 ENCOUNTER — HOSPITAL ENCOUNTER (OUTPATIENT)
Dept: INFUSION THERAPY | Facility: OTHER | Age: 49
Discharge: HOME | End: 2019-06-04
Attending: INTERNAL MEDICINE | Admitting: FAMILY MEDICINE
Payer: MEDICARE

## 2019-06-04 VITALS — SYSTOLIC BLOOD PRESSURE: 163 MMHG | DIASTOLIC BLOOD PRESSURE: 95 MMHG

## 2019-06-04 DIAGNOSIS — R79.89 ELEVATED SERUM CREATININE: ICD-10-CM

## 2019-06-04 DIAGNOSIS — Z48.298 AFTERCARE FOLLOWING ORGAN TRANSPLANT: Primary | ICD-10-CM

## 2019-06-04 DIAGNOSIS — Z94.0 KIDNEY TRANSPLANTED: ICD-10-CM

## 2019-06-04 PROCEDURE — 96360 HYDRATION IV INFUSION INIT: CPT

## 2019-06-04 PROCEDURE — 25000128 H RX IP 250 OP 636: Performed by: INTERNAL MEDICINE

## 2019-06-04 RX ORDER — CYCLOSPORINE 25 MG/1
25 CAPSULE, LIQUID FILLED ORAL 2 TIMES DAILY
Qty: 60 CAPSULE | Refills: 11 | Status: SHIPPED | OUTPATIENT
Start: 2019-06-04 | End: 2019-06-20

## 2019-06-04 RX ORDER — CYCLOSPORINE 100 MG/1
100 CAPSULE, LIQUID FILLED ORAL 2 TIMES DAILY
Qty: 60 CAPSULE | Refills: 11 | Status: SHIPPED | OUTPATIENT
Start: 2019-06-04 | End: 2019-06-20

## 2019-06-04 RX ADMIN — SODIUM CHLORIDE 1000 ML: 9 INJECTION, SOLUTION INTRAVENOUS at 10:14

## 2019-06-04 NOTE — MR AVS SNAPSHOT
After Visit Summary   6/5/2018    Gilles Henning III    MRN: [id number]           Patient Information     Date Of Birth          1970        Visit Information        Provider Department      6/5/2018 7:00 AM UC 42 ATC; UC SPEC INFUSION [facility] Specialty and Procedure        Today's Diagnoses     Aftercare following organ transplant        Kidney replaced by transplant          Care Instructions    Dear Gilles Henning III    Thank you for choosing [facility] ([facility]) for your transplant cares.  The following information is a summary of our appointment as well as important reminders.      Please make sure your phone is available today because I will call to update you with your anti-rejection drug levels and possibly make changes to your anti-rejection dosages.    1.) Stop taking Norvasc  2.) Cut your Metoprolol dose in half to 25mg twice daily and only take if your SBP is >140.  3.) Change your phosphorous dose to twice daily.  4.) Keep drinking 3 liters of non caffeinated fluids or more.  5.) Return here tomorrow at 7am for labs and assessment.    We look forward in seeing you on your next appointment here at [facility].  Please don t hesitate to call us at [phone] to reschedule any of your appointments or to speak with one of the [facility] registered nurses.  It was a pleasure taking care of you today.    Sincerely,    [facility]  Specialty Infusion & Procedure Center  [address] [place] [address]  [id number]  Phone:  [phone]            Follow-ups after your visit        Your next 10 appointments already scheduled     Jun 19, 2018  9:00 AM CDT   (Arrive by 8:45 AM)   Kidney Post Op with Mario Vallejo MD   [facility] Solid Organ Transplant ([facility])    [address] [place] [address]  [address] [place] [id number] [phone]            Jul 03, 2018  10:45 AM CDT   LAB with GH LAB   [facility] ([facility])    [address]  [facility] [address]   [phone]           Please do not eat 10-12 hours before your appointment if you are coming in fasting for labs on lipids, cholesterol, or glucose (sugar). This does not apply to pregnant women. Water, hot tea and black coffee (with nothing added) are okay. Do not drink other fluids, diet soda or chew gum.            Jul 09, 2018  4:00 PM CDT   (Arrive by 3:30 PM)   Return Kidney Transplant with Uc Kidney/Pancreas Recipient   [facility] ([facility])    [address]  [address]  [facility] [address]   [phone]            Jul 10, 2018 11:15 AM CDT   Return Visit with MICAH Fatima CNP   [facility] ([facility])    [address]  [facility] [address]   [phone]            Aug 09, 2018  3:10 PM CDT   (Arrive by 2:40 PM)   Return Kidney Transplant with Uc Kidney/Pancreas Recipient   [facility] ([facility])    [address]  [address]  [facility] [address]   [phone]            Oct 11, 2018  1:30 PM CDT   (Arrive by 1:00 PM)   Return Kidney Transplant with Uc Kidney/Pancreas Recipient   [facility] ([facility])    [address]  [address]  [facility] [address]   [phone]            Dec 10, 2018  1:30 PM CST   (Arrive by 1:00 PM)   Return Kidney Transplant with Uc Kidney/Pancreas Recipient   [facility] ([facility])    [address]  [address]  [facility] [address]   [phone]              Future tests that were ordered for you today     Open Standing Orders        Priority Remaining Interval Expires Ordered    CBC with platelets Routine 6/6 Every Other Week 3/11/2019 6/4/2018    Basic metabolic panel  Routine 6/6 Every Other Week 3/11/2019 6/4/2018    CBC with platelets Routine 4/4 Every 3 Weeks 6/4/2019 6/4/2018    Basic metabolic panel Routine 4/4 Every 3 Weeks 6/4/2019 6/4/2018    Phosphorus Routine 11/12 Weekly 10/2/2018 6/4/2018    Magnesium Routine 11/12 Weekly 10/2/2018 6/4/2018    Mycophenolic acid Routine 12/12 Weekly 10/2/2018 6/4/2018    WBC Differential Routine 11/12 Weekly 10/2/2018 6/4/2018    Hepatic panel Routine 2/2 Every 6 Months 6/4/2019 6/4/2018    Lipid Profile Routine 2/2 Every 6 Months 6/4/2019 6/4/2018    Protein  random urine with Creat Ratio Routine 2/2 Every 6 Months 6/4/2019 6/4/2018    PRA Donor Specific Antibody Routine 18/18 6/4/2019 6/4/2018    Tacrolimus level Routine 15/16 2X Week 11/11/2018 6/4/2018    Tacrolimus level Routine 12/12 Weekly 12/11/2018 6/4/2018    Tacrolimus level Routine 6/6 Every Other Week 3/11/2019 6/4/2018    Tacrolimus level Routine 4/4 Every 3 Weeks 6/4/2019 6/4/2018    BK virus PCR quantitative Routine 12/12 Monthly 6/4/2019 6/4/2018    CBC with platelets Routine 11/12 3X Week 7/9/2018 6/4/2018    Basic metabolic panel Routine 11/12 3X Week 7/9/2018 6/4/2018    CBC with platelets Routine 16/16 2X Week 11/11/2018 6/4/2018    Basic metabolic panel Routine 16/16 2X Week 11/11/2018 6/4/2018    CBC with platelets Routine 12/12 Weekly 12/11/2018 6/4/2018    Basic metabolic panel Routine 12/12 Weekly 12/11/2018 6/4/2018          Open Future Orders        Priority Expected Expires Ordered    EBV DNA PCR Quantitative Whole Blood Routine 12/31/2018 5/31/2019 6/4/2018    Hepatitis B Surface Antibody Routine 12/1/2018 12/15/2018 6/4/2018    Hepatitis B core antibody Routine 12/1/2018 12/15/2018 6/4/2018    Hepatitis C antibody Routine 12/1/2018 12/15/2018 6/4/2018            Who to contact     If you have questions or need follow up information about today's clinic visit or your schedule please contact [facility] SPECIALTY AND PROCEDURE  [FreeTextEntry1] : The patient is a 69 year old female presenting for a follow-up evaluation and active reassessment of NIDDM, HLD, sleep disorder, anorexia, and epilepsy with h/o meningitis. \par \par Interval data:\par - Evaluation with Dr Sheron Becker on May 22, 2019: in view of unacceptable degree of glycemic control patient was referred to Dr. Becker who resumed prior Farxiga and will follow her DM.\par - Maxillofacial CT Scan on May 23 2019: s/p multiple surgeries both open and endoscopic, there is a complex appearance of the reconstructed anterior skill base and repair of complex cephalocele. on the right, a CSF space protrudes to the ethmoid fossa with heterotopic bone beneath  it from the nasal cavity. Few sites of bony thinning are present, however, and site of CSF leak cannot be excluded. on the left, an opacified cell more anteriorly at the spinocranial junction has signal on prior MRI inconsistent with cephalocele. Small volume residual meningioma at the skill base bilateral optic nerve sheath meningioma, as better detailed on MRI.\par - Labs on 5/22/19: Hgb 11.3, Hct 35.9, LDL 73, total cholesterol 148, triglyceride 201, HDL 35, glucose 114, C-peptide 5.3, factor VIII 255%, and insulin like growth factor 1 166. \par - Most recent HbA1c 9.5 with mean plasma glucose 226 on 5/9/19.\par \par Current Complaints:\par - persistent mild fatigue and impaired sleep, patient continues to report poor memory but denies recurrence of episodic amnesia or altered mental status since last visit, patient taking melatonin nightly to improve sleeping.\par - denies follow-up with Dr. Young Terrell following CT, again inquires about plan for Keppra regimen.\par - denies previous neurology follow-up with Dr. Cecilia Kaur\par \par Current Medications: Trulicity 1.5mg QW, metformin 1000mg BID, Lipitor 20mg QD, Keppra 500mg BID, Farxiga 5mg QAM (per Dr. Becker), melatonin QHS. directly at [phone].  Normal or non-critical lab and imaging results will be communicated to you by MyChart, letter or phone within 4 business days after the clinic has received the results. If you do not hear from us within 7 days, please contact the clinic through MyChart or phone. If you have a critical or abnormal lab result, we will notify you by phone as soon as possible.  Submit refill requests through [employer] or call your pharmacy and they will forward the refill request to us. Please allow 3 business days for your refill to be completed.          Additional Information About Your Visit        Care EveryWhere ID     This is your Care EveryWhere ID. This could be used by other organizations to access your [place] medical records  [id number]        Your Vitals Were     Pulse Temperature Respirations Pulse Oximetry BMI (Body Mass Index)       81 97.4  F (36.3  C) (Oral) 18 100% 17.21 kg/m2        Blood Pressure from Last 3 Encounters:   06/05/18 98/70   06/02/18 (!) 144/91   05/14/18 152/84    Weight from Last 3 Encounters:   06/05/18 54.4 kg (119 lb 14.9 oz)   06/04/18 54.7 kg (120 lb 11.2 oz)   06/02/18 53.6 kg (118 lb 1.6 oz)              We Performed the Following     Basic metabolic panel     CBC with platelets     Magnesium     Parathyroid Hormone Intact     Phosphorus     Tacrolimus level     WBC Differential          Today's Medication Changes          These changes are accurate as of 6/5/18 10:51 AM.  If you have any questions, ask your nurse or doctor.               These medicines have changed or have updated prescriptions.        Dose/Directions    PHOSPHA 250 NEUTRAL [phone] MG Tabs   This may have changed:  when to take this   Used for:  Hypophosphatemia, Kidney replaced by transplant   Changed by:  Rachel Resendez, NP        Dose:  250 mg   Take 250 mg by mouth 2 times daily   Quantity:  120 tablet   Refills:  0         Stop taking these medicines if you haven't already. Please  contact your care team if you have questions.     amLODIPine 5 MG tablet   Commonly known as:  NORVASC   Stopped by:  Rachel Resendez, NP                    Primary Care Provider Office Phone # Fax #    Charlie Darrion Dubose [phone] [address]   [place] [address]        Equal Access to Services     [facility]: Hadii aad ku hadasho Soomaali, waaxda luqadaha, qaybta kaalmada adeegyada, waxay idiin hayaan adeeg kharash laFloryaan . So [facility] [phone].    ATENCIÓN: Si habla español, tiene a millan disposición servicios gratuitos de asistencia lingüística. Llame al [phone].    We comply with applicable federal civil rights laws and [place] laws. We do not discriminate on the basis of race, color, national origin, age, disability, sex, sexual orientation, or gender identity.            Thank you!     Thank you for choosing [facility] SPECIALTY AND PROCEDURE  for your care. Our goal is always to provide you with excellent care. Hearing back from our patients is one way we can continue to improve our services. Please take a few minutes to complete the written survey that you may receive in the mail after your visit with us. Thank you!             Your Updated Medication List - Protect others around you: Learn how to safely use, store and throw away your medicines at www.disposemymeds.org.          This list is accurate as of 6/5/18 10:51 AM.  Always use your most recent med list.                   Brand Name Dispense Instructions for use Diagnosis    acetaminophen 325 MG tablet    TYLENOL    80 tablet    Take 2 tablets (650 mg) by mouth every 6 hours for 10 days    Kidney transplant recipient       atorvastatin 40 MG tablet    LIPITOR     Take 40 mg by mouth daily        clonazePAM 1 MG tablet    klonoPIN     Take 0.5-1 mg by mouth 2 times daily as needed (restless leg)        docusate sodium 100 MG capsule    COLACE     Take 200 mg by mouth 2 times  daily        magnesium hydroxide 400 MG/5ML suspension    MILK OF MAGNESIA    355 mL    Take 30 mLs by mouth daily    Kidney transplant recipient       magnesium oxide 400 MG tablet    MAG-OX    30 tablet    Take 1 tablet (400 mg) by mouth daily (with lunch)    Kidney transplant recipient       metoprolol tartrate 50 MG tablet    LOPRESSOR    60 tablet    Take 1 tablet (50 mg) by mouth 2 times daily    Kidney transplant recipient       mycophenolate 250 MG capsule    GENERIC EQUIVALENT    180 capsule    Take 3 capsules (750 mg) by mouth 2 times daily    Kidney transplant recipient       NONFORMULARY      Salicylic acid 3% / 5-FU 4% in vanicream : Apply a thin layer to affected area once daily        omeprazole 40 MG capsule    priLOSEC    90 capsule    TAKE 1 CAPSULE BY MOUTH DAILY    Gastroesophageal reflux disease, esophagitis presence not specified       oxyCODONE IR 5 MG tablet    ROXICODONE    12 tablet    Take 1-2 tablets (5-10 mg) by mouth every 4 hours as needed for other (pain control or improvement in physical function. Hold dose for analgesic side effects.)    Kidney transplant recipient       PHOSPHA 250 NEUTRAL [phone] MG Tabs     120 tablet    Take 250 mg by mouth 2 times daily    Hypophosphatemia, Kidney replaced by transplant       polyethylene glycol Packet    MIRALAX/GLYCOLAX    14 packet    Take 17 g by mouth daily as needed for constipation    Kidney transplant recipient       prochlorperazine 10 MG tablet    COMPAZINE    20 tablet    Take 1 tablet (10 mg) by mouth every 6 hours as needed for nausea or vomiting    Nausea       senna-docusate 8.6-50 MG per tablet    SENOKOT-S;PERICOLACE    100 tablet    Take 2 tablets by mouth 2 times daily as needed for constipation    Kidney transplant recipient       sulfamethoxazole-trimethoprim 400-80 MG per tablet    BACTRIM/SEPTRA    30 tablet    Take 1 tablet by mouth daily    Kidney transplant recipient       SUMAtriptan 25 MG tablet    IMITREX      Take 25 mg by mouth every 2 hours as needed for migraine Max 200mg/24 hours. Max 7/week. Max 30/month.        tacrolimus 1 MG 24 hr tablet    ENVARSUS XR    150 tablet    Take 5 tablets (5 mg) by mouth every morning (before breakfast)    Kidney transplant recipient       traZODone 150 MG tablet    DESYREL     Take 300 mg by mouth At Bedtime with food        valGANciclovir 450 MG tablet    VALCYTE    60 tablet    Take 2 tablets (900 mg) by mouth daily    Kidney transplant recipient       vitamin D 2000 units tablet      Take 2,000 Units by mouth daily

## 2019-06-04 NOTE — TELEPHONE ENCOUNTER
RNCC left detailed VM with Stephanie - staff at [facility] as Hector was at court when I called.  No need for IV hydration based on 6/3 creatinine and reported BPs.  Notify RNCC if lightheaded / dizzy.

## 2019-06-04 NOTE — PROGRESS NOTES
Infusion Nursing Note:  Gilles Henning III presents today for fluids.    Patient seen by provider today: No   present during visit today: Not Applicable.    Note: patient called this morning stating that his blood pressures were low and he was dizzy, we have a standing order for fluids and fit him in our schedule. When arrived pressures were not low but still complaining about being dizzy and he felt he was dehydrated..    Intravenous Access:  Peripheral IV placed.    Treatment Conditions:  Not Applicable.      Post Infusion Assessment:  Patient tolerated infusion without incident.  Blood return noted pre and post infusion.  Site patent and intact, free from redness, edema or discomfort.  No evidence of extravasations.  Access discontinued per protocol.       Discharge Plan:   Patient and/or family verbalized understanding of discharge instructions and all questions answered.  Patient discharged in stable condition accompanied by: self.  Departure Mode: Ambulatory.    Jean S. Hammann, RN

## 2019-06-05 DIAGNOSIS — I10 HYPERTENSION: Primary | ICD-10-CM

## 2019-06-05 RX ORDER — AMLODIPINE BESYLATE 5 MG/1
5 TABLET ORAL EVERY EVENING
Qty: 30 TABLET | Refills: 2 | Status: SHIPPED | OUTPATIENT
Start: 2019-06-05 | End: 2019-10-08

## 2019-06-05 NOTE — TELEPHONE ENCOUNTER
Post Kidney and Pancreas Transplant Team Conference  Date: 6/4/2019  Transplant Coordinator: Angeline Alonso     Attendees (via telephone):  [x]  Dr. Macias [] Thao Barnard, RN  [] Blossom Montes LPN     []  Dr. Donato [] Martha Morris, DONALD [] Noelle Wagner LPN   []  Dr. Telles [] Beth Evans, DONALD    []  Dr. Cabrera [] Katelyn Gallegos RN [] Jacob López, PharmD   [] Dr. Lorenzo [x] Maliha Alonso RN    [] Dr. Finley [] Mahad Steele RN    [] Dr. Maynard [] Mildred Barajas RN    [] Dr. Parsons [] Gia Romero RN    [] Dr. Royal [] Marianna Fortune RN    [] Surgery Fellow [] Arash Agarwal RN    [] Rachel Resendez, NP [] Thao Villarreal RN        Verbal Plan Read Back:   BK reviewed - rise after treatment for rejection. No changes to IS goals, etc.    Hypertension - start amlodipine 5mg nightly.    Routed to RN Coordinator   Angeline Alonso

## 2019-06-06 ENCOUNTER — OFFICE VISIT (OUTPATIENT)
Dept: PEDIATRICS | Facility: OTHER | Age: 49
End: 2019-06-06
Attending: INTERNAL MEDICINE
Payer: COMMERCIAL

## 2019-06-06 ENCOUNTER — TELEPHONE (OUTPATIENT)
Dept: PEDIATRICS | Facility: OTHER | Age: 49
End: 2019-06-06

## 2019-06-06 VITALS
RESPIRATION RATE: 14 BRPM | DIASTOLIC BLOOD PRESSURE: 86 MMHG | HEART RATE: 70 BPM | BODY MASS INDEX: 22.68 KG/M2 | WEIGHT: 158.4 LBS | SYSTOLIC BLOOD PRESSURE: 132 MMHG | TEMPERATURE: 96.6 F | HEIGHT: 70 IN

## 2019-06-06 DIAGNOSIS — N02.B9 IGA NEPHROPATHY: ICD-10-CM

## 2019-06-06 DIAGNOSIS — N05.9 NEPHRITIS AND NEPHROPATHY, WITH PATHOLOGICAL LESION IN KIDNEY: ICD-10-CM

## 2019-06-06 DIAGNOSIS — Z94.0 KIDNEY TRANSPLANTED: ICD-10-CM

## 2019-06-06 DIAGNOSIS — T86.11 KIDNEY TRANSPLANT REJECTION: ICD-10-CM
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
## 2019-06-06 DIAGNOSIS — E46 MALNUTRITION, UNSPECIFIED TYPE (H): Primary | ICD-10-CM

## 2019-06-06 DIAGNOSIS — E86.0 DEHYDRATION: ICD-10-CM

## 2019-06-06 DIAGNOSIS — Z94.0 KIDNEY TRANSPLANT RECIPIENT: ICD-10-CM

## 2019-06-06 PROCEDURE — 99214 OFFICE O/P EST MOD 30 MIN: CPT | Performed by: INTERNAL MEDICINE

## 2019-06-06 PROCEDURE — G0463 HOSPITAL OUTPT CLINIC VISIT: HCPCS

## 2019-06-06 ASSESSMENT — MIFFLIN-ST. JEOR: SCORE: 1584.75

## 2019-06-06 ASSESSMENT — PATIENT HEALTH QUESTIONNAIRE - PHQ9
5. POOR APPETITE OR OVEREATING: NOT AT ALL
SUM OF ALL RESPONSES TO PHQ QUESTIONS 1-9: 0

## 2019-06-06 ASSESSMENT — ANXIETY QUESTIONNAIRES
1. FEELING NERVOUS, ANXIOUS, OR ON EDGE: NOT AT ALL
GAD7 TOTAL SCORE: 0
3. WORRYING TOO MUCH ABOUT DIFFERENT THINGS: NOT AT ALL
2. NOT BEING ABLE TO STOP OR CONTROL WORRYING: NOT AT ALL
6. BECOMING EASILY ANNOYED OR IRRITABLE: NOT AT ALL
IF YOU CHECKED OFF ANY PROBLEMS ON THIS QUESTIONNAIRE, HOW DIFFICULT HAVE THESE PROBLEMS MADE IT FOR YOU TO DO YOUR WORK, TAKE CARE OF THINGS AT HOME, OR GET ALONG WITH OTHER PEOPLE: NOT DIFFICULT AT ALL
7. FEELING AFRAID AS IF SOMETHING AWFUL MIGHT HAPPEN: NOT AT ALL
5. BEING SO RESTLESS THAT IT IS HARD TO SIT STILL: NOT AT ALL

## 2019-06-06 ASSESSMENT — PAIN SCALES - GENERAL: PAINLEVEL: SEVERE PAIN (7)

## 2019-06-06 NOTE — TELEPHONE ENCOUNTER
Call received from Charlie Dubose with questions regarding medications patient has received at recent infusions.chart review done over the phone with Charlie Dubose  and questions answered. Blossom Richardson RN on 6/6/2019 at 8:45 AM
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
## 2019-06-06 NOTE — NURSING NOTE
Patient presents to clinic for a few referrals.  Would like a referral for dental surgery at [facility], referral for general surgery here for feeding tube, and would like to have his sneha left side under chin checked, left side of back where he had cancer.   Moon Cardona LPN ....................  6/6/2019   8:26 AM      No LMP for male patient.  Medication Reconciliation: complete    Moon Cardona LPN  6/6/2019 8:26 AM
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
## 2019-06-07 ASSESSMENT — ANXIETY QUESTIONNAIRES: GAD7 TOTAL SCORE: 0

## 2019-06-10 ENCOUNTER — MEDICAL CORRESPONDENCE (OUTPATIENT)
Dept: LAB | Facility: OTHER | Age: 49
End: 2019-06-10

## 2019-06-10 ENCOUNTER — OFFICE VISIT (OUTPATIENT)
Dept: FAMILY MEDICINE | Facility: OTHER | Age: 49
End: 2019-06-10
Attending: NURSE PRACTITIONER
Payer: MEDICARE

## 2019-06-10 ENCOUNTER — TELEPHONE (OUTPATIENT)
Dept: TRANSPLANT | Facility: CLINIC | Age: 49
End: 2019-06-10

## 2019-06-10 VITALS
RESPIRATION RATE: 20 BRPM | BODY MASS INDEX: 22.37 KG/M2 | DIASTOLIC BLOOD PRESSURE: 80 MMHG | WEIGHT: 156.25 LBS | HEART RATE: 82 BPM | SYSTOLIC BLOOD PRESSURE: 122 MMHG | HEIGHT: 70 IN | TEMPERATURE: 97.3 F

## 2019-06-10 DIAGNOSIS — Z94.0 KIDNEY TRANSPLANTED: ICD-10-CM

## 2019-06-10 DIAGNOSIS — Z48.288 ENCOUNTER FOR AFTERCARE FOLLOWING MULTIPLE ORGAN TRANSPLANT: Primary | ICD-10-CM

## 2019-06-10 DIAGNOSIS — Z79.60 LONG-TERM USE OF IMMUNOSUPPRESSANT MEDICATION: ICD-10-CM

## 2019-06-10 DIAGNOSIS — K04.7 DENTAL INFECTION: Primary | ICD-10-CM

## 2019-06-10 LAB
ANION GAP SERPL CALCULATED.3IONS-SCNC: 7 MMOL/L (ref 3–14)
BUN SERPL-MCNC: 10 MG/DL (ref 7–25)
CALCIUM SERPL-MCNC: 9.8 MG/DL (ref 8.6–10.3)
CHLORIDE SERPL-SCNC: 108 MMOL/L (ref 98–107)
CO2 SERPL-SCNC: 25 MMOL/L (ref 21–31)
CREAT SERPL-MCNC: 1.36 MG/DL (ref 0.7–1.3)
ERYTHROCYTE [DISTWIDTH] IN BLOOD BY AUTOMATED COUNT: 14.2 % (ref 10–15)
GFR SERPL CREATININE-BSD FRML MDRD: 55 ML/MIN/{1.73_M2}
GLUCOSE SERPL-MCNC: 87 MG/DL (ref 70–105)
HCT VFR BLD AUTO: 38.7 % (ref 40–53)
HGB BLD-MCNC: 12.5 G/DL (ref 13.3–17.7)
MCH RBC QN AUTO: 31.9 PG (ref 26.5–33)
MCHC RBC AUTO-ENTMCNC: 32.3 G/DL (ref 31.5–36.5)
MCV RBC AUTO: 99 FL (ref 78–100)
PLATELET # BLD AUTO: 161 10E9/L (ref 150–450)
POTASSIUM SERPL-SCNC: 4.1 MMOL/L (ref 3.5–5.1)
RBC # BLD AUTO: 3.92 10E12/L (ref 4.4–5.9)
SODIUM SERPL-SCNC: 140 MMOL/L (ref 134–144)
WBC # BLD AUTO: 4.2 10E9/L (ref 4–11)

## 2019-06-10 PROCEDURE — 87799 DETECT AGENT NOS DNA QUANT: CPT | Performed by: INTERNAL MEDICINE

## 2019-06-10 PROCEDURE — 36415 COLL VENOUS BLD VENIPUNCTURE: CPT | Performed by: INTERNAL MEDICINE

## 2019-06-10 PROCEDURE — 80158 DRUG ASSAY CYCLOSPORINE: CPT | Performed by: INTERNAL MEDICINE

## 2019-06-10 PROCEDURE — 85027 COMPLETE CBC AUTOMATED: CPT | Performed by: INTERNAL MEDICINE

## 2019-06-10 PROCEDURE — 99213 OFFICE O/P EST LOW 20 MIN: CPT | Performed by: NURSE PRACTITIONER

## 2019-06-10 PROCEDURE — 80048 BASIC METABOLIC PNL TOTAL CA: CPT | Performed by: INTERNAL MEDICINE

## 2019-06-10 PROCEDURE — G0463 HOSPITAL OUTPT CLINIC VISIT: HCPCS

## 2019-06-10 ASSESSMENT — ANXIETY QUESTIONNAIRES
7. FEELING AFRAID AS IF SOMETHING AWFUL MIGHT HAPPEN: NOT AT ALL
GAD7 TOTAL SCORE: 0
6. BECOMING EASILY ANNOYED OR IRRITABLE: NOT AT ALL
IF YOU CHECKED OFF ANY PROBLEMS ON THIS QUESTIONNAIRE, HOW DIFFICULT HAVE THESE PROBLEMS MADE IT FOR YOU TO DO YOUR WORK, TAKE CARE OF THINGS AT HOME, OR GET ALONG WITH OTHER PEOPLE: NOT DIFFICULT AT ALL
3. WORRYING TOO MUCH ABOUT DIFFERENT THINGS: NOT AT ALL
1. FEELING NERVOUS, ANXIOUS, OR ON EDGE: NOT AT ALL
2. NOT BEING ABLE TO STOP OR CONTROL WORRYING: NOT AT ALL
5. BEING SO RESTLESS THAT IT IS HARD TO SIT STILL: NOT AT ALL

## 2019-06-10 ASSESSMENT — PATIENT HEALTH QUESTIONNAIRE - PHQ9
SUM OF ALL RESPONSES TO PHQ QUESTIONS 1-9: 0
5. POOR APPETITE OR OVEREATING: NOT AT ALL

## 2019-06-10 ASSESSMENT — PAIN SCALES - GENERAL: PAINLEVEL: SEVERE PAIN (7)

## 2019-06-10 ASSESSMENT — MIFFLIN-ST. JEOR: SCORE: 1575

## 2019-06-10 NOTE — PROGRESS NOTES
HPI:    Gilles Henning III is a 49 year old male who presents to clinic today for concerns of dental infection.  He reports he is had multiple teeth that are been breaking.  He was seen with by his dentist 2 months ago and is going to be having referral to the [facility] for dental surgery.  He is having pain in his left upper jaw.  Is not sure if he is having any drainage.  He has had some mild swelling.  He is taking Tylenol and ibuprofen for symptomatic management.  Denies any fevers.    Past Medical History:   Diagnosis Date     Aftercare following organ transplant 5/31/2019     Anemia in chronic kidney disease      Autoimmune disease (H)      Bipolar affective disorder (H)      BK viremia 9/25/2018     Bone disease      Chemical dependency (H)      Chronic rejection of kidney transplant 2015    Chronic rejection of 2009 kidney, failed 2015. Graft nephrectomy 2017.     Developmental delay      EBV (Christine-Barr virus) viremia 1/15/2019     ESRD needing dialysis (H) 2015     Head injury      History of alcoholism (H)      History of peritoneal dialysis     7 years     Hyperlipidemia      IgA nephropathy      Kidney problem      Kidney transplant rejection 5/6/2019     MDD (major depressive disorder)     Hx multiple suicide attempts     Migraine      Migraines      Osteopenia      Peritonitis (H)      Polysubstance abuse (H)     in remission     Problem, psychiatric      PTSD (post-traumatic stress disorder)      Renal cell carcinoma (H) 2015    Left native kidney, s/p nephrectomy     Secondary hyperparathyroidism (H)      Tobacco abuse     Chewing tobacco     Transplant          Current Outpatient Medications   Medication Sig Dispense Refill     acetaminophen (TYLENOL) 325 MG tablet Take 325-650 mg by mouth every 6 hours as needed for mild pain       ALPRAZolam (XANAX) 0.5 MG tablet Take 0.5 mg by mouth up to four times per week as needed for panic.       amLODIPine (NORVASC) 5 MG tablet Take 1 tablet  "(5 mg) by mouth every evening 30 tablet 2     amoxicillin-clavulanate (AUGMENTIN) 875-125 MG tablet Take 1 tablet by mouth 2 times daily for 10 days 20 tablet 0     atorvastatin (LIPITOR) 40 MG tablet Take 40 mg by mouth every morning       chlorhexidine (PERIDEX) 0.12 % solution Swish and spit 15 mLs in mouth 4 times daily 1893 mL 2     cloNIDine (CATAPRES) 0.1 MG tablet Take 0.1 mg by mouth 2 times daily as needed (anxiety)        cycloSPORINE modified (GENERIC EQUIVALENT) 100 MG capsule Take 1 capsule (100 mg) by mouth 2 times daily Total dose = 125 mg BID 60 capsule 11     cycloSPORINE modified (GENERIC EQUIVALENT) 25 MG capsule Take 1 capsule (25 mg) by mouth 2 times daily Total dose = 125 mg BID 60 capsule 11     escitalopram (LEXAPRO) 20 MG tablet Take 20 mg by mouth daily       folic acid (FOLVITE) 1 MG tablet Take 1 tablet (1 mg) by mouth daily 30 tablet 11     Gauze Pads & Dressings (OPTIFOAM) 4\"X4\" PADS Apply under trach to prevent skin breakdown. 10 each 99     multivitamin (CENTRUM) liquid Take 15 mLs by mouth At Bedtime  11     mycophenolate (GENERIC EQUIVALENT) 500 MG tablet Take 1 tablet (500 mg) by mouth 2 times daily 60 tablet 11     Nutritional Supplements (NUTREN 1.0 PO) Take 7 Cans by mouth daily 7 cans per day per [place] [phone]       omeprazole (PRILOSEC) 20 MG DR capsule Take 40 mg by mouth daily       order for DME Protein shakes.  3x/day. 99 1 each 11     order for DME Equipment being ordered: SILVIO GAUZE PADS 200 each 11     order for DME Equipment being ordered: DME. Trach: 6 DCT Shiley 1 Box 3     order for DME Foam dressing for under trach 1 each 11     order for DME Passy delgado valve size 6 1 each 11     order for DME Speaker cap for #6 cannula. 1 each 11     polyethylene glycol (MIRALAX/GLYCOLAX) packet Take 17 g by mouth 2 times daily as needed for constipation       prazosin (MINIPRESS) 2 MG capsule Take 2 capsules (4 mg) by mouth At Bedtime 30 capsule 0     predniSONE " "(DELTASONE) 5 MG tablet Take 5 mg by mouth daily.       QUEtiapine (SEROQUEL) 100 MG tablet Take 1 tablet (100 mg) by mouth At Bedtime 30 tablet 0     sodium bicarbonate 650 MG tablet Take 650 mg by mouth 2 times daily       sulfamethoxazole-trimethoprim (BACTRIM/SEPTRA) 400-80 MG tablet Take 1 tablet by mouth At Bedtime 30 tablet 0     traZODone (DESYREL) 100 MG tablet Take 200 mg by mouth At Bedtime        vitamin D3 (CHOLECALCIFEROL) 2000 units tablet Take 1 tablet by mouth daily 90 tablet 3       Allergies   Allergen Reactions     Gabapentin Other (See Comments)     myoclonus       ROS:  Pertinent positives and negatives are noted in HPI.    EXAM:  /80 (BP Location: Right arm, Patient Position: Sitting, Cuff Size: Adult Regular)   Pulse 82   Temp 97.3  F (36.3  C) (Tympanic)   Resp 20   Ht 1.778 m (5' 10\")   Wt 70.9 kg (156 lb 4 oz)   BMI 22.42 kg/m    General appearance: well appearing male, in no acute distress  Head: normocephalic, atraumatic  Ears: TM's with cone of light, no erythema, canals clear bilaterally  Eyes: conjunctivae normal  Orophayrnx: moist mucous membranes, facial and oral deformities are noted.  He is tender to left upper jaw with palpation.  He has multiple broken and missing teeth.  Neck: supple without adenopathy  Respiratory: clear to auscultation bilaterally  Cardiac: RRR with no murmurs  Psychological: normal affect, alert and pleasant    ASSESSMENT AND PLAN:    1. Dental infection      Probable abscess or dental infection noted due to poor condition and increasing pain.  A we will treat him today with antibiotics.  I do recommend he follow-up with a dentist as well as pursue the referral to the [facility] for dental and facial reconstruction.  We will follow-up in clinic as needed.      Merline Jones..................6/10/2019 2:34 PM      This document was prepared using voice generated software.  While every attempt was made for accuracy, grammatical errors may " exist.

## 2019-06-10 NOTE — LETTER
My Depression Action Plan  Name: Gilles Henning III   Date of Birth 1970  Date: 6/10/2019    My doctor: Charlie Dubose   My clinic: [facility]  [address]  [place] [id number] [phone]          GREEN    ZONE   Good Control    What it looks like:     Things are going generally well. You have normal up s and down s. You may even feel depressed from time to time, but bad moods usually last less than a day.   What you need to do:  1. Continue to care for yourself (see self care plan)  2. Check your depression survival kit and update it as needed  3. Follow your physician s recommendations including any medication.  4. Do not stop taking medication unless you consult with your physician first.           YELLOW         ZONE Getting Worse    What it looks like:     Depression is starting to interfere with your life.     It may be hard to get out of bed; you may be starting to isolate yourself from others.    Symptoms of depression are starting to last most all day and this has happened for several days.     You may have suicidal thoughts but they are not constant.   What you need to do:     1. Call your care team, your response to treatment will improve if you keep your care team informed of your progress. Yellow periods are signs an adjustment may need to be made.     2. Continue your self-care, even if you have to fake it!    3. Talk to someone in your support network    4. Open up your depression survival kit           RED    ZONE Medical Alert - Get Help    What it looks like:     Depression is seriously interfering with your life.     You may experience these or other symptoms: You can t get out of bed most days, can t work or engage in other necessary activities, you have trouble taking care of basic hygiene, or basic responsibilities, thoughts of suicide or death that will not go away, self-injurious behavior.     What you need to do:  1. Call your  care team and request a same-day appointment. If they are not available (weekends or after hours) call your local crisis line, emergency room or 911.            Depression Self Care Plan / Survival Kit    Self-Care for Depression  Here s the deal. Your body and mind are really not as separate as most people think.  What you do and think affects how you feel and how you feel influences what you do and think. This means if you do things that people who feel good do, it will help you feel better.  Sometimes this is all it takes.  There is also a place for medication and therapy depending on how severe your depression is, so be sure to consult with your medical provider and/ or Behavioral Health Consultant if your symptoms are worsening or not improving.     In order to better manage my stress, I will:    Exercise  Get some form of exercise, every day. This will help reduce pain and release endorphins, the  feel good  chemicals in your brain. This is almost as good as taking antidepressants!  This is not the same as joining a gym and then never going! (they count on that by the way ) It can be as simple as just going for a walk or doing some gardening, anything that will get you moving.      Hygiene   Maintain good hygiene (Get out of bed in the morning, Make your bed, Brush your teeth, Take a shower, and Get dressed like you were going to work, even if you are unemployed).  If your clothes don't fit try to get ones that do.    Diet  I will strive to eat foods that are good for me, drink plenty of water, and avoid excessive sugar, caffeine, alcohol, and other mood-altering substances.  Some foods that are helpful in depression are: complex carbohydrates, B vitamins, flaxseed, fish or fish oil, fresh fruits and vegetables.    Psychotherapy  I agree to participate in Individual Therapy (if recommended).    Medication  If prescribed medications, I agree to take them.  Missing doses can result in serious side effects.  I  understand that drinking alcohol, or other illicit drug use, may cause potential side effects.  I will not stop my medication abruptly without first discussing it with my provider.    Staying Connected With Others  I will stay in touch with my friends, family members, and my primary care provider/team.    Use your imagination  Be creative.  We all have a creative side; it doesn t matter if it s oil painting, sand castles, or mud pies! This will also kick up the endorphins.    Witness Beauty  (AKA stop and smell the roses) Take a look outside, even in mid-winter. Notice colors, textures. Watch the squirrels and birds.     Service to others  Be of service to others.  There is always someone else in need.  By helping others we can  get out of ourselves  and remember the really important things.  This also provides opportunities for practicing all the other parts of the program.    Humor  Laugh and be silly!  Adjust your TV habits for less news and crime-drama and more comedy.    Control your stress  Try breathing deep, massage therapy, biofeedback, and meditation. Find time to relax each day.     My support system    Clinic Contact:  Phone number:    Contact 1:  Phone number:    Contact 2:  Phone number:    Taoist/:  Phone number:    Therapist:  Phone number:    Local crisis center:    Phone number:    Other community support:  Phone number:

## 2019-06-11 ENCOUNTER — TELEPHONE (OUTPATIENT)
Dept: TRANSPLANT | Facility: CLINIC | Age: 49
End: 2019-06-11

## 2019-06-11 ENCOUNTER — TELEPHONE (OUTPATIENT)
Dept: PEDIATRICS | Facility: OTHER | Age: 49
End: 2019-06-11

## 2019-06-11 LAB
CYCLOSPORINE BLD LC/MS/MS-MCNC: 160 UG/L (ref 50–400)
EBV DNA # SPEC NAA+PROBE: NORMAL {COPIES}/ML
EBV DNA SPEC NAA+PROBE-LOG#: NORMAL {LOG_COPIES}/ML
TME LAST DOSE: NORMAL H

## 2019-06-11 ASSESSMENT — ANXIETY QUESTIONNAIRES: GAD7 TOTAL SCORE: 0

## 2019-06-11 NOTE — TELEPHONE ENCOUNTER
Post Kidney and Pancreas Transplant Team Conference  Date: 6/10/2019  Transplant Coordinator: Angeline Alonso     Attendees, via telephone:  []  Dr. Macias [] Thao Barnard, RN  [] Blossom Montes LPN     [x]  Dr. Donato [] Martha Morris, DONALD [] Noelle Wagner LPN   []  Dr. Telles [] Beth Evans, DONALD    []  Dr. Cabrera [] Katelyn Gallegos RN [] Jacob López, PharmD   [] Dr. Lorenzo [x] Maliha Alonso RN    [] Dr. Finley [] Mahad Steele RN    [] Dr. Maynard [] Mildred Barajas RN    [] Dr. Parsons [] Gia Romero RN    [] Dr. Royal [] Marianna Fortune RN    [] Surgery Fellow [] Arash Agarwal RN    [] Rachel Resendez, NP [] Thao Villarreal RN        Verbal Plan Read Back:   For history of RCC with bilateral nephrectomy, CXR yearly to ensure no metastases (last done in May 2019).     Baseline creatinine 1.2 - 1.4.    Routed to RN Coordinator   Angeline Alonso

## 2019-06-11 NOTE — TELEPHONE ENCOUNTER
Patient needs a history & px for nursing home. Move to [facility]. Patient would like  Work in this week.     Taniya Snowden on 6/11/2019 at 10:50 AM

## 2019-06-11 NOTE — TELEPHONE ENCOUNTER
States that they are in the process of getting a [employer] Claim.  And they are looking for a referral to a nursing home that they will need a doctor to approve and sign.

## 2019-06-11 NOTE — TELEPHONE ENCOUNTER
Spoke to [facility] again and pt will be seen on Monday at 2:00 pm.  Moon Cardona LPN ....................  6/11/2019   12:00 PM

## 2019-06-11 NOTE — TELEPHONE ENCOUNTER
Patient informed of his lab results. No questions at this time.    Spoke to [facility] and pt is in detox again is being held by the [facility] until they can find him placement in a facility.  Needs H & P within 30 days so was scheduled with Charlie Dubose MD on Monday.  Just FYCURLY.  Moon Cardona LPN ....................  6/11/2019   10:26 AM

## 2019-06-12 ENCOUNTER — HOSPITAL ENCOUNTER (OUTPATIENT)
Dept: INFUSION THERAPY | Facility: OTHER | Age: 49
Discharge: HOME | End: 2019-06-12
Attending: INTERNAL MEDICINE | Admitting: INTERNAL MEDICINE
Payer: MEDICARE

## 2019-06-12 ENCOUNTER — TELEPHONE (OUTPATIENT)
Dept: TRANSPLANT | Facility: CLINIC | Age: 49
End: 2019-06-12

## 2019-06-12 VITALS — TEMPERATURE: 97.6 F | SYSTOLIC BLOOD PRESSURE: 111 MMHG | RESPIRATION RATE: 18 BRPM | DIASTOLIC BLOOD PRESSURE: 65 MMHG

## 2019-06-12 DIAGNOSIS — F33.41 RECURRENT MAJOR DEPRESSIVE DISORDER, IN PARTIAL REMISSION (H): ICD-10-CM

## 2019-06-12 DIAGNOSIS — Z48.298 AFTERCARE FOLLOWING ORGAN TRANSPLANT: Primary | ICD-10-CM

## 2019-06-12 DIAGNOSIS — R79.89 ELEVATED SERUM CREATININE: ICD-10-CM

## 2019-06-12 DIAGNOSIS — Z94.0 KIDNEY TRANSPLANTED: ICD-10-CM

## 2019-06-12 PROCEDURE — 25000128 H RX IP 250 OP 636: Performed by: INTERNAL MEDICINE

## 2019-06-12 PROCEDURE — 96360 HYDRATION IV INFUSION INIT: CPT

## 2019-06-12 RX ADMIN — SODIUM CHLORIDE 1000 ML: 9 INJECTION, SOLUTION INTRAVENOUS at 14:23

## 2019-06-12 NOTE — LETTER
2019  PHYSICIAN ORDERS      DATE & TIME ISSUED: 2019 1:25 PM  PATIENT NAME: Gilles Henning III   : 1970     [facility] MR# [if applicable]: [id number]     DIAGNOSIS:  post kidney transplant, dehydration   ICD-10 CODE: Z94.0, E86.0     1L NS IV over 1 hour to be given per infusion center protocol    Any questions please call: [phone] (option 5)        .

## 2019-06-12 NOTE — PROGRESS NOTES
Infusion Nursing Note:  Gilles Henning III presents today for Fluid Bolus.    Patient seen by provider today: No   present during visit today: Not Applicable.    Note: N/A.    Intravenous Access:  Peripheral IV placed.    Treatment Conditions:  Not Applicable.    Post Infusion Assessment:  Patient tolerated infusion without incident.  Blood return noted pre and post infusion.  Site patent and intact, free from redness, edema or discomfort.  No evidence of extravasations.  Access discontinued per protocol.     Discharge Plan:   Patient discharged in stable condition accompanied by: self.  Departure Mode: Ambulatory.    Brenda J. Goodell, RN

## 2019-06-12 NOTE — TELEPHONE ENCOUNTER
Spoke to Maria, nurse at [facility] who asks for BP parameters to follow so that they know when to hold Amlodipine.  Please advise.

## 2019-06-12 NOTE — TELEPHONE ENCOUNTER
Call returned to [facility]. He states that BP 95/70 and  - denies diarrhea or volume loss. States that he is taking Amlodipine, which has been started for uncontrolled BP. He denies taking any Clonidine (used PRN for anxiety). 1L NS fluid order sent. I asked that he hold Amlodipine for now. To review with nephrology.

## 2019-06-12 NOTE — TELEPHONE ENCOUNTER
Provider Call: Medication Clarification  Route to LPN  Provider left message at 1:46pm, didn't say which medication she needed clarified. Please connect.  Callback needed? Yes    Return Call Needed  Same as documented in contacts section  When to return call?: Same day: Route High Priority

## 2019-06-13 ENCOUNTER — TELEPHONE (OUTPATIENT)
Dept: TRANSPLANT | Facility: CLINIC | Age: 49
End: 2019-06-13

## 2019-06-13 DIAGNOSIS — F33.41 RECURRENT MAJOR DEPRESSIVE DISORDER, IN PARTIAL REMISSION (H): Primary | ICD-10-CM

## 2019-06-13 RX ORDER — PRAZOSIN HYDROCHLORIDE 2 MG/1
4 CAPSULE ORAL AT BEDTIME
Qty: 30 CAPSULE | Refills: 1 | Status: CANCELLED | OUTPATIENT
Start: 2019-06-13

## 2019-06-13 RX ORDER — TRAZODONE HYDROCHLORIDE 100 MG/1
200 TABLET ORAL AT BEDTIME
Qty: 60 TABLET | Refills: 1 | Status: CANCELLED | OUTPATIENT
Start: 2019-06-13

## 2019-06-13 NOTE — TELEPHONE ENCOUNTER
Spoke to Maria, nurse with patients care center who verbalizes understanding to hold Amlodipine dose if BP is less than 110/60

## 2019-06-13 NOTE — LETTER
[facility]  [address]  [place] [address]  Phone: [phone]  Fax: [phone]     Dear Gilles,    This letter is to remind you that you are coming due for an annual visit for the renewal of the medications Seroquel and Minipress along with labs. A limited 1 month supply was granted today. Please call [phone] to schedule an appointment at your earliest convenience.     Thank you,     Refill Nurse

## 2019-06-13 NOTE — TELEPHONE ENCOUNTER
Called patient at a assist home, nuno to schedule appt in [facility] called back and patient is no longer at this location, they gave me the correct phone number to call.  My question is will someone from [facility] assisted living [place] bring patient to his [place] appt??

## 2019-06-14 RX ORDER — TRAZODONE HYDROCHLORIDE 100 MG/1
200 TABLET ORAL AT BEDTIME
Qty: 60 TABLET | Refills: 5 | Status: SHIPPED | OUTPATIENT
Start: 2019-06-14

## 2019-06-14 RX ORDER — PRAZOSIN HYDROCHLORIDE 2 MG/1
4 CAPSULE ORAL AT BEDTIME
Qty: 60 CAPSULE | Refills: 0 | Status: SHIPPED | OUTPATIENT
Start: 2019-06-14 | End: 2019-10-08

## 2019-06-14 RX ORDER — QUETIAPINE FUMARATE 100 MG/1
100 TABLET, FILM COATED ORAL AT BEDTIME
Qty: 30 TABLET | Refills: 0 | Status: SHIPPED | OUTPATIENT
Start: 2019-06-14

## 2019-06-14 NOTE — TELEPHONE ENCOUNTER
"Requested Prescriptions   Pending Prescriptions Disp Refills     QUEtiapine (SEROQUEL) 100 MG tablet 30 tablet 0     Sig: Take 1 tablet (100 mg) by mouth At Bedtime       Antipsychotic Medications Passed - 6/13/2019 12:09 PM        Passed - Blood pressure under 140/90 in past 12 months     BP Readings from Last 3 Encounters:   06/12/19 111/65   06/10/19 122/80   06/06/19 132/86                 Passed - Patient is 12 years of age or older        Passed - Lipid panel on file within the past 12 months     Recent Labs   Lab Test 01/23/19  1018   CHOL 185   TRIG 129   HDL 56   *   NHDL 129               Passed - CBC on file in past 12 months     Recent Labs   Lab Test 06/10/19  1115  12/22/17  1714   WBC 4.2   < >  --    GICHWBC  --   --  7.8   RBC 3.92*   < >  --    GICHRBC  --   --  3.11*   HGB 12.5*   < > 10.5*   HCT 38.7*   < > 31.0*      < > 133*    < > = values in this interval not displayed.                 Passed - Heart Rate on file within past 12 months     Pulse Readings from Last 3 Encounters:   06/10/19 82   06/06/19 70   05/23/19 62               Passed - A1c or Glucose on file in past 12 months     Recent Labs   Lab Test 06/10/19  1115  05/29/18  2350   GLC 87   < > 111*   A1C  --   --  4.5    < > = values in this interval not displayed.       Please review patients last 3 weights. If a weight gain of >10 lbs exists, you may refill the prescription once after instructing the patient to schedule an appointment within the next 30 days.    Wt Readings from Last 3 Encounters:   06/10/19 70.9 kg (156 lb 4 oz)   06/06/19 71.8 kg (158 lb 6.4 oz)   05/29/19 68 kg (150 lb)             Passed - Medication is active on med list        Passed - Recent (6 mo) or future (30 days) visit within the authorizing provider's specialty     Patient had office visit in the last 6 months or has a visit in the next 30 days with authorizing provider or within the authorizing provider's specialty.  See \"Patient Info\" " "tab in inbasket, or \"Choose Columns\" in Meds & Orders section of the refill encounter.            prazosin (MINIPRESS) 2 MG capsule 30 capsule 0     Sig: Take 2 capsules (4 mg) by mouth At Bedtime       Alpha Blockers Passed - 6/13/2019 12:09 PM        Passed - Blood pressure under 140/90 in past 12 months     BP Readings from Last 3 Encounters:   06/12/19 111/65   06/10/19 122/80   06/06/19 132/86                 Passed - Recent (12 mo) or future (30 days) visit within the authorizing provider's specialty     Patient had office visit in the last 12 months or has a visit in the next 30 days with authorizing provider or within the authorizing provider's specialty.  See \"Patient Info\" tab in inbasket, or \"Choose Columns\" in Meds & Orders section of the refill encounter.              Passed - Patient does not have Tadalafil, Vardenafil, or Sildenafil on their medication list        Passed - Medication is active on med list        Passed - Patient is 18 years of age or older        traZODone (DESYREL) 100 MG tablet       Sig: Take 2 tablets (200 mg) by mouth At Bedtime       Serotonin Modulators Passed - 6/13/2019 12:09 PM        Passed - Recent (12 mo) or future (30 days) visit within the authorizing provider's specialty     Patient had office visit in the last 12 months or has a visit in the next 30 days with authorizing provider or within the authorizing provider's specialty.  See \"Patient Info\" tab in inbasket, or \"Choose Columns\" in Meds & Orders section of the refill encounter.              Passed - Medication is active on med list        Passed - Patient is age 18 or older        LOV 6/10/19    Last date of Lipid panel 1/23/19. A limited 1 month refill was granted for Seroquel today and a letter sent to patient reminding him of the need for an office visit and labs.  "

## 2019-06-17 ENCOUNTER — OFFICE VISIT (OUTPATIENT)
Dept: PEDIATRICS | Facility: OTHER | Age: 49
End: 2019-06-17
Attending: INTERNAL MEDICINE
Payer: COMMERCIAL

## 2019-06-17 VITALS
HEART RATE: 89 BPM | HEIGHT: 71 IN | DIASTOLIC BLOOD PRESSURE: 80 MMHG | TEMPERATURE: 98 F | RESPIRATION RATE: 18 BRPM | SYSTOLIC BLOOD PRESSURE: 134 MMHG | WEIGHT: 157.6 LBS | BODY MASS INDEX: 22.06 KG/M2 | OXYGEN SATURATION: 100 %

## 2019-06-17 DIAGNOSIS — F43.10 PTSD (POST-TRAUMATIC STRESS DISORDER): ICD-10-CM

## 2019-06-17 DIAGNOSIS — F33.9 RECURRENT MAJOR DEPRESSIVE DISORDER, REMISSION STATUS UNSPECIFIED (H): ICD-10-CM
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
## 2019-06-17 DIAGNOSIS — C64.9 RENAL CELL CARCINOMA, UNSPECIFIED LATERALITY (H): ICD-10-CM

## 2019-06-17 DIAGNOSIS — N02.B9 IGA NEPHROPATHY: ICD-10-CM

## 2019-06-17 DIAGNOSIS — T86.11 KIDNEY TRANSPLANT REJECTION: ICD-10-CM

## 2019-06-17 DIAGNOSIS — F31.9 BIPOLAR AFFECTIVE DISORDER, REMISSION STATUS UNSPECIFIED (H): ICD-10-CM

## 2019-06-17 DIAGNOSIS — Z94.0 KIDNEY TRANSPLANT RECIPIENT: Primary | ICD-10-CM

## 2019-06-17 DIAGNOSIS — D84.9 IMMUNOSUPPRESSED STATUS (H): ICD-10-CM

## 2019-06-17 DIAGNOSIS — Z90.5 S/P NEPHRECTOMY: ICD-10-CM

## 2019-06-17 PROBLEM — J18.9 CAP (COMMUNITY ACQUIRED PNEUMONIA): Status: RESOLVED | Noted: 2019-02-04 | Resolved: 2019-06-17

## 2019-06-17 PROBLEM — K92.0 HEMATEMESIS: Status: RESOLVED | Noted: 2019-05-09 | Resolved: 2019-06-17

## 2019-06-17 PROBLEM — T85.598A FEEDING TUBE DYSFUNCTION: Status: RESOLVED | Noted: 2018-10-20 | Resolved: 2019-06-17

## 2019-06-17 PROBLEM — D62 ANEMIA DUE TO BLOOD LOSS, ACUTE: Status: RESOLVED | Noted: 2019-05-09 | Resolved: 2019-06-17

## 2019-06-17 PROBLEM — J18.9 COMMUNITY ACQUIRED PNEUMONIA OF LEFT LOWER LOBE OF LUNG: Status: RESOLVED | Noted: 2019-02-04 | Resolved: 2019-06-17

## 2019-06-17 PROBLEM — F10.929 ACUTE ALCOHOLIC INTOXICATION (H): Status: RESOLVED | Noted: 2019-05-21 | Resolved: 2019-06-17

## 2019-06-17 PROBLEM — J69.0 ASPIRATION PNEUMONIA (H): Status: RESOLVED | Noted: 2019-05-09 | Resolved: 2019-06-17

## 2019-06-17 PROBLEM — F10.929 ALCOHOL INTOXICATION (H): Status: RESOLVED | Noted: 2019-05-09 | Resolved: 2019-06-17

## 2019-06-17 PROCEDURE — G0463 HOSPITAL OUTPT CLINIC VISIT: HCPCS

## 2019-06-17 PROCEDURE — 99214 OFFICE O/P EST MOD 30 MIN: CPT | Performed by: INTERNAL MEDICINE

## 2019-06-17 ASSESSMENT — PATIENT HEALTH QUESTIONNAIRE - PHQ9
SUM OF ALL RESPONSES TO PHQ QUESTIONS 1-9: 18
5. POOR APPETITE OR OVEREATING: NOT AT ALL

## 2019-06-17 ASSESSMENT — ANXIETY QUESTIONNAIRES
1. FEELING NERVOUS, ANXIOUS, OR ON EDGE: SEVERAL DAYS
3. WORRYING TOO MUCH ABOUT DIFFERENT THINGS: MORE THAN HALF THE DAYS
GAD7 TOTAL SCORE: 7
2. NOT BEING ABLE TO STOP OR CONTROL WORRYING: NEARLY EVERY DAY
5. BEING SO RESTLESS THAT IT IS HARD TO SIT STILL: SEVERAL DAYS
6. BECOMING EASILY ANNOYED OR IRRITABLE: NOT AT ALL
7. FEELING AFRAID AS IF SOMETHING AWFUL MIGHT HAPPEN: NOT AT ALL

## 2019-06-17 ASSESSMENT — PAIN SCALES - GENERAL: PAINLEVEL: SEVERE PAIN (6)

## 2019-06-17 ASSESSMENT — MIFFLIN-ST. JEOR: SCORE: 1597

## 2019-06-17 NOTE — NURSING NOTE
Patient presents in the clinic for an H&P for a facility.  Chief Complaint   Patient presents with     H & P     Medication Reconciliation: complete    Adia Pat LPN
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
## 2019-06-18 ENCOUNTER — RESULTS ONLY (OUTPATIENT)
Dept: OTHER | Facility: CLINIC | Age: 49
End: 2019-06-18

## 2019-06-18 DIAGNOSIS — Z48.288 ENCOUNTER FOR AFTERCARE FOLLOWING MULTIPLE ORGAN TRANSPLANT: ICD-10-CM

## 2019-06-18 DIAGNOSIS — Z94.0 KIDNEY TRANSPLANTED: ICD-10-CM

## 2019-06-18 DIAGNOSIS — Z79.60 LONG-TERM USE OF IMMUNOSUPPRESSANT MEDICATION: ICD-10-CM

## 2019-06-18 LAB
ALBUMIN MFR UR ELPH: 5 MG/DL (ref 1–14)
ANION GAP SERPL CALCULATED.3IONS-SCNC: 8 MMOL/L (ref 3–14)
BUN SERPL-MCNC: 12 MG/DL (ref 7–25)
CALCIUM SERPL-MCNC: 10.1 MG/DL (ref 8.6–10.3)
CHLORIDE SERPL-SCNC: 108 MMOL/L (ref 98–107)
CO2 SERPL-SCNC: 27 MMOL/L (ref 21–31)
CREAT SERPL-MCNC: 1.47 MG/DL (ref 0.7–1.3)
CREAT UR-MCNC: 76 MG/DL
ERYTHROCYTE [DISTWIDTH] IN BLOOD BY AUTOMATED COUNT: 13.4 % (ref 10–15)
GFR SERPL CREATININE-BSD FRML MDRD: 51 ML/MIN/{1.73_M2}
GLUCOSE SERPL-MCNC: 94 MG/DL (ref 70–105)
HCT VFR BLD AUTO: 37.5 % (ref 40–53)
HGB BLD-MCNC: 12.1 G/DL (ref 13.3–17.7)
MCH RBC QN AUTO: 31.3 PG (ref 26.5–33)
MCHC RBC AUTO-ENTMCNC: 32.3 G/DL (ref 31.5–36.5)
MCV RBC AUTO: 97 FL (ref 78–100)
PLATELET # BLD AUTO: 143 10E9/L (ref 150–450)
POTASSIUM SERPL-SCNC: 3.7 MMOL/L (ref 3.5–5.1)
PROT/CREAT 24H UR: 0.07 MG/G{CREAT}
RBC # BLD AUTO: 3.87 10E12/L (ref 4.4–5.9)
SODIUM SERPL-SCNC: 143 MMOL/L (ref 134–144)
WBC # BLD AUTO: 4.4 10E9/L (ref 4–11)

## 2019-06-18 PROCEDURE — 84156 ASSAY OF PROTEIN URINE: CPT | Performed by: INTERNAL MEDICINE

## 2019-06-18 PROCEDURE — 87799 DETECT AGENT NOS DNA QUANT: CPT | Performed by: INTERNAL MEDICINE

## 2019-06-18 PROCEDURE — 36415 COLL VENOUS BLD VENIPUNCTURE: CPT | Performed by: INTERNAL MEDICINE

## 2019-06-18 PROCEDURE — 86833 HLA CLASS II HIGH DEFIN QUAL: CPT | Performed by: INTERNAL MEDICINE

## 2019-06-18 PROCEDURE — 85027 COMPLETE CBC AUTOMATED: CPT | Performed by: INTERNAL MEDICINE

## 2019-06-18 PROCEDURE — 80048 BASIC METABOLIC PNL TOTAL CA: CPT | Performed by: INTERNAL MEDICINE

## 2019-06-18 PROCEDURE — 86832 HLA CLASS I HIGH DEFIN QUAL: CPT | Performed by: INTERNAL MEDICINE

## 2019-06-18 PROCEDURE — 80158 DRUG ASSAY CYCLOSPORINE: CPT | Performed by: INTERNAL MEDICINE

## 2019-06-18 ASSESSMENT — ANXIETY QUESTIONNAIRES: GAD7 TOTAL SCORE: 7

## 2019-06-19 ENCOUNTER — ONCOLOGY VISIT (OUTPATIENT)
Dept: ONCOLOGY | Facility: OTHER | Age: 49
End: 2019-06-19
Attending: INTERNAL MEDICINE
Payer: COMMERCIAL

## 2019-06-19 VITALS
SYSTOLIC BLOOD PRESSURE: 90 MMHG | BODY MASS INDEX: 22.12 KG/M2 | WEIGHT: 158 LBS | RESPIRATION RATE: 16 BRPM | HEIGHT: 71 IN | TEMPERATURE: 98.3 F | HEART RATE: 90 BPM | DIASTOLIC BLOOD PRESSURE: 60 MMHG

## 2019-06-19 DIAGNOSIS — N05.9 NEPHRITIS AND NEPHROPATHY, WITH PATHOLOGICAL LESION IN KIDNEY: ICD-10-CM

## 2019-06-19 DIAGNOSIS — C64.9 RENAL CELL CARCINOMA, UNSPECIFIED LATERALITY (H): Primary | ICD-10-CM

## 2019-06-19 DIAGNOSIS — Z85.528 HX OF RENAL CELL CANCER: ICD-10-CM

## 2019-06-19 DIAGNOSIS — Z94.0 KIDNEY TRANSPLANT RECIPIENT: ICD-10-CM

## 2019-06-19 DIAGNOSIS — T86.11 KIDNEY TRANSPLANT REJECTION: ICD-10-CM

## 2019-06-19 DIAGNOSIS — N02.B9 IGA NEPHROPATHY: ICD-10-CM

## 2019-06-19 DIAGNOSIS — E46 MALNUTRITION, UNSPECIFIED TYPE (H): ICD-10-CM
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
## 2019-06-19 DIAGNOSIS — Z94.0 KIDNEY TRANSPLANTED: ICD-10-CM

## 2019-06-19 DIAGNOSIS — R10.9 LEFT FLANK PAIN: ICD-10-CM

## 2019-06-19 DIAGNOSIS — E86.0 DEHYDRATION: ICD-10-CM

## 2019-06-19 LAB
BKV DNA # SPEC NAA+PROBE: ABNORMAL COPIES/ML
BKV DNA SPEC NAA+PROBE-LOG#: 4.9 LOG COPIES/ML
CYCLOSPORINE BLD LC/MS/MS-MCNC: 224 UG/L (ref 50–400)
DONOR IDENTIFICATION: NORMAL
DSA COMMENTS: NORMAL
DSA PRESENT: NO
DSA TEST METHOD: NORMAL
EBV DNA # SPEC NAA+PROBE: NORMAL {COPIES}/ML
EBV DNA SPEC NAA+PROBE-LOG#: NORMAL {LOG_COPIES}/ML
ORGAN: NORMAL
SA1 CELL: NORMAL
SA1 COMMENTS: NORMAL
SA1 HI RISK ABY: NORMAL
SA1 MOD RISK ABY: NORMAL
SA1 TEST METHOD: NORMAL
SA2 CELL: NORMAL
SA2 COMMENTS: NORMAL
SA2 HI RISK ABY UA: NORMAL
SA2 MOD RISK ABY: NORMAL
SA2 TEST METHOD: NORMAL
SPECIMEN SOURCE: ABNORMAL
TME LAST DOSE: NORMAL H
UNACCEPTABLE ANTIGEN: NORMAL
UNOS CPRA: 100

## 2019-06-19 PROCEDURE — 99215 OFFICE O/P EST HI 40 MIN: CPT | Performed by: INTERNAL MEDICINE

## 2019-06-19 PROCEDURE — G0463 HOSPITAL OUTPT CLINIC VISIT: HCPCS

## 2019-06-19 ASSESSMENT — PATIENT HEALTH QUESTIONNAIRE - PHQ9: SUM OF ALL RESPONSES TO PHQ QUESTIONS 1-9: 3

## 2019-06-19 ASSESSMENT — PAIN SCALES - GENERAL: PAINLEVEL: SEVERE PAIN (6)

## 2019-06-19 ASSESSMENT — MIFFLIN-ST. JEOR: SCORE: 1598.55

## 2019-06-19 NOTE — NURSING NOTE
"Patient present to re-establish care for renal cancer. New left flank pain. States is gone now.  Bella Galdamez RN...........6/19/2019 1:00 PM    Chief Complaint   Patient presents with     RECHECK     renal cell cancer       Initial BP 90/60   Pulse 90   Temp 98.3  F (36.8  C)   Resp 16   Ht 1.803 m (5' 10.98\")   Wt 71.7 kg (158 lb)   BMI 22.05 kg/m   Estimated body mass index is 22.05 kg/m  as calculated from the following:    Height as of this encounter: 1.803 m (5' 10.98\").    Weight as of this encounter: 71.7 kg (158 lb).  Medication Reconciliation: complete    Bella Galdamez RN      "

## 2019-06-20 ENCOUNTER — TELEPHONE (OUTPATIENT)
Dept: TRANSPLANT | Facility: CLINIC | Age: 49
End: 2019-06-20

## 2019-06-20 DIAGNOSIS — D84.9 IMMUNOSUPPRESSED STATUS (H): ICD-10-CM

## 2019-06-20 RX ORDER — CYCLOSPORINE 25 MG/1
CAPSULE, LIQUID FILLED ORAL
Qty: 60 CAPSULE | Refills: 11 | Status: SHIPPED | OUTPATIENT
Start: 2019-06-20 | End: 2019-09-19

## 2019-06-20 RX ORDER — CYCLOSPORINE 100 MG/1
100 CAPSULE, LIQUID FILLED ORAL 2 TIMES DAILY
Qty: 60 CAPSULE | Refills: 11 | Status: SHIPPED | OUTPATIENT
Start: 2019-06-20 | End: 2019-10-16

## 2019-06-20 NOTE — LETTER
PHYSICIAN ORDERS      DATE & TIME ISSUED: 2019 4:02 PM  PATIENT NAME: Gilles Henning III   : 1970     [facility] MR# [if applicable]: [id number]     DIAGNOSIS:  Kidney replaced by transplant  ICD-10 CODE: Z94.0     Please REDUCE cyclosporine dose to 100mg, oral, twice daily.    Any questions please call: [facility] SOT                                             [phone] ext [phone]

## 2019-06-20 NOTE — TELEPHONE ENCOUNTER
ISSUE:   Cyclosporine level 244 on 6/18, goal 100 - 125, dose 125 mg BID    PLAN:   Please call pt and confirm this was a good 12-hour trough. Verify dose 125 mg BID. Confirm no new medications or illness (kee. Diarrhea). If good trough, decrease dose to 100 mg BID and recheck level in 1 week, as scheduled.     RNCC confirmed dose with nursing at [facility] is currently taking 125mg.  Dose reduced, will recheck las next week Monday.   Vy at [facility] voiced understanding.  Orders faxed.
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
## 2019-06-25 ENCOUNTER — TELEPHONE (OUTPATIENT)
Dept: TRANSPLANT | Facility: CLINIC | Age: 49
End: 2019-06-25

## 2019-06-25 ENCOUNTER — TELEPHONE (OUTPATIENT)
Dept: PEDIATRICS | Facility: OTHER | Age: 49
End: 2019-06-25

## 2019-06-25 DIAGNOSIS — Z48.288 ENCOUNTER FOR AFTERCARE FOLLOWING MULTIPLE ORGAN TRANSPLANT: ICD-10-CM

## 2019-06-25 DIAGNOSIS — N02.B9 IGA NEPHROPATHY: ICD-10-CM

## 2019-06-25 DIAGNOSIS — D84.9 IMMUNOSUPPRESSED STATUS (H): ICD-10-CM

## 2019-06-25 DIAGNOSIS — Z94.0 KIDNEY TRANSPLANTED: ICD-10-CM

## 2019-06-25 DIAGNOSIS — Z79.60 LONG-TERM USE OF IMMUNOSUPPRESSANT MEDICATION: ICD-10-CM

## 2019-06-25 LAB
ALBUMIN SERPL-MCNC: 4.1 G/DL (ref 3.5–5.7)
ALP SERPL-CCNC: 72 U/L (ref 34–104)
ALT SERPL W P-5'-P-CCNC: 24 U/L (ref 7–52)
ANION GAP SERPL CALCULATED.3IONS-SCNC: 9 MMOL/L (ref 3–14)
AST SERPL W P-5'-P-CCNC: 32 U/L (ref 13–39)
BASOPHILS # BLD AUTO: 0 10E9/L (ref 0–0.2)
BASOPHILS NFR BLD AUTO: 0 %
BILIRUB SERPL-MCNC: 0.7 MG/DL (ref 0.3–1)
BUN SERPL-MCNC: 12 MG/DL (ref 7–25)
CALCIUM SERPL-MCNC: 9.3 MG/DL (ref 8.6–10.3)
CHLORIDE SERPL-SCNC: 109 MMOL/L (ref 98–107)
CO2 SERPL-SCNC: 24 MMOL/L (ref 21–31)
CREAT SERPL-MCNC: 1.5 MG/DL (ref 0.7–1.3)
DIFFERENTIAL METHOD BLD: ABNORMAL
EOSINOPHIL # BLD AUTO: 0.1 10E9/L (ref 0–0.7)
EOSINOPHIL NFR BLD AUTO: 3 %
ERYTHROCYTE [DISTWIDTH] IN BLOOD BY AUTOMATED COUNT: 13.5 % (ref 10–15)
GFR SERPL CREATININE-BSD FRML MDRD: 50 ML/MIN/{1.73_M2}
GLUCOSE SERPL-MCNC: 94 MG/DL (ref 70–105)
HCT VFR BLD AUTO: 35.5 % (ref 40–53)
HGB BLD-MCNC: 11.7 G/DL (ref 13.3–17.7)
LDH SERPL L TO P-CCNC: 227 U/L (ref 140–271)
LYMPHOCYTES # BLD AUTO: 0.7 10E9/L (ref 0.8–5.3)
LYMPHOCYTES NFR BLD AUTO: 17 %
MCH RBC QN AUTO: 31.6 PG (ref 26.5–33)
MCHC RBC AUTO-ENTMCNC: 33 G/DL (ref 31.5–36.5)
MCV RBC AUTO: 96 FL (ref 78–100)
METAMYELOCYTES # BLD: 0.1 10E9/L
METAMYELOCYTES NFR BLD MANUAL: 3 %
MONOCYTES # BLD AUTO: 0.2 10E9/L (ref 0–1.3)
MONOCYTES NFR BLD AUTO: 5 %
NEUTROPHILS # BLD AUTO: 3 10E9/L (ref 1.6–8.3)
NEUTROPHILS NFR BLD AUTO: 72 %
PLATELET # BLD AUTO: 135 10E9/L (ref 150–450)
PLATELET # BLD EST: ABNORMAL 10*3/UL
POTASSIUM SERPL-SCNC: 4.2 MMOL/L (ref 3.5–5.1)
PROT SERPL-MCNC: 6.9 G/DL (ref 6.4–8.9)
RBC # BLD AUTO: 3.7 10E12/L (ref 4.4–5.9)
RBC MORPH BLD: ABNORMAL
SODIUM SERPL-SCNC: 142 MMOL/L (ref 134–144)
WBC # BLD AUTO: 4.2 10E9/L (ref 4–11)

## 2019-06-25 PROCEDURE — 83883 ASSAY NEPHELOMETRY NOT SPEC: CPT | Performed by: INTERNAL MEDICINE

## 2019-06-25 PROCEDURE — 82784 ASSAY IGA/IGD/IGG/IGM EACH: CPT | Performed by: INTERNAL MEDICINE

## 2019-06-25 PROCEDURE — 84165 PROTEIN E-PHORESIS SERUM: CPT | Performed by: INTERNAL MEDICINE

## 2019-06-25 PROCEDURE — 00000402 ZZHCL STATISTIC TOTAL PROTEIN: Performed by: INTERNAL MEDICINE

## 2019-06-25 PROCEDURE — 84999 UNLISTED CHEMISTRY PROCEDURE: CPT | Performed by: INTERNAL MEDICINE

## 2019-06-25 PROCEDURE — 86334 IMMUNOFIX E-PHORESIS SERUM: CPT | Performed by: INTERNAL MEDICINE

## 2019-06-25 PROCEDURE — 80158 DRUG ASSAY CYCLOSPORINE: CPT | Performed by: INTERNAL MEDICINE

## 2019-06-25 PROCEDURE — 85810 BLOOD VISCOSITY EXAMINATION: CPT | Performed by: INTERNAL MEDICINE

## 2019-06-25 PROCEDURE — 80053 COMPREHEN METABOLIC PANEL: CPT | Performed by: INTERNAL MEDICINE

## 2019-06-25 PROCEDURE — 36415 COLL VENOUS BLD VENIPUNCTURE: CPT | Performed by: INTERNAL MEDICINE

## 2019-06-25 PROCEDURE — 85025 COMPLETE CBC W/AUTO DIFF WBC: CPT | Performed by: INTERNAL MEDICINE

## 2019-06-25 PROCEDURE — 83615 LACTATE (LD) (LDH) ENZYME: CPT | Performed by: INTERNAL MEDICINE

## 2019-06-25 RX ORDER — FLUDROCORTISONE ACETATE 0.1 MG/1
1 TABLET ORAL DAILY
Refills: 2 | COMMUNITY
Start: 2019-06-24 | End: 2019-10-16

## 2019-06-25 NOTE — TELEPHONE ENCOUNTER
"Received fax from Jimbo Young stating the following.    \"Gilles is returning to [facility] today with support from Jonn (Adult [facility] Targeted  [facility]) once weekly, continued mental health out patient care through [facility]. By statute, BMS cannot remove alcohol from Gilles's possession, nor can BMS stop him from participating in activities of his choice in the community.   Safety checks have been put in place for hourly.  If at any safety check Gilles is found to be in possession of, or consumed alcohol, Jonn will be notified or 911 will be called and individual taken to [facility].  Please review the current medication list and sign off on medications, services for Gilles and return to [facility] via fax at [phone].\"      Spoke with nurse their and fixed medication list and they are wondering if Charlie Dubose MD will discontinue the following controlled substances.      Chlorhexidine, Xanax, cyclobenzaprine, and Oxycodone.     Moon Javier LPN ....................  6/25/2019   2:41 PM    "

## 2019-06-25 NOTE — TELEPHONE ENCOUNTER
Creatinine baseline 1.2 - 1.4, now up to 1.5.  BPs are stable.     Hector reports that he has increased his oral intake, as he was feeling slightly dehydrated.    Will discuss need for closure biopsy with MD.

## 2019-06-26 LAB
ALBUMIN SERPL ELPH-MCNC: 4.1 G/DL (ref 3.7–5.1)
ALPHA1 GLOB SERPL ELPH-MCNC: 0.3 G/DL (ref 0.2–0.4)
ALPHA2 GLOB SERPL ELPH-MCNC: 0.8 G/DL (ref 0.5–0.9)
B-GLOBULIN SERPL ELPH-MCNC: 0.8 G/DL (ref 0.6–1)
CYCLOSPORINE BLD LC/MS/MS-MCNC: 124 UG/L (ref 50–400)
GAMMA GLOB SERPL ELPH-MCNC: 0.9 G/DL (ref 0.7–1.6)
IGA SERPL-MCNC: 317 MG/DL (ref 70–380)
IGG SERPL-MCNC: 891 MG/DL (ref 695–1620)
IGM SERPL-MCNC: 47 MG/DL (ref 60–265)
KAPPA LC UR-MCNC: NORMAL MG/DL (ref 0.33–1.94)
KAPPA LC/LAMBDA SER: NORMAL {RATIO} (ref 0.26–1.65)
LAMBDA LC SERPL-MCNC: NORMAL MG/DL (ref 0.57–2.63)
M PROTEIN SERPL ELPH-MCNC: 0.1 G/DL
PROT PATTERN SERPL ELPH-IMP: ABNORMAL
PROT PATTERN SERPL IFE-IMP: ABNORMAL
TME LAST DOSE: NORMAL H
VISC SER: 1.5 CP (ref 1.4–1.8)

## 2019-06-26 NOTE — TELEPHONE ENCOUNTER
Chart review shows that Rx as requested was last filled as follows:    Outpatient Medication Detail      Disp Refills Start End KWAME   sulfamethoxazole-trimethoprim (BACTRIM/SEPTRA) 400-80 MG tablet 30 tablet 0 5/9/2019  No   Sig - Route: Take 1 tablet by mouth At Bedtime - Oral   Sent to pharmacy as: sulfamethoxazole-trimethoprim (BACTRIM/SEPTRA) 400-80 MG tablet   Class: E-Prescribe   Order: [id number]   E-Prescribing Status: Receipt confirmed by pharmacy (5/9/2019  2:00 PM CDT)   Printout Tracking     External Result Report   Pharmacy     [id number] - [place], [place] - [address]   Associated Diagnoses     Immunosuppressed status (H) [D89.9]       Source Order Set     Order Set Name Order ID    [id number]     Prescribing Provider's NPI: [id number]  Dorothea Coyle     And is due for a refill. LOV with PCP was on 6/17/19 and Rx as requested is not on RN refill protocol. Writer will mookie up and route Rx request to PCP for his consideration/approval.    Unable to complete prescription refill per RN Medication Refill Policy. Delmer Ryan 6/26/2019 1:56 PM

## 2019-06-27 LAB
RESULT: ABNORMAL
SEND OUTS MISC TEST CODE: ABNORMAL
SEND OUTS MISC TEST SPECIMEN: ABNORMAL
TEST NAME: ABNORMAL

## 2019-06-27 RX ORDER — SULFAMETHOXAZOLE AND TRIMETHOPRIM 400; 80 MG/1; MG/1
1 TABLET ORAL AT BEDTIME
Qty: 90 TABLET | Refills: 3 | Status: SHIPPED | OUTPATIENT
Start: 2019-06-27 | End: 2019-10-16

## 2019-06-28 ENCOUNTER — TELEPHONE (OUTPATIENT)
Dept: TRANSPLANT | Facility: CLINIC | Age: 49
End: 2019-06-28

## 2019-06-28 DIAGNOSIS — Z94.0 KIDNEY TRANSPLANTED: Primary | ICD-10-CM

## 2019-06-28 NOTE — TELEPHONE ENCOUNTER
Post Kidney and Pancreas Transplant Team Conference  Date: 6/28/2019  Transplant Coordinator: Angeline Alonso     Attendees, via teleconferenc:  []  Dr. Macias [] Thao Barnard, RN  [] Blossom Montes LPN     []  Dr. Donato [] Martha Morris, DONALD [] Noelle Wagner LPN   [x]  Dr. Telles [] Beth Evans RN    []  Dr. Cabrera [] Katelyn Gallegos RN [] Jacob López, PharmD   [] Dr. Lorenzo [x] Maliha Alonso RN    [] Dr. Finley [] Mahad Steele RN    [] Dr. Maynard [] Mildred Barajas RN    [] Dr. Parsons [] Gia Romero RN    [] Dr. Royal [] Marianna Fortune RN    [] Surgery Fellow [] Arash Agarwal RN    [] Rachel Resendez NP [] Thao Villarreal RN        Verbal Plan Read Back:   Pursue closure biopsy.    Routed to RN Coordinator   Angeline Young voiced understanding and will complete biopsy on 7/3.

## 2019-07-01 NOTE — TELEPHONE ENCOUNTER
Transplant Coordinator Renal Biopsy Communication    Call placed to Gilles Henning III to discuss indication for kidney transplant biopsy per Dr. Telles.     Indication for transplant renal biopsy: closure biopsy after 1A kidney transplant rejection.    Laterality: right  Date of biopsy: 7/10/19    Patient location within 70 miles of [facility]: No.  If no, must stay overnight locally. The patient will not stay locally, but will return to his AL facility. RNCC discussed s/s of need to reach out to SOT if need be.    Gilles Henning III's medication list was reviewed.   Anticoagulant: none   Ibuprofen: No.  Fish Oil:  No.  Medications held: NA    Recent blood pressure readings are WNL or not applicable. Instructed to take medication, especially blood pressure medications, before arriving to the [facility] at 0600 day of procedure.     Procedure expectations and duration of stay discussed. Expressed pt can expect a phone call from LPN/MA to confirm biopsy date/time/location/directions/review of medications. Pt has no additional questions at this time. Transplant Office phone number given to pt for future questions.      Angeline Alonso  Kidney/Pancreas Transplant Coordinator  [phone] option 5

## 2019-07-02 DIAGNOSIS — T86.10 COMPLICATIONS, KIDNEY TRANSPLANT: Primary | ICD-10-CM

## 2019-07-02 NOTE — TELEPHONE ENCOUNTER
LPN/MA  Renal Biopsy Communication    Call to pt to confirm renal biopsy procedure. Biopsy orders entered and patient aware of date 7/10/2019, in [facility] and to arrive at 6:00AM.     No need to be NPO.      Discussed anticoagulants (i.e. fish oil, ASA, Plavix, Coumadin, Ibuprofen). Pt denies use of anticoagulants.     Take all medicine before arrival for biopsy and bring all medicine bottles with.      Report to 1st floor lab, then 5th floor for biopsy.      Use fitogram services and bring form of entertainment.     Discussed with patient need to stay overnight locally evening of procedure if live more than 70 miles away.    Call placed to scheduling at [phone] to schedule and confirm biopsy date/time    Lab appointment scheduled for morning of biopsy.

## 2019-07-03 ENCOUNTER — HOSPITAL ENCOUNTER (OUTPATIENT)
Dept: PET IMAGING | Facility: OTHER | Age: 49
Discharge: HOME | End: 2019-07-03
Attending: INTERNAL MEDICINE | Admitting: INTERNAL MEDICINE
Payer: MEDICARE

## 2019-07-03 DIAGNOSIS — N02.B9 IGA NEPHROPATHY: ICD-10-CM

## 2019-07-03 DIAGNOSIS — Z85.528 HX OF RENAL CELL CANCER: ICD-10-CM

## 2019-07-03 DIAGNOSIS — C64.9 RENAL CELL CARCINOMA, UNSPECIFIED LATERALITY (H): ICD-10-CM

## 2019-07-03 DIAGNOSIS — R10.9 LEFT FLANK PAIN: ICD-10-CM

## 2019-07-03 PROCEDURE — 34300033 ZZH RX 343: Performed by: INTERNAL MEDICINE

## 2019-07-03 PROCEDURE — 78815 PET IMAGE W/CT SKULL-THIGH: CPT | Mod: PS

## 2019-07-03 PROCEDURE — A9552 F18 FDG: HCPCS | Performed by: INTERNAL MEDICINE

## 2019-07-03 RX ADMIN — FLUDEOXYGLUCOSE F-18 11.25 MCI.: 500 INJECTION, SOLUTION INTRAVENOUS at 15:34

## 2019-07-04 NOTE — TELEPHONE ENCOUNTER
Will the 4x4 Optifoam be ok for them also?  Tracy River LPN...................9/5/2018   12:53 PM    0 = understands/communicates without difficulty

## 2019-07-09 ENCOUNTER — APPOINTMENT (OUTPATIENT)
Dept: GENERAL RADIOLOGY | Facility: OTHER | Age: 49
End: 2019-07-09
Attending: PHYSICIAN ASSISTANT
Payer: MEDICARE

## 2019-07-09 ENCOUNTER — HOSPITAL ENCOUNTER (EMERGENCY)
Facility: OTHER | Age: 49
Discharge: HOME | End: 2019-07-09
Attending: PHYSICIAN ASSISTANT | Admitting: PHYSICIAN ASSISTANT
Payer: MEDICARE

## 2019-07-09 VITALS
OXYGEN SATURATION: 99 % | DIASTOLIC BLOOD PRESSURE: 64 MMHG | WEIGHT: 164 LBS | TEMPERATURE: 97.6 F | RESPIRATION RATE: 16 BRPM | SYSTOLIC BLOOD PRESSURE: 122 MMHG | BODY MASS INDEX: 22.96 KG/M2 | HEIGHT: 71 IN | HEART RATE: 76 BPM

## 2019-07-09 DIAGNOSIS — K59.00 CONSTIPATION: ICD-10-CM

## 2019-07-09 PROCEDURE — 99283 EMERGENCY DEPT VISIT LOW MDM: CPT | Mod: 25 | Performed by: PHYSICIAN ASSISTANT

## 2019-07-09 PROCEDURE — 99283 EMERGENCY DEPT VISIT LOW MDM: CPT | Mod: Z6 | Performed by: PHYSICIAN ASSISTANT

## 2019-07-09 PROCEDURE — 25000132 ZZH RX MED GY IP 250 OP 250 PS 637: Mod: GY | Performed by: PHYSICIAN ASSISTANT

## 2019-07-09 PROCEDURE — 74019 RADEX ABDOMEN 2 VIEWS: CPT

## 2019-07-09 RX ORDER — OMEPRAZOLE 40 MG/1
40 CAPSULE, DELAYED RELEASE ORAL DAILY
Refills: 10 | COMMUNITY
Start: 2019-06-30

## 2019-07-09 RX ORDER — CHLORHEXIDINE GLUCONATE ORAL RINSE 1.2 MG/ML
SOLUTION DENTAL
Refills: 1 | COMMUNITY
Start: 2019-05-03 | End: 2019-07-16

## 2019-07-09 RX ORDER — MAGNESIUM CARB/ALUMINUM HYDROX 105-160MG
296 TABLET,CHEWABLE ORAL ONCE
Status: COMPLETED | OUTPATIENT
Start: 2019-07-09 | End: 2019-07-09

## 2019-07-09 RX ADMIN — MAGNESIUM CITRATE 296 ML: 1.75 LIQUID ORAL at 12:22

## 2019-07-09 ASSESSMENT — ENCOUNTER SYMPTOMS
CHILLS: 0
DIARRHEA: 0
BRUISES/BLEEDS EASILY: 0
SORE THROAT: 0
WOUND: 0
DIZZINESS: 0
ADENOPATHY: 0
FACIAL SWELLING: 0
CONFUSION: 0
BACK PAIN: 0
TROUBLE SWALLOWING: 0
SHORTNESS OF BREATH: 0
VOMITING: 0
NAUSEA: 0
HEADACHES: 0
ABDOMINAL PAIN: 1
FEVER: 0
LIGHT-HEADEDNESS: 0
CHEST TIGHTNESS: 0
CONSTIPATION: 1
HEMATURIA: 0

## 2019-07-09 ASSESSMENT — MIFFLIN-ST. JEOR: SCORE: 1626.03
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
## 2019-07-09 NOTE — ED AVS SNAPSHOT
[address]  [place] [address]  Phone:  [phone]  Fax:  [phone]                                    Gilles Henning III   MRN: [id number]    Department:  [facility]   Date of Visit:  7/9/2019           After Visit Summary Signature Page    I have received my discharge instructions, and my questions have been answered. I have discussed any challenges I see with this plan with the nurse or doctor.    ..........................................................................................................................................  Patient/Patient Representative Signature      ..........................................................................................................................................  Patient Representative Print Name and Relationship to Patient    ..................................................               ................................................  Date                                   Time    ..........................................................................................................................................  Reviewed by Signature/Title    ...................................................              ..............................................  Date                                               Time          22EPIC Rev 08/18

## 2019-07-09 NOTE — ED TRIAGE NOTES
Pt reports he hasn't gone to the bathroom for a week or 2 and when he did go it was really dry.  Pt says he feels really dehydrated and when he eats & drinks it feels hard to breathe due to abdominal pressure.

## 2019-07-10 PROBLEM — F41.8 MIXED ANXIETY DEPRESSIVE DISORDER: Status: ACTIVE | Noted: 2019-07-10

## 2019-07-10 PROBLEM — E87.5 HYPERKALEMIA: Status: ACTIVE | Noted: 2019-07-10

## 2019-07-10 PROBLEM — K59.00 OBSTIPATION: Status: ACTIVE | Noted: 2019-07-10

## 2019-07-10 PROBLEM — E78.5 DYSLIPIDEMIA: Status: ACTIVE | Noted: 2019-07-10

## 2019-07-10 PROBLEM — R62.50 DEVELOPMENTAL DELAY: Status: ACTIVE | Noted: 2019-07-10

## 2019-07-12 ENCOUNTER — RESULTS ONLY (OUTPATIENT)
Dept: OTHER | Facility: CLINIC | Age: 49
End: 2019-07-12

## 2019-07-12 ENCOUNTER — ANCILLARY PROCEDURE (OUTPATIENT)
Dept: RADIOLOGY | Facility: AMBULATORY SURGERY CENTER | Age: 49
End: 2019-07-12
Attending: INTERNAL MEDICINE
Payer: COMMERCIAL

## 2019-07-12 ENCOUNTER — HOSPITAL ENCOUNTER (OUTPATIENT)
Facility: AMBULATORY SURGERY CENTER | Age: 49
End: 2019-07-12
Attending: INTERNAL MEDICINE
Payer: COMMERCIAL

## 2019-07-12 VITALS
OXYGEN SATURATION: 99 % | RESPIRATION RATE: 16 BRPM | HEIGHT: 71 IN | TEMPERATURE: 97.7 F | WEIGHT: 165 LBS | BODY MASS INDEX: 23.1 KG/M2 | DIASTOLIC BLOOD PRESSURE: 74 MMHG | SYSTOLIC BLOOD PRESSURE: 117 MMHG

## 2019-07-12 DIAGNOSIS — N02.B9 IGA NEPHROPATHY: ICD-10-CM

## 2019-07-12 DIAGNOSIS — T86.10 COMPLICATIONS, KIDNEY TRANSPLANT: ICD-10-CM

## 2019-07-12 DIAGNOSIS — Z94.0 KIDNEY TRANSPLANTED: ICD-10-CM

## 2019-07-12 LAB
ABO + RH BLD: NORMAL
ABO + RH BLD: NORMAL
ACANTHOCYTES BLD QL SMEAR: SLIGHT
ALBUMIN UR-MCNC: NEGATIVE MG/DL
ANION GAP SERPL CALCULATED.3IONS-SCNC: 4 MMOL/L (ref 3–14)
APPEARANCE UR: CLEAR
B2 MICROGLOB SERPL-MCNC: 3.1 MG/L
BASOPHILS # BLD AUTO: 0 10E9/L (ref 0–0.2)
BASOPHILS NFR BLD AUTO: 1 %
BILIRUB UR QL STRIP: NEGATIVE
BKV DNA # SPEC NAA+PROBE: ABNORMAL COPIES/ML
BKV DNA SPEC NAA+PROBE-LOG#: 4.6 LOG COPIES/ML
BLD GP AB SCN SERPL QL: NORMAL
BLOOD BANK CMNT PATIENT-IMP: NORMAL
BUN SERPL-MCNC: 15 MG/DL (ref 7–30)
BURR CELLS BLD QL SMEAR: SLIGHT
CALCIUM SERPL-MCNC: 9.4 MG/DL (ref 8.5–10.1)
CHLORIDE SERPL-SCNC: 108 MMOL/L (ref 94–109)
CO2 SERPL-SCNC: 29 MMOL/L (ref 20–32)
COLOR UR AUTO: YELLOW
CREAT SERPL-MCNC: 1.34 MG/DL (ref 0.66–1.25)
CREAT UR-MCNC: 103 MG/DL
CYCLOSPORINE BLD LC/MS/MS-MCNC: 112 UG/L (ref 50–400)
DIFFERENTIAL METHOD BLD: ABNORMAL
DONOR IDENTIFICATION: NORMAL
DSA COMMENTS: NORMAL
DSA PRESENT: NO
DSA TEST METHOD: NORMAL
EOSINOPHIL # BLD AUTO: 0.1 10E9/L (ref 0–0.7)
EOSINOPHIL NFR BLD AUTO: 2 %
ERYTHROCYTE [DISTWIDTH] IN BLOOD BY AUTOMATED COUNT: 14.5 % (ref 10–15)
GFR SERPL CREATININE-BSD FRML MDRD: 61 ML/MIN/{1.73_M2}
GLUCOSE SERPL-MCNC: 100 MG/DL (ref 70–99)
GLUCOSE UR STRIP-MCNC: NEGATIVE MG/DL
HCT VFR BLD AUTO: 34.4 % (ref 40–53)
HGB BLD-MCNC: 10.9 G/DL (ref 13.3–17.7)
HGB BLD-MCNC: 11.6 G/DL (ref 13.3–17.7)
HGB UR QL STRIP: NEGATIVE
INR PPP: 0.92 (ref 0.86–1.14)
KETONES UR STRIP-MCNC: NEGATIVE MG/DL
LEUKOCYTE ESTERASE UR QL STRIP: NEGATIVE
LYMPHOCYTES # BLD AUTO: 0.7 10E9/L (ref 0.8–5.3)
LYMPHOCYTES NFR BLD AUTO: 16 %
MCH RBC QN AUTO: 32.2 PG (ref 26.5–33)
MCHC RBC AUTO-ENTMCNC: 31.7 G/DL (ref 31.5–36.5)
MCV RBC AUTO: 102 FL (ref 78–100)
MICROALBUMIN UR-MCNC: 5 MG/L
MICROALBUMIN/CREAT UR: 5.27 MG/G CR (ref 0–17)
MONOCYTES # BLD AUTO: 0.7 10E9/L (ref 0–1.3)
MONOCYTES NFR BLD AUTO: 15 %
NEUTROPHILS # BLD AUTO: 3 10E9/L (ref 1.6–8.3)
NEUTROPHILS NFR BLD AUTO: 66 %
NITRATE UR QL: NEGATIVE
ORGAN: NORMAL
OVALOCYTES BLD QL SMEAR: SLIGHT
PH UR STRIP: 5 PH (ref 5–7)
PLATELET # BLD AUTO: 140 10E9/L (ref 150–450)
PLATELET # BLD EST: ABNORMAL 10*3/UL
POIKILOCYTOSIS BLD QL SMEAR: SLIGHT
POLYCHROMASIA BLD QL SMEAR: SLIGHT
POTASSIUM SERPL-SCNC: 3.8 MMOL/L (ref 3.4–5.3)
PROT UR-MCNC: 0.16 G/L
PROT/CREAT 24H UR: 0.15 G/G CR (ref 0–0.2)
RBC # BLD AUTO: 3.39 10E12/L (ref 4.4–5.9)
RBC #/AREA URNS AUTO: <1 /HPF (ref 0–2)
SA1 CELL: NORMAL
SA1 COMMENTS: NORMAL
SA1 HI RISK ABY: NORMAL
SA1 MOD RISK ABY: NORMAL
SA1 TEST METHOD: NORMAL
SA2 CELL: NORMAL
SA2 COMMENTS: NORMAL
SA2 HI RISK ABY UA: NORMAL
SA2 MOD RISK ABY: NORMAL
SA2 TEST METHOD: NORMAL
SODIUM SERPL-SCNC: 141 MMOL/L (ref 133–144)
SOURCE: NORMAL
SP GR UR STRIP: 1.01 (ref 1–1.03)
SPECIMEN EXP DATE BLD: NORMAL
SPECIMEN SOURCE: ABNORMAL
TME LAST DOSE: NORMAL H
UNACCEPTABLE ANTIGEN: NORMAL
UNOS CPRA: 100
UROBILINOGEN UR STRIP-MCNC: 0 MG/DL (ref 0–2)
WBC # BLD AUTO: 4.5 10E9/L (ref 4–11)
WBC #/AREA URNS AUTO: 1 /HPF (ref 0–5)

## 2019-07-12 ASSESSMENT — MIFFLIN-ST. JEOR: SCORE: 1630.57

## 2019-07-12 NOTE — PROCEDURES
Kidney Transplant Biopsy Procedure Note    Indication/Diagnosis:  Kidney transplant with kidney transplant and elevated creatinine    Procedure:  The indications and risks of the kidney biopsy, including bleeding severe enough to require hospitalization, transfusion, surgery, or even loss of the kidney or death, were explained, understood and agreed.  Consent was signed.  The kidney, located in the right lower quadrant, was visualized under ultrasound and biopsy site marked.  Patient was prepped and draped in the usual sterile manner.  Biopsy site was anesthetized with 1% lidocaine.  Biopsy consisted of 3 passes with a 18 ga needle under direct ultrasound guidance were made and tissue was sent to pathology.  There were no immediate complications.  Pressure was held over biopsy site and patient will be observed for signs of bleeding or other complications.  Patient remained hemodynamically stable during and early post procedure.
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
## 2019-07-12 NOTE — DISCHARGE INSTRUCTIONS
[facility]  Home Care Following Kidney Biopsy  ACTIVITY: NO heavy lifting (weight greater than 10 pounds) for 1 week. NO strenuous activity for 24 hours; relax and take it easy.     DIET: Resume your regular diet and drink plenty of fluids UNLESS you are fluid restricted.    DRAINAGE: There should be minimal drainage from the biopsy site. If bleeding soaks the dressing, you should lie down and apply pressure to the site for a minimum of 10 minutes. Call one of the numbers below if experienced bleeding that soaked the dressing.     DRESSING: Keep the dressing in place for 24 hours to prevent the site from re-opening and bleeding.     SEDATION: IF you received sedation medications, DO NOT drive or operate heavy machinery, DO NOT drink alcoholic beverages, and DO NOT make important legal decisions.    CALL ONE OF THE NUMBERS BELOW IF YOU EXPERIENCE ANY ONE OF THE FOLLOWING:      Excessive bleeding or drainage    Excessive swelling, redness, or tenderness at the site    Fever above 100.5 F, orally    Severe pain    Drainage that is green, yellow, thick white, or has a bad odor    If you havebloody urine or see bloody clots in your urine     Emergency Department: [phone] (open 24 hours)  Transplant Center: [phone] (open 7:00 AM - 6:30 PM)

## 2019-07-15 DIAGNOSIS — I10 HYPERTENSION: ICD-10-CM

## 2019-07-16 ENCOUNTER — ONCOLOGY VISIT (OUTPATIENT)
Dept: ONCOLOGY | Facility: OTHER | Age: 49
End: 2019-07-16
Attending: INTERNAL MEDICINE
Payer: COMMERCIAL

## 2019-07-16 ENCOUNTER — TELEPHONE (OUTPATIENT)
Dept: TRANSPLANT | Facility: CLINIC | Age: 49
End: 2019-07-16

## 2019-07-16 VITALS
TEMPERATURE: 97.7 F | HEIGHT: 71 IN | HEART RATE: 80 BPM | DIASTOLIC BLOOD PRESSURE: 86 MMHG | RESPIRATION RATE: 16 BRPM | SYSTOLIC BLOOD PRESSURE: 110 MMHG | WEIGHT: 166.8 LBS | BODY MASS INDEX: 23.35 KG/M2 | OXYGEN SATURATION: 96 %

## 2019-07-16 DIAGNOSIS — R94.02 ABNORMAL PET SCAN OF HEAD: Primary | ICD-10-CM

## 2019-07-16 LAB — COPATH REPORT: NORMAL

## 2019-07-16 PROCEDURE — G0463 HOSPITAL OUTPT CLINIC VISIT: HCPCS

## 2019-07-16 PROCEDURE — 99215 OFFICE O/P EST HI 40 MIN: CPT | Performed by: INTERNAL MEDICINE

## 2019-07-16 RX ORDER — EMOLLIENT BASE
CREAM (GRAM) TOPICAL DAILY
COMMUNITY

## 2019-07-16 ASSESSMENT — PAIN SCALES - GENERAL: PAINLEVEL: MILD PAIN (3)

## 2019-07-16 ASSESSMENT — MIFFLIN-ST. JEOR: SCORE: 1638.47

## 2019-07-16 NOTE — TELEPHONE ENCOUNTER
Post Kidney and Pancreas Transplant Team Conference  Date: 7/16/2019  Transplant Coordinator: Angeline Alonso     Attendees, via teleconference:  [x]  Dr. Macias [] Thao Barnard, RN  [] Blossom Montes LPN     []  Dr. Donato [] Martha Morris, DONALD [] Noelle Wagner LPN   []  Dr. Telles [] Beth Evans RN    []  Dr. Cabrera [] Katelyn Gallegos RN [] Jacob López, PharmD   [] Dr. Lorenzo [x] Maliha Alonso, DONALD    [] Dr. Finley [] Mahad Steele RN    [] Dr. Maynard [] Mildred Barajas RN    [] Dr. Parsons [] Gia Romero RN    [] Dr. Royal [] Marianna Fortune, DONALD    [] Surgery Fellow [] Arash Agarwal RN    [] Rachel Resendez NP [] Thao Villarreal RN        Verbal Plan Read Back:   Biopsy reviewed:  BK noted. No rejection.  Monitor next BK PCR with no changes at this time.       Routed to RN Coordinator   Angeline Young voiced understanding. No changes at this time.

## 2019-07-16 NOTE — NURSING NOTE
"Chief Complaint   Patient presents with     RECHECK     Renal cell cancer, Anemia   No current concerns or complaints.     Initial /86   Pulse 80   Temp 97.7  F (36.5  C) (Axillary)   Resp 16   Ht 1.803 m (5' 10.98\")   Wt 75.7 kg (166 lb 12.8 oz)   SpO2 96%   BMI 23.27 kg/m   Estimated body mass index is 23.27 kg/m  as calculated from the following:    Height as of this encounter: 1.803 m (5' 10.98\").    Weight as of this encounter: 75.7 kg (166 lb 12.8 oz).  Medication Reconciliation: complete    Oly Astudillo, CMA  "

## 2019-07-17 ENCOUNTER — TELEPHONE (OUTPATIENT)
Dept: TRANSPLANT | Facility: CLINIC | Age: 49
End: 2019-07-17

## 2019-07-17 NOTE — TELEPHONE ENCOUNTER
Post Kidney and Pancreas Transplant Team Conference  Date: 7/17/2019  Transplant Coordinator: Angeline Alonso     Attendees:  [x]  Dr. Macias [] Thao Barnard, RN  [x] Blossom Montes LPN     [x]  Dr. Donato [x] Martha Morris, DONALD [] Noelle Wagner LPN   []  Dr. Telles [] Beth Evans RN    []  Dr. Cabrera [] Katelyn Gallegos RN [x] Jacob López, PharmD   [] Dr. Lorenzo [x] Malhia Alonso, DONALD    [x] Dr. Finley [] Mahad Steele RN    [] Dr. Maynard [x] Mildred Barajas RN    [] Dr. Parsons [] Gia Romero RN    [] Dr. Royal [] Marianna Fortune, DONALD    [] Surgery Fellow [] Arash Agarwal RN    [] Rachel Resendez NP [] Thao Villarreal RN        Verbal Plan Read Back:   No changes at this time    Routed to RN Coordinator   Blossom Montes

## 2019-07-19 ENCOUNTER — TELEPHONE (OUTPATIENT)
Dept: PEDIATRICS | Facility: OTHER | Age: 49
End: 2019-07-19

## 2019-07-19 DIAGNOSIS — R21 RASH AND NONSPECIFIC SKIN ERUPTION: Primary | ICD-10-CM

## 2019-07-19 RX ORDER — AMLODIPINE BESYLATE 5 MG/1
TABLET ORAL
Qty: 30 TABLET | Refills: 11 | OUTPATIENT
Start: 2019-07-19
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
## 2019-07-19 NOTE — TELEPHONE ENCOUNTER
[facility] in [place], [place] sent Rx request for the following:      Amlodipine 5 mg tablet      Last Prescription Date:   6/5/19  Last Fill Qty/Refills:         30, R-2    Last Office Visit:              6/17/19   Future Office visit:           None noted    Refill request refused.  Pt is living at [facility] in  and uses [facility] for pharmacy.  Medication was also refill to [facility] on 6/5/19 for 3 months.    Unable to complete prescription refill per RNMedication Refill Policy.................... Michelle Richmond ....................  7/19/2019   10:21 AM

## 2019-07-19 NOTE — TELEPHONE ENCOUNTER
"Received fax Jimbo Young stating the following.    \"Resident stated he has some suspicious spots on his arms.  He would like looked by dermatology at [facility].  He has had some removed in the past that were cancerous. May we please get a referral for him?\"    Moon Javier LPN ....................  7/19/2019   9:44 AM    "

## 2019-07-23 ENCOUNTER — OFFICE VISIT (OUTPATIENT)
Dept: OTOLARYNGOLOGY | Facility: OTHER | Age: 49
End: 2019-07-23
Attending: OTOLARYNGOLOGY
Payer: MEDICARE

## 2019-07-23 DIAGNOSIS — R94.02 ABNORMAL PET SCAN OF HEAD: Primary | ICD-10-CM

## 2019-07-23 PROCEDURE — G0463 HOSPITAL OUTPT CLINIC VISIT: HCPCS

## 2019-07-24 ENCOUNTER — TELEPHONE (OUTPATIENT)
Dept: TRANSPLANT | Facility: CLINIC | Age: 49
End: 2019-07-24

## 2019-07-24 DIAGNOSIS — Z48.298 AFTERCARE FOLLOWING ORGAN TRANSPLANT: Primary | ICD-10-CM

## 2019-07-24 DIAGNOSIS — Z94.0 KIDNEY REPLACED BY TRANSPLANT: ICD-10-CM
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
## 2019-07-29 ENCOUNTER — TELEPHONE (OUTPATIENT)
Dept: TRANSPLANT | Facility: CLINIC | Age: 49
End: 2019-07-29

## 2019-07-29 DIAGNOSIS — Z48.288 ENCOUNTER FOR AFTERCARE FOLLOWING MULTIPLE ORGAN TRANSPLANT: ICD-10-CM

## 2019-07-29 DIAGNOSIS — Z48.298 AFTERCARE FOLLOWING ORGAN TRANSPLANT: ICD-10-CM

## 2019-07-29 DIAGNOSIS — Z94.0 KIDNEY TRANSPLANTED: ICD-10-CM

## 2019-07-29 DIAGNOSIS — Z94.0 KIDNEY REPLACED BY TRANSPLANT: ICD-10-CM

## 2019-07-29 DIAGNOSIS — Z79.60 LONG-TERM USE OF IMMUNOSUPPRESSANT MEDICATION: ICD-10-CM

## 2019-07-29 LAB
ANION GAP SERPL CALCULATED.3IONS-SCNC: 8 MMOL/L (ref 3–14)
BUN SERPL-MCNC: 13 MG/DL (ref 7–25)
CALCIUM SERPL-MCNC: 9.6 MG/DL (ref 8.6–10.3)
CHLORIDE SERPL-SCNC: 110 MMOL/L (ref 98–107)
CO2 SERPL-SCNC: 24 MMOL/L (ref 21–31)
CREAT SERPL-MCNC: 1.23 MG/DL (ref 0.7–1.3)
ERYTHROCYTE [DISTWIDTH] IN BLOOD BY AUTOMATED COUNT: 14.2 % (ref 10–15)
GFR SERPL CREATININE-BSD FRML MDRD: 62 ML/MIN/{1.73_M2}
GLUCOSE SERPL-MCNC: 100 MG/DL (ref 70–105)
HCT VFR BLD AUTO: 35.4 % (ref 40–53)
HGB BLD-MCNC: 11.7 G/DL (ref 13.3–17.7)
MCH RBC QN AUTO: 32.2 PG (ref 26.5–33)
MCHC RBC AUTO-ENTMCNC: 33.1 G/DL (ref 31.5–36.5)
MCV RBC AUTO: 98 FL (ref 78–100)
PLATELET # BLD AUTO: 144 10E9/L (ref 150–450)
POTASSIUM SERPL-SCNC: 3.9 MMOL/L (ref 3.5–5.1)
RBC # BLD AUTO: 3.63 10E12/L (ref 4.4–5.9)
SODIUM SERPL-SCNC: 142 MMOL/L (ref 134–144)
WBC # BLD AUTO: 4.1 10E9/L (ref 4–11)

## 2019-07-29 PROCEDURE — 80197 ASSAY OF TACROLIMUS: CPT | Performed by: INTERNAL MEDICINE

## 2019-07-29 PROCEDURE — 80158 DRUG ASSAY CYCLOSPORINE: CPT | Performed by: INTERNAL MEDICINE

## 2019-07-29 PROCEDURE — 85027 COMPLETE CBC AUTOMATED: CPT | Performed by: INTERNAL MEDICINE

## 2019-07-29 PROCEDURE — 87799 DETECT AGENT NOS DNA QUANT: CPT | Performed by: INTERNAL MEDICINE

## 2019-07-29 PROCEDURE — 80048 BASIC METABOLIC PNL TOTAL CA: CPT | Performed by: INTERNAL MEDICINE

## 2019-07-29 PROCEDURE — 36415 COLL VENOUS BLD VENIPUNCTURE: CPT | Performed by: INTERNAL MEDICINE

## 2019-07-29 NOTE — TELEPHONE ENCOUNTER
Hector called to review lab results -   Labs improved with good oral hydration.   Hector reports BP is running 120 - 130 / 80s.    RNCC advised lab draw in 1 month, as long as CSA is at goal.    [facility] Will repeat call RNCC with contact info once he is there and will repeat labs at [facility] as soon as he is returned.

## 2019-07-30 LAB
CYCLOSPORINE BLD LC/MS/MS-MCNC: 108 UG/L (ref 50–400)
EBV DNA # SPEC NAA+PROBE: <500 {COPIES}/ML
EBV DNA SPEC NAA+PROBE-LOG#: <2.7 {LOG_COPIES}/ML
TACROLIMUS BLD-MCNC: <3 UG/L (ref 5–15)
TME LAST DOSE: 958 H
TME LAST DOSE: 958 H

## 2019-08-05 ENCOUNTER — TELEPHONE (OUTPATIENT)
Dept: TRANSPLANT | Facility: CLINIC | Age: 49
End: 2019-08-05

## 2019-08-05 DIAGNOSIS — D84.9 IMMUNOSUPPRESSED STATUS (H): ICD-10-CM

## 2019-08-05 NOTE — TELEPHONE ENCOUNTER
Post Kidney and Pancreas Transplant Team Conference  Date: 8/2/2019  Transplant Coordinator: Angeline Alonso     Attendees, via teleconference:  []  Dr. Macias [] Thao Barnard, RN  [] Blossom Montes LPN     [x]  Dr. Donato [] Martha Morris, RN [] Noelle Wagner LPN   []  Dr. Telles [] Beth Evans, RN    []  Dr. Cabrera [] Katelyn Gallegos RN [] Jacob López, PharmD   [] Dr. Lorenzo [x] Maliha Alonso, DONALD    [] Dr. Finley [] Mahad Steele RN    [] Dr. Manyard [] Mildred Barajas RN    [] Dr. Parsons [] Gia Romero RN    [] Dr. Royal [] Marianna Fortune RN    [] Surgery Fellow [] Arash Agarwal RN    [] Rachel Resendez, NP [] Thao Villarreal RN        Verbal Plan Read Back:   Due to rise in BK:  REDUCE mmf to 500mg AM, 250mg PM  Recheck BK as scheudled in 2 weeks    Routed to RN Coordinator   Angeline Alonso    RNCC left  for Gen at [facility] to obtain contact information [facility].    RNCC spoke with Jason at [facility], she did confirm that [facility]. She did not have contact info for him there, but did refer me to his mental health .     RNCC LVM for  requesting Hector's most udpated contact information.

## 2019-08-20 RX ORDER — MYCOPHENOLATE MOFETIL 500 MG/1
TABLET ORAL
Qty: 60 TABLET | Refills: 11 | Status: SHIPPED | OUTPATIENT
Start: 2019-08-20 | End: 2019-10-08

## 2019-08-20 NOTE — TELEPHONE ENCOUNTER
RNCC left  for [facility] contact, Yasmin Pimentel NP.  RNCC has not received contact info for [facility].    RNCC was able to speak Garrick Brand NP, who is currently caring for Hector in-patient.  Garrick has made the changes for  AM, 250 PM. BK PCR Qt will be drawn NOW and then on 8/29.    RNCC gave Garrick contact info for [facility].

## 2019-09-10 ENCOUNTER — MYC MEDICAL ADVICE (OUTPATIENT)
Dept: PEDIATRICS | Facility: OTHER | Age: 49
End: 2019-09-10

## 2019-09-13 ENCOUNTER — TELEPHONE (OUTPATIENT)
Dept: TRANSPLANT | Facility: CLINIC | Age: 49
End: 2019-09-13

## 2019-09-13 NOTE — TELEPHONE ENCOUNTER
RNCC spoke with CORA Cruz at [facility] who reports that Hector has not discharged back to [facility] yet.    RNCC placed call to [facility]:  RNCC spoke with KHARI Mccauley currently providing care for Hector.  Expected discharge date = 9/17.19.  Orders for BMP, CBC, CSA, BK, and EBV placed.

## 2019-09-16 ENCOUNTER — TELEPHONE (OUTPATIENT)
Dept: TRANSPLANT | Facility: CLINIC | Age: 49
End: 2019-09-16

## 2019-09-16 NOTE — TELEPHONE ENCOUNTER
Hector called to notify [facility] that he had his labs drawn and he signed the release of information, so the [facility] will send results as soon as possible.  [facility] and [facility] to [facility]. He will have a working cell phone, contact info updated.    Hector also reports that [facility]ach [facility].    Hector reports a weight gain of 10lbs due to a healthy appetite and drinking 90oz of water daily.    Will await lab results.   gradual onset

## 2019-09-19 ENCOUNTER — HOSPITAL ENCOUNTER (EMERGENCY)
Facility: OTHER | Age: 49
Discharge: HOME | End: 2019-09-20
Attending: FAMILY MEDICINE | Admitting: FAMILY MEDICINE
Payer: MEDICARE

## 2019-09-19 DIAGNOSIS — F10.920 ALCOHOL INTOXICATION, UNCOMPLICATED (H): ICD-10-CM

## 2019-09-19 DIAGNOSIS — F10.10 ALCOHOL ABUSE: ICD-10-CM

## 2019-09-19 LAB
ALBUMIN SERPL-MCNC: 4.7 G/DL (ref 3.5–5.7)
ALP SERPL-CCNC: 84 U/L (ref 34–104)
ALT SERPL W P-5'-P-CCNC: 13 U/L (ref 7–52)
ANION GAP SERPL CALCULATED.3IONS-SCNC: 16 MMOL/L (ref 3–14)
APAP SERPL-MCNC: <0.2 UG/ML (ref 0–30)
APTT PPP: 34 SEC (ref 22–37)
AST SERPL W P-5'-P-CCNC: 25 U/L (ref 13–39)
BASOPHILS # BLD AUTO: 0.1 10E9/L (ref 0–0.2)
BASOPHILS NFR BLD AUTO: 0.6 %
BILIRUB SERPL-MCNC: 0.4 MG/DL (ref 0.3–1)
BUN SERPL-MCNC: 16 MG/DL (ref 7–25)
CALCIUM SERPL-MCNC: 9.7 MG/DL (ref 8.6–10.3)
CHLORIDE SERPL-SCNC: 108 MMOL/L (ref 98–107)
CO2 SERPL-SCNC: 20 MMOL/L (ref 21–31)
CREAT SERPL-MCNC: 1.53 MG/DL (ref 0.7–1.3)
DIFFERENTIAL METHOD BLD: ABNORMAL
EOSINOPHIL # BLD AUTO: 0.1 10E9/L (ref 0–0.7)
EOSINOPHIL NFR BLD AUTO: 0.6 %
ERYTHROCYTE [DISTWIDTH] IN BLOOD BY AUTOMATED COUNT: 12.8 % (ref 10–15)
ETHANOL SERPL-MCNC: 0.34 %
GFR SERPL CREATININE-BSD FRML MDRD: 48 ML/MIN/{1.73_M2}
GLUCOSE SERPL-MCNC: 94 MG/DL (ref 70–105)
HCT VFR BLD AUTO: 39.7 % (ref 40–53)
HGB BLD-MCNC: 12.6 G/DL (ref 13.3–17.7)
IMM GRANULOCYTES # BLD: 0.2 10E9/L (ref 0–0.4)
IMM GRANULOCYTES NFR BLD: 2.3 %
INR PPP: 0.97 (ref 0–1.3)
LYMPHOCYTES # BLD AUTO: 2.3 10E9/L (ref 0.8–5.3)
LYMPHOCYTES NFR BLD AUTO: 24.8 %
MCH RBC QN AUTO: 31.3 PG (ref 26.5–33)
MCHC RBC AUTO-ENTMCNC: 31.7 G/DL (ref 31.5–36.5)
MCV RBC AUTO: 99 FL (ref 78–100)
MONOCYTES # BLD AUTO: 0.5 10E9/L (ref 0–1.3)
MONOCYTES NFR BLD AUTO: 5.3 %
NEUTROPHILS # BLD AUTO: 6.2 10E9/L (ref 1.6–8.3)
NEUTROPHILS NFR BLD AUTO: 66.4 %
PLATELET # BLD AUTO: 173 10E9/L (ref 150–450)
POTASSIUM SERPL-SCNC: 4.2 MMOL/L (ref 3.5–5.1)
PROT SERPL-MCNC: 7.4 G/DL (ref 6.4–8.9)
RBC # BLD AUTO: 4.03 10E12/L (ref 4.4–5.9)
SALICYLATES SERPL-MCNC: <0 MG/DL (ref 15–30)
SODIUM SERPL-SCNC: 144 MMOL/L (ref 134–144)
WBC # BLD AUTO: 9.3 10E9/L (ref 4–11)

## 2019-09-19 PROCEDURE — 99285 EMERGENCY DEPT VISIT HI MDM: CPT | Mod: 25 | Performed by: FAMILY MEDICINE

## 2019-09-19 PROCEDURE — 85610 PROTHROMBIN TIME: CPT | Performed by: FAMILY MEDICINE

## 2019-09-19 PROCEDURE — 80053 COMPREHEN METABOLIC PANEL: CPT | Performed by: FAMILY MEDICINE

## 2019-09-19 PROCEDURE — 80329 ANALGESICS NON-OPIOID 1 OR 2: CPT | Mod: 91 | Performed by: FAMILY MEDICINE

## 2019-09-19 PROCEDURE — 96374 THER/PROPH/DIAG INJ IV PUSH: CPT | Performed by: FAMILY MEDICINE

## 2019-09-19 PROCEDURE — 25000128 H RX IP 250 OP 636: Performed by: FAMILY MEDICINE

## 2019-09-19 PROCEDURE — 99284 EMERGENCY DEPT VISIT MOD MDM: CPT | Mod: Z6 | Performed by: FAMILY MEDICINE

## 2019-09-19 PROCEDURE — 80320 DRUG SCREEN QUANTALCOHOLS: CPT | Mod: 91 | Performed by: FAMILY MEDICINE

## 2019-09-19 PROCEDURE — 96361 HYDRATE IV INFUSION ADD-ON: CPT | Performed by: FAMILY MEDICINE

## 2019-09-19 PROCEDURE — 85730 THROMBOPLASTIN TIME PARTIAL: CPT | Performed by: FAMILY MEDICINE

## 2019-09-19 PROCEDURE — 80329 ANALGESICS NON-OPIOID 1 OR 2: CPT | Performed by: FAMILY MEDICINE

## 2019-09-19 PROCEDURE — 96375 TX/PRO/DX INJ NEW DRUG ADDON: CPT | Performed by: FAMILY MEDICINE

## 2019-09-19 PROCEDURE — 36415 COLL VENOUS BLD VENIPUNCTURE: CPT | Performed by: FAMILY MEDICINE

## 2019-09-19 PROCEDURE — 85025 COMPLETE CBC W/AUTO DIFF WBC: CPT | Performed by: FAMILY MEDICINE

## 2019-09-19 RX ORDER — OLANZAPINE 10 MG/2ML
10 INJECTION, POWDER, FOR SOLUTION INTRAMUSCULAR ONCE
Status: COMPLETED | OUTPATIENT
Start: 2019-09-19 | End: 2019-09-19

## 2019-09-19 RX ORDER — DIPHENHYDRAMINE HYDROCHLORIDE 50 MG/ML
25 INJECTION INTRAMUSCULAR; INTRAVENOUS ONCE
Status: COMPLETED | OUTPATIENT
Start: 2019-09-19 | End: 2019-09-19

## 2019-09-19 RX ORDER — NALTREXONE HYDROCHLORIDE 50 MG/1
50 TABLET, FILM COATED ORAL DAILY
COMMUNITY

## 2019-09-19 RX ORDER — SODIUM CHLORIDE 9 MG/ML
INJECTION, SOLUTION INTRAVENOUS CONTINUOUS
Status: DISCONTINUED | OUTPATIENT
Start: 2019-09-19 | End: 2019-09-20 | Stop reason: HOSPADM

## 2019-09-19 RX ORDER — ONDANSETRON 2 MG/ML
8 INJECTION INTRAMUSCULAR; INTRAVENOUS ONCE
Status: COMPLETED | OUTPATIENT
Start: 2019-09-19 | End: 2019-09-19

## 2019-09-19 RX ORDER — CETIRIZINE HYDROCHLORIDE 10 MG/1
10 TABLET ORAL DAILY
COMMUNITY

## 2019-09-19 RX ORDER — AMOXICILLIN 250 MG
1 CAPSULE ORAL 2 TIMES DAILY
COMMUNITY

## 2019-09-19 RX ADMIN — OLANZAPINE 10 MG: 10 INJECTION, POWDER, LYOPHILIZED, FOR SOLUTION INTRAMUSCULAR at 22:42

## 2019-09-19 RX ADMIN — SODIUM CHLORIDE 1000 ML: 9 INJECTION, SOLUTION INTRAVENOUS at 22:47

## 2019-09-19 RX ADMIN — ONDANSETRON HYDROCHLORIDE 8 MG: 2 INJECTION, SOLUTION INTRAMUSCULAR; INTRAVENOUS at 22:43

## 2019-09-19 RX ADMIN — SODIUM CHLORIDE 1000 ML: 9 INJECTION, SOLUTION INTRAVENOUS at 23:57

## 2019-09-19 RX ADMIN — DIPHENHYDRAMINE HYDROCHLORIDE 25 MG: 50 INJECTION, SOLUTION INTRAMUSCULAR; INTRAVENOUS at 22:43

## 2019-09-19 NOTE — ED AVS SNAPSHOT
[facility]  [address]  [place] [address]  Phone:  [phone]  Fax:  [phone]                                    Gilles Henning III   MRN: [id number]    Department:  [facility]   Date of Visit:  9/19/2019           After Visit Summary Signature Page    I have received my discharge instructions, and my questions have been answered. I have discussed any challenges I see with this plan with the nurse or doctor.    ..........................................................................................................................................  Patient/Patient Representative Signature      ..........................................................................................................................................  Patient Representative Print Name and Relationship to Patient    ..................................................               ................................................  Date                                   Time    ..........................................................................................................................................  Reviewed by Signature/Title    ...................................................              ..............................................  Date                                               Time          22EPIC Rev 08/18

## 2019-09-20 ENCOUNTER — HOSPITAL ENCOUNTER (EMERGENCY)
Facility: OTHER | Age: 49
Discharge: OTHER INSTITUTION | End: 2019-09-21
Attending: FAMILY MEDICINE | Admitting: FAMILY MEDICINE
Payer: MEDICARE

## 2019-09-20 ENCOUNTER — APPOINTMENT (OUTPATIENT)
Dept: GENERAL RADIOLOGY | Facility: OTHER | Age: 49
End: 2019-09-20
Attending: FAMILY MEDICINE
Payer: MEDICARE

## 2019-09-20 VITALS
HEART RATE: 78 BPM | OXYGEN SATURATION: 99 % | TEMPERATURE: 97 F | RESPIRATION RATE: 13 BRPM | SYSTOLIC BLOOD PRESSURE: 113 MMHG | DIASTOLIC BLOOD PRESSURE: 72 MMHG

## 2019-09-20 DIAGNOSIS — J06.9 VIRAL URI: ICD-10-CM

## 2019-09-20 DIAGNOSIS — F32.A DEPRESSION WITH SUICIDAL IDEATION: ICD-10-CM

## 2019-09-20 DIAGNOSIS — Z00.8 MEDICAL CLEARANCE FOR PSYCHIATRIC ADMISSION: ICD-10-CM

## 2019-09-20 DIAGNOSIS — F10.10 ALCOHOL ABUSE, EPISODIC DRINKING BEHAVIOR: ICD-10-CM

## 2019-09-20 DIAGNOSIS — R45.851 DEPRESSION WITH SUICIDAL IDEATION: ICD-10-CM

## 2019-09-20 LAB
ALBUMIN SERPL-MCNC: 4 G/DL (ref 3.5–5.7)
ALBUMIN UR-MCNC: NEGATIVE MG/DL
ALBUMIN UR-MCNC: NEGATIVE MG/DL
ALP SERPL-CCNC: 70 U/L (ref 34–104)
ALT SERPL W P-5'-P-CCNC: 13 U/L (ref 7–52)
AMPHETAMINES UR QL SCN: NOT DETECTED
AMPHETAMINES UR QL SCN: NOT DETECTED
ANION GAP SERPL CALCULATED.3IONS-SCNC: 10 MMOL/L (ref 3–14)
APAP SERPL-MCNC: <0.2 UG/ML (ref 0–30)
APPEARANCE UR: CLEAR
APPEARANCE UR: CLEAR
AST SERPL W P-5'-P-CCNC: 37 U/L (ref 13–39)
BARBITURATES UR QL: NOT DETECTED
BARBITURATES UR QL: NOT DETECTED
BASOPHILS # BLD AUTO: 0 10E9/L (ref 0–0.2)
BASOPHILS NFR BLD AUTO: 0.4 %
BENZODIAZ UR QL: NOT DETECTED
BENZODIAZ UR QL: NOT DETECTED
BILIRUB SERPL-MCNC: 0.6 MG/DL (ref 0.3–1)
BILIRUB UR QL STRIP: NEGATIVE
BILIRUB UR QL STRIP: NEGATIVE
BUN SERPL-MCNC: 19 MG/DL (ref 7–25)
BUPRENORPHINE UR QL: NOT DETECTED NG/ML
BUPRENORPHINE UR QL: NOT DETECTED NG/ML
CALCIUM SERPL-MCNC: 9.1 MG/DL (ref 8.6–10.3)
CANNABINOIDS UR QL: NOT DETECTED NG/ML
CANNABINOIDS UR QL: NOT DETECTED NG/ML
CHLORIDE SERPL-SCNC: 107 MMOL/L (ref 98–107)
CO2 SERPL-SCNC: 22 MMOL/L (ref 21–31)
COCAINE UR QL: NOT DETECTED
COCAINE UR QL: NOT DETECTED
COLOR UR AUTO: YELLOW
COLOR UR AUTO: YELLOW
CREAT SERPL-MCNC: 1.49 MG/DL (ref 0.7–1.3)
D-METHAMPHET UR QL: NOT DETECTED NG/ML
D-METHAMPHET UR QL: NOT DETECTED NG/ML
DIFFERENTIAL METHOD BLD: ABNORMAL
EOSINOPHIL # BLD AUTO: 0 10E9/L (ref 0–0.7)
EOSINOPHIL NFR BLD AUTO: 0.5 %
ERYTHROCYTE [DISTWIDTH] IN BLOOD BY AUTOMATED COUNT: 13.1 % (ref 10–15)
ETHANOL SERPL-MCNC: <0.01 %
GFR SERPL CREATININE-BSD FRML MDRD: 50 ML/MIN/{1.73_M2}
GLUCOSE SERPL-MCNC: 100 MG/DL (ref 70–105)
GLUCOSE UR STRIP-MCNC: NEGATIVE MG/DL
GLUCOSE UR STRIP-MCNC: NEGATIVE MG/DL
HCT VFR BLD AUTO: 31.8 % (ref 40–53)
HGB BLD-MCNC: 10.4 G/DL (ref 13.3–17.7)
HGB UR QL STRIP: NEGATIVE
HGB UR QL STRIP: NEGATIVE
IMM GRANULOCYTES # BLD: 0.2 10E9/L (ref 0–0.4)
IMM GRANULOCYTES NFR BLD: 1.9 %
KETONES UR STRIP-MCNC: NEGATIVE MG/DL
KETONES UR STRIP-MCNC: NEGATIVE MG/DL
LEUKOCYTE ESTERASE UR QL STRIP: NEGATIVE
LEUKOCYTE ESTERASE UR QL STRIP: NEGATIVE
LYMPHOCYTES # BLD AUTO: 0.5 10E9/L (ref 0.8–5.3)
LYMPHOCYTES NFR BLD AUTO: 6.3 %
MCH RBC QN AUTO: 31.6 PG (ref 26.5–33)
MCHC RBC AUTO-ENTMCNC: 32.7 G/DL (ref 31.5–36.5)
MCV RBC AUTO: 97 FL (ref 78–100)
METHADONE UR QL SCN: NOT DETECTED
METHADONE UR QL SCN: NOT DETECTED
MONOCYTES # BLD AUTO: 0.9 10E9/L (ref 0–1.3)
MONOCYTES NFR BLD AUTO: 11.6 %
NEUTROPHILS # BLD AUTO: 6.3 10E9/L (ref 1.6–8.3)
NEUTROPHILS NFR BLD AUTO: 79.3 %
NITRATE UR QL: NEGATIVE
NITRATE UR QL: NEGATIVE
OPIATES UR QL SCN: NOT DETECTED
OPIATES UR QL SCN: NOT DETECTED
OXYCODONE UR QL: NOT DETECTED NG/ML
OXYCODONE UR QL: NOT DETECTED NG/ML
PCP UR QL SCN: NOT DETECTED
PCP UR QL SCN: NOT DETECTED
PH UR STRIP: 5 PH (ref 5–9)
PH UR STRIP: 6.5 PH (ref 5–9)
PLATELET # BLD AUTO: 150 10E9/L (ref 150–450)
POTASSIUM SERPL-SCNC: 4.2 MMOL/L (ref 3.5–5.1)
PROPOXYPH UR QL: NOT DETECTED NG/ML
PROPOXYPH UR QL: NOT DETECTED NG/ML
PROT SERPL-MCNC: 6.1 G/DL (ref 6.4–8.9)
RBC # BLD AUTO: 3.29 10E12/L (ref 4.4–5.9)
SALICYLATES SERPL-MCNC: <0 MG/DL (ref 15–30)
SODIUM SERPL-SCNC: 139 MMOL/L (ref 134–144)
SOURCE: ABNORMAL
SOURCE: NORMAL
SP GR UR STRIP: <1.005 (ref 1–1.03)
SP GR UR STRIP: <1.005 (ref 1–1.03)
TRICYCLICS UR QL SCN: NOT DETECTED NG/ML
TRICYCLICS UR QL SCN: NOT DETECTED NG/ML
UROBILINOGEN UR STRIP-ACNC: 0.2 EU/DL (ref 0.2–1)
UROBILINOGEN UR STRIP-ACNC: 2 EU/DL (ref 0.2–1)
WBC # BLD AUTO: 7.9 10E9/L (ref 4–11)

## 2019-09-20 PROCEDURE — 99284 EMERGENCY DEPT VISIT MOD MDM: CPT | Mod: Z6 | Performed by: FAMILY MEDICINE

## 2019-09-20 PROCEDURE — 80329 ANALGESICS NON-OPIOID 1 OR 2: CPT | Mod: 91 | Performed by: FAMILY MEDICINE

## 2019-09-20 PROCEDURE — 80307 DRUG TEST PRSMV CHEM ANLYZR: CPT | Performed by: FAMILY MEDICINE

## 2019-09-20 PROCEDURE — 25800030 ZZH RX IP 258 OP 636: Performed by: FAMILY MEDICINE

## 2019-09-20 PROCEDURE — 80320 DRUG SCREEN QUANTALCOHOLS: CPT | Performed by: FAMILY MEDICINE

## 2019-09-20 PROCEDURE — 81003 URINALYSIS AUTO W/O SCOPE: CPT | Performed by: FAMILY MEDICINE

## 2019-09-20 PROCEDURE — 81003 URINALYSIS AUTO W/O SCOPE: CPT | Mod: XU | Performed by: FAMILY MEDICINE

## 2019-09-20 PROCEDURE — 36415 COLL VENOUS BLD VENIPUNCTURE: CPT | Performed by: FAMILY MEDICINE

## 2019-09-20 PROCEDURE — 99285 EMERGENCY DEPT VISIT HI MDM: CPT | Mod: 25 | Performed by: FAMILY MEDICINE

## 2019-09-20 PROCEDURE — 80053 COMPREHEN METABOLIC PANEL: CPT | Performed by: FAMILY MEDICINE

## 2019-09-20 PROCEDURE — 71046 X-RAY EXAM CHEST 2 VIEWS: CPT

## 2019-09-20 PROCEDURE — 25000132 ZZH RX MED GY IP 250 OP 250 PS 637: Performed by: FAMILY MEDICINE

## 2019-09-20 PROCEDURE — 80329 ANALGESICS NON-OPIOID 1 OR 2: CPT | Performed by: FAMILY MEDICINE

## 2019-09-20 PROCEDURE — 85025 COMPLETE CBC W/AUTO DIFF WBC: CPT | Performed by: FAMILY MEDICINE

## 2019-09-20 RX ORDER — IBUPROFEN 400 MG/1
800 TABLET, FILM COATED ORAL ONCE
Status: COMPLETED | OUTPATIENT
Start: 2019-09-20 | End: 2019-09-20

## 2019-09-20 RX ORDER — ACETAMINOPHEN 500 MG
1000 TABLET ORAL ONCE
Status: COMPLETED | OUTPATIENT
Start: 2019-09-20 | End: 2019-09-20

## 2019-09-20 RX ADMIN — SODIUM CHLORIDE: 9 INJECTION, SOLUTION INTRAVENOUS at 00:59

## 2019-09-20 RX ADMIN — ACETAMINOPHEN 1000 MG: 500 TABLET ORAL at 22:53

## 2019-09-20 RX ADMIN — IBUPROFEN 800 MG: 400 TABLET ORAL at 20:03

## 2019-09-20 RX ADMIN — SODIUM CHLORIDE 125 ML/HR: 9 INJECTION, SOLUTION INTRAVENOUS at 08:52

## 2019-09-20 ASSESSMENT — MIFFLIN-ST. JEOR: SCORE: 1623.3

## 2019-09-20 NOTE — ED AVS SNAPSHOT
[facility]  [address]  [place] [address]  Phone:  [phone]  Fax:  [phone]                                    Gilles Henning III   MRN: [id number]    Department:  [facility]   Date of Visit:  9/20/2019           After Visit Summary Signature Page    I have received my discharge instructions, and my questions have been answered. I have discussed any challenges I see with this plan with the nurse or doctor.    ..........................................................................................................................................  Patient/Patient Representative Signature      ..........................................................................................................................................  Patient Representative Print Name and Relationship to Patient    ..................................................               ................................................  Date                                   Time    ..........................................................................................................................................  Reviewed by Signature/Title    ...................................................              ..............................................  Date                                               Time          22EPIC Rev 08/18
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
## 2019-09-20 NOTE — ED PROVIDER NOTES
Patient was signed out from Dr. Pringle to myself at change of shift.  He has slept well during the night and woke up and had an entire breakfast and was clinically sober.  We will discharge him back to his assisted living facility.     Sadaf Angelo MD  09/20/19 1111

## 2019-09-20 NOTE — ED NOTES
"As of 1158 on 9/20/19.       Nurse at [facility] called back regarding patient. Was told that patient was medically cleared and told staff he was allowed to take public bus. Patient left ER at 1150.       Nurse became angry stating that we were in the wrong for sending him home and that she \"wishes we would have waited for a phone call back.\" When told the time that the nurse called regarding patient at 1115, she quickly responded back \"it wasn't that long ago.\" (see documentation in chart of time called to [facility].) She then stated that the patient wasn't safe to come back there due to court paperwork, and how that they cannot care for him. Writer looked back in notes to [facility]fiy, but couldn't find any documentation of previous statement reported to staff at [facility]. When told nurse from [facility] about no report about that, she stated \"Well we sent paperwork.\" Writer found paperwork that was sent with patient, no documentation on paper found either.  Call transferred to House supervisor for further clarification.   "

## 2019-09-20 NOTE — ED NOTES
Pt placed in 4 point restraints due to being aggressive and uncooperative with PD, EMS, and Nursing Staff. Pt will have a bedside sitter at this time.

## 2019-09-20 NOTE — PLAN OF CARE
Pt arrived to facility combative and pulling at equipment and struggling against EMS staff and [employer] who accompanied them. Pt's behaviors escalating quickly. Pt was oriented to surrounding and encouraged to stop resisting. Pt did not show signs of understanding MD notified of pt's behaviors and orders received and restraints were applied.    Viktoriya Neumann RN on 9/19/2019 at 11:10 PM

## 2019-09-20 NOTE — ED NOTES
Called and left message for CORA Cruz at [facility]. Patient resting comfortably in room

## 2019-09-20 NOTE — PROGRESS NOTES
Pt laying in bed with his eyes closed. He is restless still but not combative. He is trying to turn on his side to sleep but unable due to leg restraints. Removed leg restraints at this time and pt said thank you and closed his eyes again. ALFONZO

## 2019-09-20 NOTE — ED AVS SNAPSHOT
"    [facility]: [phone]                                              INTERAGENCY TRANSFER FORM - LAB / IMAGING / EKG / EMG RESULTS   2019                   Hospital Admission Date: 2019  AYAAN JUNIOR III   : 1970  Sex: Male         Attending Provider:  Jamel Pringle MD    Allergies:  Gabapentin    Infection:  None   Service:  EMERGENCY ME    Ht:  1.778 m (5' 10\")   Wt:  75.7 kg (166 lb 14.4 oz)   Admission Wt:  75.7 kg (166 lb 14.4 oz)    BMI:  23.95 kg/m 2   BSA:  1.93 m 2            Patient PCP Information     Provider PCP Type    Charlie Dubose MD General         Lab Results - 3 Days      Drug of Abuse Screen Urine GH [[id number]]  Resulted: 19, Result status: Final result   Ordering provider:  Jamel Pringle MD  19 Resulting lab:  [facility]    Specimen Information    Type Source Collected On   Urine   19          Components    Component Value Reference Range Flag Lab   Amphetamine Qual Urine Not Detected NDET^Not Detected   [facility]   Benzodiazepine Qual Urine Not Detected NDET^Not Detected   [facility]   Cocaine Qual Urine Not Detected NDET^Not Detected   [facility]   Methadone Qual Urine Not Detected NDET^Not Detected   [facility]   PCP Qual Urine Not Detected NDET^Not Detected   [facility]   Opiates Qualitative Urine Not Detected NDET^Not Detected   [facility]   Oxycodone Qualitative Urine Not Detected NDET^Not Detected ng/mL   [facility]   Propoxyphene Qualitative Urine Not Detected NDET^Not Detected ng/mL   [facility]   Tricyclic Antidepressants Qual Urine Not Detected NDET^Not Detected ng/mL   [facility]   Methamphetamine Qualitative Urine Not Detected NDET^Not Detected ng/mL   [facility]   Barbiturates Qual Urine Not Detected NDET^Not Detected   [facility]   Cannabinoids Qualitative Urine Not Detected NDET^Not Detected ng/mL   [facility]   Buprenorphine Qualitative Urine Not Detected " NDET^Not Detected ng/mL   [facility]            UA reflex to Microscopic and Culture [[id number]] (Abnormal)  Resulted: 09/20/19 2126, Result status: Final result   Ordering provider:  Jamel Pringle MD  09/20/19 1930 Resulting lab:  [facility]    Specimen Information    Type Source Collected On   Midstream Urine   09/20/19 2110          Components    Component Value Reference Range Flag Lab   Color Urine Yellow     [facility]   Appearance Urine Clear     [facility]   Glucose Urine Negative NEG^Negative mg/dL   [facility]   Bilirubin Urine Negative NEG^Negative   [facility]   Ketones Urine Negative NEG^Negative mg/dL   [facility]   Specific Gravity Urine <1.005 1.000 - 1.030   [facility]   Blood Urine Negative NEG^Negative   [facility]   pH Urine 6.5 5.0 - 9.0 pH   [facility]   Protein Albumin Urine Negative NEG^Negative mg/dL   [facility]   Urobilinogen Urine 2.0 0.2 - 1.0 EU/dL H [facility]   Nitrite Urine Negative NEG^Negative   [facility]   Leukocyte Esterase Urine Negative NEG^Negative   [facility]   Source Midstream Urine     [facility]            Salicylate level [[id number]] (Abnormal)  Resulted: 09/20/19 2011, Result status: Final result   Ordering provider:  Jamel Pringle MD  09/20/19 1930 Resulting lab:  [facility]    Specimen Information    Type Source Collected On   Blood   09/20/19 1942          Components    Component Value Reference Range Flag Lab   Salicylate Level <0 15 - 30 mg/dL  [facility]   Comment:  Therapeutic:        <20  Anti inflammatory:  15-30              Ethanol GH [[id number]]  Resulted: 09/20/19 2011, Result status: Final result   Ordering provider:  Jamel Pringle MD  09/20/19 1930 Resulting lab:  [facility]    Specimen Information    Type Source Collected On   Blood   09/20/19 1942          Components    Component Value Reference Range Flag Lab   Ethanol g/dL <0.01 <0.01 %   [facility]            Comprehensive  metabolic panel [[id number]] (Abnormal)  Resulted: 09/20/19 2011, Result status: Final result   Ordering provider:  Jamel Pringle MD  09/20/19 1930 Resulting lab:  [facility]    Specimen Information    Type Source Collected On   Blood   09/20/19 1942          Components    Component Value Reference Range Flag Lab   Sodium 139 134 - 144 mmol/L   [facility]   Potassium 4.2 3.5 - 5.1 mmol/L   [facility]   Chloride 107 98 - 107 mmol/L   [facility]   Carbon Dioxide 22 21 - 31 mmol/L   [facility]   Anion Gap 10 3 - 14 mmol/L   [facility]   Glucose 100 70 - 105 mg/dL   [facility]   Urea Nitrogen 19 7 - 25 mg/dL   [facility]   Creatinine 1.49 0.70 - 1.30 mg/dL H [facility]   GFR Estimate 50 >60 mL/min/{1.73_m2}  [facility]   GFR Estimate If Black 60 >60 mL/min/{1.73_m2}  [facility]   Calcium 9.1 8.6 - 10.3 mg/dL   [facility]   Bilirubin Total 0.6 0.3 - 1.0 mg/dL   [facility]   Albumin 4.0 3.5 - 5.7 g/dL   [facility]   Protein Total 6.1 6.4 - 8.9 g/dL  [facility]   Alkaline Phosphatase 70 34 - 104 U/L   [facility]   ALT 13 7 - 52 U/L   [facility]   AST 37 13 - 39 U/L   [facility]            Acetaminophen GH [[id number]]  Resulted: 09/20/19 2011, Result status: Final result   Ordering provider:  Jamel Pringle MD  09/20/19 1930 Resulting lab:  [facility]    Specimen Information    Type Source Collected On   Blood   09/20/19 1942          Components    Component Value Reference Range Flag Lab   Acetaminophen <0.2 0.0 - 30.0 ug/mL   [facility]            CBC with platelets differential [[id number]] (Abnormal)  Resulted: 09/20/19 1946, Result status: Final result   Ordering provider:  Jamel Pringle MD  09/20/19 1930 Resulting lab:  [facility]    Specimen Information    Type Source Collected On   Blood   09/20/19 1942          Components    Component Value Reference Range Flag Lab   WBC 7.9 4.0 - 11.0 10e9/L   [facility]   RBC Count 3.29 4.4 - 5.9 10e12/L   [facility]   Hemoglobin 10.4 13.3 - 17.7 g/dL  [facility]   Hematocrit 31.8 40.0 - 53.0 %  [facility]   MCV 97 78 - 100 fl   [facility]   MCH 31.6 26.5 - 33.0 pg   [facility]   MCHC 32.7 31.5 - 36.5 g/dL   [facility]   RDW 13.1 10.0 - 15.0 %   [facility]   Platelet Count 150 150 - 450 10e9/L   [facility]   Diff Method Automated Method     [facility]   % Neutrophils 79.3 %   [facility]   % Lymphocytes 6.3 %   [facility]   % Monocytes 11.6 %   [facility]   % Eosinophils 0.5 %   [facility]   % Basophils 0.4 %   [facility]   % Immature Granulocytes 1.9 %   [facility]   Absolute Neutrophil 6.3 1.6 - 8.3 10e9/L   [facility]   Absolute Lymphocytes 0.5 0.8 - 5.3 10e9/L  [facility]   Absolute Monocytes 0.9 0.0 - 1.3 10e9/L   [facility]   Absolute Eosinophils 0.0 0.0 - 0.7 10e9/L   [facility]   Absolute Basophils 0.0 0.0 - 0.2 10e9/L   [facility]   Abs Immature Granulocytes 0.2 0 - 0.4 10e9/L   [facility]            Testing Performed By     Lab - Abbreviation Name Director Address Valid Date Range    1743 - [facility] [address]  [facility] [address] 08/07/15 0948 - Present            Unresulted Labs (24h ago, onward)    None         Imaging Results - 3 Days      XR Chest 2 Views [[id number]]  Resulted: 09/20/19 2040, Result status: Final result   Ordering provider:  Jamel Pringle MD  09/20/19 1930 Resulted by:  Johanna Mendez MD   Performed:  09/20/19 1949 - 09/20/19 2009 Accession number:  [id number]   Resulting lab:  RADIOLOGY RESULTS   Narrative:  PROCEDURE: XR CHEST 2 VW 9/20/2019 8:09 PM    HISTORY: fever    COMPARISONS: 5/11/2019.    TECHNIQUE: 2 views.    FINDINGS: There is a tracheostomy tube.    Heart and pulmonary vasculature are normal. No acute infiltrate or  effusion is seen.        Impression:  IMPRESSION: No acute disease.    JOHANNA MENDEZ MD      Testing Performed By     Lab - Abbreviation Name Director Address Valid Date Range    104 - Rad  [facility] RADIOLOGY RESULTS Unknown Unknown 02/16/05 1553 - Present            Encounter-Level Documents:    There are no encounter-level documents.     Order-Level Documents:    There are no order-level documents.

## 2019-09-21 VITALS
RESPIRATION RATE: 14 BRPM | TEMPERATURE: 99.3 F | OXYGEN SATURATION: 98 % | HEIGHT: 70 IN | BODY MASS INDEX: 23.89 KG/M2 | WEIGHT: 166.9 LBS | DIASTOLIC BLOOD PRESSURE: 78 MMHG | HEART RATE: 77 BPM | SYSTOLIC BLOOD PRESSURE: 139 MMHG

## 2019-09-21 PROCEDURE — 25000131 ZZH RX MED GY IP 250 OP 636 PS 637: Performed by: EMERGENCY MEDICINE

## 2019-09-21 PROCEDURE — 25000132 ZZH RX MED GY IP 250 OP 250 PS 637: Performed by: EMERGENCY MEDICINE

## 2019-09-21 RX ORDER — PANTOPRAZOLE SODIUM 40 MG/1
40 TABLET, DELAYED RELEASE ORAL
Status: DISCONTINUED | OUTPATIENT
Start: 2019-09-21 | End: 2019-09-21 | Stop reason: DRUGHIGH

## 2019-09-21 RX ORDER — MYCOPHENOLATE MOFETIL 500 MG/1
250 TABLET ORAL AT BEDTIME
Status: DISCONTINUED | OUTPATIENT
Start: 2019-09-21 | End: 2019-09-21 | Stop reason: HOSPADM

## 2019-09-21 RX ORDER — QUETIAPINE FUMARATE 100 MG/1
100 TABLET, FILM COATED ORAL AT BEDTIME
Status: DISCONTINUED | OUTPATIENT
Start: 2019-09-21 | End: 2019-09-21 | Stop reason: DRUGHIGH

## 2019-09-21 RX ORDER — QUETIAPINE FUMARATE 100 MG/1
200 TABLET, FILM COATED ORAL AT BEDTIME
Status: DISCONTINUED | OUTPATIENT
Start: 2019-09-21 | End: 2019-09-21 | Stop reason: HOSPADM

## 2019-09-21 RX ORDER — MAGNESIUM OXIDE 420 MG/1
1 TABLET ORAL 2 TIMES DAILY
COMMUNITY
End: 2019-10-16

## 2019-09-21 RX ORDER — MYCOPHENOLATE MOFETIL 500 MG/1
500 TABLET ORAL EVERY MORNING
Status: DISCONTINUED | OUTPATIENT
Start: 2019-09-21 | End: 2019-09-21 | Stop reason: HOSPADM

## 2019-09-21 RX ORDER — PRAZOSIN HYDROCHLORIDE 1 MG/1
4 CAPSULE ORAL AT BEDTIME
Status: DISCONTINUED | OUTPATIENT
Start: 2019-09-21 | End: 2019-09-21 | Stop reason: HOSPADM

## 2019-09-21 RX ORDER — AMLODIPINE BESYLATE 5 MG/1
5 TABLET ORAL AT BEDTIME
Status: DISCONTINUED | OUTPATIENT
Start: 2019-09-21 | End: 2019-09-21 | Stop reason: HOSPADM

## 2019-09-21 RX ORDER — TRAZODONE HYDROCHLORIDE 50 MG/1
200 TABLET, FILM COATED ORAL AT BEDTIME
Status: DISCONTINUED | OUTPATIENT
Start: 2019-09-21 | End: 2019-09-21 | Stop reason: HOSPADM

## 2019-09-21 RX ORDER — CYCLOSPORINE 25 MG/1
100 CAPSULE, LIQUID FILLED ORAL
Status: DISCONTINUED | OUTPATIENT
Start: 2019-09-21 | End: 2019-09-21 | Stop reason: HOSPADM

## 2019-09-21 RX ORDER — FLUDROCORTISONE ACETATE 0.1 MG/1
100 TABLET ORAL DAILY
Status: DISCONTINUED | OUTPATIENT
Start: 2019-09-21 | End: 2019-09-21 | Stop reason: HOSPADM

## 2019-09-21 RX ORDER — NICOTINE 21 MG/24HR
1 PATCH, TRANSDERMAL 24 HOURS TRANSDERMAL DAILY
Status: DISCONTINUED | OUTPATIENT
Start: 2019-09-21 | End: 2019-09-21 | Stop reason: HOSPADM

## 2019-09-21 RX ORDER — PANTOPRAZOLE SODIUM 40 MG/1
40 TABLET, DELAYED RELEASE ORAL AT BEDTIME
Status: DISCONTINUED | OUTPATIENT
Start: 2019-09-21 | End: 2019-09-21 | Stop reason: HOSPADM

## 2019-09-21 RX ORDER — AMLODIPINE BESYLATE 5 MG/1
5 TABLET ORAL DAILY
Status: DISCONTINUED | OUTPATIENT
Start: 2019-09-21 | End: 2019-09-21 | Stop reason: DRUGHIGH

## 2019-09-21 RX ORDER — NICOTINE 21 MG/24HR
1 PATCH, TRANSDERMAL 24 HOURS TRANSDERMAL EVERY 24 HOURS
COMMUNITY

## 2019-09-21 RX ORDER — METHOCARBAMOL 500 MG/1
500 TABLET, FILM COATED ORAL 2 TIMES DAILY PRN
COMMUNITY

## 2019-09-21 RX ORDER — MULTIPLE VITAMINS W/ MINERALS TAB 9MG-400MCG
1 TAB ORAL DAILY
COMMUNITY

## 2019-09-21 RX ADMIN — CYCLOSPORINE 100 MG: 25 CAPSULE, LIQUID FILLED ORAL at 11:38

## 2019-09-21 RX ADMIN — NICOTINE 1 PATCH: 21 PATCH, EXTENDED RELEASE TRANSDERMAL at 12:13

## 2019-09-21 RX ADMIN — FLUDROCORTISONE ACETATE 100 MCG: 0.1 TABLET ORAL at 11:37

## 2019-09-21 RX ADMIN — MYCOPHENOLATE MOFETIL 500 MG: 500 TABLET ORAL at 11:41

## 2019-09-21 RX ADMIN — NICOTINE 1 PATCH: 21 PATCH, EXTENDED RELEASE TRANSDERMAL at 12:15

## 2019-09-21 ASSESSMENT — ENCOUNTER SYMPTOMS
PALPITATIONS: 0
RHINORRHEA: 1
FEVER: 1
COUGH: 1
VOMITING: 0
HEADACHES: 0
SORE THROAT: 0
CHILLS: 0
NAUSEA: 0
DYSURIA: 0
DYSPHORIC MOOD: 0
BACK PAIN: 0
WHEEZING: 0
DIARRHEA: 0
VOICE CHANGE: 0
HALLUCINATIONS: 0
SHORTNESS OF BREATH: 0
TROUBLE SWALLOWING: 0
NECK PAIN: 0
FATIGUE: 0
ABDOMINAL PAIN: 0
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
## 2019-09-21 NOTE — ED NOTES
Pt using call light appropriately to request another blanket.  No other needs expressed, pt has no complaints at this time.

## 2019-09-21 NOTE — PHARMACY-ADMISSION MEDICATION HISTORY
Pharmacy -- Admission Medication Reconciliation IN PROGRESS    Prior to admission (PTA) medications were reviewed and the patient's PTA medication list was updated.    Sources Consulted: [facility], Jimbo ALVARADO, [facility] note, [facility] note  Faxed [facility] (no reponse at time of note)  Faxed [facility] (no response at time of note)  Spoke with staff at [facility] asking for dates of last doses and all last doses or prn (no fax at time of note)    The reliability of this Medication Reconciliation is: Reliability: Borderline reliable    The following significant changes were made:  Added times of last doses of scheduled meds (unable to confirm dates)  Changed apap to match mar  Change atorvastatin to evening  Removed mag citrate  Removed centrum liquid  Added therm M vitamins  Added directions to omeprazole  Changed quetiapine to 200 mg hs  Changed senna s to bid  Added mag oxide  Added nicotine patch  Added robitussin  Added methocarbamol    In addition, the patient's allergies were reviewed with the patient' records and left  as follows:   Allergies: Gabapentin       Medication barriers identified: unable to assess   Medication adherence concerns: unable to assess   Understanding of emergency medications: unable to assess    Pharmacy will continue to gather information.    Selena Martinez [facility], 9/21/2019,  11:11 AM

## 2019-09-21 NOTE — ED PROVIDER NOTES
Transfer of Care    Monitored throughout the day, discharged to detox after discussion with CRT    Dong Gan MD  Internal Medicine and Emergency Medicine  5:35 PM 09/21/19      Dong Gan MD  09/21/19 1104

## 2019-10-04 ENCOUNTER — TELEPHONE (OUTPATIENT)
Dept: TRANSPLANT | Facility: CLINIC | Age: 49
End: 2019-10-04

## 2019-10-04 NOTE — LETTER
OUTPATIENT LABORATORY TEST ORDER    Patient Name:Gilles Henning III   Transplant Date: 5/30/2018  YOB: 1970  Issue Date & Time:10/8/2019  1:06 PM  [facility] MR: [id number]  Expiration Date:  (1 year after date issued)      Diagnoses: Aftercare following organ transplant (ICD-10 Z48.288)   Kidney Transplant (ICD-10 Z94.0)   Long term use of medications (ICD-10  Z79.899)     ?Lab results to be available on the same day drawn.   ?Patient should release information to the [facility], [facility].     ?Please fax to the Transplant Center at [phone].    Every other week ?Hemogram and Platelet   ?Basic Metabolic Panel (Sodium, Potassium,Chloride, CO2, Creatinine, Urea Nitrogen, Glucose, Calcium)   ?Cyclosporine drug level    ?BK PCR Quantitative (Polyoma virus - blood in purple top tube to Reference Lab)    Monthly   ?EBV PCR QT    Every 6 months ? Liver Function Tests(Bilirubin Direct/Total, AST, ALT, Alkaline Phosphatase)   ? Random Urine for Protein/Creatinine ratio   ? PRA/DSA level (mailers provided by the patient)    If you have any questions, please call [facility] at [phone] or [phone].    Please fax all results to [phone].    .

## 2019-10-08 ENCOUNTER — TELEPHONE (OUTPATIENT)
Dept: TRANSPLANT | Facility: CLINIC | Age: 49
End: 2019-10-08

## 2019-10-08 DIAGNOSIS — F33.41 RECURRENT MAJOR DEPRESSIVE DISORDER, IN PARTIAL REMISSION (H): ICD-10-CM

## 2019-10-08 DIAGNOSIS — D84.9 IMMUNOSUPPRESSED STATUS (H): Primary | ICD-10-CM

## 2019-10-08 DIAGNOSIS — I10 HYPERTENSION: ICD-10-CM

## 2019-10-08 RX ORDER — MYCOPHENOLATE MOFETIL 500 MG/1
TABLET ORAL
Qty: 60 TABLET | Refills: 11 | Status: SHIPPED | OUTPATIENT
Start: 2019-10-08 | End: 2019-10-16

## 2019-10-08 RX ORDER — AMLODIPINE BESYLATE 5 MG/1
5 TABLET ORAL EVERY EVENING
Qty: 30 TABLET | Refills: 2 | Status: SHIPPED | OUTPATIENT
Start: 2019-10-08 | End: 2019-10-16

## 2019-10-08 RX ORDER — CLONIDINE HYDROCHLORIDE 0.1 MG/1
0.1 TABLET ORAL 2 TIMES DAILY PRN
Qty: 60 TABLET | Refills: 3 | Status: SHIPPED | OUTPATIENT
Start: 2019-10-08

## 2019-10-08 RX ORDER — PRAZOSIN HYDROCHLORIDE 2 MG/1
4 CAPSULE ORAL AT BEDTIME
Qty: 60 CAPSULE | Refills: 0 | Status: SHIPPED | OUTPATIENT
Start: 2019-10-08

## 2019-10-08 NOTE — TELEPHONE ENCOUNTER
RNCC spoke with Mike, manager at [facility], a sober-living facility for adult men.    Hector reports that he is feeling well. He has not been monitoring vitals, but has been eating well and weight is up to 170lbs.    There is no independent travel from [facility], but they do manage ride service for medical appointments.    Mike voiced that he will make lab appointments for every other week starting this week, at [facility].

## 2019-10-08 NOTE — TELEPHONE ENCOUNTER
Patient Call: Medication Refill  Route to LPN  Instruct the patient to first contact their pharmacy. If they have called their pharmacy and require further assistance, route to LPN.    [facility] [address] ESCOBAR [facility] [address] [phone] (Phone)  [phone] (Fax   amLODIPine (NORVASC) 5 MG tablet  cloNIDine (CATAPRES) 0.1 MG tablet  escitalopram (LEXAPRO) 20 MG tablet  naltrexone (DEPADE/REVIA) 50 MG tablet  traZODone (DESYREL) 100 MG tablet  QUEtiapine (SEROQUEL) 100 MG tablet  prazosin (MINIPRESS) 2 MG capsule  When will the patient be out of this medication?: Greater than 3 days (Route standard priority)

## 2019-10-14 ENCOUNTER — TELEPHONE (OUTPATIENT)
Dept: TRANSPLANT | Facility: CLINIC | Age: 49
End: 2019-10-14

## 2019-10-14 NOTE — TELEPHONE ENCOUNTER
CO2 = 29  Currently taking sodium bicarb 650mg BID.  Okay to stop.    Currently, florinef on med list - is Hector taking this?  Current BPs?    RNCC spoke with Dedra () at [facility] who will make med change to bicarb.  She was not able to get ahold of Poncho, but he will call back with most recent BPs.

## 2019-10-15 ENCOUNTER — TELEPHONE (OUTPATIENT)
Dept: TRANSPLANT | Facility: CLINIC | Age: 49
End: 2019-10-15

## 2019-10-15 NOTE — TELEPHONE ENCOUNTER
Called resident that pt lives at they confirmed for [place] appt on Fri 11/1 at 240pm, faxed letter to them w/address

## 2019-10-16 DIAGNOSIS — E78.5 HYPERLIPIDEMIA, UNSPECIFIED HYPERLIPIDEMIA TYPE: Primary | ICD-10-CM

## 2019-10-16 DIAGNOSIS — Z94.0 KIDNEY TRANSPLANTED: ICD-10-CM

## 2019-10-16 DIAGNOSIS — N25.81 HYPERPARATHYROIDISM DUE TO RENAL INSUFFICIENCY (H): ICD-10-CM

## 2019-10-16 DIAGNOSIS — D84.9 IMMUNOSUPPRESSED STATUS (H): Primary | ICD-10-CM

## 2019-10-16 DIAGNOSIS — I10 HYPERTENSION: ICD-10-CM

## 2019-10-16 DIAGNOSIS — E55.9 VITAMIN D DEFICIENCY: ICD-10-CM

## 2019-10-16 DIAGNOSIS — E83.42 HYPOMAGNESEMIA: ICD-10-CM

## 2019-10-16 RX ORDER — PREDNISONE 5 MG/1
5 TABLET ORAL DAILY
Qty: 90 TABLET | Refills: 3 | Status: SHIPPED | OUTPATIENT
Start: 2019-10-16

## 2019-10-16 RX ORDER — CYCLOSPORINE 100 MG/1
100 CAPSULE, LIQUID FILLED ORAL 2 TIMES DAILY
Qty: 60 CAPSULE | Refills: 11 | Status: SHIPPED | OUTPATIENT
Start: 2019-10-16 | End: 2019-10-21

## 2019-10-16 RX ORDER — SULFAMETHOXAZOLE AND TRIMETHOPRIM 400; 80 MG/1; MG/1
1 TABLET ORAL AT BEDTIME
Qty: 90 TABLET | Refills: 3 | Status: SHIPPED | OUTPATIENT
Start: 2019-10-16 | End: 2020-09-25

## 2019-10-16 RX ORDER — MYCOPHENOLATE MOFETIL 500 MG/1
500 TABLET ORAL EVERY MORNING
Qty: 30 TABLET | Refills: 11 | Status: SHIPPED | OUTPATIENT
Start: 2019-10-16 | End: 2019-10-28

## 2019-10-16 RX ORDER — MYCOPHENOLATE MOFETIL 250 MG/1
250 CAPSULE ORAL EVERY EVENING
Qty: 30 CAPSULE | Refills: 11 | Status: SHIPPED | OUTPATIENT
Start: 2019-10-16 | End: 2019-10-28

## 2019-10-16 RX ORDER — AMLODIPINE BESYLATE 5 MG/1
5 TABLET ORAL EVERY EVENING
Qty: 90 TABLET | Refills: 3 | Status: SHIPPED | OUTPATIENT
Start: 2019-10-16

## 2019-10-16 NOTE — TELEPHONE ENCOUNTER
Patient Call: Medication Refill    Needs medication refill and has questions about a new pharmacy      When will the patient be out of this medication?: Less than 3 days (Route high priority)  Please call Hector #[phone]

## 2019-10-17 RX ORDER — FOLIC ACID 1 MG/1
1 TABLET ORAL DAILY
Qty: 30 TABLET | Refills: 11 | Status: SHIPPED | OUTPATIENT
Start: 2019-10-17

## 2019-10-17 RX ORDER — ATORVASTATIN CALCIUM 40 MG/1
40 TABLET, FILM COATED ORAL EVERY EVENING
Qty: 90 TABLET | Refills: 0 | Status: SHIPPED | OUTPATIENT
Start: 2019-10-17

## 2019-10-17 RX ORDER — MAGNESIUM OXIDE 420 MG/1
1 TABLET ORAL 2 TIMES DAILY
Qty: 180 TABLET | Refills: 0 | Status: SHIPPED | OUTPATIENT
Start: 2019-10-17

## 2019-10-17 RX ORDER — CHOLECALCIFEROL (VITAMIN D3) 50 MCG
1 TABLET ORAL DAILY
Qty: 90 TABLET | Refills: 3 | Status: SHIPPED | OUTPATIENT
Start: 2019-10-17

## 2019-10-17 RX ORDER — FLUDROCORTISONE ACETATE 0.1 MG/1
0.1 TABLET ORAL DAILY
Qty: 90 TABLET | Refills: 0 | Status: SHIPPED | OUTPATIENT
Start: 2019-10-17

## 2019-10-17 NOTE — TELEPHONE ENCOUNTER
Folic Acid and Vitamin D refilled per RN policy. Lipitor sent to provider due to warning interaction. Unable to complete prescription refill for Magnesium and Florinef per RN Medication Refill Policy.   Bella Galdamez RN...........10/17/2019 10:33 AM

## 2019-10-21 DIAGNOSIS — D84.9 IMMUNOSUPPRESSED STATUS (H): Primary | ICD-10-CM

## 2019-10-21 RX ORDER — CYCLOSPORINE 25 MG/1
75 CAPSULE, LIQUID FILLED ORAL 2 TIMES DAILY
Qty: 180 CAPSULE | Refills: 11 | Status: SHIPPED | OUTPATIENT
Start: 2019-10-21 | End: 2019-10-28

## 2019-10-21 RX ORDER — CYCLOSPORINE 100 MG/1
CAPSULE, LIQUID FILLED ORAL
Qty: 60 CAPSULE | Refills: 11
Start: 2019-10-21 | End: 2020-09-25

## 2019-10-21 NOTE — TELEPHONE ENCOUNTER
ISSUE:   cyclosporine level 124 on 10/14, goal 100, dose 100 mg BID    PLAN:   Please call pt and confirm this was a good 12-hour trough. Verify dose 100 mg BID. Confirm no new medications or illness (kee. Diarrhea). If good trough, decrease dose to 75 mg BID and recheck level in 1 week.    Please send orders to outside facility as well as orders for CSA dose change.

## 2019-10-21 NOTE — TELEPHONE ENCOUNTER
Spoke to patients inpatient facility and confirmed patients current CSA dose and good 12 hour trough level.  Staff verbalized understanding to decrease patients CSA dose to 75 mg BID and repeat txp labs in 1 week.

## 2019-10-25 ENCOUNTER — TELEPHONE (OUTPATIENT)
Dept: TRANSPLANT | Facility: CLINIC | Age: 49
End: 2019-10-25

## 2019-10-25 NOTE — TELEPHONE ENCOUNTER
RNCC spoke with Dedra, care giver at [facility].  She reports Hector has 5 days left of 100mg CSA BID.  There is 10days left of current dose MMF.    They have not yet changed from 100mg to 75mg CSA BID, as they have not been able to obtain the CSA from their local pharmacy.    RNCC spoke with pharm tech at [facility], they are trying to bill Medicare B.    Message left for [facility] , [place] [facility]. I asked her to call augustina Cerrato direct phone number and if left my direct number as well in the event that she cannot get ahold of Bl[facility] on Monday.

## 2019-10-28 DIAGNOSIS — Z94.0 KIDNEY REPLACED BY TRANSPLANT: Primary | ICD-10-CM

## 2019-10-28 DIAGNOSIS — D84.9 IMMUNOSUPPRESSED STATUS (H): ICD-10-CM

## 2019-10-28 RX ORDER — MYCOPHENOLATE MOFETIL 500 MG/1
500 TABLET ORAL EVERY MORNING
Qty: 30 TABLET | Refills: 11 | Status: SHIPPED | OUTPATIENT
Start: 2019-10-28 | End: 2020-09-25

## 2019-10-28 RX ORDER — MYCOPHENOLATE MOFETIL 250 MG/1
250 CAPSULE ORAL EVERY EVENING
Qty: 30 CAPSULE | Refills: 11 | Status: SHIPPED | OUTPATIENT
Start: 2019-10-28 | End: 2020-09-25

## 2019-10-28 RX ORDER — CYCLOSPORINE 25 MG/1
75 CAPSULE, LIQUID FILLED ORAL 2 TIMES DAILY
Qty: 180 CAPSULE | Refills: 11 | Status: SHIPPED | OUTPATIENT
Start: 2019-10-28 | End: 2020-09-25

## 2019-10-31 ENCOUNTER — TELEPHONE (OUTPATIENT)
Dept: TRANSPLANT | Facility: CLINIC | Age: 49
End: 2019-10-31

## 2019-10-31 DIAGNOSIS — Z94.0 KIDNEY TRANSPLANTED: Primary | ICD-10-CM

## 2019-10-31 NOTE — LETTER
PHYSICIAN ORDERS      DATE & TIME ISSUED: 2019 9:36 AM  PATIENT NAME: Gilles Henning III   : 1970     [facility] MR# [if applicable]: [id number]     DIAGNOSIS:  Kidney Transplant  ICD-10 CODE: Z94.0     Please complete the following lab at the next routine transplant lab draw:  IgG    Any questions please call: [phone]  Please fax lab results to [phone].      Dr. Edmond Donato

## 2019-10-31 NOTE — TELEPHONE ENCOUNTER
BK stable, but still above goal.  Will need to recheck BK level in 2 weeks and check IgG at that time.    RNCC spoke with Dedra at [facility] - she confirmed all rides are currently set for current lab schedule of e/o week labs.

## 2019-11-12 ENCOUNTER — TELEPHONE (OUTPATIENT)
Dept: TRANSPLANT | Facility: CLINIC | Age: 49
End: 2019-11-12

## 2019-11-13 NOTE — TELEPHONE ENCOUNTER
RNCC spoke with Trevor at [facility] - Gilles was still not at [facility].  Upon review of [facility] roster, Gilles is currently in [facility].    [facility] to discuss current post transplant cares.

## 2019-11-14 NOTE — TELEPHONE ENCOUNTER
RNCC spoke with Reva at [facility] -   Concern for creatinine >1.6, as this is >20% increase from baseline.  Hector does have a recent history of kidney transplant rejection.   Reva reports that Hector is complaining of 5/10 pain over the graft site.    RNCC advised repeat BMP and CBC today, if trend up, communicate with SOT.    IF creat >1.6, present to local ED.    [facility] [place] [facility] open on 11/25 or 11/26 for Hector.    Reva reports that EBV &CSA were drawn on last set of labs, no result yet back.  She will ensure BK was drawn as well.

## 2019-11-15 ENCOUNTER — TELEPHONE (OUTPATIENT)
Dept: TRANSPLANT | Facility: CLINIC | Age: 49
End: 2019-11-15

## 2019-11-15 NOTE — TELEPHONE ENCOUNTER
RNCC received phone call that H[facility] is no longer at [facility].    RNCC left VM for [facility] RN, will need most recent CSA dose prior to 11/12 lab draw, as level outside of goal range (needs to be 12h trough).     RNCC spoke with Jonn Kruger, , who reports that Hector is currently at [facility] until 11/26 when he will start the RRTP program at [facility].    RNCC spoke with Stephanie at [facility] for reminder of need for labs (orders sent) and 12h trough levels.

## 2019-11-15 NOTE — LETTER
OUTPATIENT LABORATORY TEST ORDER    Patient Name:Gilles Henning III   Transplant Date: 5/30/2018  YOB: 1970  Issue Date & Time:10/8/2019  1:06 PM  [facility] MR: [id number]  Expiration Date:  (1 year after date issued)      Diagnoses: Aftercare following organ transplant (ICD-10 Z48.288)   Kidney Transplant (ICD-10 Z94.0)   Long term use of medications (ICD-10  Z79.899)     ?Lab results to be available on the same day drawn.   ?Patient should release information to the [facility],   [facility].     ?Please fax to the Transplant Center at [phone].    Every other week     ?Hemogram and Platelet   ?Basic Metabolic Panel (Sodium, Potassium,Chloride, CO2, Creatinine, Urea Nitrogen, Glucose, Calcium)   ?Cyclosporine drug level    ?BK PCR Quantitative (Polyoma virus - blood in purple top tube to Reference Lab)    Monthly    ?EBV PCR QT    Every 6 months    ? Liver Function Tests(Bilirubin Direct/Total, AST, ALT, Alkaline Phosphatase)   ? Random Urine for Protein/Creatinine ratio   ? PRA/DSA level (mailers provided by the patient)    If you have any questions, please call The Transplant Center at [phone] or [phone].    Please fax all results to [phone].    .

## 2019-11-18 ENCOUNTER — DOCUMENTATION ONLY (OUTPATIENT)
Dept: OTHER | Facility: CLINIC | Age: 49
End: 2019-11-18

## 2019-11-19 ENCOUNTER — OFFICE VISIT (OUTPATIENT)
Dept: PEDIATRICS | Facility: OTHER | Age: 49
End: 2019-11-19
Attending: INTERNAL MEDICINE
Payer: COMMERCIAL

## 2019-11-19 VITALS
OXYGEN SATURATION: 98 % | TEMPERATURE: 97.6 F | WEIGHT: 173 LBS | SYSTOLIC BLOOD PRESSURE: 108 MMHG | BODY MASS INDEX: 24.77 KG/M2 | HEIGHT: 70 IN | DIASTOLIC BLOOD PRESSURE: 60 MMHG | RESPIRATION RATE: 16 BRPM | HEART RATE: 76 BPM

## 2019-11-19 DIAGNOSIS — Z90.5 S/P NEPHRECTOMY: ICD-10-CM

## 2019-11-19 DIAGNOSIS — B33.8 BK VIRUS NEPHROPATHY: ICD-10-CM

## 2019-11-19 DIAGNOSIS — N02.B9 IGA NEPHROPATHY: ICD-10-CM

## 2019-11-19 DIAGNOSIS — Z94.0 KIDNEY TRANSPLANT RECIPIENT: ICD-10-CM

## 2019-11-19 DIAGNOSIS — N62 GYNECOMASTIA, MALE: ICD-10-CM

## 2019-11-19 DIAGNOSIS — N05.8 BK VIRUS NEPHROPATHY: ICD-10-CM

## 2019-11-19 DIAGNOSIS — Z72.0 TOBACCO ABUSE: ICD-10-CM

## 2019-11-19 DIAGNOSIS — Z43.0 TRACHEOSTOMY CARE (H): ICD-10-CM

## 2019-11-19 DIAGNOSIS — S42.302D CLOSED FRACTURE OF LEFT UPPER EXTREMITY WITH ROUTINE HEALING, SUBSEQUENT ENCOUNTER: Primary | ICD-10-CM

## 2019-11-19 PROCEDURE — G0463 HOSPITAL OUTPT CLINIC VISIT: HCPCS

## 2019-11-19 PROCEDURE — 99215 OFFICE O/P EST HI 40 MIN: CPT | Performed by: INTERNAL MEDICINE

## 2019-11-19 RX ORDER — NICOTINE 21 MG/24HR
1 PATCH, TRANSDERMAL 24 HOURS TRANSDERMAL EVERY 24 HOURS
Qty: 30 PATCH | Refills: 0 | Status: SHIPPED | OUTPATIENT
Start: 2019-11-19

## 2019-11-19 ASSESSMENT — ANXIETY QUESTIONNAIRES
2. NOT BEING ABLE TO STOP OR CONTROL WORRYING: MORE THAN HALF THE DAYS
6. BECOMING EASILY ANNOYED OR IRRITABLE: NOT AT ALL
IF YOU CHECKED OFF ANY PROBLEMS ON THIS QUESTIONNAIRE, HOW DIFFICULT HAVE THESE PROBLEMS MADE IT FOR YOU TO DO YOUR WORK, TAKE CARE OF THINGS AT HOME, OR GET ALONG WITH OTHER PEOPLE: SOMEWHAT DIFFICULT
5. BEING SO RESTLESS THAT IT IS HARD TO SIT STILL: MORE THAN HALF THE DAYS
1. FEELING NERVOUS, ANXIOUS, OR ON EDGE: NEARLY EVERY DAY
7. FEELING AFRAID AS IF SOMETHING AWFUL MIGHT HAPPEN: NOT AT ALL
GAD7 TOTAL SCORE: 9
3. WORRYING TOO MUCH ABOUT DIFFERENT THINGS: SEVERAL DAYS

## 2019-11-19 ASSESSMENT — PATIENT HEALTH QUESTIONNAIRE - PHQ9
5. POOR APPETITE OR OVEREATING: SEVERAL DAYS
SUM OF ALL RESPONSES TO PHQ QUESTIONS 1-9: 11

## 2019-11-19 ASSESSMENT — MIFFLIN-ST. JEOR: SCORE: 1650.97

## 2019-11-19 ASSESSMENT — PAIN SCALES - GENERAL: PAINLEVEL: MILD PAIN (3)

## 2019-11-19 NOTE — PATIENT INSTRUCTIONS
-- New prescription for trach dressing   -- Continue nicotine patch 14 mg, wean down to 7 mg in 1 month   -- Obtain discharge summary and other notes from [facility] ER and hospital stay   -- Kidney lab visit as planned   -- Contact transplant team to see when they want to see you next   -- Have [facility] Dr. Dubose   -- Follow-up with OA for left arm surgery [place] you leave for [place] or see Ortho in [place]   -- Re: gynecomastia consider reduce seroquel dose vs referral to Breast clinic with Dr. Hernandez   -- If you move to [place] or [facility] long term, establish with a new physician

## 2019-11-19 NOTE — NURSING NOTE
"Chief Complaint   Patient presents with     Arm Pain     Pt present to clinic today for a follow up on his broken arm.  Initial /60   Pulse 76   Temp 97.6  F (36.4  C) (Tympanic)   Resp 16   Ht 1.778 m (5' 10\")   Wt 78.5 kg (173 lb)   SpO2 98%   BMI 24.82 kg/m   Estimated body mass index is 24.82 kg/m  as calculated from the following:    Height as of this encounter: 1.778 m (5' 10\").    Weight as of this encounter: 78.5 kg (173 lb).  Medication Reconciliation: complete    Mely Sanchez LPN  "

## 2019-11-20 PROBLEM — B33.8 BK VIRUS NEPHROPATHY: Status: ACTIVE | Noted: 2019-11-20

## 2019-11-20 PROBLEM — N05.8 BK VIRUS NEPHROPATHY: Status: ACTIVE | Noted: 2019-11-20

## 2019-11-20 ASSESSMENT — ANXIETY QUESTIONNAIRES: GAD7 TOTAL SCORE: 9
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
## 2019-11-25 DIAGNOSIS — Z94.0 KIDNEY TRANSPLANTED: ICD-10-CM

## 2019-11-25 DIAGNOSIS — Z48.288 ENCOUNTER FOR AFTERCARE FOLLOWING MULTIPLE ORGAN TRANSPLANT: ICD-10-CM

## 2019-11-25 DIAGNOSIS — Z79.60 LONG-TERM USE OF IMMUNOSUPPRESSANT MEDICATION: ICD-10-CM

## 2019-11-25 LAB
ANION GAP SERPL CALCULATED.3IONS-SCNC: 10 MMOL/L (ref 3–14)
BUN SERPL-MCNC: 17 MG/DL (ref 7–25)
CALCIUM SERPL-MCNC: 9.6 MG/DL (ref 8.6–10.3)
CHLORIDE SERPL-SCNC: 108 MMOL/L (ref 98–107)
CO2 SERPL-SCNC: 24 MMOL/L (ref 21–31)
CREAT SERPL-MCNC: 1.31 MG/DL (ref 0.7–1.3)
ERYTHROCYTE [DISTWIDTH] IN BLOOD BY AUTOMATED COUNT: 14.7 % (ref 10–15)
GFR SERPL CREATININE-BSD FRML MDRD: 58 ML/MIN/{1.73_M2}
GLUCOSE SERPL-MCNC: 100 MG/DL (ref 70–105)
HCT VFR BLD AUTO: 34.1 % (ref 40–53)
HGB BLD-MCNC: 10.6 G/DL (ref 13.3–17.7)
MCH RBC QN AUTO: 30.3 PG (ref 26.5–33)
MCHC RBC AUTO-ENTMCNC: 31.1 G/DL (ref 31.5–36.5)
MCV RBC AUTO: 97 FL (ref 78–100)
PLATELET # BLD AUTO: 183 10E9/L (ref 150–450)
POTASSIUM SERPL-SCNC: 3.9 MMOL/L (ref 3.5–5.1)
RBC # BLD AUTO: 3.5 10E12/L (ref 4.4–5.9)
SODIUM SERPL-SCNC: 142 MMOL/L (ref 134–144)
WBC # BLD AUTO: 3.7 10E9/L (ref 4–11)

## 2019-11-25 PROCEDURE — 85027 COMPLETE CBC AUTOMATED: CPT | Mod: ZL | Performed by: INTERNAL MEDICINE

## 2019-11-25 PROCEDURE — 36415 COLL VENOUS BLD VENIPUNCTURE: CPT | Mod: ZL | Performed by: INTERNAL MEDICINE

## 2019-11-25 PROCEDURE — 87799 DETECT AGENT NOS DNA QUANT: CPT | Mod: ZL | Performed by: INTERNAL MEDICINE

## 2019-11-25 PROCEDURE — 82784 ASSAY IGA/IGD/IGG/IGM EACH: CPT | Mod: ZL | Performed by: INTERNAL MEDICINE

## 2019-11-25 PROCEDURE — 80048 BASIC METABOLIC PNL TOTAL CA: CPT | Mod: ZL | Performed by: INTERNAL MEDICINE

## 2019-11-25 PROCEDURE — 80158 DRUG ASSAY CYCLOSPORINE: CPT | Mod: ZL | Performed by: INTERNAL MEDICINE

## 2019-11-26 LAB
CYCLOSPORINE BLD LC/MS/MS-MCNC: 79 UG/L (ref 50–400)
IGG SERPL-MCNC: 769 MG/DL (ref 695–1620)
TME LAST DOSE: NORMAL H

## 2019-11-27 LAB
BKV DNA # SPEC NAA+PROBE: 8718 COPIES/ML
BKV DNA SPEC NAA+PROBE-LOG#: 3.9 LOG COPIES/ML
SPECIMEN SOURCE: ABNORMAL

## 2020-01-14 ENCOUNTER — TELEPHONE (OUTPATIENT)
Dept: TRANSPLANT | Facility: CLINIC | Age: 50
End: 2020-01-14

## 2020-01-14 NOTE — TELEPHONE ENCOUNTER
ISSUE:  Overdue for transplant labs    PLAN:  Ensure patient can have transplant labs with BK and EBV this week or next week.

## 2020-01-15 NOTE — TELEPHONE ENCOUNTER
Spoke to [facility] who states that patient was discharged 1/14/2020 to [place], [place] (no street address given).  Last phone # [facility] has for patient is [phone] which is a non working number.

## 2020-03-11 ENCOUNTER — HEALTH MAINTENANCE LETTER (OUTPATIENT)
Age: 50
End: 2020-03-11

## 2020-04-01 ENCOUNTER — TELEPHONE (OUTPATIENT)
Dept: TRANSPLANT | Facility: CLINIC | Age: 50
End: 2020-04-01

## 2020-04-01 NOTE — TELEPHONE ENCOUNTER
Due for follow up with transplant nephrology.  Due for follow up imaging with history of RCC.    RNCC spoke with staff (Stephanie) at [facility], Hector does not currently reside there.    RNCC spoke with staff at [facility], he is not currently an active patient there.    The phone number for the patient is not valid.    RNCC could not reach mental health .    RNCC did confirm that Mr. Henning is not currently at the [facility].    RNCC confirmed with Records department of [facility] that last known disposition of patient was in November of 2019.
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
## 2020-07-08 ENCOUNTER — TRANSFERRED RECORDS (OUTPATIENT)
Dept: HEALTH INFORMATION MANAGEMENT | Facility: CLINIC | Age: 50
End: 2020-07-08

## 2020-07-08 LAB — INR PPP: 1.1 (ref 0.9–1.1)

## 2020-07-10 LAB — AST SERPL-CCNC: 17 U/L (ref 5–41)

## 2020-07-14 LAB — GLUCOSE SERPL-MCNC: 90 MG/DL (ref 70–100)

## 2020-07-16 ENCOUNTER — TRANSFERRED RECORDS (OUTPATIENT)
Dept: HEALTH INFORMATION MANAGEMENT | Facility: CLINIC | Age: 50
End: 2020-07-16

## 2020-07-21 ENCOUNTER — TRANSFERRED RECORDS (OUTPATIENT)
Dept: HEALTH INFORMATION MANAGEMENT | Facility: CLINIC | Age: 50
End: 2020-07-21
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
## 2020-09-01 ENCOUNTER — TRANSFERRED RECORDS (OUTPATIENT)
Dept: HEALTH INFORMATION MANAGEMENT | Facility: CLINIC | Age: 50
End: 2020-09-01

## 2020-09-03 LAB
ALT SERPL-CCNC: 8 U/L (ref 8–45)
CREAT SERPL-MCNC: 8.11 MG/DL (ref 0.72–1.25)
GFR SERPL CREATININE-BSD FRML MDRD: 7 ML/MIN/1.73M2
POTASSIUM SERPL-SCNC: 4.3 MMOL/L (ref 3.5–5.1)

## 2020-09-08 ENCOUNTER — TRANSFERRED RECORDS (OUTPATIENT)
Dept: HEALTH INFORMATION MANAGEMENT | Facility: CLINIC | Age: 50
End: 2020-09-08

## 2020-09-15 ENCOUNTER — TRANSFERRED RECORDS (OUTPATIENT)
Dept: HEALTH INFORMATION MANAGEMENT | Facility: CLINIC | Age: 50
End: 2020-09-15

## 2020-09-15 LAB — HEP C HIM: NORMAL

## 2020-09-22 ENCOUNTER — TRANSFERRED RECORDS (OUTPATIENT)
Dept: HEALTH INFORMATION MANAGEMENT | Facility: CLINIC | Age: 50
End: 2020-09-22

## 2020-09-25 ENCOUNTER — REFERRAL (OUTPATIENT)
Dept: TRANSPLANT | Facility: CLINIC | Age: 50
End: 2020-09-25

## 2020-09-25 ENCOUNTER — TELEPHONE (OUTPATIENT)
Dept: TRANSPLANT | Facility: CLINIC | Age: 50
End: 2020-09-25

## 2020-09-25 NOTE — TELEPHONE ENCOUNTER
Patient lost to follow up after history of noncompliance related to alcohol abuse.    CELE received notification from Intake LPN that MD notes from outside nephrologist were received and scanned into [facility].    CELE is able to review labs in [facility] and Hector is currently on dialysis.     CELE left  for [facility], as it appears he resumed HD in July within their system.    CELE WAS ABLE to contact Hector based on the most current contact info in [facility]:    He lives in a soberhouse in [place] and remains on dialysis through [facility].  Hector reports that he was told to stop Cyclosporine on Tuesday, 9/22, by his dialysis nephrologist.  Hector also reports that he is off of the mycophenolate, as of 9/22.  Hector is no longer making urine.  Hector remains on prednisone.

## 2020-09-29 ENCOUNTER — TELEPHONE (OUTPATIENT)
Dept: TRANSPLANT | Facility: CLINIC | Age: 50
End: 2020-09-29

## 2020-09-29 NOTE — TELEPHONE ENCOUNTER
Post Kidney and Pancreas Transplant Team Conference  Date: 9/29/2020  Transplant Coordinator: Angeline Alonso     Attendees, via telephone:  [x]  Dr. Macias  [] Blossom Montes LPN     []  Dr. Donato [] Martha Morris, DONALD [] Noelle Wagner LPN   []  Dr. Vasquez [] Beth Evans RN    []  Dr. Villarreal [] Katelyn Gallegos RN [] Jacob López, PharmD   [] Dr. Lorenzo [x] Maliha Alonso RN    [] Dr. Finley [] Mahad Steele RN    [] Dr. Maynard [] Abbey Esparza RN    [] Dr. Parsons [] Gia Romero RN    []  Dr. Duran [] Marianna Fortune RN    [] Surgery Fellow [] Danni Sevilla RN    [] Rachel Resendez, NP [] Thao Villarreal RN        Verbal Plan Read Back:   As dicussed with RN at [facility], Yudith remains on CSA 100mg BID.    NO CHANGE to immunosuppression at this time.     Routed to RN Coordinator   Angeline Alonso RN

## 2020-10-15 ENCOUNTER — DOCUMENTATION ONLY (OUTPATIENT)
Dept: TRANSPLANT | Facility: CLINIC | Age: 50
End: 2020-10-15

## 2020-10-15 NOTE — PROGRESS NOTES
Received notification of failed kidney from [facility] Routine Follow up.  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

## 2020-10-15 NOTE — Clinical Note
Good Afternoon Angeline: Mr. Hennnig' kidney failed and he resumed dialysis on 08.12.2020.  [facility]

## 2020-12-27 ENCOUNTER — HEALTH MAINTENANCE LETTER (OUTPATIENT)
Age: 50
End: 2020-12-27

## 2021-04-11 NOTE — PLAN OF CARE
Problem: Goal Outcome Summary  Goal: Goal Outcome Summary  RN: Pt is alert and oriented, VSS. Pt is able to make needs known, independent with transfer and ambulation using walker. Pt remains on NPO, TF started at 1800 as ordered without issues. Trach care done, no suction needed this shift. All the incisions CD, med applied. Pain is well controlled with PRN medication. Pt denies SOB. Trach dome applied at HS.       · Home regimen:  Toprol  mg daily and ramipril 10 mg daily   · Switch ramipril to 40mg lisinopril while inpatient  · C/w Toprol XL  Monitor BP per unit protocol

## 2021-04-25 ENCOUNTER — HEALTH MAINTENANCE LETTER (OUTPATIENT)
Age: 51
End: 2021-04-25

## 2021-10-09 ENCOUNTER — HEALTH MAINTENANCE LETTER (OUTPATIENT)
Age: 51
End: 2021-10-09

## 2022-05-21 ENCOUNTER — HEALTH MAINTENANCE LETTER (OUTPATIENT)
Age: 52
End: 2022-05-21

## 2022-06-27 NOTE — PLAN OF CARE
Problem: Goal Outcome Summary  Goal: Goal Outcome Summary   Reviewed speech intllgibilty stategies with pt- pt generally using during conversational speech but occasional reminders to slow rate and exaggerate articulation; pt continues to wear PMSV daily without difficulty- maintains O2 sats at 100%- pt is independent with placing PMSV as well as removing when he is sleeping and also is independent in the care of the PMSV. PMSV will go with pt when he is d/c'd to home today-- pt should continue to work on speech intelligibility strategies with SLP-- focus on bilabial sounds: p,b,m,w,r.Pt continues with a # 6 Shiley trach ( with cuff deflated)-- plan is to further downsize tracheostomy and then begin capping following next visit to OMF team.     Completed po trials of thin water- completed oral cares 1st- outo f 5 trials of 1/2 teaspoon amts of thin water - pt had a mild cough and throat clear 1x out of 5 but O2 sats maintained at 100%. REviewed free water program guidelines again of oral cares 1st and thn liiting to just 4-5 small sipsof water only per hour. Disucssed with pt and his wife that pt isnot cleared by sptx to take any other clear liquids at this time nor would he be safe to take pills by mouth. Pt and his wife were stating that hey were cleared by the OF team to take pills by mouth-- explained that they defer to us to assess readiness for this and pt is not currently ready or safe to do this but further expolained that the plan is for pt to [facility] to work with sptx once d/c'd to home ( will be either through HH or OP- SW is seeing whta is available in pt's area)-- and then the plan is for sptx to continue to assess for readiness for further advancement to an oral diet-- pt likelywill eed an OP VFSS when readiness indicated to determine safest diet. Currently today with trials of thin water-- pt still with difficulty with A-P transit of bolus as well as large aounts of liquid spillage from oral cavity;  also as noted- pt did have throat clar 1x out of 5 trials with thin water. Further educated pt and wife on continuing with gentle oral motor excs and effortful swallow ex. Pt should continue with Free water program as just outlined above  for now but no other clear liquids for now. Pt will need to be seen by sptx following discharge for swallowing.    Speech Language Therapy Discharge Summary    Reason for therapy discharge:    Discharged to home with outpatient therapy.    Progress towards therapy goal(s). See goals on Care Plan in [facility] electronic health record for goal details.  Goals partially met.  Barriers to achieving goals:   discharge from facility.    Therapy recommendation(s):    Continued therapy is recommended.  Rationale/Recommendations:  See above summary notes.                   “[facility]” pamphlet given

## 2022-08-11 NOTE — TELEPHONE ENCOUNTER
PRIOR AUTHORIZATION DENIED    Medication: omeprazole (PRILOSEC) 2 mg/mL SUSP    Denial Date: 9/19/2018    Denial Rational:        Appeal Information:        Refill approved as requested.

## 2022-09-17 ENCOUNTER — HEALTH MAINTENANCE LETTER (OUTPATIENT)
Age: 52
End: 2022-09-17

## 2022-11-01 ENCOUNTER — OFFICE VISIT (OUTPATIENT)
Dept: ORTHOPEDIC SURGERY | Age: 52
End: 2022-11-01
Payer: COMMERCIAL

## 2022-11-01 DIAGNOSIS — M84.374A STRESS FRACTURE OF METATARSAL BONE OF RIGHT FOOT, INITIAL ENCOUNTER: Primary | ICD-10-CM

## 2022-11-01 DIAGNOSIS — M79.671 ACUTE PAIN OF RIGHT FOOT: ICD-10-CM

## 2022-11-01 PROCEDURE — 99202 OFFICE O/P NEW SF 15 MIN: CPT | Performed by: ORTHOPAEDIC SURGERY

## 2022-11-01 PROCEDURE — L4387 NON-PNEUM WALK BOOT PRE OTS: HCPCS | Performed by: ORTHOPAEDIC SURGERY

## 2022-11-01 NOTE — LETTER
11/2/2022    Patient: Martine Cullen. YOB: 1970   Date of Visit: 11/1/2022     Lizandro Law, [address] [phone]  Via Fax: [phone]    Dear Lizandro Law MD,      Thank you for referring Mr. Mahad Chavira to [facility] for evaluation. My notes for this consultation are attached. If you have questions, please do not hesitate to call me. I look forward to following your patient along with you.       Sincerely,    Gaby Ballard MD

## 2022-11-01 NOTE — PROGRESS NOTES
Racheal Dunn. (: 1970) is a 47 y.o. male, patient,here for evaluation of the following   Chief Complaint   Patient presents with    Foot Pain        ASSESSMENT/PLAN:  Below is the assessment and plan developed based on review of pertinent history, physical exam, labs, studies, and medications. 1. Stress fracture of metatarsal bone of right foot, initial encounter  2. Acute pain of right foot  -     XR STANDING FOOT RT MIN 3 V; Future  -     REFERRAL TO DME  -     W[place] NON-PNEUM WALK BOOT PRE OTS      Patient informed of findings on exam and x-rays reviewed, I do not see an obvious fracture but based on examination of tenderness along the metatarsals and associated swelling after walking activities all day and then pain after those activities, this appears to be stress fracture more likely than arthritis. The pain is also not at the joints but at the forefoot metatarsals. I recommend immobilization in a short boot which is fitted today and he is weightbearing as tolerated in the boot. We will see him again in approximately 4 weeks and get new x-rays of the right foot. Informed sometimes the x-rays can show up fracture at another visit as the fracture is healing it becomes visible. Return in about 4 weeks (around 2022) for repeat xrays. Not on File    No current outpatient medications on file. No current facility-administered medications for this visit. No past medical history on file. No past surgical history on file. No family history on file.     Social History     Socioeconomic History    Marital status:      Spouse name: Not on file    Number of children: Not on file    Years of education: Not on file    Highest education level: Not on file   Occupational History    Not on file   Tobacco Use    Smoking status: Not on file    Smokeless tobacco: Not on file   Substance and Sexual Activity    Alcohol use: Not on file    Drug use: Not on file    Sexual activity: Not on file   Other Topics Concern    Not on file   Social History Narrative    Not on file     Social Determinants of Health     Financial Resource Strain: Not on file   Food Insecurity: Not on file   Transportation Needs: Not on file   Physical Activity: Not on file   Stress: Not on file   Social Connections: Not on file   Intimate Partner Violence: Not on file   Housing Stability: Not on file           Vitals: There were no vitals taken for this visit. There is no height or weight on file to calculate BMI. SUBJECTIVE:  Filippo Caputo. (: 1970)   New patient presents today with complaint of pain and swelling that radiates to the top of the foot. He states he was in [place] over the recent weekend and had been walking nonstop all day in the area while he was visiting and he also had traveled on trains of most of his activities was walking. When he was back on the train coming home he noted severe pain to his foot and thought it would get better but it has not improved as he continues to have radiating pain to the top of his foot. Monday his foot felt cold and it was swollen and he is currently still having trouble walking. He is taking ibuprofen since  which seems to help some of the swelling and tenderness to the area. He describes this pain as severe constant pain associate with swelling and throbbing sensation and he is not diabetic, smoker of cigarettes. OBJECTIVE EXAM:     There were no vitals taken for this visit. Appearance: Alert, well appearing and pleasant patient who is in no distress, oriented to person, place/time, and who follows commands. This patient is accompanied in the       office by his  self. Psychiatric: Affect and mood are appropriate.  No dementia noted on examination Musculoskeletal:  LOCATION: [facility] tenderness and swelling forefoot metatarsals foot - right      Integumentary: No rashes, skin patches, wounds, or abrasions to the right or left legs       Warm and normal color. No regions of expressible drainage. Gait: Normal      Tenderness: No tenderness        Motor/Strength/Tone Exam: Normal       Sensory Exam:   Intact Normal Sensation to ankle/foot      Stability Testing: No anterolateral or varus instability of the Ankle or Subtalar Joints               No peroneal tendon instability noted      ROM: Normal ROM noted to ankle/foot      Contractures: No Achilles or Gastrocnemius Contractures      Calf tenderness: Absent for calf or gastrocnemius muscle regions       Soft, supple, non tender, non taut lower extremity compartment  Alignment:      NEUTRAL Hindfoot,         none Metatarsus Adductus Metatarsus   Wounds/Abrasions:    None present  Extremities:   No embolic phenomena to the toes          No significant edema to the foot and or toes. Lower extremities are warm and appear well perfused    DVT: No evidence of DVT seen on examination at this time     No calf swelling, no tenderness to calf muscles  Lymphatic:  No Evidence of Lymphedema  Vascular: Medial Border of Tibia Region: Edema is not present         Pulses: Dorsalis Pedis &  Posterior Tibial Pulses : Palpable yes        Varicosities Lower Limbs :  None  noted  Neuro: Negative bilateral Straight leg raise (seated position)    See Musculoskeletal section for pertinent individual extremity examination    No abnormal hand/wrist, foot/ankle, or facial/neck tremors. Lower Extremity/Ankle/Foot:  Antalgic gait, satisfactory weightbearing stance. Right lower extremity/ankle: There is normal alignment without deformity, nontender, no swelling, ligaments grossly stable. Right foot: There is some swelling to the forefoot compared to contralateral and there is tenderness over the metatarsals of forefoot second, third and fourth. Able to flex and extend toes satisfaction range of motion and strength.     Contralateral lower extremity/ankle /foot exam:  Nontender, no swelling ligaments grossly stable. Normal weightbearing stance. Neurovascular exam intact for light touch sensation, capillary refill is present, dorsalis pedis pulse palpable, EHL/FHL 5/5. Imaging:    XR Results (most recent):  Results from Appointment encounter on 11/01/22    XR STANDING FOOT RT MIN 3 V    Narrative  Right foot AP, lateral and oblique standing x-rays show hallux interphalanges, normal joint space at the forefoot, no fractures or dislocations, normal bone density, no severe arthritis at the hindfoot or midfoot joints. No acute abnormalities. An electronic signature was used to authenticate this note.   -- Camryn Gold MD

## 2023-06-04 ENCOUNTER — HEALTH MAINTENANCE LETTER (OUTPATIENT)
Age: 53
End: 2023-06-04

## 2023-08-06 NOTE — PLAN OF CARE
Problem: Patient Care Overview  Goal: Plan of Care/Patient Progress Review  OT/7A - OT DEFERRED. Per chart review and discussion with PT, no acute OT needs at this time. Pt has no ADL concerns, family able to assist PRN. Will complete OT orders, please re-consult if new needs arise.        denies pain/discomfort (Rating = 0)
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
## 2024-01-30 NOTE — ED TRIAGE NOTES
Sarwat Ann MD  You23 hours ago (5:13 PM)     Pt may read up on HSV through [facility] site.     Infections are transmitted through contact with HSV in herpes lesions, mucosal surfaces, genital secretions, or oral secretions.  HSV-1 and HSV-2 can be shed from normal-appearing oral or genital mucosa or skin.    Generally, a person can only get HSV-2 infection during genital contact with someone who has a genital HSV-2 infection.    Hope this is helpful  Thanks  Sarwat Ann MD     You  Sarwat Ann MD4 days ago     Please advise. Thanks!          Patient presents to ED with generalized pain, weakness, fever, and cough.  Patient states symptoms began yesterday morning but have gotten worse since.  Patient states he had a fever at home of 102, temp on arrival was 99.4.

## 2024-04-01 NOTE — TELEPHONE ENCOUNTER
Spoke with Home Care RN. Gilles Henning III is ready to d/c from home care, he knows he still needs labs 3x weekly.    LPN task:    Please send lab order to [facility].  
Updated standing lab orders faxed to patients lab.  
[Annual] : an annual visit.

## 2024-05-31 NOTE — TELEPHONE ENCOUNTER
Chronic, stable, stage 3a-b  Recheck  Avoid nephrotoxic medications  Maintain hydration   Patient Information     Patient Name MRN Sex Gilles Munoz III [id number] Male 1970      Telephone Encounter by Charlie Dubose MD at 2017  7:22 AM     Author:  Charlie Dubose MD Service:  (none) Author Type:  Physician     Filed:  2017  7:22 AM Encounter Date:  2017 Status:  Signed     :  Charlie Dubose MD (Physician)            Noted, agree.    Signed, Charlie Dubose MD  Internal Medicine & Pediatrics

## 2024-11-05 ENCOUNTER — HOSPITAL ENCOUNTER (OUTPATIENT)
Age: 54
Discharge: HOME | End: 2024-11-08
Payer: COMMERCIAL

## 2024-11-05 ENCOUNTER — TRANSCRIBE ORDERS (OUTPATIENT)
Age: 54
End: 2024-11-05

## 2024-11-05 DIAGNOSIS — M51.370 DEGENERATION OF INTERVERTEBRAL DISC OF LUMBOSACRAL REGION WITH DISCOGENIC BACK PAIN: Primary | ICD-10-CM

## 2024-11-05 DIAGNOSIS — M51.370 DEGENERATION OF INTERVERTEBRAL DISC OF LUMBOSACRAL REGION WITH DISCOGENIC BACK PAIN: ICD-10-CM

## 2024-11-05 PROCEDURE — 72110 X-RAY EXAM L-2 SPINE 4/>VWS: CPT

## 2025-06-24 NOTE — PROGRESS NOTES
HEMODIALYSIS TREATMENT NOTE    Date: 12/16/2017  Time: 5:47 PM    Data:  Pre Wt:     Desired Wt: 63.3 kg   Post Wt:    Weight gain:   -3kg   Weight change: 3  kg  Ultrafiltration - Post Run Net Total Removed (mL): 3000 mL  Ultrafiltration - Post Run Net Total Gain (mL): 0 mL  Vascular Access Status: Yes, secured and visible  Dialyzer Rinse: Streaked  Total Blood Volume Processed: 64  Total Dialysis (Treatment) Time:  3    Lab:   YES: HEPB    Interventions/Assesment: 3hr of HD run via LAVF without any difficulties. Total fluid removed 3kg. pt tolerated well, VS stable. See MAR for medication details. Hand off report was given to primary RN on 7A.     Plan: Continue with plan of care. Next dialysis per renal team.   There was 95% healing of your surgical site.  We reviewed the pathology which showed this to be an epidermoid cyst.  Follow-up with any additional questions or problems.

## (undated) DEVICE — PREP CHLORAPREP 26ML TINTED ORANGE  260815

## (undated) DEVICE — PACK NEURO MINOR UMMC SNE32MNMU4

## (undated) DEVICE — DRILL BIT SYN 1.5X44.5 W/6MM STOP J LATCH 317.66

## (undated) DEVICE — TUBING IRRIG CYSTO/BLADDER SET 81" LF 2C4040

## (undated) DEVICE — TUBE FEEDING 05FR 20" 461412

## (undated) DEVICE — ESU GROUND PAD ADULT W/CORD E7507

## (undated) DEVICE — SU PDS II 3-0 SH 27" Z316H

## (undated) DEVICE — INSERT FOGARTY 33MM TRACTION HYDRAJAW HYDRA33

## (undated) DEVICE — DRSG MEDIPORE 3 1/2X13 3/4" 3573

## (undated) DEVICE — SUCTION MANIFOLD DORNOCH ULTRA CART UL-CL500

## (undated) DEVICE — BLADE CLIPPER SGL USE 9680

## (undated) DEVICE — SYR 10ML FINGER CONTROL W/O NDL 309695

## (undated) DEVICE — SU VICRYL 4-0 PS-2 18" UND J496H

## (undated) DEVICE — BUR STRK EGG 4.0X44.5MM 10 FLUTE 1607-002-035

## (undated) DEVICE — SU VICRYL 3-0 SH 27" UND J416H

## (undated) DEVICE — SYR EAR BULB 3OZ 0035830

## (undated) DEVICE — SU PROLENE 6-0 RB-2DA 30" 8711H

## (undated) DEVICE — Device

## (undated) DEVICE — SU CHROMIC 4-0 SH 27" G121H

## (undated) DEVICE — SU PROLENE 5-0 RB-1DA 36"  8556H

## (undated) DEVICE — SU DERMABOND ADVANCED .7ML DNX12

## (undated) DEVICE — IMPLANTABLE DEVICE: Type: IMPLANTABLE DEVICE | Site: MANDIBLE | Status: NON-FUNCTIONAL

## (undated) DEVICE — DRILL BIT SYN 1.8X125MM  J LATCH

## (undated) DEVICE — LINEN TOWEL PACK X6 WHITE 5487

## (undated) DEVICE — SU CHROMIC 3-0 FS-2 27" 636

## (undated) DEVICE — SPONGE KITTNER 30-101

## (undated) DEVICE — SU VICRYL 3-0 SH 27" J316H

## (undated) DEVICE — CLIP HORIZON LG ORANGE 004200

## (undated) DEVICE — SU SILK 0 TIE 6X30" A306H

## (undated) DEVICE — CLIP HORIZON SM RED WIDE SLOT 001201

## (undated) DEVICE — PREP POVIDONE IODINE SCRUB 7.5% 4OZ APL82212

## (undated) DEVICE — SU PDS II 5-0 RB-1 27" Z303H

## (undated) DEVICE — SOL NACL 0.9% IRRIG 1000ML BOTTLE 2F7124

## (undated) DEVICE — SU SILK 3-0 TIE 12X30" A304H

## (undated) DEVICE — DRAIN JACKSON PRATT ROUND SIL 19FR W/TROCAR LF JP-2232

## (undated) DEVICE — PREP POVIDONE IODINE SOLUTION 10% 4OZ

## (undated) DEVICE — BLADE KNIFE SURG 11 WITH HANDLE 4-411

## (undated) DEVICE — SU SILK 1 TIE 6X30" A307H

## (undated) DEVICE — DRSG TELFA 3X8" 1238

## (undated) DEVICE — SU SILK 2-0 TIE 12X30" A305H

## (undated) DEVICE — TUBE NASOGASTRIC ENTRIFLEX 12FR 43"

## (undated) DEVICE — SU MONOCRYL 4-0 PS-2 27" UND Y426H

## (undated) DEVICE — NDL 25GA 2"  8881200441

## (undated) DEVICE — SOL NACL 0.9% INJ 1000ML BAG 07983-09

## (undated) DEVICE — CATH PLUG W/CAP 000076

## (undated) DEVICE — SU PDS II 0 TP-1 60" Z991G

## (undated) DEVICE — SPONGE LAP 18X18" X8435

## (undated) DEVICE — ADH LIQUID MASTISOL TOPICAL VIAL 2-3ML 0523-48

## (undated) DEVICE — BRUSH SURGICAL EZ SCRUB PLAIN 371603

## (undated) DEVICE — SU PROLENE 4-0 SHDA 36" 8521H

## (undated) DEVICE — DRAPE SLEEVE 599

## (undated) DEVICE — DRAPE SHEET REV FOLD 3/4 9349

## (undated) DEVICE — SPONGE SURGIFOAM 100 1974

## (undated) DEVICE — ESU ELEC NDL 1" COATED/INSULATED E1465

## (undated) DEVICE — SPONGE RAY-TEC 4X4" 7317

## (undated) DEVICE — GLOVE PROTEXIS POWDER FREE 8.5 ORTHOPEDIC 2D73ET85

## (undated) DEVICE — SOL WATER IRRIG 1000ML BOTTLE 2F7114

## (undated) DEVICE — SU SILK 4-0 TIE 12X30" A303H

## (undated) DEVICE — PREP SKIN SCRUB TRAY 4461A

## (undated) DEVICE — EYE SHIELD CORNEAL CROUCH  E5699

## (undated) DEVICE — TOOTHBRUSH ADULT NON STERILE MDS136850

## (undated) DEVICE — DRSG STERI STRIP 1/4X4" R1546

## (undated) DEVICE — DEVICE CATH STABILIZATION STATLOCK FOLEY 3-WAY FOL0105

## (undated) DEVICE — SU PROLENE 7-0 BV-1DA 30" 8703H

## (undated) DEVICE — DRSG STERI STRIP 1/2X4" R1547

## (undated) DEVICE — SU PDS II 4-0 RB-1 27" Z304H

## (undated) DEVICE — LINEN TOWEL PACK X30 5481

## (undated) DEVICE — DRAIN JACKSON PRATT RESERVOIR 100ML SU130-1305

## (undated) DEVICE — ANTIFOG SOLUTION W/FOAM PAD 31142527

## (undated) DEVICE — SPONGE BALL KERLIX ROUND XL W/O STRING LATEX 4935

## (undated) DEVICE — SOL WATER IRRIG 1000ML BOTTLE 07139-09

## (undated) DEVICE — SU ETHILON 3-0 PS-1 18" 1663H

## (undated) DEVICE — PREP CHLORAPREP W/ORANGE TINT 10.5ML 260715

## (undated) DEVICE — SU PDS II 6-0 RB-2DA 30" Z149H

## (undated) DEVICE — BASIN SET SINGLE STERILE 13752-624

## (undated) DEVICE — CATH FOLEY 3WAY 16FR 30ML SIL 73016SI

## (undated) DEVICE — CLIP HORIZON MED BLUE 002200

## (undated) DEVICE — SU VICRYL 4-0 P-3 18" UND  J494H

## (undated) DEVICE — COVER ULTRASOUND PROBE W/GEL FLEXI-FEEL 6"X58" LF  25-FF658

## (undated) DEVICE — SUTURE BOOTS 051003PBX

## (undated) DEVICE — SOL NACL 0.9% IRRIG 1000ML BOTTLE 07138-09

## (undated) DEVICE — WIPES FOLEY CARE SURESTEP PROVON DFC100

## (undated) DEVICE — DRAPE SLUSH/WARMER 66X44" ORS-320

## (undated) DEVICE — PAD CHUX UNDERPAD 23X24" 7136

## (undated) DEVICE — SU SILK 3-0 SH CR 8X18" C013D

## (undated) DEVICE — IMP SCR SYN CORTEX 2.0X06MM W/PLUSDRIVE TI 401.063: Type: IMPLANTABLE DEVICE | Site: MANDIBLE | Status: NON-FUNCTIONAL

## (undated) DEVICE — TAPE CLOTH 2" CARDINAL 3TRCL02

## (undated) DEVICE — LINEN TOWEL PACK X5 5464

## (undated) RX ORDER — SODIUM CHLORIDE 9 MG/ML
INJECTION, SOLUTION INTRAVENOUS
Status: DISPENSED
Start: 2019-09-19

## (undated) RX ORDER — PROPOFOL 10 MG/ML
INJECTION, EMULSION INTRAVENOUS
Status: DISPENSED
Start: 2017-12-15

## (undated) RX ORDER — AMLODIPINE BESYLATE 5 MG/1
TABLET ORAL
Status: DISPENSED
Start: 2018-03-09

## (undated) RX ORDER — THIAMINE HYDROCHLORIDE 100 MG/ML
INJECTION, SOLUTION INTRAMUSCULAR; INTRAVENOUS
Status: DISPENSED
Start: 2019-05-08

## (undated) RX ORDER — LIDOCAINE HYDROCHLORIDE 10 MG/ML
INJECTION, SOLUTION EPIDURAL; INFILTRATION; INTRACAUDAL; PERINEURAL
Status: DISPENSED
Start: 2019-05-03

## (undated) RX ORDER — FENTANYL CITRATE 50 UG/ML
INJECTION, SOLUTION INTRAMUSCULAR; INTRAVENOUS
Status: DISPENSED
Start: 2017-12-15

## (undated) RX ORDER — MAGNESIUM CARB/ALUMINUM HYDROX 105-160MG
TABLET,CHEWABLE ORAL
Status: DISPENSED
Start: 2019-07-09

## (undated) RX ORDER — SODIUM CHLORIDE 9 MG/ML
INJECTION, SOLUTION INTRAVENOUS
Status: DISPENSED
Start: 2019-03-06

## (undated) RX ORDER — ONDANSETRON 2 MG/ML
INJECTION INTRAMUSCULAR; INTRAVENOUS
Status: DISPENSED
Start: 2019-09-19

## (undated) RX ORDER — SODIUM CHLORIDE 9 MG/ML
INJECTION, SOLUTION INTRAVENOUS
Status: DISPENSED
Start: 2019-05-03

## (undated) RX ORDER — LORAZEPAM 2 MG/ML
INJECTION INTRAMUSCULAR
Status: DISPENSED
Start: 2019-01-06

## (undated) RX ORDER — METHYLPREDNISOLONE SODIUM SUCCINATE 125 MG/2ML
INJECTION, POWDER, LYOPHILIZED, FOR SOLUTION INTRAMUSCULAR; INTRAVENOUS
Status: DISPENSED
Start: 2019-02-04

## (undated) RX ORDER — THIAMINE HYDROCHLORIDE 100 MG/ML
INJECTION, SOLUTION INTRAMUSCULAR; INTRAVENOUS
Status: DISPENSED
Start: 2019-05-21

## (undated) RX ORDER — ALBUTEROL SULFATE 0.83 MG/ML
SOLUTION RESPIRATORY (INHALATION)
Status: DISPENSED
Start: 2018-05-14

## (undated) RX ORDER — BUPIVACAINE HYDROCHLORIDE AND EPINEPHRINE 5; 5 MG/ML; UG/ML
INJECTION, SOLUTION PERINEURAL
Status: DISPENSED
Start: 2019-05-29

## (undated) RX ORDER — SODIUM CHLORIDE 9 MG/ML
INJECTION, SOLUTION INTRAVENOUS
Status: DISPENSED
Start: 2019-09-20

## (undated) RX ORDER — IPRATROPIUM BROMIDE AND ALBUTEROL SULFATE 2.5; .5 MG/3ML; MG/3ML
SOLUTION RESPIRATORY (INHALATION)
Status: DISPENSED
Start: 2019-02-04

## (undated) RX ORDER — SUMATRIPTAN 6 MG/.5ML
INJECTION, SOLUTION SUBCUTANEOUS
Status: DISPENSED
Start: 2018-03-09

## (undated) RX ORDER — ONDANSETRON 2 MG/ML
INJECTION INTRAMUSCULAR; INTRAVENOUS
Status: DISPENSED
Start: 2017-12-15

## (undated) RX ORDER — FENTANYL CITRATE 50 UG/ML
INJECTION, SOLUTION INTRAMUSCULAR; INTRAVENOUS
Status: DISPENSED
Start: 2018-08-02

## (undated) RX ORDER — SODIUM CHLORIDE 9 MG/ML
INJECTION, SOLUTION INTRAVENOUS
Status: DISPENSED
Start: 2018-05-30

## (undated) RX ORDER — DIPHENHYDRAMINE HYDROCHLORIDE 50 MG/ML
INJECTION INTRAMUSCULAR; INTRAVENOUS
Status: DISPENSED
Start: 2019-01-06

## (undated) RX ORDER — HEPARIN SODIUM 1000 [USP'U]/ML
INJECTION, SOLUTION INTRAVENOUS; SUBCUTANEOUS
Status: DISPENSED
Start: 2018-05-30

## (undated) RX ORDER — WATER 10 ML/10ML
INJECTION INTRAMUSCULAR; INTRAVENOUS; SUBCUTANEOUS
Status: DISPENSED
Start: 2018-10-19

## (undated) RX ORDER — PANTOPRAZOLE SODIUM 40 MG/1
TABLET, DELAYED RELEASE ORAL
Status: DISPENSED
Start: 2018-03-09

## (undated) RX ORDER — SODIUM BICARBONATE 650 MG/1
TABLET ORAL
Status: DISPENSED
Start: 2019-05-21

## (undated) RX ORDER — FENTANYL CITRATE 50 UG/ML
INJECTION, SOLUTION INTRAMUSCULAR; INTRAVENOUS
Status: DISPENSED
Start: 2018-05-30

## (undated) RX ORDER — HALOPERIDOL 5 MG/ML
INJECTION INTRAMUSCULAR
Status: DISPENSED
Start: 2019-01-06

## (undated) RX ORDER — SODIUM CHLORIDE 9 MG/ML
INJECTION, SOLUTION INTRAVENOUS
Status: DISPENSED
Start: 2018-10-20

## (undated) RX ORDER — SULFAMETHOXAZOLE/TRIMETHOPRIM 800-160 MG
TABLET ORAL
Status: DISPENSED
Start: 2019-05-21

## (undated) RX ORDER — HYDROMORPHONE HYDROCHLORIDE 1 MG/ML
INJECTION, SOLUTION INTRAMUSCULAR; INTRAVENOUS; SUBCUTANEOUS
Status: DISPENSED
Start: 2018-08-02

## (undated) RX ORDER — SODIUM CHLORIDE 9 MG/ML
INJECTION, SOLUTION INTRAVENOUS
Status: DISPENSED
Start: 2019-05-21

## (undated) RX ORDER — PANTOPRAZOLE SODIUM 40 MG/10ML
INJECTION, POWDER, LYOPHILIZED, FOR SOLUTION INTRAVENOUS
Status: DISPENSED
Start: 2019-05-09

## (undated) RX ORDER — CEFAZOLIN SODIUM 2 G/100ML
INJECTION, SOLUTION INTRAVENOUS
Status: DISPENSED
Start: 2017-12-15

## (undated) RX ORDER — PROCHLORPERAZINE MALEATE 5 MG
TABLET ORAL
Status: DISPENSED
Start: 2018-03-09

## (undated) RX ORDER — POLYETHYLENE GLYCOL 3350 17 G/17G
POWDER, FOR SOLUTION ORAL
Status: DISPENSED
Start: 2019-05-11

## (undated) RX ORDER — PREDNISONE 10 MG/1
TABLET ORAL
Status: DISPENSED
Start: 2019-05-21

## (undated) RX ORDER — ACETAMINOPHEN 500 MG
TABLET ORAL
Status: DISPENSED
Start: 2019-02-04

## (undated) RX ORDER — DEXTROSE, SODIUM CHLORIDE, SODIUM LACTATE, POTASSIUM CHLORIDE, AND CALCIUM CHLORIDE 5; .6; .31; .03; .02 G/100ML; G/100ML; G/100ML; G/100ML; G/100ML
INJECTION, SOLUTION INTRAVENOUS
Status: DISPENSED
Start: 2018-05-30

## (undated) RX ORDER — SODIUM CHLORIDE 450 MG/100ML
INJECTION, SOLUTION INTRAVENOUS
Status: DISPENSED
Start: 2018-05-30

## (undated) RX ORDER — PROPOFOL 10 MG/ML
INJECTION, EMULSION INTRAVENOUS
Status: DISPENSED
Start: 2018-08-02

## (undated) RX ORDER — CEFUROXIME SODIUM 1.5 G/16ML
INJECTION, POWDER, FOR SOLUTION INTRAVENOUS
Status: DISPENSED
Start: 2018-05-30

## (undated) RX ORDER — HYDRALAZINE HYDROCHLORIDE 20 MG/ML
INJECTION INTRAMUSCULAR; INTRAVENOUS
Status: DISPENSED
Start: 2017-12-15

## (undated) RX ORDER — LABETALOL HYDROCHLORIDE 5 MG/ML
INJECTION, SOLUTION INTRAVENOUS
Status: DISPENSED
Start: 2017-12-15

## (undated) RX ORDER — PAPAVERINE HYDROCHLORIDE 30 MG/ML
INJECTION INTRAMUSCULAR; INTRAVENOUS
Status: DISPENSED
Start: 2018-05-30

## (undated) RX ORDER — HYDROMORPHONE HYDROCHLORIDE 1 MG/ML
INJECTION, SOLUTION INTRAMUSCULAR; INTRAVENOUS; SUBCUTANEOUS
Status: DISPENSED
Start: 2017-12-15

## (undated) RX ORDER — BUDESONIDE 0.5 MG/2ML
INHALANT ORAL
Status: DISPENSED
Start: 2019-02-04

## (undated) RX ORDER — OXYCODONE AND ACETAMINOPHEN 5; 325 MG/1; MG/1
TABLET ORAL
Status: DISPENSED
Start: 2018-09-08

## (undated) RX ORDER — DEXAMETHASONE SODIUM PHOSPHATE 4 MG/ML
INJECTION, SOLUTION INTRA-ARTICULAR; INTRALESIONAL; INTRAMUSCULAR; INTRAVENOUS; SOFT TISSUE
Status: DISPENSED
Start: 2017-12-15

## (undated) RX ORDER — PHENYLEPHRINE HCL IN 0.9% NACL 1 MG/10 ML
SYRINGE (ML) INTRAVENOUS
Status: DISPENSED
Start: 2018-08-02

## (undated) RX ORDER — SODIUM CHLORIDE 9 MG/ML
INJECTION, SOLUTION INTRAVENOUS
Status: DISPENSED
Start: 2017-12-15

## (undated) RX ORDER — DIPHENHYDRAMINE HYDROCHLORIDE 50 MG/ML
INJECTION INTRAMUSCULAR; INTRAVENOUS
Status: DISPENSED
Start: 2019-09-19

## (undated) RX ORDER — CEFTRIAXONE SODIUM 1 G/50ML
INJECTION, SOLUTION INTRAVENOUS
Status: DISPENSED
Start: 2019-02-04

## (undated) RX ORDER — OXYCODONE HYDROCHLORIDE 5 MG/1
TABLET ORAL
Status: DISPENSED
Start: 2019-03-06

## (undated) RX ORDER — LORAZEPAM 1 MG/1
TABLET ORAL
Status: DISPENSED
Start: 2018-03-09

## (undated) RX ORDER — VERAPAMIL HYDROCHLORIDE 2.5 MG/ML
INJECTION, SOLUTION INTRAVENOUS
Status: DISPENSED
Start: 2018-05-30

## (undated) RX ORDER — METOPROLOL TARTRATE 25 MG/1
TABLET, FILM COATED ORAL
Status: DISPENSED
Start: 2017-12-15

## (undated) RX ORDER — ACETAMINOPHEN 500 MG
TABLET ORAL
Status: DISPENSED
Start: 2019-09-20

## (undated) RX ORDER — KETOROLAC TROMETHAMINE 30 MG/ML
INJECTION, SOLUTION INTRAMUSCULAR; INTRAVENOUS
Status: DISPENSED
Start: 2019-02-04

## (undated) RX ORDER — LIDOCAINE HYDROCHLORIDE 20 MG/ML
INJECTION, SOLUTION EPIDURAL; INFILTRATION; INTRACAUDAL; PERINEURAL
Status: DISPENSED
Start: 2017-12-15

## (undated) RX ORDER — FENTANYL CITRATE 50 UG/ML
INJECTION, SOLUTION INTRAMUSCULAR; INTRAVENOUS
Status: DISPENSED
Start: 2019-05-03

## (undated) RX ORDER — IBUPROFEN 400 MG/1
TABLET, FILM COATED ORAL
Status: DISPENSED
Start: 2019-09-20

## (undated) RX ORDER — OXYCODONE AND ACETAMINOPHEN 5; 325 MG/1; MG/1
TABLET ORAL
Status: DISPENSED
Start: 2019-05-01

## (undated) RX ORDER — HYDRALAZINE HYDROCHLORIDE 20 MG/ML
INJECTION INTRAMUSCULAR; INTRAVENOUS
Status: DISPENSED
Start: 2018-05-30

## (undated) RX ORDER — DIPHENHYDRAMINE HYDROCHLORIDE 50 MG/ML
INJECTION INTRAMUSCULAR; INTRAVENOUS
Status: DISPENSED
Start: 2018-10-19

## (undated) RX ORDER — OLANZAPINE 10 MG/2ML
INJECTION, POWDER, FOR SOLUTION INTRAMUSCULAR
Status: DISPENSED
Start: 2019-09-19

## (undated) RX ORDER — WATER 10 ML/10ML
INJECTION INTRAMUSCULAR; INTRAVENOUS; SUBCUTANEOUS
Status: DISPENSED
Start: 2019-09-19

## (undated) RX ORDER — OLANZAPINE 10 MG/2ML
INJECTION, POWDER, FOR SOLUTION INTRAMUSCULAR
Status: DISPENSED
Start: 2018-10-19